# Patient Record
Sex: FEMALE | Race: WHITE | NOT HISPANIC OR LATINO | Employment: OTHER | URBAN - METROPOLITAN AREA
[De-identification: names, ages, dates, MRNs, and addresses within clinical notes are randomized per-mention and may not be internally consistent; named-entity substitution may affect disease eponyms.]

---

## 2017-01-10 ENCOUNTER — INFUSION (OUTPATIENT)
Dept: INFUSION THERAPY | Facility: HOSPITAL | Age: 52
End: 2017-01-10
Attending: INTERNAL MEDICINE
Payer: MEDICARE

## 2017-01-10 ENCOUNTER — OFFICE VISIT (OUTPATIENT)
Dept: HEMATOLOGY/ONCOLOGY | Facility: CLINIC | Age: 52
End: 2017-01-10
Payer: MEDICARE

## 2017-01-10 VITALS
BODY MASS INDEX: 25.3 KG/M2 | DIASTOLIC BLOOD PRESSURE: 60 MMHG | OXYGEN SATURATION: 100 % | WEIGHT: 161.19 LBS | SYSTOLIC BLOOD PRESSURE: 90 MMHG | TEMPERATURE: 98 F | HEIGHT: 67 IN | HEART RATE: 118 BPM | RESPIRATION RATE: 18 BRPM

## 2017-01-10 VITALS
DIASTOLIC BLOOD PRESSURE: 66 MMHG | HEIGHT: 67 IN | BODY MASS INDEX: 25.3 KG/M2 | WEIGHT: 161.19 LBS | HEART RATE: 77 BPM | SYSTOLIC BLOOD PRESSURE: 98 MMHG

## 2017-01-10 DIAGNOSIS — C50.111 MALIGNANT NEOPLASM OF CENTRAL PORTION OF RIGHT FEMALE BREAST: Primary | ICD-10-CM

## 2017-01-10 DIAGNOSIS — I95.1 ORTHOSTATIC HYPOTENSION: ICD-10-CM

## 2017-01-10 DIAGNOSIS — D64.81 ANTINEOPLASTIC CHEMOTHERAPY INDUCED ANEMIA: ICD-10-CM

## 2017-01-10 DIAGNOSIS — Z86.73 HISTORY OF CVA (CEREBROVASCULAR ACCIDENT): ICD-10-CM

## 2017-01-10 DIAGNOSIS — T45.1X5A ANTINEOPLASTIC CHEMOTHERAPY INDUCED ANEMIA: ICD-10-CM

## 2017-01-10 DIAGNOSIS — Z51.11 CHEMOTHERAPY MANAGEMENT, ENCOUNTER FOR: ICD-10-CM

## 2017-01-10 PROCEDURE — 96413 CHEMO IV INFUSION 1 HR: CPT | Mod: PO

## 2017-01-10 PROCEDURE — 96367 TX/PROPH/DG ADDL SEQ IV INF: CPT | Mod: PO

## 2017-01-10 PROCEDURE — 99215 OFFICE O/P EST HI 40 MIN: CPT | Mod: 25,S$PBB,, | Performed by: INTERNAL MEDICINE

## 2017-01-10 PROCEDURE — 63600175 PHARM REV CODE 636 W HCPCS: Mod: PO | Performed by: INTERNAL MEDICINE

## 2017-01-10 PROCEDURE — 96361 HYDRATE IV INFUSION ADD-ON: CPT | Mod: PO

## 2017-01-10 PROCEDURE — 96375 TX/PRO/DX INJ NEW DRUG ADDON: CPT | Mod: PO

## 2017-01-10 PROCEDURE — 25000003 PHARM REV CODE 250: Mod: PO | Performed by: INTERNAL MEDICINE

## 2017-01-10 PROCEDURE — 96415 CHEMO IV INFUSION ADDL HR: CPT | Mod: PO

## 2017-01-10 PROCEDURE — 99999 PR PBB SHADOW E&M-EST. PATIENT-LVL III: CPT | Mod: PBBFAC,,, | Performed by: INTERNAL MEDICINE

## 2017-01-10 PROCEDURE — 96377 APPLICATON ON-BODY INJECTOR: CPT | Mod: PO,59

## 2017-01-10 RX ORDER — HEPARIN 100 UNIT/ML
500 SYRINGE INTRAVENOUS
Status: CANCELLED | OUTPATIENT
Start: 2017-01-10

## 2017-01-10 RX ORDER — PALONOSETRON 0.05 MG/ML
0.25 INJECTION, SOLUTION INTRAVENOUS
Status: COMPLETED | OUTPATIENT
Start: 2017-01-10 | End: 2017-01-10

## 2017-01-10 RX ORDER — FAMOTIDINE 20 MG/50ML
20 INJECTION, SOLUTION INTRAVENOUS
Status: COMPLETED | OUTPATIENT
Start: 2017-01-10 | End: 2017-01-10

## 2017-01-10 RX ORDER — SODIUM CHLORIDE 0.9 % (FLUSH) 0.9 %
10 SYRINGE (ML) INJECTION
Status: CANCELLED | OUTPATIENT
Start: 2017-01-10

## 2017-01-10 RX ORDER — HEPARIN 100 UNIT/ML
500 SYRINGE INTRAVENOUS
Status: DISCONTINUED | OUTPATIENT
Start: 2017-01-10 | End: 2017-01-10 | Stop reason: HOSPADM

## 2017-01-10 RX ORDER — FAMOTIDINE 20 MG/50ML
20 INJECTION, SOLUTION INTRAVENOUS
Status: CANCELLED
Start: 2017-01-10

## 2017-01-10 RX ORDER — PALONOSETRON 0.05 MG/ML
0.25 INJECTION, SOLUTION INTRAVENOUS
Status: CANCELLED
Start: 2017-01-10

## 2017-01-10 RX ADMIN — PALONOSETRON HYDROCHLORIDE 0.25 MG: 0.25 INJECTION INTRAVENOUS at 09:01

## 2017-01-10 RX ADMIN — HEPARIN 500 UNITS: 100 SYRINGE at 03:01

## 2017-01-10 RX ADMIN — SODIUM CHLORIDE 1000 ML: 900 INJECTION, SOLUTION INTRAVENOUS at 09:01

## 2017-01-10 RX ADMIN — DEXAMETHASONE SODIUM PHOSPHATE: 4 INJECTION, SOLUTION INTRA-ARTICULAR; INTRALESIONAL; INTRAMUSCULAR; INTRAVENOUS; SOFT TISSUE at 10:01

## 2017-01-10 RX ADMIN — FAMOTIDINE 20 MG: 20 INJECTION, SOLUTION INTRAVENOUS at 09:01

## 2017-01-10 RX ADMIN — DIPHENHYDRAMINE HYDROCHLORIDE: 50 INJECTION INTRAMUSCULAR; INTRAVENOUS at 10:01

## 2017-01-10 RX ADMIN — PACLITAXEL 324 MG: 6 INJECTION, SOLUTION INTRAVENOUS at 10:01

## 2017-01-10 RX ADMIN — PEGFILGRASTIM 6 MG: KIT SUBCUTANEOUS at 03:01

## 2017-01-10 NOTE — MR AVS SNAPSHOT
Patient Information     Patient Name Sex     Carole Moore Female 1965      Visit Information        Provider Department Dept Phone Center    1/10/2017 8:45 AM [facility] Chemo Infusion [facility] Chemotherapy Infusion [phone] [facility]      Patient Instructions     None      Your Current Medications Are     aspirin 81 MG Chew    atorvastatin (LIPITOR) 10 MG tablet    LORATADINE (CLARITIN ORAL)    ondansetron (ZOFRAN) 8 MG tablet    prochlorperazine (COMPAZINE) 10 MG tablet    tramadol (ULTRAM) 50 mg tablet      Facility-Administered Medications     dexamethasone (DECADRON) 20 mg in sodium chloride 0.9% IVPB    diphenhydrAMINE (BENADRYL) 50 mg in sodium chloride 0.9% 100 mL IVPB    famotidine IVPB 20 mg    heparin, porcine (PF) 100 unit/mL injection flush 500 Units    paclitaxel (TAXOL) 175 mg/m2 = 324 mg in sodium chloride 0.9% 554 mL chemo infusion    palonosetron injection 0.25 mg    pegfilgrastim (NEULASTA (ON BODY INJECTOR)) injection 6 mg    sodium chloride 0.9% bolus 1,000 mL    diphenhydramine IVPB 50 mg (Discontinued)      Appointments for Next Year     2017  8:05 AM NON FASTING LAB (5 min.) [facility]    Arrive at check-in approximately 15 minutes before your scheduled appointment time. Bring all outside medical records and imaging, along with a list of your current medications and insurance card.    (off [employer]) 2nd floor    2017  8:20 AM ESTABLISHED PATIENT (20 min.) [facility] - Hemotology Oncology Alessia Moss MD    Arrive at check-in approximately 15 minutes before your scheduled appointment time. Bring all outside medical records and imaging, along with a list of your current medications and insurance card.    (off [employer]) 3rd Floor    2017  9:00 AM INFUSION 240 MIN (240 min.) [facility] [address]    Arrive at check-in approximately 15 minutes before your scheduled appointment time. Bring all  outside medical records and imaging, along with a list of your current medications and insurance card.    2/24/2017  9:30 AM 2 D ECHO (45 min.) [facility] -Cardiology ECHOCARDIOGRAM, [facility]    INSTRUCTIONS FOR A COMPLETE TWO-DIMENSIONAL ECHOCARDIOGRAM EXAM OR LIMITED 2D/M-MODE ECHOCARDIOGRAM EXAM You have been scheduled for a Two-Dimensional Echocardiogram. This test uses ultrasound waves (harmless sound waves) to give detailed images of heart size, function and structure. NO PREPARATION IS NECESSARY. YOU MAY EAT AND TAKE YOUR MEDICATIONS AS USUAL. Please wear a two-piece outfit. You will be asked to undress from the waist up. Women will put on a patient gown opening to the front. Small pads (electrodes) will be put on your chest to monitor your heart beat. You will be asked to lie on your left side, and will need to remain quiet during the exam. A transducer coated with cool gel will be moved firmly over your chest. This transducer creates sound waves that make images of your heart. A computer changes the sound waves into images that are seen on a screen. You may hear loud noises from the echo machine; this is a normal occurrence. You may be asked to exhale and hold your breath for a few seconds. Air in your lungs can affect the images.  The images of your heart are recorded on video tape so the cardiologist can review them later. Your doctor will be informed of the test results. The test will take approximately 30 minutes to complete. If you are unable to keep your appointment, please call our office to reschedule. Please note that family members or friends are not  allowed to accompany you while you are in the testing area. Check your appointment slip for the location where the test will be done.    (off [facility]) 3rd floor    3/2/2017  1:40 PM ESTABLISHED PATIENT (20 min.) [facility] - Cardiology Fredy Mei MD    Arrive at check-in approximately 15 minutes before your scheduled appointment time. Bring all  "outside medical records and imaging, along with a list of your current medications and insurance card.    (off [facility]) 3rd floor         Default Flowsheet Data (last 24 hours)      Amb Complex Vitals Raffy        01/10/17 1142 01/10/17 1124 01/10/17 0918 01/10/17 0841       Measurements    Weight   73.1 kg (161 lb 2.5 oz) 73.1 kg (161 lb 2.5 oz)     Height   5' 7" (1.702 m) 5' 7" (1.702 m)     BSA (Calculated - sq m)   1.86 sq meters 1.86 sq meters     BMI (Calculated)   25.3 25.3     BP 96/65 (!)  96/59  90/60     Temp    97.8 °F (36.6 °C)     Pulse 77 73  (!)  118     Resp    18     SpO2    100 %     Pain Assessment    Pain Score   Zero Zero             Allergies     No Known Allergies      Medications You Received from 01/09/2017 1517 to 01/10/2017 1517        Date/Time Order Dose Route Action     01/10/2017 1023 dexamethasone (DECADRON) 20 mg in sodium chloride 0.9% IVPB   Intravenous New Bag     01/10/2017 1000 diphenhydrAMINE (BENADRYL) 50 mg in sodium chloride 0.9% 100 mL IVPB   Intravenous New Bag     01/10/2017 0941 famotidine IVPB 20 mg 20 mg Intravenous New Bag     01/10/2017 1059 paclitaxel (TAXOL) 175 mg/m2 = 324 mg in sodium chloride 0.9% 554 mL chemo infusion 324 mg Intravenous New Bag     01/10/2017 0940 palonosetron injection 0.25 mg 0.25 mg Intravenous Given     01/10/2017 1508 pegfilgrastim (NEULASTA (ON BODY INJECTOR)) injection 6 mg 6 mg Subcutaneous Given     01/10/2017 0935 sodium chloride 0.9% bolus 1,000 mL 1,000 mL Intravenous New Bag      Current Discharge Medication List     Cannot display discharge medications since this is not an admission.      "

## 2017-01-10 NOTE — PLAN OF CARE
Problem: Patient Care Overview  Goal: Plan of Care Review  Outcome: Ongoing (interventions implemented as appropriate)  Pt is without complaint at present

## 2017-01-10 NOTE — PROGRESS NOTES
Hematology/Oncology Established Visit    Reason for Visit / CC: Breast cancer    [facility] Diagnosis: Breast cancer    Stage: Clinical Stage IIB ([id number])    Pathology: 10/27/16 Right breast mass, incisional biopsy:  Invasive lobular carcinoma, intermediate grade (3,2,1), see note. Virtually 100% of the cells of the infiltrating carcinoma are strongly both nuclear estrogen receptor and nuclear  progesterone receptor positive. They are HER-2 negative.    Prior Treatment: None    Current Treatment: Neoadjuvant chemotherapy with dose-dense AC-T    History of Present Illness:  Ms. 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 She has had an indention in her right breast with some nipple retraction for the past year, but mammogram and u/s last year were normal. This year, imaging and physical exam revealed denser tissue with more pronounced nipple retraction. Dr. Faria performed incisional LN biopsy of the R breast, which revealed invasive lobular carcinoma. She had a hysterectomy in 2008. She does get occasional hot flashes. Colonoscopy approx age 46 was normal. No melena/hematochezia. No unintentional weight loss. No bone pain.    Today, patient comes in for follow up of breast cancer. She is tearful this morning and states that she has been extremely fatigued after her 4th AC chemotherapy cycle. She has been taking 2 naps a day, which is very unusual for her. No nausea, vomiting, diarrhea, fevers, chills, sweats, cough.  Her constipation and hemorrhoids have returned. Pt endorses she is not drinking much water. No fevers, chills, sweats.    ROS:  General:  No wt loss, fever/chills, fatigue, night sweats  Eyes: No vision problems, pain or inflammation.   Ears/Nose: No difficultly hearing, ear pain, rhinorrhea, or epistaxis  Oropharynx: No ulcers, dysphagia, or odynophagia  Cardiovascular: No chest  pains, sob, PND or dyspnea on exertion  Pulmonary: No cough, sob, hemoptysis  Gastrointestional: No n/v/d, melena, hematochezia, or change in bowel habits  : No dysuria, hematuria, pelvic pain or flank pain  Musculoskeletal: No myalgias,  or arthralgias +R arm weakness and numbness  Neurological: No headaches, focal deficits, or seizure activity  Endocrine: No heat or cold intolerance   Skin: No rashes pruritus, or lesions  Psychiatric: No symptoms of mood disorders  Heme/Lymph: No lymph node enlargment    Past Medical History   Diagnosis Date    Cancer     CVA (cerebral infarction)     Diverticulosis     Hyperlipidemia     Nausea after anesthesia     Paralysis      right side    Stroke      Past Surgical History:  Partial hysterectomy 11/2008    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 No illicits.  Social History     Social History    Marital status:      Spouse name: N/A    Number of children: N/A    Years of education: N/A     Social History Main Topics    Smoking status: Never Smoker    Smokeless tobacco: Never Used    Alcohol use No    Drug use: No    Sexual activity: No     Other Topics Concern    Are You Pregnant Or Think You May Be? No    Breast-Feeding No     Social History Narrative       Family History: family history includes Breast cancer in her paternal aunt; Colon cancer in her father and mother; Melanoma in her maternal uncle. 2 Paternal aunts diagnosed with breast cancer age 60s. Mom had colon cancer age late 60s, father had colon cancer age late 60s. Maternal uncle with melanoma age 45. Maternal uncle with lung cancer age 59.    Physical Exam:  Vitals:    01/10/17 0841   BP: 90/60   Pulse: (!) 118   Resp: 18   Temp: 97.8 °F (36.6 °C)     Body mass index is 25.24 kg/(m^2).  General:  AAOx4, no acute distress  HEENT: EOMI. Normocephalic and atraumatic. No maxillary sinus tenderness. External auditory canals clear and TMs intact without  lesions. Nasal and oral mucosal membranes moist. Normal dentition and gums.   Neck: no LAD, thyromegaly, normal ROM  Breast: Left breast without any masses, skin changes. Right breast with central dense mass approx 1.5cm horiz by 2cm vertical and less nipple retraction. No cervical, supraclav, axillary LAD.  Pulmonary: Bilaterally clear to auscultation, Normal effort with no accessory muscle use, no wheezes/rales/rhonchi  CV: Normal rate, regular rhythm, no murmurs/rubs/gallops, no edema  ABD:  Soft, nontender, nondistended, no mass, and without hepatosplenomegaly   Ext: No clubbing, cyanosis, or edema, normal ROM  Skin: No rashes, lesions, bruising or petechiae  Neurological: CN II to XII grossly intact, normal coordination  +R arm with 0/5 strength, mild numbness. R ankle with foot drop requiring brace.  Psychiatric:  Normal affect and judgement +tearful  Hem/Lymph:  No submandibular, cervical, supraclavicular, axillary LAD.    Labs:    Lab Results   Component Value Date    WBC 7.35 01/10/2017    HGB 10.1 (L) 01/10/2017    HCT 30.3 (L) 01/10/2017    MCV 93 01/10/2017     01/10/2017     Lab Results   Component Value Date     12/27/2016    K 3.4 (L) 12/27/2016     12/27/2016    CO2 27 12/27/2016    BUN 12 12/27/2016    CREATININE 0.7 12/27/2016    CALCIUM 8.9 12/27/2016    ANIONGAP 7 (L) 12/27/2016    ESTGFRAFRICA >60 12/27/2016    EGFRNONAA >60 12/27/2016     Lab Results   Component Value Date    ALT 19 12/27/2016    AST 13 12/27/2016    ALKPHOS 82 12/27/2016    BILITOT 0.4 12/27/2016       No results found for: IRON, TIBC, FERRITIN, SATURATEDIRO  No results found for: LESSRLAL84  No results found for: FOLATE  Lab Results   Component Value Date    TSH 0.680 09/15/2015       Imaging:  Mammogram 10/10/16: The breasts are heterogeneously dense.  This may lower the sensitivity of mammography.  There is global asymmetry seen in the right breast.  Increased density and contraction of fibroglandular  tissue on the right with increased prominence  of nipple retraction.  No defiinite mass or suspicious calcifications. Recommend ultrasound.  Left breast stable without  additional evidence of dominant mass, architectural distortion or suspicious calcification.  Results of ultrasound and/or mammogram should not preclude biopsy of any clinically palpable and/or suspicious nodule or mass. Please correlate with exam.  Digital tomosynthesis was performed and used in the interpretation of the images. Images were evaluated with a Computer Aided Detection (CAD) system.  Impression  Global asymmetry in the right breast requires additional evaluation. Clinical correlation of finding is recommended. An ultrasound exam is recommended.    PET 11/9/16:  Hypermetabolic uptake within patient's known right breast carcinoma with a maximum SUV of 3.5.  No findings to suggest metastatic disease.    Assessment / Plan:  Carole Moore is a 51 y.o. female who comes in with right breast cancer.    1. Right-sided breast cancer: Invasive lobular carcinoma, ER/AZ positive, [id number] neg. BRCA negative. Given large mass on physical examination as well as intraoperative findings, I agree that this patient would benefit from neoadjuvant chemotherapy. Have discussed and consented patient for dose-dense AC-T regimen. Will plan for neoadjuvant chemotherapy, followed by mastectomy and SLND. Physical exam demonstates response.  -- Proceed with cycle 1, day 1 of dose-dense Taxol today. Neulasta OBI today.   -- Return in 2 weeks for cycle 2    2. Cardiotoxic medication monitoring: Given use of anthracycline, will monitor heart function. Echo on 11/10/16 shows EF 55-60%.  -- Cardio-oncology is following.    3. 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 No irinotecan can be given. Monitor Tylenol use.    4. H/o CVA: With residual R-sided weakness. On Aspirin, statin.    5. Constipation: Likely related to Zofran. I  recommend discontinuing Zofran and using Compazine prn. Discussed with patient to increase fluids and use Senna daily prn.    6. Chemotherapy induced anemia: Will continue to monitor closely.    7. Orthostatic hypotension: 2/2 dehydration. Will administer 1 liter NS over 1 hr today.    Alessia Moss M.D.  Hematology Oncology

## 2017-01-12 ENCOUNTER — INFUSION (OUTPATIENT)
Dept: INFUSION THERAPY | Facility: HOSPITAL | Age: 52
End: 2017-01-12
Attending: INTERNAL MEDICINE
Payer: COMMERCIAL

## 2017-01-12 VITALS — DIASTOLIC BLOOD PRESSURE: 64 MMHG | TEMPERATURE: 97 F | HEART RATE: 104 BPM | SYSTOLIC BLOOD PRESSURE: 100 MMHG

## 2017-01-12 DIAGNOSIS — C50.111 MALIGNANT NEOPLASM OF CENTRAL PORTION OF RIGHT FEMALE BREAST: ICD-10-CM

## 2017-01-12 DIAGNOSIS — I95.1 ORTHOSTATIC HYPOTENSION: Primary | ICD-10-CM

## 2017-01-12 PROCEDURE — 96360 HYDRATION IV INFUSION INIT: CPT | Mod: PO

## 2017-01-12 PROCEDURE — 96361 HYDRATE IV INFUSION ADD-ON: CPT | Mod: PO

## 2017-01-12 PROCEDURE — 25000003 PHARM REV CODE 250: Mod: PO | Performed by: INTERNAL MEDICINE

## 2017-01-12 RX ORDER — HEPARIN 100 UNIT/ML
500 SYRINGE INTRAVENOUS
Status: CANCELLED | OUTPATIENT
Start: 2017-01-12

## 2017-01-12 RX ORDER — HEPARIN 100 UNIT/ML
500 SYRINGE INTRAVENOUS
Status: COMPLETED | OUTPATIENT
Start: 2017-01-12 | End: 2017-01-12

## 2017-01-12 RX ORDER — SODIUM CHLORIDE 0.9 % (FLUSH) 0.9 %
10 SYRINGE (ML) INJECTION
Status: CANCELLED | OUTPATIENT
Start: 2017-01-12

## 2017-01-12 RX ADMIN — HEPARIN 500 UNITS: 100 SYRINGE at 11:01

## 2017-01-12 RX ADMIN — SODIUM CHLORIDE: 900 INJECTION, SOLUTION INTRAVENOUS at 09:01

## 2017-01-12 NOTE — MR AVS SNAPSHOT
Patient Information     Patient Name Sex     Carole Moore Female 1965      Visit Information        Provider Department Dept Phone Center    2017 9:00 AM [facility] Chemo Infusion [facility] Chemotherapy Infusion [phone] [facility]      Patient Instructions     None      Your Current Medications Are     aspirin 81 MG Chew    atorvastatin (LIPITOR) 10 MG tablet    LORATADINE (CLARITIN ORAL)    ondansetron (ZOFRAN) 8 MG tablet    prochlorperazine (COMPAZINE) 10 MG tablet    tramadol (ULTRAM) 50 mg tablet      Facility-Administered Medications     sodium chloride 0.9% 1,000 mL infusion      Appointments for Next Year     2017  8:45 AM INFUSION 120 MIN (120 min.) [facility] CHAIR 06 [facility]    Arrive at check-in approximately 15 minutes before your scheduled appointment time. Bring all outside medical records and imaging, along with a list of your current medications and insurance card.    2017  8:05 AM NON FASTING LAB (5 min.) [facility] LABORATORY [facility]    Arrive at check-in approximately 15 minutes before your scheduled appointment time. Bring all outside medical records and imaging, along with a list of your current medications and insurance card.    (off [employer]) 2nd floor    2017  8:20 AM ESTABLISHED PATIENT (20 min.) [facility] Hemotology Oncology Alessia Moss MD    Arrive at check-in approximately 15 minutes before your scheduled appointment time. Bring all outside medical records and imaging, along with a list of your current medications and insurance card.    (off [email or web address]) 3rd Floor    2017  9:00 AM INFUSION 240 MIN (240 min.) [facility] CHAIR 08 [facility]    Arrive at check-in approximately 15 minutes before your scheduled appointment time. Bring all outside medical records and imaging, along with a list of your current medications and insurance card.    2017  9:30 AM 2 D ECHO (45 min.) [facility]  -Cardiology ECHOCARDIOGRAM, [facility]    INSTRUCTIONS FOR A COMPLETE TWO-DIMENSIONAL ECHOCARDIOGRAM EXAM OR LIMITED 2D/M-MODE ECHOCARDIOGRAM EXAM You have been scheduled for a Two-Dimensional Echocardiogram. This test uses ultrasound waves (harmless sound waves) to give detailed images of heart size, function and structure. NO PREPARATION IS NECESSARY. YOU MAY EAT AND TAKE YOUR MEDICATIONS AS USUAL. Please wear a two-piece outfit. You will be asked to undress from the waist up. Women will put on a patient gown opening to the front. Small pads (electrodes) will be put on your chest to monitor your heart beat. You will be asked to lie on your left side, and will need to remain quiet during the exam. A transducer coated with cool gel will be moved firmly over your chest. This transducer creates sound waves that make images of your heart. A computer changes the sound waves into images that are seen on a screen. You may hear loud noises from the echo machine; this is a normal occurrence. You may be asked to exhale and hold your breath for a few seconds. Air in your lungs can affect the images.  The images of your heart are recorded on video tape so the cardiologist can review them later. Your doctor will be informed of the test results. The test will take approximately 30 minutes to complete. If you are unable to keep your appointment, please call our office to reschedule. Please note that family members or friends are not  allowed to accompany you while you are in the testing area. Check your appointment slip for the location where the test will be done.    (off [employer]) 3rd floor    3/2/2017  1:40 PM ESTABLISHED PATIENT (20 min.) [facility] - Cardiology Fredy Mei MD    Arrive at check-in approximately 15 minutes before your scheduled appointment time. Bring all outside medical records and imaging, along with a list of your current medications and insurance card.    (off [employer]) 3rd floor          Default Flowsheet Data (last 24 hours)      Amb Complex Vitals Raffy        01/12/17 0856                Measurements    /64        Temp 96.9 °F (36.1 °C)        Pulse 104        Pain Assessment    Pain Score One        Pain Loc FINGER                Allergies     No Known Allergies      Medications You Received from 01/11/2017 1115 to 01/12/2017 1115        Date/Time Order Dose Route Action     01/12/2017 0904 sodium chloride 0.9% 1,000 mL infusion   Intravenous New Bag      Current Discharge Medication List     Cannot display discharge medications since this is not an admission.

## 2017-01-12 NOTE — PLAN OF CARE
Problem: Patient Care Overview  Goal: Plan of Care Review  Outcome: Ongoing (interventions implemented as appropriate)  Pt states she feels better having received the fluids

## 2017-01-13 ENCOUNTER — INFUSION (OUTPATIENT)
Dept: INFUSION THERAPY | Facility: HOSPITAL | Age: 52
End: 2017-01-13
Attending: INTERNAL MEDICINE
Payer: MEDICARE

## 2017-01-13 VITALS
HEART RATE: 110 BPM | RESPIRATION RATE: 20 BRPM | DIASTOLIC BLOOD PRESSURE: 59 MMHG | TEMPERATURE: 98 F | SYSTOLIC BLOOD PRESSURE: 104 MMHG

## 2017-01-13 DIAGNOSIS — I95.1 ORTHOSTATIC HYPOTENSION: Primary | ICD-10-CM

## 2017-01-13 PROCEDURE — 96361 HYDRATE IV INFUSION ADD-ON: CPT | Mod: PO

## 2017-01-13 PROCEDURE — 25000003 PHARM REV CODE 250: Mod: PO | Performed by: INTERNAL MEDICINE

## 2017-01-13 PROCEDURE — 96360 HYDRATION IV INFUSION INIT: CPT | Mod: PO

## 2017-01-13 RX ADMIN — SODIUM CHLORIDE: 900 INJECTION, SOLUTION INTRAVENOUS at 08:01

## 2017-01-13 NOTE — PLAN OF CARE
Problem: Patient Care Overview  Goal: Plan of Care Review  Outcome: Ongoing (interventions implemented as appropriate)  Patient verbalizes understanding of care plan.

## 2017-01-24 ENCOUNTER — OFFICE VISIT (OUTPATIENT)
Dept: HEMATOLOGY/ONCOLOGY | Facility: CLINIC | Age: 52
End: 2017-01-24
Payer: MEDICARE

## 2017-01-24 ENCOUNTER — INFUSION (OUTPATIENT)
Dept: INFUSION THERAPY | Facility: HOSPITAL | Age: 52
End: 2017-01-24
Attending: INTERNAL MEDICINE
Payer: MEDICARE

## 2017-01-24 VITALS
OXYGEN SATURATION: 98 % | WEIGHT: 161.81 LBS | TEMPERATURE: 98 F | RESPIRATION RATE: 18 BRPM | HEIGHT: 67 IN | BODY MASS INDEX: 25.4 KG/M2 | HEART RATE: 100 BPM | DIASTOLIC BLOOD PRESSURE: 70 MMHG | SYSTOLIC BLOOD PRESSURE: 90 MMHG

## 2017-01-24 VITALS
WEIGHT: 161.81 LBS | HEIGHT: 67 IN | DIASTOLIC BLOOD PRESSURE: 66 MMHG | HEART RATE: 103 BPM | BODY MASS INDEX: 25.4 KG/M2 | SYSTOLIC BLOOD PRESSURE: 110 MMHG

## 2017-01-24 DIAGNOSIS — Z86.73 HISTORY OF CVA (CEREBROVASCULAR ACCIDENT): ICD-10-CM

## 2017-01-24 DIAGNOSIS — Z79.899 ENCOUNTER FOR MONITORING CARDIOTOXIC DRUG THERAPY: ICD-10-CM

## 2017-01-24 DIAGNOSIS — Z51.81 ENCOUNTER FOR MONITORING CARDIOTOXIC DRUG THERAPY: ICD-10-CM

## 2017-01-24 DIAGNOSIS — T45.1X5A ANTINEOPLASTIC CHEMOTHERAPY INDUCED ANEMIA: ICD-10-CM

## 2017-01-24 DIAGNOSIS — C50.111 MALIGNANT NEOPLASM OF CENTRAL PORTION OF RIGHT FEMALE BREAST: Primary | ICD-10-CM

## 2017-01-24 DIAGNOSIS — Z51.11 CHEMOTHERAPY MANAGEMENT, ENCOUNTER FOR: ICD-10-CM

## 2017-01-24 DIAGNOSIS — D64.81 ANTINEOPLASTIC CHEMOTHERAPY INDUCED ANEMIA: ICD-10-CM

## 2017-01-24 PROCEDURE — 25000003 PHARM REV CODE 250: Mod: PO | Performed by: INTERNAL MEDICINE

## 2017-01-24 PROCEDURE — 96415 CHEMO IV INFUSION ADDL HR: CPT | Mod: PO

## 2017-01-24 PROCEDURE — 63600175 PHARM REV CODE 636 W HCPCS: Mod: PO | Performed by: INTERNAL MEDICINE

## 2017-01-24 PROCEDURE — 96377 APPLICATON ON-BODY INJECTOR: CPT | Mod: PO

## 2017-01-24 PROCEDURE — 99213 OFFICE O/P EST LOW 20 MIN: CPT | Mod: PBBFAC,25,PO | Performed by: INTERNAL MEDICINE

## 2017-01-24 PROCEDURE — 99999 PR PBB SHADOW E&M-EST. PATIENT-LVL III: CPT | Mod: PBBFAC,,, | Performed by: INTERNAL MEDICINE

## 2017-01-24 PROCEDURE — 96413 CHEMO IV INFUSION 1 HR: CPT | Mod: PO

## 2017-01-24 PROCEDURE — 96367 TX/PROPH/DG ADDL SEQ IV INF: CPT | Mod: PO

## 2017-01-24 PROCEDURE — 99215 OFFICE O/P EST HI 40 MIN: CPT | Mod: 25,S$PBB,, | Performed by: INTERNAL MEDICINE

## 2017-01-24 PROCEDURE — 96375 TX/PRO/DX INJ NEW DRUG ADDON: CPT | Mod: PO

## 2017-01-24 PROCEDURE — 96368 THER/DIAG CONCURRENT INF: CPT | Mod: PO

## 2017-01-24 RX ORDER — FAMOTIDINE 20 MG/50ML
20 INJECTION, SOLUTION INTRAVENOUS
Status: CANCELLED
Start: 2017-01-24

## 2017-01-24 RX ORDER — PALONOSETRON 0.05 MG/ML
0.25 INJECTION, SOLUTION INTRAVENOUS
Status: COMPLETED | OUTPATIENT
Start: 2017-01-24 | End: 2017-01-24

## 2017-01-24 RX ORDER — PALONOSETRON 0.05 MG/ML
0.25 INJECTION, SOLUTION INTRAVENOUS
Status: CANCELLED
Start: 2017-01-24

## 2017-01-24 RX ORDER — SODIUM CHLORIDE 0.9 % (FLUSH) 0.9 %
10 SYRINGE (ML) INJECTION
Status: DISCONTINUED | OUTPATIENT
Start: 2017-01-24 | End: 2017-01-24 | Stop reason: HOSPADM

## 2017-01-24 RX ORDER — FAMOTIDINE 20 MG/50ML
20 INJECTION, SOLUTION INTRAVENOUS
Status: COMPLETED | OUTPATIENT
Start: 2017-01-24 | End: 2017-01-24

## 2017-01-24 RX ORDER — HEPARIN 100 UNIT/ML
500 SYRINGE INTRAVENOUS
Status: CANCELLED | OUTPATIENT
Start: 2017-01-24

## 2017-01-24 RX ORDER — SODIUM CHLORIDE 0.9 % (FLUSH) 0.9 %
10 SYRINGE (ML) INJECTION
Status: CANCELLED | OUTPATIENT
Start: 2017-01-24

## 2017-01-24 RX ORDER — HEPARIN 100 UNIT/ML
500 SYRINGE INTRAVENOUS
Status: DISCONTINUED | OUTPATIENT
Start: 2017-01-24 | End: 2017-01-24 | Stop reason: HOSPADM

## 2017-01-24 RX ADMIN — FAMOTIDINE 20 MG: 20 INJECTION, SOLUTION INTRAVENOUS at 09:01

## 2017-01-24 RX ADMIN — DIPHENHYDRAMINE HYDROCHLORIDE: 50 INJECTION INTRAMUSCULAR; INTRAVENOUS at 09:01

## 2017-01-24 RX ADMIN — PEGFILGRASTIM 6 MG: KIT SUBCUTANEOUS at 03:01

## 2017-01-24 RX ADMIN — PACLITAXEL 324 MG: 6 INJECTION, SOLUTION INTRAVENOUS at 11:01

## 2017-01-24 RX ADMIN — PALONOSETRON HYDROCHLORIDE 0.25 MG: 0.25 INJECTION INTRAVENOUS at 09:01

## 2017-01-24 RX ADMIN — DEXAMETHASONE SODIUM PHOSPHATE: 4 INJECTION, SOLUTION INTRA-ARTICULAR; INTRALESIONAL; INTRAMUSCULAR; INTRAVENOUS; SOFT TISSUE at 10:01

## 2017-01-24 RX ADMIN — SODIUM CHLORIDE: 0.9 INJECTION, SOLUTION INTRAVENOUS at 09:01

## 2017-01-24 RX ADMIN — SODIUM CHLORIDE, PRESERVATIVE FREE 10 ML: 5 INJECTION INTRAVENOUS at 09:01

## 2017-01-24 RX ADMIN — HEPARIN 500 UNITS: 100 SYRINGE at 03:01

## 2017-01-24 NOTE — MR AVS SNAPSHOT
Patient Information     Patient Name Sex     Carole Moore Female 1965      Visit Information        Provider Department Dept Phone Center    2017 9:00 AM [facility] Chemo Infusion [facility] Chemotherapy [facility]fusion [phone] [facility]      Patient Instructions      [facility]  [address] [facility] [address]  [address] [address]  [phone] phone     [phone] fax  Hours of Operation: Monday- Friday 8:00am- 5:00pm  After hours phone  [phone]  Hematology / Oncology Physicians on call      Dr. Bill Moss    Please call with any concerns regarding your appointment today.HOME CARE AFTER CHEMOTHERAPY   Meals   Many patients feel sick and lose their appetites during treatment. Eat small meals several times a day. Choose bland foods with little taste or smell if you have problems with nausea. Be sure to cook all food thoroughly. This kills bacteria and helps you avoid intestinal infection. Soft foods are easier to swallow and digest.   Activity   Exercise keeps you strong and keeps your heart and lungs active. Talk to your doctor about an appropriate exercise program for you.   Skin Care   To prevent a skin infection, bathe or shower once a day. Use a moisturizing soap and wash with warm water. Avoid very hot or cold water. Chemotherapy can make your skin dry . Apply moisturizing lotion to help relieve dry skin. Some drugs used in high doses can cause slight burns to appear (like sunburn). Ask for a special cream to help relieve the burn and protect your skin.   Prevent Mouth Sores   During chemotherapy, many people get mouth sores. Do the following to help prevent mouth sores or to ease discomfort.   Brush your teeth with a soft-bristle toothbrush after every meal.  Don't use dental floss if your platelet count is below 50,000. Your doctor or nurse will tell you if this is the case.  Use an oral swab or special soft toothbrush if your  gums bleed during regular brushing.  Use mouthwash as directed. If you can't tolerate commercial mouthwash, use salt and baking soda to clean your mouth. Mix 1 teaspoon of salt and 1 teaspoon of baking soda into a glass of water. Swish and spit.  Call your doctor or return to this facility if you develop any of the following:   Sore throat   White patches in the mouth or throat   Fever of 100.4ºF (38ºC) or higher, or as directed by your healthcare provider  © 9510-7765 [employer]en[employer], [address] [place] [id number]. All rights reserved. This information is not intended as a substitute for professional medical care. Always follow your healthcare professional's   YOU HAVE STARTED ON CHEMOTHERAPY. IF YOU EXPERIENCE ANY OF THE FOLLOWING PROBLEMS, CALL THE OFFICE IMMEDIATELY.    *FEVER .0 OR GREATER    *CHILLS, ESPECIALLY SHAKING CHILLS, OR SWEATING    *A SEVERE COUGH OR SORE THROAT, OR SINUS PAIN/     PRESSURE    *REDNESS, SWELLING, OR TENDERNESS AROUND A WOUND,     SORE, PIMPLE, RECTAL AREA, OR IV SITE    *SORES OR ULCERS IN THE MOUTH    *BLISTERS ON THE LIPS OR SKIN    *FREQUENT URGENCY TO URINATE OR A BURNING FEELING   WHEN YOU URINATE    *BLOOD IN THE URINE OR STOOL    *ANY UNEXPLAINED BRUISING OR PROLONGED BLEEDING,     (NOSEBLEEDS OR BLEEDING GUMS)    *LOOSE BOWEL MOVEMENTS THAT DO NOT RESPOND TO     IMODIUM OR MORE THAN THREE TIMES A DAY    *VOMITING UNRESPONSIVE TO ANTINAUSEA MEDICINE    *ANY UNUSUAL PHYSICAL SYMPTOMS THAT BEGAN AFTER     CHEMOTHERAPY    DURING WEEKDAYS, CALL AND ASK TO SPEAK DIRECTLY TO A NURSE.  AT OTHER TIMES, CALL THE OFFICE PHONE NUMBER; THE ANSWERING SERVICE WILL CONTACT THE ON-CALL PHYSICIAN.  SOMEONE IS AVAILABLE 24 HOURS A DAY, SEVEN DAYS A WEEK.       Your Current Medications Are     aspirin 81 MG Chew    atorvastatin (LIPITOR) 10 MG tablet    LORATADINE (CLARITIN ORAL)    ondansetron (ZOFRAN) 8 MG tablet    prochlorperazine (COMPAZINE) 10 MG tablet    tramadol  (ULTRAM) 50 mg tablet      Facility-Administered Medications     dexamethasone (DECADRON) 20 mg in sodium chloride 0.9% IVPB    diphenhydrAMINE (BENADRYL) 50 mg in sodium chloride 0.9% 100 mL IVPB    famotidine IVPB 20 mg    heparin, porcine (PF) 100 unit/mL injection flush 500 Units    paclitaxel (TAXOL) 175 mg/m2 = 324 mg in sodium chloride 0.9% 554 mL chemo infusion    palonosetron injection 0.25 mg    pegfilgrastim (NEULASTA (ON BODY INJECTOR)) injection 6 mg    sodium chloride 0.9% 250 mL flush bag    sodium chloride 0.9% flush 10 mL      Appointments for Next Year     1/26/2017  9:00 AM INFUSION 120 MIN (120 min.) [facility]    Arrive at check-in approximately 15 minutes before your scheduled appointment time. Bring all outside medical records and imaging, along with a list of your current medications and insurance card.    2/7/2017  7:55 AM NON FASTING LAB (5 min.) [facility]    Arrive at check-in approximately 15 minutes before your scheduled appointment time. Bring all outside medical records and imaging, along with a list of your current medications and insurance card.    (off [employer]) 2nd floor    2/7/2017  8:20 AM ESTABLISHED PATIENT (20 min.) [facility] - Hemotology Oncology Alessia Moss MD    Arrive at check-in approximately 15 minutes before your scheduled appointment time. Bring all outside medical records and imaging, along with a list of your current medications and insurance card.    (off [employer]) 3rd Floor    2/7/2017  8:45 AM INFUSION 240 MIN (240 min.) [facility]    Arrive at check-in approximately 15 minutes before your scheduled appointment time. Bring all outside medical records and imaging, along with a list of your current medications and insurance card.    2/24/2017  9:30 AM 2 D ECHO (45 min.) [facility] -[facility]iology [facility]    INSTRUCTIONS FOR A COMPLETE  TWO-DIMENSIONAL ECHOCARDIOGRAM EXAM OR LIMITED 2D/M-MODE ECHOCARDIOGRAM EXAM You have been scheduled for a Two-Dimensional Echocardiogram. This test uses ultrasound waves (harmless sound waves) to give detailed images of heart size, function and structure. NO PREPARATION IS NECESSARY. YOU MAY EAT AND TAKE YOUR MEDICATIONS AS USUAL. Please wear a two-piece outfit. You will be asked to undress from the waist up. Women will put on a patient gown opening to the front. Small pads (electrodes) will be put on your chest to monitor your heart beat. You will be asked to lie on your left side, and will need to remain quiet during the exam. A transducer coated with cool gel will be moved firmly over your chest. This transducer creates sound waves that make images of your heart. A computer changes the sound waves into images that are seen on a screen. You may hear loud noises from the echo machine; this is a normal occurrence. You may be asked to exhale and hold your breath for a few seconds. Air in your lungs can affect the images.  The images of your heart are recorded on video tape so the cardiologist can review them later. Your doctor will be informed of the test results. The test will take approximately 30 minutes to complete. If you are unable to keep your appointment, please call our office to reschedule. Please note that family members or friends are not  allowed to accompany you while you are in the testing area. Check your appointment slip for the location where the test will be done.    (off [employer]) 3rd floor    3/2/2017  1:40 PM ESTABLISHED PATIENT (20 min.) [facility] - Cardiology Fredy Mei MD    Arrive at check-in approximately 15 minutes before your scheduled appointment time. Bring all outside medical records and imaging, along with a list of your current medications and insurance card.    (off [employer]) 3rd floor         Default Flowsheet Data (last 24 hours)      Amb Complex Vitals Raffy      "   01/24/17 0915 01/24/17 0845             Measurements    Weight 73.4 kg (161 lb 13.1 oz) 73.4 kg (161 lb 13.1 oz)       Height 5' 7" (1.702 m) 5' 7" (1.702 m)       BSA (Calculated - sq m) 1.86 sq meters 1.86 sq meters       BMI (Calculated) 25.4 25.4       BP  90/70       Temp  98 °F (36.7 °C)       Pulse  100       Resp  18       SpO2  98 %       Pain Assessment    Pain Score Zero Zero               Allergies     No Known Allergies      Medications You Received from 01/23/2017 0951 to 01/24/2017 0951        Date/Time Order Dose Route Action     01/24/2017 0936 famotidine IVPB 20 mg 20 mg Intravenous New Bag     01/24/2017 0932 palonosetron injection 0.25 mg 0.25 mg Intravenous Given     01/24/2017 0921 sodium chloride 0.9% 250 mL flush bag   Intravenous New Bag     01/24/2017 0920 sodium chloride 0.9% flush 10 mL 10 mL Intravenous Given      Current Discharge Medication List     Cannot display discharge medications since this is not an admission.      "

## 2017-01-24 NOTE — PROGRESS NOTES
Hematology/Oncology Established Visit    Reason for Visit / CC: Breast cancer    [facility] Diagnosis: Breast cancer    Stage: Clinical Stage IIB (tP7P4Tt)    Pathology: 10/27/16 Right breast mass, incisional biopsy:  Invasive lobular carcinoma, intermediate grade (3,2,1), see note. Virtually 100% of the cells of the infiltrating carcinoma are strongly both nuclear estrogen receptor and nuclear  progesterone receptor positive. They are HER-2 negative.    Prior Treatment: None    Current Treatment: Neoadjuvant chemotherapy with dose-dense AC-T    History of Present Illness:  Ms. 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 She has had an indention in her right breast with some nipple retraction for the past year, but mammogram and u/s last year were normal. This year, imaging and physical exam revealed denser tissue with more pronounced nipple retraction. Dr. Faria performed incisional LN biopsy of the R breast, which revealed invasive lobular carcinoma. She had a hysterectomy in 2008. She does get occasional hot flashes. Colonoscopy approx age 46 was normal. No melena/hematochezia. No unintentional weight loss. No bone pain.    Today, patient comes in for follow up of breast cancer. She tolerated her first cycle of dose-dense Taxol well and had mild myalgias 3-4 days after treatment. No nausea, vomiting, diarrhea, fevers, chills, sweats, cough.  Her constipation and hemorrhoids have returned. Pt endorses she is not drinking much water, but she felt much better after IVF 2 weeks ago. She also forgot to take her stool softeners regularly.    ROS:  General:  No wt loss, fever/chills, fatigue, night sweats  Eyes: No vision problems, pain or inflammation.   Ears/Nose: No difficultly hearing, ear pain, rhinorrhea, or epistaxis  Oropharynx: No ulcers, dysphagia, or odynophagia  Cardiovascular: No chest  pains, sob, PND or dyspnea on exertion  Pulmonary: No cough, sob, hemoptysis  Gastrointestional: No n/v/d, melena, hematochezia, or change in bowel habits +constipation and hemorrhoids  : No dysuria, hematuria, pelvic pain or flank pain  Musculoskeletal: No myalgias,  or arthralgias +R arm weakness and numbness  Neurological: No headaches, focal deficits, or seizure activity  Endocrine: No heat or cold intolerance   Skin: No rashes pruritus, or lesions  Psychiatric: No symptoms of mood disorders  Heme/Lymph: No lymph node enlargment    Past Medical History   Diagnosis Date    Cancer     CVA (cerebral infarction)     Diverticulosis     Hyperlipidemia     Nausea after anesthesia     Paralysis      right side    Stroke      Past Surgical History:  Partial hysterectomy 11/2008    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 No illicits.  Social History     Social History    Marital status:      Spouse name: N/A    Number of children: N/A    Years of education: N/A     Social History Main Topics    Smoking status: Never Smoker    Smokeless tobacco: Never Used    Alcohol use No    Drug use: No    Sexual activity: No     Other Topics Concern    Are You Pregnant Or Think You May Be? No    Breast-Feeding No     Social History Narrative       Family History: family history includes Breast cancer in her paternal aunt; Colon cancer in her father and mother; Melanoma in her maternal uncle. 2 Paternal aunts diagnosed with breast cancer age 60s. Mom had colon cancer age late 60s, father had colon cancer age late 60s. Maternal uncle with melanoma age 45. Maternal uncle with lung cancer age 59.    Physical Exam:  Vitals:    01/24/17 0845   BP: 90/70   Pulse: 100   Resp: 18   Temp: 98 °F (36.7 °C)     Body mass index is 25.34 kg/(m^2).  General:  AAOx4, no acute distress  HEENT: EOMI. Normocephalic and atraumatic. No maxillary sinus tenderness. External auditory canals clear  and TMs intact without lesions. Nasal and oral mucosal membranes moist. Normal dentition and gums.   Neck: no LAD, thyromegaly, normal ROM  Breast: Left breast without any masses, skin changes. Right breast with central dense mass approx 1.5cm horiz by 2cm vertical and less nipple retraction. No cervical, supraclav, axillary LAD.  Pulmonary: Bilaterally clear to auscultation, Normal effort with no accessory muscle use, no wheezes/rales/rhonchi  CV: Normal rate, regular rhythm, no murmurs/rubs/gallops, no edema  ABD:  Soft, nontender, nondistended, no mass, and without hepatosplenomegaly   Ext: No clubbing, cyanosis, or edema, normal ROM  Skin: No rashes, lesions, bruising or petechiae  Neurological: CN II to XII grossly intact, normal coordination  +R arm with 0/5 strength, mild numbness. R ankle with foot drop requiring brace.  Psychiatric:  Normal affect and judgement +tearful  Hem/Lymph:  No submandibular, cervical, supraclavicular, axillary LAD.    Labs:    Lab Results   Component Value Date    WBC 9.02 01/24/2017    HGB 11.0 (L) 01/24/2017    HCT 33.3 (L) 01/24/2017    MCV 97 01/24/2017     01/24/2017     Lab Results   Component Value Date     01/10/2017    K 4.0 01/10/2017     01/10/2017    CO2 27 01/10/2017    BUN 11 01/10/2017    CREATININE 0.7 01/10/2017    CALCIUM 9.8 01/10/2017    ANIONGAP 11 01/10/2017    ESTGFRAFRICA >60 01/10/2017    EGFRNONAA >60 01/10/2017     Lab Results   Component Value Date    ALT 15 01/10/2017    AST 15 01/10/2017    ALKPHOS 101 01/10/2017    BILITOT 0.4 01/10/2017       No results found for: IRON, TIBC, FERRITIN, SATURATEDIRO  No results found for: LABRTCID11  No results found for: FOLATE  Lab Results   Component Value Date    TSH 0.680 09/15/2015       Imaging:  Mammogram 10/10/16: The breasts are heterogeneously dense.  This may lower the sensitivity of mammography.  There is global asymmetry seen in the right breast.  Increased density and contraction of  fibroglandular tissue on the right with increased prominence  of nipple retraction.  No defiinite mass or suspicious calcifications. Recommend ultrasound.  Left breast stable without  additional evidence of dominant mass, architectural distortion or suspicious calcification.  Results of ultrasound and/or mammogram should not preclude biopsy of any clinically palpable and/or suspicious nodule or mass. Please correlate with exam.  Digital tomosynthesis was performed and used in the interpretation of the images. Images were evaluated with a Computer Aided Detection (CAD) system.  Impression  Global asymmetry in the right breast requires additional evaluation. Clinical correlation of finding is recommended. An ultrasound exam is recommended.    PET 11/9/16:  Hypermetabolic uptake within patient's known right breast carcinoma with a maximum SUV of 3.5.  No findings to suggest metastatic disease.    Assessment / Plan:  Carole Moore is a 51 y.o. female who comes in with right breast cancer.    1. Right-sided breast cancer: Invasive lobular carcinoma, ER/MD positive, [id number] neg. BRCA negative. Given large mass on physical examination as well as intraoperative findings, I agree that this patient would benefit from neoadjuvant chemotherapy. Have discussed and consented patient for dose-dense AC-T regimen. Will plan for neoadjuvant chemotherapy, followed by mastectomy and SLND. Physical exam demonstates response.  -- Proceed with cycle 2, day 1 of dose-dense Taxol today. Neulasta OBI today.   -- 1 liter IVF on Thursday  -- Return in 2 weeks for cycle 3    2. Cardiotoxic medication monitoring: Given use of anthracycline, will monitor heart function. Echo on 11/10/16 shows EF 55-60%.  -- Cardio-oncology is following.    3. 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 No irinotecan can be given. Monitor Tylenol use.    4. H/o CVA: With residual R-sided weakness. On Aspirin, statin.    5.  Constipation: Likely related to Zofran. I recommend discontinuing Zofran and using Compazine prn. Discussed with patient to increase fluids and use Senna bid.    6. Chemotherapy induced anemia: Will continue to monitor closely.    7. Orthostatic hypotension: 2/2 dehydration. Will administer 1 liter NS on Thursday.    Alessia Moss M.D.  Hematology Oncology

## 2017-01-24 NOTE — PLAN OF CARE
"Problem: Patient Care Overview  Goal: Plan of Care Review  Outcome: Ongoing (interventions implemented as appropriate)  Pt. Stated,"I'm feeling really good today!"      "

## 2017-01-24 NOTE — PATIENT INSTRUCTIONS
[facility]  [address]  [address] [address]  [phone] phone     [phone] fax  Hours of Operation: Monday- Friday 8:00am- 5:00pm  After hours phone  [phone]  Hematology / Oncology Physicians on call      Dr. Bill Moss    Please call with any concerns regarding your appointment today.HOME CARE AFTER CHEMOTHERAPY   Meals   Many patients feel sick and lose their appetites during treatment. Eat small meals several times a day. Choose bland foods with little taste or smell if you have problems with nausea. Be sure to cook all food thoroughly. This kills bacteria and helps you avoid intestinal infection. Soft foods are easier to swallow and digest.   Activity   Exercise keeps you strong and keeps your heart and lungs active. Talk to your doctor about an appropriate exercise program for you.   Skin Care   To prevent a skin infection, bathe or shower once a day. Use a moisturizing soap and wash with warm water. Avoid very hot or cold water. Chemotherapy can make your skin dry . Apply moisturizing lotion to help relieve dry skin. Some drugs used in high doses can cause slight burns to appear (like sunburn). Ask for a special cream to help relieve the burn and protect your skin.   Prevent Mouth Sores   During chemotherapy, many people get mouth sores. Do the following to help prevent mouth sores or to ease discomfort.   Brush your teeth with a soft-bristle toothbrush after every meal.  Don't use dental floss if your platelet count is below 50,000. Your doctor or nurse will tell you if this is the case.  Use an oral swab or special soft toothbrush if your gums bleed during regular brushing.  Use mouthwash as directed. If you can't tolerate commercial mouthwash, use salt and baking soda to clean your mouth. Mix 1 teaspoon of salt and 1 teaspoon of baking soda into a glass of water. Swish and spit.  Call your doctor or return  to this facility if you develop any of the following:   Sore throat   White patches in the mouth or throat   Fever of 100.4ºF (38ºC) or higher, or as directed by your healthcare provider  © 3289-1941 [employer] [facility], [address] [place] [address], [place] [address]. All rights reserved. This information is not intended as a substitute for professional medical care. Always follow your healthcare professional's   YOU HAVE STARTED ON CHEMOTHERAPY. IF YOU EXPERIENCE ANY OF THE FOLLOWING PROBLEMS, CALL THE OFFICE IMMEDIATELY.    *FEVER .0 OR GREATER    *CHILLS, ESPECIALLY SHAKING CHILLS, OR SWEATING    *A SEVERE COUGH OR SORE THROAT, OR SINUS PAIN/     PRESSURE    *REDNESS, SWELLING, OR TENDERNESS AROUND A WOUND,     SORE, PIMPLE, RECTAL AREA, OR IV SITE    *SORES OR ULCERS IN THE MOUTH    *BLISTERS ON THE LIPS OR SKIN    *FREQUENT URGENCY TO URINATE OR A BURNING FEELING   WHEN YOU URINATE    *BLOOD IN THE URINE OR STOOL    *ANY UNEXPLAINED BRUISING OR PROLONGED BLEEDING,     (NOSEBLEEDS OR BLEEDING GUMS)    *LOOSE BOWEL MOVEMENTS THAT DO NOT RESPOND TO     IMODIUM OR MORE THAN THREE TIMES A DAY    *VOMITING UNRESPONSIVE TO ANTINAUSEA MEDICINE    *ANY UNUSUAL PHYSICAL SYMPTOMS THAT BEGAN AFTER     CHEMOTHERAPY    DURING WEEKDAYS, CALL AND ASK TO SPEAK DIRECTLY TO A NURSE.  AT OTHER TIMES, CALL THE OFFICE PHONE NUMBER; THE ANSWERING SERVICE WILL CONTACT THE ON-CALL PHYSICIAN.  SOMEONE IS AVAILABLE 24 HOURS A DAY, SEVEN DAYS A WEEK.

## 2017-01-26 ENCOUNTER — INFUSION (OUTPATIENT)
Dept: INFUSION THERAPY | Facility: HOSPITAL | Age: 52
End: 2017-01-26
Attending: INTERNAL MEDICINE
Payer: COMMERCIAL

## 2017-01-26 VITALS
RESPIRATION RATE: 18 BRPM | DIASTOLIC BLOOD PRESSURE: 71 MMHG | BODY MASS INDEX: 25.27 KG/M2 | HEIGHT: 67 IN | TEMPERATURE: 99 F | SYSTOLIC BLOOD PRESSURE: 105 MMHG | WEIGHT: 161 LBS | HEART RATE: 108 BPM

## 2017-01-26 DIAGNOSIS — I95.1 ORTHOSTATIC HYPOTENSION: Primary | ICD-10-CM

## 2017-01-26 PROCEDURE — 25000003 PHARM REV CODE 250: Mod: PO | Performed by: INTERNAL MEDICINE

## 2017-01-26 PROCEDURE — 96361 HYDRATE IV INFUSION ADD-ON: CPT | Mod: PO

## 2017-01-26 PROCEDURE — 96360 HYDRATION IV INFUSION INIT: CPT | Mod: PO

## 2017-01-26 RX ADMIN — SODIUM CHLORIDE: 900 INJECTION, SOLUTION INTRAVENOUS at 09:01

## 2017-01-26 NOTE — MR AVS SNAPSHOT
Patient Information     Patient Name Sex     Carole Moore Female 1965      Visit Information        Provider Department Dept Phone Center    2017 9:00 AM [facility] Chemo Infusion [facility]otherapy [facility]fusion [phone] [facility]      Patient Instructions    .  [facility]  [address] [facility] [address]  [address]  [phone] phone     [phone] fax  Hours of Operation: Monday- Friday 8:00am- 5:00pm  After hours phone  [phone]  Hematology / Oncology Physicians on call      Dr. Bill Moss    Please call with any concerns regarding your appointment today.         Your Current Medications Are     aspirin 81 MG Chew    atorvastatin (LIPITOR) 10 MG tablet    LORATADINE (CLARITIN ORAL)    ondansetron (ZOFRAN) 8 MG tablet    prochlorperazine (COMPAZINE) 10 MG tablet    tramadol (ULTRAM) 50 mg tablet      Facility-Administered Medications     sodium chloride 0.9% 1,000 mL infusion      Appointments for Next Year     2017  7:55 AM NON FASTING LAB (5 min.) [facility]    Arrive at check-in approximately 15 minutes before your scheduled appointment time. Bring all outside medical records and imaging, along with a list of your current medications and insurance card.    (off [address]) 2nd floor    2017  8:20 AM ESTABLISHED PATIENT (20 min.) [facility] - Hemotology Oncology Alessia Moss MD    Arrive at check-in approximately 15 minutes before your scheduled appointment time. Bring all outside medical records and imaging, along with a list of your current medications and insurance card.    (off [address]) 3rd Floor    2017  8:45 AM INFUSION 240 MIN (240 min.) [facility]    Arrive at check-in approximately 15 minutes before your scheduled appointment time. Bring all outside medical records and imaging, along with a list of your current  medications and insurance card.    2/24/2017  9:30 AM 2 D ECHO (45 min.) [facility] -[facility]iology ECHOCARDIOGRAM, [facility]    INSTRUCTIONS FOR A COMPLETE TWO-DIMENSIONAL ECHOCARDIOGRAM EXAM OR LIMITED 2D/M-MODE ECHOCARDIOGRAM EXAM You have been scheduled for a Two-Dimensional Echocardiogram. This test uses ultrasound waves (harmless sound waves) to give detailed images of heart size, function and structure. NO PREPARATION IS NECESSARY. YOU MAY EAT AND TAKE YOUR MEDICATIONS AS USUAL. Please wear a two-piece outfit. You will be asked to undress from the waist up. Women will put on a patient gown opening to the front. Small pads (electrodes) will be put on your chest to monitor your heart beat. You will be asked to lie on your left side, and will need to remain quiet during the exam. A transducer coated with cool gel will be moved firmly over your chest. This transducer creates sound waves that make images of your heart. A computer changes the sound waves into images that are seen on a screen. You may hear loud noises from the echo machine; this is a normal occurrence. You may be asked to exhale and hold your breath for a few seconds. Air in your lungs can affect the images.  The images of your heart are recorded on video tape so the cardiologist can review them later. Your doctor will be informed of the test results. The test will take approximately 30 minutes to complete. If you are unable to keep your appointment, please call our office to reschedule. Please note that family members or friends are not  allowed to accompany you while you are in the testing area. Check your appointment slip for the location where the test will be done.    (off [facility]) 3rd floor    3/2/2017  1:40 PM ESTABLISHED PATIENT (20 min.) [facility] Fredy Mei MD    Arrive at check-in approximately 15 minutes before your scheduled appointment time. Bring all outside medical records and imaging, along with a list of your current  "medications and insurance card.    (off [facility]) 3rd floor         Default Flowsheet Data (last 24 hours)      Amb Complex Vitals Raffy        01/26/17 0905                Measurements    Weight 73 kg (161 lb)        Height 5' 7" (1.702 m)        BSA (Calculated - sq m) 1.86 sq meters        BMI (Calculated) 25.3        /71        Temp 99 °F (37.2 °C)        Pulse 108        Resp 18        Pain Assessment    Pain Score Zero                Allergies     No Known Allergies      Medications You Received from 01/25/2017 1047 to 01/26/2017 1047        Date/Time Order Dose Route Action     01/26/2017 0913 sodium chloride 0.9% 1,000 mL infusion   Intravenous New Bag      Current Discharge Medication List     Cannot display discharge medications since this is not an admission.      "

## 2017-01-26 NOTE — PLAN OF CARE
Problem: Patient Care Overview  Goal: Plan of Care Review  Outcome: Ongoing (interventions implemented as appropriate)  Pt has no complaints today

## 2017-01-26 NOTE — PATIENT INSTRUCTIONS
.  [facility]  [address]  [address]  [phone] phone     [phone] fax  Hours of Operation: Monday- Friday 8:00am- 5:00pm  After hours phone  [phone]  Hematology / Oncology Physicians on call      Dr. Bill Moss    Please call with any concerns regarding your appointment today.

## 2017-02-01 ENCOUNTER — OFFICE VISIT (OUTPATIENT)
Dept: HEMATOLOGY/ONCOLOGY | Facility: CLINIC | Age: 52
End: 2017-02-01
Payer: MEDICARE

## 2017-02-01 VITALS
DIASTOLIC BLOOD PRESSURE: 64 MMHG | HEIGHT: 67 IN | OXYGEN SATURATION: 100 % | SYSTOLIC BLOOD PRESSURE: 104 MMHG | RESPIRATION RATE: 18 BRPM | TEMPERATURE: 98 F | HEART RATE: 108 BPM | WEIGHT: 162.94 LBS | BODY MASS INDEX: 25.57 KG/M2

## 2017-02-01 DIAGNOSIS — C50.111 MALIGNANT NEOPLASM OF CENTRAL PORTION OF RIGHT FEMALE BREAST: Primary | ICD-10-CM

## 2017-02-01 DIAGNOSIS — L73.9 FOLLICULITIS: ICD-10-CM

## 2017-02-01 PROCEDURE — 99213 OFFICE O/P EST LOW 20 MIN: CPT | Mod: PBBFAC | Performed by: INTERNAL MEDICINE

## 2017-02-01 PROCEDURE — 99213 OFFICE O/P EST LOW 20 MIN: CPT | Mod: S$PBB,,, | Performed by: INTERNAL MEDICINE

## 2017-02-01 PROCEDURE — 99999 PR PBB SHADOW E&M-EST. PATIENT-LVL III: CPT | Mod: PBBFAC,,, | Performed by: INTERNAL MEDICINE

## 2017-02-01 RX ORDER — DOXYCYCLINE HYCLATE 100 MG
100 TABLET ORAL 2 TIMES DAILY
Qty: 28 TABLET | Refills: 0 | Status: SHIPPED | OUTPATIENT
Start: 2017-02-01 | End: 2017-02-21 | Stop reason: ALTCHOICE

## 2017-02-01 NOTE — PROGRESS NOTES
Hematology/Oncology Established Visit    Reason for Visit / CC: Breast cancer    [facility] Diagnosis: Breast cancer    Stage: Clinical Stage IIB ([id number])    Pathology: 10/27/16 Right breast mass, incisional biopsy:  Invasive lobular carcinoma, intermediate grade (3,2,1), see note. Virtually 100% of the cells of the infiltrating carcinoma are strongly both nuclear estrogen receptor and nuclear  progesterone receptor positive. They are HER-2 negative.    Prior Treatment: None    Current Treatment: Neoadjuvant chemotherapy with dose-dense AC-T    History of Present Illness:  Ms. 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 She has had an indention in her right breast with some nipple retraction for the past year, but mammogram and u/s last year were normal. This year, imaging and physical exam revealed denser tissue with more pronounced nipple retraction. Dr. Faria performed incisional LN biopsy of the R breast, which revealed invasive lobular carcinoma. She had a hysterectomy in 2008. She does get occasional hot flashes. Colonoscopy approx age 46 was normal. No melena/hematochezia. No unintentional weight loss. No bone pain.    Today, patient comes in for urgent visit regarding firm tender lumps in her R axilla which she noticed last night in the shower. She denies any fevers, chills. She states she actually does not shave her underarms due to lack of hair there. She states it tingles and is slightly tender, but she does have chronically decreased sensation in that arm.    ROS:  General:  No wt loss, fever/chills, fatigue, night sweats  Eyes: No vision problems, pain or inflammation.   Ears/Nose: No difficultly hearing, ear pain, rhinorrhea, or epistaxis  Oropharynx: No ulcers, dysphagia, or odynophagia  Cardiovascular: No chest pains, sob, PND or dyspnea on exertion  Pulmonary: No cough, sob,  hemoptysis  Gastrointestional: No n/v/d, melena, hematochezia, or change in bowel habits +constipation and hemorrhoids  : No dysuria, hematuria, pelvic pain or flank pain  Musculoskeletal: No myalgias,  or arthralgias +R arm weakness and numbness  Neurological: No headaches, focal deficits, or seizure activity  Endocrine: No heat or cold intolerance   Skin: No rashes pruritus +R axillary nodules  Psychiatric: No symptoms of mood disorders  Heme/Lymph: No lymph node enlargment    Past Medical History   Diagnosis Date    Cancer     CVA (cerebral infarction)     Diverticulosis     Hyperlipidemia     Nausea after anesthesia     Paralysis      right side    Stroke      Past Surgical History:  Partial hysterectomy 11/2008    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 No illicits.  Social History     Social History    Marital status:      Spouse name: N/A    Number of children: N/A    Years of education: N/A     Social History Main Topics    Smoking status: Never Smoker    Smokeless tobacco: Never Used    Alcohol use No    Drug use: No    Sexual activity: No     Other Topics Concern    Are You Pregnant Or Think You May Be? No    Breast-Feeding No     Social History Narrative       Family History: family history includes Breast cancer in her paternal aunt; Colon cancer in her father and mother; Melanoma in her maternal uncle. 2 Paternal aunts diagnosed with breast cancer age 60s. Mom had colon cancer age late 60s, father had colon cancer age late 60s. Maternal uncle with melanoma age 45. Maternal uncle with lung cancer age 59.    Physical Exam:  Vitals:    02/01/17 1313   BP: 104/64   Pulse: 108   Resp: 18   Temp: 97.9 °F (36.6 °C)     Body mass index is 25.52 kg/(m^2).  General:  AAOx4, no acute distress  Breast: Left breast without any masses, skin changes. Right breast with central dense mass approx 1.5cm horiz by 2cm vertical and less nipple retraction. R  axilla with 3 small subcutaneous nodules that are 0.5cm in diameter, one with overlying pustule. No erythema or drainage.  Pulmonary: Bilaterally clear to auscultation, Normal effort with no accessory muscle use, no wheezes/rales/rhonchi    Labs:    Lab Results   Component Value Date    WBC 9.02 01/24/2017    HGB 11.0 (L) 01/24/2017    HCT 33.3 (L) 01/24/2017    MCV 97 01/24/2017     01/24/2017     Lab Results   Component Value Date     01/24/2017    K 4.3 01/24/2017     01/24/2017    CO2 25 01/24/2017    BUN 12 01/24/2017    CREATININE 0.7 01/24/2017    CALCIUM 9.6 01/24/2017    ANIONGAP 11 01/24/2017    ESTGFRAFRICA >60 01/24/2017    EGFRNONAA >60 01/24/2017     Lab Results   Component Value Date    ALT 40 01/24/2017    AST 25 01/24/2017    ALKPHOS 103 01/24/2017    BILITOT 0.6 01/24/2017       No results found for: IRON, TIBC, FERRITIN, SATURATEDIRO  No results found for: [id number]  No results found for: FOLATE  Lab Results   Component Value Date    TSH 0.680 09/15/2015       Imaging:  Mammogram 10/10/16: The breasts are heterogeneously dense.  This may lower the sensitivity of mammography.  There is global asymmetry seen in the right breast.  Increased density and contraction of fibroglandular tissue on the right with increased prominence  of nipple retraction.  No defiinite mass or suspicious calcifications. Recommend ultrasound.  Left breast stable without  additional evidence of dominant mass, architectural distortion or suspicious calcification.  Results of ultrasound and/or mammogram should not preclude biopsy of any clinically palpable and/or suspicious nodule or mass. Please correlate with exam.  Digital tomosynthesis was performed and used in the interpretation of the images. Images were evaluated with a Computer Aided Detection (CAD) system.  Impression  Global asymmetry in the right breast requires additional evaluation. Clinical correlation of finding is recommended. An ultrasound  exam is recommended.    PET 11/9/16:  Hypermetabolic uptake within patient's known right breast carcinoma with a maximum SUV of 3.5.  No findings to suggest metastatic disease.    Assessment / Plan:  Carole Moore is a 51 y.o. female who comes in with right breast cancer.    1. Right-sided breast cancer: Invasive lobular carcinoma, ER/NE positive, [id number] neg. BRCA negative. Given large mass on physical examination as well as intraoperative findings, I agree that this patient would benefit from neoadjuvant chemotherapy. Have discussed and consented patient for dose-dense AC-T regimen. Will plan for neoadjuvant chemotherapy, followed by mastectomy and SLND. Physical exam demonstates response.  -- Return next week for cycle 3 of dose-dense Taxol.     2. Folliculitis/abscess: These nodules are in the subcutaneous region and too small for I&D (0.5cm each). Will treat with Doxycycline 100mg bid for coverage of skin dianelys including MRSA.  -- Doxycycline 100mg bid x 14 days.  -- Will re-examine axilla next week in clinic.    Alessia Moss M.D.  Hematology Oncology

## 2017-02-07 ENCOUNTER — INFUSION (OUTPATIENT)
Dept: INFUSION THERAPY | Facility: HOSPITAL | Age: 52
End: 2017-02-07
Attending: INTERNAL MEDICINE
Payer: MEDICARE

## 2017-02-07 ENCOUNTER — OFFICE VISIT (OUTPATIENT)
Dept: HEMATOLOGY/ONCOLOGY | Facility: CLINIC | Age: 52
End: 2017-02-07
Payer: MEDICARE

## 2017-02-07 VITALS
DIASTOLIC BLOOD PRESSURE: 60 MMHG | HEART RATE: 98 BPM | RESPIRATION RATE: 18 BRPM | WEIGHT: 165.13 LBS | TEMPERATURE: 98 F | SYSTOLIC BLOOD PRESSURE: 98 MMHG | OXYGEN SATURATION: 98 % | BODY MASS INDEX: 25.92 KG/M2 | HEIGHT: 67 IN

## 2017-02-07 VITALS — HEART RATE: 90 BPM | SYSTOLIC BLOOD PRESSURE: 113 MMHG | DIASTOLIC BLOOD PRESSURE: 67 MMHG

## 2017-02-07 DIAGNOSIS — T45.1X5A ANTINEOPLASTIC CHEMOTHERAPY INDUCED ANEMIA: ICD-10-CM

## 2017-02-07 DIAGNOSIS — C50.111 MALIGNANT NEOPLASM OF CENTRAL PORTION OF RIGHT FEMALE BREAST: Primary | ICD-10-CM

## 2017-02-07 DIAGNOSIS — Z51.11 CHEMOTHERAPY MANAGEMENT, ENCOUNTER FOR: ICD-10-CM

## 2017-02-07 DIAGNOSIS — L73.9 FOLLICULITIS: ICD-10-CM

## 2017-02-07 DIAGNOSIS — Z86.73 HISTORY OF CVA (CEREBROVASCULAR ACCIDENT): ICD-10-CM

## 2017-02-07 DIAGNOSIS — D64.81 ANTINEOPLASTIC CHEMOTHERAPY INDUCED ANEMIA: ICD-10-CM

## 2017-02-07 PROCEDURE — 99213 OFFICE O/P EST LOW 20 MIN: CPT | Mod: PBBFAC,25,PO | Performed by: INTERNAL MEDICINE

## 2017-02-07 PROCEDURE — 63600175 PHARM REV CODE 636 W HCPCS: Mod: PO | Performed by: INTERNAL MEDICINE

## 2017-02-07 PROCEDURE — 96413 CHEMO IV INFUSION 1 HR: CPT | Mod: PO

## 2017-02-07 PROCEDURE — 25000003 PHARM REV CODE 250: Mod: PO | Performed by: INTERNAL MEDICINE

## 2017-02-07 PROCEDURE — 99215 OFFICE O/P EST HI 40 MIN: CPT | Mod: 25,S$PBB,, | Performed by: INTERNAL MEDICINE

## 2017-02-07 PROCEDURE — 96367 TX/PROPH/DG ADDL SEQ IV INF: CPT | Mod: PO

## 2017-02-07 PROCEDURE — 96375 TX/PRO/DX INJ NEW DRUG ADDON: CPT | Mod: PO

## 2017-02-07 PROCEDURE — 99999 PR PBB SHADOW E&M-EST. PATIENT-LVL III: CPT | Mod: PBBFAC,,, | Performed by: INTERNAL MEDICINE

## 2017-02-07 PROCEDURE — 96377 APPLICATON ON-BODY INJECTOR: CPT | Mod: PO

## 2017-02-07 PROCEDURE — 96415 CHEMO IV INFUSION ADDL HR: CPT | Mod: PO

## 2017-02-07 RX ORDER — FAMOTIDINE 20 MG/50ML
20 INJECTION, SOLUTION INTRAVENOUS
Status: CANCELLED
Start: 2017-02-07

## 2017-02-07 RX ORDER — PALONOSETRON 0.05 MG/ML
0.25 INJECTION, SOLUTION INTRAVENOUS
Status: CANCELLED
Start: 2017-02-07

## 2017-02-07 RX ORDER — FAMOTIDINE 20 MG/50ML
20 INJECTION, SOLUTION INTRAVENOUS
Status: COMPLETED | OUTPATIENT
Start: 2017-02-07 | End: 2017-02-07

## 2017-02-07 RX ORDER — SODIUM CHLORIDE 0.9 % (FLUSH) 0.9 %
10 SYRINGE (ML) INJECTION
Status: DISCONTINUED | OUTPATIENT
Start: 2017-02-07 | End: 2017-02-07 | Stop reason: HOSPADM

## 2017-02-07 RX ORDER — SODIUM CHLORIDE 0.9 % (FLUSH) 0.9 %
10 SYRINGE (ML) INJECTION
Status: CANCELLED | OUTPATIENT
Start: 2017-02-07

## 2017-02-07 RX ORDER — HEPARIN 100 UNIT/ML
500 SYRINGE INTRAVENOUS
Status: CANCELLED | OUTPATIENT
Start: 2017-02-07

## 2017-02-07 RX ORDER — HEPARIN 100 UNIT/ML
500 SYRINGE INTRAVENOUS
Status: DISCONTINUED | OUTPATIENT
Start: 2017-02-07 | End: 2017-02-07 | Stop reason: HOSPADM

## 2017-02-07 RX ORDER — PALONOSETRON 0.05 MG/ML
0.25 INJECTION, SOLUTION INTRAVENOUS
Status: COMPLETED | OUTPATIENT
Start: 2017-02-07 | End: 2017-02-07

## 2017-02-07 RX ADMIN — DIPHENHYDRAMINE HYDROCHLORIDE: 50 INJECTION INTRAMUSCULAR; INTRAVENOUS at 09:02

## 2017-02-07 RX ADMIN — FAMOTIDINE 20 MG: 20 INJECTION, SOLUTION INTRAVENOUS at 09:02

## 2017-02-07 RX ADMIN — PALONOSETRON HYDROCHLORIDE 0.25 MG: 0.25 INJECTION INTRAVENOUS at 09:02

## 2017-02-07 RX ADMIN — DEXAMETHASONE SODIUM PHOSPHATE: 4 INJECTION, SOLUTION INTRA-ARTICULAR; INTRALESIONAL; INTRAMUSCULAR; INTRAVENOUS; SOFT TISSUE at 10:02

## 2017-02-07 RX ADMIN — HEPARIN 500 UNITS: 100 SYRINGE at 01:02

## 2017-02-07 RX ADMIN — PACLITAXEL 324 MG: 6 INJECTION, SOLUTION, CONCENTRATE INTRAVENOUS at 10:02

## 2017-02-07 RX ADMIN — PEGFILGRASTIM 6 MG: KIT SUBCUTANEOUS at 02:02

## 2017-02-07 NOTE — PROGRESS NOTES
Hematology/Oncology Established Visit    Reason for Visit / CC: Breast cancer    [facility] Diagnosis: Breast cancer    Stage: Clinical Stage IIB ([id number])    Pathology: 10/27/16 Right breast mass, incisional biopsy:  Invasive lobular carcinoma, intermediate grade (3,2,1), see note. Virtually 100% of the cells of the infiltrating carcinoma are strongly both nuclear estrogen receptor and nuclear  progesterone receptor positive. They are HER-2 negative.    Prior Treatment: None    Current Treatment: Neoadjuvant chemotherapy with dose-dense AC-T    History of Present Illness:  Ms. 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 She has had an indention in her right breast with some nipple retraction for the past year, but mammogram and u/s last year were normal. This year, imaging and physical exam revealed denser tissue with more pronounced nipple retraction. Dr. Faria performed incisional LN biopsy of the R breast, which revealed invasive lobular carcinoma. She had a hysterectomy in 2008. She does get occasional hot flashes. Colonoscopy approx age 46 was normal. No melena/hematochezia. No unintentional weight loss. No bone pain.    Today, patient comes in for follow up of breast cancer. No nausea, vomiting, diarrhea, fevers, chills, sweats, cough.  Her constipation and hemorrhoids have returned and she is managing with regular stool softeners. She is drinking more water. She is taking Doxycycline, which was started last week for firm tender lumps in axilla thought to be folliculitis. The bumps do seem to be getting smaller.     ROS:  General:  No wt loss, fever/chills, fatigue, night sweats  Eyes: No vision problems, pain or inflammation.   Ears/Nose: No difficultly hearing, ear pain, rhinorrhea, or epistaxis  Oropharynx: No ulcers, dysphagia, or odynophagia  Cardiovascular: No chest pains, sob, PND or  dyspnea on exertion  Pulmonary: No cough, sob, hemoptysis  Gastrointestional: No n/v/d, melena, hematochezia, or change in bowel habits +constipation and hemorrhoids  : No dysuria, hematuria, pelvic pain or flank pain  Musculoskeletal: No myalgias,  or arthralgias +R arm weakness and numbness  Neurological: No headaches, focal deficits, or seizure activity  Endocrine: No heat or cold intolerance   Skin: No rashes pruritus, or lesions  Psychiatric: No symptoms of mood disorders  Heme/Lymph: No lymph node enlargment    Past Medical History   Diagnosis Date    Cancer     CVA (cerebral infarction)     Diverticulosis     Hyperlipidemia     Nausea after anesthesia     Paralysis      right side    Stroke      Past Surgical History:  Partial hysterectomy 11/2008    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 No illicits.  Social History     Social History    Marital status:      Spouse name: N/A    Number of children: N/A    Years of education: N/A     Social History Main Topics    Smoking status: Never Smoker    Smokeless tobacco: Never Used    Alcohol use No    Drug use: No    Sexual activity: No     Other Topics Concern    Are You Pregnant Or Think You May Be? No    Breast-Feeding No     Social History Narrative       Family History: family history includes Breast cancer in her paternal aunt; Colon cancer in her father and mother; Melanoma in her maternal uncle. 2 Paternal aunts diagnosed with breast cancer age 60s. Mom had colon cancer age late 60s, father had colon cancer age late 60s. Maternal uncle with melanoma age 45. Maternal uncle with lung cancer age 59.    Physical Exam:  Vitals:    02/07/17 0830   BP: 98/60   Pulse: 98   Resp: 18   Temp: 98 °F (36.7 °C)     Body mass index is 25.86 kg/(m^2).  General:  AAOx4, no acute distress  HEENT: EOMI. Normocephalic and atraumatic. No maxillary sinus tenderness. External auditory canals clear and TMs intact  without lesions. Nasal and oral mucosal membranes moist. Normal dentition and gums.   Neck: no LAD, thyromegaly, normal ROM  Breast: Left breast without any masses, skin changes. Left axilla with one small subcutaneous nodule. Right breast with central dense mass approx 1.5cm horiz by 2cm vertical and less nipple retraction. R axilla with 3 small subcutaneous nodules that are 0.5cm in diameter, shrinking, superior one with overlying pustule now. No erythema or drainage.No cervical, supraclav, axillary LAD.  Pulmonary: Bilaterally clear to auscultation, Normal effort with no accessory muscle use, no wheezes/rales/rhonchi  CV: Normal rate, regular rhythm, no murmurs/rubs/gallops, no edema  ABD:  Soft, nontender, nondistended, no mass, and without hepatosplenomegaly   Ext: No clubbing, cyanosis, or edema, normal ROM  Skin: No rashes, lesions, bruising or petechiae  Neurological: CN II to XII grossly intact, normal coordination  +R arm with 0/5 strength, mild numbness. R ankle with foot drop requiring brace.  Psychiatric:  Normal affect and judgement +tearful  Hem/Lymph:  No submandibular, cervical, supraclavicular, axillary LAD.    Labs:    Lab Results   Component Value Date    WBC 8.01 02/07/2017    HGB 10.9 (L) 02/07/2017    HCT 33.6 (L) 02/07/2017    MCV 98 02/07/2017     02/07/2017     Lab Results   Component Value Date     01/24/2017    K 4.3 01/24/2017     01/24/2017    CO2 25 01/24/2017    BUN 12 01/24/2017    CREATININE 0.7 01/24/2017    CALCIUM 9.6 01/24/2017    ANIONGAP 11 01/24/2017    ESTGFRAFRICA >60 01/24/2017    EGFRNONAA >60 01/24/2017     Lab Results   Component Value Date    ALT 40 01/24/2017    AST 25 01/24/2017    ALKPHOS 103 01/24/2017    BILITOT 0.6 01/24/2017       No results found for: IRON, TIBC, FERRITIN, SATURATEDIRO  No results found for: [id number]  No results found for: FOLATE  Lab Results   Component Value Date    TSH 0.680 09/15/2015       Imaging:  Mammogram 10/10/16:  The breasts are heterogeneously dense.  This may lower the sensitivity of mammography.  There is global asymmetry seen in the right breast.  Increased density and contraction of fibroglandular tissue on the right with increased prominence  of nipple retraction.  No defiinite mass or suspicious calcifications. Recommend ultrasound.  Left breast stable without  additional evidence of dominant mass, architectural distortion or suspicious calcification.  Results of ultrasound and/or mammogram should not preclude biopsy of any clinically palpable and/or suspicious nodule or mass. Please correlate with exam.  Digital tomosynthesis was performed and used in the interpretation of the images. Images were evaluated with a Computer Aided Detection (CAD) system.  Impression  Global asymmetry in the right breast requires additional evaluation. Clinical correlation of finding is recommended. An ultrasound exam is recommended.    PET 11/9/16:  Hypermetabolic uptake within patient's known right breast carcinoma with a maximum SUV of 3.5.  No findings to suggest metastatic disease.    Assessment / Plan:  Carole Moore is a 51 y.o. female who comes in with right breast cancer.    1. Right-sided breast cancer: Invasive lobular carcinoma, ER/[place] positive, [id number] neg. BRCA negative. Given large mass on physical examination as well as intraoperative findings, I agree that this patient would benefit from neoadjuvant chemotherapy. Have discussed and consented patient for dose-dense AC-T regimen. Will plan for neoadjuvant chemotherapy, followed by mastectomy and SLND. Physical exam demonstates response.  -- Proceed with cycle 3, day 1 of dose-dense Taxol today. Neulasta OBI today.   -- Return in 2 weeks for cycle 4    2. Cardiotoxic medication monitoring: Given use of anthracycline, will monitor heart function. Echo on 11/10/16 shows EF 55-60%.  -- Cardio-oncology is following.    3. Gilbert's disease: Likely given chronic  unconjugated hyperbilirubinemia. No irinotecan can be given. Monitor Tylenol use.    4. H/o CVA: With residual R-sided weakness. On Aspirin, statin.    5. Constipation: Likely related to Zofran. I recommend discontinuing Zofran and using Compazine prn. Discussed with patient to increase fluids and use Senna bid.    6. Chemotherapy induced anemia: Will continue to monitor closely.    7. Folliculitis: Present in bilateral axillary regions. Pt is completing a course of antibiotics with Doxycycline.    Alessia Moss M.D.  Hematology Oncology

## 2017-02-07 NOTE — PATIENT INSTRUCTIONS
[facility]  [address]  [address]  [phone] phone     [phone] fax  Hours of Operation: Monday- Friday 8:00am- 5:00pm  After hours phone  [phone]  Hematology / Oncology Physicians on call      Dr. Bill Moss    Please call with any concerns regarding your appointment today.

## 2017-02-07 NOTE — PLAN OF CARE
Problem: Patient Care Overview  Goal: Plan of Care Review  Outcome: Ongoing (interventions implemented as appropriate)  Pt stated, this chemo drug is much better than the other. I have been feeling better

## 2017-02-07 NOTE — MR AVS SNAPSHOT
Patient Information     Patient Name Sex     Carole Moore Female 1965      Visit Information        Provider Department Dept Phone Center    2017 8:45 AM [facility] [phone] [facility]      Patient Instructions      [facility]  [address] [facility] [address]  [address] [address]  [phone] phone     [phone] fax  Hours of Operation: Monday- Friday 8:00am- 5:00pm  After hours phone  [phone]  Hematology / Oncology Physicians on call      Dr. Bill Moss    Please call with any concerns regarding your appointment today.       Your Current Medications Are     aspirin 81 MG Chew    atorvastatin (LIPITOR) 10 MG tablet    doxycycline (VIBRA-TABS) 100 MG tablet    LORATADINE (CLARITIN ORAL)    ondansetron (ZOFRAN) 8 MG tablet    prochlorperazine (COMPAZINE) 10 MG tablet    tramadol (ULTRAM) 50 mg tablet      Facility-Administered Medications     dexamethasone (DECADRON) 20 mg in sodium chloride 0.9% IVPB    diphenhydrAMINE (BENADRYL) 50 mg in sodium chloride 0.9% 100 mL IVPB    famotidine IVPB 20 mg    heparin, porcine (PF) 100 unit/mL injection flush 500 Units    paclitaxel (TAXOL) 175 mg/m2 = 324 mg in sodium chloride 0.9% 554 mL chemo infusion    palonosetron injection 0.25 mg    pegfilgrastim (NEULASTA (ON BODY INJECTOR)) injection 6 mg    sodium chloride 0.9% 250 mL flush bag    sodium chloride 0.9% flush 10 mL      Appointments for Next Year     2017  8:00 AM NON FASTING LAB (10 min.) [facility], [facility] CARTER    Arrive at check-in approximately 15 minutes before your scheduled appointment time. Bring all outside medical records and imaging, along with a list of your current medications and insurance card.    2017  8:20 AM ESTABLISHED PATIENT (20 min.) [facility] - Hematology Oncology Alessia Moss MD    Arrive at check-in approximately 15  minutes before your scheduled appointment time. Bring all outside medical records and imaging, along with a list of your current medications and insurance card.    (off [address]) 1st Floor Appointments with Bianca Mendoza - 2nd Floor    2/21/2017  8:45 AM INFUSION 240 MIN (240 min.) [facility] CHAIR 02 ONLH    Arrive at check-in approximately 15 minutes before your scheduled appointment time. Bring all outside medical records and imaging, along with a list of your current medications and insurance card.    (off [address]) 1st floor    2/24/2017  9:30 AM 2 D ECHO (45 min.) [facility]CHOCARD[facility], [facility]    INSTRUCTIONS FOR A COMPLETE TWO-DIMENSIONAL ECHOCARDIOGRAM EXAM OR LIMITED 2D/M-MODE ECHOCARDIOGRAM EXAM You have been scheduled for a Two-Dimensional Echocardiogram. This test uses ultrasound waves (harmless sound waves) to give detailed images of heart size, function and structure. NO PREPARATION IS NECESSARY. YOU MAY EAT AND TAKE YOUR MEDICATIONS AS USUAL. Please wear a two-piece outfit. You will be asked to undress from the waist up. Women will put on a patient gown opening to the front. Small pads (electrodes) will be put on your chest to monitor your heart beat. You will be asked to lie on your left side, and will need to remain quiet during the exam. A transducer coated with cool gel will be moved firmly over your chest. This transducer creates sound waves that make images of your heart. A computer changes the sound waves into images that are seen on a screen. You may hear loud noises from the echo machine; this is a normal occurrence. You may be asked to exhale and hold your breath for a few seconds. Air in your lungs can affect the images.  The images of your heart are recorded on video tape so the cardiologist can review them later. Your doctor will be informed of the test results. The test will take approximately 30 minutes to complete. If you are unable to keep your appointment,  "please call our office to reschedule. Please note that family members or friends are not  allowed to accompany you while you are in the testing area. Check your appointment slip for the location where the test will be done.    (off [address]) 3rd floor    3/2/2017  1:40 PM ESTABLISHED PATIENT (20 min.) [facility] - Cardiology Fredy Mei MD    Arrive at check-in approximately 15 minutes before your scheduled appointment time. Bring all outside medical records and imaging, along with a list of your current medications and insurance card.    (off [address]) 3rd floor         Default Flowsheet Data (last 24 hours)      Amb Complex Vitals Raffy        02/07/17 1043 02/07/17 0902 02/07/17 0830          Measurements    Weight   74.9 kg (165 lb 2 oz)      Height   5' 7" (1.702 m)      BSA (Calculated - sq m)   1.88 sq meters      BMI (Calculated)   25.9      BP (!)  97/57  98/60      Temp   98 °F (36.7 °C)      Pulse 82  98      Resp   18      SpO2   98 %      Pain Assessment    Pain Score  Zero Zero              Allergies     No Known Allergies      Medications You Received from 02/06/2017 1357 to 02/07/2017 1357        Date/Time Order Dose Route Action     02/07/2017 1015 dexamethasone (DECADRON) 20 mg in sodium chloride 0.9% IVPB   Intravenous New Bag     02/07/2017 0917 diphenhydrAMINE (BENADRYL) 50 mg in sodium chloride 0.9% 100 mL IVPB   Intravenous New Bag     02/07/2017 0938 famotidine IVPB 20 mg 20 mg Intravenous New Bag     02/07/2017 1039 paclitaxel (TAXOL) 175 mg/m2 = 324 mg in sodium chloride 0.9% 554 mL chemo infusion 324 mg Intravenous New Bag     02/07/2017 0914 palonosetron injection 0.25 mg 0.25 mg Intravenous Given      Current Discharge Medication List     Cannot display discharge medications since this is not an admission.      "

## 2017-02-21 ENCOUNTER — OFFICE VISIT (OUTPATIENT)
Dept: HEMATOLOGY/ONCOLOGY | Facility: CLINIC | Age: 52
End: 2017-02-21
Payer: MEDICARE

## 2017-02-21 ENCOUNTER — INFUSION (OUTPATIENT)
Dept: INFUSION THERAPY | Facility: HOSPITAL | Age: 52
End: 2017-02-21
Attending: INTERNAL MEDICINE
Payer: MEDICARE

## 2017-02-21 VITALS
TEMPERATURE: 98 F | WEIGHT: 164.69 LBS | RESPIRATION RATE: 18 BRPM | BODY MASS INDEX: 25.85 KG/M2 | SYSTOLIC BLOOD PRESSURE: 100 MMHG | OXYGEN SATURATION: 96 % | DIASTOLIC BLOOD PRESSURE: 70 MMHG | HEIGHT: 67 IN | HEART RATE: 107 BPM

## 2017-02-21 VITALS — SYSTOLIC BLOOD PRESSURE: 111 MMHG | DIASTOLIC BLOOD PRESSURE: 67 MMHG | HEART RATE: 99 BPM

## 2017-02-21 DIAGNOSIS — C50.111 MALIGNANT NEOPLASM OF CENTRAL PORTION OF RIGHT FEMALE BREAST: Primary | ICD-10-CM

## 2017-02-21 DIAGNOSIS — D64.81 ANTINEOPLASTIC CHEMOTHERAPY INDUCED ANEMIA: ICD-10-CM

## 2017-02-21 DIAGNOSIS — Z51.11 CHEMOTHERAPY MANAGEMENT, ENCOUNTER FOR: ICD-10-CM

## 2017-02-21 DIAGNOSIS — T45.1X5A ANTINEOPLASTIC CHEMOTHERAPY INDUCED ANEMIA: ICD-10-CM

## 2017-02-21 DIAGNOSIS — K64.9 HEMORRHOIDS, UNSPECIFIED HEMORRHOID TYPE: ICD-10-CM

## 2017-02-21 PROCEDURE — 96415 CHEMO IV INFUSION ADDL HR: CPT

## 2017-02-21 PROCEDURE — 63600175 PHARM REV CODE 636 W HCPCS: Performed by: INTERNAL MEDICINE

## 2017-02-21 PROCEDURE — 99215 OFFICE O/P EST HI 40 MIN: CPT | Mod: 25,S$PBB,, | Performed by: INTERNAL MEDICINE

## 2017-02-21 PROCEDURE — 96375 TX/PRO/DX INJ NEW DRUG ADDON: CPT

## 2017-02-21 PROCEDURE — 25000003 PHARM REV CODE 250: Performed by: INTERNAL MEDICINE

## 2017-02-21 PROCEDURE — 99999 PR PBB SHADOW E&M-EST. PATIENT-LVL III: CPT | Mod: PBBFAC,,, | Performed by: INTERNAL MEDICINE

## 2017-02-21 PROCEDURE — 96377 APPLICATON ON-BODY INJECTOR: CPT

## 2017-02-21 PROCEDURE — 96367 TX/PROPH/DG ADDL SEQ IV INF: CPT

## 2017-02-21 PROCEDURE — 96413 CHEMO IV INFUSION 1 HR: CPT

## 2017-02-21 RX ORDER — PALONOSETRON 0.05 MG/ML
0.25 INJECTION, SOLUTION INTRAVENOUS
Status: COMPLETED | OUTPATIENT
Start: 2017-02-21 | End: 2017-02-21

## 2017-02-21 RX ORDER — HYDROCORTISONE ACETATE 25 MG/1
25 SUPPOSITORY RECTAL 2 TIMES DAILY
Qty: 20 SUPPOSITORY | Refills: 0 | Status: SHIPPED | OUTPATIENT
Start: 2017-02-21 | End: 2017-03-03

## 2017-02-21 RX ORDER — SODIUM CHLORIDE 0.9 % (FLUSH) 0.9 %
10 SYRINGE (ML) INJECTION
Status: CANCELLED | OUTPATIENT
Start: 2017-02-21

## 2017-02-21 RX ORDER — PALONOSETRON 0.05 MG/ML
0.25 INJECTION, SOLUTION INTRAVENOUS
Status: CANCELLED
Start: 2017-02-21

## 2017-02-21 RX ORDER — FAMOTIDINE 20 MG/50ML
20 INJECTION, SOLUTION INTRAVENOUS
Status: COMPLETED | OUTPATIENT
Start: 2017-02-21 | End: 2017-02-21

## 2017-02-21 RX ORDER — HEPARIN 100 UNIT/ML
500 SYRINGE INTRAVENOUS
Status: CANCELLED | OUTPATIENT
Start: 2017-02-21

## 2017-02-21 RX ORDER — FAMOTIDINE 20 MG/50ML
20 INJECTION, SOLUTION INTRAVENOUS
Status: CANCELLED
Start: 2017-02-21

## 2017-02-21 RX ORDER — SODIUM CHLORIDE 0.9 % (FLUSH) 0.9 %
10 SYRINGE (ML) INJECTION
Status: DISCONTINUED | OUTPATIENT
Start: 2017-02-21 | End: 2017-02-21 | Stop reason: HOSPADM

## 2017-02-21 RX ORDER — HEPARIN 100 UNIT/ML
500 SYRINGE INTRAVENOUS
Status: DISCONTINUED | OUTPATIENT
Start: 2017-02-21 | End: 2017-02-21 | Stop reason: HOSPADM

## 2017-02-21 RX ADMIN — SODIUM CHLORIDE: 0.9 INJECTION, SOLUTION INTRAVENOUS at 09:02

## 2017-02-21 RX ADMIN — HEPARIN 500 UNITS: 100 SYRINGE at 02:02

## 2017-02-21 RX ADMIN — DEXAMETHASONE SODIUM PHOSPHATE: 4 INJECTION, SOLUTION INTRAMUSCULAR; INTRAVENOUS at 09:02

## 2017-02-21 RX ADMIN — DIPHENHYDRAMINE HYDROCHLORIDE: 50 INJECTION, SOLUTION INTRAMUSCULAR; INTRAVENOUS at 10:02

## 2017-02-21 RX ADMIN — PALONOSETRON HYDROCHLORIDE 0.25 MG: 0.25 INJECTION INTRAVENOUS at 09:02

## 2017-02-21 RX ADMIN — PACLITAXEL 330 MG: 6 INJECTION, SOLUTION INTRAVENOUS at 10:02

## 2017-02-21 RX ADMIN — FAMOTIDINE 20 MG: 20 INJECTION, SOLUTION INTRAVENOUS at 09:02

## 2017-02-21 RX ADMIN — PEGFILGRASTIM 6 MG: KIT SUBCUTANEOUS at 02:02

## 2017-02-21 NOTE — MR AVS SNAPSHOT
Patient Information     Patient Name Sex     Carole Moore Female 1965      Visit Information        Provider Department Dept Phone Center    2017 8:45 AM [facility] Chemo Infusion On Chemotherapy Infusion [phone] [facility]      Patient Instructions      [facility]  [address]  [address] [address]  [phone] phone     [phone] fax  Hours of Operation: Monday- Friday 8:00am- 5:00pm  After hours phone  [phone]  Hematology / Oncology Physicians on call      Dr. Bill Moss    Please call with any concerns regarding your appointment today.      WAYS TO HELP PREVENT INFECTION         WASH YOUR HANDS OFTEN DURING THE DAY, ESPECIALLY BEFORE YOU EAT, AFTER USING THE BATHROOM, AND AFTER TOUCHING ANIMALS     STAY AWAY FROM PEOPLE WHO HAVE ILLNESSES YOU CAN CATCH; SUCH AS COLDS, FLU, CHICKEN POX     TRY TO AVOID CROWDS     STAY AWAY FROM CHILDREN WHO RECENTLY HAVE RECEIVED LIVE VIRUS VACCINES     MAINTAIN GOOD MOUTH CARE     DO NOT SQUEEZE OR SCRATCH PIMPLES     CLEAN CUTS & SCRAPES RIGHT AWAY AND DAILY UNTIL HEALED WITH WARM WATER, SOAP & AN ANTISEPTIC     AVOID CONTACT WITH LITTER BOXES, BIRD CAGES, & FISH TANKS     AVOID STANDING WATER, IE., BIRD BATHS, FLOWER POTS/VASES, OR HUMIDIFIERS     WEAR GLOVES WHEN GARDENING OR CLEANING UP AFTER OTHERS, ESPECIALLY BABIES & SMALL CHILDREN     DO NOT EAT RAW FISH, SEAFOOD, MEAT, OR EGGS       Your Current Medications Are     aspirin 81 MG Chew    atorvastatin (LIPITOR) 10 MG tablet    LORATADINE (CLARITIN ORAL)    ondansetron (ZOFRAN) 8 MG tablet    prochlorperazine (COMPAZINE) 10 MG tablet    tramadol (ULTRAM) 50 mg tablet    doxycycline (VIBRA-TABS) 100 MG tablet (Discontinued)    hydrocortisone (ANUSOL-HC) 25 mg suppository      Facility-Administered Medications     dexamethasone (DECADRON) 20 mg in sodium chloride 0.9% IVPB     diphenhydrAMINE (BENADRYL) 50 mg in sodium chloride 0.9% 100 mL IVPB    famotidine IVPB 20 mg    heparin, porcine (PF) 100 unit/mL injection flush 500 Units    paclitaxel (TAXOL) 175 mg/m2 = 330 mg in sodium chloride 0.9% 555 mL chemo infusion    palonosetron injection 0.25 mg    pegfilgrastim (NEULASTA (ON BODY INJECTOR)) injection 6 mg    sodium chloride 0.9% 250 mL flush bag    sodium chloride 0.9% flush 10 mL      Appointments for Next Year     2/24/2017  9:30 AM 2 D ECHO (45 min.) [facility], [facility]    INSTRUCTIONS FOR A COMPLETE TWO-DIMENSIONAL ECHOCARDIOGRAM EXAM OR LIMITED 2D/M-MODE ECHOCARDIOGRAM EXAM You have been scheduled for a Two-Dimensional Echocardiogram. This test uses ultrasound waves (harmless sound waves) to give detailed images of heart size, function and structure. NO PREPARATION IS NECESSARY. YOU MAY EAT AND TAKE YOUR MEDICATIONS AS USUAL. Please wear a two-piece outfit. You will be asked to undress from the waist up. Women will put on a patient gown opening to the front. Small pads (electrodes) will be put on your chest to monitor your heart beat. You will be asked to lie on your left side, and will need to remain quiet during the exam. A transducer coated with cool gel will be moved firmly over your chest. This transducer creates sound waves that make images of your heart. A computer changes the sound waves into images that are seen on a screen. You may hear loud noises from the echo machine; this is a normal occurrence. You may be asked to exhale and hold your breath for a few seconds. Air in your lungs can affect the images.  The images of your heart are recorded on video tape so the cardiologist can review them later. Your doctor will be informed of the test results. The test will take approximately 30 minutes to complete. If you are unable to keep your appointment, please call our office to reschedule. Please note that family members or friends are not  allowed to  "accompany you while you are in the testing area. Check your appointment slip for the location where the test will be done.    (off [address]) 3rd floor    3/2/2017  1:40 PM ESTABLISHED PATIENT (20 min.) [facility] - Cardiology Fredy Mei MD    Arrive at check-in approximately 15 minutes before your scheduled appointment time. Bring all outside medical records and imaging, along with a list of your current medications and insurance card.    (off [address]) 3rd floor    3/8/2017  9:45 AM ESTABLISHED PATIENT (15 min.) [facility] - General Surgery Travon Faria MD    Arrive at check-in approximately 15 minutes before your scheduled appointment time. Bring all outside medical records and imaging, along with a list of your current medications and insurance card.    (off [address]) 3rd floor    3/8/2017 11:00 AM ESTABLISHED PATIENT (20 min.) [facility] - Hematology Oncology Alessia Moss MD    Arrive at check-in approximately 15 minutes before your scheduled appointment time. Bring all outside medical records and imaging, along with a list of your current medications and insurance card.    (off [address]) 1st Floor Appointments with [facility] - 2nd Floor         Default Flowsheet Data (last 24 hours)      Amb Complex Vitals Raffy        02/21/17 1319 02/21/17 1108 02/21/17 0820          Measurements    Weight   74.7 kg (164 lb 10.9 oz)      Height   5' 7" (1.702 m)      BSA (Calculated - sq m)   1.88 sq meters      BMI (Calculated)   25.8      /67 104/67 100/70      Temp   97.9 °F (36.6 °C)      Pulse 99 78 107      Resp   18      SpO2   96 %      Pain Assessment    Pain Score   Zero              Allergies     No Known Allergies      Medications You Received from 02/20/2017 1353 to 02/21/2017 1353        Date/Time Order Dose Route Action     02/21/2017 0956 dexamethasone (DECADRON) 20 mg in sodium chloride 0.9% IVPB   Intravenous New Bag     02/21/2017 1027 diphenhydrAMINE (BENADRYL) 50 mg in " sodium chloride 0.9% 100 mL IVPB   Intravenous New Bag     02/21/2017 0918 famotidine IVPB 20 mg 20 mg Intravenous New Bag     02/21/2017 1056 paclitaxel (TAXOL) 175 mg/m2 = 330 mg in sodium chloride 0.9% 555 mL chemo infusion 330 mg Intravenous New Bag     02/21/2017 0915 palonosetron injection 0.25 mg 0.25 mg Intravenous Given     02/21/2017 0905 sodium chloride 0.9% 250 mL flush bag   Intravenous New Bag      Current Discharge Medication List     Cannot display discharge medications since this is not an admission.

## 2017-02-21 NOTE — PLAN OF CARE
Problem: Patient Care Overview  Goal: Plan of Care Review  Outcome: Ongoing (interventions implemented as appropriate)  States she is excited that this is her last chemo treatment.

## 2017-02-21 NOTE — PATIENT INSTRUCTIONS
[facility]  [address] [facility] [address]  [address] [address]  [phone] phone     [phone] fax  Hours of Operation: Monday- Friday 8:00am- 5:00pm  After hours phone  [phone]  Hematology / Oncology Physicians on call      Dr. Bill Moss    Please call with any concerns regarding your appointment today.      WAYS TO HELP PREVENT INFECTION         WASH YOUR HANDS OFTEN DURING THE DAY, ESPECIALLY BEFORE YOU EAT, AFTER USING THE BATHROOM, AND AFTER TOUCHING ANIMALS     STAY AWAY FROM PEOPLE WHO HAVE ILLNESSES YOU CAN CATCH; SUCH AS COLDS, FLU, CHICKEN POX     TRY TO AVOID CROWDS     STAY AWAY FROM CHILDREN WHO RECENTLY HAVE RECEIVED LIVE VIRUS VACCINES     MAINTAIN GOOD MOUTH CARE     DO NOT SQUEEZE OR SCRATCH PIMPLES     CLEAN CUTS & SCRAPES RIGHT AWAY AND DAILY UNTIL HEALED WITH WARM WATER, SOAP & AN ANTISEPTIC     AVOID CONTACT WITH LITTER BOXES, BIRD CAGES, & FISH TANKS     AVOID STANDING WATER, IE., BIRD BATHS, FLOWER POTS/VASES, OR HUMIDIFIERS     WEAR GLOVES WHEN GARDENING OR CLEANING UP AFTER OTHERS, ESPECIALLY BABIES & SMALL CHILDREN     DO NOT EAT RAW FISH, SEAFOOD, MEAT, OR EGGS

## 2017-02-21 NOTE — PROGRESS NOTES
Hematology/Oncology Established Visit    Reason for Visit / CC: Breast cancer    [facility] Diagnosis: Breast cancer    Stage: Clinical Stage IIB ([id number])    Pathology: 10/27/16 Right breast mass, incisional biopsy:  Invasive lobular carcinoma, intermediate grade (3,2,1), see note. Virtually 100% of the cells of the infiltrating carcinoma are strongly both nuclear estrogen receptor and nuclear  progesterone receptor positive. They are HER-2 negative.    Prior Treatment: None    Current Treatment: Neoadjuvant chemotherapy with dose-dense AC-T    History of Present Illness:  Ms. 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 She has had an indention in her right breast with some nipple retraction for the past year, but mammogram and u/s last year were normal. This year, imaging and physical exam revealed denser tissue with more pronounced nipple retraction. Dr. Faria performed incisional LN biopsy of the R breast, which revealed invasive lobular carcinoma. She had a hysterectomy in 2008. She does get occasional hot flashes. Colonoscopy approx age 46 was normal. No melena/hematochezia. No unintentional weight loss. No bone pain.    Today, patient comes in for follow up of breast cancer. No nausea, vomiting, diarrhea, fevers, chills, sweats, cough.  Her constipation and hemorrhoids are being managed with regular stool softeners and preparation H. She is drinking more water. She completed Doxycycline for axillary folliculitis vs hydradenitis. The bumps are still getting smaller.     ROS:  General:  No wt loss, fever/chills, fatigue, night sweats  Eyes: No vision problems, pain or inflammation.   Ears/Nose: No difficultly hearing, ear pain, rhinorrhea, or epistaxis  Oropharynx: No ulcers, dysphagia, or odynophagia  Cardiovascular: No chest pains, sob, PND or dyspnea on exertion  Pulmonary: No cough, sob,  hemoptysis  Gastrointestional: No n/v/d, melena, hematochezia, or change in bowel habits +constipation and hemorrhoids  : No dysuria, hematuria, pelvic pain or flank pain  Musculoskeletal: No myalgias,  or arthralgias +R arm weakness and numbness  Neurological: No headaches, focal deficits, or seizure activity  Endocrine: No heat or cold intolerance   Skin: No rashes pruritus, or lesions  Psychiatric: No symptoms of mood disorders  Heme/Lymph: No lymph node enlargment    Past Medical History   Diagnosis Date    Cancer     CVA (cerebral infarction)     Diverticulosis     Hyperlipidemia     Nausea after anesthesia     Paralysis      right side    Stroke      Past Surgical History:  Partial hysterectomy 11/2008    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 No illicits.  Social History     Social History    Marital status:      Spouse name: N/A    Number of children: N/A    Years of education: N/A     Social History Main Topics    Smoking status: Never Smoker    Smokeless tobacco: Never Used    Alcohol use No    Drug use: No    Sexual activity: No     Other Topics Concern    Are You Pregnant Or Think You May Be? No    Breast-Feeding No     Social History Narrative       Family History: family history includes Breast cancer in her paternal aunt; Colon cancer in her father and mother; Melanoma in her maternal uncle. 2 Paternal aunts diagnosed with breast cancer age 60s. Mom had colon cancer age late 60s, father had colon cancer age late 60s. Maternal uncle with melanoma age 45. Maternal uncle with lung cancer age 59.    Physical Exam:  Vitals:    02/21/17 0820   BP: 100/70   Pulse: 107   Resp: 18   Temp: 97.9 °F (36.6 °C)     Body mass index is 25.79 kg/(m^2).  General:  AAOx4, no acute distress  HEENT: EOMI. Normocephalic and atraumatic. No maxillary sinus tenderness. External auditory canals clear and TMs intact without lesions. Nasal and oral mucosal  membranes moist. Normal dentition and gums.   Neck: no LAD, thyromegaly, normal ROM  Breast: Left breast without any masses, skin changes. Left superior aspect of axilla with one small subcutaneous nodule. Right breast with central density, unable to measure and less nipple retraction. R superior aspect of axilla with 1 small subcutaneous nodules that is shrinking. No erythema or drainage.No cervical, supraclav, axillary LAD.  Pulmonary: Bilaterally clear to auscultation, Normal effort with no accessory muscle use, no wheezes/rales/rhonchi  CV: Normal rate, regular rhythm, no murmurs/rubs/gallops, no edema  ABD:  Soft, nontender, nondistended, no mass, and without hepatosplenomegaly   Ext: No clubbing, cyanosis, or edema, normal ROM  Skin: No rashes, lesions, bruising or petechiae  Neurological: CN II to XII grossly intact, normal coordination  +R arm with 0/5 strength, mild numbness. R ankle with foot drop requiring brace.  Psychiatric:  Normal affect and judgement +tearful  Hem/Lymph:  No submandibular, cervical, supraclavicular, axillary LAD.    Labs:    Lab Results   Component Value Date    WBC 5.96 02/21/2017    HGB 11.3 (L) 02/21/2017    HCT 34.9 (L) 02/21/2017     (H) 02/21/2017     02/21/2017     Lab Results   Component Value Date     02/07/2017    K 4.2 02/07/2017     02/07/2017    CO2 23 02/07/2017    BUN 13 02/07/2017    CREATININE 0.6 02/07/2017    CALCIUM 9.6 02/07/2017    ANIONGAP 10 02/07/2017    ESTGFRAFRICA >60 02/07/2017    EGFRNONAA >60 02/07/2017     Lab Results   Component Value Date    ALT 31 02/07/2017    AST 21 02/07/2017    ALKPHOS 113 02/07/2017    BILITOT 0.7 02/07/2017       No results found for: IRON, TIBC, FERRITIN, SATURATEDIRO  No results found for: NHXMHNBO20  No results found for: FOLATE  Lab Results   Component Value Date    TSH 0.680 09/15/2015       Imaging:  Mammogram 10/10/16: The breasts are heterogeneously dense.  This may lower the sensitivity of  mammography.  There is global asymmetry seen in the right breast.  Increased density and contraction of fibroglandular tissue on the right with increased prominence  of nipple retraction.  No defiinite mass or suspicious calcifications. Recommend ultrasound.  Left breast stable without  additional evidence of dominant mass, architectural distortion or suspicious calcification.  Results of ultrasound and/or mammogram should not preclude biopsy of any clinically palpable and/or suspicious nodule or mass. Please correlate with exam.  Digital tomosynthesis was performed and used in the interpretation of the images. Images were evaluated with a Computer Aided Detection (CAD) system.  Impression  Global asymmetry in the right breast requires additional evaluation. Clinical correlation of finding is recommended. An ultrasound exam is recommended.    PET 11/9/16:  Hypermetabolic uptake within patient's known right breast carcinoma with a maximum SUV of 3.5.  No findings to suggest metastatic disease.    Assessment / Plan:  Carole Moore is a 51 y.o. female who comes in with right breast cancer.    1. Right-sided breast cancer: Invasive lobular carcinoma, ER/VT positive, [id number] neg. BRCA negative. Given large mass on physical examination as well as intraoperative findings, I agree that this patient would benefit from neoadjuvant chemotherapy. Have discussed and consented patient for dose-dense AC-T regimen. Will plan for neoadjuvant chemotherapy, followed by mastectomy and SLND. Physical exam demonstates response.  -- Proceed with cycle 4, day 1 of dose-dense Taxol today. Neulasta OBI today.   -- Refer back to Dr. Faria to discuss surgery.  -- Return in 2 weeks for follow up.    2. Cardiotoxic medication monitoring: Given use of anthracycline, will monitor heart function. Echo on 11/10/16 shows EF 55-60%.  -- Cardio-oncology is following.    3. Gilbert's disease: Likely given chronic unconjugated  hyperbilirubinemia. No irinotecan can be given. Monitor Tylenol use.    4. H/o CVA: With residual R-sided weakness. On Aspirin, statin.    5. Constipation: Likely related to Zofran. I recommend discontinuing Zofran and using Compazine prn. Discussed with patient to increase fluids and use Senna bid.    6. Chemotherapy induced anemia: Will continue to monitor closely.    7. Folliculitis vs hydradenitis: Present in bilateral axillary regions. Pt completed a course of antibiotics with Doxycycline.    Alessia Moss M.D.  Hematology Oncology

## 2017-02-24 ENCOUNTER — CLINICAL SUPPORT (OUTPATIENT)
Dept: CARDIOLOGY | Facility: CLINIC | Age: 52
End: 2017-02-24
Payer: MEDICARE

## 2017-02-24 DIAGNOSIS — Z91.89 HEART FAILURE, ACC/AHA STAGE A: ICD-10-CM

## 2017-02-24 PROCEDURE — 93307 TTE W/O DOPPLER COMPLETE: CPT | Mod: PBBFAC,PO | Performed by: INTERNAL MEDICINE

## 2017-02-25 LAB
DIASTOLIC DYSFUNCTION: NO
ESTIMATED PA SYSTOLIC PRESSURE: 18.32
RETIRED EF AND QEF - SEE NOTES: 60 (ref 55–65)

## 2017-03-02 ENCOUNTER — OFFICE VISIT (OUTPATIENT)
Dept: CARDIOLOGY | Facility: CLINIC | Age: 52
End: 2017-03-02
Payer: COMMERCIAL

## 2017-03-02 VITALS
HEIGHT: 67 IN | BODY MASS INDEX: 26.21 KG/M2 | DIASTOLIC BLOOD PRESSURE: 64 MMHG | WEIGHT: 167 LBS | SYSTOLIC BLOOD PRESSURE: 100 MMHG | HEART RATE: 88 BPM

## 2017-03-02 DIAGNOSIS — Z91.89 HEART FAILURE, ACC/AHA STAGE A: Primary | ICD-10-CM

## 2017-03-02 PROCEDURE — 99213 OFFICE O/P EST LOW 20 MIN: CPT | Mod: PBBFAC,PO | Performed by: NUCLEAR MEDICINE

## 2017-03-02 PROCEDURE — 99214 OFFICE O/P EST MOD 30 MIN: CPT | Mod: S$PBB,,, | Performed by: NUCLEAR MEDICINE

## 2017-03-02 PROCEDURE — 99999 PR PBB SHADOW E&M-EST. PATIENT-LVL III: CPT | Mod: PBBFAC,,, | Performed by: NUCLEAR MEDICINE

## 2017-03-02 RX ORDER — IBUPROFEN 200 MG
200 TABLET ORAL
COMMUNITY
End: 2017-03-08

## 2017-03-02 NOTE — PROGRESS NOTES
Subjective:   Patient ID:  Carole Moore is a 51 y.o. female who presents for follow-up of Congestive Heart Failure (heart failure) and Results (review wchocardiogram)      HPI  HEART FAILURE STAGE A- CHEMO VS CA OF BREAST- AC-T-   SHE HAS FINISHED HER CYCLE OF CHEMO- BEEN CONSIDERED FOR SURGERY.  RECENT ECHO 2/24/17-  LV EF 60%- NO PE. NORMAL LV FUNCTION AND CONTRACTILITY  NO HX OF PALPITATIONS. NO NEAR SYNCOPE OR SYNCOPE  NO CHEST DISCOMFORT- NO UNUSUAL LIPSCOMB WITH ORDINARY DAILY ACTIVITIES- NO ORTHOPNEA OR PND  NO EDEMA. NO CALVE TENDERNESS. NO ABDOMINAL PAIN  NO FOCAL CNS SYMPTOMS OR SIGNS TO SUGGEST TIA OR STROKE      Review of Systems   Constitution: Negative for chills, fever, weakness, night sweats, weight gain and weight loss.   HENT: Negative for headaches and nosebleeds.    Eyes: Negative for blurred vision, double vision and visual disturbance.   Cardiovascular: Negative for chest pain, dyspnea on exertion, irregular heartbeat, leg swelling, orthopnea, palpitations, paroxysmal nocturnal dyspnea and syncope.   Respiratory: Negative for cough, hemoptysis and wheezing.    Endocrine: Negative for polydipsia and polyuria.   Hematologic/Lymphatic: Does not bruise/bleed easily.   Skin: Negative for rash.   Musculoskeletal: Negative for joint pain, joint swelling, muscle weakness and myalgias.   Gastrointestinal: Negative for abdominal pain, hematemesis, jaundice and melena.   Genitourinary: Negative for dysuria, hematuria and nocturia.   Neurological: Negative for dizziness, focal weakness and sensory change.   Psychiatric/Behavioral: Negative for depression. The patient does not have insomnia and is not nervous/anxious.      Family History   Problem Relation Age of Onset    Breast cancer Paternal Aunt     Colon cancer Father     Colon cancer Mother     Melanoma Maternal Uncle      Past Medical History:   Diagnosis Date    Cancer     CVA (cerebral infarction)     Diverticulosis     Hyperlipidemia      Nausea after anesthesia     Paralysis     right side    Stroke      Current Outpatient Prescriptions on File Prior to Visit   Medication Sig Dispense Refill    aspirin 81 MG Chew Take 81 mg by mouth once daily.      atorvastatin (LIPITOR) 10 MG tablet Take 1 tablet (10 mg total) by mouth once daily. 90 tablet 4    hydrocortisone (ANUSOL-HC) 25 mg suppository Place 1 suppository (25 mg total) rectally 2 (two) times daily. 20 suppository 0    LORATADINE (CLARITIN ORAL) Take by mouth.      ondansetron (ZOFRAN) 8 MG tablet Take 1 tablet (8 mg total) by mouth every 8 (eight) hours as needed for Nausea. 30 tablet 2    prochlorperazine (COMPAZINE) 10 MG tablet Take 1 tablet (10 mg total) by mouth every 6 (six) hours as needed. 30 tablet 1    tramadol (ULTRAM) 50 mg tablet Take 1 tablet (50 mg total) by mouth every 6 (six) hours as needed for Pain. 20 tablet 0     No current facility-administered medications on file prior to visit.      Review of patient's allergies indicates:  No Known Allergies    Objective:     Physical Exam   Constitutional: She is oriented to person, place, and time. She appears well-developed. No distress.   HENT:   Head: Normocephalic.   Eyes: Conjunctivae are normal. Pupils are equal, round, and reactive to light. No scleral icterus.   Neck: Normal range of motion. Neck supple. Normal carotid pulses, no hepatojugular reflux and no JVD present. Carotid bruit is not present. No edema present. No thyroid mass and no thyromegaly present.   Cardiovascular: Normal rate, regular rhythm, S1 normal, S2 normal, normal heart sounds and intact distal pulses.  PMI is not displaced.  Exam reveals no gallop and no friction rub.    No murmur heard.  Pulses:       Carotid pulses are 2+ on the right side, and 2+ on the left side.       Radial pulses are 2+ on the right side, and 2+ on the left side.        Femoral pulses are 2+ on the right side, and 2+ on the left side.       Popliteal pulses are 2+ on  the right side, and 2+ on the left side.        Dorsalis pedis pulses are 2+ on the right side, and 2+ on the left side.        Posterior tibial pulses are 2+ on the right side, and 2+ on the left side.   Pulmonary/Chest: Effort normal and breath sounds normal. She has no wheezes. She has no rales. She exhibits no tenderness.   Abdominal: Soft. Bowel sounds are normal. She exhibits no pulsatile midline mass and no mass. There is no hepatosplenomegaly. There is no tenderness.   Musculoskeletal: Normal range of motion. She exhibits no edema or tenderness.        Cervical back: Normal.        Thoracic back: Normal.        Lumbar back: Normal.   Lymphadenopathy:     She has no cervical adenopathy.     She has no axillary adenopathy.        Right: No supraclavicular adenopathy present.        Left: No supraclavicular adenopathy present.   Neurological: She is alert and oriented to person, place, and time. She has normal strength and normal reflexes. No sensory deficit. Gait normal.   Skin: Skin is warm. No rash noted. No cyanosis. No pallor. Nails show no clubbing.   Psychiatric: She has a normal mood and affect. Her speech is normal and behavior is normal. Cognition and memory are normal.       Assessment:     1. Heart failure, ACC/AHA stage A      STABLE CV STATUS- NO EVIDENCE OF ADHF. NO ARRHYTHMIAS. NO ACTIVE MYOCARDIAL ISCHEMIA  Plan:     Heart failure, ACC/AHA stage A  -     2D echo only; Future      RETURN IN 4 MONTHS.

## 2017-03-02 NOTE — MR AVS SNAPSHOT
[facility] - Cardiology  [address] [facility] [address] [place] [address]  Phone: [phone]  Fax: [phone]                  Carole Moore   3/2/2017 1:40 PM   Office Visit    Description:  Female : 1965   Provider:  Fredy Mei MD   Department:  [facility] - [facility]           Reason for Visit     Congestive Heart Failure     Results           Diagnoses this Visit        Comments    Heart failure, ACC/AHA stage A    -  Primary            To Do List           Future Appointments        Provider Department Dept Phone    3/8/2017 9:45 AM Travon Faria MD [facility] - General Surgery [phone]    3/8/2017 11:00 AM Alessia Moss MD [facility] - Hematology Oncology [phone]      Goals (5 Years of Data)     None      Follow-Up and Disposition     Return in about 4 months (around 2017).      [facility] On Call     [facility] On Call Nurse Care Line -  Assistance  Registered nurses in the [facility] On Call Center provide clinical advisement, health education, appointment booking, and other advisory services.  Call for this free service at [phone].             Medications           Message regarding Medications     Verify the changes and/or additions to your medication regime listed below are the same as discussed with your clinician today.  If any of these changes or additions are incorrect, please notify your healthcare provider.             Verify that the below list of medications is an accurate representation of the medications you are currently taking.  If none reported, the list may be blank. If incorrect, please contact your healthcare provider. Carry this list with you in case of emergency.           Current Medications     aspirin 81 MG Chew Take 81 mg by mouth once daily.    atorvastatin (LIPITOR) 10 MG tablet Take 1 tablet (10 mg total) by mouth once daily.    hydrocortisone (ANUSOL-HC) 25 mg suppository Place 1 suppository (25 mg total) rectally 2 (two) times daily.    ibuprofen  (ADVIL,MOTRIN) 200 MG tablet Take 200 mg by mouth as needed for Pain.    LORATADINE (CLARITIN ORAL) Take by mouth.    ondansetron (ZOFRAN) 8 MG tablet Take 1 tablet (8 mg total) by mouth every 8 (eight) hours as needed for Nausea.    prochlorperazine (COMPAZINE) 10 MG tablet Take 1 tablet (10 mg total) by mouth every 6 (six) hours as needed.    tramadol (ULTRAM) 50 mg tablet Take 1 tablet (50 mg total) by mouth every 6 (six) hours as needed for Pain.           Clinical Reference Information           Your Vitals Were     BP                   100/64 (BP Location: Right arm, Patient Position: Sitting, BP Method: Manual)           Blood Pressure          Most Recent Value    BP  100/64      Allergies as of 3/2/2017     No Known Allergies      Immunizations Administered on Date of Encounter - 3/2/2017     None      Orders Placed During Today's Visit     Future Labs/Procedures Expected by Expires    2D echo only  As directed 3/2/2018      [facility] Sign-Up     Activating your [facility] account is as easy as 1-2-3!     1) Visit [email or web address], select Sign Up Now, enter this activation code and your date of birth, then select Next.  [email or web address]  Expires: 4/16/2017  2:20 PM      2) Create a username and password to use when you visit [facility] in the future and select a security question in case you lose your password and select Next.    3) Enter your e-mail address and click Sign Up!    Additional Information  If you have questions, please e-mail [email or web address] or call [phone] to talk to our [facility] staff. Remember, [facility] is NOT to be used for urgent needs. For medical emergencies, dial 911.         Language Assistance Services     ATTENTION: Language assistance services are available, free of charge. Please call [phone].      ATENCIÓN: Si habla español, tiene a escudero disposición servicios gratuitos de asistencia lingüística. Llame al [phone].     CHÚ Ý: N?u b?n nói Ti?ng Vi?t, có các  d?ch v? h? tr? ngôn ng? mi?n phí [place]ell[place] cho b?n. G?i s? [phone].         [facility] complies with applicable Federal civil rights laws and does not discriminate on the basis of race, color, national origin, age, disability, or sex.

## 2017-03-08 ENCOUNTER — OFFICE VISIT (OUTPATIENT)
Dept: SURGERY | Facility: CLINIC | Age: 52
End: 2017-03-08
Payer: MEDICARE

## 2017-03-08 ENCOUNTER — OFFICE VISIT (OUTPATIENT)
Dept: HEMATOLOGY/ONCOLOGY | Facility: CLINIC | Age: 52
End: 2017-03-08
Payer: MEDICARE

## 2017-03-08 VITALS
WEIGHT: 165.56 LBS | RESPIRATION RATE: 18 BRPM | HEART RATE: 96 BPM | TEMPERATURE: 98 F | DIASTOLIC BLOOD PRESSURE: 80 MMHG | HEIGHT: 67 IN | BODY MASS INDEX: 25.99 KG/M2 | SYSTOLIC BLOOD PRESSURE: 120 MMHG | OXYGEN SATURATION: 100 %

## 2017-03-08 VITALS
BODY MASS INDEX: 25.93 KG/M2 | TEMPERATURE: 98 F | SYSTOLIC BLOOD PRESSURE: 101 MMHG | DIASTOLIC BLOOD PRESSURE: 67 MMHG | HEART RATE: 93 BPM | WEIGHT: 165.56 LBS

## 2017-03-08 DIAGNOSIS — D64.81 ANTINEOPLASTIC CHEMOTHERAPY INDUCED ANEMIA: ICD-10-CM

## 2017-03-08 DIAGNOSIS — C50.111 MALIGNANT NEOPLASM OF CENTRAL PORTION OF RIGHT FEMALE BREAST: Primary | ICD-10-CM

## 2017-03-08 DIAGNOSIS — G62.0 CHEMOTHERAPY-INDUCED PERIPHERAL NEUROPATHY: ICD-10-CM

## 2017-03-08 DIAGNOSIS — T45.1X5A ANTINEOPLASTIC CHEMOTHERAPY INDUCED ANEMIA: ICD-10-CM

## 2017-03-08 DIAGNOSIS — T45.1X5A CHEMOTHERAPY-INDUCED PERIPHERAL NEUROPATHY: ICD-10-CM

## 2017-03-08 PROCEDURE — 99213 OFFICE O/P EST LOW 20 MIN: CPT | Mod: S$PBB,,, | Performed by: SURGERY

## 2017-03-08 PROCEDURE — 99213 OFFICE O/P EST LOW 20 MIN: CPT | Mod: PBBFAC | Performed by: INTERNAL MEDICINE

## 2017-03-08 PROCEDURE — 99999 PR PBB SHADOW E&M-EST. PATIENT-LVL III: CPT | Mod: PBBFAC,,, | Performed by: INTERNAL MEDICINE

## 2017-03-08 PROCEDURE — 99999 PR PBB SHADOW E&M-EST. PATIENT-LVL III: CPT | Mod: PBBFAC,,, | Performed by: SURGERY

## 2017-03-08 PROCEDURE — 99214 OFFICE O/P EST MOD 30 MIN: CPT | Mod: S$PBB,,, | Performed by: INTERNAL MEDICINE

## 2017-03-08 NOTE — MR AVS SNAPSHOT
[facility]  [address] [place] [address]  Phone: [phone]  Fax: [phone]                  Carole Moore   3/8/2017 9:45 AM   Office Visit    Description:  Female : 1965   Provider:  Travon Faria MD   Department:  [facility]           Reason for Visit     Follow-up                To Do List           Future Appointments        Provider Department Dept Phone    3/8/2017 11:00 AM Alessia Moss MD [facility] - Hematology Oncology [phone]      Goals (5 Years of Data)     None      [facility]     [facility] On [facility] Nurse Care Line -  Assistance  Registered nurses in the [facility] On Call Center provide clinical advisement, health education, appointment booking, and other advisory services.  Call for this free service at [phone].             Medications           Message regarding Medications     Verify the changes and/or additions to your medication regime listed below are the same as discussed with your clinician today.  If any of these changes or additions are incorrect, please notify your healthcare provider.        STOP taking these medications     ondansetron (ZOFRAN) 8 MG tablet Take 1 tablet (8 mg total) by mouth every 8 (eight) hours as needed for Nausea.    prochlorperazine (COMPAZINE) 10 MG tablet Take 1 tablet (10 mg total) by mouth every 6 (six) hours as needed.    tramadol (ULTRAM) 50 mg tablet Take 1 tablet (50 mg total) by mouth every 6 (six) hours as needed for Pain.    ibuprofen (ADVIL,MOTRIN) 200 MG tablet Take 200 mg by mouth as needed for Pain.           Verify that the below list of medications is an accurate representation of the medications you are currently taking.  If none reported, the list may be blank. If incorrect, please contact your healthcare provider. Carry this list with you in case of emergency.           Current Medications     aspirin 81 MG Chew Take 81 mg by mouth once daily.    atorvastatin  (LIPITOR) 10 MG tablet Take 1 tablet (10 mg total) by mouth once daily.    LORATADINE (CLARITIN ORAL) Take by mouth.           Clinical Reference Information           Your Vitals Were     BP Pulse Temp Weight Last Period BMI    101/67 93 98.2 °F (36.8 °C) (Oral) 75.1 kg (165 lb 9.1 oz) 10/11/2009 25.93 kg/m2      Blood Pressure          Most Recent Value    BP  101/67      Allergies as of 3/8/2017     No Known Allergies      Immunizations Administered on Date of Encounter - 3/8/2017     None      [facility] Sign-Up     Activating your [facility] account is as easy as 1-2-3!     1) Visit [email or web address], select Sign Up Now, enter this activation code and your date of birth, then select Next.  [email or web address]  Expires: 4/16/2017  2:20 PM      2) Create a username and password to use when you visit [facility] in the future and select a security question in case you lose your password and select Next.    3) Enter your e-mail address and click Sign Up!    Additional Information  If you have questions, please e-mail [email or web address] or call [phone] to talk to our [facility] staff. Remember, [facility] is NOT to be used for urgent needs. For medical emergencies, dial 911.         Language Assistance Services     ATTENTION: Language assistance services are available, free of charge. Please call [phone].      ATENCIÓN: Si habla español, tiene a escudero disposición servicios gratuitos de asistencia lingüística. Llame al [phone].     CHÚ Ý: N?u b?n nói Ti?ng Vi?t, có các d?ch v? h? tr? ngôn ng? mi?n phí dành cho b?n. G?i s? [phone].         [facility] - General Surgery complies with applicable Federal civil rights laws and does not discriminate on the basis of race, color, national origin, age, disability, or sex.

## 2017-03-08 NOTE — PROGRESS NOTES
Hematology/Oncology Established Visit    Reason for Visit / CC: Breast cancer    [facility] Diagnosis: Breast cancer    Stage: Clinical Stage IIB ([id number])    Pathology: 10/27/16 Right breast mass, incisional biopsy:  Invasive lobular carcinoma, intermediate grade (3,2,1), see note. Virtually 100% of the cells of the infiltrating carcinoma are strongly both nuclear estrogen receptor and nuclear  progesterone receptor positive. They are HER-2 negative.    Prior Treatment: None    Current Treatment: Neoadjuvant chemotherapy with dose-dense AC-T    History of Present Illness:  Ms. Moore is a 52 y/o female with h/o CVA in her 30s from OCPs and residual R arm contracture comes in with new diagnosis of Invasive lobular carcinoma, ER/IA positive. She has had an indention in her right breast with some nipple retraction for the past year, but mammogram and u/s last year were normal. This year, imaging and physical exam revealed denser tissue with more pronounced nipple retraction. Dr. Faria performed incisional LN biopsy of the R breast, which revealed invasive lobular carcinoma. She had a hysterectomy in 2008. She does get occasional hot flashes. Colonoscopy approx age 46 was normal. No melena/hematochezia. No unintentional weight loss. No bone pain.    Today, patient comes in for follow up of breast cancer. She completed neoadjuvant chemotherapy 2 weeks ago. She met with Dr. Faria this morning who recommends a right breast mastectomy as well as evaluation by plastic surgeon, Dr. Carrasco. She is feeling itching in her hands, feet, and back. When asked whether this could be numbness/tingling, she is unsure.    ROS:  General:  No wt loss, fever/chills, fatigue, night sweats  Eyes: No vision problems, pain or inflammation.   Ears/Nose: No difficultly hearing, ear pain, rhinorrhea, or epistaxis  Oropharynx: No ulcers, dysphagia, or odynophagia  Cardiovascular: No chest pains, sob, PND or dyspnea on exertion  Pulmonary: No  cough, sob, hemoptysis  Gastrointestional: No n/v/d, melena, hematochezia, or change in bowel habits +constipation and hemorrhoids  : No dysuria, hematuria, pelvic pain or flank pain  Musculoskeletal: No myalgias,  or arthralgias +R arm weakness and numbness  Neurological: No headaches, focal deficits, or seizure activity  Endocrine: No heat or cold intolerance   Skin: No rashes pruritus, or lesions  Psychiatric: No symptoms of mood disorders  Heme/Lymph: No lymph node enlargment    Past Medical History:   Diagnosis Date    Cancer     CVA (cerebral infarction)     Diverticulosis     Hyperlipidemia     Nausea after anesthesia     Paralysis     right side    Stroke      Past Surgical History:  Partial hysterectomy 11/2008    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 No illicits.  Social History     Social History    Marital status:      Spouse name: N/A    Number of children: N/A    Years of education: N/A     Social History Main Topics    Smoking status: Never Smoker    Smokeless tobacco: Never Used    Alcohol use No    Drug use: No    Sexual activity: No     Other Topics Concern    Are You Pregnant Or Think You May Be? No    Breast-Feeding No     Social History Narrative       Family History: family history includes Breast cancer in her paternal aunt; Colon cancer in her father and mother; Melanoma in her maternal uncle. 2 Paternal aunts diagnosed with breast cancer age 60s. Mom had colon cancer age late 60s, father had colon cancer age late 60s. Maternal uncle with melanoma age 45. Maternal uncle with lung cancer age 59.    Physical Exam:  Vitals:    03/08/17 1116   BP: 120/80   Pulse: 96   Resp: 18   Temp: 98.2 °F (36.8 °C)     Body mass index is 25.93 kg/(m^2).  General:  AAOx4, no acute distress  HEENT: EOMI. Normocephalic and atraumatic. No maxillary sinus tenderness. External auditory canals clear and TMs intact without lesions. Nasal and oral  mucosal membranes moist. Normal dentition and gums.   Neck: no LAD, thyromegaly, normal ROM  Breast: Left breast without any masses, skin changes. Left superior aspect of axilla with one small subcutaneous nodule. Right breast with central density, unable to measure and less nipple retraction. R superior aspect of axilla with 1 small subcutaneous nodules that is shrinking. No erythema or drainage.No cervical, supraclav, axillary LAD.  Pulmonary: Bilaterally clear to auscultation, Normal effort with no accessory muscle use, no wheezes/rales/rhonchi  CV: Normal rate, regular rhythm, no murmurs/rubs/gallops, no edema  ABD:  Soft, nontender, nondistended, no mass, and without hepatosplenomegaly   Ext: No clubbing, cyanosis, or edema, normal ROM  Skin: No rashes, lesions, bruising or petechiae  Neurological: CN II to XII grossly intact, normal coordination  +R arm with 0/5 strength, mild numbness. R ankle with foot drop requiring brace.  Psychiatric:  Normal affect and judgement +tearful  Hem/Lymph:  No submandibular, cervical, supraclavicular, axillary LAD.    Labs:    Lab Results   Component Value Date    WBC 5.96 02/21/2017    HGB 11.3 (L) 02/21/2017    HCT 34.9 (L) 02/21/2017     (H) 02/21/2017     02/21/2017     Lab Results   Component Value Date     02/21/2017    K 3.9 02/21/2017     02/21/2017    CO2 25 02/21/2017    BUN 12 02/21/2017    CREATININE 0.7 02/21/2017    CALCIUM 9.5 02/21/2017    ANIONGAP 11 02/21/2017    ESTGFRAFRICA >60 02/21/2017    EGFRNONAA >60 02/21/2017     Lab Results   Component Value Date    ALT 37 02/21/2017    AST 23 02/21/2017    ALKPHOS 109 02/21/2017    BILITOT 0.7 02/21/2017       No results found for: IRON, TIBC, FERRITIN, SATURATEDIRO  No results found for: [id number]  No results found for: FOLATE  Lab Results   Component Value Date    TSH 0.680 09/15/2015       Imaging:  Mammogram 10/10/16: The breasts are heterogeneously dense.  This may lower the  sensitivity of mammography.  There is global asymmetry seen in the right breast.  Increased density and contraction of fibroglandular tissue on the right with increased prominence  of nipple retraction.  No defiinite mass or suspicious calcifications. Recommend ultrasound.  Left breast stable without  additional evidence of dominant mass, architectural distortion or suspicious calcification.  Results of ultrasound and/or mammogram should not preclude biopsy of any clinically palpable and/or suspicious nodule or mass. Please correlate with exam.  Digital tomosynthesis was performed and used in the interpretation of the images. Images were evaluated with a Computer Aided Detection (CAD) system.  Impression  Global asymmetry in the right breast requires additional evaluation. Clinical correlation of finding is recommended. An ultrasound exam is recommended.    PET 11/9/16:  Hypermetabolic uptake within patient's known right breast carcinoma with a maximum SUV of 3.5.  No findings to suggest metastatic disease.    Assessment / Plan:  Carole Moore is a 51 y.o. female who comes in with right breast cancer.    1. Right-sided breast cancer: Invasive lobular carcinoma, ER/MT positive, [id number] neg. BRCA negative. Given large mass on physical examination as well as intraoperative findings, I agree that this patient would benefit from neoadjuvant chemotherapy. Have discussed and consented patient for dose-dense AC-T regimen. Will plan for neoadjuvant chemotherapy, followed by mastectomy and SLND. Physical exam demonstates response. Patient completed neoadjuvant chemotherapy on 2/21/17.  -- Return in 5-6 weeks to review final pathology and decide whether there is any role for radiation  -- Will discuss adjuvant Endocrine therapy at next visit (Check FSH, estradiol at next visit. If FSH is not > 40, should consider Tamoxifen instead of AI).     2. Cardiotoxic medication monitoring: Given use of anthracycline, will  monitor heart function. Echo on 11/10/16 shows EF 55-60%.  -- Cardio-oncology is following.    3. 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 No irinotecan can be given. Monitor Tylenol use.    4. H/o CVA: With residual R-sided weakness. On Aspirin, statin.    5. Constipation: Likely related to Zofran. I recommend discontinuing Zofran and using Compazine prn. Discussed with patient to increase fluids and use Senna bid.    6. Chemotherapy induced anemia: Will continue to monitor closely.    7. Chemotherapy induced peripheral neuropathy: Mild, due to Taxol and will continue to monitor.    Alessia Moss M.D.  Hematology Oncology

## 2017-03-09 NOTE — PROGRESS NOTES
HISTORY OF PRESENT ILLNESS:  The patient returns now in followup.  The patient   is known to me, who was diagnosed in October last year with a fairly diffuse   right breast infiltrating lobular carcinoma, strongly ER/PA positive, HER-2/dorothea   negative.  This caused nipple retraction and was fairly diffuse in the breast.    Clinically and by ultrasound, she really did not have any enlarged lymph nodes.    The patient, a couple of weeks ago, finished her last treatment using ACT   therapy.  The patient is feeling stronger now and is eating well.  She has   noticed that the right breast has improved in the exam significantly with less   nipple retraction, less flattening in the breast and is softer now.  She   tolerated the chemo well.  She did have an episode of what sounds like some   folliculitis in her axilla, but not responding antibiotics and she has had no   problems there since.  The patient presents now for consideration for mastectomy   as the next step in the therapy.    PAST MEDICAL HISTORY:  Unchanged basically from 16 years ago, right   cerebrovascular accident leaving a right arm hemiparesis and limp on the right   side, not sure of the cause.  She has some hyperlipidemia and diverticulosis.    MEDICATIONS:  Only real medicine is some Lipitor, occasional Motrin.    PAST SURGICAL HISTORY:  Only surgery, tonsillectomy and hysterectomy.    SOCIAL HISTORY:  She never smoked and does not really drink alcohol.    FAMILY HISTORY:  Mother and father with colon cancer and aunt with breast   cancer.    REVIEW OF SYSTEMS:  Really unchanged, again some residual from her stroke, but   no other real medical problems other than the breast cancer.    PHYSICAL EXAMINATION:  VITAL SIGNS:  Blood pressure 101/67, pulse is 90.  She is 75 kg with normal   temperature of 98.2.  GENERAL:  She is alert and oriented x3.  HEENT:  Extraocular movements were intact.  NECK:  Soft.  I do not palpate any supraclavicular  adenopathy.  CHEST:  Clear to auscultation throughout.  HEART:  Reveals normal rate and rhythm.  BREASTS:  Show a significant improvement in the breast contour.  Nipple   retraction has much improved and the flattened contour is now more rounded.  The   firm 4 to 5 cm doughy area deep to the nipple and areola was basically   resolved.  The whole breast and this area feel almost back to normal or same as   the other side.  I cannot really detect any distinct mass.  Both left breast is   normal on exam.  Both axillae are normal.  ABDOMEN:  Soft and nontender.  EXTREMITIES:  Again, she has a hemiplegia on the right arm.  She can move the   shoulder some, but is limited extension on the right arm and limited extension   of the elbow, limited extension of the hand and wrist.  Again with ambulation   has a mild limp on the right foot.    IMPRESSION:  The patient is doing well post-neoadjuvant chemotherapy with ACT.    It should be noted that he has not had any cardiac changes on echo, but she has   a significant improvement in the breast exam.  It is really hard to detect any   residual mass at this point.    RECOMMENDATIONS:  The patient needs mastectomy with sentinel node biopsy and   possible axillary dissection.  This mass was very diffuse in the breast   involving the nipple and areola and I would be reluctant to do anything short of   a total right mastectomy and sentinel node biopsy.  We also discussed   post-mastectomy reconstruction.  The patient would like to go ahead and pursue   that avenue.  We will set her up with Dr. Carrasco.  We talk to her about possible   implant versus flaps.  I did tell the patient radiation therapy would depend on   the findings of mastectomy and sentinel node.  If there is minimal residual   tumor in the right breast and the lymph nodes are negative, then the patient may   not need any radiation therapy.  I did  her to the mastectomy and the   sentinel node biopsy.  I talked to  her about injection of the radioactive   colloid and Lymphazurin blue dye.  I talked to her about temporary turning the   urine blue.  I talked to her that 95% of time we can find the sentinel nodes,   but sometimes it is not possible and she will need to proceed directly to a node   dissection.  I also talked to her about the possibility of arm edema and   swelling after the surgery.  I counseled her that she will need drains whether   she has mastectomy alone or mastectomy with reconstruction.  I also counseled   her as to the risks of bleeding, infection, flap necrosis, postoperative seroma   and numbness in the upper arm.  She agrees with the procedure.  She is going to   see Dr. Carrasco with Plastic Surgery and then we will coordinate surgery after   that.        /barrett [id number] yeison(s)        TIMOTHY/IMELDA  dd: 03/08/2017 18:15:29 (CST)  td: 03/09/2017 07:55:53 (CST)  Doc ID   #[id number]  Job ID #[id number]    CC: Alessia Emery M.D.

## 2017-03-27 DIAGNOSIS — C50.919 BREAST CANCER: ICD-10-CM

## 2017-03-27 DIAGNOSIS — C50.111 MALIGNANT NEOPLASM OF CENTRAL PORTION OF RIGHT FEMALE BREAST: Primary | ICD-10-CM

## 2017-03-27 RX ORDER — SODIUM CHLORIDE 9 MG/ML
INJECTION, SOLUTION INTRAVENOUS CONTINUOUS
Status: CANCELLED | OUTPATIENT
Start: 2017-03-27

## 2017-03-30 ENCOUNTER — TELEPHONE (OUTPATIENT)
Dept: SURGERY | Facility: CLINIC | Age: 52
End: 2017-03-30

## 2017-03-30 NOTE — TELEPHONE ENCOUNTER
----- Message from Albert Weeks sent at 3/30/2017 11:04 AM CDT -----  Contact: Pt   Pt request call from nurse to get more information on the surgery she is going to have on 04-11-17, please contact pt at [phone]

## 2017-03-31 ENCOUNTER — CLINICAL SUPPORT (OUTPATIENT)
Dept: CARDIOLOGY | Facility: CLINIC | Age: 52
End: 2017-03-31
Payer: MEDICARE

## 2017-03-31 ENCOUNTER — LAB VISIT (OUTPATIENT)
Dept: LAB | Facility: HOSPITAL | Age: 52
End: 2017-03-31
Attending: SURGERY
Payer: MEDICARE

## 2017-03-31 ENCOUNTER — OFFICE VISIT (OUTPATIENT)
Dept: SURGERY | Facility: CLINIC | Age: 52
End: 2017-03-31
Payer: MEDICARE

## 2017-03-31 VITALS
TEMPERATURE: 98 F | SYSTOLIC BLOOD PRESSURE: 112 MMHG | DIASTOLIC BLOOD PRESSURE: 75 MMHG | BODY MASS INDEX: 26.17 KG/M2 | WEIGHT: 167.13 LBS | HEART RATE: 87 BPM

## 2017-03-31 DIAGNOSIS — C50.111 MALIGNANT NEOPLASM OF CENTRAL PORTION OF RIGHT FEMALE BREAST: ICD-10-CM

## 2017-03-31 DIAGNOSIS — C50.111 MALIGNANT NEOPLASM OF CENTRAL PORTION OF RIGHT FEMALE BREAST: Primary | ICD-10-CM

## 2017-03-31 LAB
ALBUMIN SERPL BCP-MCNC: 3.6 G/DL
ALP SERPL-CCNC: 115 U/L
ALT SERPL W/O P-5'-P-CCNC: 23 U/L
ANION GAP SERPL CALC-SCNC: 8 MMOL/L
AST SERPL-CCNC: 20 U/L
BASOPHILS # BLD AUTO: 0.02 K/UL
BASOPHILS NFR BLD: 0.5 %
BILIRUB SERPL-MCNC: 0.9 MG/DL
BUN SERPL-MCNC: 13 MG/DL
CALCIUM SERPL-MCNC: 9.5 MG/DL
CHLORIDE SERPL-SCNC: 105 MMOL/L
CO2 SERPL-SCNC: 27 MMOL/L
CREAT SERPL-MCNC: 0.7 MG/DL
DIFFERENTIAL METHOD: ABNORMAL
EOSINOPHIL # BLD AUTO: 0.3 K/UL
EOSINOPHIL NFR BLD: 7.7 %
ERYTHROCYTE [DISTWIDTH] IN BLOOD BY AUTOMATED COUNT: 12.8 %
EST. GFR  (AFRICAN AMERICAN): >60 ML/MIN/1.73 M^2
EST. GFR  (NON AFRICAN AMERICAN): >60 ML/MIN/1.73 M^2
GLUCOSE SERPL-MCNC: 86 MG/DL
HCT VFR BLD AUTO: 38.6 %
HGB BLD-MCNC: 12.4 G/DL
LYMPHOCYTES # BLD AUTO: 1.1 K/UL
LYMPHOCYTES NFR BLD: 29.2 %
MCH RBC QN AUTO: 30.6 PG
MCHC RBC AUTO-ENTMCNC: 32.1 %
MCV RBC AUTO: 95 FL
MONOCYTES # BLD AUTO: 0.3 K/UL
MONOCYTES NFR BLD: 8 %
NEUTROPHILS # BLD AUTO: 2.1 K/UL
NEUTROPHILS NFR BLD: 54.6 %
PLATELET # BLD AUTO: 204 K/UL
PMV BLD AUTO: 10.6 FL
POTASSIUM SERPL-SCNC: 4 MMOL/L
PROT SERPL-MCNC: 6.9 G/DL
RBC # BLD AUTO: 4.05 M/UL
SODIUM SERPL-SCNC: 140 MMOL/L
WBC # BLD AUTO: 3.77 K/UL

## 2017-03-31 PROCEDURE — 80053 COMPREHEN METABOLIC PANEL: CPT

## 2017-03-31 PROCEDURE — 36415 COLL VENOUS BLD VENIPUNCTURE: CPT

## 2017-03-31 PROCEDURE — 85025 COMPLETE CBC W/AUTO DIFF WBC: CPT

## 2017-03-31 PROCEDURE — 93005 ELECTROCARDIOGRAM TRACING: CPT | Mod: PBBFAC | Performed by: NUCLEAR MEDICINE

## 2017-03-31 PROCEDURE — 99211 OFF/OP EST MAY X REQ PHY/QHP: CPT | Mod: S$PBB,,, | Performed by: SURGERY

## 2017-03-31 PROCEDURE — 93010 ELECTROCARDIOGRAM REPORT: CPT | Mod: S$PBB,,, | Performed by: NUCLEAR MEDICINE

## 2017-03-31 PROCEDURE — 99999 PR PBB SHADOW E&M-EST. PATIENT-LVL III: CPT | Mod: PBBFAC,,, | Performed by: SURGERY

## 2017-03-31 NOTE — MR AVS SNAPSHOT
[facility]  [address] [address]  Phone: [phone]  Fax: [phone]                  Carole Moore   3/31/2017 10:45 AM   Office Visit    Description:  Female : 1965   Provider:  Travon Faria MD   Department:  [facility]           Reason for Visit     Pre-op Exam           Diagnoses this Visit        Comments    Malignant neoplasm of central portion of right female breast    -  Primary            To Do List           Future Appointments        Provider Department Dept Phone    3/31/2017 11:45 AM LAB, SAME DAY [facility] [phone]    2017 2:00 PM CHAIR 07 [facility] [phone]    2017 7:00 AM [facility] -  [phone]    2017 10:30 AM Travon Faria MD [facility] [phone]    2017 10:00 AM LABORATORY, [address] [facility] [phone]      Your Future Surgeries/Procedures     2017   Surgery with Travon Faria MD   [facility]  ([facility])    [address] [address] [phone]              Goals (5 Years of Data)     None      [facility]     [facility] Nurse Care Line -  Assistance  Unless otherwise directed by your provider, please contact [facility], our nurse care line that is available for  assistance.     Registered nurses in the [facility] provide: appointment scheduling, clinical advisement, health education, and other advisory services.  Call: [phone] (toll free)               Medications           Message regarding Medications     Verify the changes and/or additions to your medication regime listed below are the same as discussed with your clinician today.  If any of these changes or additions are incorrect, please notify your healthcare provider.              Verify that the below list of medications is an accurate representation of the medications you are currently taking.  If none reported, the list may be blank. If incorrect, please contact your healthcare provider. Carry this list with you in case of emergency.           Current Medications     atorvastatin (LIPITOR) 10 MG tablet Take 1 tablet (10 mg total) by mouth once daily.    LORATADINE (CLARITIN ORAL) Take by mouth.    aspirin 81 MG Chew Take 81 mg by mouth once daily.           Clinical Reference Information           Your Vitals Were     BP Pulse Temp Weight Last Period BMI    112/75 87 98 °F (36.7 °C) (Oral) 75.8 kg (167 lb 1.7 oz) 10/11/2009 26.17 kg/m2      Blood Pressure          Most Recent Value    BP  112/75      Allergies as of 3/31/2017     No Known Allergies      Immunizations Administered on Date of Encounter - 3/31/2017     None      Orders Placed During Today's Visit     Future Labs/Procedures Expected by Expires    CBC auto differential  3/31/2017 5/30/2018    Comprehensive metabolic panel  3/31/2017 5/30/2018    EKG 12-lead  As directed 3/31/2018      [facility] Sign-Up     Activating your [facility] account is as easy as 1-2-3!     1) Visit [email or web address], select Sign Up Now, enter this activation code and your date of birth, then select Next.  [email or web address]  Expires: 4/16/2017  3:20 PM      2) Create a username and password to use when you visit [facility] in the future and select a security question in case you lose your password and select Next.    3) Enter your e-mail address and click Sign Up!    Additional Information  If you have questions, please e-mail [email or web address] or call [phone] to talk to our [facility] staff. Remember, [facility] is NOT to be used for urgent needs. For medical emergencies, dial 911.         Language Assistance Services     ATTENTION: Language assistance services are available, free of charge. Please call [phone].      ATENCIÓN: Ana lema  español, tiene a escudero disposición servicios gratuitos de asistencia lingüística. Llsaul al [phone].     CHÚ Ý: N?u b?n nói Ti?ng Vi?t, có các d?ch v? h? tr? ngôn ng? mi?n phí dành cho b?n. G?i s? [phone].         [facility] complies with applicable Federal civil rights laws and does not discriminate on the basis of race, color, national origin, age, disability, or sex.

## 2017-04-03 NOTE — PROGRESS NOTES
CLINIC VISIT    The patient returns now in followup.  Please see my note from a few weeks ago.    Basically about four or five weeks ago, the patient finished her neoadjuvant   chemotherapy.  She presented with diffuse infiltrating lobular cancer of the   right breast.  She had MediPort placement and then chemotherapy with significant   reduction, distortion and mass of the right breast.  She is here now for her   preop eval.  She has seen a plastic surgeon and the plans are now right   mastectomy, a sentinel node biopsy, possible axillary node dissection and then   right breast tissue expander and left breast surgery.  The patient is feeling   better.  Her energy is coming back.  She feels less tired, but she has noticed   that the firmness in the breast on the right side is basically resolved.    PAST MEDICAL HISTORY:  Cerebrovascular accident 16 years ago, leaving her right   arm fairly weak with decreased range of motion.  She also wears a foot brace.    Etiology is not sure other than possible birth control pills.  She has some   hyperlipidemia, has had some diverticulosis.    CURRENT MEDICATIONS:  Lipitor, 81 mg aspirin.  She has held the aspirin now for   anticipation of surgery.    PAST SURGICAL HISTORY:  Hysterectomy, tonsillectomy and she had a MediPort   placed.    SOCIAL HISTORY:  Occasionally smoked in the distant past.  Does not drink   alcohol.    FAMILY HISTORY:  Aunt had breast cancer.  Parents had colon cancer.    REVIEW OF SYSTEMS:  Again, feeling much better.  She does get some sweats from   the post-menopausal.  GASTROINTESTINAL:  Occasional constipation is improved.  SKIN:  She had some mild inflammation in her axilla about a month ago, but has   totally cleared now.  MUSCULOSKELETAL:  Again, right arm weakness, right foot brace and a mild limp   when she walks.  NEUROLOGIC:  No history of seizures.  No psychiatric problems.  HEMATOLOGIC:  She has some mild anemia  post-chemotherapy.  CARDIOVASCULAR:  Denies any chest pains, any palpitations or high blood   pressure.  Followup echo recently showed good ejection fraction.    PHYSICAL EXAMINATION:  VITAL SIGNS:  Blood pressure 112/75, pulse 87, temperature 98 degrees.  She is   75 kg.  GENERAL:  She is alert and oriented x3.  HEENT:  Extraocular movements are intact.  There is no gross visual or auditory   defect.  NECK:  Soft without adenopathy.  I do not palpate any axillary adenopathy.  CHEST:  Clear to auscultation throughout.  HEART:  Reveals normal rate and rhythm.  BREAST:  Left breast was within normal limits.  I cannot palpate any masses.    The nipple areola is normal.  Examination of the right breast reveals   significant improvement.  There is improvement even over the last couple of   weeks.  The nipple is now soft.  It is not really inverted anymore.  I cannot   palpate any mass at beneath the nipple and I cannot really palpate any distinct   mass in the right breast.  Right breast is somewhat smaller than the left   breast.  However, I cannot palpate any masses.  ABDOMEN:  Soft and nontender.  EXTREMITIES:  Reveal almost a flexure like contracture of the right arm with   limited abduction and extension.  She does walk with a limp and she does have a   right ankle brace on.  PSYCHIATRIC:  Normal psychiatric behavior.    IMPRESSION:  Excellent neoadjuvant chemotherapy response to diffuse breast   cancer in the right breast.  Clinically negative lymph nodes.    PLAN:  I discussed my portion of the surgery with the patient.  She will undergo   a right mastectomy and sentinel lymph node biopsy and discussed injection of   the blue dye, the probability to turn the urine blue temporarily.  I also talked   to her about injection of dye in Radiology.  I discussed the fact that if the   lymph nodes are positive in the axilla, would proceed with axillary node   dissection.  If cannot find any sentinel nodes, then I would not  proceed with   axillary node dissection.  After my portion is done, Dr. Carrasco, the plastic   surgeon, would start with his.  Discussed bleeding, infection, arm numbness, arm   swelling, possibilities after the surgery.  In addition, I talked to her about   the drains and possible seroma.  The patient is ready to proceed with the   surgery.        /barrett [id number] yeison(s)          TIMOTHY/IMELDA  dd: 03/31/2017 13:14:57 (CDT)  td: 04/01/2017 00:49:51 (CDT)  Doc ID   #[id number]  Job ID #[id number]    CC: Alessia Moss M.D.

## 2017-04-04 ENCOUNTER — INFUSION (OUTPATIENT)
Dept: INFUSION THERAPY | Facility: HOSPITAL | Age: 52
End: 2017-04-04
Attending: INTERNAL MEDICINE
Payer: MEDICARE

## 2017-04-04 DIAGNOSIS — C50.111 MALIGNANT NEOPLASM OF CENTRAL PORTION OF RIGHT FEMALE BREAST: Primary | ICD-10-CM

## 2017-04-04 PROCEDURE — 25000003 PHARM REV CODE 250: Mod: PO | Performed by: INTERNAL MEDICINE

## 2017-04-04 PROCEDURE — 96523 IRRIG DRUG DELIVERY DEVICE: CPT | Mod: PO

## 2017-04-04 RX ORDER — SODIUM CHLORIDE 0.9 % (FLUSH) 0.9 %
10 SYRINGE (ML) INJECTION
Status: CANCELLED | OUTPATIENT
Start: 2017-04-04

## 2017-04-04 RX ORDER — HEPARIN 100 UNIT/ML
500 SYRINGE INTRAVENOUS
Status: CANCELLED | OUTPATIENT
Start: 2017-04-04

## 2017-04-04 RX ORDER — HEPARIN 100 UNIT/ML
500 SYRINGE INTRAVENOUS
Status: COMPLETED | OUTPATIENT
Start: 2017-04-04 | End: 2017-04-04

## 2017-04-04 RX ORDER — SODIUM CHLORIDE 0.9 % (FLUSH) 0.9 %
10 SYRINGE (ML) INJECTION
Status: COMPLETED | OUTPATIENT
Start: 2017-04-04 | End: 2017-04-04

## 2017-04-04 RX ADMIN — HEPARIN SODIUM (PORCINE) LOCK FLUSH IV SOLN 100 UNIT/ML 500 UNITS: 100 SOLUTION at 01:04

## 2017-04-04 RX ADMIN — SODIUM CHLORIDE, PRESERVATIVE FREE 10 ML: 5 INJECTION INTRAVENOUS at 01:04

## 2017-04-05 NOTE — PRE ADMISSION SCREENING
Pre op instructions reviewed with patient per phone:    To confirm, Your surgeon has instructed you:  Surgery is scheduled 04/11/17 at 0930.      Please report to [facility] floor main lobby by 0530  Pt to go to radiology prior to surgery with final arrival time.      INSTRUCTIONS IMPORTANT!!!  ¨ Do not eat, drink, or smoke after 12 midnight-including water. OK to brush teeth, no gum, candy or mints!    ¨ Take only these medicines with a small swallow of water-morning of surgery.  Lipitor        ____  Do not wear makeup, including mascara.  ____  No powder, lotions or creams to surgical area.  ____  Please remove all jewelry, including piercings and leave at home.  ____  No money or valuables needed. Please leave at home.  ____  Please bring identification and insurance information to hospital.  ____  If going home the same day, arrange for a ride home. You will not be able to   drive if Anesthesia was used.  ____  Children, under 12 years old, must remain in the waiting room with an adult.  They are not allowed in patient areas.  ____  Wear loose fitting clothing. Allow for dressings, bandages.  ____  Stop Aspirin, Ibuprofen, Motrin and Aleve at least 5-7 days before surgery, unless otherwise instructed by your doctor, or the nurse.   You MAY use Tylenol/acetaminophen until day of surgery.  ____  If you take diabetic medication, do not take am of surgery unless instructed by   Doctor.  ____ Stop taking any Fish Oil supplement or any Vitamins that contain Vitamin E at least 5 days prior to surgery.          Bathing Instructions-- The night before surgery and the morning prior to coming to the hospital:   -Do not shave the surgical area.   -Shower and wash your hair and body as usual with anti-bacterial  soap and shampoo.   -Rinse your hair and body completely.   -Use one packet of hibiclens to wash the surgical site (using your hand) gently for 5 minutes.  Do not scrub you skin too  hard.   -Do not use hibiclens on your head, face, or genitals.   -Do not wash with anti-bacterial soap after you use the hibiclens.   -Rinse your body thoroughly.   -Dry with clean, soft towel.  Do not use lotion, cream, deodorant, or powders on   the surgical site.    Use antibacterial soap in place of hibiclens if your surgery is on the head, face or genitals.         Surgical Site Infection    Prevention of surgical site infections:     -Keep incisions clean and dry.   -Do not soak/submerge incisions in water until completely healed.   -Do not apply lotions, powders, creams, or deodorants to site.   -Always make sure hands are cleaned with antibacterial soap/ alcohol-based   prior to touching the surgical site.  (This includes doctors, nurses, staff, and yourself.)    Signs and symptoms:   -Redness and pain around the area where you had surgery   -Drainage of cloudy fluid from your surgical wound   -Fever over 100.4  I have read or had read and explained to me, and understand the above information.

## 2017-04-10 ENCOUNTER — ANESTHESIA EVENT (OUTPATIENT)
Dept: SURGERY | Facility: HOSPITAL | Age: 52
End: 2017-04-10
Payer: MEDICARE

## 2017-04-10 NOTE — H&P (VIEW-ONLY)
CLINIC VISIT    The patient returns now in followup.  Please see my note from a few weeks ago.    Basically about four or five weeks ago, the patient finished her neoadjuvant   chemotherapy.  She presented with diffuse infiltrating lobular cancer of the   right breast.  She had MediPort placement and then chemotherapy with significant   reduction, distortion and mass of the right breast.  She is here now for her   preop eval.  She has seen a plastic surgeon and the plans are now right   mastectomy, a sentinel node biopsy, possible axillary node dissection and then   right breast tissue expander and left breast surgery.  The patient is feeling   better.  Her energy is coming back.  She feels less tired, but she has noticed   that the firmness in the breast on the right side is basically resolved.    PAST MEDICAL HISTORY:  Cerebrovascular accident 16 years ago, leaving her right   arm fairly weak with decreased range of motion.  She also wears a foot brace.    Etiology is not sure other than possible birth control pills.  She has some   hyperlipidemia, has had some diverticulosis.    CURRENT MEDICATIONS:  Lipitor, 81 mg aspirin.  She has held the aspirin now for   anticipation of surgery.    PAST SURGICAL HISTORY:  Hysterectomy, tonsillectomy and she had a MediPort   placed.    SOCIAL HISTORY:  Occasionally smoked in the distant past.  Does not drink   alcohol.    FAMILY HISTORY:  Aunt had breast cancer.  Parents had colon cancer.    REVIEW OF SYSTEMS:  Again, feeling much better.  She does get some sweats from   the post-menopausal.  GASTROINTESTINAL:  Occasional constipation is improved.  SKIN:  She had some mild inflammation in her axilla about a month ago, but has   totally cleared now.  MUSCULOSKELETAL:  Again, right arm weakness, right foot brace and a mild limp   when she walks.  NEUROLOGIC:  No history of seizures.  No psychiatric problems.  HEMATOLOGIC:  She has some mild anemia  post-chemotherapy.  CARDIOVASCULAR:  Denies any chest pains, any palpitations or high blood   pressure.  Followup echo recently showed good ejection fraction.    PHYSICAL EXAMINATION:  VITAL SIGNS:  Blood pressure 112/75, pulse 87, temperature 98 degrees.  She is   75 kg.  GENERAL:  She is alert and oriented x3.  HEENT:  Extraocular movements are intact.  There is no gross visual or auditory   defect.  NECK:  Soft without adenopathy.  I do not palpate any axillary adenopathy.  CHEST:  Clear to auscultation throughout.  HEART:  Reveals normal rate and rhythm.  BREAST:  Left breast was within normal limits.  I cannot palpate any masses.    The nipple areola is normal.  Examination of the right breast reveals   significant improvement.  There is improvement even over the last couple of   weeks.  The nipple is now soft.  It is not really inverted anymore.  I cannot   palpate any mass at beneath the nipple and I cannot really palpate any distinct   mass in the right breast.  Right breast is somewhat smaller than the left   breast.  However, I cannot palpate any masses.  ABDOMEN:  Soft and nontender.  EXTREMITIES:  Reveal almost a flexure like contracture of the right arm with   limited abduction and extension.  She does walk with a limp and she does have a   right ankle brace on.  PSYCHIATRIC:  Normal psychiatric behavior.    IMPRESSION:  Excellent neoadjuvant chemotherapy response to diffuse breast   cancer in the right breast.  Clinically negative lymph nodes.    PLAN:  I discussed my portion of the surgery with the patient.  She will undergo   a right mastectomy and sentinel lymph node biopsy and discussed injection of   the blue dye, the probability to turn the urine blue temporarily.  I also talked   to her about injection of dye in Radiology.  I discussed the fact that if the   lymph nodes are positive in the axilla, would proceed with axillary node   dissection.  If cannot find any sentinel nodes, then I would not  proceed with   axillary node dissection.  After my portion is done, Dr. Carrasco, the plastic   surgeon, would start with his.  Discussed bleeding, infection, arm numbness, arm   swelling, possibilities after the surgery.  In addition, I talked to her about   the drains and possible seroma.  The patient is ready to proceed with the   surgery.        /barrett [id number] yeison(s)          TIMOTHY/IMELDA  dd: 03/31/2017 13:14:57 (CDT)  td: 04/01/2017 00:49:51 (CDT)  Doc ID   #[id number]  Job ID #[id number]    CC: Alessia Moss M.D.

## 2017-04-11 ENCOUNTER — HOSPITAL ENCOUNTER (OUTPATIENT)
Dept: RADIOLOGY | Facility: HOSPITAL | Age: 52
Discharge: HOME | End: 2017-04-11
Attending: SURGERY | Admitting: SURGERY
Payer: COMMERCIAL

## 2017-04-11 ENCOUNTER — ANESTHESIA (OUTPATIENT)
Dept: SURGERY | Facility: HOSPITAL | Age: 52
End: 2017-04-11
Payer: MEDICARE

## 2017-04-11 ENCOUNTER — HOSPITAL ENCOUNTER (OUTPATIENT)
Facility: HOSPITAL | Age: 52
Discharge: HOME | End: 2017-04-12
Attending: SURGERY | Admitting: SURGERY
Payer: MEDICARE

## 2017-04-11 DIAGNOSIS — C50.919 BREAST CANCER: ICD-10-CM

## 2017-04-11 DIAGNOSIS — C50.111 MALIGNANT NEOPLASM OF CENTRAL PORTION OF RIGHT FEMALE BREAST: ICD-10-CM

## 2017-04-11 LAB — ALLODERM: NORMAL

## 2017-04-11 PROCEDURE — 88341 IMHCHEM/IMCYTCHM EA ADD ANTB: CPT | Mod: 26,,, | Performed by: PATHOLOGY

## 2017-04-11 PROCEDURE — 63600175 PHARM REV CODE 636 W HCPCS: Performed by: SURGERY

## 2017-04-11 PROCEDURE — 71000039 HC RECOVERY, EACH ADD'L HOUR: Performed by: SURGERY

## 2017-04-11 PROCEDURE — 88305 TISSUE EXAM BY PATHOLOGIST: CPT | Performed by: PATHOLOGY

## 2017-04-11 PROCEDURE — 63600175 PHARM REV CODE 636 W HCPCS: Performed by: ANESTHESIOLOGY

## 2017-04-11 PROCEDURE — 25000003 PHARM REV CODE 250: Performed by: NURSE ANESTHETIST, CERTIFIED REGISTERED

## 2017-04-11 PROCEDURE — 36000706: Performed by: SURGERY

## 2017-04-11 PROCEDURE — 63600175 PHARM REV CODE 636 W HCPCS: Performed by: NURSE ANESTHETIST, CERTIFIED REGISTERED

## 2017-04-11 PROCEDURE — 38525 BIOPSY/REMOVAL LYMPH NODES: CPT | Mod: 51,RT,, | Performed by: SURGERY

## 2017-04-11 PROCEDURE — C1789 PROSTHESIS, BREAST, IMP: HCPCS | Performed by: SURGERY

## 2017-04-11 PROCEDURE — 19303 MAST SIMPLE COMPLETE: CPT | Mod: RT,,, | Performed by: SURGERY

## 2017-04-11 PROCEDURE — 27201423 OPTIME MED/SURG SUP & DEVICES STERILE SUPPLY: Performed by: SURGERY

## 2017-04-11 PROCEDURE — 25000003 PHARM REV CODE 250: Performed by: SURGERY

## 2017-04-11 PROCEDURE — 37000009 HC ANESTHESIA EA ADD 15 MINS: Performed by: SURGERY

## 2017-04-11 PROCEDURE — 88331 PATH CONSLTJ SURG 1 BLK 1SPC: CPT | Mod: 26,,, | Performed by: PATHOLOGY

## 2017-04-11 PROCEDURE — 37000008 HC ANESTHESIA 1ST 15 MINUTES: Performed by: SURGERY

## 2017-04-11 PROCEDURE — 71000033 HC RECOVERY, INTIAL HOUR: Performed by: SURGERY

## 2017-04-11 PROCEDURE — 88305 TISSUE EXAM BY PATHOLOGIST: CPT | Mod: 26,,, | Performed by: PATHOLOGY

## 2017-04-11 PROCEDURE — 88307 TISSUE EXAM BY PATHOLOGIST: CPT | Mod: 26,,, | Performed by: PATHOLOGY

## 2017-04-11 PROCEDURE — A9520 TC99 TILMANOCEPT DIAG 0.5MCI: HCPCS

## 2017-04-11 PROCEDURE — 36000707: Performed by: SURGERY

## 2017-04-11 PROCEDURE — C1729 CATH, DRAINAGE: HCPCS | Performed by: SURGERY

## 2017-04-11 PROCEDURE — 38900 IO MAP OF SENT LYMPH NODE: CPT | Mod: RT,,, | Performed by: SURGERY

## 2017-04-11 PROCEDURE — 88342 IMHCHEM/IMCYTCHM 1ST ANTB: CPT | Mod: 26,,, | Performed by: PATHOLOGY

## 2017-04-11 DEVICE — IMPLANTABLE DEVICE: Type: IMPLANTABLE DEVICE | Site: BREAST | Status: FUNCTIONAL

## 2017-04-11 DEVICE — GRAFT PERF 9.6X19.3 THCK TISS: Type: IMPLANTABLE DEVICE | Site: BREAST | Status: FUNCTIONAL

## 2017-04-11 RX ORDER — MEPERIDINE HYDROCHLORIDE 50 MG/ML
12.5 INJECTION INTRAMUSCULAR; INTRAVENOUS; SUBCUTANEOUS ONCE AS NEEDED
Status: DISCONTINUED | OUTPATIENT
Start: 2017-04-11 | End: 2017-04-11 | Stop reason: HOSPADM

## 2017-04-11 RX ORDER — SODIUM CHLORIDE, SODIUM LACTATE, POTASSIUM CHLORIDE, CALCIUM CHLORIDE 600; 310; 30; 20 MG/100ML; MG/100ML; MG/100ML; MG/100ML
INJECTION, SOLUTION INTRAVENOUS CONTINUOUS PRN
Status: DISCONTINUED | OUTPATIENT
Start: 2017-04-11 | End: 2017-04-11

## 2017-04-11 RX ORDER — ACETAMINOPHEN 10 MG/ML
INJECTION, SOLUTION INTRAVENOUS
Status: DISCONTINUED | OUTPATIENT
Start: 2017-04-11 | End: 2017-04-11

## 2017-04-11 RX ORDER — MIDAZOLAM HYDROCHLORIDE 1 MG/ML
INJECTION, SOLUTION INTRAMUSCULAR; INTRAVENOUS
Status: DISCONTINUED | OUTPATIENT
Start: 2017-04-11 | End: 2017-04-11

## 2017-04-11 RX ORDER — ONDANSETRON 2 MG/ML
INJECTION INTRAMUSCULAR; INTRAVENOUS
Status: DISCONTINUED | OUTPATIENT
Start: 2017-04-11 | End: 2017-04-11

## 2017-04-11 RX ORDER — CEFAZOLIN SODIUM 2 G/50ML
2 SOLUTION INTRAVENOUS
Status: COMPLETED | OUTPATIENT
Start: 2017-04-11 | End: 2017-04-11

## 2017-04-11 RX ORDER — CEFAZOLIN SODIUM 2 G/50ML
2 SOLUTION INTRAVENOUS
Status: DISCONTINUED | OUTPATIENT
Start: 2017-04-11 | End: 2017-04-12 | Stop reason: HOSPADM

## 2017-04-11 RX ORDER — NEOSTIGMINE METHYLSULFATE 1 MG/ML
INJECTION, SOLUTION INTRAVENOUS
Status: DISCONTINUED | OUTPATIENT
Start: 2017-04-11 | End: 2017-04-11

## 2017-04-11 RX ORDER — FENTANYL CITRATE 50 UG/ML
INJECTION, SOLUTION INTRAMUSCULAR; INTRAVENOUS
Status: DISCONTINUED | OUTPATIENT
Start: 2017-04-11 | End: 2017-04-11

## 2017-04-11 RX ORDER — BUPIVACAINE HYDROCHLORIDE AND EPINEPHRINE 2.5; 5 MG/ML; UG/ML
INJECTION, SOLUTION EPIDURAL; INFILTRATION; INTRACAUDAL; PERINEURAL
Status: DISCONTINUED | OUTPATIENT
Start: 2017-04-11 | End: 2017-04-11 | Stop reason: HOSPADM

## 2017-04-11 RX ORDER — GLYCOPYRROLATE 0.2 MG/ML
INJECTION INTRAMUSCULAR; INTRAVENOUS
Status: DISCONTINUED | OUTPATIENT
Start: 2017-04-11 | End: 2017-04-11

## 2017-04-11 RX ORDER — ROCURONIUM BROMIDE 10 MG/ML
INJECTION, SOLUTION INTRAVENOUS
Status: DISCONTINUED | OUTPATIENT
Start: 2017-04-11 | End: 2017-04-11

## 2017-04-11 RX ORDER — NEOMYCIN AND POLYMYXIN B SULFATES 40; 200000 MG/ML; [USP'U]/ML
SOLUTION IRRIGATION
Status: DISCONTINUED | OUTPATIENT
Start: 2017-04-11 | End: 2017-04-11 | Stop reason: HOSPADM

## 2017-04-11 RX ORDER — PHENYLEPHRINE HYDROCHLORIDE 10 MG/ML
INJECTION INTRAVENOUS
Status: DISCONTINUED | OUTPATIENT
Start: 2017-04-11 | End: 2017-04-11

## 2017-04-11 RX ORDER — DEXAMETHASONE SODIUM PHOSPHATE 4 MG/ML
INJECTION, SOLUTION INTRA-ARTICULAR; INTRALESIONAL; INTRAMUSCULAR; INTRAVENOUS; SOFT TISSUE
Status: DISCONTINUED | OUTPATIENT
Start: 2017-04-11 | End: 2017-04-11

## 2017-04-11 RX ORDER — PROPOFOL 10 MG/ML
VIAL (ML) INTRAVENOUS
Status: DISCONTINUED | OUTPATIENT
Start: 2017-04-11 | End: 2017-04-11

## 2017-04-11 RX ORDER — ONDANSETRON 8 MG/1
8 TABLET, ORALLY DISINTEGRATING ORAL EVERY 8 HOURS PRN
Status: DISCONTINUED | OUTPATIENT
Start: 2017-04-11 | End: 2017-04-12 | Stop reason: HOSPADM

## 2017-04-11 RX ORDER — KETOROLAC TROMETHAMINE 30 MG/ML
15 INJECTION, SOLUTION INTRAMUSCULAR; INTRAVENOUS ONCE
Status: COMPLETED | OUTPATIENT
Start: 2017-04-11 | End: 2017-04-11

## 2017-04-11 RX ORDER — SUCCINYLCHOLINE CHLORIDE 20 MG/ML
INJECTION INTRAMUSCULAR; INTRAVENOUS
Status: DISCONTINUED | OUTPATIENT
Start: 2017-04-11 | End: 2017-04-11

## 2017-04-11 RX ORDER — LIDOCAINE HCL/PF 100 MG/5ML
SYRINGE (ML) INTRAVENOUS
Status: DISCONTINUED | OUTPATIENT
Start: 2017-04-11 | End: 2017-04-11

## 2017-04-11 RX ORDER — SODIUM CHLORIDE 9 MG/ML
INJECTION, SOLUTION INTRAVENOUS CONTINUOUS
Status: DISCONTINUED | OUTPATIENT
Start: 2017-04-11 | End: 2017-04-11

## 2017-04-11 RX ORDER — ATORVASTATIN CALCIUM 10 MG/1
10 TABLET, FILM COATED ORAL DAILY
Status: DISCONTINUED | OUTPATIENT
Start: 2017-04-12 | End: 2017-04-12 | Stop reason: HOSPADM

## 2017-04-11 RX ORDER — HYDROMORPHONE HYDROCHLORIDE 1 MG/ML
1 INJECTION, SOLUTION INTRAMUSCULAR; INTRAVENOUS; SUBCUTANEOUS EVERY 4 HOURS PRN
Status: DISCONTINUED | OUTPATIENT
Start: 2017-04-11 | End: 2017-04-12 | Stop reason: HOSPADM

## 2017-04-11 RX ORDER — SODIUM CHLORIDE, SODIUM LACTATE, POTASSIUM CHLORIDE, CALCIUM CHLORIDE 600; 310; 30; 20 MG/100ML; MG/100ML; MG/100ML; MG/100ML
1000 INJECTION, SOLUTION INTRAVENOUS CONTINUOUS
Status: DISCONTINUED | OUTPATIENT
Start: 2017-04-11 | End: 2017-04-12 | Stop reason: HOSPADM

## 2017-04-11 RX ORDER — CEFAZOLIN SODIUM 1 G/3ML
INJECTION, POWDER, FOR SOLUTION INTRAMUSCULAR; INTRAVENOUS
Status: DISCONTINUED | OUTPATIENT
Start: 2017-04-11 | End: 2017-04-11 | Stop reason: HOSPADM

## 2017-04-11 RX ORDER — BACITRACIN 50000 [IU]/1
INJECTION, POWDER, FOR SOLUTION INTRAMUSCULAR
Status: DISCONTINUED | OUTPATIENT
Start: 2017-04-11 | End: 2017-04-11 | Stop reason: HOSPADM

## 2017-04-11 RX ORDER — LIDOCAINE HYDROCHLORIDE 10 MG/ML
1 INJECTION, SOLUTION EPIDURAL; INFILTRATION; INTRACAUDAL; PERINEURAL ONCE
Status: DISCONTINUED | OUTPATIENT
Start: 2017-04-11 | End: 2017-04-11 | Stop reason: HOSPADM

## 2017-04-11 RX ORDER — OXYCODONE HYDROCHLORIDE 5 MG/1
5 TABLET ORAL
Status: DISCONTINUED | OUTPATIENT
Start: 2017-04-11 | End: 2017-04-11 | Stop reason: HOSPADM

## 2017-04-11 RX ORDER — CYCLOBENZAPRINE HCL 10 MG
10 TABLET ORAL 3 TIMES DAILY
Status: DISCONTINUED | OUTPATIENT
Start: 2017-04-11 | End: 2017-04-12 | Stop reason: HOSPADM

## 2017-04-11 RX ORDER — SODIUM CHLORIDE 9 MG/ML
3 INJECTION, SOLUTION INTRAMUSCULAR; INTRAVENOUS; SUBCUTANEOUS
Status: DISCONTINUED | OUTPATIENT
Start: 2017-04-11 | End: 2017-04-12 | Stop reason: HOSPADM

## 2017-04-11 RX ORDER — MORPHINE SULFATE 10 MG/ML
2 INJECTION INTRAMUSCULAR; INTRAVENOUS; SUBCUTANEOUS EVERY 5 MIN PRN
Status: COMPLETED | OUTPATIENT
Start: 2017-04-11 | End: 2017-04-11

## 2017-04-11 RX ORDER — HYDROCODONE BITARTRATE AND ACETAMINOPHEN 5; 325 MG/1; MG/1
1 TABLET ORAL EVERY 4 HOURS PRN
Status: DISCONTINUED | OUTPATIENT
Start: 2017-04-11 | End: 2017-04-12 | Stop reason: HOSPADM

## 2017-04-11 RX ORDER — ISOSULFAN BLUE 50 MG/5ML
INJECTION, SOLUTION SUBCUTANEOUS
Status: DISCONTINUED | OUTPATIENT
Start: 2017-04-11 | End: 2017-04-11 | Stop reason: HOSPADM

## 2017-04-11 RX ADMIN — FENTANYL CITRATE 50 MCG: 50 INJECTION, SOLUTION INTRAMUSCULAR; INTRAVENOUS at 10:04

## 2017-04-11 RX ADMIN — NEOSTIGMINE METHYLSULFATE 3 MG: 1 INJECTION INTRAVENOUS at 12:04

## 2017-04-11 RX ADMIN — ROCURONIUM BROMIDE 10 MG: 10 INJECTION, SOLUTION INTRAVENOUS at 10:04

## 2017-04-11 RX ADMIN — ONDANSETRON 4 MG: 2 INJECTION, SOLUTION INTRAMUSCULAR; INTRAVENOUS at 12:04

## 2017-04-11 RX ADMIN — MIDAZOLAM HYDROCHLORIDE 2 MG: 1 INJECTION, SOLUTION INTRAMUSCULAR; INTRAVENOUS at 09:04

## 2017-04-11 RX ADMIN — KETOROLAC TROMETHAMINE 15 MG: 30 INJECTION, SOLUTION INTRAMUSCULAR at 02:04

## 2017-04-11 RX ADMIN — MORPHINE SULFATE 2 MG: 10 INJECTION, SOLUTION INTRAMUSCULAR; INTRAVENOUS at 02:04

## 2017-04-11 RX ADMIN — SODIUM CHLORIDE, SODIUM LACTATE, POTASSIUM CHLORIDE, AND CALCIUM CHLORIDE 1000 ML: .6; .31; .03; .02 INJECTION, SOLUTION INTRAVENOUS at 03:04

## 2017-04-11 RX ADMIN — PROMETHAZINE HYDROCHLORIDE 6.25 MG: 25 INJECTION, SOLUTION INTRAMUSCULAR; INTRAVENOUS at 08:04

## 2017-04-11 RX ADMIN — SUCCINYLCHOLINE CHLORIDE 120 MG: 20 INJECTION, SOLUTION INTRAMUSCULAR; INTRAVENOUS at 09:04

## 2017-04-11 RX ADMIN — HYDROCODONE BITARTRATE AND ACETAMINOPHEN 1 TABLET: 5; 325 TABLET ORAL at 07:04

## 2017-04-11 RX ADMIN — CYCLOBENZAPRINE HYDROCHLORIDE 10 MG: 10 TABLET, FILM COATED ORAL at 09:04

## 2017-04-11 RX ADMIN — CEFAZOLIN SODIUM 2 G: 2 SOLUTION INTRAVENOUS at 07:04

## 2017-04-11 RX ADMIN — DEXAMETHASONE SODIUM PHOSPHATE 8 MG: 4 INJECTION, SOLUTION INTRA-ARTICULAR; INTRALESIONAL; INTRAMUSCULAR; INTRAVENOUS; SOFT TISSUE at 09:04

## 2017-04-11 RX ADMIN — PHENYLEPHRINE HYDROCHLORIDE 100 MCG: 10 INJECTION INTRAVENOUS at 10:04

## 2017-04-11 RX ADMIN — SODIUM CHLORIDE, SODIUM LACTATE, POTASSIUM CHLORIDE, AND CALCIUM CHLORIDE: 600; 310; 30; 20 INJECTION, SOLUTION INTRAVENOUS at 09:04

## 2017-04-11 RX ADMIN — MORPHINE SULFATE 2 MG: 10 INJECTION, SOLUTION INTRAMUSCULAR; INTRAVENOUS at 01:04

## 2017-04-11 RX ADMIN — ROCURONIUM BROMIDE 5 MG: 10 INJECTION, SOLUTION INTRAVENOUS at 09:04

## 2017-04-11 RX ADMIN — EPHEDRINE SULFATE 10 MG: 50 INJECTION, SOLUTION INTRAMUSCULAR; INTRAVENOUS; SUBCUTANEOUS at 11:04

## 2017-04-11 RX ADMIN — ONDANSETRON 8 MG: 8 TABLET, ORALLY DISINTEGRATING ORAL at 05:04

## 2017-04-11 RX ADMIN — CEFAZOLIN SODIUM 2 G: 2 SOLUTION INTRAVENOUS at 12:04

## 2017-04-11 RX ADMIN — LIDOCAINE HYDROCHLORIDE 80 MG: 20 INJECTION, SOLUTION INTRAVENOUS at 09:04

## 2017-04-11 RX ADMIN — HYDROCODONE BITARTRATE AND ACETAMINOPHEN 1 TABLET: 5; 325 TABLET ORAL at 03:04

## 2017-04-11 RX ADMIN — FENTANYL CITRATE 50 MCG: 50 INJECTION, SOLUTION INTRAMUSCULAR; INTRAVENOUS at 12:04

## 2017-04-11 RX ADMIN — EPHEDRINE SULFATE 10 MG: 50 INJECTION, SOLUTION INTRAMUSCULAR; INTRAVENOUS; SUBCUTANEOUS at 10:04

## 2017-04-11 RX ADMIN — PROPOFOL 50 MG: 10 INJECTION, EMULSION INTRAVENOUS at 12:04

## 2017-04-11 RX ADMIN — ROCURONIUM BROMIDE 20 MG: 10 INJECTION, SOLUTION INTRAVENOUS at 11:04

## 2017-04-11 RX ADMIN — FENTANYL CITRATE 50 MCG: 50 INJECTION, SOLUTION INTRAMUSCULAR; INTRAVENOUS at 09:04

## 2017-04-11 RX ADMIN — GLYCOPYRROLATE 0.6 MG: 0.2 INJECTION, SOLUTION INTRAMUSCULAR; INTRAVENOUS at 12:04

## 2017-04-11 RX ADMIN — CEFAZOLIN SODIUM 2 G: 2 SOLUTION INTRAVENOUS at 09:04

## 2017-04-11 RX ADMIN — PROPOFOL 130 MG: 10 INJECTION, EMULSION INTRAVENOUS at 09:04

## 2017-04-11 RX ADMIN — SODIUM CHLORIDE, SODIUM LACTATE, POTASSIUM CHLORIDE, AND CALCIUM CHLORIDE: 600; 310; 30; 20 INJECTION, SOLUTION INTRAVENOUS at 11:04

## 2017-04-11 RX ADMIN — ACETAMINOPHEN 1000 MG: 10 INJECTION, SOLUTION INTRAVENOUS at 12:04

## 2017-04-11 RX ADMIN — ROCURONIUM BROMIDE 15 MG: 10 INJECTION, SOLUTION INTRAVENOUS at 10:04

## 2017-04-11 NOTE — TRANSFER OF CARE
"Anesthesia Transfer of Care Note    Patient: Carole Moore    Procedure(s) Performed: Procedure(s) (LRB):  MASTECTOMY, SENTINEL NODE BX, ANCILLARY NODE DISSECTION (Right)  INSERTION-BREAST TISSUE EXPANDER WITH ALLODERM RIGHT (Right)  AUGMENTATION-BREAST WITH GEL IMPLANT LEFT (Left)    Patient location: PACU    Anesthesia Type: general    Transport from OR: Transported from OR on room air with adequate spontaneous ventilation    Post pain: adequate analgesia    Post assessment: no apparent anesthetic complications    Post vital signs: stable    Level of consciousness: sedated    Nausea/Vomiting: no nausea/vomiting    Complications: none          Last vitals:   Visit Vitals    BP (!) 122/59 (BP Location: Left arm, Patient Position: Sitting, BP Method: Automatic)    Pulse 83    Temp 36.7 °C (98.1 °F) (Oral)    Resp 18    Ht 5' 7" (1.702 m)    Wt 75 kg (165 lb 5.5 oz)    LMP 10/11/2009    SpO2 98%    Breastfeeding No    BMI 25.9 kg/m2     "

## 2017-04-11 NOTE — ANESTHESIA POSTPROCEDURE EVALUATION
"Anesthesia Post Evaluation    Patient: Carole Moore    Procedure(s) Performed: Procedure(s) (LRB):  MASTECTOMY, SENTINEL NODE BX, ANCILLARY NODE DISSECTION (Right)  INSERTION-BREAST TISSUE EXPANDER WITH ALLODERM RIGHT (Right)  AUGMENTATION-BREAST WITH GEL IMPLANT LEFT (Left)    Final Anesthesia Type: general  Patient location during evaluation: PACU  Patient participation: Yes- Able to Participate  Level of consciousness: awake and alert  Post-procedure vital signs: reviewed and stable  Pain management: adequate  Airway patency: patent  PONV status at discharge: No PONV  Anesthetic complications: no      Cardiovascular status: blood pressure returned to baseline  Respiratory status: unassisted  Hydration status: euvolemic  Follow-up not needed.        Visit Vitals    BP (!) 101/58    Pulse 82    Temp 36.7 °C (98.1 °F) (Oral)    Resp 17    Ht 5' 7" (1.702 m)    Wt 75 kg (165 lb 5.5 oz)    LMP 10/11/2009    SpO2 (!) 94%    Breastfeeding No    BMI 25.9 kg/m2       Pain/Dimas Score: Pain Assessment Performed: Yes (4/11/2017  2:15 PM)  Presence of Pain: complains of pain/discomfort (4/11/2017  2:15 PM)  Pain Rating Prior to Med Admin: 6 (4/11/2017  2:33 PM)  Dimas Score: 10 (4/11/2017  2:15 PM)      "

## 2017-04-11 NOTE — OP NOTE
DATE OF PROCEDURE:  04/11/2017    PREOPERATIVE DIAGNOSES:  Right breast cancer, status post neoadjuvant   chemotherapy.    POSTOPERATIVE DIAGNOSES:  Right breast cancer, status post neoadjuvant   chemotherapy.    This was done with 2 surgeons.  I will dictate my portion of the operation.  Dr. Carrasco will dictate his portion.    OPERATIVE PROCEDURE:  1.  Right total mastectomy.  2.  Right deep axillary sentinel node biopsy.  3.  Injection of Lymphazurin blue dye for identification of the sentinel node.    SURGEON:  Travon Faria M.D.    ESTIMATED BLOOD LOSS:  100 mL.    FLUIDS RECEIVED:  1900 mL of crystalloid.    ANESTHESIA:  General endotracheal, but also 15 mL of 0.25% Marcaine with   epinephrine used to inject the right axillary wound.    OPERATIVE FINDINGS:  There was no evidence any residual cancer in the right   breast grossly.  All 3 deep axillary sentinel nodes were normal with counts of   10,972, 2513, and 2604 respectively.  She had fairly dense breast on the right   side.    PROCEDURE IN DETAIL:  Before anesthesia was obtained, the patient went to   Nuclear Medicine and she had injection of lymphoscintigraphy for identification   of the sentinel nodes.  The patient was taken to the Operating Room and after   general endotracheal anesthesia was obtained, area of skin just lateral above   the nipple areola was injected with 4 mL of Lymphazurin blue dye injecting the   dermis.  The breast was then massaged for 10 minutes.  She also had a [place]   catheter placed.  Preoperatively, she had SCD hose and IV antibiotics.  Next,   both breasts, axilla, right upper extremity, and chest wall in between the   breast was prepped and draped in sterile fashion.  After this was done, the   gamma counter was placed and high counts were noted just lateral and posteriorly   to the pectoralis major muscle in mid portion of the axilla.  Transverse   incision was made there and carried through the skin using knife, through    subcutaneous tissue using cautery, down to the breast parenchyma.  Using careful   blunt dissection, the area of high counts was noted and using the gamma counter   as a guide, dissection was carried down and approximately a 1 cm lymph node was   identified.  It was dissected out.  This node was clamped at its pedicle and   removed and had high counts and was sent for frozen.  The pedicles were ligated   with 2-0 silk ties.  Gamma counter was placed again.  A little bit anterior and   deep to this node, the second sentinel node was identified using careful blunt   dissection following the counts.  This node was minimally blue.  It was   dissected out and removed in identical fashion.  The first one had a count of   2500.  The patient's baseline count area was about 30.  A gamma counter was   again placed and then a little more posteriorly in deep near the chest wall, a   third sentinel node was identified.  Again, a count was over 2600.  It was   removed in identical fashion as first.  These all came back benign on frozen   section.  The gamma counter was placed again and was normal activity was noted   after removing at least 3 sentinel nodes.  The wound was irrigated.  A couple of   small oozing areas were cauterized.  The area was injected with Marcaine.  The   deep axillary tissue was reapproximated with 3-0 Vicryl, subcutaneous tissue   running 3-0 Vicryl, and skin with 4-0 Vicryl subcuticular.  Attention was then   turned toward the breast.  Dr. Carrasco has already drawn elliptical incision   included the nipple areola and this ellipse was then made.  It did go a little   bit higher above the areola and the marked ellipse that to make sure the whole   marginal areola was obtained due to the fact the skin had been a little thicker   prechemotherapy.  Incision was carried down in elliptical include the areola to   be removed.  It was carried through the skin using knife, through the   subcutaneous tissue down to  the breast parenchyma.  After this was done, then   breast flaps were elevated using the cautery stain and a plane between the subQ   fat and the breast.  These flaps were elevated.  Medially, there were a couple   of medial perforators were identified.  These were ligated with 2-0 silk or   clipped.  This was done superiorly to the clavicle, inferiorly to the rectus   fascia, medial to the sternum and laterally to the lateral edge of the   latissimus dorsi.  After this was done, the breast was then elevated off the   pectoralis muscle.  The pectoralis fascia was removed with the breast and the   cautery was used to elevate the pectoralis fascia and breast off the breast.    This was gone from medial to laterally.  Then, lateral to the pectoralis muscle   using blunt dissection, the breast was able to be elevated away from the   subcutaneous fat and the serratus muscle and latissimus to stay away from the   nerves using cautery.  This was transected.  A couple of small oozers were   either clipped or cauterized.  After this was done, the wound was copiously   irrigated.  A couple of small oozers were cauterized.  Moist lap was left in the   wound and to keep the wound moist for Dr. Carrasco's procedure.      TIMOTHY/  dd: 04/11/2017 12:01:07 (CDT)  td: 04/11/2017 14:26:21 (CDT)  Doc ID   #[id number]  Job ID #[id number]    CC: Augustine Carrasco M.D.

## 2017-04-11 NOTE — PLAN OF CARE
Problem: Patient Care Overview  Goal: Plan of Care Review  Patient remains free from falls, fall precaution in place. Stand by assist. IV infusing.   VS stable. Dressing dry,clean, intact. DG drainage intact, bloody drain. C/O of pain to breast. PRN pain med given.  No other C/O at this time. Call bell and belongings within reach, reminded to call for assistance.

## 2017-04-11 NOTE — IP AVS SNAPSHOT
[address] [place] [address]           Patient Discharge Instructions   Our goal is to set you up for success. This packet includes information on your condition, medications, and your home care.  It will help you care for yourself to prevent having to return to the hospital.     Please ask your nurse if you have any questions.      There are many details to remember when preparing to leave the hospital. Here is what you will need to do:    1. Take your medicine. If you are prescribed medications, review your Medication List on the following pages. You may have new medications to  at the pharmacy and others that you'll need to stop taking. Review the instructions for how and when to take your medications. Talk with your doctor or nurses if you are unsure of what to do.     2. Go to your follow-up appointments. Specific follow-up information is listed in the following pages. Your may be contacted by a nurse or clinical provider about future appointments. Be sure we have all of the phone numbers to reach you. Please contact your provider's office if you are unable to make an appointment.     3. Watch for warning signs. Your doctor or nurse will give you detailed warning signs to watch for and when to call for assistance. These instructions may also include educational information about your condition. If you experience any of warning signs to your health, call your doctor.               ** Verify the list of medication(s) below is accurate and up to date. Carry this with you in case of emergency. If your medications have changed, please notify your healthcare provider.             Medication List      CONTINUE taking these medications        Additional Info                      aspirin 81 MG Chew   Refills:  0   Dose:  81 mg    Instructions:  Take 81 mg by mouth once daily.     Begin Date    AM    Noon    PM    Bedtime       atorvastatin 10 MG tablet   Commonly  known as:  LIPITOR   Quantity:  90 tablet   Refills:  4   Dose:  10 mg   Indications:  Hypertriglyceridemia    Instructions:  Take 1 tablet (10 mg total) by mouth once daily.     Begin Date    AM    Noon    PM    Bedtime       CLARITIN ORAL   Refills:  0    Instructions:  Take by mouth daily as needed.     Begin Date    AM    Noon    PM    Bedtime                  Please bring to all follow up appointments:    1. A copy of your discharge instructions.  2. All medicines you are currently taking in their original bottles.  3. Identification and insurance card.    Please arrive 15 minutes ahead of scheduled appointment time.    Please call 24 hours in advance if you must reschedule your appointment and/or time.        Your Scheduled Appointments     Apr 19, 2017 10:30 AM CDT   Post OP with Travon Faria MD   [facility] - General Surgery ([facility])    [address]  [facility] [id number] [phone]            May 02, 2017 10:00 AM CDT   Non-Fasting Lab with LABORATORY, [address] ([facility]    [address] [place] [id number] [phone]            May 02, 2017 10:20 AM CDT   Established Patient Visit with MD Zackery Daleal - Hematology Oncology ([facility])    [address] [place] [id number] [phone]            May 02, 2017 11:00 AM CDT   Infusion 15 Min with CHAIR 04 ONLH [facility] ([facility])    [address] [place] [id number] [phone]              Follow-up Information     Follow up with Travon Faria MD.    Specialty:  General Surgery    Why:  HAS APPT. WITH ME     Contact information:    [address]  [facility] [id number] [phone]          Follow up with Augustine Carrasco MD.    Specialty:  Plastic Surgery    Why:  HAS APPT. ALREADY    Contact information:    [address] [facility] [id number] [phone]       "    Discharge Instructions     Future Orders    Activity as tolerated     Call MD for:  difficulty breathing or increased cough     Call MD for:  persistent nausea and vomiting or diarrhea     Call MD for:  redness, tenderness, or signs of infection (pain, swelling, redness, odor or green/yellow discharge around incision site)     Call MD for:  temperature >100.4     Diet general     Questions:    Total calories:      Fat restriction, if any:      Protein restriction, if any:      Na restriction, if any:      Fluid restriction:      Additional restrictions:      Lifting restrictions     Sponge bath only until clinic visit     Weight bearing restrictions (specify)       Discharge References/Attachments     MASTECTOMY: HEALING AT HOME (ENGLISH)    MASTECTOMY: CARE, FOLLOW-UP  (ENGLISH)    MASTECTOMY: AFTER SURGERY   (ENGLISH)    MASTECTOMY WITH RECONSTRUCTION (ENGLISH)        Primary Diagnosis     Your primary diagnosis was:  Breast Cancer      Admission Information     Date & Time Provider Department CSN    4/11/2017  5:52 AM Travon Faria MD [facility] -  [id number]      Care Providers     Provider Role Specialty Primary office phone    Travon Faria MD Attending Provider General Surgery [phone]    Travon aFria MD Surgeon  General Surgery [phone]    Augustine Carrasco MD Surgeon  Plastic Surgery [phone]      Your Vitals Were     BP Pulse Temp Resp Height Weight    107/62 (BP Location: Left arm, Patient Position: Lying, BP Method: Automatic) 72 98 °F (36.7 °C) (Oral) 18 5' 7" (1.702 m) 75 kg (165 lb 5.5 oz)    Last Period SpO2 BMI          10/11/2009 98% 25.9 kg/m2        Recent Lab Values     No lab values to display.      Pending Labs     Order Current Status    Specimen to Pathology - Surgery Collected (04/11/17 1029)    Specimen to Pathology - Surgery Collected (04/11/17 1030)    Specimen to Pathology - Surgery In process    Alloderm Preliminary result      Allergies as of " 4/12/2017     No Known Allergies      [facility]     [facility] Nurse Care Line - 24/7 Assistance  Unless otherwise directed by your provider, please contact [facility], our nurse care line that is available for 24/7 assistance.     Registered nurses in the [facility] provide clinical advisement, health education, appointment booking, and other advisory services.  Call for this free service at [phone].        Advance Directives     An advance directive is a document which, in the event you are no longer able to make decisions for yourself, tells your healthcare team what kind of treatment you do or do not want to receive, or who you would like to make those decisions for you.  If you do not currently have an advance directive, [facility] encourages you to create one.  For more information call:  [phone]), [phone] ([phone]),  or log on to [email or web address].        Language Assistance Services     ATTENTION: Language assistance services are available, free of charge. Please call [phone].      ATENCIÓN: Si habla español, tiene a escudero disposición servicios gratuitos de asistencia lingüística. Llame al [phone].     CHÚ Ý: N?u b?n nói Ti?ng Vi?t, có các d?ch v? h? tr? ngôn ng? mi?n phí dành cho b?n. G?i s? [phone].        [facility] Sign-Up     Activating your [facility] account is as easy as 1-2-3!     1) Visit [email or web address], select Sign Up Now, enter this activation code and your date of birth, then select Next.  [email or web address]  Expires: 4/16/2017  3:20 PM      2) Create a username and password to use when you visit [facility] in the future and select a security question in case you lose your password and select Next.    3) Enter your e-mail address and click Sign Up!    Additional Information  If you have questions, please e-mail [email or web address] or call [phone] to talk to our [facility] staff. Remember, [facility] is NOT to  be used for urgent needs. For medical emergencies, dial 911.          [facility] complies with applicable Federal civil rights laws and does not discriminate on the basis of race, color, national origin, age, disability, or sex.

## 2017-04-11 NOTE — PROGRESS NOTES
Pressure somewhat low and drops slightly with each morphine admin.  Dr. Gonzales, anesthesia, aware, OK to continue giving morphine

## 2017-04-11 NOTE — ANESTHESIA PREPROCEDURE EVALUATION
04/11/2017  Carole Moore is a 51 y.o., female.    [facility] Pre-op Assessment    I have reviewed the Patient Summary Reports.    I have reviewed the Nursing Notes.   I have reviewed the Medications.     Review of Systems  Anesthesia Hx:  Hx of Anesthetic complications  History of prior surgery of interest to airway management or planning: Denies Family Hx of Anesthesia complications.   Denies Personal Hx of Anesthesia complications.   Cardiovascular:   CONCLUSIONS     1 - Concentric hypertrophy.     2 - Normal left ventricular systolic function (EF 60-65%).     3 - Normal left ventricular diastolic function.     4 - Normal right ventricular systolic function .     5 - The estimated PA systolic pressure is greater than 18 mmHg.             This document has been electronically    SIGNED BY: Fish Sandoval MD On: 02/25/2017    Neurological:   CVA, residual symptoms Stroke at age 35. 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       Physical Exam  General:  Well nourished    Airway/Jaw/Neck:  Airway Findings: Mallampati: II     Dental:  DENTAL FINDINGS: Normal   Chest/Lungs:  Chest/Lungs Findings: Normal Respiratory Rate, Clear to auscultation     Heart/Vascular:  Heart Findings: Rate: Normal  Rhythm: Regular Rhythm  Sounds: Normal             Anesthesia Plan  Type of Anesthesia, risks & benefits discussed:  Anesthesia Type:  general  Patient's Preference:   Intra-op Monitoring Plan:   Intra-op Monitoring Plan Comments:   Post Op Pain Control Plan:   Post Op Pain Control Plan Comments:   Induction:   IV  Beta Blocker:  Patient is not currently on a Beta-Blocker (No further documentation required).       Informed Consent:    ASA Score: 2     Day of Surgery Review of History & Physical: I have interviewed and examined the patient. I have reviewed the patient's H&P dated:  There are no significant changes.

## 2017-04-11 NOTE — PROGRESS NOTES
Pt arrived to floor via stretcher BS report received from MARGAUX Beltran.  VS taken and assessment completed ,see flow sheets for detailed assessment. Dressing clean, dry, intact.   No acute distress noted.Pt oriented to room service, call bell within reach, hourly rounding and fall prevention measures in placed.

## 2017-04-11 NOTE — BRIEF OP NOTE
[facility] -   Brief Operative Note    SUMMARY     Surgery Date: 4/11/2017     Surgeon(s) and Role:  Panel 1:     * Travon Faria MD - Primary    Panel 2:     * Augustine Carrasco MD - Primary    Assisting Surgeon: None    Pre-op Diagnosis:  Malignant neoplasm of central portion of right female breast [C50.111]    Post-op Diagnosis:  Post-Op Diagnosis Codes:     * Malignant neoplasm of central portion of right female breast [C50.111]    Procedure: right total mastectomy, sentinel node biopsy, injection of blue dye to identify sentinel node.                      See Dr. Carrasco note for rest of procedures    Anesthesia: General,15ml of .25%marcaine with epi in axilla on right    Description of Procedure:      Description of the findings of the procedure: dense breast with no evidence of residual disease. All three sentinel nodes are normal. Counts were 10k,2500, and 2600 .     Estimated Blood Loss: 100 mL         Specimens:   Specimen (12h ago through future)    Start     Ordered    04/11/17 1124  Specimen to Pathology - Surgery  Once     Comments:  1. SN # 1 right axilla (frozen)  2. SN #2 right axilla (frozen)  3. SN # 3 right axilla (frozen)  4. Nipple from  Right breast (perm)  5. Right breast (perm) removed at 1115    DX RIGHT BREAST CX    04/11/17 1128    04/11/17 1030  Specimen to Pathology - Surgery  Once     Comments:  1. SN # 1 RIGHT AXILLA  2. SN #2 RIGHT  AXILLA  3. SN #3 RIGHT AXILLA     DX RIGHT BREAST CANCER    04/11/17 1030    04/11/17 1029  Specimen to Pathology - Surgery  Once     Comments:  1. Skin from right breast nipple     DX: right breast cancer    04/11/17 1029

## 2017-04-11 NOTE — PLAN OF CARE
Ambulated to preop with limp. Right leg brace due to weakness from past cva. Right arm paralyzed. preop completed. Pt to radiology at 710 and returned at 740am. Dr collins came to bedside. Right axilla already marked by radiology. Pt awaiting surgery.

## 2017-04-12 VITALS
HEIGHT: 67 IN | DIASTOLIC BLOOD PRESSURE: 81 MMHG | BODY MASS INDEX: 25.96 KG/M2 | TEMPERATURE: 98 F | SYSTOLIC BLOOD PRESSURE: 104 MMHG | HEART RATE: 86 BPM | OXYGEN SATURATION: 97 % | RESPIRATION RATE: 19 BRPM | WEIGHT: 165.38 LBS

## 2017-04-12 PROCEDURE — 63600175 PHARM REV CODE 636 W HCPCS: Performed by: SURGERY

## 2017-04-12 PROCEDURE — 25000003 PHARM REV CODE 250: Performed by: SURGERY

## 2017-04-12 PROCEDURE — 94799 UNLISTED PULMONARY SVC/PX: CPT

## 2017-04-12 PROCEDURE — 99900035 HC TECH TIME PER 15 MIN (STAT)

## 2017-04-12 RX ORDER — KETOROLAC TROMETHAMINE 30 MG/ML
30 INJECTION, SOLUTION INTRAMUSCULAR; INTRAVENOUS ONCE
Status: COMPLETED | OUTPATIENT
Start: 2017-04-12 | End: 2017-04-12

## 2017-04-12 RX ADMIN — CYCLOBENZAPRINE HYDROCHLORIDE 10 MG: 10 TABLET, FILM COATED ORAL at 06:04

## 2017-04-12 RX ADMIN — KETOROLAC TROMETHAMINE 30 MG: 30 INJECTION, SOLUTION INTRAMUSCULAR at 08:04

## 2017-04-12 RX ADMIN — CEFAZOLIN SODIUM 2 G: 2 SOLUTION INTRAVENOUS at 03:04

## 2017-04-12 RX ADMIN — HYDROCODONE BITARTRATE AND ACETAMINOPHEN 1 TABLET: 5; 325 TABLET ORAL at 02:04

## 2017-04-12 RX ADMIN — HYDROCODONE BITARTRATE AND ACETAMINOPHEN 1 TABLET: 5; 325 TABLET ORAL at 06:04

## 2017-04-12 NOTE — OP NOTE
DATE OF PROCEDURE:  04/11/2017.    PREOPERATIVE DIAGNOSES:  Right breast cancer, absence right breast,   contralateral asymmetry following breast reconstruction.    POSTOPERATIVE DIAGNOSIS:  Right breast cancer, absence right breast,   contralateral asymmetry following breast reconstruction.    PROCEDURES:  1.  Right-sided breast reconstruction with tissue expander and AlloDerm.  2.  Left-sided breast augmentation for symmetry.    SURGEON:  Augustine Carrasco M.D.    ASSISTANT:  None.    ANESTHESIA:  General.    ESTIMATED BLOOD LOSS:  100 mL.    IMPLANTS:  The patient received on the left side a Natrelle style 15   silicone-filled round, smooth gel implant, 194 mL  On the right side, the   patient received Natrelle style 133MX-14 tissue expander, 600 mL, filled to 60   mL intraoperatively.    INDICATIONS FOR PROCEDURE:  Ms. Moore is a pleasant 51-year-old female with a   history of right-sided breast cancer.  She is status post chemotherapy and at   this point while undergoing mastectomy, she is interested in immediate   reconstruction.  She is also interested in a contralateral augmentation for   symmetry.  Risks, benefits, alternatives and possible complications were   discussed with the patient at length and she agreed to proceed with surgery.    DESCRIPTION OF OPERATIVE REPORT:  Informed consent was obtained from the patient   at the preoperative appointment.  On the morning of surgery, the patient was   seen, examined and marked.  Operative plan was reconfirmed with the patient   along with the markings.  Antibiotics and SCDs were begun in holding.  The   patient was then brought to the Operating Room and placed supine on the   operating table.  General endotracheal anesthesia was administered without   difficulty.  The patient's chest was prepped and draped in standard sterile   fashion.  First, Dr Faria proceeded with his portion of the procedure, which   was a right-sided simple mastectomy along with sentinel  lymph node biopsy.    Sullivan lymph node biopsy returned negative.  Incision was through a   periareolar elliptical skin incision encompassing the nipple-areolar complex.    See his OP report for details.  Once he was finished, I began my portion of the   procedure.  First, the left-sided augmentation for symmetry was performed.  In   the inframammary crease, approximately a 1.5 cm incision was designed, just off   the midline and laterally.  Local anesthetic was administered along this.  Next,   a #10-blade scalpel was used to make an incision through the skin and dissected   down the subcutaneous tissue.  Bovie electrocauterization was used to create a   retropectoral pocket of sufficient size to contain the implant.  Inferiorly, the   muscle was released along the inframammary fold in a dual plane type 2   maneuver.  The pocket was dissected of the appropriate size for the implant.    Meticulous hemostasis was confirmed.  Pocket was washed copiously with   antibiotic irrigation and there was no sign of bleeding.  New sterile gloves   were changed.  The implant was opened up, washed with antibiotic irrigation and   local anesthetic was administered into the pocket using sterile technique.  The   implant was then placed in the proper orientation behind the muscle.  Skin was   then closed in multiple layers using interrupted 3-0 Vicryl to reapproximate   Sal, then a 3-point tacking stitch to chest wall.  A 3-0 Vicryl was used to   reapproximate deep dermis and a 3-0 PDS was used to close the skin.  The nipple   covered over on the left breast during the entire procedure.  Next, the   right-sided breast reconstruction was performed.  The pocket was inspected and   meticulous hemostasis was achieved with electrocautery.  Next, a retropectoral   pocket of sufficient size containing tissue expanders created using   electrocautery.  Inferior attachments of the inframammary fold were lysed of the   pectoralis muscle  to allow for better placement of tissue expander as the   muscle fibers inserted high on the patient's chest.  Once retropectoral pocket   was created of sufficient size, meticulous hemostasis was confirmed.  The pocket   was washed copiously with antibiotic irrigation.  A piece of immediate   perforated contoured AlloDerm was then used as an inferior sling and prepared   accordingly using sterile technique was sewn as an inferior sling in proper   orientation and was sutured in place using interrupted 2-0 Vicryl in a   horizontal mattress fashion.  It was sutured to the lateral border of sternum to   the inframammary fold and laterally onto the serratus anterior.  A 15-Estonian   drain was placed in the retropectoral pocket and sutured in place with 2-0 silk.    Next, the pocket was inspected one more time for hemostasis.  New sterile   gloves were changed.  Tissue expanders opened up, prepared accordingly, washed   with antibiotic irrigation using sterile technique was placed in the   retropectoral pocket with proper orientation.  The superior border of the   AlloDerm was sutured to the inferior border of the pectoralis using a running   2-0 Vicryl.  Next, a 15-Estonian drain was placed into the subcutaneous space and   sutured in place with 2-0 silk.  Skin edges debrided back to healthy bleeding   tissue.  Tissue expander was gently inflated to 60 mL to have some volume.    Pocket was washed multiple times with antibiotic irrigation and finally, the   skin edges were closed using interrupted 3-0 Vicryl and deep dermis with 3-0   PDS.  Skin was a little bit tight, but there was really very minimal skin to   work with.  Biopatch were placed around the drains.  Drains were put on bulb   suction.  Sterile dressing was placed on top.  The patient tolerated the   procedure well.          /barrett [id number] blank(s)        AYLIN/  dd: 04/12/2017 06:39:54 (CDT)  td: 04/12/2017 10:37:20 (CDT)  Doc ID   #[id number]  Job ID #[id number]    CC:

## 2017-04-12 NOTE — OP NOTE
"[facility]  Surgery Department  Operative Note    SUMMARY     Date of Procedure: 4/11/2017     Procedure: Procedure(s) (LRB):  MASTECTOMY, SENTINEL NODE BX, ANCILLARY NODE DISSECTION (Right)  INSERTION-BREAST TISSUE EXPANDER WITH ALLODERM RIGHT (Right)  AUGMENTATION-BREAST WITH GEL IMPLANT LEFT (Left)     Surgeon(s) and Role:  Panel 1:     * Travon Faria MD - Primary    Panel 2:     * Augustine Carrasco MD - Primary    Assisting Surgeon: None    Pre-Operative Diagnosis: Malignant neoplasm of central portion of right female breast [C50.111]    Post-Operative Diagnosis: Post-Op Diagnosis Codes:     * Malignant neoplasm of central portion of right female breast [C50.111]    Anesthesia: General    Technical Procedures Used: Right breast reconstruction with tissue expander and alloderm, left breast augmentation for symmetry    Description of the Findings of the Procedure: Left breast aug with round smooth silicone gel 194 cc style 15; right recon with alloderm and tissue expander 600cc filled to 60 cc intraop    Significant Surgical Tasks Conducted by the Assistant(s), if Applicable: None    Complications: No    Estimated Blood Loss (EBL): 125 mL           Implants:   Implant Name Type Inv. Item Serial No.  Lot No. LRB No. Used   GRAFT PERF 9.6X19.3 THCK TISS - [id number]  GRAFT PERF 9.6X19.3 THCK TISS  [employer] [id number] Right 1   IMPLANT BREAST MID RANGE 194CC - [id number]  IMPLANT BREAST MID RANGE 194CC  ADAIR MORRISON [id number] Left 1   EXPANDER TISSUE GABY 600ML - [id number]   EXPANDER TISSUE GABY 600ML   [employer] [id number] Right 1       Specimens:   Specimen     None                  Condition: Good    Disposition: PACU    Attestation: "I was present and scrubbed for the entire procedure."  "

## 2017-04-12 NOTE — PROGRESS NOTES
IV removed. Patient received verbal and written discharge instructions on medications, follow up appts, activity,and diet. No acute distress noted. Patient verbalized understanding of discharge instructions.  Patient wheeled to vehicle and was transported home by . Patient has f/u appt with Dr. Carrasco and Dr. Faria next week. Patient's  taught and return demonstrated emptying of DG drains.

## 2017-04-12 NOTE — PROGRESS NOTES
[facility]  General Surgery  Progress Note    Subjective:     Interval History: surgery yesterday.  + emesis last night after taking po pain meds on empty stomach.  Ambulating. Voiding.  Doing well otherwise.    Post-Op Info:  Procedure(s) (LRB):  MASTECTOMY, SENTINEL NODE BX, ANCILLARY NODE DISSECTION (Right)  INSERTION-BREAST TISSUE EXPANDER WITH ALLODERM RIGHT (Right)  AUGMENTATION-BREAST WITH GEL IMPLANT LEFT (Left)   1 Day Post-Op      Medications:  Continuous Infusions:   lactated ringers 1,000 mL (04/12/17 0100)     Scheduled Meds:   atorvastatin  10 mg Oral Daily    ceFAZolin (ANCEF) IVPB  2 g Intravenous Q8H    cyclobenzaprine  10 mg Oral TID     PRN Meds:hydrocodone-acetaminophen 5-325mg, HYDROmorphone, ondansetron, promethazine (PHENERGAN) IVPB, sodium chloride 0.9%     Objective:     Vital Signs (Most Recent):  Temp: 98.3 °F (36.8 °C) (04/12/17 0351)  Pulse: 71 (04/12/17 0351)  Resp: 18 (04/12/17 0351)  BP: 105/61 (04/12/17 0351)  SpO2: 98 % (04/12/17 0351) Vital Signs (24h Range):  Temp:  [97.3 °F (36.3 °C)-98.3 °F (36.8 °C)] 98.3 °F (36.8 °C)  Pulse:  [63-84] 71  Resp:  [17-25] 18  SpO2:  [94 %-99 %] 98 %  BP: ()/(50-70) 105/61       Intake/Output Summary (Last 24 hours) at 04/12/17 0657  Last data filed at 04/12/17 0200   Gross per 24 hour   Intake          3553.33 ml   Output             3195 ml   Net           358.33 ml       Physical Exam  Incisions - healing well with no sign of infection or fluid collection.  Flaps well perfused.    Significant Labs:      Significant Diagnostics:      Assessment/Plan:     Active Diagnoses:    Diagnosis Date Noted POA    Breast cancer [C50.919] 04/11/2017 Yes      Problems Resolved During this Admission:    Diagnosis Date Noted Date Resolved POA       A/P: s/p right breast recon and left breast aug    1. Doing great.  Encourage PO intake.  If able to tolerate po, send home today.  2. Detailed wound care, restrictions, drain management  discussed with patient and .  3. Patient has prescriptions for pain meds, abx, muscle relaxers already given in my office.  4. OK for D/C from plastics standpoint.  Patient to call me with any questions and has follow up set up already in my office for next week.        Augustine Carrasco MD  General Surgery  [facility]

## 2017-04-12 NOTE — PROGRESS NOTES
This RN spoke with Dr. Faria and notified him that patient c/o pain to breasts from surgery and next available Norco 5 mg PO PRN Q4 is at 1030. Toradol 30 mg IVP one time dose ordered for prior to discharge.

## 2017-04-12 NOTE — PLAN OF CARE
Problem: Patient Care Overview  Goal: Plan of Care Review  Outcome: Ongoing (interventions implemented as appropriate)  Free of falls/injury during shift  Pain managed effectively w/ PRN meds  BRA in place to bilateral breast w/ DG x 2 in place  R arm contracted w/ R foot drop noted  Probable d/c this am  Safety interventions in place

## 2017-04-13 NOTE — DISCHARGE SUMMARY
[facility]  General Surgery  Discharge Summary      Patient Name: Carole Moore  MRN: [id number]  Admission Date: 4/11/2017  Hospital Length of Stay: 0 days  Discharge Date and Time: 4/12/2017  9:26 AM  Attending Physician: No att. providers found   Discharging Provider: Travon Faria MD  Primary Care Provider: Angel Emery MD     HPI: see H and P. Patient has had neoadjuvant chemo and now for mastectomy, snb, and tissue expander.     procedure was right mastectomy, snb, right tissue expander withalloderm, implant on left side    Hospital Course: patient tolerated procedure well. By next day wounds were clean and dry with no hematoma, kathryn drain volume was appropriate, vss, eating well, and voiding well. She was instructed in wound care and drain care. And went home doing well    Consults:     Significant Diagnostic Studies:      Pending Diagnostic Studies:     None        Final Active Diagnoses:    Diagnosis Date Noted POA    PRINCIPAL PROBLEM:  Malignant neoplasm of central portion of right female breast [C50.111] 11/07/2016 Yes    Breast cancer [C50.919] 04/11/2017 Yes    History of CVA (cerebrovascular accident) [Z86.73] 12/02/2013 Not Applicable    Hyperlipidemia [E78.5] 07/11/2013 Yes      Problems Resolved During this Admission:    Diagnosis Date Noted Date Resolved POA      Discharged Condition: good    Disposition: Home or Self Care    Follow Up:  Follow-up Information     Follow up with Travon Faria MD.    Specialty:  General Surgery    Why:  HAS APPT. WITH ME     Contact information:    [address]  [place] [address] [phone]          Follow up with Augustine Carrasco MD.    Specialty:  Plastic Surgery    Why:  HAS APPT. ALREADY    Contact information:    [address]  [place] [address] [phone]          Patient Instructions:     Diet general     Activity as tolerated     Sponge bath only until clinic visit     Lifting restrictions      Weight bearing restrictions (specify)     Call MD for:  temperature >100.4     Call MD for:  persistent nausea and vomiting or diarrhea     Call MD for:  redness, tenderness, or signs of infection (pain, swelling, redness, odor or green/yellow discharge around incision site)     Call MD for:  difficulty breathing or increased cough     Change dressing (specify)   Order Comments: MAY APPLY FRESH GAUZE TO AREA AS NEEDED, MEASURE DRAIN OUTPUT HAS BEEN INSTRUCTED       Medications:  Reconciled Home Medications:   Discharge Medication List as of 4/12/2017  8:41 AM      CONTINUE these medications which have NOT CHANGED    Details   aspirin 81 MG Chew Take 81 mg by mouth once daily., Until Discontinued, Historical Med      atorvastatin (LIPITOR) 10 MG tablet Take 1 tablet (10 mg total) by mouth once daily., Starting 12/9/2015, Until Discontinued, Normal      LORATADINE (CLARITIN ORAL) Take by mouth daily as needed. , Until Discontinued, Historical Med             Travon Faria MD  General Surgery  [facility]

## 2017-04-19 ENCOUNTER — OFFICE VISIT (OUTPATIENT)
Dept: SURGERY | Facility: CLINIC | Age: 52
End: 2017-04-19
Payer: COMMERCIAL

## 2017-04-19 VITALS
HEART RATE: 101 BPM | TEMPERATURE: 98 F | BODY MASS INDEX: 26.17 KG/M2 | DIASTOLIC BLOOD PRESSURE: 71 MMHG | WEIGHT: 167.13 LBS | SYSTOLIC BLOOD PRESSURE: 105 MMHG

## 2017-04-19 DIAGNOSIS — Z98.890 POST-OPERATIVE STATE: Primary | ICD-10-CM

## 2017-04-19 PROCEDURE — 99024 POSTOP FOLLOW-UP VISIT: CPT | Mod: S$GLB,,, | Performed by: SURGERY

## 2017-04-19 PROCEDURE — 99999 PR PBB SHADOW E&M-EST. PATIENT-LVL III: CPT | Mod: PBBFAC,,, | Performed by: SURGERY

## 2017-04-19 NOTE — MR AVS SNAPSHOT
[facility] Surgery  [address] [place] [address]  Phone: [phone]  Fax: [phone]                  Carole Moore   2017 10:30 AM   Office Visit    Description:  Female : 1965   Provider:  Travon Faria MD   Department:  [facility]           Reason for Visit     Post-op Evaluation                To Do List           Future Appointments        Provider Department Dept Phone    2017 10:00 AM LABORATORY, [address] [facility] [phone]    2017 10:20 AM Alessia Moss MD [facility] - Hematology Oncology [phone]    2017 11:00 AM CHAIR 04 [facility] [phone]    2017 9:00 AM Travon Faria MD [facility] [phone]      Goals (5 Years of Data)     None      [facility]     [facility] Nurse Care Line -  Assistance  Unless otherwise directed by your provider, please contact [facility], our nurse care line that is available for  assistance.     Registered nurses in the [facility] provide: appointment scheduling, clinical advisement, health education, and other advisory services.  Call: [phone] (toll free)               Medications           Message regarding Medications     Verify the changes and/or additions to your medication regime listed below are the same as discussed with your clinician today.  If any of these changes or additions are incorrect, please notify your healthcare provider.             Verify that the below list of medications is an accurate representation of the medications you are currently taking.  If none reported, the list may be blank. If incorrect, please contact your healthcare provider. Carry this list with you in case of emergency.           Current Medications     aspirin 81 MG Chew Take 81 mg by mouth once daily.    atorvastatin (LIPITOR) 10 MG tablet Take 1 tablet (10 mg total) by mouth once daily.     LORATADINE (CLARITIN ORAL) Take by mouth daily as needed.            Clinical Reference Information           Your Vitals Were     BP Pulse Temp Weight Last Period BMI    105/71 101 97.8 °F (36.6 °C) (Oral) 75.8 kg (167 lb 1.7 oz) 10/11/2009 26.17 kg/m2      Blood Pressure          Most Recent Value    BP  105/71      Allergies as of 4/19/2017     No Known Allergies      Immunizations Administered on Date of Encounter - 4/19/2017     None      [facility] Sign-Up     Activating your [facility] account is as easy as 1-2-3!     1) Visit [email or web address], select Sign Up Now, enter this activation code and your date of birth, then select Next.  [email or web address]  Expires: 6/3/2017 11:45 AM      2) Create a username and password to use when you visit [facility] in the future and select a security question in case you lose your password and select Next.    3) Enter your e-mail address and click Sign Up!    Additional Information  If you have questions, please e-mail [email or web address] or call [phone] to talk to our [facility] staff. Remember, [facility] is NOT to be used for urgent needs. For medical emergencies, dial 911.         Language Assistance Services     ATTENTION: Language assistance services are available, free of charge. Please call [phone].      ATENCIÓN: Si habla español, tiene a escudero disposición servicios gratuitos de asistencia lingüística. Llame al [phone].     CHÚ Ý: N?u b?n nói Ti?ng Vi?t, có các d?ch v? h? tr? ngôn ng? mi?n phí dành cho b?n. G?i s? [phone].         [facility] - General Surgery complies with applicable Federal civil rights laws and does not discriminate on the basis of race, color, national origin, age, disability, or sex.

## 2017-04-20 NOTE — PROGRESS NOTES
Subjective:       Patient ID: Carole Moore is a 51 y.o. female.    Chief Complaint: Post-op Evaluation    HPI Comments: PATIENT IS NOW 6 DAYS POST RIGHT MASTECTOMY AND SENTINEL NODE BX FOR BREAST CANCER. SHE ALSO HAD LEFT BREAST IMPLANT PLACED. SHE FEELS WELL WITH MINIMAL PAIN. ONE DRAIN IS NEARLY DRY. DENIES WOUND COMPLAINTS ARM SWELLING OR FEVER.     Review of Systems    Objective:      Physical Exam   Constitutional: She is oriented to person, place, and time. She appears well-developed and well-nourished.   HENT:   Head: Normocephalic and atraumatic.   Neck: Neck supple.   Cardiovascular: Normal rate and regular rhythm.    Pulmonary/Chest: Effort normal.   Neurological: She is alert and oriented to person, place, and time.   Skin:   LEFT BREAST IS CLEAN AND DRY AND WOUND AT SMALL SGY SITE IS CLEAN. LEFT MASTECTOMY SITE SHOWS WOUNDS ARE CLEAN AND DRY, NO SEROMA ONE DRAIN DRAINING WELL. NO ARM SWELLING   Psychiatric: She has a normal mood and affect. Her behavior is normal.   Vitals reviewed.    PRELIMINARY PATH SHOWS 7 CM AREA OF RESIDUAL TUMOR, SKIN MARGINS OK. LYMPH NODES ARE NORMAL BUT IHC IS PENDING  Assessment:     STABLE POST RIGHT MASTECTOMY, SNB, PLACEMENT OF TISSUE EXPANDER AND LEFT IMPLANT     Plan:       SEE ME ONE MONTH, AWAIT FINAL PATH AND ONCOLOGY AND RTX INPUT. WILL SEE dR. TERRELL FOR DRAIN F/U

## 2017-05-02 ENCOUNTER — TELEPHONE (OUTPATIENT)
Dept: INTERNAL MEDICINE | Facility: CLINIC | Age: 52
End: 2017-05-02

## 2017-05-02 ENCOUNTER — LAB VISIT (OUTPATIENT)
Dept: LAB | Facility: HOSPITAL | Age: 52
End: 2017-05-02
Attending: INTERNAL MEDICINE
Payer: MEDICARE

## 2017-05-02 ENCOUNTER — INFUSION (OUTPATIENT)
Dept: INFUSION THERAPY | Facility: HOSPITAL | Age: 52
End: 2017-05-02
Attending: INTERNAL MEDICINE
Payer: MEDICARE

## 2017-05-02 ENCOUNTER — OFFICE VISIT (OUTPATIENT)
Dept: HEMATOLOGY/ONCOLOGY | Facility: CLINIC | Age: 52
End: 2017-05-02
Payer: MEDICARE

## 2017-05-02 VITALS
BODY MASS INDEX: 26.19 KG/M2 | HEART RATE: 85 BPM | WEIGHT: 166.88 LBS | OXYGEN SATURATION: 97 % | HEIGHT: 67 IN | TEMPERATURE: 98 F | RESPIRATION RATE: 18 BRPM

## 2017-05-02 VITALS — BODY MASS INDEX: 26.19 KG/M2 | HEIGHT: 67 IN | WEIGHT: 166.88 LBS

## 2017-05-02 DIAGNOSIS — E78.5 HYPERLIPIDEMIA, UNSPECIFIED HYPERLIPIDEMIA TYPE: ICD-10-CM

## 2017-05-02 DIAGNOSIS — C50.111 MALIGNANT NEOPLASM OF CENTRAL PORTION OF RIGHT FEMALE BREAST: ICD-10-CM

## 2017-05-02 DIAGNOSIS — C50.111 MALIGNANT NEOPLASM OF CENTRAL PORTION OF RIGHT FEMALE BREAST: Primary | ICD-10-CM

## 2017-05-02 LAB
ESTRADIOL SERPL-MCNC: <10 PG/ML
FSH SERPL-ACNC: 80.6 MIU/ML

## 2017-05-02 PROCEDURE — 36415 COLL VENOUS BLD VENIPUNCTURE: CPT

## 2017-05-02 PROCEDURE — 83001 ASSAY OF GONADOTROPIN (FSH): CPT

## 2017-05-02 PROCEDURE — 25000003 PHARM REV CODE 250: Performed by: INTERNAL MEDICINE

## 2017-05-02 PROCEDURE — 1160F RVW MEDS BY RX/DR IN RCRD: CPT | Mod: ,,, | Performed by: INTERNAL MEDICINE

## 2017-05-02 PROCEDURE — 99213 OFFICE O/P EST LOW 20 MIN: CPT | Mod: PBBFAC,25 | Performed by: INTERNAL MEDICINE

## 2017-05-02 PROCEDURE — 99999 PR PBB SHADOW E&M-EST. PATIENT-LVL III: CPT | Mod: PBBFAC,,, | Performed by: INTERNAL MEDICINE

## 2017-05-02 PROCEDURE — 96523 IRRIG DRUG DELIVERY DEVICE: CPT

## 2017-05-02 PROCEDURE — 82670 ASSAY OF TOTAL ESTRADIOL: CPT

## 2017-05-02 PROCEDURE — 99214 OFFICE O/P EST MOD 30 MIN: CPT | Mod: S$PBB,,, | Performed by: INTERNAL MEDICINE

## 2017-05-02 RX ORDER — HEPARIN 100 UNIT/ML
500 SYRINGE INTRAVENOUS
Status: CANCELLED | OUTPATIENT
Start: 2017-05-02

## 2017-05-02 RX ORDER — SODIUM CHLORIDE 0.9 % (FLUSH) 0.9 %
10 SYRINGE (ML) INJECTION
Status: CANCELLED | OUTPATIENT
Start: 2017-05-02

## 2017-05-02 RX ORDER — ATORVASTATIN CALCIUM 10 MG/1
10 TABLET, FILM COATED ORAL DAILY
Qty: 90 TABLET | Refills: 4 | Status: SHIPPED | OUTPATIENT
Start: 2017-05-02 | End: 2018-07-27 | Stop reason: SDUPTHER

## 2017-05-02 RX ORDER — HEPARIN 100 UNIT/ML
500 SYRINGE INTRAVENOUS
Status: COMPLETED | OUTPATIENT
Start: 2017-05-02 | End: 2017-05-02

## 2017-05-02 RX ORDER — SODIUM CHLORIDE 0.9 % (FLUSH) 0.9 %
10 SYRINGE (ML) INJECTION
Status: COMPLETED | OUTPATIENT
Start: 2017-05-02 | End: 2017-05-02

## 2017-05-02 RX ADMIN — SODIUM CHLORIDE, PRESERVATIVE FREE 10 ML: 5 INJECTION INTRAVENOUS at 11:05

## 2017-05-02 RX ADMIN — HEPARIN 500 UNITS: 100 SYRINGE at 11:05

## 2017-05-02 NOTE — TELEPHONE ENCOUNTER
----- Message from Emma Patterson LPN sent at 5/2/2017 10:21 AM CDT -----  This patients right leg brace recently broke. She is requesting a new script be faxed to [facility]: [facility]. I gathered the numbers for you.    Thanks, Emma    [phone] fax  [phone] phone

## 2017-05-02 NOTE — TELEPHONE ENCOUNTER
Contacted [facility].  They are faxing over the proper paperwork for Dr. Emery to sign in for to get patient a new brace.

## 2017-05-02 NOTE — TELEPHONE ENCOUNTER
Call [facility] and ask them to let us know what brace we need to order for her.  We have done this before.

## 2017-05-02 NOTE — TELEPHONE ENCOUNTER
Is this something you would do? This message came from the hematology nurse.  She was seeing them today.  You have not seen patient since 1/2016 due to breast cancer.
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
## 2017-05-02 NOTE — PROGRESS NOTES
Hematology/Oncology Established Visit    Reason for Visit / CC: Breast cancer    [facility] Diagnosis: Breast cancer    Stage: Pathological stage after neoadjuvant chemotherapy - Stage IIIA [[id number]]    Pathology: 10/27/16 Right breast mass, incisional biopsy:  Invasive lobular carcinoma, intermediate grade (3,2,1), see note. Virtually 100% of the cells of the infiltrating carcinoma are strongly both nuclear estrogen receptor and nuclear  progesterone receptor positive. They are HER-2 negative.    1. [place] node #1:  One lymph node positive for metastatic carcinoma; see note.  2. [place] node #2:  One lymph node positive for metastatic carcinoma; see note.  3. [place] node #3:  One lymph node positive for metastatic carcinoma; see note.  NOTE: The metastatic disease to the lymph nodes is apparent only on immunohistochemical staining, being largely  comprised of single-lying cells of a morphology consistent with those of the primary lobular carcinoma. The sizes of  the metastatic foci are therefore technically difficult to measure , especially in light of the effects of xiomara-adjuvant  therapy; however, at least one of the foci is felt to be larger than 0.2 cm. Immunostaining for pancytokeratin, WSK,  and CAM 5.2 is performed on each sentinel lymph node, marking as above, with all control material staining  appropriately.  4. Nipple from right breast:  Skin tissue positive for isolated tumor cells; see note.  NOTE: Direct extension of tumor to nipple and/or skin is not identified.  5. Right breast:  Mastectomy with invasive lobular carcinoma, approximately 6 cm in greatest dimension, status-post neoadjuvant    Prior Treatment:   1. Neoadjuvant chemotherapy with dose-dense AC-T, completed 2/21/17  2. Right breast mastectomy and sentinel LN dissection 4/11/17    Current Treatment: Pending    History of Present Illness:  Ms. Moore is a 52 y/o female with h/o CVA in her 30s from OCPs and residual R arm contracture  comes in with new diagnosis of Invasive lobular carcinoma, ER/MA positive. She has had an indention in her right breast with some nipple retraction for the past year, but mammogram and u/s last year were normal. This year, imaging and physical exam revealed denser tissue with more pronounced nipple retraction. Dr. Faria performed incisional LN biopsy of the R breast, which revealed invasive lobular carcinoma. She had a hysterectomy in 2008. She does get occasional hot flashes. Colonoscopy approx age 46 was normal. No melena/hematochezia. No unintentional weight loss. No bone pain.     Today, patient comes in for follow up of breast cancer. She completed neoadjuvant chemotherapy in 2/2017 and underwent R breast mastectomy and sentinel LN dissection on 4/11/17. She also had tissue expander placed by Dr. Carrasco and plans to see him weekly at this time. She comes in today to review pathology. She states she is healing well and denies any post-op pain.    ROS:  Answers for HPI/ROS submitted by the patient on 5/2/2017   appetite change : No  unexpected weight change: No  visual disturbance: No  cough: No  shortness of breath: No  chest pain: No  abdominal pain: No  diarrhea: No  frequency: Yes  back pain: No  rash: No  headaches: No  adenopathy: No  nervous/ anxious: No  +R arm numbness weakness    Past Medical History:   Diagnosis Date    Cancer     R Breast cancer    CVA (cerebral infarction)     Diverticulosis     Hyperlipidemia     Nausea after anesthesia     Paralysis     right side    PONV (postoperative nausea and vomiting)     Stroke     R side weakness     Past Surgical History:  Partial hysterectomy 11/2008    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 No illicits.  Social History     Social History    Marital status:      Spouse name: N/A    Number of children: N/A    Years of education: N/A     Social History Main Topics    Smoking status: Never Smoker     Smokeless tobacco: Never Used    Alcohol use No    Drug use: No    Sexual activity: No     Other Topics Concern    Are You Pregnant Or Think You May Be? No    Breast-Feeding No     Social History Narrative       Family History: family history includes Breast cancer in her paternal aunt; Colon cancer in her father and mother; Melanoma in her maternal uncle. 2 Paternal aunts diagnosed with breast cancer age 60s. Mom had colon cancer age late 60s, father had colon cancer age late 60s. Maternal uncle with melanoma age 45. Maternal uncle with lung cancer age 59.    Physical Exam:  Vitals:    05/02/17 1018   Pulse: 85   Resp: 18   Temp: 98 °F (36.7 °C)     Body mass index is 26.14 kg/(m^2).  General:  AAOx4, no acute distress  HEENT: EOMI. Normocephalic and atraumatic. No maxillary sinus tenderness. External auditory canals clear and TMs intact without lesions. Nasal and oral mucosal membranes moist. Normal dentition and gums.   Neck: no LAD, thyromegaly, normal ROM  Breast: Left breast without any masses, skin changes. Left superior aspect of axilla with one small subcutaneous nodule. Right breast with central density, unable to measure and less nipple retraction. R superior aspect of axilla with 1 small subcutaneous nodules that is shrinking. No erythema or drainage.No cervical, supraclav, axillary LAD.  Pulmonary: Bilaterally clear to auscultation, Normal effort with no accessory muscle use, no wheezes/rales/rhonchi  CV: Normal rate, regular rhythm, no murmurs/rubs/gallops, no edema  ABD:  Soft, nontender, nondistended, no mass, and without hepatosplenomegaly   Ext: No clubbing, cyanosis, or edema, normal ROM  Skin: No rashes, lesions, bruising or petechiae  Neurological: CN II to XII grossly intact, normal coordination  +R arm with 0/5 strength, mild numbness. R ankle with foot drop requiring brace.  Psychiatric:  Normal affect and judgement +tearful  Hem/Lymph:  No submandibular, cervical, supraclavicular,  axillary LAD.    Labs:    Lab Results   Component Value Date    WBC 3.77 (L) 03/31/2017    HGB 12.4 03/31/2017    HCT 38.6 03/31/2017    MCV 95 03/31/2017     03/31/2017     Lab Results   Component Value Date     03/31/2017    K 4.0 03/31/2017     03/31/2017    CO2 27 03/31/2017    BUN 13 03/31/2017    CREATININE 0.7 03/31/2017    CALCIUM 9.5 03/31/2017    ANIONGAP 8 03/31/2017    ESTGFRAFRICA >60.0 03/31/2017    EGFRNONAA >60.0 03/31/2017     Lab Results   Component Value Date    ALT 23 03/31/2017    AST 20 03/31/2017    ALKPHOS 115 03/31/2017    BILITOT 0.9 03/31/2017       No results found for: IRON, TIBC, FERRITIN, SATURATEDIRO  No results found for: PPCJQAHS15  No results found for: FOLATE  Lab Results   Component Value Date    TSH 0.680 09/15/2015       Imaging:  Mammogram 10/10/16: The breasts are heterogeneously dense.  This may lower the sensitivity of mammography.  There is global asymmetry seen in the right breast.  Increased density and contraction of fibroglandular tissue on the right with increased prominence  of nipple retraction.  No defiinite mass or suspicious calcifications. Recommend ultrasound.  Left breast stable without  additional evidence of dominant mass, architectural distortion or suspicious calcification.  Results of ultrasound and/or mammogram should not preclude biopsy of any clinically palpable and/or suspicious nodule or mass. Please correlate with exam.  Digital tomosynthesis was performed and used in the interpretation of the images. Images were evaluated with a Computer Aided Detection (CAD) system.  Impression  Global asymmetry in the right breast requires additional evaluation. Clinical correlation of finding is recommended. An ultrasound exam is recommended.    PET 11/9/16:  Hypermetabolic uptake within patient's known right breast carcinoma with a maximum SUV of 3.5.  No findings to suggest metastatic disease.    Assessment / Plan:  Carole Moore is  a 51 y.o. female who comes in with right breast cancer.    1. Right-sided breast cancer: Stage IIIA invasive lobular carcinoma, ER/FL positive, [id number] neg. BRCA negative. Given large mass on physical examination, we chose to treat with neoadjuvant chemotherapy, using dose-dense AC-T regimen, which patient completed in 2/2017 with decrease in size and firmness of R breast mass. She then underwent R breast mastectomy and SLND 4/2017, with tissue expander placement. Given final pathology after neoadjuvant chemotherapy showed tumor 6cm, sentinel LNs positive, and nipple skin positive, I do recommend adjuvant radiation to R chest and axilla.  -- Refer to [facility] for adjuvant radiation to R chest and axilla. Discussed with Dr. Coker.  -- Will present her case at tumor board as well.  -- f/u FSH, estradiol: If FSH is not > 40, should consider Tamoxifen instead of AI, which will be started AFTER radiation complete.   -- Return in 2 weeks for follow up.    2. Cardiotoxic medication monitoring: Given use of anthracycline, will monitor heart function. Echo on 11/10/16 shows EF 55-60%.  -- Cardio-oncology is following.    3. 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 No irinotecan can be given. Monitor Tylenol use.    4. H/o CVA: With residual R-sided weakness. On Aspirin, statin.    5. Constipation: Likely related to Zofran. I recommend discontinuing Zofran and using Compazine prn. Discussed with patient to increase fluids and use Senna bid.    6. Chemotherapy induced anemia: Will continue to monitor closely.    7. Chemotherapy induced peripheral neuropathy: Mild, due to Taxol and will continue to monitor.    Alessia Moss M.D.  Hematology Oncology    Spent over 40 minutes discussing final pathology as well as discussing plan for adjuvant radiation followed by endocrine therapy.
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
## 2017-05-10 ENCOUNTER — TELEPHONE (OUTPATIENT)
Dept: INTERNAL MEDICINE | Facility: CLINIC | Age: 52
End: 2017-05-10

## 2017-05-10 NOTE — TELEPHONE ENCOUNTER
----- Message from Jules Carrasco sent at 5/10/2017  2:59 PM CDT -----  Contact: nya guadalupe/ Elaine [facility]  She's calling in regards to receipt of the certificate of medical necessity, please advise, please fax this to: [phone], # [phone]

## 2017-05-10 NOTE — TELEPHONE ENCOUNTER
Informed them that it is in his box and I will fax back as soon as he gets back in clinic. Verbalized understanding.

## 2017-05-16 ENCOUNTER — DOCUMENTATION ONLY (OUTPATIENT)
Dept: INFUSION THERAPY | Facility: HOSPITAL | Age: 52
End: 2017-05-16

## 2017-05-16 NOTE — PROGRESS NOTES
Patient was presented by Dr. Moss and discussed at the Multi-Disciplinary Tumor Conference at [facility] on 5/11/17. The recommendation was to consider axillary disection followed by radiation and Arimidex therapy.

## 2017-05-17 ENCOUNTER — OFFICE VISIT (OUTPATIENT)
Dept: HEMATOLOGY/ONCOLOGY | Facility: CLINIC | Age: 52
End: 2017-05-17
Payer: MEDICARE

## 2017-05-17 VITALS
HEIGHT: 67 IN | HEART RATE: 80 BPM | DIASTOLIC BLOOD PRESSURE: 80 MMHG | BODY MASS INDEX: 26.26 KG/M2 | OXYGEN SATURATION: 97 % | TEMPERATURE: 98 F | RESPIRATION RATE: 18 BRPM | SYSTOLIC BLOOD PRESSURE: 120 MMHG | WEIGHT: 167.31 LBS

## 2017-05-17 DIAGNOSIS — Z79.811 AROMATASE INHIBITOR USE: ICD-10-CM

## 2017-05-17 DIAGNOSIS — R21 RASH OF HANDS: ICD-10-CM

## 2017-05-17 DIAGNOSIS — C50.111 MALIGNANT NEOPLASM OF CENTRAL PORTION OF RIGHT FEMALE BREAST: Primary | ICD-10-CM

## 2017-05-17 PROCEDURE — 1160F RVW MEDS BY RX/DR IN RCRD: CPT | Mod: ,,, | Performed by: INTERNAL MEDICINE

## 2017-05-17 PROCEDURE — 99213 OFFICE O/P EST LOW 20 MIN: CPT | Mod: PBBFAC | Performed by: INTERNAL MEDICINE

## 2017-05-17 PROCEDURE — 99999 PR PBB SHADOW E&M-EST. PATIENT-LVL III: CPT | Mod: PBBFAC,,, | Performed by: INTERNAL MEDICINE

## 2017-05-17 PROCEDURE — 99214 OFFICE O/P EST MOD 30 MIN: CPT | Mod: S$PBB,,, | Performed by: INTERNAL MEDICINE

## 2017-05-17 RX ORDER — UREA 200 MG/G
1 CREAM TOPICAL DAILY
Qty: 85 G | Refills: 0 | Status: SHIPPED | OUTPATIENT
Start: 2017-05-17 | End: 2018-08-09

## 2017-05-17 NOTE — PROGRESS NOTES
Hematology/Oncology Established Visit    Reason for Visit / CC: Breast cancer    [facility] Diagnosis: Breast cancer    Stage: Pathological stage after neoadjuvant chemotherapy - Stage IIIA [[id number]]    Pathology: 10/27/16 Right breast mass, incisional biopsy:  Invasive lobular carcinoma, intermediate grade (3,2,1), see note. Virtually 100% of the cells of the infiltrating carcinoma are strongly both nuclear estrogen receptor and nuclear  progesterone receptor positive. They are HER-2 negative.    1. [place] node #1:  One lymph node positive for metastatic carcinoma; see note.  2. [place] node #2:  One lymph node positive for metastatic carcinoma; see note.  3. [place] node #3:  One lymph node positive for metastatic carcinoma; see note.  NOTE: The metastatic disease to the lymph nodes is apparent only on immunohistochemical staining, being largely  comprised of single-lying cells of a morphology consistent with those of the primary lobular carcinoma. The sizes of  the metastatic foci are therefore technically difficult to measure , especially in light of the effects of xiomara-adjuvant  therapy; however, at least one of the foci is felt to be larger than 0.2 cm. Immunostaining for pancytokeratin, WSK,  and CAM 5.2 is performed on each sentinel lymph node, marking as above, with all control material staining  appropriately.  4. Nipple from right breast:  Skin tissue positive for isolated tumor cells; see note.  NOTE: Direct extension of tumor to nipple and/or skin is not identified.  5. Right breast:  Mastectomy with invasive lobular carcinoma, approximately 6 cm in greatest dimension, status-post neoadjuvant    Prior Treatment:   1. Neoadjuvant chemotherapy with dose-dense AC-T, completed 2/21/17  2. Right breast mastectomy and sentinel LN dissection 4/11/17    Current Treatment: Pending    History of Present Illness:  Ms. Moore is a 50 y/o female with h/o CVA in her 30s from OCPs and residual R arm contracture  comes in with new diagnosis of Invasive lobular carcinoma, ER/RI positive. She has had an indention in her right breast with some nipple retraction for the past year, but mammogram and u/s last year were normal. This year, imaging and physical exam revealed denser tissue with more pronounced nipple retraction. Dr. Faria performed incisional LN biopsy of the R breast, which revealed invasive lobular carcinoma. She had a hysterectomy in 2008. She does get occasional hot flashes. Colonoscopy approx age 46 was normal. No melena/hematochezia. No unintentional weight loss. No bone pain.     Today, patient comes in for follow up of breast cancer. She completed neoadjuvant chemotherapy in 2/2017 and underwent R breast mastectomy and sentinel LN dissection on 4/11/17. She also had tissue expander placed by Dr. Carrasco and plans to see him weekly at this time. She comes in today to for follow up. She states she is healing well and denies any post-op pain. She has also met with Dr. Coker in [facility] who recommends adjuvant radiation to the right chest and right axilla after her tissue expansion is complete.     Answers for HPI/ROS submitted by the patient on 5/17/2017   appetite change : Yes  unexpected weight change: No  visual disturbance: Yes  cough: No  shortness of breath: No  chest pain: No  abdominal pain: No  diarrhea: No  frequency: No  back pain: No  rash: Yes  headaches: No  adenopathy: No  nervous/ anxious: No      Past Medical History:   Diagnosis Date    Cancer     R Breast cancer    CVA (cerebral infarction)     Diverticulosis     Hyperlipidemia     Nausea after anesthesia     Paralysis     right side    PONV (postoperative nausea and vomiting)     Stroke     R side weakness     Past Surgical History:  Partial hysterectomy 11/2008    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 No illicits.  Social History     Social History    Marital status:      Spouse name:  N/A    Number of children: N/A    Years of education: N/A     Social History Main Topics    Smoking status: Never Smoker    Smokeless tobacco: Never Used    Alcohol use No    Drug use: No    Sexual activity: No     Other Topics Concern    Are You Pregnant Or Think You May Be? No    Breast-Feeding No     Social History Narrative       Family History: family history includes Breast cancer in her paternal aunt; Colon cancer in her father and mother; Melanoma in her maternal uncle. 2 Paternal aunts diagnosed with breast cancer age 60s. Mom had colon cancer age late 60s, father had colon cancer age late 60s. Maternal uncle with melanoma age 45. Maternal uncle with lung cancer age 59.    Physical Exam:  Vitals:    05/17/17 1125   BP: 120/80   Pulse: 80   Resp: 18   Temp: 97.9 °F (36.6 °C)     Body mass index is 26.21 kg/(m^2).  General:  AAOx4, no acute distress  HEENT: EOMI. Normocephalic and atraumatic. No maxillary sinus tenderness. External auditory canals clear and TMs intact without lesions. Nasal and oral mucosal membranes moist. Normal dentition and gums.   Neck: no LAD, thyromegaly, normal ROM  Breast: Left breast without any masses, skin changes. Left superior aspect of axilla with one small subcutaneous nodule. Right breast with central density, unable to measure and less nipple retraction. R superior aspect of axilla with 1 small subcutaneous nodules that is shrinking. No erythema or drainage.No cervical, supraclav, axillary LAD.  Pulmonary: Bilaterally clear to auscultation, Normal effort with no accessory muscle use, no wheezes/rales/rhonchi  CV: Normal rate, regular rhythm, no murmurs/rubs/gallops, no edema  ABD:  Soft, nontender, nondistended, no mass, and without hepatosplenomegaly   Ext: No clubbing, cyanosis, or edema, normal ROM  Skin: No rashes, lesions, bruising or petechiae  Neurological: CN II to XII grossly intact, normal coordination  +R arm with 0/5 strength, mild numbness. R ankle with  foot drop requiring brace.  Psychiatric:  Normal affect and judgement  Hem/Lymph:  No submandibular, cervical, supraclavicular, axillary LAD.    Labs:    Lab Results   Component Value Date    WBC 3.77 (L) 03/31/2017    HGB 12.4 03/31/2017    HCT 38.6 03/31/2017    MCV 95 03/31/2017     03/31/2017     Lab Results   Component Value Date     03/31/2017    K 4.0 03/31/2017     03/31/2017    CO2 27 03/31/2017    BUN 13 03/31/2017    CREATININE 0.7 03/31/2017    CALCIUM 9.5 03/31/2017    ANIONGAP 8 03/31/2017    ESTGFRAFRICA >60.0 03/31/2017    EGFRNONAA >60.0 03/31/2017     Lab Results   Component Value Date    ALT 23 03/31/2017    AST 20 03/31/2017    ALKPHOS 115 03/31/2017    BILITOT 0.9 03/31/2017       No results found for: IRON, TIBC, FERRITIN, SATURATEDIRO  No results found for: [id number]  No results found for: FOLATE  Lab Results   Component Value Date    TSH 0.680 09/15/2015       Imaging:  Mammogram 10/10/16: The breasts are heterogeneously dense.  This may lower the sensitivity of mammography.  There is global asymmetry seen in the right breast.  Increased density and contraction of fibroglandular tissue on the right with increased prominence  of nipple retraction.  No defiinite mass or suspicious calcifications. Recommend ultrasound.  Left breast stable without  additional evidence of dominant mass, architectural distortion or suspicious calcification.  Results of ultrasound and/or mammogram should not preclude biopsy of any clinically palpable and/or suspicious nodule or mass. Please correlate with exam.  Digital tomosynthesis was performed and used in the interpretation of the images. Images were evaluated with a Computer Aided Detection (CAD) system.  Impression  Global asymmetry in the right breast requires additional evaluation. Clinical correlation of finding is recommended. An ultrasound exam is recommended.    PET 11/9/16:  Hypermetabolic uptake within patient's known right breast  carcinoma with a maximum SUV of 3.5.  No findings to suggest metastatic disease.    Assessment / Plan:  Carole Moore is a 51 y.o. female who comes in with right breast cancer.    1. Right-sided breast cancer: Stage IIIA invasive lobular carcinoma, ER/NE positive, [id number] neg. BRCA negative. Given large mass on physical examination, we chose to treat with neoadjuvant chemotherapy, using dose-dense AC-T regimen, which patient completed in 2/2017 with decrease in size and firmness of R breast mass. She then underwent R breast mastectomy and SLND 4/2017, with tissue expander placement. Given final pathology after neoadjuvant chemotherapy showed tumor 6cm, sentinel LNs positive, and nipple skin positive, I do recommend adjuvant radiation to R chest and axilla. I presented her case at tumor board and while axillary LN dissection was discussed, adjuvant radiation to the left chest and axilla were recommended.  -- Refered to [facility] for adjuvant radiation to R chest and axilla. Discussed with Dr. Coker. He recommends starting adjuvant radiation R chest and R axilla after breast tissue expansion is finished in 2-3 weeks. Dr. Coker will communicate with Dr. Carrasco.  -- Can start aromatase inhibitor AFTER radiation complete (FSH is > 40 and confirms menopause).  -- DEXA to evaluate bone density  -- Will also plan to treat with Zometa 4mg IV every 6 months for prevention of AI-induced bone loss.  -- Return in 7 weeks for follow up and to prescribe AI.    2. Cardiotoxic medication monitoring: Given use of anthracycline, will monitor heart function. Echo on 11/10/16 shows EF 55-60%.  -- Cardio-oncology is following.    3. 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 No irinotecan can be given. Monitor Tylenol use.    4. H/o CVA: With residual R-sided weakness. On Aspirin, statin.    5. Constipation: Likely related to Zofran. I recommend discontinuing Zofran and using Compazine prn.  Discussed with patient to increase fluids and use Senna bid.    6. Chemotherapy induced anemia: Will continue to monitor closely.    7. Chemotherapy induced peripheral neuropathy: Mild, due to Taxol and will continue to monitor.    Alessia Moss M.D.  Hematology Oncology

## 2017-05-31 ENCOUNTER — OFFICE VISIT (OUTPATIENT)
Dept: SURGERY | Facility: CLINIC | Age: 52
End: 2017-05-31
Payer: COMMERCIAL

## 2017-05-31 VITALS
BODY MASS INDEX: 26.66 KG/M2 | WEIGHT: 170.19 LBS | TEMPERATURE: 99 F | SYSTOLIC BLOOD PRESSURE: 114 MMHG | HEART RATE: 93 BPM | DIASTOLIC BLOOD PRESSURE: 71 MMHG

## 2017-05-31 DIAGNOSIS — Z98.890 POST-OPERATIVE STATE: Primary | ICD-10-CM

## 2017-05-31 PROCEDURE — 99999 PR PBB SHADOW E&M-EST. PATIENT-LVL II: CPT | Mod: PBBFAC,,, | Performed by: SURGERY

## 2017-05-31 PROCEDURE — 99024 POSTOP FOLLOW-UP VISIT: CPT | Mod: S$GLB,,, | Performed by: SURGERY

## 2017-06-02 NOTE — PROGRESS NOTES
CLINIC VISIT    The patient returns now in followup.  She is now about seven weeks status post   right total mastectomy, sentinel node biopsy for infiltrating lobular carcinoma   of the right breast.  At that time, she also underwent placement of a tissue   expander using saline in the right side and had a silicone implant on the left   side.  The patient feels well.  The tissue expander is going to get its final   injection in the next couple of days.  She denies any wound drainage or any   significant pain or any significant arm swelling.  The patient's case was   presented at Tumor Board a couple of weeks ago because of the sentinel nodes.    Initial frozen sentinel nodes were benign and on H and E, they were negative.    However, on IHC, there was one sentinel node showed a diameter of about 2 mm of   tumor and there were some nodes two of them ihc only.  After discussion, it was decided that   the patient needed chest wall radiation on the right side to the size of   residual tumor and so radiation was then included in her axillary nodes also.    On exam today, her blood pressure 114/71, pulse is 83, temperature 98.9.  She is   alert and oriented x3.  Neck is soft.  Her left and right breast wounds are all   clean and dry, healing and well.  There is no significant tenderness.  The   right axillary area is clean and dry.  There is no significant arm swelling.    IMPRESSION:  Stable status post now seven weeks from right total mastectomy,   sentinel node biopsy and placement of a tissue expander and then implant in the   left breast.    PLAN:  Resume all regular activities.  I had a discussion with the patient and   her , at this point, I recommend just proceeding with radiation therapy.    The other possibility is to go back and remove the rest of the nodes in the   right axilla with minimal disease in the nodes and the fact that she needs   radiation anyway in the chest wall, I would recommend to  radiate in the axilla   also.  I told her I think morbidity would increase significantly.  Lymph node   dissection and it is not going to increase her survival.  I told her I would be   more concerned about chest wall recurrence or distal recurrence in the axilla.    The patient is fine with that.  She is going to start radiation therapy in the   next couple of weeks and I will see her back in a couple of months for [employer]   removal per Dr. Moss's request.      TIMOTHY/IMELDA  dd: 05/31/2017 18:49:59 (CDT)  td: 06/01/2017 17:55:51 (CDT)  Doc ID   #[id number]  Job ID #[id number]    CC:

## 2017-06-08 DIAGNOSIS — M85.80 OTHER SPECIFIED DISORDERS OF BONE DENSITY AND STRUCTURE: ICD-10-CM

## 2017-06-08 DIAGNOSIS — E28.39 ESTROGEN DEFICIENCY: Primary | ICD-10-CM

## 2017-06-12 ENCOUNTER — APPOINTMENT (OUTPATIENT)
Dept: RADIOLOGY | Facility: CLINIC | Age: 52
End: 2017-06-12
Attending: INTERNAL MEDICINE
Payer: MEDICARE

## 2017-06-12 DIAGNOSIS — M85.80 OTHER SPECIFIED DISORDERS OF BONE DENSITY AND STRUCTURE: ICD-10-CM

## 2017-06-12 DIAGNOSIS — E28.39 ESTROGEN DEFICIENCY: ICD-10-CM

## 2017-06-12 PROCEDURE — 77080 DXA BONE DENSITY AXIAL: CPT | Mod: TC

## 2017-06-12 PROCEDURE — 77080 DXA BONE DENSITY AXIAL: CPT | Mod: 26,,, | Performed by: INTERNAL MEDICINE

## 2017-06-20 ENCOUNTER — INFUSION (OUTPATIENT)
Dept: INFUSION THERAPY | Facility: HOSPITAL | Age: 52
End: 2017-06-20
Attending: INTERNAL MEDICINE
Payer: MEDICARE

## 2017-06-20 VITALS — BODY MASS INDEX: 26.71 KG/M2 | WEIGHT: 170.19 LBS | HEIGHT: 67 IN

## 2017-06-20 DIAGNOSIS — C50.111 MALIGNANT NEOPLASM OF CENTRAL PORTION OF RIGHT FEMALE BREAST: Primary | ICD-10-CM

## 2017-06-20 DIAGNOSIS — L30.9 ECZEMA, UNSPECIFIED TYPE: Primary | ICD-10-CM

## 2017-06-20 PROCEDURE — 25000003 PHARM REV CODE 250: Mod: PO | Performed by: INTERNAL MEDICINE

## 2017-06-20 PROCEDURE — 96523 IRRIG DRUG DELIVERY DEVICE: CPT | Mod: PO

## 2017-06-20 RX ORDER — HEPARIN 100 UNIT/ML
500 SYRINGE INTRAVENOUS
Status: COMPLETED | OUTPATIENT
Start: 2017-06-20 | End: 2017-06-20

## 2017-06-20 RX ORDER — HYDROCORTISONE 25 MG/G
CREAM TOPICAL 2 TIMES DAILY
Qty: 1 TUBE | Refills: 1 | Status: SHIPPED | OUTPATIENT
Start: 2017-06-20 | End: 2022-10-05

## 2017-06-20 RX ORDER — SODIUM CHLORIDE 0.9 % (FLUSH) 0.9 %
10 SYRINGE (ML) INJECTION
Status: COMPLETED | OUTPATIENT
Start: 2017-06-20 | End: 2017-06-20

## 2017-06-20 RX ORDER — SODIUM CHLORIDE 0.9 % (FLUSH) 0.9 %
10 SYRINGE (ML) INJECTION
Status: CANCELLED | OUTPATIENT
Start: 2017-06-20

## 2017-06-20 RX ORDER — HEPARIN 100 UNIT/ML
500 SYRINGE INTRAVENOUS
Status: CANCELLED | OUTPATIENT
Start: 2017-06-20

## 2017-06-20 RX ADMIN — SODIUM CHLORIDE, PRESERVATIVE FREE 10 ML: 5 INJECTION INTRAVENOUS at 11:06

## 2017-06-20 RX ADMIN — HEPARIN 500 UNITS: 100 SYRINGE at 11:06

## 2017-06-20 NOTE — PATIENT INSTRUCTIONS
[facility]  [address]  [phone] phone     [phone] fax  Hours of Operation: Monday- Friday 8:00am- 5:00pm  After hours phone  [phone]  Hematology / Oncology Physicians on call      Dr. Bill Moss                      Please call with any concerns regarding your appointment today.

## 2017-06-20 NOTE — NURSING
"Pt here for Mediport Flush. Right chestwall mediport accessed with a 20g 1" trevino via sterile technique.  Excellent blood return noted.  Flushed with 10ml NS and 5 ml heparin solution.  Needle D/C, site without redness, swelling, or drainage noted.  Dressing applied.  Patient tolerated well.  Patient to return to clinic on 8/1/17.    "

## 2017-06-26 DIAGNOSIS — M85.89 OSTEOPENIA OF MULTIPLE SITES: Primary | ICD-10-CM

## 2017-06-26 DIAGNOSIS — Z79.811 USE OF AROMATASE INHIBITORS: ICD-10-CM

## 2017-07-05 ENCOUNTER — INFUSION (OUTPATIENT)
Dept: INFUSION THERAPY | Facility: HOSPITAL | Age: 52
End: 2017-07-05
Attending: INTERNAL MEDICINE
Payer: MEDICARE

## 2017-07-05 ENCOUNTER — LAB VISIT (OUTPATIENT)
Dept: LAB | Facility: HOSPITAL | Age: 52
End: 2017-07-05
Attending: INTERNAL MEDICINE
Payer: MEDICARE

## 2017-07-05 ENCOUNTER — OFFICE VISIT (OUTPATIENT)
Dept: HEMATOLOGY/ONCOLOGY | Facility: CLINIC | Age: 52
End: 2017-07-05
Payer: COMMERCIAL

## 2017-07-05 VITALS
BODY MASS INDEX: 25.15 KG/M2 | DIASTOLIC BLOOD PRESSURE: 60 MMHG | OXYGEN SATURATION: 98 % | SYSTOLIC BLOOD PRESSURE: 90 MMHG | TEMPERATURE: 98 F | WEIGHT: 160.25 LBS | HEIGHT: 67 IN | HEART RATE: 83 BPM | RESPIRATION RATE: 18 BRPM

## 2017-07-05 VITALS — BODY MASS INDEX: 25.1 KG/M2 | WEIGHT: 160.25 LBS

## 2017-07-05 DIAGNOSIS — Z79.811 USE OF AROMATASE INHIBITORS: ICD-10-CM

## 2017-07-05 DIAGNOSIS — M85.89 OSTEOPENIA OF MULTIPLE SITES: ICD-10-CM

## 2017-07-05 DIAGNOSIS — Z79.811 AROMATASE INHIBITOR USE: ICD-10-CM

## 2017-07-05 DIAGNOSIS — Z79.811 USE OF AROMATASE INHIBITORS: Primary | ICD-10-CM

## 2017-07-05 DIAGNOSIS — C50.111 MALIGNANT NEOPLASM OF CENTRAL PORTION OF RIGHT FEMALE BREAST: ICD-10-CM

## 2017-07-05 DIAGNOSIS — Z86.73 HISTORY OF CVA (CEREBROVASCULAR ACCIDENT): ICD-10-CM

## 2017-07-05 DIAGNOSIS — C50.111 MALIGNANT NEOPLASM OF CENTRAL PORTION OF RIGHT FEMALE BREAST: Primary | ICD-10-CM

## 2017-07-05 LAB
ANION GAP SERPL CALC-SCNC: 6 MMOL/L
BUN SERPL-MCNC: 10 MG/DL
CALCIUM SERPL-MCNC: 9.6 MG/DL
CHLORIDE SERPL-SCNC: 103 MMOL/L
CO2 SERPL-SCNC: 31 MMOL/L
CREAT SERPL-MCNC: 0.7 MG/DL
EST. GFR  (AFRICAN AMERICAN): >60 ML/MIN/1.73 M^2
EST. GFR  (NON AFRICAN AMERICAN): >60 ML/MIN/1.73 M^2
GLUCOSE SERPL-MCNC: 98 MG/DL
POTASSIUM SERPL-SCNC: 4.4 MMOL/L
SODIUM SERPL-SCNC: 140 MMOL/L

## 2017-07-05 PROCEDURE — 25000003 PHARM REV CODE 250: Mod: PO | Performed by: INTERNAL MEDICINE

## 2017-07-05 PROCEDURE — 63600175 PHARM REV CODE 636 W HCPCS: Mod: PO | Performed by: INTERNAL MEDICINE

## 2017-07-05 PROCEDURE — 99214 OFFICE O/P EST MOD 30 MIN: CPT | Mod: 25,S$GLB,, | Performed by: INTERNAL MEDICINE

## 2017-07-05 PROCEDURE — 36415 COLL VENOUS BLD VENIPUNCTURE: CPT | Mod: PO

## 2017-07-05 PROCEDURE — 80048 BASIC METABOLIC PNL TOTAL CA: CPT | Mod: PO

## 2017-07-05 PROCEDURE — 99999 PR PBB SHADOW E&M-EST. PATIENT-LVL III: CPT | Mod: PBBFAC,,, | Performed by: INTERNAL MEDICINE

## 2017-07-05 PROCEDURE — 96365 THER/PROPH/DIAG IV INF INIT: CPT | Mod: PO

## 2017-07-05 RX ORDER — HEPARIN 100 UNIT/ML
500 SYRINGE INTRAVENOUS
Status: DISCONTINUED | OUTPATIENT
Start: 2017-07-05 | End: 2017-07-05 | Stop reason: HOSPADM

## 2017-07-05 RX ORDER — ANASTROZOLE 1 MG/1
1 TABLET ORAL DAILY
Qty: 30 TABLET | Refills: 2 | Status: SHIPPED | OUTPATIENT
Start: 2017-07-05 | End: 2017-10-11 | Stop reason: SDUPTHER

## 2017-07-05 RX ORDER — SODIUM CHLORIDE 0.9 % (FLUSH) 0.9 %
10 SYRINGE (ML) INJECTION
Status: CANCELLED | OUTPATIENT
Start: 2017-07-05

## 2017-07-05 RX ORDER — HEPARIN 100 UNIT/ML
500 SYRINGE INTRAVENOUS
Status: CANCELLED | OUTPATIENT
Start: 2017-07-05

## 2017-07-05 RX ORDER — ZOLEDRONIC ACID 4 MG/100ML
4 SOLUTION INTRAVENOUS ONCE
Status: COMPLETED | OUTPATIENT
Start: 2017-07-05 | End: 2017-07-05

## 2017-07-05 RX ADMIN — ZOLEDRONIC ACID 4 MG: 0.04 INJECTION, SOLUTION INTRAVENOUS at 12:07

## 2017-07-05 RX ADMIN — HEPARIN 500 UNITS: 100 SYRINGE at 01:07

## 2017-07-05 NOTE — PROGRESS NOTES
Hematology/Oncology Established Visit    Reason for Visit / CC: Breast cancer    [facility] Diagnosis: Breast cancer    Stage: Pathological stage after neoadjuvant chemotherapy - Stage IIIA [[id number]]    Pathology: 10/27/16 Right breast mass, incisional biopsy:  Invasive lobular carcinoma, intermediate grade (3,2,1), see note. Virtually 100% of the cells of the infiltrating carcinoma are strongly both nuclear estrogen receptor and nuclear  progesterone receptor positive. They are HER-2 negative.    1. [place] node #1:  One lymph node positive for metastatic carcinoma; see note.  2. [place] node #2:  One lymph node positive for metastatic carcinoma; see note.  3. [place] node #3:  One lymph node positive for metastatic carcinoma; see note.  NOTE: The metastatic disease to the lymph nodes is apparent only on immunohistochemical staining, being largely  comprised of single-lying cells of a morphology consistent with those of the primary lobular carcinoma. The sizes of  the metastatic foci are therefore technically difficult to measure , especially in light of the effects of xiomara-adjuvant  therapy; however, at least one of the foci is felt to be larger than 0.2 cm. Immunostaining for pancytokeratin, WSK,  and CAM 5.2 is performed on each sentinel lymph node, marking as above, with all control material staining  appropriately.  4. Nipple from right breast:  Skin tissue positive for isolated tumor cells; see note.  NOTE: Direct extension of tumor to nipple and/or skin is not identified.  5. Right breast:  Mastectomy with invasive lobular carcinoma, approximately 6 cm in greatest dimension, status-post neoadjuvant    Prior Treatment:   1. Neoadjuvant chemotherapy with dose-dense AC-T, completed 2/21/17  2. Right breast mastectomy and sentinel LN dissection 4/11/17    Current Treatment: Pending    History of Present Illness:  Ms. Moore is a 52 y/o female with h/o CVA in her 30s from OCPs and residual R arm contracture  comes in with new diagnosis of Invasive lobular carcinoma, ER/RI positive. She has had an indention in her right breast with some nipple retraction for the past year, but mammogram and u/s last year were normal. This year, imaging and physical exam revealed denser tissue with more pronounced nipple retraction. Dr. Faria performed incisional LN biopsy of the R breast, which revealed invasive lobular carcinoma. She had a hysterectomy in 2008. She does get occasional hot flashes. Colonoscopy approx age 46 was normal. No melena/hematochezia. No unintentional weight loss. No bone pain.     Today, patient comes in for follow up of breast cancer. She completed neoadjuvant chemotherapy in 2/2017 and underwent R breast mastectomy and sentinel LN dissection on 4/11/17. She also had tissue expander placed by Dr. Carrasco and sees him again in Oct 2017. She has also met with Dr. Coker in [facility] who recommended and started adjuvant radiation to the right chest and right axilla after her tissue expansion is complete. She is tolerating radiation well and states she has approx 13 treatments remaining. 7/24/17 will be her last treatment.     Answers for HPI/ROS submitted by the patient on 7/5/2017   appetite change : No  unexpected weight change: No  visual disturbance: No  cough: No  shortness of breath: No  chest pain: No  abdominal pain: No  diarrhea: No  frequency: No  back pain: No  rash: No  headaches: No  adenopathy: No  nervous/ anxious: No      Past Medical History:   Diagnosis Date    Cancer     R Breast cancer    CVA (cerebral infarction)     Diverticulosis     Hyperlipidemia     Nausea after anesthesia     Paralysis     right side    PONV (postoperative nausea and vomiting)     Stroke     R side weakness     Past Surgical History:  Partial hysterectomy 11/2008    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 No illicits.  Social History     Social History    Marital status:       Spouse name: N/A    Number of children: N/A    Years of education: N/A     Social History Main Topics    Smoking status: Never Smoker    Smokeless tobacco: Never Used    Alcohol use No    Drug use: No    Sexual activity: No     Other Topics Concern    Are You Pregnant Or Think You May Be? No    Breast-Feeding No     Social History Narrative    None       Family History: family history includes Breast cancer in her paternal aunt; Colon cancer in her father and mother; Melanoma in her maternal uncle. 2 Paternal aunts diagnosed with breast cancer age 60s. Mom had colon cancer age late 60s, father had colon cancer age late 60s. Maternal uncle with melanoma age 45. Maternal uncle with lung cancer age 59.    Physical Exam:  Vitals:    07/05/17 1132   BP: 90/60   Pulse: 83   Resp: 18   Temp: 97.5 °F (36.4 °C)     Body mass index is 25.1 kg/m².  General:  AAOx4, no acute distress  HEENT: EOMI. Normocephalic and atraumatic. No maxillary sinus tenderness. External auditory canals clear and TMs intact without lesions. Nasal and oral mucosal membranes moist. Normal dentition and gums.   Neck: no LAD, thyromegaly, normal ROM  Breast: Left breast without any masses, skin changes. Left superior aspect of axilla with one small subcutaneous nodule. Right breast with central density, unable to measure and less nipple retraction. R superior aspect of axilla with 1 small subcutaneous nodules that is shrinking. No erythema or drainage.No cervical, supraclav, axillary LAD.  Pulmonary: Bilaterally clear to auscultation, Normal effort with no accessory muscle use, no wheezes/rales/rhonchi  CV: Normal rate, regular rhythm, no murmurs/rubs/gallops, no edema  ABD:  Soft, nontender, nondistended, no mass, and without hepatosplenomegaly   Ext: No clubbing, cyanosis, or edema, normal ROM  Skin: No rashes, lesions, bruising or petechiae  Neurological: CN II to XII grossly intact, normal coordination  +R arm with 0/5  strength, mild numbness. R ankle with foot drop requiring brace.  Psychiatric:  Normal affect and judgement  Hem/Lymph:  No submandibular, cervical, supraclavicular, axillary LAD.    Labs:    Lab Results   Component Value Date    WBC 3.77 (L) 03/31/2017    HGB 12.4 03/31/2017    HCT 38.6 03/31/2017    MCV 95 03/31/2017     03/31/2017     Lab Results   Component Value Date     03/31/2017    K 4.0 03/31/2017     03/31/2017    CO2 27 03/31/2017    BUN 13 03/31/2017    CREATININE 0.7 03/31/2017    CALCIUM 9.5 03/31/2017    ANIONGAP 8 03/31/2017    ESTGFRAFRICA >60.0 03/31/2017    EGFRNONAA >60.0 03/31/2017     Lab Results   Component Value Date    ALT 23 03/31/2017    AST 20 03/31/2017    ALKPHOS 115 03/31/2017    BILITOT 0.9 03/31/2017       No results found for: IRON, TIBC, FERRITIN, SATURATEDIRO  No results found for: [id number]  No results found for: FOLATE  Lab Results   Component Value Date    TSH 0.680 09/15/2015       Imaging:  Mammogram 10/10/16: The breasts are heterogeneously dense.  This may lower the sensitivity of mammography.  There is global asymmetry seen in the right breast.  Increased density and contraction of fibroglandular tissue on the right with increased prominence  of nipple retraction.  No defiinite mass or suspicious calcifications. Recommend ultrasound.  Left breast stable without  additional evidence of dominant mass, architectural distortion or suspicious calcification.  Results of ultrasound and/or mammogram should not preclude biopsy of any clinically palpable and/or suspicious nodule or mass. Please correlate with exam.  Digital tomosynthesis was performed and used in the interpretation of the images. Images were evaluated with a Computer Aided Detection (CAD) system.  Impression  Global asymmetry in the right breast requires additional evaluation. Clinical correlation of finding is recommended. An ultrasound exam is recommended.    PET 11/9/16:  Hypermetabolic uptake  within patient's known right breast carcinoma with a maximum SUV of 3.5.  No findings to suggest metastatic disease.    Assessment / Plan:  Carole Moore is a 51 y.o. female who comes in with right breast cancer.    1. Right-sided breast cancer: Stage IIIA invasive lobular carcinoma, ER/NM positive, Mad1Ijk neg. BRCA negative. Given large mass on physical examination, we chose to treat with neoadjuvant chemotherapy, using dose-dense AC-T regimen, which patient completed in 2/2017 with decrease in size and firmness of R breast mass. She then underwent R breast mastectomy and SLND 4/2017, with tissue expander placement. Given final pathology after neoadjuvant chemotherapy showed tumor 6cm, sentinel LNs positive, and nipple skin positive, I do recommend adjuvant radiation to R chest and axilla. I presented her case at tumor board and while axillary LN dissection was discussed, adjuvant radiation to the left chest and axilla were recommended.  -- Port removal by Dr. Faria  -- Pt will continue with adjuvant radiation, which is due to be completed 7/24/17.   -- Start anastrazole 1mg PO daily. Pt knows to start this after 7/24/17 when she completes radiation.  -- Proceed with 1st Zometa infusion today.  -- Return in 3 months for f/u, breast exam.    2. Osteopenia: Seen on DEXA in 6/2017.   -- Zometa 4mg IV every 6 months for prevention of AI-induced bone loss and treatment of osteopenia.  -- Take Calcium + VitD  -- Check Vit D level at next visit    3. Cardiotoxic medication monitoring: Given use of anthracycline, will monitor heart function. Echo on 11/10/16 shows EF 55-60%.  -- Cardio-oncology is following.    4. 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 No irinotecan can be given. Monitor Tylenol use.    5. H/o CVA: With residual R-sided weakness. On Aspirin, statin.    6. H/o Constipation: Now resolved and was likely related to Zofran.     7. H/o Chemotherapy induced peripheral  neuropathy: Was mild and has completely resolved per patient.    Alessia Moss M.D.  Hematology Oncology

## 2017-07-06 ENCOUNTER — OFFICE VISIT (OUTPATIENT)
Dept: CARDIOLOGY | Facility: CLINIC | Age: 52
End: 2017-07-06
Payer: COMMERCIAL

## 2017-07-06 VITALS
HEART RATE: 88 BPM | HEIGHT: 67 IN | SYSTOLIC BLOOD PRESSURE: 90 MMHG | WEIGHT: 167 LBS | BODY MASS INDEX: 26.21 KG/M2 | DIASTOLIC BLOOD PRESSURE: 60 MMHG

## 2017-07-06 DIAGNOSIS — Z91.89 HEART FAILURE, ACC/AHA STAGE A: Primary | ICD-10-CM

## 2017-07-06 PROCEDURE — 99999 PR PBB SHADOW E&M-EST. PATIENT-LVL III: CPT | Mod: PBBFAC,,, | Performed by: NUCLEAR MEDICINE

## 2017-07-06 PROCEDURE — 99214 OFFICE O/P EST MOD 30 MIN: CPT | Mod: S$GLB,,, | Performed by: NUCLEAR MEDICINE

## 2017-07-06 NOTE — PROGRESS NOTES
Subjective:   Patient ID:  Carole Moore is a 51 y.o. female who presents for follow-up of heart failure stage A (3 month followup)      HPI HEART FAILURE STAGE A- CHEMO FOR CA OF BREAST - GRAY-  ON RADIATION RX  DOING WELL. NO HX OF PALPITATIONS. NO UNUSUAL LIPSCOMB WITH ORDINARY DAILY ACTIVITIES.  NO ORTHOPNEA OR PND  NO PLEURITIC   CP . NO ANGINA  NO EDEMA.  NO NEW FOCAL CNS SYMPTOMS OR SIGNS    Review of Systems   Constitution: Negative for chills, fever, weakness, night sweats, weight gain and weight loss.   HENT: Negative for headaches and nosebleeds.    Eyes: Negative for blurred vision, double vision and visual disturbance.   Cardiovascular: Negative for chest pain, dyspnea on exertion, irregular heartbeat, leg swelling, orthopnea, palpitations, paroxysmal nocturnal dyspnea and syncope.   Respiratory: Negative for cough, hemoptysis and wheezing.    Endocrine: Negative for polydipsia and polyuria.   Hematologic/Lymphatic: Does not bruise/bleed easily.   Skin: Negative for rash.   Musculoskeletal: Negative for joint pain, joint swelling, muscle weakness and myalgias.   Gastrointestinal: Negative for abdominal pain, hematemesis, jaundice and melena.   Genitourinary: Negative for dysuria, hematuria and nocturia.   Neurological: Positive for focal weakness. Negative for dizziness and sensory change.   Psychiatric/Behavioral: Negative for depression. The patient does not have insomnia and is not nervous/anxious.      Family History   Problem Relation Age of Onset    Breast cancer Paternal Aunt     Colon cancer Father     Colon cancer Mother     Melanoma Maternal Uncle      Past Medical History:   Diagnosis Date    Cancer     R Breast cancer    CVA (cerebral infarction)     Diverticulosis     Hyperlipidemia     Nausea after anesthesia     Paralysis     right side    PONV (postoperative nausea and vomiting)     Stroke     R side weakness     Current Outpatient Prescriptions on File Prior to Visit    Medication Sig Dispense Refill    atorvastatin (LIPITOR) 10 MG tablet Take 1 tablet (10 mg total) by mouth once daily. 90 tablet 4    hydrocortisone 2.5 % cream Apply topically 2 (two) times daily. 1 Tube 1    anastrozole (ARIMIDEX) 1 mg Tab Take 1 tablet (1 mg total) by mouth once daily. 30 tablet 2    aspirin 81 MG Chew Take 81 mg by mouth once daily.      LORATADINE (CLARITIN ORAL) Take by mouth daily as needed.       urea 20 % Crea Apply 1 application topically once daily at 6am. 85 g 0     Current Facility-Administered Medications on File Prior to Visit   Medication Dose Route Frequency Provider Last Rate Last Dose    [COMPLETED] zoledronic acid-mannitol-water 4 mg/100 mL IVPB (premix) 4 mg  4 mg Intravenous Once Alessia Moss MD   Stopped at 07/05/17 1309    [DISCONTINUED] heparin, porcine (PF) 100 unit/mL injection flush 500 Units  500 Units Intravenous PRN Alessia Moss MD   500 Units at 07/05/17 1309    [DISCONTINUED] zoledronic acid (ZOMETA) 4 mg in sodium chloride 0.9% 100 mL IVPB  4 mg Intravenous 1 time in Clinic/HOD Alessia Moss MD         Review of patient's allergies indicates:  No Known Allergies    Objective:     Physical Exam   Constitutional: She is oriented to person, place, and time. She appears well-developed. No distress.   HENT:   Head: Normocephalic.   Eyes: Conjunctivae are normal. Pupils are equal, round, and reactive to light. No scleral icterus.   Neck: Normal range of motion. Neck supple. Normal carotid pulses, no hepatojugular reflux and no JVD present. Carotid bruit is not present. No edema present. No thyroid mass and no thyromegaly present.   Cardiovascular: Normal rate, regular rhythm, S1 normal, S2 normal, normal heart sounds and intact distal pulses.  PMI is not displaced.  Exam reveals no gallop and no friction rub.    No murmur heard.  Pulses:       Carotid pulses are 2+ on the right side, and 2+ on the left side.       Radial pulses are 2+ on the right  side, and 2+ on the left side.        Femoral pulses are 2+ on the right side, and 2+ on the left side.       Popliteal pulses are 2+ on the right side, and 2+ on the left side.        Dorsalis pedis pulses are 2+ on the right side, and 2+ on the left side.        Posterior tibial pulses are 2+ on the right side, and 2+ on the left side.   Pulmonary/Chest: Effort normal and breath sounds normal. She has no wheezes. She has no rales. She exhibits no tenderness.   Abdominal: Soft. Bowel sounds are normal. She exhibits no pulsatile midline mass and no mass. There is no hepatosplenomegaly. There is no tenderness.   Musculoskeletal: Normal range of motion. She exhibits no edema or tenderness.        Cervical back: Normal.        Thoracic back: Normal.        Lumbar back: Normal.   Lymphadenopathy:     She has no cervical adenopathy.     She has no axillary adenopathy.        Right: No supraclavicular adenopathy present.        Left: No supraclavicular adenopathy present.   Neurological: She is alert and oriented to person, place, and time. She has normal strength and normal reflexes. No sensory deficit. She exhibits abnormal muscle tone. Gait normal.   OLD RIGHT HEMIPARESIS   Skin: Skin is warm. No rash noted. No cyanosis. No pallor. Nails show no clubbing.   Psychiatric: She has a normal mood and affect. Her speech is normal and behavior is normal. Cognition and memory are normal.       Assessment:     1. Heart failure, ACC/AHA stage A      STABLE CV STATUS- NO CLINICAL EVIDENCE OF ADHF. NO ARRHYTHMIAS. NO ACTIVE MYOCARDIAL ISCHEMIA  BP STABLE  CNS STATUS STABLE  Plan:     Heart failure, ACC/AHA stage A  -     2D echo only; Future; Expected date: 01/06/2018      RETURN IN 6 MONTHS WITH LIMITED ECHO

## 2017-07-12 ENCOUNTER — PROCEDURE VISIT (OUTPATIENT)
Dept: SURGERY | Facility: CLINIC | Age: 52
End: 2017-07-12
Payer: COMMERCIAL

## 2017-07-12 DIAGNOSIS — C50.911 MALIGNANT NEOPLASM OF RIGHT BREAST IN FEMALE, ESTROGEN RECEPTOR POSITIVE, UNSPECIFIED SITE OF BREAST: Primary | ICD-10-CM

## 2017-07-12 DIAGNOSIS — Z17.0 MALIGNANT NEOPLASM OF RIGHT BREAST IN FEMALE, ESTROGEN RECEPTOR POSITIVE, UNSPECIFIED SITE OF BREAST: Primary | ICD-10-CM

## 2017-07-12 PROCEDURE — 36590 REMOVAL TUNNELED CV CATH: CPT | Mod: S$GLB,,, | Performed by: SURGERY

## 2017-07-12 NOTE — PROCEDURES
"Exc, Foreign Body  Date/Time: 7/12/2017 5:12 PM  Performed by: AB BIGGS  Authorized by: AB BIGGS     Consent Done?:  Yes (Written)  Time out: Immediately prior to procedure a "time out" was called to verify the correct patient, procedure, equipment, support staff and site/side marked as required.      STAFF:  Assistants?: No    I was present for the entire procedure.  INDICATIONS:: Removal of Mediport.  LOCATION:  Body area:  Neck / anterior thorax    PREP:  Patient was prepped and draped in the normal sterile fashion.    ANESTHESIA:  Anesthesia:  Local infiltration  Local anesthetic:  Lidocaine 1% with epinephrine    PROCEDURE DETAILS:  Excision type:  Foreign body removal  Excision size (cm):  2.5  Scalpel size:  15  Incision type:  Single straight  Specimens?: No      Hemostasis was obtained.  Estimated blood loss (cc):  3  Wound closure:  Intermediate layered  Wound repair size (cm):  2.5  Sutures:  3-0 Vicryl and other (comments) (4-0 Vicryl subcuticular for the skin)  Sterile dressings:  Gauze, Mastisol and Steri-strips  Post-op diagnosis: Same as pre-op diagnosis.  Needle, instrument, and sponge counts were correct.  Patient tolerated the procedure well with no immediate complications.  Post-operative instructions were provided for the patient.     Mediport was a left anterior thorax just inferior to the clavicle  "

## 2017-07-13 ENCOUNTER — OFFICE VISIT (OUTPATIENT)
Dept: INTERNAL MEDICINE | Facility: CLINIC | Age: 52
End: 2017-07-13
Payer: COMMERCIAL

## 2017-07-13 VITALS
TEMPERATURE: 98 F | BODY MASS INDEX: 26.19 KG/M2 | DIASTOLIC BLOOD PRESSURE: 68 MMHG | WEIGHT: 166.88 LBS | SYSTOLIC BLOOD PRESSURE: 105 MMHG | HEIGHT: 67 IN | HEART RATE: 88 BPM

## 2017-07-13 DIAGNOSIS — E78.2 MIXED HYPERLIPIDEMIA: ICD-10-CM

## 2017-07-13 DIAGNOSIS — Z79.811 USE OF AROMATASE INHIBITORS: ICD-10-CM

## 2017-07-13 DIAGNOSIS — Z86.73 HISTORY OF CVA (CEREBROVASCULAR ACCIDENT): ICD-10-CM

## 2017-07-13 DIAGNOSIS — C50.111 MALIGNANT NEOPLASM OF CENTRAL PORTION OF RIGHT BREAST IN FEMALE, ESTROGEN RECEPTOR POSITIVE: ICD-10-CM

## 2017-07-13 DIAGNOSIS — Z17.0 MALIGNANT NEOPLASM OF CENTRAL PORTION OF RIGHT BREAST IN FEMALE, ESTROGEN RECEPTOR POSITIVE: ICD-10-CM

## 2017-07-13 DIAGNOSIS — Z12.11 COLON CANCER SCREENING: ICD-10-CM

## 2017-07-13 DIAGNOSIS — Z00.00 ROUTINE GENERAL MEDICAL EXAMINATION AT HEALTH CARE FACILITY: Primary | ICD-10-CM

## 2017-07-13 DIAGNOSIS — I69.351 HEMIPARESIS AFFECTING RIGHT SIDE AS LATE EFFECT OF CEREBROVASCULAR ACCIDENT: ICD-10-CM

## 2017-07-13 PROBLEM — I95.1 ORTHOSTATIC HYPOTENSION: Status: RESOLVED | Noted: 2017-01-12 | Resolved: 2017-07-13

## 2017-07-13 PROCEDURE — 99999 PR PBB SHADOW E&M-EST. PATIENT-LVL III: CPT | Mod: PBBFAC,,, | Performed by: INTERNAL MEDICINE

## 2017-07-13 PROCEDURE — 99396 PREV VISIT EST AGE 40-64: CPT | Mod: S$GLB,,, | Performed by: INTERNAL MEDICINE

## 2017-07-13 NOTE — PROGRESS NOTES
Subjective:       Patient ID: Carole Moore is a 51 y.o. female.    Chief Complaint: Annual Exam    HPI  patient is a 51-year-old female coming in today for updated physical exam review chronic health issues.  She is a patient has a history of breast cancer diagnosed within the last year and the right breast.  Hyperlipidemia, history of CVA with resultant hemiparesis.  She indicates overall she feels like she's doing well.  She underwent mastectomy and tissue expander placement.  She has completed chemotherapy and is getting ready to complete her radiation therapy.  She has been recommended a REM index therapy for 10 years.  She is looking forward to being done with the active phase of treatment.  She had her access port removed yesterday.  She is feeling pretty well overall.    She's had a history of CVA with resultant hemiparesis on the right side.  She is stable from that measure.  She is not had any further events no evidence of any progression of weakness or hemiparesis generally she feels like she has been doing well.  She continues take aspirin daily and continues on her statin therapy.    She is overdue for colon cancer screening.  She was due last year but in the course of the diagnosis and treatment for the breast cancer that got sidelined.  She is looking to get that done later in the fall.    Review of Systems   Constitutional: Negative for chills and fever.   HENT: Negative for hearing loss.    Eyes: Negative for photophobia and visual disturbance.   Respiratory: Negative for cough, shortness of breath and wheezing.    Cardiovascular: Negative for chest pain and palpitations.   Gastrointestinal: Negative for blood in stool, constipation, nausea and vomiting.   Genitourinary: Negative for dysuria and hematuria.   Musculoskeletal: Negative for neck pain and neck stiffness.   Skin: Negative for rash.   Neurological: Negative for syncope, weakness, light-headedness and headaches.   Hematological:  "Negative for adenopathy.   Psychiatric/Behavioral: Negative for dysphoric mood. The patient is not nervous/anxious.        Objective:   /68   Pulse 88   Temp 97.9 °F (36.6 °C) (Tympanic)   Ht 5' 7" (1.702 m)   Wt 75.7 kg (166 lb 14.2 oz)   LMP 10/11/2009   BMI 26.14 kg/m²      Physical Exam   Constitutional: She is oriented to person, place, and time. She appears well-developed and well-nourished.   HENT:   Head: Normocephalic and atraumatic.   Eyes: EOM are normal. Pupils are equal, round, and reactive to light.   Neck: Normal range of motion. Neck supple. No thyromegaly present.   Cardiovascular: Normal rate, regular rhythm and intact distal pulses.  Exam reveals no gallop and no friction rub.    No murmur heard.  Pulmonary/Chest: Breath sounds normal. She has no wheezes. She has no rales. She exhibits no tenderness.   Abdominal: Soft. Bowel sounds are normal. She exhibits no distension and no mass. There is no tenderness. There is no rebound and no guarding.   Musculoskeletal: She exhibits no edema.   Lymphadenopathy:     She has no cervical adenopathy.   Neurological: She is alert and oriented to person, place, and time. She has normal reflexes. She displays normal reflexes. No cranial nerve deficit.   Right-sided hemiparesis.   Skin: Skin is warm and dry. No rash noted.   Healing incision over the left upper chest.   Psychiatric: She has a normal mood and affect. Her behavior is normal. Judgment normal.   Vitals reviewed.      Lab Visit on 07/05/2017   Component Date Value    Sodium 07/05/2017 140     Potassium 07/05/2017 4.4     Chloride 07/05/2017 103     CO2 07/05/2017 31*    Glucose 07/05/2017 98     BUN, Bld 07/05/2017 10     Creatinine 07/05/2017 0.7     Calcium 07/05/2017 9.6     Anion Gap 07/05/2017 6*    eGFR if  07/05/2017 >60     eGFR if non African Amer* 07/05/2017 >60        Assessment:       1. Routine general medical examination at health care facility    2. " Mixed hyperlipidemia    3. Malignant neoplasm of central portion of right breast in female, estrogen receptor positive    4. History of CVA (cerebrovascular accident)    5. Hemiparesis affecting right side as late effect of cerebrovascular accident    6. Use of aromatase inhibitors    7. Colon cancer screening        Plan:   Hyperlipidemia  Update labs, continue lipitor    History of CVA (cerebrovascular accident)  Needs updated disabiltiy forms.    Carole was seen today for annual exam.    Diagnoses and all orders for this visit:    Routine general medical examination at health care facility  Comments:  Focus on good health habits, low fat diet regular exercise, seatbelt use, sunscreen use.    Mixed hyperlipidemia  -     Lipid panel; Future    Malignant neoplasm of central portion of right breast in female, estrogen receptor positive  Comments:  Completed chemo and surgery.  Wrapping up xrt at this time.  Arimidex planned for 10 years.  REconstruction in six months    History of CVA (cerebrovascular accident)  Comments:  Continue lipitor and asa    Hemiparesis affecting right side as late effect of cerebrovascular accident    Use of aromatase inhibitors    Colon cancer screening  -     Case request GI: COLONOSCOPY        Return in about 6 months (around 1/13/2018).

## 2017-08-02 ENCOUNTER — LAB VISIT (OUTPATIENT)
Dept: LAB | Facility: HOSPITAL | Age: 52
End: 2017-08-02
Attending: INTERNAL MEDICINE
Payer: MEDICARE

## 2017-08-02 DIAGNOSIS — E78.2 MIXED HYPERLIPIDEMIA: ICD-10-CM

## 2017-08-02 LAB
CHOLEST/HDLC SERPL: 2.5 {RATIO}
HDL/CHOLESTEROL RATIO: 39.5 %
HDLC SERPL-MCNC: 147 MG/DL
HDLC SERPL-MCNC: 58 MG/DL
LDLC SERPL CALC-MCNC: 73.4 MG/DL
NONHDLC SERPL-MCNC: 89 MG/DL
TRIGL SERPL-MCNC: 78 MG/DL

## 2017-08-02 PROCEDURE — 80061 LIPID PANEL: CPT

## 2017-08-02 PROCEDURE — 36415 COLL VENOUS BLD VENIPUNCTURE: CPT | Mod: PO

## 2017-08-08 ENCOUNTER — OFFICE VISIT (OUTPATIENT)
Dept: DERMATOLOGY | Facility: CLINIC | Age: 52
End: 2017-08-08
Payer: MEDICARE

## 2017-08-08 DIAGNOSIS — D48.9 NEOPLASM OF UNCERTAIN BEHAVIOR: Primary | ICD-10-CM

## 2017-08-08 PROCEDURE — 99213 OFFICE O/P EST LOW 20 MIN: CPT | Mod: PBBFAC,PO

## 2017-08-08 PROCEDURE — 99999 PR PBB SHADOW E&M-EST. PATIENT-LVL III: CPT | Mod: PBBFAC,,,

## 2017-08-08 PROCEDURE — 11100 PR BIOPSY OF SKIN LESION: CPT | Mod: PBBFAC,PO

## 2017-08-08 PROCEDURE — 88305 TISSUE EXAM BY PATHOLOGIST: CPT | Performed by: PATHOLOGY

## 2017-08-08 PROCEDURE — 11100 PR BIOPSY OF SKIN LESION: CPT | Mod: S$PBB,,,

## 2017-08-08 PROCEDURE — 88305 TISSUE EXAM BY PATHOLOGIST: CPT | Mod: 26,,, | Performed by: PATHOLOGY

## 2017-08-08 PROCEDURE — 99499 UNLISTED E&M SERVICE: CPT | Mod: S$PBB,,,

## 2017-08-08 NOTE — PROGRESS NOTES
Subjective:       Patient ID:  Carole Moore is a 52 y.o. female who presents for   Chief Complaint   Patient presents with    Spot     c/o spot on left nose x 6 months      53 yo with complaint of lesion to left nose x 6 months. Pt reports similar lesion in this spot years ago that was biopsied by Dr. Lane 2/2015 and was consistent with a fibrous papule. Lesion is asymptomatic. She denies other new or changing lesions.  No personal or family history of skin cancer         Review of Systems   Constitutional: Negative for fever and chills.   Hematologic/Lymphatic: Does not bruise/bleed easily.        Objective:    Physical Exam   Constitutional: She appears well-developed and well-nourished. No distress.   Neurological: She is alert and oriented to person, place, and time. She is not disoriented.   Psychiatric: She has a normal mood and affect.   Skin:   Areas Examined (abnormalities noted in diagram):   Head / Face Inspection Performed              Diagram Legend     Erythematous scaling macule/papule c/w actinic keratosis       Vascular papule c/w angioma      Pigmented verrucoid papule/plaque c/w seborrheic keratosis      Yellow umbilicated papule c/w sebaceous hyperplasia      Irregularly shaped tan macule c/w lentigo     1-2 mm smooth white papules consistent with Milia      Movable subcutaneous cyst with punctum c/w epidermal inclusion cyst      Subcutaneous movable cyst c/w pilar cyst      Firm pink to brown papule c/w dermatofibroma      Pedunculated fleshy papule(s) c/w skin tag(s)      Evenly pigmented macule c/w junctional nevus     Mildly variegated pigmented, slightly irregular-bordered macule c/w mildly atypical nevus      Flesh colored to evenly pigmented papule c/w intradermal nevus       Pink pearly papule/plaque c/w basal cell carcinoma      Erythematous hyperkeratotic cursted plaque c/w SCC      Surgical scar with no sign of skin cancer recurrence      Open and closed comedones       Inflammatory papules and pustules      Verrucoid papule consistent consistent with wart     Erythematous eczematous patches and plaques     Dystrophic onycholytic nail with subungual debris c/w onychomycosis     Umbilicated papule    Erythematous-base heme-crusted tan verrucoid plaque consistent with inflamed seborrheic keratosis     Erythematous Silvery Scaling Plaque c/w Psoriasis     See annotation      Assessment / Plan:      Pathology Orders:      Normal Orders This Visit    Tissue Specimen To Pathology, Dermatology     Questions:    Directional Terms:  Other(comment)    Clinical information:  r/o fibrous papule vs other    Specific Site:  left nasal sidewall        Neoplasm of uncertain behavior  -     Tissue Specimen To Pathology, Dermatology  Shave biopsy procedure note:  Shave biopsy performed after verbal consent including risk of infection, scar, recurrence, need for additional treatment of site. Area prepped with alcohol, anesthetized with approximately 1.0cc of 1% lidocaine with epinephrine. Lesional tissue shaved with razor blade. Hemostasis achieved with application of aluminum chloride followed by hyfrecation. No complications. Dressing applied. Wound care explained.           Return for skin check with Dr. Lane.

## 2017-08-08 NOTE — PATIENT INSTRUCTIONS
Shave Biopsy Wound Care    Your doctor has performed a shave biopsy today.  A band aid and vaseline ointment has been placed over the site.  This should remain in place for 24 hours.  It is recommended that you keep the area dry for the first 24 hours.  After 24 hours, you may remove the band aid and wash the area with warm soap and water and apply Vaseline jelly.  Many patients prefer to use Neosporin or Bacitracin ointment.  This is acceptable; however, know that you can develop an allergy to this medication even if you have used it safely for years.  It is important to keep the area moist.  Letting it dry out and get air slows healing time, and will worsen the scar.  Band aid is optional after first 24 hours.      If you notice increasing redness, tenderness, pain, or yellow drainage at the biopsy site, please notify your doctor.  These are signs of an infection.    If your biopsy site is bleeding, apply firm pressure for 15 minutes straight.  Repeat for another 15 minutes, if it is still bleeding.   If the surgical site continues to bleed, then please contact your doctor.      [facility] [place] [address]/ [phone] FAX/ [email or web address]

## 2017-09-28 ENCOUNTER — OFFICE VISIT (OUTPATIENT)
Dept: DERMATOLOGY | Facility: CLINIC | Age: 52
End: 2017-09-28
Payer: MEDICARE

## 2017-09-28 DIAGNOSIS — D48.5 NEOPLASM OF UNCERTAIN BEHAVIOR OF SKIN: ICD-10-CM

## 2017-09-28 DIAGNOSIS — D22.39 FIBROUS PAPULE OF NOSE: ICD-10-CM

## 2017-09-28 DIAGNOSIS — L82.1 SEBORRHEIC KERATOSIS: Primary | ICD-10-CM

## 2017-09-28 PROCEDURE — 88305 TISSUE EXAM BY PATHOLOGIST: CPT | Performed by: PATHOLOGY

## 2017-09-28 PROCEDURE — 99213 OFFICE O/P EST LOW 20 MIN: CPT | Mod: PBBFAC,PO | Performed by: DERMATOLOGY

## 2017-09-28 PROCEDURE — 11100 PR BIOPSY OF SKIN LESION: CPT | Mod: PBBFAC,PO | Performed by: DERMATOLOGY

## 2017-09-28 PROCEDURE — 11101 PR BIOPSY, EACH ADDED LESION: CPT | Mod: PBBFAC,PO | Performed by: DERMATOLOGY

## 2017-09-28 PROCEDURE — 99213 OFFICE O/P EST LOW 20 MIN: CPT | Mod: 25,S$PBB,, | Performed by: DERMATOLOGY

## 2017-09-28 PROCEDURE — 11100 PR BIOPSY OF SKIN LESION: CPT | Mod: S$PBB,,, | Performed by: DERMATOLOGY

## 2017-09-28 PROCEDURE — 99999 PR PBB SHADOW E&M-EST. PATIENT-LVL III: CPT | Mod: PBBFAC,,, | Performed by: DERMATOLOGY

## 2017-09-28 PROCEDURE — 11101 PR BIOPSY, EACH ADDED LESION: CPT | Mod: S$PBB,,, | Performed by: DERMATOLOGY

## 2017-09-28 PROCEDURE — 88305 TISSUE EXAM BY PATHOLOGIST: CPT | Mod: 26,,, | Performed by: PATHOLOGY

## 2017-09-28 NOTE — PATIENT INSTRUCTIONS
Shave Biopsy Wound Care    Your doctor has performed a shave biopsy today.  A band aid and vaseline ointment has been placed over the site.  This should remain in place for 24 hours.  It is recommended that you keep the area dry for the first 24 hours.  After 24 hours, you may remove the band aid and wash the area with warm soap and water and apply Vaseline jelly.  Many patients prefer to use Neosporin or Bacitracin ointment.  This is acceptable; however, know that you can develop an allergy to this medication even if you have used it safely for years.  It is important to keep the area moist.  Letting it dry out and get air slows healing time, and will worsen the scar.  Band aid is optional after first 24 hours.      If you notice increasing redness, tenderness, pain, or yellow drainage at the biopsy site, please notify your doctor.  These are signs of an infection.    If your biopsy site is bleeding, apply firm pressure for 15 minutes straight.  Repeat for another 15 minutes, if it is still bleeding.   If the surgical site continues to bleed, then please contact your doctor.      [facility] - [facility]   DERMATOLOGY  [address]   [place] [address]   Dept: [phone]   Dept Fax: [phone]

## 2017-09-28 NOTE — PROGRESS NOTES
Subjective:       Patient ID:  Carole Moore is a 52 y.o. female who presents for   Chief Complaint   Patient presents with    Spot     c/o spot of left side of nose x 1yr,,asymptomatic,,no tx previously frozen off but returned     Hx of fibrous papule of the left nasal sidewall, recently diagnosed right breast CA (s/p right mastectomy c/ chemotherapy and radiation), SK's and angioma, last seen on 8/8/17 by GIULIANO Dickerson.  She c/o changes of the moles of the right chest, + irritation.  Denies growth or changes in color.     The patient denies personal or family history of skin cancer.          Review of Systems   Constitutional: Negative for fever and chills.   Gastrointestinal: Negative for nausea and vomiting.   Skin: Negative for daily sunscreen use, activity-related sunscreen use and recent sunburn.   Hematologic/Lymphatic: Does not bruise/bleed easily.        Objective:    Physical Exam   Constitutional: She appears well-developed and well-nourished. No distress.   Neurological: She is alert and oriented to person, place, and time. She is not disoriented.   Psychiatric: She has a normal mood and affect.   Skin:   Areas Examined (abnormalities noted in diagram):   Head / Face Inspection Performed  Neck Inspection Performed  Chest / Axilla Inspection Performed  Abdomen Inspection Performed  Back Inspection Performed  RUE Inspected  LUE Inspection Performed  Nails and Digits Inspection Performed                   Diagram Legend     Erythematous scaling macule/papule c/w actinic keratosis       Vascular papule c/w angioma      Pigmented verrucoid papule/plaque c/w seborrheic keratosis      Yellow umbilicated papule c/w sebaceous hyperplasia      Irregularly shaped tan macule c/w lentigo     1-2 mm smooth white papules consistent with Milia      Movable subcutaneous cyst with punctum c/w epidermal inclusion cyst      Subcutaneous movable cyst c/w pilar cyst      Firm pink to brown papule c/w dermatofibroma       Pedunculated fleshy papule(s) c/w skin tag(s)      Evenly pigmented macule c/w junctional nevus     Mildly variegated pigmented, slightly irregular-bordered macule c/w mildly atypical nevus      Flesh colored to evenly pigmented papule c/w intradermal nevus       Pink pearly papule/plaque c/w basal cell carcinoma      Erythematous hyperkeratotic cursted plaque c/w SCC      Surgical scar with no sign of skin cancer recurrence      Open and closed comedones      Inflammatory papules and pustules      Verrucoid papule consistent consistent with wart     Erythematous eczematous patches and plaques     Dystrophic onycholytic nail with subungual debris c/w onychomycosis     Umbilicated papule    Erythematous-base heme-crusted tan verrucoid plaque consistent with inflamed seborrheic keratosis     Erythematous Silvery Scaling Plaque c/w Psoriasis     See annotation                Assessment / Plan:      Pathology Orders:      Normal Orders This Visit    Tissue Specimen To Pathology, Dermatology     Questions:    Directional Terms:  Other(comment)    Clinical information:  nevus r/o atypia    Specific Site:  right jawline    Tissue Specimen To Pathology, Dermatology     Questions:    Directional Terms:  Other(comment)    Clinical information:  nevus r/o atypia    Specific Site:  right breast    Tissue Specimen To Pathology, Dermatology     Questions:    Directional Terms:  Other(comment)    Clinical information:  nevus r/o atypia    Specific Site:  right chest medial    Tissue Specimen To Pathology, Dermatology     Questions:    Directional Terms:  Other(comment)    Clinical information:  nevus r/o atypia    Specific Site:  right chest lateral        Seborrheic keratosis  Reassurance given. Discussed diagnosis and that lesions are benign.  AAD handout given.     Fibrous papule of nose  S/p biopsy by GIULIANO Dickerson at last visit on 8/8/17.  Discussed risk for recurrence a second time. The patient acknowledged understanding.      Neoplasm of uncertain behavior of skin  -     Tissue Specimen To Pathology, Dermatology  -     Tissue Specimen To Pathology, Dermatology  -     Tissue Specimen To Pathology, Dermatology  -     Tissue Specimen To Pathology, Dermatology  -     Shave biopsy(-ies) done of 4 site(s).   Patient informed to call for results within 2 weeks if have not received notification [facility] telephone call [facility]           Return for call for results.     PROCEDURE NOTE - SHAVE BIOPSY   Location: see above    After risk, benefits, and alternatives were discussed with the patient, the patient agrees to the procedure by verbal informed consent.  The area(s) were cleansed with alcohol. 2 cc of lidocaine 1% with epinephrine was injected for local anesthesia into each lesion(s).  A sharp dermablade was used to remove part or all of the lesion(s).  The specimen(s) will be sent for tissue pathology.  Hemostasis was obtained with aluminum chloride and/or hyfrecation.  The area(s) were dressed with vaseline ointment and bandaged.  The patient tolerated the procedure well without adverse events.  Wound care instructions were given to the patient on the AVS.  The patient will be notified of pathology results once available. Results will also be available in Epic.

## 2017-10-05 ENCOUNTER — LAB VISIT (OUTPATIENT)
Dept: LAB | Facility: HOSPITAL | Age: 52
End: 2017-10-05
Attending: INTERNAL MEDICINE
Payer: MEDICARE

## 2017-10-05 DIAGNOSIS — M85.89 OSTEOPENIA OF MULTIPLE SITES: ICD-10-CM

## 2017-10-05 DIAGNOSIS — C50.111 MALIGNANT NEOPLASM OF CENTRAL PORTION OF RIGHT FEMALE BREAST: ICD-10-CM

## 2017-10-05 LAB — 25(OH)D3+25(OH)D2 SERPL-MCNC: 62 NG/ML

## 2017-10-05 PROCEDURE — 82306 VITAMIN D 25 HYDROXY: CPT

## 2017-10-05 PROCEDURE — 36415 COLL VENOUS BLD VENIPUNCTURE: CPT | Mod: PO

## 2017-10-11 ENCOUNTER — CLINICAL SUPPORT (OUTPATIENT)
Dept: CARDIOLOGY | Facility: CLINIC | Age: 52
End: 2017-10-11
Payer: MEDICARE

## 2017-10-11 ENCOUNTER — OFFICE VISIT (OUTPATIENT)
Dept: HEMATOLOGY/ONCOLOGY | Facility: CLINIC | Age: 52
End: 2017-10-11
Payer: MEDICARE

## 2017-10-11 VITALS
HEART RATE: 89 BPM | DIASTOLIC BLOOD PRESSURE: 74 MMHG | SYSTOLIC BLOOD PRESSURE: 99 MMHG | WEIGHT: 173.75 LBS | OXYGEN SATURATION: 95 % | RESPIRATION RATE: 18 BRPM | HEIGHT: 67 IN | BODY MASS INDEX: 27.27 KG/M2 | TEMPERATURE: 98 F

## 2017-10-11 DIAGNOSIS — Z79.811 USE OF AROMATASE INHIBITORS: ICD-10-CM

## 2017-10-11 DIAGNOSIS — Z79.811 AROMATASE INHIBITOR USE: ICD-10-CM

## 2017-10-11 DIAGNOSIS — C50.111 MALIGNANT NEOPLASM OF CENTRAL PORTION OF RIGHT BREAST IN FEMALE, ESTROGEN RECEPTOR POSITIVE: ICD-10-CM

## 2017-10-11 DIAGNOSIS — M25.532 LEFT WRIST PAIN: ICD-10-CM

## 2017-10-11 DIAGNOSIS — C50.111 MALIGNANT NEOPLASM OF CENTRAL PORTION OF RIGHT BREAST IN FEMALE, ESTROGEN RECEPTOR POSITIVE: Primary | ICD-10-CM

## 2017-10-11 DIAGNOSIS — M25.532 LEFT WRIST PAIN: Primary | ICD-10-CM

## 2017-10-11 DIAGNOSIS — Z17.0 MALIGNANT NEOPLASM OF CENTRAL PORTION OF RIGHT BREAST IN FEMALE, ESTROGEN RECEPTOR POSITIVE: Primary | ICD-10-CM

## 2017-10-11 DIAGNOSIS — M85.89 OSTEOPENIA OF MULTIPLE SITES: ICD-10-CM

## 2017-10-11 DIAGNOSIS — Z17.0 MALIGNANT NEOPLASM OF CENTRAL PORTION OF RIGHT BREAST IN FEMALE, ESTROGEN RECEPTOR POSITIVE: ICD-10-CM

## 2017-10-11 PROCEDURE — 93005 ELECTROCARDIOGRAM TRACING: CPT | Mod: PBBFAC,PO | Performed by: INTERNAL MEDICINE

## 2017-10-11 PROCEDURE — 93010 ELECTROCARDIOGRAM REPORT: CPT | Mod: S$PBB,,, | Performed by: INTERNAL MEDICINE

## 2017-10-11 PROCEDURE — 99999 PR PBB SHADOW E&M-EST. PATIENT-LVL III: CPT | Mod: PBBFAC,,, | Performed by: INTERNAL MEDICINE

## 2017-10-11 PROCEDURE — 99215 OFFICE O/P EST HI 40 MIN: CPT | Mod: S$PBB,,, | Performed by: INTERNAL MEDICINE

## 2017-10-11 PROCEDURE — 99213 OFFICE O/P EST LOW 20 MIN: CPT | Mod: PBBFAC,PO,25 | Performed by: INTERNAL MEDICINE

## 2017-10-11 PROCEDURE — G0008 ADMIN INFLUENZA VIRUS VAC: HCPCS | Mod: PBBFAC,PO

## 2017-10-11 RX ORDER — ANASTROZOLE 1 MG/1
1 TABLET ORAL DAILY
Qty: 30 TABLET | Refills: 2 | Status: SHIPPED | OUTPATIENT
Start: 2017-10-11 | End: 2017-12-28 | Stop reason: SDUPTHER

## 2017-10-11 NOTE — TELEPHONE ENCOUNTER
----- Message from Mabel Hai sent at 10/11/2017  4:14 PM CDT -----  Contact: pt   1. What is the name of the medication you are requesting? Anastrozole   2. What is the dose?   3. How do you take the medication? Orally, topically, etc? Orally   4. How often do you take this medication? Daily   5. Do you need a 30 day or 90 day supply?30  6. How many refills are you requesting? 1  7. What is your preferred pharmacy and location of the pharmacy?   [address] - [address] [facility]  [address] [address] [address]  Phone: [phone] Fax: [phone]  8. Who can we contact with further questions? the patient

## 2017-10-11 NOTE — PROGRESS NOTES
Reason for visit: Follow-up for right breast carcinoma  Date of Diagnosis: October 2016    HPI:   The patient is a 52-year-old  female who presents to the hematology oncology clinic today for follow-up for right breast carcinoma.  The patient was previously followed in the outpatient  Hem/Onc clinic by Dr. Alessia Moss and I'm evaluating the patient today for the first time.  I have reviewed all of the patient's relevant clinical history available in the medical record and have utilized this in my evaluation and management recommendations today.  Today the patient reports that overall she is tolerating her adjuvant endocrine therapy with anastrozole well.  She does have side effects related to hot flashes.  She denies any chest pain or shortness of breath.  She reports worsening left wrist pain for the past few months.  She does not have significant movement in her right upper extremity because of hemiparesis due to history of stroke.  She thinks that the pain in her left wrist might be related to compensatory overuse.  She denies any fevers, chills or night sweats.  She denies any loss of appetite or unintentional weight loss.  She denies any melena, hematochezia, hematemesis, hemoptysis or hematuria.  She denies any bowel or urinary complaints.  She denies any nausea, vomiting or abdominal pain.    PAST MEDICAL HISTORY:   1.  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  Dyslipidemia  3.  Osteopenia    SURGICAL HISTORY:   1.  Tonsillectomy  2.  Hysterectomy  3.  Right breast mastectomy with with right deep axilla sentinel lymph node biopsy followed by breast reconstruction with tissue expander done on April 11, 2017  4.  Mediport placement and removal    FAMILY HISTORY: She reports that 2 paternal aunts had breast cancer in their late 50s.  A maternal uncle had lung cancer the age of 54.  He used to smoke cigarettes.  A maternal uncle had melanoma at the  age of 49.  Her father had colon cancer at the age of 70.  Her mother had colon cancer in her 70s.  She denies any other immediate family members with cancer or bleeding/clotting disorders.    SOCIAL HISTORY: The ports a 5-pack-year smoking history and quit at the age of 23.  She drinks alcohol socially.  She has never used any recreational drugs.  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  She is  and has 2 children.  She lives in [place], [place].    ALLERGIES: [NKDA]    MEDICATIONS: [Medcard has been reviewed and/or reconciled.]    REVIEW OF SYSTEMS:   GENERAL: [No fevers, chills or sweats. No fatigue, weight loss or loss of appetite.]  HEENT: [No blurred vision, tinnitus, nasal discharge, sorethroat or dysphagia.]  HEART: [No chest pain, palpitations or shortness of breath.]    LUNGS: [No cough, hemoptysis or breathing problems.]  ABDOMEN: [No abdominal pain, nausea, vomiting, diarrhea, constipation or melena.]  GENITOURINARY: [No dysuria, bleeding or malodorous discharge.]  NEURO: [No headache, dizziness or vertigo.]  HEMATOLOGY: [No easy bruising, spontaneous bleeding or blood clots in the past].  MUSCULOSKELETAL: [No arthralgias, myalgias or bone pains.]  SKIN: [No rashes or skin lesions.]  PSYCHIATRY: [No depression or anxiety.]    PHYSICAL EXAMINATION:   VS: Reviewed on medication card.  APPEARANCE: The patient is a well-developed, well-nourished and well-groomed  female who appears in no acute distress.  She was accompanied to this clinic visit by her .  HEENT: No scleral icterus. Both external auditory canals clear. No oral ulcers, lesions. Throat clear  HEAD: No sinus tenderness.  NECK: Supple. No palpable lymphadenopathy. Thyroid non-tender, no palpable masses  CHEST: Breath sounds clear bilaterally. No rales. No rhonchi. Unlabored respirations.  BREAST: Right breast reconstruction is noted.  There are no palpable masses noted in the left breast.   No evidence of left nipple discharge.  CARDIOVASCULAR: Normal S1, S2. Normal rate. Regular rhythm.  ABDOMEN: Bowel sounds normal. No tenderness. No abdominal distention. No hepatomegaly. No splenomegaly.  LYMPHATIC: No palpable supraclavicular, axillary nodes  EXTREMITIES: No clubbing, cyanosis, edema.  Mild tenderness to palpation noted in the left wrist.  SKIN: No lesions. No petechiae. No ecchymoses. No induration or nodules  NEUROLOGIC: Right upper extremity hemiparesis is noted.  Alert & Oriented x 3. Mood appropriate to affect    LABS:   Reviewed    IMAGING:  Reviewed    IMPRESSION:  1.  Right breast lobular carcinoma [stage IIIa] [ER positive/MN positive/HER-2 negative] [BRCA negative]  2.  Osteopenia noted on DEXA scan done in June 2017  3.  Left wrist pain    PLAN:  1. Right-sided breast cancer: Stage IIIA invasive lobular carcinoma, ER/MN positive, [id number] neg. BRCA negative. Given large mass on physical examination, we chose to treat with neoadjuvant chemotherapy, using dose-dense AC-T regimen, which patient completed in 2/2017 with decrease in size and firmness of R breast mass. She then underwent R breast mastectomy and SLND 4/2017, with tissue expander placement. Given final pathology after neoadjuvant chemotherapy showed tumor 6cm, sentinel LNs positive, and nipple skin positive, we did recommend adjuvant radiation to R chest and axilla. Her case was discussed at tumor board and while axillary LN dissection was discussed, adjuvant radiation to the left chest and axilla were recommende which she has since completed.  -- Continue anastrazole 1mg PO daily for adjuvant endocrine therapy.  2. Zometa 4mg IV every 6 months for prevention of AI-induced bone loss and treatment of osteopenia.  -- Take Calcium + VitD.  Recent vitamin D level checked was normal.  She will be due for her next dose of Zometa in January 2018.  3.  She will proceed with right breast reconstruction with Dr. Augustine Carrasco as scheduled  in December 2017.  I will schedule the patient for EKG and lab work as requested for preoperative workup/clearance.  4.  Orthopedics consult for further evaluation of left wrist pain.  5.  The patient will be given the influenza vaccination today.    Follow-up in January 2018 with labs for IV Zometa.  She knows to call sooner for any new problems or questions.    I spent 40 minutes face-to-face with the patient discussing and reviewing all of the above in detail with greater than 50% of this time spent in counseling.    Leroy Huitron MD

## 2017-10-12 ENCOUNTER — TELEPHONE (OUTPATIENT)
Dept: HEMATOLOGY/ONCOLOGY | Facility: CLINIC | Age: 52
End: 2017-10-12

## 2017-10-12 NOTE — TELEPHONE ENCOUNTER
----- Message from Leroy Huitron MD sent at 10/11/2017  6:32 PM CDT -----  Please let the patient know that her EKG has been reviewed by cardiology and compared to prior EKGs and all findings look stable.  No cardiology evaluation is needed at this time.  Thank you.

## 2017-10-13 ENCOUNTER — OFFICE VISIT (OUTPATIENT)
Dept: ORTHOPEDICS | Facility: CLINIC | Age: 52
End: 2017-10-13
Payer: MEDICARE

## 2017-10-13 ENCOUNTER — HOSPITAL ENCOUNTER (OUTPATIENT)
Dept: RADIOLOGY | Facility: HOSPITAL | Age: 52
Discharge: HOME | End: 2017-10-13
Attending: ORTHOPAEDIC SURGERY
Payer: MEDICARE

## 2017-10-13 VITALS
BODY MASS INDEX: 26.66 KG/M2 | WEIGHT: 170.19 LBS | DIASTOLIC BLOOD PRESSURE: 66 MMHG | SYSTOLIC BLOOD PRESSURE: 116 MMHG | HEART RATE: 76 BPM

## 2017-10-13 DIAGNOSIS — M25.532 LEFT WRIST PAIN: ICD-10-CM

## 2017-10-13 DIAGNOSIS — M25.532 LEFT WRIST PAIN: Primary | ICD-10-CM

## 2017-10-13 DIAGNOSIS — M65.4 DE QUERVAIN'S TENOSYNOVITIS: Primary | ICD-10-CM

## 2017-10-13 DIAGNOSIS — R53.1 RIGHT SIDED WEAKNESS: ICD-10-CM

## 2017-10-13 DIAGNOSIS — M65.4 DE QUERVAIN'S TENOSYNOVITIS, LEFT: ICD-10-CM

## 2017-10-13 PROCEDURE — 73110 X-RAY EXAM OF WRIST: CPT | Mod: TC,PO,LT

## 2017-10-13 PROCEDURE — 99212 OFFICE O/P EST SF 10 MIN: CPT | Mod: PBBFAC,25,PO | Performed by: ORTHOPAEDIC SURGERY

## 2017-10-13 PROCEDURE — 73110 X-RAY EXAM OF WRIST: CPT | Mod: 26,LT,, | Performed by: RADIOLOGY

## 2017-10-13 PROCEDURE — 20550 NJX 1 TENDON SHEATH/LIGAMENT: CPT | Mod: PBBFAC,PO | Performed by: ORTHOPAEDIC SURGERY

## 2017-10-13 PROCEDURE — 20550 NJX 1 TENDON SHEATH/LIGAMENT: CPT | Mod: S$PBB,LT,, | Performed by: ORTHOPAEDIC SURGERY

## 2017-10-13 PROCEDURE — 99204 OFFICE O/P NEW MOD 45 MIN: CPT | Mod: 25,S$PBB,, | Performed by: ORTHOPAEDIC SURGERY

## 2017-10-13 PROCEDURE — 99999 PR PBB SHADOW E&M-EST. PATIENT-LVL II: CPT | Mod: PBBFAC,,, | Performed by: ORTHOPAEDIC SURGERY

## 2017-10-13 RX ORDER — DICLOFENAC SODIUM 10 MG/G
2 GEL TOPICAL 4 TIMES DAILY
Qty: 1 TUBE | Refills: 0 | Status: SHIPPED | OUTPATIENT
Start: 2017-10-13 | End: 2018-01-15 | Stop reason: SDUPTHER

## 2017-10-13 RX ORDER — TRIAMCINOLONE ACETONIDE 40 MG/ML
40 INJECTION, SUSPENSION INTRA-ARTICULAR; INTRAMUSCULAR
Status: COMPLETED | OUTPATIENT
Start: 2017-10-13 | End: 2017-10-13

## 2017-10-13 RX ADMIN — TRIAMCINOLONE ACETONIDE 40 MG: 40 INJECTION, SUSPENSION INTRA-ARTICULAR; INTRAMUSCULAR at 09:10

## 2017-10-13 NOTE — LETTER
October 13, 2017      Leroy Huitron MD  [address] [place] [address]           [facility] - Orthopedics  [address] [place] [address]  Phone: [phone]  Fax: [phone]          Patient: Carole Moore   MR Number: [id number]   YOB: 1965   Date of Visit: 10/13/2017       Dear Dr. Leroy Huitron:    Thank you for referring Carole Moore to me for evaluation. Attached you will find relevant portions of my assessment and plan of care.    If you have questions, please do not hesitate to call me. I look forward to following Carole Moore along with you.    Sincerely,    Kong Cabrera MD    Enclosure  CC:  No Recipients    If you would like to receive this communication electronically, please contact [email or web address] or [phone] to request more information on [facility] Link access.    For providers and/or their staff who would like to refer a patient to [facility], please contact us through our one-stop-shop provider referral line, [facility] , at [phone].    If you feel you have received this communication in error or would no longer like to receive these types of communications, please e-mail [email or web address]

## 2017-10-13 NOTE — PROGRESS NOTES
Subjective:     Patient ID: Carole Moore is a 52 y.o. female.    Chief Complaint: Pain of the Left Wrist    Carole Moore is a 52 y.o. female here for left wrist pain for a couple months, intermittent brace use. No other treatments. No numbness/tingling. Hx of stroke with right sided residual weakness.      Wrist Pain    The pain is present in the left wrist. This is a new problem. The current episode started more than 1 month ago. The problem occurs intermittently. The problem has been gradually worsening. The quality of the pain is described as sharp and throbbing. The pain is at a severity of 3/10. Pertinent negatives include no fever or numbness. The symptoms are aggravated by activity. She has tried brace/corset for the symptoms. The treatment provided moderate relief.       Past Medical History:   Diagnosis Date    Cancer     R Breast cancer    CVA (cerebral infarction)     Diverticulosis     Hyperlipidemia     Nausea after anesthesia     Paralysis     right side    PONV (postoperative nausea and vomiting)     Stroke     R side weakness     Past Surgical History:   Procedure Laterality Date    HYSTERECTOMY      maintains both ovaries, menorrhagia    MEDIPORT INSERTION, SINGLE      TONSILLECTOMY       Family History   Problem Relation Age of Onset    Breast cancer Paternal Aunt     Colon cancer Father     Colon cancer Mother     Melanoma Maternal Uncle      Social History     Social History    Marital status:      Spouse name: N/A    Number of children: N/A    Years of education: N/A     Occupational History    Not on file.     Social History Main Topics    Smoking status: Never Smoker    Smokeless tobacco: Never Used    Alcohol use No    Drug use: No    Sexual activity: No     Other Topics Concern    Are You Pregnant Or Think You May Be? No    Breast-Feeding No     Social History Narrative    No narrative on file     Medication List with Changes/Refills    Current Medications    ANASTROZOLE (ARIMIDEX) 1 MG TAB    Take 1 tablet (1 mg total) by mouth once daily.    ASPIRIN 81 MG CHEW    Take 81 mg by mouth once daily.    ATORVASTATIN (LIPITOR) 10 MG TABLET    Take 1 tablet (10 mg total) by mouth once daily.    HYDROCORTISONE 2.5 % CREAM    Apply topically 2 (two) times daily.    LORATADINE (CLARITIN ORAL)    Take by mouth daily as needed.     UREA 20 % CREA    Apply 1 application topically once daily at 6am.     Review of patient's allergies indicates:  No Known Allergies  Review of Systems   Constitution: Negative for fever and night sweats.   HENT: Negative for hearing loss.    Eyes: Negative for blurred vision and visual disturbance.   Cardiovascular: Negative for chest pain and leg swelling.   Respiratory: Negative for shortness of breath.    Endocrine: Negative for polyuria.   Hematologic/Lymphatic: Negative for bleeding problem.   Skin: Negative for rash.   Musculoskeletal: Positive for joint pain and joint swelling. Negative for back pain, muscle cramps and muscle weakness.   Gastrointestinal: Negative for melena.   Genitourinary: Negative for hematuria.   Neurological: Negative for loss of balance, numbness and paresthesias.   Psychiatric/Behavioral: Negative for altered mental status.       Objective:   Body mass index is 26.66 kg/m².  Vitals:    10/13/17 0814   BP: 116/66   Pulse: 76       General: Carole is well-developed, well-nourished, appears stated age, in no acute distress, alert and oriented to time, place and person.       General    Vitals reviewed.  Constitutional: She is oriented to person, place, and time. She appears well-developed and well-nourished. No distress.   HENT:   Right Ear: External ear normal.   Left Ear: External ear normal.   Nose: Nose normal.   Eyes: Pupils are equal, round, and reactive to light. Right eye exhibits no discharge. Left eye exhibits no discharge.   Neck: Normal range of motion.   Pulmonary/Chest: Effort normal and  breath sounds normal. No respiratory distress.   Abdominal: Soft.   Neurological: She is alert and oriented to person, place, and time. She has normal reflexes. No cranial nerve deficit. She exhibits normal muscle tone. Coordination normal.   Psychiatric: She has a normal mood and affect. Her behavior is normal. Judgment and thought content normal.             Right Hand/Wrist Exam     Inspection   Scars: Wrist - absent   Effusion: Wrist - absent   Bruising: Wrist - absent   Deformity: Wrist - deformity     Range of Motion     Wrist   Extension: normal   Flexion: normal   Abduction: normal  Adduction: normal    Other     Neuorologic Exam    Median Distribution: normal  Ulnar Distribution: normal  Radial Distribution: normal      Left Hand/Wrist Exam     Inspection   Scars: Wrist - absent   Effusion: Wrist - absent   Bruising: Wrist - absent   Deformity: Wrist - absent     Tenderness   The patient is tender to palpation of the dorsal area.     Range of Motion     Wrist   Extension:  50 normal   Flexion:  70 normal   Abduction: 20 normal  Adduction: 35 normal    Tests   Phalens Sign: negative  Tinels Sign (Medial Nerve): negative  Finkelstein: positive  Carpal Tunnel Compression Test: negative  Cubital Tunnel Compression Test: negative  LT Stability: negative  Costello's Test: negative      Other     Sensory Exam  Median Distribution: normal  Ulnar Distribution: normal  Radial Distribution: normal      Right Elbow Exam     Inspection   Scars: absent  Effusion: absent  Bruising: absent  Deformity: absent  Atrophy: present    Range of Motion   Extension: abnormal   Flexion: abnormal   Pronation: abnormal   Supination: abnormal     Tests Varus: negative  Valgus: negative  Tinel's Sign (cubital tunnel): negative    Other   Sensation: normal    Comments:  RUE weakness secondary to stroke      Left Elbow Exam     Inspection   Scars: absent  Effusion: absent  Bruising: absent  Deformity: absent  Atrophy: absent    Range of  Motion   Extension:  0 normal   Flexion:  130 normal   Pronation: normal   Supination:  80 normal     Tests Varus: negative  Tinel's Sign (cubital tunnel): negative  Tennis Elbow: negative  Golfer's Elbow: negative  Radial Capitellar Grind: negative    Other   Sensation: normal        Muscle Strength   Left Upper Extremity  Wrist Extension: 5/5/5   Wrist Flexion: 5/5/5   :  5/5/5   Pinch Mechanism: 5/5  Elbow Pronation:  5/5   Elbow Supination:  5/5   Elbow Extension: 5/5  Elbow Flexion: 5/5  Intrinsics: 5/5  EPL (Extensor Pollicis Longus): 5/5    Vascular Exam     Right Pulses      Radial:                    2+      Left Pulses      Radial:                    2+      Capillary Refill  Right Hand: normal capillary refill  Left Hand: normal capillary refill  Left Dorian's Test: no rapid refill no slow refill    Edema  Left Forearm: absent      Xray wrist  taken and reviewed today. There is no evidence of fracture or dislocation about the wrist   per my review. No arthritis noted.      Assessment:     Encounter Diagnoses   Name Primary?    Left wrist pain Yes    De Quervain's tenosynovitis, left     Right sided weakness         Plan:     1. Disucssed diagnosis with patient. Recommend brace wear. She would like to proceed with injection today.    Injection Procedure    After time out was performed, including verification of patient ID, procedure, site and side, availability of information and equipment, review of safety issues, and agreement with consent, the procedure site was marked and the patient was prepped aseptically. A diagnostic and therapeutic injection of 2cc Kenalog/lidocaine and was given under sterile technique using a 25g x 1.5 needle into the left DeQuervain's (1st Dorsal Compartment) in seated position.     The patient had no adverse reactions to the medication. Pain decreased. The patient was instructed to apply ice to the joint for 20 minutes and avoid strenuous activities for 24-36 hours  following the injection. Patient was warned of possible blood sugar and/or blood pressure changes during that time. Following that time, patient can resume regular activities.    Patient was reminded to call the clinic immediately for any adverse side effects as explained in clinic today.

## 2017-10-17 ENCOUNTER — TELEPHONE (OUTPATIENT)
Dept: RADIOLOGY | Facility: HOSPITAL | Age: 52
End: 2017-10-17

## 2017-10-18 ENCOUNTER — HOSPITAL ENCOUNTER (OUTPATIENT)
Dept: RADIOLOGY | Facility: HOSPITAL | Age: 52
Discharge: HOME | End: 2017-10-18
Attending: INTERNAL MEDICINE
Payer: MEDICARE

## 2017-10-18 ENCOUNTER — TELEPHONE (OUTPATIENT)
Dept: HEMATOLOGY/ONCOLOGY | Facility: CLINIC | Age: 52
End: 2017-10-18

## 2017-10-18 DIAGNOSIS — Z17.0 MALIGNANT NEOPLASM OF CENTRAL PORTION OF RIGHT BREAST IN FEMALE, ESTROGEN RECEPTOR POSITIVE: ICD-10-CM

## 2017-10-18 DIAGNOSIS — Z79.811 USE OF AROMATASE INHIBITORS: ICD-10-CM

## 2017-10-18 DIAGNOSIS — C50.111 MALIGNANT NEOPLASM OF CENTRAL PORTION OF RIGHT BREAST IN FEMALE, ESTROGEN RECEPTOR POSITIVE: ICD-10-CM

## 2017-10-18 DIAGNOSIS — M85.89 OSTEOPENIA OF MULTIPLE SITES: ICD-10-CM

## 2017-10-18 PROCEDURE — 77065 DX MAMMO INCL CAD UNI: CPT | Mod: 26,LT,, | Performed by: RADIOLOGY

## 2017-10-18 PROCEDURE — 77061 BREAST TOMOSYNTHESIS UNI: CPT | Mod: TC,LT

## 2017-10-18 PROCEDURE — 77061 BREAST TOMOSYNTHESIS UNI: CPT | Mod: 26,LT,, | Performed by: RADIOLOGY

## 2017-10-18 NOTE — TELEPHONE ENCOUNTER
----- Message from Leroy Huitron MD sent at 10/18/2017  9:18 AM CDT -----  Please let the patient know that her mammogram result looks okay.  Repeat in one year.  Thank you.

## 2017-11-20 ENCOUNTER — LAB VISIT (OUTPATIENT)
Dept: LAB | Facility: HOSPITAL | Age: 52
End: 2017-11-20
Attending: INTERNAL MEDICINE
Payer: MEDICARE

## 2017-11-20 DIAGNOSIS — M85.89 OSTEOPENIA OF MULTIPLE SITES: ICD-10-CM

## 2017-11-20 DIAGNOSIS — Z79.811 USE OF AROMATASE INHIBITORS: ICD-10-CM

## 2017-11-20 DIAGNOSIS — C50.111 MALIGNANT NEOPLASM OF CENTRAL PORTION OF RIGHT BREAST IN FEMALE, ESTROGEN RECEPTOR POSITIVE: ICD-10-CM

## 2017-11-20 DIAGNOSIS — Z17.0 MALIGNANT NEOPLASM OF CENTRAL PORTION OF RIGHT BREAST IN FEMALE, ESTROGEN RECEPTOR POSITIVE: ICD-10-CM

## 2017-11-20 LAB
ALBUMIN SERPL BCP-MCNC: 3.5 G/DL
ALP SERPL-CCNC: 91 U/L
ALT SERPL W/O P-5'-P-CCNC: 23 U/L
ANION GAP SERPL CALC-SCNC: 10 MMOL/L
AST SERPL-CCNC: 18 U/L
BASOPHILS # BLD AUTO: 0.01 K/UL
BASOPHILS NFR BLD: 0.2 %
BILIRUB SERPL-MCNC: 1.2 MG/DL
BUN SERPL-MCNC: 11 MG/DL
CALCIUM SERPL-MCNC: 9.5 MG/DL
CHLORIDE SERPL-SCNC: 103 MMOL/L
CO2 SERPL-SCNC: 26 MMOL/L
CREAT SERPL-MCNC: 0.7 MG/DL
DIFFERENTIAL METHOD: ABNORMAL
EOSINOPHIL # BLD AUTO: 0.1 K/UL
EOSINOPHIL NFR BLD: 2.7 %
ERYTHROCYTE [DISTWIDTH] IN BLOOD BY AUTOMATED COUNT: 12.7 %
EST. GFR  (AFRICAN AMERICAN): >60 ML/MIN/1.73 M^2
EST. GFR  (NON AFRICAN AMERICAN): >60 ML/MIN/1.73 M^2
GLUCOSE SERPL-MCNC: 81 MG/DL
HCT VFR BLD AUTO: 40.9 %
HGB BLD-MCNC: 13.7 G/DL
LYMPHOCYTES # BLD AUTO: 1.1 K/UL
LYMPHOCYTES NFR BLD: 24.5 %
MCH RBC QN AUTO: 31.1 PG
MCHC RBC AUTO-ENTMCNC: 33.5 G/DL
MCV RBC AUTO: 93 FL
MONOCYTES # BLD AUTO: 0.5 K/UL
MONOCYTES NFR BLD: 10.9 %
NEUTROPHILS # BLD AUTO: 2.7 K/UL
NEUTROPHILS NFR BLD: 61.7 %
PLATELET # BLD AUTO: 231 K/UL
PMV BLD AUTO: 9.7 FL
POTASSIUM SERPL-SCNC: 3.8 MMOL/L
PROT SERPL-MCNC: 7.1 G/DL
RBC # BLD AUTO: 4.41 M/UL
SODIUM SERPL-SCNC: 139 MMOL/L
WBC # BLD AUTO: 4.41 K/UL

## 2017-11-20 PROCEDURE — 80053 COMPREHEN METABOLIC PANEL: CPT

## 2017-11-20 PROCEDURE — 85025 COMPLETE CBC W/AUTO DIFF WBC: CPT

## 2017-11-20 PROCEDURE — 36415 COLL VENOUS BLD VENIPUNCTURE: CPT | Mod: PO

## 2017-11-21 ENCOUNTER — TELEPHONE (OUTPATIENT)
Dept: HEMATOLOGY/ONCOLOGY | Facility: CLINIC | Age: 52
End: 2017-11-21

## 2017-11-21 NOTE — TELEPHONE ENCOUNTER
----- Message from Leroy Huitron MD sent at 11/21/2017  9:25 AM CST -----  All lab results look ok. thanks

## 2017-12-13 ENCOUNTER — HOSPITAL ENCOUNTER (OUTPATIENT)
Dept: RADIOLOGY | Facility: HOSPITAL | Age: 52
Discharge: HOME | End: 2017-12-13
Attending: PHYSICIAN ASSISTANT
Payer: MEDICARE

## 2017-12-13 ENCOUNTER — TELEPHONE (OUTPATIENT)
Dept: INTERNAL MEDICINE | Facility: CLINIC | Age: 52
End: 2017-12-13

## 2017-12-13 ENCOUNTER — OFFICE VISIT (OUTPATIENT)
Dept: INTERNAL MEDICINE | Facility: CLINIC | Age: 52
End: 2017-12-13
Payer: MEDICARE

## 2017-12-13 VITALS
DIASTOLIC BLOOD PRESSURE: 70 MMHG | HEIGHT: 67 IN | SYSTOLIC BLOOD PRESSURE: 100 MMHG | WEIGHT: 172.19 LBS | TEMPERATURE: 99 F | BODY MASS INDEX: 27.02 KG/M2

## 2017-12-13 DIAGNOSIS — Z01.818 PRE-OP EVALUATION: ICD-10-CM

## 2017-12-13 DIAGNOSIS — J06.9 UPPER RESPIRATORY TRACT INFECTION, UNSPECIFIED TYPE: ICD-10-CM

## 2017-12-13 DIAGNOSIS — J06.9 UPPER RESPIRATORY TRACT INFECTION, UNSPECIFIED TYPE: Primary | ICD-10-CM

## 2017-12-13 PROCEDURE — 99213 OFFICE O/P EST LOW 20 MIN: CPT | Mod: PBBFAC,25,PO | Performed by: PHYSICIAN ASSISTANT

## 2017-12-13 PROCEDURE — 71020 XR CHEST PA AND LATERAL: CPT | Mod: TC,PO

## 2017-12-13 PROCEDURE — 99999 PR PBB SHADOW E&M-EST. PATIENT-LVL III: CPT | Mod: PBBFAC,,, | Performed by: PHYSICIAN ASSISTANT

## 2017-12-13 PROCEDURE — 99213 OFFICE O/P EST LOW 20 MIN: CPT | Mod: S$PBB,,, | Performed by: PHYSICIAN ASSISTANT

## 2017-12-13 PROCEDURE — 71020 XR CHEST PA AND LATERAL: CPT | Mod: 26,,, | Performed by: RADIOLOGY

## 2017-12-13 RX ORDER — DEXTROMETHORPHAN HYDROBROMIDE, GUAIFENESIN, AND PHENYLEPHRINE HYDROCHLORIDE 17.5; 385; 1 MG/1; MG/1; MG/1
1 TABLET ORAL
Qty: 14 TABLET | Refills: 0 | Status: SHIPPED | OUTPATIENT
Start: 2017-12-13 | End: 2018-08-09

## 2017-12-13 NOTE — TELEPHONE ENCOUNTER
----- Message from TRI Mendes sent at 12/13/2017  2:50 PM CST -----  xrays shows no sign of lung infection

## 2017-12-28 DIAGNOSIS — Z79.811 AROMATASE INHIBITOR USE: ICD-10-CM

## 2017-12-28 RX ORDER — ANASTROZOLE 1 MG/1
TABLET ORAL
Qty: 30 TABLET | Refills: 2 | Status: SHIPPED | OUTPATIENT
Start: 2017-12-28 | End: 2018-03-26 | Stop reason: SDUPTHER

## 2018-01-12 ENCOUNTER — OFFICE VISIT (OUTPATIENT)
Dept: HEMATOLOGY/ONCOLOGY | Facility: CLINIC | Age: 53
End: 2018-01-12
Payer: MEDICARE

## 2018-01-12 ENCOUNTER — INFUSION (OUTPATIENT)
Dept: INFUSION THERAPY | Facility: HOSPITAL | Age: 53
End: 2018-01-12
Attending: INTERNAL MEDICINE
Payer: MEDICARE

## 2018-01-12 VITALS
TEMPERATURE: 98 F | OXYGEN SATURATION: 97 % | HEIGHT: 67 IN | WEIGHT: 175.5 LBS | HEART RATE: 74 BPM | WEIGHT: 175.5 LBS | BODY MASS INDEX: 27.55 KG/M2 | SYSTOLIC BLOOD PRESSURE: 120 MMHG | BODY MASS INDEX: 27.55 KG/M2 | RESPIRATION RATE: 18 BRPM | DIASTOLIC BLOOD PRESSURE: 70 MMHG | HEIGHT: 67 IN

## 2018-01-12 DIAGNOSIS — C50.111 MALIGNANT NEOPLASM OF CENTRAL PORTION OF RIGHT BREAST IN FEMALE, ESTROGEN RECEPTOR POSITIVE: ICD-10-CM

## 2018-01-12 DIAGNOSIS — Z17.0 MALIGNANT NEOPLASM OF CENTRAL PORTION OF RIGHT BREAST IN FEMALE, ESTROGEN RECEPTOR POSITIVE: Primary | ICD-10-CM

## 2018-01-12 DIAGNOSIS — Z79.811 USE OF AROMATASE INHIBITORS: Primary | ICD-10-CM

## 2018-01-12 DIAGNOSIS — Z79.811 USE OF AROMATASE INHIBITORS: ICD-10-CM

## 2018-01-12 DIAGNOSIS — M85.89 OSTEOPENIA OF MULTIPLE SITES: ICD-10-CM

## 2018-01-12 DIAGNOSIS — C50.111 MALIGNANT NEOPLASM OF CENTRAL PORTION OF RIGHT BREAST IN FEMALE, ESTROGEN RECEPTOR POSITIVE: Primary | ICD-10-CM

## 2018-01-12 DIAGNOSIS — Z17.0 MALIGNANT NEOPLASM OF CENTRAL PORTION OF RIGHT BREAST IN FEMALE, ESTROGEN RECEPTOR POSITIVE: ICD-10-CM

## 2018-01-12 PROCEDURE — 96365 THER/PROPH/DIAG IV INF INIT: CPT | Mod: PO

## 2018-01-12 PROCEDURE — 25000003 PHARM REV CODE 250: Mod: PO | Performed by: INTERNAL MEDICINE

## 2018-01-12 PROCEDURE — 99213 OFFICE O/P EST LOW 20 MIN: CPT | Mod: PBBFAC,PO,25 | Performed by: INTERNAL MEDICINE

## 2018-01-12 PROCEDURE — 99214 OFFICE O/P EST MOD 30 MIN: CPT | Mod: 25,S$PBB,, | Performed by: INTERNAL MEDICINE

## 2018-01-12 PROCEDURE — 99999 PR PBB SHADOW E&M-EST. PATIENT-LVL III: CPT | Mod: PBBFAC,,, | Performed by: INTERNAL MEDICINE

## 2018-01-12 PROCEDURE — A4216 STERILE WATER/SALINE, 10 ML: HCPCS | Mod: PO | Performed by: INTERNAL MEDICINE

## 2018-01-12 PROCEDURE — 63600175 PHARM REV CODE 636 W HCPCS: Mod: PO | Performed by: INTERNAL MEDICINE

## 2018-01-12 RX ORDER — SODIUM CHLORIDE 0.9 % (FLUSH) 0.9 %
10 SYRINGE (ML) INJECTION
Status: DISCONTINUED | OUTPATIENT
Start: 2018-01-12 | End: 2018-01-12 | Stop reason: HOSPADM

## 2018-01-12 RX ORDER — HEPARIN 100 UNIT/ML
500 SYRINGE INTRAVENOUS
Status: DISCONTINUED | OUTPATIENT
Start: 2018-01-12 | End: 2018-01-12 | Stop reason: HOSPADM

## 2018-01-12 RX ORDER — SODIUM CHLORIDE 0.9 % (FLUSH) 0.9 %
10 SYRINGE (ML) INJECTION
Status: CANCELLED | OUTPATIENT
Start: 2018-01-12

## 2018-01-12 RX ORDER — HEPARIN 100 UNIT/ML
500 SYRINGE INTRAVENOUS
Status: CANCELLED | OUTPATIENT
Start: 2018-01-12

## 2018-01-12 RX ADMIN — ZOLEDRONIC ACID 4 MG: 4 INJECTION, SOLUTION, CONCENTRATE INTRAVENOUS at 10:01

## 2018-01-12 RX ADMIN — SODIUM CHLORIDE, PRESERVATIVE FREE 10 ML: 5 INJECTION INTRAVENOUS at 10:01

## 2018-01-12 NOTE — PATIENT INSTRUCTIONS
[facility]  [address]  [phone] phone     [phone] fax  Hours of Operation: Monday- Friday 8:00am- 5:00pm  After hours phone  [phone]  Hematology / Oncology Physicians on call      Dr. Bill Palacios                        Please call with any concerns regarding your appointment today.

## 2018-01-12 NOTE — PROGRESS NOTES
Reason for visit: Follow-up for right breast carcinoma  Date of Diagnosis: October 2016    HPI:   The patient is a 52-year-old  female who presents to the hematology oncology clinic today for follow-up for right breast carcinoma.  The patient was previously followed in the outpatient  Hem/Onc clinic by Dr. Alessia oMss.  I have reviewed all of the patient's relevant clinical history available in the medical record and have utilized this in my evaluation and management recommendations today.  Today the patient reports that overall she is tolerating her adjuvant endocrine therapy with anastrozole well.  She does have side effects related to hot flashes.  She denies any chest pain or shortness of breath. She does not have significant movement in her right upper extremity because of hemiparesis due to history of stroke.  She does have occasional pain in her left wrist which she thinks might be related to compensatory overuse.  She denies any fevers, chills or night sweats.  She denies any loss of appetite or unintentional weight loss.  She denies any melena, hematochezia, hematemesis, hemoptysis or hematuria.  She denies any bowel or urinary complaints.  She denies any nausea, vomiting or abdominal pain.    PAST MEDICAL HISTORY:   1.  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  Dyslipidemia  3.  Osteopenia    SURGICAL HISTORY:   1.  Tonsillectomy  2.  Hysterectomy  3.  Right breast mastectomy with with right deep axilla sentinel lymph node biopsy followed by breast reconstruction with tissue expander done on April 11, 2017 and reconstruction completed in Dec 2017.  4.  Mediport placement and removal    FAMILY HISTORY: She reports that 2 paternal aunts had breast cancer in their late 50s.  A maternal uncle had lung cancer the age of 54.  He used to smoke cigarettes.  A maternal uncle had melanoma at the age of 49.  Her father had colon cancer at the age of 70.   Her mother had colon cancer in her 70s.  She denies any other immediate family members with cancer or bleeding/clotting disorders.    SOCIAL HISTORY: The ports a 5-pack-year smoking history and quit at the age of 23.  She drinks alcohol socially.  She has never used any recreational drugs.  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  She is  and has 2 children.  She lives in [place], [place].    ALLERGIES: [NKDA]    MEDICATIONS: [Medcard has been reviewed and/or reconciled.]    REVIEW OF SYSTEMS:   GENERAL: [No fevers, chills or sweats. No fatigue, weight loss or loss of appetite.]  HEENT: [No blurred vision, tinnitus, nasal discharge, sorethroat or dysphagia.]  HEART: [No chest pain, palpitations or shortness of breath.]    LUNGS: [No cough, hemoptysis or breathing problems.]  ABDOMEN: [No abdominal pain, nausea, vomiting, diarrhea, constipation or melena.]  GENITOURINARY: [No dysuria, bleeding or malodorous discharge.]  NEURO: [No headache, dizziness or vertigo.]  HEMATOLOGY: [No easy bruising, spontaneous bleeding or blood clots in the past].  MUSCULOSKELETAL: [No arthralgias, myalgias or bone pains.]  SKIN: [No rashes or skin lesions.]  PSYCHIATRY: [No depression or anxiety.]    PHYSICAL EXAMINATION:   VS: Reviewed on medication card.  APPEARANCE: The patient is a well-developed, well-nourished and well-groomed  female who appears in no acute distress.    HEENT: No scleral icterus. Both external auditory canals clear. No oral ulcers, lesions. Throat clear  HEAD: No sinus tenderness.  NECK: Supple. No palpable lymphadenopathy. Thyroid non-tender, no palpable masses  CHEST: Breath sounds clear bilaterally. No rales. No rhonchi. Unlabored respirations.  BREAST: Deferred today.  CARDIOVASCULAR: Normal S1, S2. Normal rate. Regular rhythm.  ABDOMEN: Bowel sounds normal. No tenderness. No abdominal distention. No hepatomegaly. No splenomegaly.  LYMPHATIC: No palpable  supraclavicular, axillary nodes  EXTREMITIES: No clubbing, cyanosis, edema.    SKIN: No lesions. No petechiae. No ecchymoses. No induration or nodules  NEUROLOGIC: Right upper extremity hemiparesis is noted.  Alert & Oriented x 3. Mood appropriate to affect    LABS:   Reviewed    IMAGING:  Reviewed    IMPRESSION:  1.  Right breast lobular carcinoma [stage IIIa] [ER positive/VA positive/HER-2 negative] [BRCA negative]  2.  Osteopenia noted on DEXA scan done in June 2017    PLAN:  1. Right-sided breast cancer: Stage IIIA invasive lobular carcinoma, ER/VA positive, [id number] neg. BRCA negative. Given large mass on physical examination, we chose to treat with neoadjuvant chemotherapy, using dose-dense AC-T regimen, which patient completed in 2/2017 with decrease in size and firmness of R breast mass. She then underwent R breast mastectomy and SLND 4/2017, with tissue expander placement. Given final pathology after neoadjuvant chemotherapy showed tumor 6cm, sentinel LNs positive, and nipple skin positive, we did recommend adjuvant radiation to R chest and axilla. Her case was discussed at tumor board and while axillary LN dissection was discussed, adjuvant radiation to the left chest and axilla were recommende which she has since completed.  -- Continue anastrazole 1mg PO daily for adjuvant endocrine therapy.  2. Zometa 4mg IV every 6 months for prevention of AI-induced bone loss and treatment of osteopenia.  -- Take Calcium + VitD.  Recent vitamin D level checked was normal.  She will be due for her next dose of Zometa in January 2018.    Follow-up in 6 months with labs for IV Zometa.  She knows to call sooner for any new problems or questions.    Leroy Huitron MD

## 2018-01-15 ENCOUNTER — OFFICE VISIT (OUTPATIENT)
Dept: ORTHOPEDICS | Facility: CLINIC | Age: 53
End: 2018-01-15
Payer: MEDICARE

## 2018-01-15 ENCOUNTER — CLINICAL SUPPORT (OUTPATIENT)
Dept: CARDIOLOGY | Facility: CLINIC | Age: 53
End: 2018-01-15
Attending: NUCLEAR MEDICINE
Payer: MEDICARE

## 2018-01-15 VITALS
SYSTOLIC BLOOD PRESSURE: 99 MMHG | BODY MASS INDEX: 27.55 KG/M2 | DIASTOLIC BLOOD PRESSURE: 67 MMHG | WEIGHT: 175.5 LBS | HEIGHT: 67 IN | HEART RATE: 89 BPM

## 2018-01-15 DIAGNOSIS — M65.4 DE QUERVAIN'S TENOSYNOVITIS, LEFT: Primary | ICD-10-CM

## 2018-01-15 DIAGNOSIS — M25.532 LEFT WRIST PAIN: ICD-10-CM

## 2018-01-15 DIAGNOSIS — M65.4 DE QUERVAIN'S TENOSYNOVITIS: ICD-10-CM

## 2018-01-15 DIAGNOSIS — M25.532 LEFT WRIST PAIN: Primary | ICD-10-CM

## 2018-01-15 DIAGNOSIS — Z91.89 HEART FAILURE, ACC/AHA STAGE A: ICD-10-CM

## 2018-01-15 LAB
DIASTOLIC DYSFUNCTION: NO
RETIRED EF AND QEF - SEE NOTES: 55 (ref 55–65)

## 2018-01-15 PROCEDURE — 93307 TTE W/O DOPPLER COMPLETE: CPT | Mod: PBBFAC,PO | Performed by: NUCLEAR MEDICINE

## 2018-01-15 PROCEDURE — 99213 OFFICE O/P EST LOW 20 MIN: CPT | Mod: PBBFAC,PO,25 | Performed by: ORTHOPAEDIC SURGERY

## 2018-01-15 PROCEDURE — 99999 PR PBB SHADOW E&M-EST. PATIENT-LVL III: CPT | Mod: PBBFAC,,, | Performed by: ORTHOPAEDIC SURGERY

## 2018-01-15 PROCEDURE — 99214 OFFICE O/P EST MOD 30 MIN: CPT | Mod: S$PBB,,, | Performed by: ORTHOPAEDIC SURGERY

## 2018-01-15 RX ORDER — DICLOFENAC SODIUM 10 MG/G
2 GEL TOPICAL 4 TIMES DAILY
Qty: 1 TUBE | Refills: 0 | Status: SHIPPED | OUTPATIENT
Start: 2018-01-15 | End: 2019-10-16

## 2018-01-15 NOTE — PROGRESS NOTES
Subjective:     Patient ID: Carole Moore is a 52 y.o. female.    Chief Complaint: No chief complaint on file.    Here for follow up. States that her pain has improved after injection, hx below:    Carole Moore is a 52 y.o. female here for left wrist pain for a couple months, intermittent brace use. No other treatments. No numbness/tingling. Hx of stroke with right sided residual weakness. Has taken OTC nsaids without relief before.      Wrist Pain    The pain is present in the left wrist. This is a new problem. The current episode started more than 1 month ago. The problem occurs intermittently. The problem has been gradually worsening. The quality of the pain is described as sharp and throbbing. The pain is at a severity of 3/10. Pertinent negatives include no fever or numbness. The symptoms are aggravated by activity. She has tried brace/corset for the symptoms. The treatment provided mild relief.       Past Medical History:   Diagnosis Date    Breast cancer     Cancer     R Breast cancer    CVA (cerebral infarction)     Diverticulosis     Hyperlipidemia     Nausea after anesthesia     Paralysis     right side    PONV (postoperative nausea and vomiting)     Stroke     R side weakness     Past Surgical History:   Procedure Laterality Date    BREAST SURGERY      HYSTERECTOMY      maintains both ovaries, menorrhagia    MASTECTOMY      MEDIPORT INSERTION, SINGLE      TONSILLECTOMY       Family History   Problem Relation Age of Onset    Breast cancer Paternal Aunt     Colon cancer Father     Colon cancer Mother     Melanoma Maternal Uncle      Social History     Social History    Marital status:      Spouse name: N/A    Number of children: N/A    Years of education: N/A     Occupational History    Not on file.     Social History Main Topics    Smoking status: Never Smoker    Smokeless tobacco: Never Used    Alcohol use No    Drug use: No    Sexual activity: No      Other Topics Concern    Are You Pregnant Or Think You May Be? No    Breast-Feeding No     Social History Narrative    No narrative on file     Medication List with Changes/Refills   Current Medications    ANASTROZOLE (ARIMIDEX) 1 MG TAB    TAKE ONE TABLET BY MOUTH ONCE DAILY    ASPIRIN 81 MG CHEW    Take 81 mg by mouth once daily.    ATORVASTATIN (LIPITOR) 10 MG TABLET    Take 1 tablet (10 mg total) by mouth once daily.    DICLOFENAC SODIUM 1 % GEL    Apply 2 g topically 4 (four) times daily.    HYDROCORTISONE 2.5 % CREAM    Apply topically 2 (two) times daily.    LORATADINE (CLARITIN ORAL)    Take by mouth daily as needed.     PHENYLEPHRINE-DM-GUAIFENESIN 10-17.5-385 MG TAB    Take 1 tablet by mouth every 4 to 6 hours as needed.    UREA 20 % CREA    Apply 1 application topically once daily at 6am.     Review of patient's allergies indicates:  No Known Allergies  Review of Systems   Constitution: Negative for fever and night sweats.   HENT: Negative for hearing loss.    Eyes: Negative for blurred vision and visual disturbance.   Cardiovascular: Negative for chest pain and leg swelling.   Respiratory: Negative for shortness of breath.    Endocrine: Negative for polyuria.   Hematologic/Lymphatic: Negative for bleeding problem.   Skin: Negative for rash.   Musculoskeletal: Positive for joint pain and joint swelling. Negative for back pain, muscle cramps and muscle weakness.   Gastrointestinal: Negative for melena.   Genitourinary: Negative for hematuria.   Neurological: Negative for loss of balance, numbness and paresthesias.   Psychiatric/Behavioral: Negative for altered mental status.       Objective:   Body mass index is 27.49 kg/m².  Vitals:    01/15/18 0859   BP: 99/67   Pulse: 89       General: Carole is well-developed, well-nourished, appears stated age, in no acute distress, alert and oriented to time, place and person.       General    Vitals reviewed.  Constitutional: She is oriented to person, place,  and time. She appears well-developed and well-nourished. No distress.   HENT:   Right Ear: External ear normal.   Left Ear: External ear normal.   Nose: Nose normal.   Eyes: Pupils are equal, round, and reactive to light. Right eye exhibits no discharge. Left eye exhibits no discharge.   Neck: Normal range of motion.   Pulmonary/Chest: Effort normal and breath sounds normal. No respiratory distress.   Abdominal: Soft.   Neurological: She is alert and oriented to person, place, and time. She has normal reflexes. No cranial nerve deficit. She exhibits normal muscle tone. Coordination normal.   Psychiatric: She has a normal mood and affect. Her behavior is normal. Judgment and thought content normal.             Right Hand/Wrist Exam     Inspection   Scars: Wrist - absent   Effusion: Wrist - absent   Bruising: Wrist - absent   Deformity: Wrist - deformity     Range of Motion     Wrist   Extension: normal   Flexion: normal   Abduction: normal  Adduction: normal    Other     Neuorologic Exam    Median Distribution: normal  Ulnar Distribution: normal  Radial Distribution: normal      Left Hand/Wrist Exam     Inspection   Scars: Wrist - absent   Effusion: Wrist - absent   Bruising: Wrist - absent   Deformity: Wrist - absent     Tenderness   The patient is tender to palpation of the dorsal area.     Range of Motion     Wrist   Extension:  50 normal   Flexion:  70 normal   Abduction: 20 normal  Adduction: 35 normal    Tests   Phalens Sign: negative  Tinels Sign (Medial Nerve): negative  Finkelstein: positive  Carpal Tunnel Compression Test: negative  Cubital Tunnel Compression Test: negative  LT Stability: negative  Costello's Test: negative      Other     Sensory Exam  Median Distribution: normal  Ulnar Distribution: normal  Radial Distribution: normal      Right Elbow Exam     Inspection   Scars: absent  Effusion: absent  Bruising: absent  Deformity: absent  Atrophy: present    Range of Motion   Extension: abnormal    Flexion: abnormal   Pronation: abnormal   Supination: abnormal     Tests Varus: negative  Valgus: negative  Tinel's Sign (cubital tunnel): negative    Other   Sensation: normal    Comments:  RUE weakness secondary to stroke      Left Elbow Exam     Inspection   Scars: absent  Effusion: absent  Bruising: absent  Deformity: absent  Atrophy: absent    Range of Motion   Extension:  0 normal   Flexion:  130 normal   Pronation: normal   Supination:  80 normal     Tests Varus: negative  Tinel's Sign (cubital tunnel): negative  Tennis Elbow: negative  Golfer's Elbow: negative  Radial Capitellar Grind: negative    Other   Sensation: normal        Muscle Strength   Left Upper Extremity  Wrist Extension: 5/5/5   Wrist Flexion: 5/5/5   :  5/5/5   Pinch Mechanism: 5/5  Elbow Pronation:  5/5   Elbow Supination:  5/5   Elbow Extension: 5/5  Elbow Flexion: 5/5  Intrinsics: 5/5  EPL (Extensor Pollicis Longus): 5/5    Vascular Exam     Right Pulses      Radial:                    2+      Left Pulses      Radial:                    2+      Capillary Refill  Right Hand: normal capillary refill  Left Hand: normal capillary refill  Left Dorian's Test: no rapid refill no slow refill    Edema  Left Forearm: absent          Assessment:     Encounter Diagnosis   Name Primary?    De Quervain's tenosynovitis, left Yes        Plan:     1. Will re-send voltaren gel as patient didn't  last time    2. Brace PRN flareups    3. Discussed option of surgery if patient has recurrence, she declined as she has right sided weakness and would be tough for her to have a surgery on side that she relies on for ADL's. Will manage with injections a few times a year.    4. RTC PRN when symptoms worsening

## 2018-02-01 ENCOUNTER — OFFICE VISIT (OUTPATIENT)
Dept: CARDIOLOGY | Facility: CLINIC | Age: 53
End: 2018-02-01
Payer: MEDICARE

## 2018-02-01 VITALS
HEIGHT: 67 IN | DIASTOLIC BLOOD PRESSURE: 60 MMHG | WEIGHT: 169 LBS | HEART RATE: 64 BPM | BODY MASS INDEX: 26.53 KG/M2 | SYSTOLIC BLOOD PRESSURE: 122 MMHG

## 2018-02-01 DIAGNOSIS — Z91.89 HEART FAILURE, ACC/AHA STAGE A: Primary | ICD-10-CM

## 2018-02-01 DIAGNOSIS — E78.00 PURE HYPERCHOLESTEROLEMIA: ICD-10-CM

## 2018-02-01 PROCEDURE — 99213 OFFICE O/P EST LOW 20 MIN: CPT | Mod: PBBFAC,PO | Performed by: NUCLEAR MEDICINE

## 2018-02-01 PROCEDURE — 99214 OFFICE O/P EST MOD 30 MIN: CPT | Mod: S$PBB,,, | Performed by: NUCLEAR MEDICINE

## 2018-02-01 PROCEDURE — 99999 PR PBB SHADOW E&M-EST. PATIENT-LVL III: CPT | Mod: PBBFAC,,, | Performed by: NUCLEAR MEDICINE

## 2018-02-01 NOTE — PROGRESS NOTES
Subjective:   Patient ID:  Carole Moore is a 52 y.o. female who presents for follow-up of HEART FAILURE STAGE A      HPI1- HEART FAILURE STAGE A- POST CHEMO- GRAY- FOR CA OF BREAST   2- DYSLIPIDEMIA  DOING WELL.NO UNUSUAL LIPSCOMB. NO ORTHOPNEA OR PND  NO PALPITATIONS  NO CHEST DISCOMFORT  NO EDEMA. NO CALVE TENDERNESS  NO FOCAL CNS SYMPTOMS OR SIGNS TO SUGGEST TIA OR STROKE    Review of Systems   Constitution: Negative for chills, fever, weakness, night sweats, weight gain and weight loss.   HENT: Negative for nosebleeds.    Eyes: Negative for blurred vision, double vision and visual disturbance.   Cardiovascular: Negative for chest pain, dyspnea on exertion, irregular heartbeat, leg swelling, orthopnea, palpitations, paroxysmal nocturnal dyspnea and syncope.   Respiratory: Negative for cough, hemoptysis and wheezing.    Endocrine: Negative for polydipsia and polyuria.   Hematologic/Lymphatic: Does not bruise/bleed easily.   Skin: Negative for rash.   Musculoskeletal: Negative for joint pain, joint swelling, muscle weakness and myalgias.   Gastrointestinal: Negative for abdominal pain, hematemesis, jaundice and melena.   Genitourinary: Negative for dysuria, hematuria and nocturia.   Neurological: Negative for dizziness, focal weakness, headaches and sensory change.   Psychiatric/Behavioral: Negative for depression. The patient does not have insomnia and is not nervous/anxious.      Family History   Problem Relation Age of Onset    Breast cancer Paternal Aunt     Colon cancer Father     Colon cancer Mother     Melanoma Maternal Uncle      Past Medical History:   Diagnosis Date    Breast cancer     Cancer     R Breast cancer    CVA (cerebral infarction)     Diverticulosis     Hyperlipidemia     Nausea after anesthesia     Paralysis     right side    PONV (postoperative nausea and vomiting)     Stroke     R side weakness     Current Outpatient Prescriptions on File Prior to Visit   Medication Sig  Dispense Refill    anastrozole (ARIMIDEX) 1 mg Tab TAKE ONE TABLET BY MOUTH ONCE DAILY 30 tablet 2    aspirin 81 MG Chew Take 81 mg by mouth once daily.      atorvastatin (LIPITOR) 10 MG tablet Take 1 tablet (10 mg total) by mouth once daily. 90 tablet 4    diclofenac sodium 1 % Gel Apply 2 g topically 4 (four) times daily. 1 Tube 0    hydrocortisone 2.5 % cream Apply topically 2 (two) times daily. 1 Tube 1    LORATADINE (CLARITIN ORAL) Take by mouth daily as needed.       phenylephrine-DM-guaifenesin 10-17.5-385 mg Tab Take 1 tablet by mouth every 4 to 6 hours as needed. 14 tablet 0    urea 20 % Crea Apply 1 application topically once daily at 6am. 85 g 0     No current facility-administered medications on file prior to visit.      Review of patient's allergies indicates:  No Known Allergies    Objective:     Physical Exam   Constitutional: She is oriented to person, place, and time. She appears well-developed. No distress.   HENT:   Head: Normocephalic.   Eyes: Conjunctivae are normal. Pupils are equal, round, and reactive to light. No scleral icterus.   Neck: Normal range of motion. Neck supple. Normal carotid pulses, no hepatojugular reflux and no JVD present. Carotid bruit is not present. No edema present. No thyroid mass and no thyromegaly present.   Cardiovascular: Normal rate, regular rhythm, S1 normal, S2 normal, normal heart sounds and intact distal pulses.  PMI is not displaced.  Exam reveals no gallop and no friction rub.    No murmur heard.  Pulses:       Carotid pulses are 2+ on the right side, and 2+ on the left side.       Radial pulses are 2+ on the right side, and 2+ on the left side.        Femoral pulses are 2+ on the right side, and 2+ on the left side.       Popliteal pulses are 2+ on the right side, and 2+ on the left side.        Dorsalis pedis pulses are 2+ on the right side, and 2+ on the left side.        Posterior tibial pulses are 2+ on the right side, and 2+ on the left side.    Pulmonary/Chest: Effort normal and breath sounds normal. She has no wheezes. She has no rales. She exhibits no tenderness.   Abdominal: Soft. Bowel sounds are normal. She exhibits no pulsatile midline mass and no mass. There is no hepatosplenomegaly. There is no tenderness.   Musculoskeletal: Normal range of motion. She exhibits no edema or tenderness.        Cervical back: Normal.        Thoracic back: Normal.        Lumbar back: Normal.   Lymphadenopathy:     She has no cervical adenopathy.     She has no axillary adenopathy.        Right: No supraclavicular adenopathy present.        Left: No supraclavicular adenopathy present.   Neurological: She is alert and oriented to person, place, and time. She has normal strength and normal reflexes. No sensory deficit. Gait normal.   CHRONIC RIGHT HEMIPARESIS- OLD CVA   Skin: Skin is warm. No rash noted. No cyanosis. No pallor. Nails show no clubbing.   Psychiatric: She has a normal mood and affect. Her speech is normal and behavior is normal. Cognition and memory are normal.       Assessment:     1. Heart failure, ACC/AHA stage A    2. Pure hypercholesterolemia      STABLE CV STATUS- NO EVIDENCE OF ADHF. NO ARRHYTHMIAS  RECENT ECHO WAS REVIEWED AND DISCUSSED- LV EF 55-60%. NO STRUCTURAL VALVE ABNS. NO PE.  Plan:     Heart failure, ACC/AHA stage A    Pure hypercholesterolemia      1- RETURN IN ONE YEAR WITH ECHO

## 2018-03-12 ENCOUNTER — OFFICE VISIT (OUTPATIENT)
Dept: URGENT CARE | Facility: CLINIC | Age: 53
End: 2018-03-12
Payer: MEDICARE

## 2018-03-12 VITALS
HEART RATE: 89 BPM | OXYGEN SATURATION: 97 % | WEIGHT: 174.19 LBS | HEIGHT: 67 IN | SYSTOLIC BLOOD PRESSURE: 110 MMHG | RESPIRATION RATE: 18 BRPM | TEMPERATURE: 98 F | BODY MASS INDEX: 27.34 KG/M2 | DIASTOLIC BLOOD PRESSURE: 82 MMHG

## 2018-03-12 DIAGNOSIS — J02.9 PHARYNGITIS, UNSPECIFIED ETIOLOGY: ICD-10-CM

## 2018-03-12 DIAGNOSIS — J32.9 SINUSITIS, UNSPECIFIED CHRONICITY, UNSPECIFIED LOCATION: Primary | ICD-10-CM

## 2018-03-12 LAB
CTP QC/QA: YES
S PYO RRNA THROAT QL PROBE: NEGATIVE

## 2018-03-12 PROCEDURE — 99999 PR PBB SHADOW E&M-EST. PATIENT-LVL V: CPT | Mod: PBBFAC,,, | Performed by: NURSE PRACTITIONER

## 2018-03-12 PROCEDURE — 99213 OFFICE O/P EST LOW 20 MIN: CPT | Mod: S$PBB,,, | Performed by: NURSE PRACTITIONER

## 2018-03-12 PROCEDURE — 99215 OFFICE O/P EST HI 40 MIN: CPT | Mod: PBBFAC,PO | Performed by: NURSE PRACTITIONER

## 2018-03-12 PROCEDURE — 87081 CULTURE SCREEN ONLY: CPT

## 2018-03-12 RX ORDER — PREDNISONE 20 MG/1
20 TABLET ORAL DAILY
Qty: 5 TABLET | Refills: 0 | Status: SHIPPED | OUTPATIENT
Start: 2018-03-12 | End: 2018-03-17

## 2018-03-12 RX ORDER — AMOXICILLIN 875 MG/1
875 TABLET, FILM COATED ORAL 2 TIMES DAILY
Qty: 20 TABLET | Refills: 0 | Status: SHIPPED | OUTPATIENT
Start: 2018-03-12 | End: 2018-03-22

## 2018-03-13 NOTE — PROGRESS NOTES
"Subjective:      Patient ID: Carole Moore is a 52 y.o. female.    Chief Complaint: Sore Throat (x2 weeks) and Nasal Congestion    Sore Throat    This is a new problem. The current episode started 1 to 4 weeks ago (2 weeks). The problem has been gradually worsening. Maximum temperature: night sweats; possible subjective fever. Associated symptoms include congestion and swollen glands. Pertinent negatives include no coughing, ear pain, headaches or hoarse voice. Treatments tried: Claritin. The treatment provided no relief.     Review of Systems   Constitutional: Negative.    HENT: Positive for congestion, postnasal drip, sinus pressure, sore throat and voice change (intermittent hoarseness). Negative for ear pain and hoarse voice.    Respiratory: Negative.  Negative for cough.    Cardiovascular: Negative.    Gastrointestinal: Negative.    Musculoskeletal: Negative.    Neurological: Negative.  Negative for headaches.   Hematological: Negative.        Objective:   /82 (BP Location: Left arm, Patient Position: Sitting, BP Method: Medium (Manual))   Pulse 89   Temp 97.7 °F (36.5 °C) (Tympanic)   Resp 18   Ht 5' 7" (1.702 m)   Wt 79 kg (174 lb 2.6 oz)   LMP 10/11/2009   SpO2 97%   BMI 27.28 kg/m²   Physical Exam   Constitutional: She is oriented to person, place, and time. She appears well-developed and well-nourished. No distress.   HENT:   Head: Normocephalic and atraumatic.   Right Ear: Tympanic membrane normal.   Left Ear: Tympanic membrane normal.   Nose: Mucosal edema and sinus tenderness present.   Mouth/Throat: Uvula is midline and mucous membranes are normal. Posterior oropharyngeal erythema present. No oropharyngeal exudate or posterior oropharyngeal edema.   Neck: Normal range of motion. Neck supple.   Cardiovascular: Normal rate, regular rhythm and normal heart sounds.    Pulmonary/Chest: Effort normal. No respiratory distress.   Neurological: She is alert and oriented to person, place, " and time.   Skin: Skin is warm and dry. She is not diaphoretic.   Nursing note and vitals reviewed.    Assessment:      1. Sinusitis, unspecified chronicity, unspecified location    2. Pharyngitis, unspecified etiology       Plan:   Sinusitis, unspecified chronicity, unspecified location  -     amoxicillin (AMOXIL) 875 MG tablet; Take 1 tablet (875 mg total) by mouth 2 (two) times daily.  Dispense: 20 tablet; Refill: 0  -     predniSONE (DELTASONE) 20 MG tablet; Take 1 tablet (20 mg total) by mouth once daily.  Dispense: 5 tablet; Refill: 0    Pharyngitis, unspecified etiology  -     POCT Rapid Strep A  -     Strep A culture, throat    Instructions, follow up, and supportive care as per AVS.  Follow up with PCP if not improved or for any new or worsening symptoms.

## 2018-03-13 NOTE — PATIENT INSTRUCTIONS
· Rest and increase fluids.   · May apply warm compresses as needed.   · Saline nasal spray or saline irrigation (Neti pot) to loosen nasal congestion.  · Flonase or Nasacort to reduce inflammation in the sinus cavities.  · Take antibiotics exactly as prescribed. Make sure to complete the entire course of antibiotics even if you start feeling better. This will prevent recurrence of your infection and bacterial resistance.   · Do not drive, drink alcohol, or take any other sedating medications or substances while taking cough syrup.   · Follow up with your primary care provider or with ENT if not improved within a few days or sooner for any new or worsening symptoms.   · Go to the ER for any fever that does not improve with Tylenol/Ibuprofen, neck stiffness, rash, severe headache, vision changes, shortness of breath, chest pain, severe facial pain or swelling, or for any other new and concerning symptoms.     Sinusitis (Antibiotic Treatment)    The sinuses are air-filled spaces within the bones of the face. They connect to the inside of the nose. Sinusitis is an inflammation of the tissue lining the sinus cavity. Sinus inflammation can occur during a cold. It can also be due to allergies to pollens and other particles in the air. Sinusitis can cause symptoms of sinus congestion and fullness. A sinus infection causes fever, headache and facial pain. There is often green or yellow drainage from the nose or into the back of the throat (post-nasal drip). You have been given antibiotics to treat this condition.  Home care:  · Take the full course of antibiotics as instructed. Do not stop taking them, even if you feel better.  · Drink plenty of water, hot tea, and other liquids. This may help thin mucus. It also may promote sinus drainage.  · Heat may help soothe painful areas of the face. Use a towel soaked in hot water. Or,  the shower and direct the hot spray onto your face. Using a vaporizer along with a  menthol rub at night may also help.   · An expectorant containing guaifenesin may help thin the mucus and promote drainage from the sinuses.  · Over-the-counter decongestants may be used unless a similar medicine was prescribed. Nasal sprays work the fastest. Use one that contains phenylephrine or oxymetazoline. First blow the nose gently. Then use the spray. Do not use these medicines more often than directed on the label or symptoms may get worse. You may also use tablets containing pseudoephedrine. Avoid products that combine ingredients, because side effects may be increased. Read labels. You can also ask the pharmacist for help. (NOTE: Persons with high blood pressure should not use decongestants. They can raise blood pressure.)  · Over-the-counter antihistamines may help if allergies contributed to your sinusitis.    · Do not use nasal rinses or irrigation during an acute sinus infection, unless told to by your health care provider. Rinsing may spread the infection to other sinuses.  · Use acetaminophen or ibuprofen to control pain, unless another pain medicine was prescribed. (If you have chronic liver or kidney disease or ever had a stomach ulcer, talk with your doctor before using these medicines. Aspirin should never be used in anyone under 18 years of age who is ill with a fever. It may cause severe liver damage.)  · Don't smoke. This can worsen symptoms.  Follow-up care  Follow up with your healthcare provider or our staff if you are not improving within the next week.  When to seek medical advice  Call your healthcare provider if any of these occur:  · Facial pain or headache becoming more severe  · Stiff neck  · Unusual drowsiness or confusion  · Swelling of the forehead or eyelids  · Vision problems, including blurred or double vision  · Fever of 100.4ºF (38ºC) or higher, or as directed by your healthcare provider  · Seizure  · Breathing problems  · Symptoms not resolving within 10 days  Date Last  Reviewed: 4/13/2015  © 9179-7181 [employer] [place]. [address] [place] [address], [place] [address]. All rights reserved. This information is not intended as a substitute for professional medical care. Always follow your healthcare professional's instructions.

## 2018-03-16 LAB — BACTERIA THROAT CULT: NORMAL

## 2018-03-26 DIAGNOSIS — Z79.811 AROMATASE INHIBITOR USE: ICD-10-CM

## 2018-03-26 RX ORDER — ANASTROZOLE 1 MG/1
1 TABLET ORAL DAILY
Qty: 90 TABLET | Refills: 1 | Status: SHIPPED | OUTPATIENT
Start: 2018-03-26 | End: 2018-07-24 | Stop reason: SDUPTHER

## 2018-07-24 DIAGNOSIS — Z79.811 AROMATASE INHIBITOR USE: ICD-10-CM

## 2018-07-24 RX ORDER — ANASTROZOLE 1 MG/1
1 TABLET ORAL DAILY
Qty: 90 TABLET | Refills: 1 | Status: SHIPPED | OUTPATIENT
Start: 2018-07-24 | End: 2018-10-08 | Stop reason: SDUPTHER

## 2018-07-25 ENCOUNTER — PATIENT OUTREACH (OUTPATIENT)
Dept: ADMINISTRATIVE | Facility: HOSPITAL | Age: 53
End: 2018-07-25

## 2018-07-27 ENCOUNTER — LAB VISIT (OUTPATIENT)
Dept: LAB | Facility: HOSPITAL | Age: 53
End: 2018-07-27
Attending: INTERNAL MEDICINE
Payer: MEDICARE

## 2018-07-27 ENCOUNTER — OFFICE VISIT (OUTPATIENT)
Dept: HEMATOLOGY/ONCOLOGY | Facility: CLINIC | Age: 53
End: 2018-07-27
Payer: MEDICARE

## 2018-07-27 ENCOUNTER — INFUSION (OUTPATIENT)
Dept: INFUSION THERAPY | Facility: HOSPITAL | Age: 53
End: 2018-07-27
Attending: INTERNAL MEDICINE
Payer: MEDICARE

## 2018-07-27 ENCOUNTER — TELEPHONE (OUTPATIENT)
Dept: INFUSION THERAPY | Facility: HOSPITAL | Age: 53
End: 2018-07-27

## 2018-07-27 ENCOUNTER — PATIENT MESSAGE (OUTPATIENT)
Dept: ADMINISTRATIVE | Facility: HOSPITAL | Age: 53
End: 2018-07-27

## 2018-07-27 VITALS
WEIGHT: 168.19 LBS | RESPIRATION RATE: 18 BRPM | HEART RATE: 65 BPM | HEIGHT: 67 IN | SYSTOLIC BLOOD PRESSURE: 102 MMHG | BODY MASS INDEX: 26.4 KG/M2 | DIASTOLIC BLOOD PRESSURE: 64 MMHG | TEMPERATURE: 98 F

## 2018-07-27 DIAGNOSIS — E78.00 PURE HYPERCHOLESTEROLEMIA: Primary | ICD-10-CM

## 2018-07-27 DIAGNOSIS — Z79.811 USE OF AROMATASE INHIBITORS: ICD-10-CM

## 2018-07-27 DIAGNOSIS — Z17.0 MALIGNANT NEOPLASM OF CENTRAL PORTION OF RIGHT BREAST IN FEMALE, ESTROGEN RECEPTOR POSITIVE: Primary | ICD-10-CM

## 2018-07-27 DIAGNOSIS — Z17.0 MALIGNANT NEOPLASM OF CENTRAL PORTION OF RIGHT BREAST IN FEMALE, ESTROGEN RECEPTOR POSITIVE: ICD-10-CM

## 2018-07-27 DIAGNOSIS — C50.111 MALIGNANT NEOPLASM OF CENTRAL PORTION OF RIGHT BREAST IN FEMALE, ESTROGEN RECEPTOR POSITIVE: ICD-10-CM

## 2018-07-27 DIAGNOSIS — M85.89 OSTEOPENIA OF MULTIPLE SITES: ICD-10-CM

## 2018-07-27 DIAGNOSIS — E78.5 HYPERLIPIDEMIA, UNSPECIFIED HYPERLIPIDEMIA TYPE: ICD-10-CM

## 2018-07-27 DIAGNOSIS — Z12.31 ENCOUNTER FOR SCREENING MAMMOGRAM FOR MALIGNANT NEOPLASM OF BREAST: ICD-10-CM

## 2018-07-27 DIAGNOSIS — Z79.811 USE OF AROMATASE INHIBITORS: Primary | ICD-10-CM

## 2018-07-27 DIAGNOSIS — C50.111 MALIGNANT NEOPLASM OF CENTRAL PORTION OF RIGHT BREAST IN FEMALE, ESTROGEN RECEPTOR POSITIVE: Primary | ICD-10-CM

## 2018-07-27 DIAGNOSIS — M89.9 DISORDER OF BONE: ICD-10-CM

## 2018-07-27 LAB
ALBUMIN SERPL BCP-MCNC: 3.8 G/DL
ALP SERPL-CCNC: 69 U/L
ALT SERPL W/O P-5'-P-CCNC: 22 U/L
ANION GAP SERPL CALC-SCNC: 8 MMOL/L
AST SERPL-CCNC: 21 U/L
BASOPHILS # BLD AUTO: 0.01 K/UL
BASOPHILS NFR BLD: 0.3 %
BILIRUB SERPL-MCNC: 1.2 MG/DL
BUN SERPL-MCNC: 14 MG/DL
CALCIUM SERPL-MCNC: 9.9 MG/DL
CHLORIDE SERPL-SCNC: 106 MMOL/L
CO2 SERPL-SCNC: 27 MMOL/L
CREAT SERPL-MCNC: 0.7 MG/DL
DIFFERENTIAL METHOD: ABNORMAL
EOSINOPHIL # BLD AUTO: 0.1 K/UL
EOSINOPHIL NFR BLD: 3.7 %
ERYTHROCYTE [DISTWIDTH] IN BLOOD BY AUTOMATED COUNT: 12.4 %
EST. GFR  (AFRICAN AMERICAN): >60 ML/MIN/1.73 M^2
EST. GFR  (NON AFRICAN AMERICAN): >60 ML/MIN/1.73 M^2
GLUCOSE SERPL-MCNC: 74 MG/DL
HCT VFR BLD AUTO: 39.2 %
HGB BLD-MCNC: 13.2 G/DL
LYMPHOCYTES # BLD AUTO: 1.3 K/UL
LYMPHOCYTES NFR BLD: 33.6 %
MCH RBC QN AUTO: 30.7 PG
MCHC RBC AUTO-ENTMCNC: 33.7 G/DL
MCV RBC AUTO: 91 FL
MONOCYTES # BLD AUTO: 0.4 K/UL
MONOCYTES NFR BLD: 10.5 %
NEUTROPHILS # BLD AUTO: 2 K/UL
NEUTROPHILS NFR BLD: 51.9 %
PLATELET # BLD AUTO: 198 K/UL
PMV BLD AUTO: 9.6 FL
POTASSIUM SERPL-SCNC: 4.8 MMOL/L
PROT SERPL-MCNC: 7 G/DL
RBC # BLD AUTO: 4.3 M/UL
SODIUM SERPL-SCNC: 141 MMOL/L
WBC # BLD AUTO: 3.81 K/UL

## 2018-07-27 PROCEDURE — 99215 OFFICE O/P EST HI 40 MIN: CPT | Mod: 25,S$PBB,, | Performed by: INTERNAL MEDICINE

## 2018-07-27 PROCEDURE — 96365 THER/PROPH/DIAG IV INF INIT: CPT | Mod: PO

## 2018-07-27 PROCEDURE — 99213 OFFICE O/P EST LOW 20 MIN: CPT | Mod: PBBFAC,25,PO | Performed by: INTERNAL MEDICINE

## 2018-07-27 PROCEDURE — 36415 COLL VENOUS BLD VENIPUNCTURE: CPT | Mod: PO

## 2018-07-27 PROCEDURE — 99999 PR PBB SHADOW E&M-EST. PATIENT-LVL III: CPT | Mod: PBBFAC,,, | Performed by: INTERNAL MEDICINE

## 2018-07-27 PROCEDURE — 85025 COMPLETE CBC W/AUTO DIFF WBC: CPT | Mod: PO

## 2018-07-27 PROCEDURE — 63600175 PHARM REV CODE 636 W HCPCS: Mod: PO | Performed by: INTERNAL MEDICINE

## 2018-07-27 PROCEDURE — 80053 COMPREHEN METABOLIC PANEL: CPT | Mod: PO

## 2018-07-27 RX ORDER — SODIUM CHLORIDE 0.9 % (FLUSH) 0.9 %
10 SYRINGE (ML) INJECTION
Status: CANCELLED | OUTPATIENT
Start: 2018-07-27

## 2018-07-27 RX ORDER — ATORVASTATIN CALCIUM 10 MG/1
10 TABLET, FILM COATED ORAL DAILY
Qty: 90 TABLET | Refills: 4 | Status: SHIPPED | OUTPATIENT
Start: 2018-07-27 | End: 2018-08-07 | Stop reason: SDUPTHER

## 2018-07-27 RX ORDER — HEPARIN 100 UNIT/ML
500 SYRINGE INTRAVENOUS
Status: CANCELLED | OUTPATIENT
Start: 2018-07-27

## 2018-07-27 RX ORDER — ZOLEDRONIC ACID 0.04 MG/ML
4 INJECTION, SOLUTION INTRAVENOUS
Status: COMPLETED | OUTPATIENT
Start: 2018-07-27 | End: 2018-07-27

## 2018-07-27 RX ADMIN — ZOLEDRONIC ACID 4 MG: 0.04 INJECTION, SOLUTION INTRAVENOUS at 09:07

## 2018-07-27 NOTE — PLAN OF CARE
Problem: Patient Care Overview  Goal: Plan of Care Review  Outcome: Ongoing (interventions implemented as appropriate)  I'm doing great!

## 2018-07-27 NOTE — PATIENT INSTRUCTIONS
[facility]  [address]  [phone] phone     [phone] fax  Hours of Operation: Monday- Friday 8:00am- 5:00pm  After hours phone  [phone]  Hematology / Oncology Physicians on call      Dr. Bill Palacios                        Please call with any concerns regarding your appointment today.

## 2018-07-27 NOTE — PROGRESS NOTES
Reason for visit: Follow-up for right breast carcinoma  Date of Diagnosis: October 2016    HPI:   The patient is a 53-year-old  female who presents to the hematology oncology clinic today for follow-up for right breast carcinoma.  The patient was previously followed in the outpatient  Hem/Onc clinic by Dr. Alessia Moss.  I have reviewed all of the patient's relevant clinical history available in the medical record and have utilized this in my evaluation and management recommendations today.  Today the patient reports that overall she is tolerating her adjuvant endocrine therapy with anastrozole well.  She does have side effects related to hot flashes.  She denies any chest pain or shortness of breath. She does not have significant movement in her right upper extremity because of hemiparesis due to history of stroke.  She does have occasional pain in her left wrist which she thinks might be related to compensatory overuse.  She denies any fevers, chills or night sweats.  She denies any loss of appetite or unintentional weight loss.  She denies any melena, hematochezia, hematemesis, hemoptysis or hematuria.  She denies any bowel or urinary complaints.  She denies any nausea, vomiting or abdominal pain.    PAST MEDICAL HISTORY:   1.  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  Dyslipidemia  3.  Osteopenia    SURGICAL HISTORY:   1.  Tonsillectomy  2.  Hysterectomy  3.  Right breast mastectomy with with right deep axilla sentinel lymph node biopsy followed by breast reconstruction with tissue expander done on April 11, 2017 and reconstruction completed in Dec 2017.  4.  Mediport placement and removal    FAMILY HISTORY: She reports that 2 paternal aunts had breast cancer in their late 50s.  A maternal uncle had lung cancer the age of 54.  He used to smoke cigarettes.  A maternal uncle had melanoma at the age of 49.  Her father had colon cancer at the age of 70.   Her mother had colon cancer in her 70s.  She denies any other immediate family members with cancer or bleeding/clotting disorders.    SOCIAL HISTORY: The ports a 5-pack-year smoking history and quit at the age of 23.  She drinks alcohol socially.  She has never used any recreational drugs.  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  She is  and has 2 children.  She lives in [place], [place].    ALLERGIES: [NKDA]    MEDICATIONS: [Medcard has been reviewed and/or reconciled.]    REVIEW OF SYSTEMS:   GENERAL: [No fevers, chills or sweats. No fatigue, weight loss or loss of appetite.]  HEENT: [No blurred vision, tinnitus, nasal discharge, sorethroat or dysphagia.]  HEART: [No chest pain, palpitations or shortness of breath.]    LUNGS: [No cough, hemoptysis or breathing problems.]  ABDOMEN: [No abdominal pain, nausea, vomiting, diarrhea, constipation or melena.]  GENITOURINARY: [No dysuria, bleeding or malodorous discharge.]  NEURO: [No headache, dizziness or vertigo.]  HEMATOLOGY: [No easy bruising, spontaneous bleeding or blood clots in the past].  MUSCULOSKELETAL: [No arthralgias, myalgias or bone pains.]  SKIN: [No rashes or skin lesions.]  PSYCHIATRY: [No depression or anxiety.]    PHYSICAL EXAMINATION:   VS: Reviewed on medication card.  APPEARANCE: The patient is a well-developed, well-nourished and well-groomed  female who appears in no acute distress.    HEENT: No scleral icterus. Both external auditory canals clear. No oral ulcers, lesions. Throat clear  HEAD: No sinus tenderness.  NECK: Supple. No palpable lymphadenopathy. Thyroid non-tender, no palpable masses  CHEST: Breath sounds clear bilaterally. No rales. No rhonchi. Unlabored respirations.  BREAST: No palpable mass in left breast. No nipple discharge. Right reconstruction noted.  CARDIOVASCULAR: Normal S1, S2. Normal rate. Regular rhythm.  ABDOMEN: Bowel sounds normal. No tenderness. No abdominal  distention. No hepatomegaly. No splenomegaly.  LYMPHATIC: No palpable supraclavicular, axillary nodes  EXTREMITIES: No clubbing, cyanosis, edema.    SKIN: No lesions. No petechiae. No ecchymoses. No induration or nodules  NEUROLOGIC: Right upper extremity hemiparesis is noted.  Alert & Oriented x 3. Mood appropriate to affect    LABS:   Reviewed    IMAGING:  Reviewed    IMPRESSION:  1.  Right breast lobular carcinoma [stage IIIa] [ER positive/AK positive/HER-2 negative] [BRCA negative]  2.  Osteopenia noted on DEXA scan done in June 2017    PLAN:  1. Right-sided breast cancer: Stage IIIA invasive lobular carcinoma, ER/AK positive, [id number] neg. BRCA negative. Given large mass on physical examination, we chose to treat with neoadjuvant chemotherapy, using dose-dense AC-T regimen, which patient completed in 2/2017 with decrease in size and firmness of R breast mass. She then underwent R breast mastectomy and SLND 4/2017, with tissue expander placement. Given final pathology after neoadjuvant chemotherapy showed tumor 6cm, sentinel LNs positive, and nipple skin positive, we did recommend adjuvant radiation to R chest and axilla. Her case was discussed at tumor board and while axillary LN dissection was discussed, adjuvant radiation to the left chest and axilla were recommende which she has since completed.  -- Continue anastrazole 1mg PO daily for adjuvant endocrine therapy.  2. Zometa 4mg IV every 6 months for prevention of AI-induced bone loss and treatment of osteopenia today.  -- Take Calcium + VitD.  Recent vitamin D level checked was normal.  She will be due for her next dose of Zometa in January 2019.    Follow-up in 6 months with labs for IV Zometa.  She knows to call sooner for any new problems or questions.    Leroy Huitron MD

## 2018-07-27 NOTE — TELEPHONE ENCOUNTER
----- Message from Leroy Huitron MD sent at 7/27/2018 10:16 AM CDT -----  Screening is good. thx  ----- Message -----  From: Emma Patterson LPN  Sent: 7/27/2018  10:10 AM  To: MD Dr TAYLOR Junior, did you want a screening mammo or diagnostic?  If diagnostic, please reorder.    Thanks, Emma

## 2018-08-01 ENCOUNTER — LAB VISIT (OUTPATIENT)
Dept: LAB | Facility: HOSPITAL | Age: 53
End: 2018-08-01
Attending: INTERNAL MEDICINE
Payer: MEDICARE

## 2018-08-01 DIAGNOSIS — E78.00 PURE HYPERCHOLESTEROLEMIA: ICD-10-CM

## 2018-08-01 LAB
ALBUMIN SERPL BCP-MCNC: 3.6 G/DL
ALP SERPL-CCNC: 74 U/L
ALT SERPL W/O P-5'-P-CCNC: 23 U/L
ANION GAP SERPL CALC-SCNC: 6 MMOL/L
AST SERPL-CCNC: 22 U/L
BASOPHILS # BLD AUTO: 0.02 K/UL
BASOPHILS NFR BLD: 0.6 %
BILIRUB SERPL-MCNC: 1.2 MG/DL
BUN SERPL-MCNC: 15 MG/DL
CALCIUM SERPL-MCNC: 9.4 MG/DL
CHLORIDE SERPL-SCNC: 107 MMOL/L
CHOLEST SERPL-MCNC: 130 MG/DL
CHOLEST/HDLC SERPL: 2.6 {RATIO}
CO2 SERPL-SCNC: 27 MMOL/L
CREAT SERPL-MCNC: 0.6 MG/DL
DIFFERENTIAL METHOD: ABNORMAL
EOSINOPHIL # BLD AUTO: 0.1 K/UL
EOSINOPHIL NFR BLD: 3.7 %
ERYTHROCYTE [DISTWIDTH] IN BLOOD BY AUTOMATED COUNT: 12.5 %
EST. GFR  (AFRICAN AMERICAN): >60 ML/MIN/1.73 M^2
EST. GFR  (NON AFRICAN AMERICAN): >60 ML/MIN/1.73 M^2
GLUCOSE SERPL-MCNC: 81 MG/DL
HCT VFR BLD AUTO: 41.9 %
HDLC SERPL-MCNC: 50 MG/DL
HDLC SERPL: 38.5 %
HGB BLD-MCNC: 13.5 G/DL
IMM GRANULOCYTES # BLD AUTO: 0.01 K/UL
IMM GRANULOCYTES NFR BLD AUTO: 0.3 %
LDLC SERPL CALC-MCNC: 67.8 MG/DL
LYMPHOCYTES # BLD AUTO: 1.1 K/UL
LYMPHOCYTES NFR BLD: 30.7 %
MCH RBC QN AUTO: 31 PG
MCHC RBC AUTO-ENTMCNC: 32.2 G/DL
MCV RBC AUTO: 96 FL
MONOCYTES # BLD AUTO: 0.3 K/UL
MONOCYTES NFR BLD: 7.6 %
NEUTROPHILS # BLD AUTO: 2 K/UL
NEUTROPHILS NFR BLD: 57.1 %
NONHDLC SERPL-MCNC: 80 MG/DL
NRBC BLD-RTO: 0 /100 WBC
PLATELET # BLD AUTO: 232 K/UL
PMV BLD AUTO: 10.2 FL
POTASSIUM SERPL-SCNC: 4.6 MMOL/L
PROT SERPL-MCNC: 7.1 G/DL
RBC # BLD AUTO: 4.36 M/UL
SODIUM SERPL-SCNC: 140 MMOL/L
TRIGL SERPL-MCNC: 61 MG/DL
WBC # BLD AUTO: 3.55 K/UL

## 2018-08-01 PROCEDURE — 80061 LIPID PANEL: CPT

## 2018-08-01 PROCEDURE — 85025 COMPLETE CBC W/AUTO DIFF WBC: CPT

## 2018-08-01 PROCEDURE — 36415 COLL VENOUS BLD VENIPUNCTURE: CPT | Mod: PO

## 2018-08-01 PROCEDURE — 80053 COMPREHEN METABOLIC PANEL: CPT

## 2018-08-02 NOTE — PROGRESS NOTES
Here are the results of your recent labs.  We will review them in detail at your follow up visit.    Sincerely,    Angel Emery M.D.        If you would like to review your experience with Dr. Emery or [facility], please follow the link below:    [email or web address]

## 2018-08-07 DIAGNOSIS — E78.5 HYPERLIPIDEMIA, UNSPECIFIED HYPERLIPIDEMIA TYPE: ICD-10-CM

## 2018-08-07 RX ORDER — ATORVASTATIN CALCIUM 10 MG/1
10 TABLET, FILM COATED ORAL DAILY
Qty: 90 TABLET | Refills: 4 | Status: SHIPPED | OUTPATIENT
Start: 2018-08-07 | End: 2019-12-17 | Stop reason: SDUPTHER

## 2018-08-09 ENCOUNTER — OFFICE VISIT (OUTPATIENT)
Dept: INTERNAL MEDICINE | Facility: CLINIC | Age: 53
End: 2018-08-09
Payer: MEDICARE

## 2018-08-09 VITALS
BODY MASS INDEX: 26.4 KG/M2 | HEART RATE: 88 BPM | TEMPERATURE: 98 F | HEIGHT: 67 IN | WEIGHT: 168.19 LBS | SYSTOLIC BLOOD PRESSURE: 114 MMHG | DIASTOLIC BLOOD PRESSURE: 70 MMHG

## 2018-08-09 DIAGNOSIS — Z12.11 COLON CANCER SCREENING: ICD-10-CM

## 2018-08-09 DIAGNOSIS — E78.00 PURE HYPERCHOLESTEROLEMIA: ICD-10-CM

## 2018-08-09 DIAGNOSIS — C50.111 MALIGNANT NEOPLASM OF CENTRAL PORTION OF RIGHT BREAST IN FEMALE, ESTROGEN RECEPTOR POSITIVE: ICD-10-CM

## 2018-08-09 DIAGNOSIS — Z86.73 HISTORY OF CVA (CEREBROVASCULAR ACCIDENT): ICD-10-CM

## 2018-08-09 DIAGNOSIS — Z17.0 MALIGNANT NEOPLASM OF CENTRAL PORTION OF RIGHT BREAST IN FEMALE, ESTROGEN RECEPTOR POSITIVE: ICD-10-CM

## 2018-08-09 DIAGNOSIS — R53.1 RIGHT SIDED WEAKNESS: ICD-10-CM

## 2018-08-09 DIAGNOSIS — I69.351 HEMIPARESIS AFFECTING RIGHT SIDE AS LATE EFFECT OF CEREBROVASCULAR ACCIDENT: Primary | ICD-10-CM

## 2018-08-09 PROBLEM — M65.4 DE QUERVAIN'S TENOSYNOVITIS, LEFT: Status: RESOLVED | Noted: 2017-10-13 | Resolved: 2018-08-09

## 2018-08-09 PROCEDURE — 99999 PR PBB SHADOW E&M-EST. PATIENT-LVL III: CPT | Mod: PBBFAC,,, | Performed by: INTERNAL MEDICINE

## 2018-08-09 PROCEDURE — 99214 OFFICE O/P EST MOD 30 MIN: CPT | Mod: S$PBB,,, | Performed by: INTERNAL MEDICINE

## 2018-08-09 PROCEDURE — 99213 OFFICE O/P EST LOW 20 MIN: CPT | Mod: PBBFAC,PO | Performed by: INTERNAL MEDICINE

## 2018-08-09 NOTE — PROGRESS NOTES
Subjective:       Patient ID: Carole Moore is a 53 y.o. female.    Chief Complaint: Annual Exam    HPI  Patient is a 53-year-old female presenting today for follow-up of chronic health issues.  She has a history of chronic hypercholesterolemia breast cancer and hemiparesis due to a CVA that she had quite a few years ago.  She indicates generally speaking she is doing well.  In regards to the breast cancer she is on estrogen blockade as therapy at this time.  She has completed all other interventions at this point and states that the medication is tolerable and she seems to be doing okay with it.    She is on secondary prevention with Lipitor for her cholesterol and stroke issues.  She has been on the medication for some time experiences no adverse effects.  Her cholesterol numbers have been good.    She has chronic hemiparesis on the right side secondary to her prior stroke.  This issue is stable at this time.  She has not seen any improvement with time but she is not seeing any deterioration as well.    She is due for colon cancer screening.  She is resistant about this.  We discussed that based upon her family history it is worthwhile to have this done it is quite important to have it done and she expresses understanding.  She would like to wait till October or November to schedule it but she will proceed with she states.    Review of Systems   Constitutional: Negative for chills and fever.   HENT: Negative for hearing loss.    Eyes: Negative for photophobia and visual disturbance.   Respiratory: Negative for cough, shortness of breath and wheezing.    Cardiovascular: Negative for chest pain and palpitations.   Gastrointestinal: Negative for blood in stool, constipation, nausea and vomiting.   Genitourinary: Negative for dysuria and hematuria.   Musculoskeletal: Negative for neck pain and neck stiffness.   Skin: Negative for rash.   Neurological: Negative for syncope, weakness, light-headedness and  "headaches.        See HPI   Hematological: Negative for adenopathy.   Psychiatric/Behavioral: Negative for dysphoric mood. The patient is not nervous/anxious.        Objective:   /70   Pulse 88   Temp 97.9 °F (36.6 °C) (Tympanic)   Ht 5' 7" (1.702 m)   Wt 76.3 kg (168 lb 3.4 oz)   LMP 10/11/2009   BMI 26.35 kg/m²      Physical Exam   Constitutional: She is oriented to person, place, and time. She appears well-developed and well-nourished.   HENT:   Head: Normocephalic and atraumatic.   Eyes: EOM are normal. Pupils are equal, round, and reactive to light.   Neck: Normal range of motion. Neck supple. No thyromegaly present.   Cardiovascular: Normal rate, regular rhythm and intact distal pulses.  Exam reveals no gallop and no friction rub.    No murmur heard.  Pulmonary/Chest: Breath sounds normal. She has no wheezes. She has no rales. She exhibits no tenderness.   Abdominal: Soft. Bowel sounds are normal. She exhibits no distension and no mass. There is no tenderness. There is no rebound and no guarding.   Musculoskeletal: She exhibits no edema.   Lymphadenopathy:     She has no cervical adenopathy.   Neurological: She is alert and oriented to person, place, and time. She has normal reflexes. She displays normal reflexes. No cranial nerve deficit.   Hemiparesis on the right side.  Hyperreflexia on the right.  Contractures of the right arm.   Skin: Skin is warm and dry. No rash noted.   Psychiatric: She has a normal mood and affect. Her behavior is normal. Judgment normal.   Vitals reviewed.      Lab Visit on 08/01/2018   Component Date Value    Cholesterol 08/01/2018 130     Triglycerides 08/01/2018 61     HDL 08/01/2018 50     LDL Cholesterol 08/01/2018 67.8     HDL/Chol Ratio 08/01/2018 38.5     Total Cholesterol/HDL Ra* 08/01/2018 2.6     Non-HDL Cholesterol 08/01/2018 80     Sodium 08/01/2018 140     Potassium 08/01/2018 4.6     Chloride 08/01/2018 107     CO2 08/01/2018 27     Glucose " 08/01/2018 81     BUN, Bld 08/01/2018 15     Creatinine 08/01/2018 0.6     Calcium 08/01/2018 9.4     Total Protein 08/01/2018 7.1     Albumin 08/01/2018 3.6     Total Bilirubin 08/01/2018 1.2*    Alkaline Phosphatase 08/01/2018 74     AST 08/01/2018 22     ALT 08/01/2018 23     Anion Gap 08/01/2018 6*    eGFR if African American 08/01/2018 >60.0     eGFR if non  Amer* 08/01/2018 >60.0     WBC 08/01/2018 3.55*    RBC 08/01/2018 4.36     Hemoglobin 08/01/2018 13.5     Hematocrit 08/01/2018 41.9     MCV 08/01/2018 96     MCH 08/01/2018 31.0     MCHC 08/01/2018 32.2     RDW 08/01/2018 12.5     Platelets 08/01/2018 232     MPV 08/01/2018 10.2     Immature Granulocytes 08/01/2018 0.3     Gran # (ANC) 08/01/2018 2.0     Immature Grans (Abs) 08/01/2018 0.01     Lymph # 08/01/2018 1.1     Mono # 08/01/2018 0.3     Eos # 08/01/2018 0.1     Baso # 08/01/2018 0.02     nRBC 08/01/2018 0     Gran% 08/01/2018 57.1     Lymph% 08/01/2018 30.7     Mono% 08/01/2018 7.6     Eosinophil% 08/01/2018 3.7     Basophil% 08/01/2018 0.6     Differential Method 08/01/2018 Automated    Lab Visit on 07/27/2018   Component Date Value    WBC 07/27/2018 3.81*    RBC 07/27/2018 4.30     Hemoglobin 07/27/2018 13.2     Hematocrit 07/27/2018 39.2     MCV 07/27/2018 91     MCH 07/27/2018 30.7     MCHC 07/27/2018 33.7     RDW 07/27/2018 12.4     Platelets 07/27/2018 198     MPV 07/27/2018 9.6     Gran # (ANC) 07/27/2018 2.0     Lymph # 07/27/2018 1.3     Mono # 07/27/2018 0.4     Eos # 07/27/2018 0.1     Baso # 07/27/2018 0.01     Gran% 07/27/2018 51.9     Lymph% 07/27/2018 33.6     Mono% 07/27/2018 10.5     Eosinophil% 07/27/2018 3.7     Basophil% 07/27/2018 0.3     Differential Method 07/27/2018 Automated     Sodium 07/27/2018 141     Potassium 07/27/2018 4.8     Chloride 07/27/2018 106     CO2 07/27/2018 27     Glucose 07/27/2018 74     BUN, Bld 07/27/2018 14     Creatinine  07/27/2018 0.7     Calcium 07/27/2018 9.9     Total Protein 07/27/2018 7.0     Albumin 07/27/2018 3.8     Total Bilirubin 07/27/2018 1.2*    Alkaline Phosphatase 07/27/2018 69     AST 07/27/2018 21     ALT 07/27/2018 22     Anion Gap 07/27/2018 8     eGFR if  07/27/2018 >60     eGFR if non African Amer* 07/27/2018 >60        Assessment:       1. Hemiparesis affecting right side as late effect of cerebrovascular accident    2. Pure hypercholesterolemia    3. Right sided weakness    4. History of CVA (cerebrovascular accident)    5. Malignant neoplasm of central portion of right breast in female, estrogen receptor positive    6. Colon cancer screening        Plan:   No problem-specific Assessment & Plan notes found for this encounter.    Carole was seen today for annual exam.    Diagnoses and all orders for this visit:    Hemiparesis affecting right side as late effect of cerebrovascular accident  Comments:  Continued late effect of stroke, no changes over time.  Stable.    Pure hypercholesterolemia  Comments:  COntinue atorvastatin,   stable    Right sided weakness  Comments:  persistent.  Stable. no degredation over time.    History of CVA (cerebrovascular accident)  Comments:  continue secondary prevention.    Malignant neoplasm of central portion of right breast in female, estrogen receptor positive  Comments:  COntinue on arimidex.  Following with oncology.    Colon cancer screening  -     Case request GI: COLONOSCOPY        Follow-up in about 1 year (around 8/9/2019).

## 2018-08-13 ENCOUNTER — DOCUMENTATION ONLY (OUTPATIENT)
Dept: ENDOSCOPY | Facility: HOSPITAL | Age: 53
End: 2018-08-13

## 2018-08-13 ENCOUNTER — TELEPHONE (OUTPATIENT)
Dept: INTERNAL MEDICINE | Facility: CLINIC | Age: 53
End: 2018-08-13

## 2018-08-13 RX ORDER — SODIUM, POTASSIUM,MAG SULFATES 17.5-3.13G
SOLUTION, RECONSTITUTED, ORAL ORAL
Qty: 354 ML | Refills: 0 | Status: ON HOLD | OUTPATIENT
Start: 2018-08-13 | End: 2018-10-30 | Stop reason: CLARIF

## 2018-08-13 NOTE — PROGRESS NOTES
08/13/18 Per [employer], Person pressed touch tone key to speak with an endoscopy . Colonoscopy scheduled for 10/30/18. Instructions given, mailed and sent via my chart. Suprep ordered.

## 2018-08-13 NOTE — TELEPHONE ENCOUNTER
----- Message from Yadi Gamino sent at 8/13/2018 10:37 AM CDT -----  Pt dropped off a form on last Thursday. She is wanting to check on the status of that. If you could call pt at [phone] to let her know if it has been done or once it's been done. Thanks.

## 2018-10-08 ENCOUNTER — TELEPHONE (OUTPATIENT)
Dept: HEMATOLOGY/ONCOLOGY | Facility: CLINIC | Age: 53
End: 2018-10-08

## 2018-10-08 DIAGNOSIS — Z79.811 AROMATASE INHIBITOR USE: ICD-10-CM

## 2018-10-08 RX ORDER — ANASTROZOLE 1 MG/1
1 TABLET ORAL DAILY
Qty: 90 TABLET | Refills: 1 | Status: SHIPPED | OUTPATIENT
Start: 2018-10-08 | End: 2019-10-07 | Stop reason: SDUPTHER

## 2018-10-08 NOTE — TELEPHONE ENCOUNTER
This pt has not seen you since 11/2016. She sent an appt request to see you for her 6mo fu. She has been seeing . Should I book her with you and if so can you make sure all the orders you will want are in please. Or would it be ok to use the lab orders  already put in?

## 2018-10-08 NOTE — TELEPHONE ENCOUNTER
Spoke with pt and informed her that our schedules for January 2019 are not open yet. That she last saw  7/2018 and her 6mo fu would be in January. But that as soon as our schedules open for January we will get her scheduled to see Bianca for her labs and poss Zometa. Pt verbalized understanding.

## 2018-10-16 ENCOUNTER — IMMUNIZATION (OUTPATIENT)
Dept: INTERNAL MEDICINE | Facility: CLINIC | Age: 53
End: 2018-10-16
Payer: MEDICARE

## 2018-10-16 PROCEDURE — G0008 ADMIN INFLUENZA VIRUS VAC: HCPCS | Mod: PBBFAC,PO

## 2018-10-18 ENCOUNTER — HOSPITAL ENCOUNTER (OUTPATIENT)
Dept: RADIOLOGY | Facility: HOSPITAL | Age: 53
Discharge: HOME | End: 2018-10-18
Attending: INTERNAL MEDICINE
Payer: MEDICARE

## 2018-10-18 VITALS — BODY MASS INDEX: 26.37 KG/M2 | HEIGHT: 67 IN | WEIGHT: 168 LBS

## 2018-10-18 DIAGNOSIS — Z17.0 MALIGNANT NEOPLASM OF CENTRAL PORTION OF RIGHT BREAST IN FEMALE, ESTROGEN RECEPTOR POSITIVE: ICD-10-CM

## 2018-10-18 DIAGNOSIS — Z12.31 ENCOUNTER FOR SCREENING MAMMOGRAM FOR MALIGNANT NEOPLASM OF BREAST: ICD-10-CM

## 2018-10-18 DIAGNOSIS — C50.111 MALIGNANT NEOPLASM OF CENTRAL PORTION OF RIGHT BREAST IN FEMALE, ESTROGEN RECEPTOR POSITIVE: ICD-10-CM

## 2018-10-18 PROCEDURE — 77061 BREAST TOMOSYNTHESIS UNI: CPT | Mod: TC,PO,LT

## 2018-10-18 PROCEDURE — 77065 DX MAMMO INCL CAD UNI: CPT | Mod: 26,LT,, | Performed by: RADIOLOGY

## 2018-10-18 PROCEDURE — 77065 DX MAMMO INCL CAD UNI: CPT | Mod: TC,PO,LT

## 2018-10-18 PROCEDURE — 77061 BREAST TOMOSYNTHESIS UNI: CPT | Mod: 26,LT,, | Performed by: RADIOLOGY

## 2018-10-28 PROBLEM — Z12.11 COLON CANCER SCREENING: Status: ACTIVE | Noted: 2018-10-28

## 2018-10-30 ENCOUNTER — ANESTHESIA (OUTPATIENT)
Dept: ENDOSCOPY | Facility: HOSPITAL | Age: 53
End: 2018-10-30
Payer: MEDICARE

## 2018-10-30 ENCOUNTER — HOSPITAL ENCOUNTER (OUTPATIENT)
Facility: HOSPITAL | Age: 53
Discharge: HOME | End: 2018-10-30
Attending: FAMILY MEDICINE | Admitting: FAMILY MEDICINE
Payer: MEDICARE

## 2018-10-30 ENCOUNTER — ANESTHESIA EVENT (OUTPATIENT)
Dept: ENDOSCOPY | Facility: HOSPITAL | Age: 53
End: 2018-10-30
Payer: MEDICARE

## 2018-10-30 DIAGNOSIS — K57.30 DIVERTICULOSIS OF COLON: ICD-10-CM

## 2018-10-30 DIAGNOSIS — K63.5 POLYP OF COLON, UNSPECIFIED PART OF COLON, UNSPECIFIED TYPE: ICD-10-CM

## 2018-10-30 DIAGNOSIS — Z12.11 COLON CANCER SCREENING: Primary | ICD-10-CM

## 2018-10-30 DIAGNOSIS — Z80.0 FAMILY HISTORY OF COLON CANCER: ICD-10-CM

## 2018-10-30 DIAGNOSIS — Z12.11 SPECIAL SCREENING FOR MALIGNANT NEOPLASMS, COLON: ICD-10-CM

## 2018-10-30 PROCEDURE — 63600175 PHARM REV CODE 636 W HCPCS: Performed by: NURSE ANESTHETIST, CERTIFIED REGISTERED

## 2018-10-30 PROCEDURE — 37000009 HC ANESTHESIA EA ADD 15 MINS: Performed by: FAMILY MEDICINE

## 2018-10-30 PROCEDURE — 88305 TISSUE EXAM BY PATHOLOGIST: CPT | Mod: 26,,, | Performed by: PATHOLOGY

## 2018-10-30 PROCEDURE — 27201012 HC FORCEPS, HOT/COLD, DISP: Performed by: FAMILY MEDICINE

## 2018-10-30 PROCEDURE — 45380 COLONOSCOPY AND BIOPSY: CPT | Mod: PT,,, | Performed by: FAMILY MEDICINE

## 2018-10-30 PROCEDURE — 45380 COLONOSCOPY AND BIOPSY: CPT | Performed by: FAMILY MEDICINE

## 2018-10-30 PROCEDURE — 88305 TISSUE EXAM BY PATHOLOGIST: CPT | Performed by: PATHOLOGY

## 2018-10-30 PROCEDURE — 25000003 PHARM REV CODE 250: Performed by: FAMILY MEDICINE

## 2018-10-30 PROCEDURE — 37000008 HC ANESTHESIA 1ST 15 MINUTES: Performed by: FAMILY MEDICINE

## 2018-10-30 RX ORDER — SODIUM CHLORIDE, SODIUM LACTATE, POTASSIUM CHLORIDE, CALCIUM CHLORIDE 600; 310; 30; 20 MG/100ML; MG/100ML; MG/100ML; MG/100ML
INJECTION, SOLUTION INTRAVENOUS CONTINUOUS
Status: DISCONTINUED | OUTPATIENT
Start: 2018-10-30 | End: 2018-10-30 | Stop reason: HOSPADM

## 2018-10-30 RX ORDER — PROPOFOL 10 MG/ML
INJECTION, EMULSION INTRAVENOUS
Status: DISCONTINUED | OUTPATIENT
Start: 2018-10-30 | End: 2018-10-30

## 2018-10-30 RX ORDER — LIDOCAINE HCL/PF 100 MG/5ML
SYRINGE (ML) INTRAVENOUS
Status: DISCONTINUED | OUTPATIENT
Start: 2018-10-30 | End: 2018-10-30

## 2018-10-30 RX ORDER — SODIUM CHLORIDE 0.9 % (FLUSH) 0.9 %
3 SYRINGE (ML) INJECTION
Status: DISCONTINUED | OUTPATIENT
Start: 2018-10-30 | End: 2018-10-30 | Stop reason: HOSPADM

## 2018-10-30 RX ORDER — ONDANSETRON 2 MG/ML
INJECTION INTRAMUSCULAR; INTRAVENOUS
Status: DISCONTINUED | OUTPATIENT
Start: 2018-10-30 | End: 2018-10-30

## 2018-10-30 RX ADMIN — PROPOFOL 30 MG: 10 INJECTION, EMULSION INTRAVENOUS at 07:10

## 2018-10-30 RX ADMIN — SODIUM CHLORIDE, SODIUM LACTATE, POTASSIUM CHLORIDE, AND CALCIUM CHLORIDE: 600; 310; 30; 20 INJECTION, SOLUTION INTRAVENOUS at 06:10

## 2018-10-30 RX ADMIN — LIDOCAINE HYDROCHLORIDE 100 MG: 20 INJECTION, SOLUTION INTRAVENOUS at 06:10

## 2018-10-30 RX ADMIN — ONDANSETRON 4 MG: 2 INJECTION, SOLUTION INTRAMUSCULAR; INTRAVENOUS at 07:10

## 2018-10-30 RX ADMIN — PROPOFOL 100 MG: 10 INJECTION, EMULSION INTRAVENOUS at 06:10

## 2018-10-30 NOTE — DISCHARGE INSTRUCTIONS
Diverticulosis    Diverticulosis means that small pouches have formed in the wall of your large intestine (colon). Most often, this problem causes no symptoms and is common as people age. But the pouches in the colon are at risk of becoming infected. When this happens, the condition is called diverticulitis. Although most people with diverticulosis never develop diverticulitis, it is still not uncommon. Rectal bleeding can also occur and in less common situations, a type of colon inflammation called colitis.  While most people do not have symptoms, some people with diverticulosis may have:  · Abdominal cramps and pain  · Bloating  · Constipation  · Change in bowel habits  Causes  The exact cause of diverticulosis (and diverticulitis) has not been proved, but a few things are associated with the condition:  · Low-fiber diet  · Constipation  · Lack of exercise  Your healthcare provider will talk with you about how to manage your condition. Diet changes may be all that are needed to help control diverticulosis and prevent progression to diverticulitis. If you develop diverticulitis, you will likely need other treatments.  Home care  You may be told to take fiber supplements daily. Fiber adds bulk to the stool so that it passes through the colon more easily. Stool softeners may be recommended. You may also be given medications for pain relief. Be sure to take all medications as directed.  In the past, people were told to avoid corn, nuts, and seeds. This is no longer necessary.  Follow these guidelines when caring for yourself at home:  · Eat unprocessed foods that are high in fiber. Whole grains, fruits, and vegetables are good choices.  · Drink 6 to 8 glasses of water every day unless your healthcare provider has you limit how much fluid you should have.  · Watch for changes in your bowel movements. Tell your provider if you notice any changes.  · Begin an exercise program. Ask your provider how to get started.  Generally, walking is the best.  · Get plenty of rest and sleep.  Follow-up care  Follow up with your healthcare provider, or as advised. Regular visits may be needed to check on your health. Sometimes special procedures such as colonoscopy, are needed after an episode of diverticulitis or blooding. Be sure to keep all your appointments.  If a stool sample was taken, or cultures were done, you should be told if they are positive, or if your treatment needs to be changed. You can call as directed for the results.  If X-rays were done, a radiologist will look at them. You will be told if there is a change in your treatment.  If antibiotics were prescribed, be sure to finish them all.  When to seek medical advice  Call your healthcare provider right away if any of these occur:  · Fever of 100.4°F (38°C) or higher, or as directed by your healthcare provider  · Severe cramps in the lower left side of the abdomen or pain that is getting worse  · Tenderness in the lower left side of the abdomen or worsening pain throughout the abdomen  · Diarrhea or constipation that doesn't get better within 24 hours  · Nausea and vomiting  · Bleeding from the rectum  Call 911  Call emergency services if any of the following occur:  · Trouble breathing  · Confusion  · Very drowsy or trouble awakening  · Fainting or loss of consciousness  · Rapid heart rate  · Chest pain  Date Last Reviewed: 12/30/2015 © 2000-2017 [employer]. [address] [place] [address]. All rights reserved. This information is not intended as a substitute for professional medical care. Always follow your healthcare professional's instructions.        Understanding Colon and Rectal Polyps    The colon (also called the large intestine) is a muscular tube that forms the last part of the digestive tract. It absorbs water and stores food waste. The colon is about 4 to 6 feet long. The rectum is the last 6 inches of the colon. The colon and rectum  have a smooth lining composed of millions of cells. Changes in these cells can lead to growths in the colon that can become cancerous and should be removed. Multiple tests are available to screen for colon cancer, but the colonoscopy is the most recommended test. During colonoscopy, these polyps can be removed. How often you need this test depends on many things including your condition, your family history, symptoms, and what the findings were at the previous colonoscopy.   When the colon lining changes  Changes that happen in the cells that line the colon or rectum can lead to growths called polyps. Over a period of years, polyps can turn cancerous. Removing polyps early may prevent cancer from ever forming.  Polyps  Polyps are fleshy clumps of tissue that form on the lining of the colon or rectum. Small polyps are usually benign (not cancerous). However, over time, cells in a polyp can change and become cancerous. Certain types of polyps known as adenomatous polyps are premalignant. The risk for invasive cancer increases with the size of the polyp and certain cell and gene features. This means that they can become cancerous if they're not removed. Hyperplastic polyps are benign. They can grow quite large and not turn cancerous.   Cancer  Almost all colorectal cancers start when polyp cells begin growing abnormally. As a cancerous tumor grows, it may involve more and more of the colon or rectum. In time, cancer can also grow beyond the colon or rectum and spread to nearby organs or to glands called lymph nodes. The cells can also travel to other parts of the body. This is known as metastasis. The earlier a cancerous tumor is removed, the better the chance of preventing its spread.    Date Last Reviewed: 8/1/2016  © 1149-5897 [employer]. [address] [place] [address], [place] [address]. All rights reserved. This information is not intended as a substitute for professional medical care. Always follow your  healthcare professional's instructions.

## 2018-10-30 NOTE — TRANSFER OF CARE
"Anesthesia Transfer of Care Note    Patient: Carole Moore    Procedure(s) Performed: Procedure(s) (LRB):  COLONOSCOPY (N/A)    Patient location: PACU    Anesthesia Type: MAC    Transport from OR: Transported from OR on room air with adequate spontaneous ventilation    Post pain: adequate analgesia    Post assessment: no apparent anesthetic complications    Post vital signs: stable    Level of consciousness: awake    Nausea/Vomiting: no nausea/vomiting    Complications: none    Transfer of care protocol was followed      Last vitals:   Visit Vitals  /64 (BP Location: Left arm, Patient Position: Lying)   Pulse 76   Temp 36.8 °C (98.2 °F) (Oral)   Resp 18   Ht 5' 7" (1.702 m)   Wt 73.2 kg (161 lb 5 oz)   LMP 10/11/2009   SpO2 96%   Breastfeeding? No   BMI 25.27 kg/m²     "

## 2018-10-30 NOTE — ANESTHESIA PREPROCEDURE EVALUATION
10/30/2018  Carole Moore is a 53 y.o., female.    Pre-op Assessment    I have reviewed the Patient Summary Reports.     I have reviewed the Nursing Notes.   I have reviewed the Medications.     Review of Systems  Anesthesia Hx:  Hx of Anesthetic complications    Social:  Former Smoker, No Alcohol Use    Hematology/Oncology:  Hematology Normal       -- Cancer in past history:  right surgery  Oncology Comments: 2016 right breast cancer.     EENT/Dental:   chronic allergic rhinitis   Cardiovascular:   ECG has been reviewed. CONCLUSIONS     1 - No wall motion abnormalities.     2 - Normal left ventricular systolic function (EF 55-60%).     3 - Normal left ventricular diastolic function.     4 - Normal right ventricular systolic function .   Normal sinus rhythm  Left axis deviation  Septal infarct (cited on or before 11-OCT-2017)  Abnormal ECG  When compared with ECG of 31-MAR-2017 11:48,  No significant change was found  Confirmed by MARY ELLEN KWOK MD ([id number]) on 10/11/2017 6:06:27 PM   Renal/:  Renal/ Normal     Hepatic/GI:   Bowel Prep. 0300 last drink of fluid.  Sunday last solid meal.   Neurological:   CVA, residual symptoms    Dermatological:  Skin Normal    Psych:  Psychiatric Normal           Physical Exam  General:  Well nourished    Airway/Jaw/Neck:  Airway Findings: Mallampati: I                Anesthesia Plan  Type of Anesthesia, risks & benefits discussed:  Anesthesia Type:  MAC  Patient's Preference:   Intra-op Monitoring Plan:   Intra-op Monitoring Plan Comments:   Post Op Pain Control Plan:   Post Op Pain Control Plan Comments:   Induction:   IV  Beta Blocker:  Patient is not currently on a Beta-Blocker (No further documentation required).       Informed Consent: Patient understands risks and agrees with Anesthesia plan.  Questions answered. Anesthesia consent signed with patient.  ASA  Score: 2     Day of Surgery Review of History & Physical: I have interviewed and examined the patient. I have reviewed the patient's H&P dated: 10/30/18. There are no significant changes.  H&P update referred to the surgeon.

## 2018-10-30 NOTE — [employer] PATIENT INSTRUCTIONS
Discharge Summary/Instructions after an Endoscopic Procedure  Patient Name: Carole Moore  Patient MRN: [id number]  Patient YOB: 1965 Tuesday, October 30, 2018 Cosme Monson MD  RESTRICTIONS:  During your procedure today, you received medications for sedation.  These   medications may affect your judgment, balance and coordination.  Therefore,   for 24 hours, you have the following restrictions:   - DO NOT drive a car, operate machinery, make legal/financial decisions,   sign important papers or drink alcohol.    ACTIVITY:  Today: no heavy lifting, straining or running due to procedural   sedation/anesthesia.  The following day: return to full activity including work.  DIET:  Eat and drink normally unless instructed otherwise.     TREATMENT FOR COMMON SIDE EFFECTS:  - Mild abdominal pain, nausea, belching, bloating or excessive gas:  rest,   eat lightly and use a heating pad.  - Sore Throat: treat with throat lozenges and/or gargle with warm salt   water.  - Because air was used during the procedure, expelling large amounts of air   from your rectum or belching is normal.  - If a bowel prep was taken, you may not have a bowel movement for 1-3 days.    This is normal.  SYMPTOMS TO WATCH FOR AND REPORT TO YOUR PHYSICIAN:  1. Abdominal pain or bloating, other than gas cramps.  2. Chest pain.  3. Back pain.  4. Signs of infection such as: chills or fever occurring within 24 hours   after the procedure.  5. Rectal bleeding, which would show as bright red, maroon, or black stools.   (A tablespoon of blood from the rectum is not serious, especially if   hemorrhoids are present.)  6. Vomiting.  7. Weakness or dizziness.  GO DIRECTLY TO THE NEAREST EMERGENCY ROOM IF YOU HAVE ANY OF THE FOLLOWING:      Difficulty breathing              Chills and/or fever over 101 F   Persistent vomiting and/or vomiting blood   Severe abdominal pain   Severe chest pain   Black, tarry stools   Bleeding- more than one  tablespoon   Any other symptom or condition that you feel may need urgent attention  Your doctor recommends these additional instructions:  If any biopsies were taken, your doctors clinic will contact you in 1 to 2   weeks with any results.  - Patient has a contact number available for emergencies.  The signs and   symptoms of potential delayed complications were discussed with the   patient.  Return to normal activities tomorrow.  Written discharge   instructions were provided to the patient.   - Resume previous diet.   - Continue present medications.   - Await pathology results.   - Repeat colonoscopy in 5 years for surveillance.   - Telephone my office for pathology results in 1 week.   - Discharge patient to home (via wheelchair).  For questions, problems or results please call your physician Cosme Monson MD at Work:  [phone]  If you have any questions about the above instructions, call the GI   department at [phone] or call the endoscopy unit at [phone]   from 7am until 3 pm.  [facility] [place], EMERGENCY ROOM PHONE NUMBER:   [phone]  IF A COMPLICATION OR EMERGENCY SITUATION ARISES AND YOU ARE UNABLE TO REACH   YOUR PHYSICIAN - GO DIRECTLY TO THE EMERGENCY ROOM.  I have read or have had read to me these discharge instructions for my   procedure and have received a written copy.  I understand these   instructions and will follow-up with my physician if I have any questions.     __________________________________       _____________________________________  Nurse Signature                                          Patient/Designated   Responsible Party Signature  Cosme Monson MD  10/30/2018 7:17:24 AM  This report has been verified and signed electronically.  [employer]

## 2018-10-30 NOTE — ANESTHESIA POSTPROCEDURE EVALUATION
"Anesthesia Post Evaluation    Patient: Carole Moore    Procedure(s) Performed: Procedure(s) (LRB):  COLONOSCOPY (N/A)    Final Anesthesia Type: MAC  Patient location during evaluation: PACU  Patient participation: Yes- Able to Participate  Level of consciousness: awake and alert and oriented  Post-procedure vital signs: reviewed and stable  Pain management: adequate  Airway patency: patent  PONV status at discharge: No PONV  Anesthetic complications: no      Cardiovascular status: blood pressure returned to baseline  Respiratory status: room air, unassisted and spontaneous ventilation  Hydration status: euvolemic  Follow-up not needed.        Visit Vitals  /64 (BP Location: Left arm, Patient Position: Lying)   Pulse 76   Temp 36.8 °C (98.2 °F) (Oral)   Resp 18   Ht 5' 7" (1.702 m)   Wt 73.2 kg (161 lb 5 oz)   LMP 10/11/2009   SpO2 96%   Breastfeeding? No   BMI 25.27 kg/m²       Pain/Dimas Score: Pain Assessment Performed: Yes (10/30/2018  6:46 AM)  Presence of Pain: denies (10/30/2018  6:46 AM)        "

## 2018-10-30 NOTE — H&P
Short Stay Endoscopy History and Physical    PCP - Angel Emery MD    Procedure - Colonoscopy  ASA - 2  Mallampati - per anesthesia  History of Anesthesia problems - no  Family history Anesthesia problems -  no     HPI:  This is a 53 y.o. female here for evaluation of :   Active Hospital Problems    Diagnosis  POA    *Colon cancer screening [Z12.11]  Not Applicable    Family history of colon cancer [Z80.0]  Not Applicable     Her mother and father both had colon cancer.        Resolved Hospital Problems   No resolved problems to display.         Health Maintenance       Date Due Completion Date    Pneumococcal PCV13 (High Risk) (1 - PCV13 Required) 07/21/1967 ---    Pneumococcal PPSV23 (High Risk) (1) 07/21/1983 ---    Colonoscopy 06/15/2016 6/15/2011    Lipid Panel 08/01/2019 8/1/2018    Mammogram 10/18/2019 10/18/2018    High Dose Statin 10/30/2019 10/30/2018    TETANUS VACCINE 05/30/2021 5/30/2011          Screening - yes-she has had one negative colonoscopy around 7 years ago.  Both of her parents had colon cancer.  History of polyps - no  Diarrhea - no  Anemia - no  Blood in stools - no  Abdominal pain - no  Other - no    ROS:  CONSTITUTIONAL: Denies weight change,  fatigue, fevers, chills, night sweats.  CARDIOVASCULAR: Denies chest pain, shortness of breath, orthopnea and edema.  RESPIRATORY: Denies cough, hemoptysis, dyspnea, and wheezing.  GI: See HPI.    Medical History:   Past Medical History:   Diagnosis Date    Breast cancer     Cancer     R Breast cancer    CVA (cerebral infarction)     Diverticulosis     Hyperlipidemia     Nausea after anesthesia     Paralysis     right side    PONV (postoperative nausea and vomiting)     Stroke     R side weakness       Surgical History:   Past Surgical History:   Procedure Laterality Date    AUGMENTATION-BREAST Left 4/11/2017    [facility]    BIOPSY-INCISIONAL Right 10/27/2016    [facility] at  [facility] OR    BREAST SURGERY      HYSTERECTOMY      maintains both ovaries, menorrhagia    INSERTION-BREAST TISSUE EXPANDER WITH ALLODERM RIGHT Right 4/11/2017    [facility] at [facility] OR    INSERTION-PORT Left 11/7/2016    [facility] by [facility] at [facility] OR    MASTECTOMY      MASTECTOMY, SENTINEL NODE BX, ANCILLARY NODE DISSECTION Right 4/11/2017    [facility] at [facility] OR    MEDIPORT INSERTION, SINGLE      TONSILLECTOMY         Family History:   Family History   Problem Relation Age of Onset    Breast cancer Paternal Aunt     Colon cancer Father     Colon cancer Mother     Melanoma Maternal Uncle        Social History:   Social History     Tobacco Use    Smoking status: Never Smoker    Smokeless tobacco: Never Used   Substance Use Topics    Alcohol use: No    Drug use: No       Allergies:   Review of patient's allergies indicates:  No Known Allergies    Medications:   No current facility-administered medications on file prior to encounter.      Current Outpatient Medications on File Prior to Encounter   Medication Sig Dispense Refill    aspirin 81 MG Chew Take 81 mg by mouth once daily.      atorvastatin (LIPITOR) 10 MG tablet Take 1 tablet (10 mg total) by mouth once daily. 90 tablet 4    LORATADINE (CLARITIN ORAL) Take by mouth daily as needed.       diclofenac sodium 1 % Gel Apply 2 g topically 4 (four) times daily. 1 Tube 0    hydrocortisone 2.5 % cream Apply topically 2 (two) times daily. 1 Tube 1       Physical Exam:  Vital Signs: see nurses notes.  General Appearance: Well appearing in no acute distress  ENT: OP clear  Chest: CTA B  CV: RRR, no m/r/g  Abd: s/nt/nd/nabs  Ext: no edema    Labs:Reviewed    IMP:  Active Hospital Problems    Diagnosis  POA    *Colon cancer screening [Z12.11]  Not Applicable    Family history of colon cancer [Z80.0]  Not Applicable     Her mother and father both had colon cancer.        Resolved Hospital  Problems   No resolved problems to display.         Plan:   I have explained the risks and benefits of colonoscopy to the patient including but not limited to bleeding, perforation, infection, and death. The patient wishes to proceed.

## 2018-10-30 NOTE — DISCHARGE SUMMARY
Endoscopy Discharge Summary      Admit Date: 10/30/2018    Discharge Date and Time:  10/30/2018 7:18 AM    Attending Physician: Cosme Monson MD     Discharge Physician: Cosme Monson MD     Principal Admitting Diagnoses: Colon cancer screening         Discharge Diagnosis: The primary encounter diagnosis was Colon cancer screening. Diagnoses of Special screening for malignant neoplasms, colon, Family history of colon cancer, Polyp of colon, unspecified part of colon, unspecified type, and Diverticulosis of colon were also pertinent to this visit.     Discharged Condition: Good    Indication for Admission: Colon cancer screening     Hospital Course: Patient was admitted for an inpatient procedure and tolerated the procedure well with no complications.    Significant Diagnostic Studies: Colonoscopy with cold biopsy polypectomy    Pathology (if any):  Specimen (12h ago, onward)    Start     Ordered    10/30/18 0713  Specimen to Pathology - Surgery  Once     Comments:  1. Transverse Colon Polyp     Start Status   10/30/18 0713 Collected (10/30/18 0716)       10/30/18 0715          Estimated Blood Loss: 1 ml.    Discussed with: patient and family.    Disposition: Home.    Follow Up/Patient Instructions:   Current Discharge Medication List      CONTINUE these medications which have NOT CHANGED    Details   anastrozole (ARIMIDEX) 1 mg Tab Take 1 tablet (1 mg total) by mouth once daily.  Qty: 90 tablet, Refills: 1    Comments: Please consider 90 day supplies to promote better adherence  Associated Diagnoses: Aromatase inhibitor use      aspirin 81 MG Chew Take 81 mg by mouth once daily.      atorvastatin (LIPITOR) 10 MG tablet Take 1 tablet (10 mg total) by mouth once daily.  Qty: 90 tablet, Refills: 4    Associated Diagnoses: Hyperlipidemia, unspecified hyperlipidemia type      LORATADINE (CLARITIN ORAL) Take by mouth daily as needed.       diclofenac sodium 1 % Gel Apply 2 g topically 4 (four) times daily.  Qty: 1  Tube, Refills: 0    Associated Diagnoses: De Quervain's tenosynovitis; Left wrist pain      hydrocortisone 2.5 % cream Apply topically 2 (two) times daily.  Qty: 1 Tube, Refills: 1    Associated Diagnoses: Eczema, unspecified type             Discharge Procedure Orders   Diet general     Call MD for:  temperature >100.4     Call MD for:  persistent nausea and vomiting     Call MD for:  severe uncontrolled pain     Call MD for:  difficulty breathing, headache or visual disturbances     Call MD for:  redness, tenderness, or signs of infection (pain, swelling, redness, odor or green/yellow discharge around incision site)     Call MD for:  hives     Call MD for:  persistent dizziness or light-headedness     No dressing needed       Follow-up Information     Cosme Monson MD. Call in 1 week.    Specialty:  Family Medicine  Why:  To receive pathology results.  Contact information:  [address] [facility] [address] [phone]

## 2018-10-30 NOTE — ANESTHESIA RELEASE NOTE
"Anesthesia Release from PACU Note    Patient: Carole Moore    Procedure(s) Performed: Procedure(s) (LRB):  COLONOSCOPY (N/A)    Anesthesia type: MAC    Post pain: Adequate analgesia    Post assessment: no apparent anesthetic complications, tolerated procedure well and no evidence of recall    Last Vitals:   Visit Vitals  /64 (BP Location: Left arm, Patient Position: Lying)   Pulse 76   Temp 36.8 °C (98.2 °F) (Oral)   Resp 18   Ht 5' 7" (1.702 m)   Wt 73.2 kg (161 lb 5 oz)   LMP 10/11/2009   SpO2 96%   Breastfeeding? No   BMI 25.27 kg/m²       Post vital signs: stable    Level of consciousness: awake, alert  and oriented    Nausea/Vomiting: no nausea/no vomiting    Complications: none    Airway Patency: patent    Respiratory: unassisted, spontaneous ventilation, room air    Cardiovascular: stable    Hydration: euvolemic  "

## 2018-10-31 VITALS
DIASTOLIC BLOOD PRESSURE: 80 MMHG | RESPIRATION RATE: 18 BRPM | HEIGHT: 67 IN | SYSTOLIC BLOOD PRESSURE: 102 MMHG | WEIGHT: 161.31 LBS | BODY MASS INDEX: 25.32 KG/M2 | OXYGEN SATURATION: 98 % | TEMPERATURE: 98 F | HEART RATE: 70 BPM

## 2018-11-02 RX ORDER — SODIUM CHLORIDE 0.9 % (FLUSH) 0.9 %
10 SYRINGE (ML) INJECTION
Status: CANCELLED | OUTPATIENT
Start: 2019-01-23

## 2018-11-02 RX ORDER — HEPARIN 100 UNIT/ML
500 SYRINGE INTRAVENOUS
Status: CANCELLED | OUTPATIENT
Start: 2019-01-23

## 2018-11-05 NOTE — PROGRESS NOTES
Dear Angel Emery MD,    I recently cared for Carole Moore and performed an endoscopy.  Tissue was sent for pathology evaluation and I will have a letter written to ask the patient to repeat the colonoscopy in 5 years.  The pathology showed that there was adenomatous tissue present.  Thank you for allowing me to participate in the care of your patient.  Please call me for any questions that you might have.      Dr. Cosme Monson  [phone] cell  [phone] office      NURSING STAFF:Please  inform the patient that I reviewed the recent pathology obtained at the time of colonoscopy.    The results showed that there was adenomatous tissue present which is benign and based on that, I recommend that the patient have a repeat colonoscopy performed in 5 years.     If the patient has MyChart, this message has been sent to them.  Confirm that they read the note.  If not, copy the information and print a letter to send to the patient at this time.  Confirm that a notation to the PCP was done.      Dear Carole Moore,    This is to inform you that I have reviewed your recent colonoscopy pathology.  The results showed that you had adenomatous tissue present which is benign and based on that, I recommend that you have a repeat colonoscopy performed in 5 years.      Dr. Cosme Monson  [phone]

## 2018-11-22 ENCOUNTER — OFFICE VISIT (OUTPATIENT)
Dept: URGENT CARE | Facility: CLINIC | Age: 53
End: 2018-11-22
Payer: MEDICARE

## 2018-11-22 VITALS
HEIGHT: 67 IN | WEIGHT: 168.19 LBS | RESPIRATION RATE: 16 BRPM | TEMPERATURE: 97 F | BODY MASS INDEX: 26.4 KG/M2 | OXYGEN SATURATION: 97 %

## 2018-11-22 DIAGNOSIS — R31.9 URINARY TRACT INFECTION WITH HEMATURIA, SITE UNSPECIFIED: Primary | ICD-10-CM

## 2018-11-22 DIAGNOSIS — N39.0 URINARY TRACT INFECTION WITH HEMATURIA, SITE UNSPECIFIED: Primary | ICD-10-CM

## 2018-11-22 DIAGNOSIS — R30.0 DYSURIA: ICD-10-CM

## 2018-11-22 PROCEDURE — 87086 URINE CULTURE/COLONY COUNT: CPT

## 2018-11-22 PROCEDURE — 99214 OFFICE O/P EST MOD 30 MIN: CPT | Mod: PBBFAC,PO

## 2018-11-22 PROCEDURE — 99214 OFFICE O/P EST MOD 30 MIN: CPT | Mod: S$PBB,,, | Performed by: FAMILY MEDICINE

## 2018-11-22 PROCEDURE — 87077 CULTURE AEROBIC IDENTIFY: CPT

## 2018-11-22 PROCEDURE — 99999 PR PBB SHADOW E&M-EST. PATIENT-LVL IV: CPT | Mod: PBBFAC,,,

## 2018-11-22 PROCEDURE — 87186 SC STD MICRODIL/AGAR DIL: CPT

## 2018-11-22 PROCEDURE — 87088 URINE BACTERIA CULTURE: CPT

## 2018-11-22 RX ORDER — NITROFURANTOIN 25; 75 MG/1; MG/1
100 CAPSULE ORAL 2 TIMES DAILY
Qty: 14 CAPSULE | Refills: 0 | Status: SHIPPED | OUTPATIENT
Start: 2018-11-22 | End: 2018-11-29

## 2018-11-22 RX ORDER — PHENAZOPYRIDINE HYDROCHLORIDE 200 MG/1
200 TABLET, FILM COATED ORAL
Qty: 6 TABLET | Refills: 0 | Status: SHIPPED | OUTPATIENT
Start: 2018-11-22 | End: 2018-11-24

## 2018-11-22 NOTE — PROGRESS NOTES
Subjective:       Patient ID: Carole Moore is a 53 y.o. female.    Chief Complaint: Urinary Tract Infection    Pt is a 53 year old female to clinic today with complaints of dysuria, frequency and       Dysuria    This is a recurrent problem. The current episode started in the past 7 days. The problem occurs every urination. The problem has been gradually worsening. The quality of the pain is described as burning. The pain is at a severity of 3/10. The pain is mild. There has been no fever. Associated symptoms include frequency, hesitancy, urgency and withholding. Pertinent negatives include no behavior changes, chills, discharge, flank pain, hematuria, nausea, possible pregnancy, sweats, vomiting, weight loss, bubble bath use, constipation or rash. She has tried nothing for the symptoms.     Review of Systems   Constitutional: Negative for chills, diaphoresis, fatigue, fever and weight loss.   Gastrointestinal: Negative for abdominal pain, constipation, diarrhea, nausea and vomiting.   Genitourinary: Positive for dysuria, frequency, hesitancy and urgency. Negative for difficulty urinating, flank pain and hematuria.   Musculoskeletal: Negative for back pain and myalgias.   Skin: Negative for rash.   Neurological: Negative for dizziness, weakness, light-headedness and headaches.       Objective:      Physical Exam   Constitutional: She is oriented to person, place, and time. She appears well-developed and well-nourished. No distress.   HENT:   Head: Normocephalic.   Right Ear: External ear normal.   Left Ear: External ear normal.   Nose: Nose normal.   Eyes: Pupils are equal, round, and reactive to light.   Abdominal: Soft. Normal appearance. There is no tenderness. There is no CVA tenderness.   Neurological: She is alert and oriented to person, place, and time.   Skin: Skin is warm and dry. No rash noted. She is not diaphoretic.   Psychiatric: She has a normal mood and affect. Her speech is normal and  behavior is normal. Thought content normal.   Nursing note and vitals reviewed.      Assessment:       1. Urinary tract infection with hematuria, site unspecified    2. Dysuria        Plan:   Urinary tract infection with hematuria, site unspecified  -     phenazopyridine (PYRIDIUM) 200 MG tablet; Take 1 tablet (200 mg total) by mouth 3 (three) times daily with meals. for 2 days  Dispense: 6 tablet; Refill: 0  -     nitrofurantoin, macrocrystal-monohydrate, (MACROBID) 100 MG capsule; Take 1 capsule (100 mg total) by mouth 2 (two) times daily. for 7 days  Dispense: 14 capsule; Refill: 0    Dysuria  -     POCT urinalysis, dipstick or tablet reag  -     Urine culture      · Increase fluids (avoid caffeine or sugary beverages as these will irritate the bladder).   · Take your antibiotics exactly as prescribed and make sure to complete entire course even if you begin to feel better. This will prevent recurrence of infection and antibiotic resistance.   · We have prescribed an antibiotic that covers most bacteria that cause urinary tract infections, however, if your urine culture shows that you have any bacterial resistance to the antibiotic you were prescribed or an unusual bacterial strain, we will notify you and make any necessary changes. Urine cultures usually result in about three days.   · Please follow up with your primary care provider if your symptoms do not improve within 2-3 days or sooner for any new or worsening symptoms.  · For any severe pain, bright red blood in urine, difficulty urinating, fever that does not improve with Tylenol or Ibuprofen, inability to urinate, incontinence of urine or bowels, or for any other urgent concerns, please go to the ER for immediate evaluation.

## 2018-11-22 NOTE — PATIENT INSTRUCTIONS
Anatomy of the Female Urinary Tract  Your urinary tract helps get rid of urine (your bodys liquid waste). The kidneys collect chemicals and water your body doesnt need. This is turned into urine. Urine travels out of the kidneys through the ureters to the bladder. The bladder holds urine until youre ready to release it. The urethra carries urine from the bladder out of the body. The main sphincter muscle circles the mid-urethra.      Front view of female urinary tract.     Date Last Reviewed: 1/1/2017 © 2000-2017 [employer]. [address] [place] [id number]. All rights reserved. This information is not intended as a substitute for professional medical care. Always follow your healthcare professional's instructions.

## 2018-11-26 LAB — BACTERIA UR CULT: NORMAL

## 2018-12-04 ENCOUNTER — OFFICE VISIT (OUTPATIENT)
Dept: URGENT CARE | Facility: CLINIC | Age: 53
End: 2018-12-04
Payer: MEDICARE

## 2018-12-04 VITALS
BODY MASS INDEX: 26.57 KG/M2 | HEART RATE: 79 BPM | SYSTOLIC BLOOD PRESSURE: 100 MMHG | TEMPERATURE: 98 F | OXYGEN SATURATION: 98 % | HEIGHT: 67 IN | WEIGHT: 169.31 LBS | DIASTOLIC BLOOD PRESSURE: 70 MMHG

## 2018-12-04 DIAGNOSIS — R30.0 DYSURIA: ICD-10-CM

## 2018-12-04 DIAGNOSIS — N39.0 URINARY TRACT INFECTION WITH HEMATURIA, SITE UNSPECIFIED: Primary | ICD-10-CM

## 2018-12-04 DIAGNOSIS — R31.9 URINARY TRACT INFECTION WITH HEMATURIA, SITE UNSPECIFIED: Primary | ICD-10-CM

## 2018-12-04 LAB
BILIRUB SERPL-MCNC: NEGATIVE MG/DL
BLOOD URINE, POC: 250
COLOR, POC UA: YELLOW
GLUCOSE UR QL STRIP: NORMAL
KETONES UR QL STRIP: NEGATIVE
LEUKOCYTE ESTERASE URINE, POC: NORMAL
NITRITE, POC UA: NEGATIVE
PH, POC UA: 5
PROTEIN, POC: NORMAL
SPECIFIC GRAVITY, POC UA: 1.01
UROBILINOGEN, POC UA: NORMAL

## 2018-12-04 PROCEDURE — 87088 URINE BACTERIA CULTURE: CPT

## 2018-12-04 PROCEDURE — 99999 PR PBB SHADOW E&M-EST. PATIENT-LVL III: CPT | Mod: PBBFAC,,, | Performed by: NURSE PRACTITIONER

## 2018-12-04 PROCEDURE — 99213 OFFICE O/P EST LOW 20 MIN: CPT | Mod: PBBFAC,PO | Performed by: NURSE PRACTITIONER

## 2018-12-04 PROCEDURE — 87186 SC STD MICRODIL/AGAR DIL: CPT

## 2018-12-04 PROCEDURE — 99214 OFFICE O/P EST MOD 30 MIN: CPT | Mod: S$PBB,,, | Performed by: NURSE PRACTITIONER

## 2018-12-04 PROCEDURE — 81002 URINALYSIS NONAUTO W/O SCOPE: CPT | Mod: PBBFAC,PO | Performed by: NURSE PRACTITIONER

## 2018-12-04 PROCEDURE — 87184 SC STD DISK METHOD PER PLATE: CPT

## 2018-12-04 PROCEDURE — 87086 URINE CULTURE/COLONY COUNT: CPT

## 2018-12-04 PROCEDURE — 87077 CULTURE AEROBIC IDENTIFY: CPT

## 2018-12-04 RX ORDER — SULFAMETHOXAZOLE AND TRIMETHOPRIM 800; 160 MG/1; MG/1
1 TABLET ORAL 2 TIMES DAILY
Qty: 14 TABLET | Refills: 0 | Status: SHIPPED | OUTPATIENT
Start: 2018-12-04 | End: 2018-12-04 | Stop reason: CLARIF

## 2018-12-04 RX ORDER — PHENAZOPYRIDINE HYDROCHLORIDE 100 MG/1
100 TABLET, FILM COATED ORAL 3 TIMES DAILY PRN
Qty: 15 TABLET | Refills: 0 | Status: SHIPPED | OUTPATIENT
Start: 2018-12-04 | End: 2018-12-09

## 2018-12-04 RX ORDER — SULFAMETHOXAZOLE AND TRIMETHOPRIM 800; 160 MG/1; MG/1
1 TABLET ORAL 2 TIMES DAILY
Qty: 20 TABLET | Refills: 0 | Status: SHIPPED | OUTPATIENT
Start: 2018-12-04 | End: 2018-12-14

## 2018-12-04 NOTE — PATIENT INSTRUCTIONS

## 2018-12-04 NOTE — PROGRESS NOTES
Subjective:       Patient ID: Carole Moore is a 53 y.o. female.    Chief Complaint: Urinary Frequency    Dysuria    This is a new problem. The current episode started in the past 7 days. The problem occurs every urination. The problem has been unchanged. The quality of the pain is described as burning. The pain is moderate. There has been no fever. Associated symptoms include frequency, hematuria and urgency. Pertinent negatives include no chills, discharge, flank pain, hesitancy, weight loss, constipation or withholding. She has tried nothing for the symptoms. Her past medical history is significant for recurrent UTIs.     Review of Systems   Constitutional: Negative for chills, fatigue, fever and weight loss.   Respiratory: Negative for shortness of breath, wheezing and stridor.    Cardiovascular: Negative for chest pain, palpitations and leg swelling.   Gastrointestinal: Negative for constipation.   Genitourinary: Positive for dysuria, frequency, hematuria and urgency. Negative for difficulty urinating, flank pain and hesitancy.   Skin: Negative for color change.   Allergic/Immunologic: Negative for environmental allergies.   Neurological: Negative for dizziness and light-headedness.   Psychiatric/Behavioral: Negative for agitation.       Objective:      Physical Exam   Constitutional: She is oriented to person, place, and time. She appears well-developed and well-nourished.   Cardiovascular: Normal rate and normal heart sounds.   Pulmonary/Chest: Effort normal and breath sounds normal.   Abdominal: Soft. Normal appearance and bowel sounds are normal. There is tenderness in the suprapubic area. There is no rigidity, no rebound, no guarding and no CVA tenderness.   Neurological: She is alert and oriented to person, place, and time.   Nursing note and vitals reviewed.      Assessment:       1. Urinary tract infection with hematuria, site unspecified    2. Dysuria        Plan:         Carole was seen today  for urinary frequency.    Diagnoses and all orders for this visit:    Urinary tract infection with hematuria, site unspecified  -     sulfamethoxazole-trimethoprim 800-160mg (BACTRIM DS) 800-160 mg Tab; Take 1 tablet by mouth 2 (two) times daily. for 10 days    Dysuria  -     POCT URINE DIPSTICK WITHOUT MICROSCOPE  -     Urine culture; Future  -     phenazopyridine (PYRIDIUM) 100 MG tablet; Take 1 tablet (100 mg total) by mouth 3 (three) times daily as needed for Pain.    Other orders  -     Discontinue: sulfamethoxazole-trimethoprim 800-160mg (BACTRIM DS) 800-160 mg Tab; Take 1 tablet by mouth 2 (two) times daily. for 7 days    · Increase fluids (avoid caffeine or sugary beverages as these will irritate the bladder).   · Take your antibiotics exactly as prescribed and make sure to complete entire course even if you begin to feel better. This will prevent recurrence of infection and antibiotic resistance.   · We have prescribed an antibiotic that covers most bacteria that cause urinary tract infections, however, if your urine culture shows that you have any bacterial resistance to the antibiotic you were prescribed or an unusual bacterial strain, we will notify you and make any necessary changes. Urine cultures usually result in about three days.   · Please follow up with your primary care provider if your symptoms do not improve within 2-3 days or sooner for any new or worsening symptoms.  · For any severe pain, bright red blood in urine, difficulty urinating, fever that does not improve with Tylenol or Ibuprofen, inability to urinate, incontinence of urine or bowels, or for any other urgent concerns, please go to the ER for immediate evaluation.

## 2018-12-07 ENCOUNTER — TELEPHONE (OUTPATIENT)
Dept: URGENT CARE | Facility: CLINIC | Age: 53
End: 2018-12-07

## 2018-12-08 LAB — BACTERIA UR CULT: NORMAL

## 2019-01-14 ENCOUNTER — OFFICE VISIT (OUTPATIENT)
Dept: URGENT CARE | Facility: CLINIC | Age: 54
End: 2019-01-14
Payer: MEDICARE

## 2019-01-14 VITALS
HEART RATE: 87 BPM | HEIGHT: 67 IN | TEMPERATURE: 99 F | OXYGEN SATURATION: 98 % | WEIGHT: 167.56 LBS | BODY MASS INDEX: 26.3 KG/M2 | RESPIRATION RATE: 16 BRPM | DIASTOLIC BLOOD PRESSURE: 70 MMHG | SYSTOLIC BLOOD PRESSURE: 126 MMHG

## 2019-01-14 DIAGNOSIS — J32.9 SINUSITIS, UNSPECIFIED CHRONICITY, UNSPECIFIED LOCATION: Primary | ICD-10-CM

## 2019-01-14 PROCEDURE — 99999 PR PBB SHADOW E&M-EST. PATIENT-LVL IV: CPT | Mod: PBBFAC,,, | Performed by: NURSE PRACTITIONER

## 2019-01-14 PROCEDURE — 99214 OFFICE O/P EST MOD 30 MIN: CPT | Mod: S$PBB,,, | Performed by: NURSE PRACTITIONER

## 2019-01-14 PROCEDURE — 99214 PR OFFICE/OUTPT VISIT, EST, LEVL IV, 30-39 MIN: ICD-10-PCS | Mod: S$PBB,,, | Performed by: NURSE PRACTITIONER

## 2019-01-14 PROCEDURE — 99214 OFFICE O/P EST MOD 30 MIN: CPT | Mod: PBBFAC,PO | Performed by: NURSE PRACTITIONER

## 2019-01-14 PROCEDURE — 99999 PR PBB SHADOW E&M-EST. PATIENT-LVL IV: ICD-10-PCS | Mod: PBBFAC,,, | Performed by: NURSE PRACTITIONER

## 2019-01-14 RX ORDER — BENZONATATE 200 MG/1
200 CAPSULE ORAL 3 TIMES DAILY PRN
Qty: 21 CAPSULE | Refills: 0 | Status: SHIPPED | OUTPATIENT
Start: 2019-01-14 | End: 2019-01-21

## 2019-01-14 RX ORDER — AMOXICILLIN AND CLAVULANATE POTASSIUM 875; 125 MG/1; MG/1
1 TABLET, FILM COATED ORAL 2 TIMES DAILY
Qty: 14 TABLET | Refills: 0 | Status: SHIPPED | OUTPATIENT
Start: 2019-01-14 | End: 2019-01-21

## 2019-01-14 RX ORDER — PREDNISONE 20 MG/1
20 TABLET ORAL DAILY
Qty: 5 TABLET | Refills: 0 | Status: SHIPPED | OUTPATIENT
Start: 2019-01-14 | End: 2019-01-19

## 2019-01-14 RX ORDER — FLUTICASONE PROPIONATE 50 MCG
2 SPRAY, SUSPENSION (ML) NASAL DAILY
Qty: 16 G | Refills: 0 | Status: SHIPPED | OUTPATIENT
Start: 2019-01-14 | End: 2019-01-28

## 2019-01-14 NOTE — PROGRESS NOTES
"Subjective:       Patient ID: Carole Moore is a 53 y.o. female.    Chief Complaint: Cough    HPI  The complaints are cough, congestion, sinus pressure, ear pressure, and fever. Symptoms started 6 days ago and are gradually worsening. States intermittent fevers with T max 101.8 two days ago. Taking deconex with minimal improvement. No GI issues.   /70   Pulse 87   Temp 98.8 °F (37.1 °C) (Oral)   Resp 16   Ht 5' 7" (1.702 m)   Wt 76 kg (167 lb 8.8 oz)   LMP 10/11/2009   SpO2 98%   BMI 26.24 kg/m²     Review of Systems   Constitutional: Positive for fever (T max 101.8 two days ago). Negative for chills and diaphoresis.   HENT: Positive for congestion, ear pain, postnasal drip and sinus pressure. Negative for sore throat.    Respiratory: Positive for cough. Negative for chest tightness and shortness of breath.    Cardiovascular: Negative for chest pain and palpitations.   Gastrointestinal: Negative for abdominal distention, abdominal pain, diarrhea, nausea and vomiting.   Genitourinary: Negative for difficulty urinating.   Musculoskeletal: Negative for myalgias.   Skin: Negative for rash and wound.   Allergic/Immunologic: Negative for immunocompromised state.   Neurological: Negative for headaches.   Hematological: Does not bruise/bleed easily.   Psychiatric/Behavioral: Negative for behavioral problems and confusion.       Objective:      Physical Exam   Constitutional: She is oriented to person, place, and time. She appears well-developed and well-nourished. No distress.   HENT:   Head: Normocephalic and atraumatic.   Right Ear: Tympanic membrane and ear canal normal.   Left Ear: Tympanic membrane and ear canal normal.   Nose: Nose normal. No mucosal edema. Right sinus exhibits no maxillary sinus tenderness and no frontal sinus tenderness. Left sinus exhibits no maxillary sinus tenderness and no frontal sinus tenderness.   Mouth/Throat: Uvula is midline. No oropharyngeal exudate, posterior " oropharyngeal edema or posterior oropharyngeal erythema.   Eyes: Conjunctivae and EOM are normal. Pupils are equal, round, and reactive to light.   Neck: Neck supple.   Cardiovascular: Normal rate, regular rhythm and intact distal pulses.   No murmur heard.  Pulmonary/Chest: Breath sounds normal. No respiratory distress. She has no wheezes.   Abdominal: Soft. Bowel sounds are normal. There is no tenderness.   Musculoskeletal: Normal range of motion. She exhibits no edema or deformity.   Lymphadenopathy:     She has no cervical adenopathy.   Neurological: She is alert and oriented to person, place, and time.   Skin: Skin is warm and dry. No rash noted. She is not diaphoretic.   Psychiatric: She has a normal mood and affect. Her behavior is normal.   Vitals reviewed.      Assessment:       1. Sinusitis, unspecified chronicity, unspecified location        Plan:       Carole was seen today for cough.    Diagnoses and all orders for this visit:    Sinusitis, unspecified chronicity, unspecified location  -     fluticasone (FLONASE) 50 mcg/actuation nasal spray; 2 sprays (100 mcg total) by Each Nare route once daily. for 14 days  -     amoxicillin-clavulanate 875-125mg (AUGMENTIN) 875-125 mg per tablet; Take 1 tablet by mouth 2 (two) times daily. for 7 days  -     predniSONE (DELTASONE) 20 MG tablet; Take 1 tablet (20 mg total) by mouth once daily. for 5 days  -     benzonatate (TESSALON) 200 MG capsule; Take 1 capsule (200 mg total) by mouth 3 (three) times daily as needed for Cough.    Finish the entire course of antibiotics even if symptoms improve  Patient counseled on symptomatic treatment.   - Rest  - Hydration-- 64 ounces fluid per day  - Cool mist humidifier/vaporizer  - Nasal saline/steroids  - Antihistamines  - Mucinex  - Gargles and lozenges for sore throat  - OTC pain/fever relievers    Follow up with PCP or ER immediately for worsening, new symptoms or no improvement. Go to ER if you develop fever of 103 or  higher, chest pain, shortness of breath, upper back pain, stiff neck or severe headache.      Diagnosis, treatment, AVS reviewed with patient. Patient understands and agrees with plan    Patient Instructions     Sinusitis (Antibiotic Treatment)    The sinuses are air-filled spaces within the bones of the face. They connect to the inside of the nose. Sinusitis is an inflammation of the tissue lining the sinus cavity. Sinus inflammation can occur during a cold. It can also be due to allergies to pollens and other particles in the air. Sinusitis can cause symptoms of sinus congestion and fullness. A sinus infection causes fever, headache and facial pain. There is often green or yellow drainage from the nose or into the back of the throat (post-nasal drip). You have been given antibiotics to treat this condition.  Home care:  · Take the full course of antibiotics as instructed. Do not stop taking them, even if you feel better.  · Drink plenty of water, hot tea, and other liquids. This may help thin mucus. It also may promote sinus drainage.  · Heat may help soothe painful areas of the face. Use a towel soaked in hot water. Or,  the shower and direct the hot spray onto your face. Using a vaporizer along with a menthol rub at night may also help.   · An expectorant containing guaifenesin may help thin the mucus and promote drainage from the sinuses.  · Over-the-counter decongestants may be used unless a similar medicine was prescribed. Nasal sprays work the fastest. Use one that contains phenylephrine or oxymetazoline. First blow the nose gently. Then use the spray. Do not use these medicines more often than directed on the label or symptoms may get worse. You may also use tablets containing pseudoephedrine. Avoid products that combine ingredients, because side effects may be increased. Read labels. You can also ask the pharmacist for help. (NOTE: Persons with high blood pressure should not use decongestants. They  can raise blood pressure.)  · Over-the-counter antihistamines may help if allergies contributed to your sinusitis.    · Do not use nasal rinses or irrigation during an acute sinus infection, unless told to by your health care provider. Rinsing may spread the infection to other sinuses.  · Use acetaminophen or ibuprofen to control pain, unless another pain medicine was prescribed. (If you have chronic liver or kidney disease or ever had a stomach ulcer, talk with your doctor before using these medicines. Aspirin should never be used in anyone under 18 years of age who is ill with a fever. It may cause severe liver damage.)  · Don't smoke. This can worsen symptoms.  Follow-up care  Follow up with your healthcare provider or our staff if you are not improving within the next week.  When to seek medical advice  Call your healthcare provider if any of these occur:  · Facial pain or headache becoming more severe  · Stiff neck  · Unusual drowsiness or confusion  · Swelling of the forehead or eyelids  · Vision problems, including blurred or double vision  · Fever of 100.4ºF (38ºC) or higher, or as directed by your healthcare provider  · Seizure  · Breathing problems  · Symptoms not resolving within 10 days  Date Last Reviewed: 4/13/2015  © 5405-1614 [employer]. [address] [place] [address], [place] [address]. All rights reserved. This information is not intended as a substitute for professional medical care. Always follow your healthcare professional's instructions.        Sinusitis (Antibiotic Treatment)    The sinuses are air-filled spaces within the bones of the face. They connect to the inside of the nose. Sinusitis is an inflammation of the tissue lining the sinus cavity. Sinus inflammation can occur during a cold. It can also be due to allergies to pollens and other particles in the air. Sinusitis can cause symptoms of sinus congestion and fullness. A sinus infection causes fever, headache and facial pain.  There is often green or yellow drainage from the nose or into the back of the throat (post-nasal drip). You have been given antibiotics to treat this condition.  Home care:  · Take the full course of antibiotics as instructed. Do not stop taking them, even if you feel better.  · Drink plenty of water, hot tea, and other liquids. This may help thin mucus. It also may promote sinus drainage.  · Heat may help soothe painful areas of the face. Use a towel soaked in hot water. Or,  the shower and direct the hot spray onto your face. Using a vaporizer along with a menthol rub at night may also help.   · An expectorant containing guaifenesin may help thin the mucus and promote drainage from the sinuses.  · Over-the-counter decongestants may be used unless a similar medicine was prescribed. Nasal sprays work the fastest. Use one that contains phenylephrine or oxymetazoline. First blow the nose gently. Then use the spray. Do not use these medicines more often than directed on the label or symptoms may get worse. You may also use tablets containing pseudoephedrine. Avoid products that combine ingredients, because side effects may be increased. Read labels. You can also ask the pharmacist for help. (NOTE: Persons with high blood pressure should not use decongestants. They can raise blood pressure.)  · Over-the-counter antihistamines may help if allergies contributed to your sinusitis.    · Do not use nasal rinses or irrigation during an acute sinus infection, unless told to by your health care provider. Rinsing may spread the infection to other sinuses.  · Use acetaminophen or ibuprofen to control pain, unless another pain medicine was prescribed. (If you have chronic liver or kidney disease or ever had a stomach ulcer, talk with your doctor before using these medicines. Aspirin should never be used in anyone under 18 years of age who is ill with a fever. It may cause severe liver damage.)  · Don't smoke. This can  worsen symptoms.  Follow-up care  Follow up with your healthcare provider or our staff if you are not improving within the next week.  When to seek medical advice  Call your healthcare provider if any of these occur:  · Facial pain or headache becoming more severe  · Stiff neck  · Unusual drowsiness or confusion  · Swelling of the forehead or eyelids  · Vision problems, including blurred or double vision  · Fever of 100.4ºF (38ºC) or higher, or as directed by your healthcare provider  · Seizure  · Breathing problems  · Symptoms not resolving within 10 days  Date Last Reviewed: 4/13/2015  © 6309-0075 [employer]. [address] [place] [address], [place] [address]. All rights reserved. This information is not intended as a substitute for professional medical care. Always follow your healthcare professional's instructions.

## 2019-01-14 NOTE — PATIENT INSTRUCTIONS
Sinusitis (Antibiotic Treatment)    The sinuses are air-filled spaces within the bones of the face. They connect to the inside of the nose. Sinusitis is an inflammation of the tissue lining the sinus cavity. Sinus inflammation can occur during a cold. It can also be due to allergies to pollens and other particles in the air. Sinusitis can cause symptoms of sinus congestion and fullness. A sinus infection causes fever, headache and facial pain. There is often green or yellow drainage from the nose or into the back of the throat (post-nasal drip). You have been given antibiotics to treat this condition.  Home care:  · Take the full course of antibiotics as instructed. Do not stop taking them, even if you feel better.  · Drink plenty of water, hot tea, and other liquids. This may help thin mucus. It also may promote sinus drainage.  · Heat may help soothe painful areas of the face. Use a towel soaked in hot water. Or,  the shower and direct the hot spray onto your face. Using a vaporizer along with a menthol rub at night may also help.   · An expectorant containing guaifenesin may help thin the mucus and promote drainage from the sinuses.  · Over-the-counter decongestants may be used unless a similar medicine was prescribed. Nasal sprays work the fastest. Use one that contains phenylephrine or oxymetazoline. First blow the nose gently. Then use the spray. Do not use these medicines more often than directed on the label or symptoms may get worse. You may also use tablets containing pseudoephedrine. Avoid products that combine ingredients, because side effects may be increased. Read labels. You can also ask the pharmacist for help. (NOTE: Persons with high blood pressure should not use decongestants. They can raise blood pressure.)  · Over-the-counter antihistamines may help if allergies contributed to your sinusitis.    · Do not use nasal rinses or irrigation during an acute sinus infection, unless told to by  your health care provider. Rinsing may spread the infection to other sinuses.  · Use acetaminophen or ibuprofen to control pain, unless another pain medicine was prescribed. (If you have chronic liver or kidney disease or ever had a stomach ulcer, talk with your doctor before using these medicines. Aspirin should never be used in anyone under 18 years of age who is ill with a fever. It may cause severe liver damage.)  · Don't smoke. This can worsen symptoms.  Follow-up care  Follow up with your healthcare provider or our staff if you are not improving within the next week.  When to seek medical advice  Call your healthcare provider if any of these occur:  · Facial pain or headache becoming more severe  · Stiff neck  · Unusual drowsiness or confusion  · Swelling of the forehead or eyelids  · Vision problems, including blurred or double vision  · Fever of 100.4ºF (38ºC) or higher, or as directed by your healthcare provider  · Seizure  · Breathing problems  · Symptoms not resolving within 10 days  Date Last Reviewed: 4/13/2015  © 6019-7767 [employer]. [address] [place] [address]. All rights reserved. This information is not intended as a substitute for professional medical care. Always follow your healthcare professional's instructions.        Sinusitis (Antibiotic Treatment)    The sinuses are air-filled spaces within the bones of the face. They connect to the inside of the nose. Sinusitis is an inflammation of the tissue lining the sinus cavity. Sinus inflammation can occur during a cold. It can also be due to allergies to pollens and other particles in the air. Sinusitis can cause symptoms of sinus congestion and fullness. A sinus infection causes fever, headache and facial pain. There is often green or yellow drainage from the nose or into the back of the throat (post-nasal drip). You have been given antibiotics to treat this condition.  Home care:  · Take the full course of antibiotics  as instructed. Do not stop taking them, even if you feel better.  · Drink plenty of water, hot tea, and other liquids. This may help thin mucus. It also may promote sinus drainage.  · Heat may help soothe painful areas of the face. Use a towel soaked in hot water. Or,  the shower and direct the hot spray onto your face. Using a vaporizer along with a menthol rub at night may also help.   · An expectorant containing guaifenesin may help thin the mucus and promote drainage from the sinuses.  · Over-the-counter decongestants may be used unless a similar medicine was prescribed. Nasal sprays work the fastest. Use one that contains phenylephrine or oxymetazoline. First blow the nose gently. Then use the spray. Do not use these medicines more often than directed on the label or symptoms may get worse. You may also use tablets containing pseudoephedrine. Avoid products that combine ingredients, because side effects may be increased. Read labels. You can also ask the pharmacist for help. (NOTE: Persons with high blood pressure should not use decongestants. They can raise blood pressure.)  · Over-the-counter antihistamines may help if allergies contributed to your sinusitis.    · Do not use nasal rinses or irrigation during an acute sinus infection, unless told to by your health care provider. Rinsing may spread the infection to other sinuses.  · Use acetaminophen or ibuprofen to control pain, unless another pain medicine was prescribed. (If you have chronic liver or kidney disease or ever had a stomach ulcer, talk with your doctor before using these medicines. Aspirin should never be used in anyone under 18 years of age who is ill with a fever. It may cause severe liver damage.)  · Don't smoke. This can worsen symptoms.  Follow-up care  Follow up with your healthcare provider or our staff if you are not improving within the next week.  When to seek medical advice  Call your healthcare provider if any of these  occur:  · Facial pain or headache becoming more severe  · Stiff neck  · Unusual drowsiness or confusion  · Swelling of the forehead or eyelids  · Vision problems, including blurred or double vision  · Fever of 100.4ºF (38ºC) or higher, or as directed by your healthcare provider  · Seizure  · Breathing problems  · Symptoms not resolving within 10 days  Date Last Reviewed: 4/13/2015  © 4122-1174 [employer]. [address] [place] [address], [place] [address]. All rights reserved. This information is not intended as a substitute for professional medical care. Always follow your healthcare professional's instructions.

## 2019-01-18 ENCOUNTER — LAB VISIT (OUTPATIENT)
Dept: LAB | Facility: HOSPITAL | Age: 54
End: 2019-01-18
Attending: INTERNAL MEDICINE
Payer: MEDICARE

## 2019-01-18 DIAGNOSIS — Z12.31 ENCOUNTER FOR SCREENING MAMMOGRAM FOR MALIGNANT NEOPLASM OF BREAST: ICD-10-CM

## 2019-01-18 DIAGNOSIS — M89.9 DISORDER OF BONE: ICD-10-CM

## 2019-01-18 DIAGNOSIS — Z17.0 MALIGNANT NEOPLASM OF CENTRAL PORTION OF RIGHT BREAST IN FEMALE, ESTROGEN RECEPTOR POSITIVE: ICD-10-CM

## 2019-01-18 DIAGNOSIS — C50.111 MALIGNANT NEOPLASM OF CENTRAL PORTION OF RIGHT BREAST IN FEMALE, ESTROGEN RECEPTOR POSITIVE: ICD-10-CM

## 2019-01-18 DIAGNOSIS — Z79.811 USE OF AROMATASE INHIBITORS: ICD-10-CM

## 2019-01-18 DIAGNOSIS — M85.89 OSTEOPENIA OF MULTIPLE SITES: ICD-10-CM

## 2019-01-18 LAB
25(OH)D3+25(OH)D2 SERPL-MCNC: 74 NG/ML
ALBUMIN SERPL BCP-MCNC: 3.6 G/DL
ALP SERPL-CCNC: 74 U/L
ALT SERPL W/O P-5'-P-CCNC: 32 U/L
ANION GAP SERPL CALC-SCNC: 11 MMOL/L
AST SERPL-CCNC: 29 U/L
BASOPHILS # BLD AUTO: 0.02 K/UL
BASOPHILS NFR BLD: 0.5 %
BILIRUB SERPL-MCNC: 0.9 MG/DL
BUN SERPL-MCNC: 21 MG/DL
CALCIUM SERPL-MCNC: 9.9 MG/DL
CHLORIDE SERPL-SCNC: 103 MMOL/L
CO2 SERPL-SCNC: 26 MMOL/L
CREAT SERPL-MCNC: 0.7 MG/DL
DIFFERENTIAL METHOD: ABNORMAL
EOSINOPHIL # BLD AUTO: 0.1 K/UL
EOSINOPHIL NFR BLD: 3.4 %
ERYTHROCYTE [DISTWIDTH] IN BLOOD BY AUTOMATED COUNT: 12.1 %
EST. GFR  (AFRICAN AMERICAN): >60 ML/MIN/1.73 M^2
EST. GFR  (NON AFRICAN AMERICAN): >60 ML/MIN/1.73 M^2
GLUCOSE SERPL-MCNC: 66 MG/DL
HCT VFR BLD AUTO: 39.9 %
HGB BLD-MCNC: 13 G/DL
IMM GRANULOCYTES # BLD AUTO: 0.01 K/UL
IMM GRANULOCYTES NFR BLD AUTO: 0.3 %
LYMPHOCYTES # BLD AUTO: 1.9 K/UL
LYMPHOCYTES NFR BLD: 50.7 %
MCH RBC QN AUTO: 30.2 PG
MCHC RBC AUTO-ENTMCNC: 32.6 G/DL
MCV RBC AUTO: 93 FL
MONOCYTES # BLD AUTO: 0.3 K/UL
MONOCYTES NFR BLD: 8.4 %
NEUTROPHILS # BLD AUTO: 1.4 K/UL
NEUTROPHILS NFR BLD: 36.7 %
NRBC BLD-RTO: 0 /100 WBC
PLATELET # BLD AUTO: 250 K/UL
PMV BLD AUTO: 10.1 FL
POTASSIUM SERPL-SCNC: 4.2 MMOL/L
PROT SERPL-MCNC: 7.4 G/DL
RBC # BLD AUTO: 4.3 M/UL
SODIUM SERPL-SCNC: 140 MMOL/L
WBC # BLD AUTO: 3.79 K/UL

## 2019-01-18 PROCEDURE — 80053 COMPREHEN METABOLIC PANEL: CPT

## 2019-01-18 PROCEDURE — 36415 COLL VENOUS BLD VENIPUNCTURE: CPT | Mod: PO

## 2019-01-18 PROCEDURE — 82306 VITAMIN D 25 HYDROXY: CPT

## 2019-01-18 PROCEDURE — 85025 COMPLETE CBC W/AUTO DIFF WBC: CPT

## 2019-01-22 RX ORDER — SODIUM CHLORIDE 0.9 % (FLUSH) 0.9 %
10 SYRINGE (ML) INJECTION
Status: CANCELLED | OUTPATIENT
Start: 2021-06-30

## 2019-01-22 RX ORDER — HEPARIN 100 UNIT/ML
500 SYRINGE INTRAVENOUS
Status: CANCELLED | OUTPATIENT
Start: 2021-06-30

## 2019-01-22 RX ORDER — SODIUM CHLORIDE 0.9 % (FLUSH) 0.9 %
10 SYRINGE (ML) INJECTION
Status: CANCELLED | OUTPATIENT
Start: 2021-01-13

## 2019-01-22 RX ORDER — SODIUM CHLORIDE 0.9 % (FLUSH) 0.9 %
10 SYRINGE (ML) INJECTION
Status: CANCELLED | OUTPATIENT
Start: 2020-10-21

## 2019-01-22 RX ORDER — HEPARIN 100 UNIT/ML
500 SYRINGE INTRAVENOUS
Status: CANCELLED | OUTPATIENT
Start: 2020-01-29

## 2019-01-22 RX ORDER — HEPARIN 100 UNIT/ML
500 SYRINGE INTRAVENOUS
Status: CANCELLED | OUTPATIENT
Start: 2019-02-20

## 2019-01-22 RX ORDER — SODIUM CHLORIDE 0.9 % (FLUSH) 0.9 %
10 SYRINGE (ML) INJECTION
Status: CANCELLED | OUTPATIENT
Start: 2021-04-07

## 2019-01-22 RX ORDER — SODIUM CHLORIDE 0.9 % (FLUSH) 0.9 %
10 SYRINGE (ML) INJECTION
Status: CANCELLED | OUTPATIENT
Start: 2020-07-29

## 2019-01-22 RX ORDER — HEPARIN 100 UNIT/ML
500 SYRINGE INTRAVENOUS
Status: CANCELLED | OUTPATIENT
Start: 2021-04-07

## 2019-01-22 RX ORDER — HEPARIN 100 UNIT/ML
500 SYRINGE INTRAVENOUS
Status: CANCELLED | OUTPATIENT
Start: 2020-07-29

## 2019-01-22 RX ORDER — SODIUM CHLORIDE 0.9 % (FLUSH) 0.9 %
10 SYRINGE (ML) INJECTION
Status: CANCELLED | OUTPATIENT
Start: 2019-07-25

## 2019-01-22 RX ORDER — HEPARIN 100 UNIT/ML
500 SYRINGE INTRAVENOUS
Status: CANCELLED | OUTPATIENT
Start: 2020-10-21

## 2019-01-22 RX ORDER — HEPARIN 100 UNIT/ML
500 SYRINGE INTRAVENOUS
Status: CANCELLED | OUTPATIENT
Start: 2021-01-13

## 2019-01-22 RX ORDER — HEPARIN 100 UNIT/ML
500 SYRINGE INTRAVENOUS
Status: CANCELLED | OUTPATIENT
Start: 2019-07-25

## 2019-01-22 RX ORDER — SODIUM CHLORIDE 0.9 % (FLUSH) 0.9 %
10 SYRINGE (ML) INJECTION
Status: CANCELLED | OUTPATIENT
Start: 2019-02-20

## 2019-01-22 RX ORDER — SODIUM CHLORIDE 0.9 % (FLUSH) 0.9 %
10 SYRINGE (ML) INJECTION
Status: CANCELLED | OUTPATIENT
Start: 2020-01-29

## 2019-01-22 NOTE — PROGRESS NOTES
Subjective:       Patient ID: Carole Moore is a 53 y.o. female.    Chief Complaint: Breast Cancer (f/u) and Osteopenia (Zometa)    HPI 53-year-old  female who presents to the hematology oncology clinic today for follow-up for right breast carcinoma.  The patient was previously followed in the outpatient  Hem/Onc clinic by Dr. Alessia Moss and Dr. Huitron.    2/2017-Right-sided breast cancer: Stage IIIA invasive lobular carcinoma, ER/[place] positive, [id number] neg. BRCA negative. Given large mass on physical examination, treated with neoadjuvant chemotherapy, using dose-dense AC-T regimen, which patient completed in 2/2017 with decrease in size and firmness of R breast mass. She then underwent R breast mastectomy and SLND 4/2017, with tissue expander placement. Final pathology after neoadjuvant chemotherapy showed tumor 6cm, sentinel LNs positive, and nipple skin positive, we did recommend adjuvant radiation to R chest and axilla. Her case was discussed at tumor board and while axillary LN dissection was discussed, adjuvant radiation to the left chest and axilla were recommended which she has since completed. Anastrazole 1mg PO daily for adjuvant endocrine therapy was initiated end of July 2017.    PAST MEDICAL HISTORY:   1.  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  Dyslipidemia  3.  Osteopenia     SURGICAL HISTORY:   1.  Tonsillectomy  2.  Hysterectomy  3.  Right breast mastectomy with with right deep axilla sentinel lymph node biopsy followed by breast reconstruction with tissue expander done on April 11, 2017 and reconstruction completed in Dec 2017.  4.  Mediport placement and removal     FAMILY HISTORY: She reports that 2 paternal aunts had breast cancer in their late 50s.  A maternal uncle had lung cancer the age of 54.  He used to smoke cigarettes.  A maternal uncle had melanoma at the age of 49.  Her father had colon cancer at the age of 70.   Her mother had colon cancer in her 70s.  She denies any other immediate family members with cancer or bleeding/clotting disorders.     SOCIAL HISTORY: The ports a 5-pack-year smoking history and quit at the age of 23.  She drinks alcohol socially.  She has never used any recreational drugs.  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  She is  and has 2 children.  She lives in [place], [place].     ALLERGIES: [NKDA]     MEDICATIONS: [Medcard has been reviewed and/or reconciled.]    Review of Systems   Constitutional: Negative.         Hot flashes at night- drenching   HENT: Negative.    Eyes: Negative.    Respiratory: Negative.    Cardiovascular: Negative.    Gastrointestinal: Negative.         No reflux   Endocrine: Negative.    Genitourinary: Negative.    Musculoskeletal: Negative.    Skin: Negative.    Allergic/Immunologic: Negative.    Neurological: Negative.    Hematological: Negative.  Negative for adenopathy.   Psychiatric/Behavioral: Negative.        Objective:      Physical Exam   Constitutional: She is oriented to person, place, and time. She appears well-developed and well-nourished.   HENT:   Head: Normocephalic and atraumatic.   Right Ear: External ear normal.   Left Ear: External ear normal.   Mouth/Throat: No oropharyngeal exudate.   Eyes: Conjunctivae and EOM are normal. Pupils are equal, round, and reactive to light. Right eye exhibits no discharge. Left eye exhibits no discharge. No scleral icterus.   Neck: Normal range of motion. Neck supple. No thyromegaly present.   Cardiovascular: Normal rate, regular rhythm and normal heart sounds.   Pulmonary/Chest: Effort normal and breath sounds normal. Right breast exhibits no inverted nipple, no mass, no nipple discharge, no skin change and no tenderness. Left breast exhibits no inverted nipple, no mass, no nipple discharge, no skin change and no tenderness.   Abdominal: Soft. Bowel sounds are normal.    Musculoskeletal: Normal range of motion. She exhibits no edema.        Right shoulder: She exhibits no crepitus and normal strength.   Lymphadenopathy:        Head (right side): No submental, no submandibular, no tonsillar, no preauricular, no posterior auricular and no occipital adenopathy present.        Head (left side): No submental, no submandibular, no tonsillar, no preauricular, no posterior auricular and no occipital adenopathy present.     She has no cervical adenopathy.        Right cervical: No superficial cervical and no posterior cervical adenopathy present.       Left cervical: No superficial cervical and no posterior cervical adenopathy present.     She has no axillary adenopathy.        Right: No supraclavicular adenopathy present.        Left: No supraclavicular adenopathy present.   Neurological: She is alert and oriented to person, place, and time. She has normal reflexes.   Right upper extremity hemiparesis noted   Skin: Skin is warm and dry. No rash noted. No erythema. No pallor.   Psychiatric: She has a normal mood and affect. Her behavior is normal. Judgment and thought content normal.       Assessment:       1. Malignant neoplasm of central portion of right breast in female, estrogen receptor positive    2. Osteopenia of multiple sites    3. Use of aromatase inhibitors    4. Hemiparesis affecting right side as late effect of cerebrovascular accident    5. History of CVA (cerebrovascular accident)    6. Hot flashes related to aromatase inhibitor therapy        Plan:       1.       Right-sided breast cancer: Stage IIIA invasive lobular carcinoma, ER/[place] positive, [id number] neg. BRCA negative.  2.       Osteopenia- Zometa 4 mg IV every 6 months for prevention of AI induced bone loss and treatment of osteopenia to be given today- rtc in July with bmp and Zometa. Bone density 6/12/17 - osteopenia- 3 year f/u recommended- 6/2020.   3.        Continue Anastrazole 1 mg po daily for adjuvant endocrine  therapy. Take calcium and vit D.   4.        Hot flashes- at night - more so- interrupting sleep intermittently. Ditropan 2.5 mg po bid X 1 week and then increase to 5 mg bid thereafter.   5.        Pt is exercising for 30-60 minutes most days of the week on her bike- not taking her calcium and vit d as regularly but will start.

## 2019-01-23 ENCOUNTER — INFUSION (OUTPATIENT)
Dept: INFUSION THERAPY | Facility: HOSPITAL | Age: 54
End: 2019-01-23
Attending: NURSE PRACTITIONER
Payer: MEDICARE

## 2019-01-23 ENCOUNTER — OFFICE VISIT (OUTPATIENT)
Dept: HEMATOLOGY/ONCOLOGY | Facility: CLINIC | Age: 54
End: 2019-01-23
Payer: MEDICARE

## 2019-01-23 VITALS
RESPIRATION RATE: 16 BRPM | SYSTOLIC BLOOD PRESSURE: 90 MMHG | DIASTOLIC BLOOD PRESSURE: 60 MMHG | HEART RATE: 88 BPM | BODY MASS INDEX: 26.37 KG/M2 | TEMPERATURE: 98 F | OXYGEN SATURATION: 98 % | HEIGHT: 67 IN | WEIGHT: 168 LBS

## 2019-01-23 DIAGNOSIS — Z79.811 USE OF AROMATASE INHIBITORS: ICD-10-CM

## 2019-01-23 DIAGNOSIS — Z79.811 USE OF AROMATASE INHIBITORS: Primary | ICD-10-CM

## 2019-01-23 DIAGNOSIS — R23.2 HOT FLASHES RELATED TO AROMATASE INHIBITOR THERAPY: ICD-10-CM

## 2019-01-23 DIAGNOSIS — M85.89 OSTEOPENIA OF MULTIPLE SITES: ICD-10-CM

## 2019-01-23 DIAGNOSIS — T45.1X5A HOT FLASHES RELATED TO AROMATASE INHIBITOR THERAPY: ICD-10-CM

## 2019-01-23 DIAGNOSIS — Z17.0 MALIGNANT NEOPLASM OF CENTRAL PORTION OF RIGHT BREAST IN FEMALE, ESTROGEN RECEPTOR POSITIVE: ICD-10-CM

## 2019-01-23 DIAGNOSIS — C50.111 MALIGNANT NEOPLASM OF CENTRAL PORTION OF RIGHT BREAST IN FEMALE, ESTROGEN RECEPTOR POSITIVE: ICD-10-CM

## 2019-01-23 DIAGNOSIS — Z17.0 MALIGNANT NEOPLASM OF CENTRAL PORTION OF RIGHT BREAST IN FEMALE, ESTROGEN RECEPTOR POSITIVE: Primary | ICD-10-CM

## 2019-01-23 DIAGNOSIS — C50.111 MALIGNANT NEOPLASM OF CENTRAL PORTION OF RIGHT BREAST IN FEMALE, ESTROGEN RECEPTOR POSITIVE: Primary | ICD-10-CM

## 2019-01-23 DIAGNOSIS — I69.351 HEMIPARESIS AFFECTING RIGHT SIDE AS LATE EFFECT OF CEREBROVASCULAR ACCIDENT: ICD-10-CM

## 2019-01-23 DIAGNOSIS — Z86.73 HISTORY OF CVA (CEREBROVASCULAR ACCIDENT): ICD-10-CM

## 2019-01-23 PROCEDURE — 63600175 PHARM REV CODE 636 W HCPCS: Performed by: NURSE PRACTITIONER

## 2019-01-23 PROCEDURE — 99214 OFFICE O/P EST MOD 30 MIN: CPT | Mod: PBBFAC,PN,25 | Performed by: NURSE PRACTITIONER

## 2019-01-23 PROCEDURE — 99214 OFFICE O/P EST MOD 30 MIN: CPT | Mod: S$PBB,,, | Performed by: NURSE PRACTITIONER

## 2019-01-23 PROCEDURE — 96365 THER/PROPH/DIAG IV INF INIT: CPT

## 2019-01-23 PROCEDURE — 99214 PR OFFICE/OUTPT VISIT, EST, LEVL IV, 30-39 MIN: ICD-10-PCS | Mod: S$PBB,,, | Performed by: NURSE PRACTITIONER

## 2019-01-23 PROCEDURE — 99999 PR PBB SHADOW E&M-EST. PATIENT-LVL IV: CPT | Mod: PBBFAC,,, | Performed by: NURSE PRACTITIONER

## 2019-01-23 PROCEDURE — 99999 PR PBB SHADOW E&M-EST. PATIENT-LVL IV: ICD-10-PCS | Mod: PBBFAC,,, | Performed by: NURSE PRACTITIONER

## 2019-01-23 RX ORDER — OXYBUTYNIN CHLORIDE 5 MG/1
TABLET ORAL
Qty: 90 TABLET | Refills: 11 | Status: SHIPPED | OUTPATIENT
Start: 2019-01-23 | End: 2019-07-24 | Stop reason: ALTCHOICE

## 2019-01-23 RX ORDER — ZOLEDRONIC ACID 0.04 MG/ML
4 INJECTION, SOLUTION INTRAVENOUS
Status: COMPLETED | OUTPATIENT
Start: 2019-01-23 | End: 2019-01-23

## 2019-01-23 RX ADMIN — ZOLEDRONIC ACID 4 MG: 0.04 INJECTION, SOLUTION INTRAVENOUS at 01:01

## 2019-01-23 NOTE — DISCHARGE INSTRUCTIONS
[facility]  [address]  [phone] phone     [phone] fax  Hours of Operation: Monday- Friday 8:00am- 5:00pm  After hours phone  [phone]  Hematology / Oncology Physicians on call      Dr. Bill Palacios                        Please call with any concerns regarding your appointment today.

## 2019-01-23 NOTE — PLAN OF CARE
Problem: Adult Inpatient Plan of Care  Goal: Plan of Care Review  Outcome: Ongoing (interventions implemented as appropriate)  I feel great! No complaints.

## 2019-01-23 NOTE — PLAN OF CARE
Problem: Fall Injury Risk  Goal: Absence of Fall and Fall-Related Injury  Outcome: Ongoing (interventions implemented as appropriate)  Pt has osteopenia

## 2019-01-31 ENCOUNTER — OFFICE VISIT (OUTPATIENT)
Dept: DERMATOLOGY | Facility: CLINIC | Age: 54
End: 2019-01-31
Payer: MEDICARE

## 2019-01-31 DIAGNOSIS — D18.00 ANGIOMA: ICD-10-CM

## 2019-01-31 DIAGNOSIS — L82.1 SEBORRHEIC KERATOSIS: Primary | ICD-10-CM

## 2019-01-31 PROCEDURE — 99999 PR PBB SHADOW E&M-EST. PATIENT-LVL II: CPT | Mod: PBBFAC,,, | Performed by: DERMATOLOGY

## 2019-01-31 PROCEDURE — 99213 OFFICE O/P EST LOW 20 MIN: CPT | Mod: S$PBB,,, | Performed by: DERMATOLOGY

## 2019-01-31 PROCEDURE — 99213 PR OFFICE/OUTPT VISIT, EST, LEVL III, 20-29 MIN: ICD-10-PCS | Mod: S$PBB,,, | Performed by: DERMATOLOGY

## 2019-01-31 PROCEDURE — 99212 OFFICE O/P EST SF 10 MIN: CPT | Mod: PBBFAC,PN | Performed by: DERMATOLOGY

## 2019-01-31 PROCEDURE — 99999 PR PBB SHADOW E&M-EST. PATIENT-LVL II: ICD-10-PCS | Mod: PBBFAC,,, | Performed by: DERMATOLOGY

## 2019-01-31 NOTE — PATIENT INSTRUCTIONS

## 2019-01-31 NOTE — PROGRESS NOTES
Subjective:       Patient ID:  Carole Moore is a 53 y.o. female who presents for   Chief Complaint   Patient presents with    Mole     family history of melanoma    Skin Tags     left eye     Hx of multiple nevi,  fibrous papule of the left nasal sidewall, right breast CA (s/p right mastectomy c/ chemotherapy and radiation, followed by GIULIANO Mendoza), SK's and angioma, last seen on 9/28/17.  She c/o mole of the right abdomen for several years.  Denies changes. No tx tried.    The patient denies personal or family history of skin cancer.          Review of Systems   Constitutional: Negative for fever and chills.   Gastrointestinal: Negative for nausea and vomiting.   Skin: Positive for activity-related sunscreen use. Negative for daily sunscreen use and recent sunburn.   Hematologic/Lymphatic: Does not bruise/bleed easily.        Objective:    Physical Exam   Constitutional: She appears well-developed and well-nourished. No distress.   Neurological: She is alert and oriented to person, place, and time. She is not disoriented.   Psychiatric: She has a normal mood and affect.   Skin:   Areas Examined (abnormalities noted in diagram):   Scalp / Hair Palpated and Inspected  Head / Face Inspection Performed  Neck Inspection Performed  Chest / Axilla Inspection Performed  Abdomen Inspection Performed  Back Inspection Performed  RUE Inspected  LUE Inspection Performed  RLE Inspected  LLE Inspection Performed  Nails and Digits Inspection Performed                   Diagram Legend     Erythematous scaling macule/papule c/w actinic keratosis       Vascular papule c/w angioma      Pigmented verrucoid papule/plaque c/w seborrheic keratosis      Yellow umbilicated papule c/w sebaceous hyperplasia      Irregularly shaped tan macule c/w lentigo     1-2 mm smooth white papules consistent with Milia      Movable subcutaneous cyst with punctum c/w epidermal inclusion cyst      Subcutaneous movable cyst c/w pilar cyst       Firm pink to brown papule c/w dermatofibroma      Pedunculated fleshy papule(s) c/w skin tag(s)      Evenly pigmented macule c/w junctional nevus     Mildly variegated pigmented, slightly irregular-bordered macule c/w mildly atypical nevus      Flesh colored to evenly pigmented papule c/w intradermal nevus       Pink pearly papule/plaque c/w basal cell carcinoma      Erythematous hyperkeratotic cursted plaque c/w SCC      Surgical scar with no sign of skin cancer recurrence      Open and closed comedones      Inflammatory papules and pustules      Verrucoid papule consistent consistent with wart     Erythematous eczematous patches and plaques     Dystrophic onycholytic nail with subungual debris c/w onychomycosis     Umbilicated papule    Erythematous-base heme-crusted tan verrucoid plaque consistent with inflamed seborrheic keratosis     Erythematous Silvery Scaling Plaque c/w Psoriasis     See annotation      Assessment / Plan:        Multiple nevi  Reassurance given.  Discussed ABCDEF of melanoma and changes for patient to look for.  AAD Handout given. Discussed importance of daily use of sunscreen which is broad-spectrum and has a minimum SPF of 30.    Seborrheic keratosis  Reassurance given.  Lesions are benign.    Angioma  Reassurance given.  Lesions are benign.             Follow-up if symptoms worsen or fail to improve.

## 2019-02-04 ENCOUNTER — OFFICE VISIT (OUTPATIENT)
Dept: URGENT CARE | Facility: CLINIC | Age: 54
End: 2019-02-04
Payer: MEDICARE

## 2019-02-04 VITALS
BODY MASS INDEX: 26.38 KG/M2 | SYSTOLIC BLOOD PRESSURE: 112 MMHG | WEIGHT: 168.44 LBS | HEART RATE: 100 BPM | DIASTOLIC BLOOD PRESSURE: 76 MMHG | TEMPERATURE: 98 F | RESPIRATION RATE: 19 BRPM | OXYGEN SATURATION: 98 %

## 2019-02-04 DIAGNOSIS — N39.0 URINARY TRACT INFECTION WITH HEMATURIA, SITE UNSPECIFIED: Primary | ICD-10-CM

## 2019-02-04 DIAGNOSIS — R31.9 URINARY TRACT INFECTION WITH HEMATURIA, SITE UNSPECIFIED: Primary | ICD-10-CM

## 2019-02-04 DIAGNOSIS — R30.0 DYSURIA: ICD-10-CM

## 2019-02-04 DIAGNOSIS — R35.0 FREQUENCY OF URINATION: ICD-10-CM

## 2019-02-04 LAB
BILIRUB SERPL-MCNC: ABNORMAL MG/DL
BLOOD URINE, POC: ABNORMAL
COLOR, POC UA: ABNORMAL
GLUCOSE UR QL STRIP: NORMAL
KETONES UR QL STRIP: ABNORMAL
LEUKOCYTE ESTERASE URINE, POC: ABNORMAL
NITRITE, POC UA: ABNORMAL
PH, POC UA: 5
PROTEIN, POC: ABNORMAL
SPECIFIC GRAVITY, POC UA: 1.01
UROBILINOGEN, POC UA: NORMAL

## 2019-02-04 PROCEDURE — 99214 PR OFFICE/OUTPT VISIT, EST, LEVL IV, 30-39 MIN: ICD-10-PCS | Mod: S$PBB,,, | Performed by: NURSE PRACTITIONER

## 2019-02-04 PROCEDURE — 99214 OFFICE O/P EST MOD 30 MIN: CPT | Mod: PBBFAC,PO | Performed by: NURSE PRACTITIONER

## 2019-02-04 PROCEDURE — 87086 URINE CULTURE/COLONY COUNT: CPT

## 2019-02-04 PROCEDURE — 99999 PR PBB SHADOW E&M-EST. PATIENT-LVL IV: CPT | Mod: PBBFAC,,, | Performed by: NURSE PRACTITIONER

## 2019-02-04 PROCEDURE — 99214 OFFICE O/P EST MOD 30 MIN: CPT | Mod: S$PBB,,, | Performed by: NURSE PRACTITIONER

## 2019-02-04 PROCEDURE — 99999 PR PBB SHADOW E&M-EST. PATIENT-LVL IV: ICD-10-PCS | Mod: PBBFAC,,, | Performed by: NURSE PRACTITIONER

## 2019-02-04 PROCEDURE — 81001 URINALYSIS AUTO W/SCOPE: CPT | Mod: PBBFAC,PO | Performed by: NURSE PRACTITIONER

## 2019-02-04 RX ORDER — SULFAMETHOXAZOLE AND TRIMETHOPRIM 800; 160 MG/1; MG/1
1 TABLET ORAL 2 TIMES DAILY
Qty: 20 TABLET | Refills: 0 | Status: SHIPPED | OUTPATIENT
Start: 2019-02-04 | End: 2019-02-09

## 2019-02-04 RX ORDER — SULFAMETHOXAZOLE AND TRIMETHOPRIM 800; 160 MG/1; MG/1
1 TABLET ORAL 2 TIMES DAILY
Qty: 10 TABLET | Refills: 0 | Status: SHIPPED | OUTPATIENT
Start: 2019-02-04 | End: 2019-02-04 | Stop reason: SDUPTHER

## 2019-02-04 RX ORDER — PHENAZOPYRIDINE HYDROCHLORIDE 100 MG/1
100 TABLET, FILM COATED ORAL 3 TIMES DAILY PRN
Qty: 6 TABLET | Refills: 0 | Status: SHIPPED | OUTPATIENT
Start: 2019-02-04 | End: 2019-02-06

## 2019-02-04 NOTE — PROGRESS NOTES
Subjective:       Patient ID: Carole Moore is a 53 y.o. female.    Chief Complaint: Urinary Frequency and Urinary Urgency    Pt is a 53 year old female to clinic today with complaints of dysuria, frequency and urgency that began Saturday.       Urinary Frequency    This is a recurrent problem. The current episode started in the past 7 days. The problem has been gradually worsening. The quality of the pain is described as burning. The pain is at a severity of 4/10. The pain is mild. There has been no fever. Associated symptoms include frequency, hesitancy, urgency, bubble bath use and withholding. Pertinent negatives include no behavior changes, chills, discharge, flank pain, hematuria, nausea, possible pregnancy, sweats, vomiting, weight loss, constipation or rash. She has tried increased fluids for the symptoms. The treatment provided no relief. Her past medical history is significant for recurrent UTIs.     Review of Systems   Constitutional: Negative for chills, diaphoresis, fatigue, fever and weight loss.   Gastrointestinal: Negative for constipation, nausea and vomiting.   Genitourinary: Positive for dysuria, frequency, hesitancy and urgency. Negative for difficulty urinating, flank pain and hematuria.   Musculoskeletal: Negative for back pain and myalgias.   Skin: Negative for rash.   Neurological: Negative for dizziness, weakness, light-headedness and headaches.       Objective:      Physical Exam    Assessment:       1. Urinary tract infection with hematuria, site unspecified    2. Dysuria    3. Frequency of urination        Plan:   Urinary tract infection with hematuria, site unspecified  -     Discontinue: sulfamethoxazole-trimethoprim 800-160mg (BACTRIM DS) 800-160 mg Tab; Take 1 tablet by mouth 2 (two) times daily. for 5 days  Dispense: 10 tablet; Refill: 0  -     phenazopyridine (PYRIDIUM) 100 MG tablet; Take 1 tablet (100 mg total) by mouth 3 (three) times daily as needed for Pain.  Dispense:  6 tablet; Refill: 0  -     sulfamethoxazole-trimethoprim 800-160mg (BACTRIM DS) 800-160 mg Tab; Take 1 tablet by mouth 2 (two) times daily. for 5 days  Dispense: 20 tablet; Refill: 0    Dysuria  -     POCT urinalysis, dipstick or tablet reag  -     Urine culture    Frequency of urination  -     POCT urinalysis, dipstick or tablet reag  -     Urine culture      Pt opts to hold off on urology referral at this time.     · Increase fluids (avoid caffeine or sugary beverages as these will irritate the bladder).   · Take your antibiotics exactly as prescribed and make sure to complete entire course even if you begin to feel better. This will prevent recurrence of infection and antibiotic resistance.   · We have prescribed an antibiotic that covers most bacteria that cause urinary tract infections, however, if your urine culture shows that you have any bacterial resistance to the antibiotic you were prescribed or an unusual bacterial strain, we will notify you and make any necessary changes. Urine cultures usually result in about three days.   · Please follow up with your primary care provider if your symptoms do not improve within 2-3 days or sooner for any new or worsening symptoms.  · For any severe pain, bright red blood in urine, difficulty urinating, fever that does not improve with Tylenol or Ibuprofen, inability to urinate, incontinence of urine or bowels, or for any other urgent concerns, please go to the ER for immediate evaluation.

## 2019-02-04 NOTE — PATIENT INSTRUCTIONS
Understanding Urinary Tract Infections (UTIs)  Most UTIs are caused by bacteria, although they may also be caused by viruses or fungi. Bacteria from the bowel are the most common source of infection. The infection may start because of any of the following:  · Sexual activity. During sex, bacteria can travel from the penis, vagina, or rectum into the urethra.   · Bacteria on the skin outside the rectum may travel into the urethra. This is more common in women since the rectum and urethra are closer to each other than in men. Wiping from front to back after using the toilet and keeping the area clean can help prevent germs from getting to the urethra.  · Blockage of urine flow through the urinary tract. If urine sits too long, germs may start to grow out of control.      Parts of the urinary tract  The infection can occur in any part of the urinary tract.  · The kidneys collect and store urine.  · The ureters carry urine from the kidneys to the bladder.  · The bladder holds urine until you are ready to let it out.  · The urethra carries urine from the bladder out of the body. It is shorter in women, so bacteria can move through it more easily. The urethra is longer in men, so a UTI is less likely to reach the bladder or kidneys in men.  Date Last Reviewed: 1/1/2017  © 2145-8158 [employer]. [address] [place] [address], [place] [address]. All rights reserved. This information is not intended as a substitute for professional medical care. Always follow your healthcare professional's instructions.        Urinary Tract Infections in Women    Urinary tract infections (UTIs) are most often caused by bacteria (germs). These bacteria enter the urinary tract. The bacteria may come from outside the body. Or they may travel from the skin outside the rectum or vagina into the urethra. Female anatomy makes it easy for bacteria from the bowel to enter a womans urinary tract, which is the most common source of UTI. This  means women develop UTIs more often than men. Pain in or around the urinary tract is a common UTI symptom. But the only way to know for sure if you have a UTI for the healthcare provider to test your urine. The two tests that may be done are the urinalysis and urine culture.  Types of UTIs  · Cystitis: A bladder infection (cystitis) is the most common UTI in women. You may have urgent or frequent urination. You may also have pain, burning when you urinate, and bloody urine.  · Urethritis: This is an inflamed urethra, which is the tube that carries urine from the bladder to outside the body. You may have lower stomach or back pain. You may also have urgent or frequent urination.  · Pyelonephritis: This is a kidney infection. If not treated, it can be serious and damage your kidneys. In severe cases, you may be hospitalized. You may have a fever and lower back pain.  Medicines to treat a UTI  Most UTIs are treated with antibiotics. These kill the bacteria. The length of time you need to take them depends on the type of infection. It may be as short as 3 days. If you have repeated UTIs, a low-dose antibiotic may be needed for several months. Take antibiotics exactly as directed. Dont stop taking them until all of the medicine is gone. If you stop taking the antibiotic too soon, the infection may not go away, and you may develop a resistance to the antibiotic. This can make it much harder to treat.  Lifestyle changes to treat and prevent UTIs  The lifestyle changes below will help get rid of your UTI. They may also help prevent future UTIs.  · Drink plenty of fluids. This includes water, juice, or other caffeine-free drinks. Fluids help flush bacteria out of your body.  · Empty your bladder. Always empty your bladder when you feel the urge to urinate. And always urinate before going to sleep. Urine that stays in your bladder can lead to infection. Try to urinate before and after sex as well.  · Practice good personal  hygiene. Wipe yourself from front to back after using the toilet. This helps keep bacteria from getting into the urethra.  · Use condoms during sex. These help prevent UTIs caused by sexually transmitted bacteria. Also, avoid using spermicides during sex. These can increase the risk of UTIs. Choose other forms of birth control instead. For women who tend to get UTIs after sex, a low-dose of a preventive antibiotic may be used. Be sure to discuss this option with your healthcare provider.  · Follow up with your healthcare provider as directed. He or she may test to make sure the infection has cleared. If needed, more treatment may be started.  Date Last Reviewed: 1/1/2017  © 7084-3658 [employer]. [address] [place] [address], [place] [address]. All rights reserved. This information is not intended as a substitute for professional medical care. Always follow your healthcare professional's instructions.

## 2019-02-05 LAB
BACTERIA UR CULT: NORMAL
BACTERIA UR CULT: NORMAL

## 2019-02-06 DIAGNOSIS — E78.2 MIXED HYPERLIPIDEMIA: ICD-10-CM

## 2019-02-06 DIAGNOSIS — Z86.73 HISTORY OF CVA (CEREBROVASCULAR ACCIDENT): Primary | ICD-10-CM

## 2019-02-07 ENCOUNTER — OFFICE VISIT (OUTPATIENT)
Dept: CARDIOLOGY | Facility: CLINIC | Age: 54
End: 2019-02-07
Payer: MEDICARE

## 2019-02-07 ENCOUNTER — CLINICAL SUPPORT (OUTPATIENT)
Dept: CARDIOLOGY | Facility: CLINIC | Age: 54
End: 2019-02-07
Payer: MEDICARE

## 2019-02-07 VITALS
WEIGHT: 165 LBS | BODY MASS INDEX: 25.9 KG/M2 | SYSTOLIC BLOOD PRESSURE: 110 MMHG | DIASTOLIC BLOOD PRESSURE: 60 MMHG | HEIGHT: 67 IN | HEART RATE: 56 BPM

## 2019-02-07 DIAGNOSIS — Z86.73 HISTORY OF CVA (CEREBROVASCULAR ACCIDENT): ICD-10-CM

## 2019-02-07 DIAGNOSIS — E78.2 MIXED HYPERLIPIDEMIA: ICD-10-CM

## 2019-02-07 DIAGNOSIS — Z91.89 HEART FAILURE, ACC/AHA STAGE A: Primary | ICD-10-CM

## 2019-02-07 DIAGNOSIS — E78.00 PURE HYPERCHOLESTEROLEMIA: ICD-10-CM

## 2019-02-07 PROCEDURE — 99213 OFFICE O/P EST LOW 20 MIN: CPT | Mod: PBBFAC,PN | Performed by: NUCLEAR MEDICINE

## 2019-02-07 PROCEDURE — 93005 ELECTROCARDIOGRAM TRACING: CPT | Mod: PBBFAC,PO | Performed by: INTERNAL MEDICINE

## 2019-02-07 PROCEDURE — 99999 PR PBB SHADOW E&M-EST. PATIENT-LVL III: CPT | Mod: PBBFAC,,, | Performed by: NUCLEAR MEDICINE

## 2019-02-07 PROCEDURE — 93010 ELECTROCARDIOGRAM REPORT: CPT | Mod: S$PBB,,, | Performed by: INTERNAL MEDICINE

## 2019-02-07 PROCEDURE — 93010 EKG 12-LEAD: ICD-10-PCS | Mod: S$PBB,,, | Performed by: INTERNAL MEDICINE

## 2019-02-07 PROCEDURE — 99214 PR OFFICE/OUTPT VISIT, EST, LEVL IV, 30-39 MIN: ICD-10-PCS | Mod: S$PBB,,, | Performed by: NUCLEAR MEDICINE

## 2019-02-07 PROCEDURE — 99214 OFFICE O/P EST MOD 30 MIN: CPT | Mod: S$PBB,,, | Performed by: NUCLEAR MEDICINE

## 2019-02-07 PROCEDURE — 99999 PR PBB SHADOW E&M-EST. PATIENT-LVL III: ICD-10-PCS | Mod: PBBFAC,,, | Performed by: NUCLEAR MEDICINE

## 2019-02-07 NOTE — PROGRESS NOTES
Subjective:   Patient ID:  aCrole Moore is a 53 y.o. female who presents for follow-up of 1- HEART FAILURE STAGE A; Hyperlipidemia; and OLD STROKE      HPI NO RECENT HOSPITALIZATIONS OR ED VISITS FOR ADHF OR ARRHYTHMIAS  NO EDEMA. NO CALVE TENDERNESS  NO ABDOMINAL DISCOMFORT  NO PALPITATIONS.   NO NEAR SYNCOPE OR SYNCOPE  NO ORTHOPNEA OR PND.  NO UNUSUAL LIPSCOMB.  NO CHEST DISCOMFORT  NO NEW FOCAL CNS SYMPTOMS OR SIGNS TO SUGGEST TIA OR STROKE    Review of Systems   Constitution: Negative for chills, fever, weakness, night sweats, weight gain and weight loss.   HENT: Negative for nosebleeds.    Eyes: Negative for blurred vision, double vision and visual disturbance.   Cardiovascular: Negative for chest pain, dyspnea on exertion, irregular heartbeat, leg swelling, orthopnea, palpitations, paroxysmal nocturnal dyspnea and syncope.   Respiratory: Negative for cough, hemoptysis and wheezing.    Endocrine: Negative for polydipsia and polyuria.   Hematologic/Lymphatic: Does not bruise/bleed easily.   Skin: Negative for rash.   Musculoskeletal: Negative for joint pain, joint swelling, muscle weakness and myalgias.   Gastrointestinal: Negative for abdominal pain, hematemesis, jaundice and melena.   Genitourinary: Negative for dysuria, hematuria and nocturia.   Neurological: Negative for dizziness, focal weakness, headaches and sensory change.   Psychiatric/Behavioral: Negative for depression. The patient does not have insomnia and is not nervous/anxious.      Family History   Problem Relation Age of Onset    Breast cancer Paternal Aunt     Colon cancer Father     Colon cancer Mother     Melanoma Maternal Uncle      Past Medical History:   Diagnosis Date    Breast cancer     Cancer     R Breast cancer    CVA (cerebral infarction)     Diverticulosis     Family history of colon cancer 10/30/2018    Her mother and father both had colon cancer.    Hyperlipidemia     Nausea after anesthesia     Paralysis      right side    PONV (postoperative nausea and vomiting)     Stroke     R side weakness     Current Outpatient Medications on File Prior to Visit   Medication Sig Dispense Refill    anastrozole (ARIMIDEX) 1 mg Tab Take 1 tablet (1 mg total) by mouth once daily. 90 tablet 1    aspirin 81 MG Chew Take 81 mg by mouth once daily.      atorvastatin (LIPITOR) 10 MG tablet Take 1 tablet (10 mg total) by mouth once daily. 90 tablet 4    LORATADINE (CLARITIN ORAL) Take by mouth daily as needed.       oxybutynin (DITROPAN) 5 MG Tab Take half tab twice a day for one week then go to 5 mg (whole tab) twice a day thereafter 90 tablet 11    sulfamethoxazole-trimethoprim 800-160mg (BACTRIM DS) 800-160 mg Tab Take 1 tablet by mouth 2 (two) times daily. for 5 days 20 tablet 0    diclofenac sodium 1 % Gel Apply 2 g topically 4 (four) times daily. 1 Tube 0    hydrocortisone 2.5 % cream Apply topically 2 (two) times daily. 1 Tube 1    [] phenazopyridine (PYRIDIUM) 100 MG tablet Take 1 tablet (100 mg total) by mouth 3 (three) times daily as needed for Pain. 6 tablet 0     No current facility-administered medications on file prior to visit.      Review of patient's allergies indicates:  No Known Allergies    Objective:     Physical Exam   Constitutional: She is oriented to person, place, and time. She appears well-developed. No distress.   HENT:   Head: Normocephalic.   Eyes: Conjunctivae are normal. Pupils are equal, round, and reactive to light. No scleral icterus.   Neck: Normal range of motion. Neck supple. Normal carotid pulses, no hepatojugular reflux and no JVD present. Carotid bruit is not present. No edema present. No thyroid mass and no thyromegaly present.   Cardiovascular: Normal rate, regular rhythm, S1 normal, S2 normal, normal heart sounds and intact distal pulses. PMI is not displaced. Exam reveals no gallop and no friction rub.   No murmur heard.  Pulses:       Carotid pulses are 2+ on the right side, and  2+ on the left side.       Radial pulses are 2+ on the right side, and 2+ on the left side.        Femoral pulses are 2+ on the right side, and 2+ on the left side.       Popliteal pulses are 2+ on the right side, and 2+ on the left side.        Dorsalis pedis pulses are 2+ on the right side, and 2+ on the left side.        Posterior tibial pulses are 2+ on the right side, and 2+ on the left side.   Pulmonary/Chest: Effort normal and breath sounds normal. She has no wheezes. She has no rales. She exhibits no tenderness.   Abdominal: Soft. Bowel sounds are normal. She exhibits no pulsatile midline mass and no mass. There is no hepatosplenomegaly. There is no tenderness.   Musculoskeletal: Normal range of motion. She exhibits no edema or tenderness.        Cervical back: Normal.        Thoracic back: Normal.        Lumbar back: Normal.   Lymphadenopathy:     She has no cervical adenopathy.     She has no axillary adenopathy.        Right: No supraclavicular adenopathy present.        Left: No supraclavicular adenopathy present.   Neurological: She is alert and oriented to person, place, and time. She has normal strength and normal reflexes. No sensory deficit. Gait normal.   MILD RESIDUAL RIGHT HEMIPARESIS   Skin: Skin is warm. No rash noted. No cyanosis. No pallor. Nails show no clubbing.   Psychiatric: She has a normal mood and affect. Her speech is normal and behavior is normal. Cognition and memory are normal.       Assessment:     1. Heart failure, ACC/AHA stage A    2. History of CVA (cerebrovascular accident)    3. Pure hypercholesterolemia      STABLE CV STATUS  STABLE CNS STATUS  BP CONTROLLED  Plan:     Heart failure, ACC/AHA stage A  -     2D echo with color flow doppler; Future    History of CVA (cerebrovascular accident)    Pure hypercholesterolemia      1- CONTINUE PRESENT CARD MANAGEMENT    2- RETURN IN ONE YEAR WITH ECHO

## 2019-07-23 RX ORDER — SODIUM CHLORIDE 0.9 % (FLUSH) 0.9 %
10 SYRINGE (ML) INJECTION
Status: CANCELLED | OUTPATIENT
Start: 2019-07-23

## 2019-07-23 RX ORDER — HEPARIN 100 UNIT/ML
500 SYRINGE INTRAVENOUS
Status: CANCELLED | OUTPATIENT
Start: 2019-07-23

## 2019-07-23 NOTE — PROGRESS NOTES
Subjective:       Patient ID: Carole Moore is a 54 y.o. female.    Chief Complaint: Breast Cancer and Osteopenia    HPI 53-year-old  female who presents to the hematology oncology clinic today for follow-up for right breast carcinoma.  The patient was previously followed in the outpatient  Hem/Onc clinic by Dr. Alessia Moss and Dr. Huitron.    2/2017-Right-sided breast cancer: Stage IIIA invasive lobular carcinoma, ER/DC positive, Mcx4Aol neg. BRCA negative. Given large mass on physical examination, treated with neoadjuvant chemotherapy, using dose-dense AC-T regimen, which patient completed in 2/2017 with decrease in size and firmness of R breast mass. She then underwent R breast mastectomy and SLND 4/2017, with tissue expander placement. Final pathology after neoadjuvant chemotherapy showed tumor 6cm, sentinel LNs positive, and nipple skin positive, we did recommend adjuvant radiation to R chest and axilla. Her case was discussed at tumor board and while axillary LN dissection was discussed, adjuvant radiation to the left chest and axilla were recommended which she has since completed.     Anastrazole 1mg PO daily for adjuvant endocrine therapy was initiated end of July 2017.    Zometa initiated for osteopenia secondary to AI use.     Pt states had dental abscess and cap placement over broken tooth in June. No healing issues and thinks she told her dentist about the medications she is taking. Asked pt to make sure dentist is aware of her taking Zometa.    PAST MEDICAL HISTORY:   1.  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  Dyslipidemia  3.  Osteopenia     SURGICAL HISTORY:   1.  Tonsillectomy  2.  Hysterectomy  3.  Right breast mastectomy with with right deep axilla sentinel lymph node biopsy followed by breast reconstruction with tissue expander done on April 11, 2017 and reconstruction completed in Dec 2017.  4.  Mediport placement and  removal     FAMILY HISTORY: She reports that 2 paternal aunts had breast cancer in their late 50s.  A maternal uncle had lung cancer the age of 54.  He used to smoke cigarettes.  A maternal uncle had melanoma at the age of 49.  Her father had colon cancer at the age of 70.  Her mother had colon cancer in her 70s.  She denies any other immediate family members with cancer or bleeding/clotting disorders.     SOCIAL HISTORY: The ports a 5-pack-year smoking history and quit at the age of 23.  She drinks alcohol socially.  She has never used any recreational drugs.  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  She is  and has 2 children.  She lives in [place], [place].     ALLERGIES: [NKDA]     MEDICATIONS: [Medcard has been reviewed and/or reconciled.]    Review of Systems   Constitutional: Negative.         Hot flashes at night- drenching   HENT: Negative.    Eyes: Negative.    Respiratory: Negative.    Cardiovascular: Negative.    Gastrointestinal: Negative.         No reflux   Endocrine: Negative.    Genitourinary: Negative.    Musculoskeletal: Negative.    Skin: Negative.    Allergic/Immunologic: Negative.    Neurological: Negative.    Hematological: Negative.  Negative for adenopathy.   Psychiatric/Behavioral: Negative.        Objective:      Physical Exam   Constitutional: She is oriented to person, place, and time. She appears well-developed and well-nourished.   HENT:   Head: Normocephalic and atraumatic.   Right Ear: External ear normal.   Left Ear: External ear normal.   Mouth/Throat: No oropharyngeal exudate.   Eyes: Pupils are equal, round, and reactive to light. Conjunctivae and EOM are normal. Right eye exhibits no discharge. Left eye exhibits no discharge. No scleral icterus.   Neck: Normal range of motion. Neck supple. No thyromegaly present.   Cardiovascular: Normal rate, regular rhythm and normal heart sounds.   Pulmonary/Chest: Effort normal and breath sounds  normal. Right breast exhibits no inverted nipple, no mass, no nipple discharge, no skin change and no tenderness. Left breast exhibits no inverted nipple, no mass, no nipple discharge, no skin change and no tenderness.   Abdominal: Soft. Bowel sounds are normal.   Musculoskeletal: Normal range of motion. She exhibits no edema.        Right shoulder: She exhibits no crepitus and normal strength.   Lymphadenopathy:        Head (right side): No submental, no submandibular, no tonsillar, no preauricular, no posterior auricular and no occipital adenopathy present.        Head (left side): No submental, no submandibular, no tonsillar, no preauricular, no posterior auricular and no occipital adenopathy present.     She has no cervical adenopathy.        Right cervical: No superficial cervical and no posterior cervical adenopathy present.       Left cervical: No superficial cervical and no posterior cervical adenopathy present.     She has no axillary adenopathy.        Right: No supraclavicular adenopathy present.        Left: No supraclavicular adenopathy present.   Neurological: She is alert and oriented to person, place, and time. She has normal reflexes.   Right upper extremity hemiparesis noted   Skin: Skin is warm and dry. No rash noted. No erythema. No pallor.   Psychiatric: She has a normal mood and affect. Her behavior is normal. Judgment and thought content normal.       Mammogram left 10/18/18- wnl  Bone density 6/12/17- osteopenia-3 year f/u recommended -6/2020  Taking Vit D daily- intermittently taking calcium    Exercise- rides her bike at least 30 minutes daily.     Assessment:       1. Malignant neoplasm of central portion of right breast in female, estrogen receptor positive    2. Use of aromatase inhibitors    3. Osteopenia of multiple sites    4. Osteopenia due to cancer therapy    5. Other osteoporosis without current pathological fracture     6. Encounter for monitoring adjuvant hormonal therapy         Plan:       1.       Right-sided breast cancer: Stage IIIA invasive lobular carcinoma, ER/IL positive, Isp4Cuo neg. BRCA negative.  2.       Osteopenia- Zometa 4 mg IV every 6 months for prevention of AI induced bone loss and treatment of osteopenia to be given today- rtc in July with bmp and Zometa. Bone density 6/12/17 - osteopenia- 3 year f/u recommended- 6/2020.   3.        Continue Anastrazole 1 mg po daily for adjuvant endocrine therapy. Take calcium and vit D. 7/2022 will make 5 years  4.        Hot flashes- improved.   5.        Pt is exercising for 30-60 minutes most days of the week on her bike- not taking her calcium regularly. Taking vit d regularly.

## 2019-07-24 ENCOUNTER — OFFICE VISIT (OUTPATIENT)
Dept: HEMATOLOGY/ONCOLOGY | Facility: CLINIC | Age: 54
End: 2019-07-24
Payer: MEDICARE

## 2019-07-24 ENCOUNTER — INFUSION (OUTPATIENT)
Dept: INFUSION THERAPY | Facility: HOSPITAL | Age: 54
End: 2019-07-24
Attending: NURSE PRACTITIONER
Payer: MEDICARE

## 2019-07-24 VITALS
HEART RATE: 75 BPM | SYSTOLIC BLOOD PRESSURE: 106 MMHG | DIASTOLIC BLOOD PRESSURE: 72 MMHG | TEMPERATURE: 98 F | BODY MASS INDEX: 25.97 KG/M2 | WEIGHT: 165.81 LBS

## 2019-07-24 DIAGNOSIS — Z17.0 MALIGNANT NEOPLASM OF CENTRAL PORTION OF RIGHT BREAST IN FEMALE, ESTROGEN RECEPTOR POSITIVE: ICD-10-CM

## 2019-07-24 DIAGNOSIS — C50.111 MALIGNANT NEOPLASM OF CENTRAL PORTION OF RIGHT BREAST IN FEMALE, ESTROGEN RECEPTOR POSITIVE: Primary | ICD-10-CM

## 2019-07-24 DIAGNOSIS — C50.111 MALIGNANT NEOPLASM OF CENTRAL PORTION OF RIGHT BREAST IN FEMALE, ESTROGEN RECEPTOR POSITIVE: ICD-10-CM

## 2019-07-24 DIAGNOSIS — M81.8 OTHER OSTEOPOROSIS WITHOUT CURRENT PATHOLOGICAL FRACTURE: ICD-10-CM

## 2019-07-24 DIAGNOSIS — M85.80 OSTEOPENIA DUE TO CANCER THERAPY: ICD-10-CM

## 2019-07-24 DIAGNOSIS — Z85.3 PERSONAL HISTORY OF BREAST CANCER: Primary | ICD-10-CM

## 2019-07-24 DIAGNOSIS — Z51.81 ENCOUNTER FOR MONITORING ADJUVANT HORMONAL THERAPY: ICD-10-CM

## 2019-07-24 DIAGNOSIS — Z17.0 MALIGNANT NEOPLASM OF CENTRAL PORTION OF RIGHT BREAST IN FEMALE, ESTROGEN RECEPTOR POSITIVE: Primary | ICD-10-CM

## 2019-07-24 DIAGNOSIS — M85.89 OSTEOPENIA OF MULTIPLE SITES: ICD-10-CM

## 2019-07-24 DIAGNOSIS — Z79.899 ENCOUNTER FOR MONITORING ADJUVANT HORMONAL THERAPY: ICD-10-CM

## 2019-07-24 DIAGNOSIS — Z79.811 USE OF AROMATASE INHIBITORS: ICD-10-CM

## 2019-07-24 DIAGNOSIS — Z79.811 USE OF AROMATASE INHIBITORS: Primary | ICD-10-CM

## 2019-07-24 PROCEDURE — 99999 PR PBB SHADOW E&M-EST. PATIENT-LVL III: CPT | Mod: PBBFAC,,, | Performed by: NURSE PRACTITIONER

## 2019-07-24 PROCEDURE — 99213 OFFICE O/P EST LOW 20 MIN: CPT | Mod: PBBFAC,25 | Performed by: NURSE PRACTITIONER

## 2019-07-24 PROCEDURE — 99214 PR OFFICE/OUTPT VISIT, EST, LEVL IV, 30-39 MIN: ICD-10-PCS | Mod: 25,S$PBB,, | Performed by: NURSE PRACTITIONER

## 2019-07-24 PROCEDURE — 96365 THER/PROPH/DIAG IV INF INIT: CPT

## 2019-07-24 PROCEDURE — 99214 OFFICE O/P EST MOD 30 MIN: CPT | Mod: 25,S$PBB,, | Performed by: NURSE PRACTITIONER

## 2019-07-24 PROCEDURE — 63600175 PHARM REV CODE 636 W HCPCS: Performed by: NURSE PRACTITIONER

## 2019-07-24 PROCEDURE — 99999 PR PBB SHADOW E&M-EST. PATIENT-LVL III: ICD-10-PCS | Mod: PBBFAC,,, | Performed by: NURSE PRACTITIONER

## 2019-07-24 RX ORDER — ZOLEDRONIC ACID 0.04 MG/ML
4 INJECTION, SOLUTION INTRAVENOUS
Status: COMPLETED | OUTPATIENT
Start: 2019-07-24 | End: 2019-07-24

## 2019-07-24 RX ADMIN — ZOLEDRONIC ACID 4 MG: 0.04 INJECTION, SOLUTION INTRAVENOUS at 11:07

## 2019-07-24 NOTE — PLAN OF CARE
Problem: Fall Injury Risk  Goal: Absence of Fall and Fall-Related Injury    Intervention: Promote Injury-Free Environment  Reminded pt to call for assistance upon ambulation

## 2019-07-24 NOTE — DISCHARGE INSTRUCTIONS
.  [facility]  [address] [facility]  [address]  [phone] phone     [phone] fax  Hours of Operation: Monday- Friday 8:00am- 5:00pm  After hours phone  [phone]  Hematology / Oncology Physicians on call      Dr. Bill Avila    Please call with any concerns regarding your appointment today.  .FALL PREVENTION   Falls often occur due to slipping, tripping or losing your balance. Here are ways to reduce your risk of falling again.   Was there anything that caused your fall that can be fixed, removed or replaced?   Make your home safe by keeping walkways clear of objects you may trip over.   Use non-slip pads under rugs.   Do not walk in poorly lit areas.   Do not stand on chairs or wobbly ladders.   Use caution when reaching overhead or looking upward. This position can cause a loss of balance.   Be sure your shoes fit properly, have non-slip bottoms and are in good condition.   Be cautious when going up and down stairs, curbs, and when walking on uneven sidewalks.   If your balance is poor, consider using a cane or walker.   If your fall was related to alcohol use, stop or limit alcohol intake.   If your fall was related to use of sleeping medicines, talk to your doctor about this. You may need to reduce your dosage at bedtime if you awaken during the night to go to the bathroom.   To reduce the need for nighttime bathroom trips:   Avoid drinking fluids for several hours before going to bed   Empty your bladder before going to bed   Men can keep a urinal at the bedside   © 2438-7669 [employer], [address] [place] [address], [place] [address]. All rights reserved. This information is not intended as a substitute for professional medical care. Always follow your healthcare professional's instructions.

## 2019-07-24 NOTE — PLAN OF CARE
"Problem: Adult Inpatient Plan of Care  Goal: Plan of Care Review  Outcome: Ongoing (interventions implemented as appropriate)  Pt states, "I always feel tired"      "

## 2019-08-01 ENCOUNTER — PATIENT MESSAGE (OUTPATIENT)
Dept: HEMATOLOGY/ONCOLOGY | Facility: CLINIC | Age: 54
End: 2019-08-01

## 2019-08-29 ENCOUNTER — PATIENT MESSAGE (OUTPATIENT)
Dept: INTERNAL MEDICINE | Facility: CLINIC | Age: 54
End: 2019-08-29

## 2019-09-05 ENCOUNTER — TELEPHONE (OUTPATIENT)
Dept: INTERNAL MEDICINE | Facility: CLINIC | Age: 54
End: 2019-09-05

## 2019-09-05 DIAGNOSIS — Z00.00 ANNUAL PHYSICAL EXAM: Primary | ICD-10-CM

## 2019-09-05 DIAGNOSIS — E78.00 PURE HYPERCHOLESTEROLEMIA: ICD-10-CM

## 2019-09-05 NOTE — TELEPHONE ENCOUNTER
----- Message from Tiffany Michelle sent at 9/5/2019  2:28 PM CDT -----  Contact: [phone]  Would like cot consult with the nurse,  Patient would like to get an Order for Blood work before her  Appt,  Patient Appt is on Aug 12, 2019 Please call back at  [phone], Thanks sj

## 2019-09-06 ENCOUNTER — LAB VISIT (OUTPATIENT)
Dept: LAB | Facility: HOSPITAL | Age: 54
End: 2019-09-06
Attending: INTERNAL MEDICINE
Payer: MEDICARE

## 2019-09-06 DIAGNOSIS — E78.00 PURE HYPERCHOLESTEROLEMIA: ICD-10-CM

## 2019-09-06 DIAGNOSIS — Z00.00 ANNUAL PHYSICAL EXAM: ICD-10-CM

## 2019-09-06 LAB
ALBUMIN SERPL BCP-MCNC: 3.6 G/DL (ref 3.5–5.2)
ALP SERPL-CCNC: 56 U/L (ref 55–135)
ALT SERPL W/O P-5'-P-CCNC: 21 U/L (ref 10–44)
ANION GAP SERPL CALC-SCNC: 8 MMOL/L (ref 8–16)
AST SERPL-CCNC: 19 U/L (ref 10–40)
BILIRUB SERPL-MCNC: 0.9 MG/DL (ref 0.1–1)
BUN SERPL-MCNC: 15 MG/DL (ref 6–20)
CALCIUM SERPL-MCNC: 9.6 MG/DL (ref 8.7–10.5)
CHLORIDE SERPL-SCNC: 107 MMOL/L (ref 95–110)
CHOLEST SERPL-MCNC: 169 MG/DL (ref 120–199)
CHOLEST/HDLC SERPL: 2.6 {RATIO} (ref 2–5)
CO2 SERPL-SCNC: 28 MMOL/L (ref 23–29)
CREAT SERPL-MCNC: 0.7 MG/DL (ref 0.5–1.4)
EST. GFR  (AFRICAN AMERICAN): >60 ML/MIN/1.73 M^2
EST. GFR  (NON AFRICAN AMERICAN): >60 ML/MIN/1.73 M^2
GLUCOSE SERPL-MCNC: 82 MG/DL (ref 70–110)
HDLC SERPL-MCNC: 66 MG/DL (ref 40–75)
HDLC SERPL: 39.1 % (ref 20–50)
LDLC SERPL CALC-MCNC: 90 MG/DL (ref 63–159)
NONHDLC SERPL-MCNC: 103 MG/DL
POTASSIUM SERPL-SCNC: 4.5 MMOL/L (ref 3.5–5.1)
PROT SERPL-MCNC: 6.7 G/DL (ref 6–8.4)
SODIUM SERPL-SCNC: 143 MMOL/L (ref 136–145)
TRIGL SERPL-MCNC: 65 MG/DL (ref 30–150)

## 2019-09-06 PROCEDURE — 36415 COLL VENOUS BLD VENIPUNCTURE: CPT | Mod: PO

## 2019-09-06 PROCEDURE — 80053 COMPREHEN METABOLIC PANEL: CPT

## 2019-09-06 PROCEDURE — 80061 LIPID PANEL: CPT

## 2019-09-08 NOTE — PROGRESS NOTES
Here are the results of your recent labs.  We will review them in detail at your follow up visit.    Sincerely,    Angel Emery M.D.        If you would like to review your experience with Dr. Emery or [facility], please follow the link below:    [email or web address]

## 2019-09-12 ENCOUNTER — OFFICE VISIT (OUTPATIENT)
Dept: URGENT CARE | Facility: CLINIC | Age: 54
End: 2019-09-12
Payer: MEDICARE

## 2019-09-12 VITALS
SYSTOLIC BLOOD PRESSURE: 120 MMHG | BODY MASS INDEX: 26.62 KG/M2 | HEART RATE: 85 BPM | TEMPERATURE: 98 F | DIASTOLIC BLOOD PRESSURE: 80 MMHG | WEIGHT: 170 LBS | OXYGEN SATURATION: 99 %

## 2019-09-12 DIAGNOSIS — N30.90 CYSTITIS: Primary | ICD-10-CM

## 2019-09-12 LAB
BILIRUB SERPL-MCNC: NORMAL MG/DL
BLOOD URINE, POC: NORMAL
COLOR, POC UA: NORMAL
GLUCOSE UR QL STRIP: NORMAL
KETONES UR QL STRIP: NORMAL
LEUKOCYTE ESTERASE URINE, POC: NORMAL
NITRITE, POC UA: NORMAL
PH, POC UA: 7
PROTEIN, POC: NORMAL
SPECIFIC GRAVITY, POC UA: 1
UROBILINOGEN, POC UA: NORMAL

## 2019-09-12 PROCEDURE — 99999 PR PBB SHADOW E&M-EST. PATIENT-LVL IV: ICD-10-PCS | Mod: PBBFAC,,, | Performed by: FAMILY MEDICINE

## 2019-09-12 PROCEDURE — 87077 CULTURE AEROBIC IDENTIFY: CPT

## 2019-09-12 PROCEDURE — 87186 SC STD MICRODIL/AGAR DIL: CPT

## 2019-09-12 PROCEDURE — 99214 OFFICE O/P EST MOD 30 MIN: CPT | Mod: S$PBB,,, | Performed by: FAMILY MEDICINE

## 2019-09-12 PROCEDURE — 81002 URINALYSIS NONAUTO W/O SCOPE: CPT | Mod: PBBFAC,PO | Performed by: FAMILY MEDICINE

## 2019-09-12 PROCEDURE — 99999 PR PBB SHADOW E&M-EST. PATIENT-LVL IV: CPT | Mod: PBBFAC,,, | Performed by: FAMILY MEDICINE

## 2019-09-12 PROCEDURE — 87086 URINE CULTURE/COLONY COUNT: CPT

## 2019-09-12 PROCEDURE — 87088 URINE BACTERIA CULTURE: CPT

## 2019-09-12 PROCEDURE — 99214 PR OFFICE/OUTPT VISIT, EST, LEVL IV, 30-39 MIN: ICD-10-PCS | Mod: S$PBB,,, | Performed by: FAMILY MEDICINE

## 2019-09-12 PROCEDURE — 99214 OFFICE O/P EST MOD 30 MIN: CPT | Mod: PBBFAC,PO | Performed by: FAMILY MEDICINE

## 2019-09-12 RX ORDER — SULFAMETHOXAZOLE AND TRIMETHOPRIM 800; 160 MG/1; MG/1
1 TABLET ORAL 2 TIMES DAILY
Qty: 6 TABLET | Refills: 0 | Status: SHIPPED | OUTPATIENT
Start: 2019-09-12 | End: 2019-09-15

## 2019-09-12 NOTE — PATIENT INSTRUCTIONS

## 2019-09-12 NOTE — PROGRESS NOTES
Subjective:       Patient ID: Carole Moore is a 54 y.o. female.    Chief Complaint: Urinary Frequency    /80 (BP Location: Left arm, Patient Position: Sitting, BP Method: Large (Manual))   Pulse 85   Temp 98.2 °F (36.8 °C) (Oral)   Wt 77.1 kg (169 lb 15.6 oz)   LMP 10/11/2009   SpO2 99%   BMI 26.62 kg/m²     HPI  Urinary frequency since last night. Had UTI November, December last year and February this year. Desires urology referral.   Hx of hysterectomy     Review of Systems   Constitutional: Negative for chills and fever.   Gastrointestinal: Negative for abdominal distention and abdominal pain.   Allergic/Immunologic: Positive for immunocompromised state.       Objective:      Physical Exam   Constitutional: She is oriented to person, place, and time. She appears well-developed and well-nourished. No distress.   HENT:   Head: Normocephalic and atraumatic.   Abdominal: Soft. She exhibits no distension. There is no tenderness.   Neurological: She is alert and oriented to person, place, and time. No cranial nerve deficit.   Skin: She is not diaphoretic.   Nursing note and vitals reviewed.      Assessment:       1. Cystitis        Plan:     Carole was seen today for urinary frequency.    Diagnoses and all orders for this visit:    Cystitis  -     POCT urine dipstick without microscope  -     sulfamethoxazole-trimethoprim 800-160mg (BACTRIM DS) 800-160 mg Tab; Take 1 tablet by mouth 2 (two) times daily. for 3 days  -     Urine culture  -     Ambulatory referral to Urology

## 2019-09-15 LAB — BACTERIA UR CULT: ABNORMAL

## 2019-10-07 ENCOUNTER — IMMUNIZATION (OUTPATIENT)
Dept: INTERNAL MEDICINE | Facility: CLINIC | Age: 54
End: 2019-10-07
Payer: MEDICARE

## 2019-10-07 DIAGNOSIS — Z79.811 AROMATASE INHIBITOR USE: ICD-10-CM

## 2019-10-07 PROCEDURE — 90686 IIV4 VACC NO PRSV 0.5 ML IM: CPT | Mod: PBBFAC,PO

## 2019-10-07 RX ORDER — ANASTROZOLE 1 MG/1
1 TABLET ORAL DAILY
Qty: 90 TABLET | Refills: 1 | Status: SHIPPED | OUTPATIENT
Start: 2019-10-07 | End: 2020-03-31 | Stop reason: SDUPTHER

## 2019-10-16 ENCOUNTER — OFFICE VISIT (OUTPATIENT)
Dept: INTERNAL MEDICINE | Facility: CLINIC | Age: 54
End: 2019-10-16
Payer: MEDICARE

## 2019-10-16 VITALS
WEIGHT: 166.69 LBS | SYSTOLIC BLOOD PRESSURE: 110 MMHG | HEART RATE: 72 BPM | DIASTOLIC BLOOD PRESSURE: 70 MMHG | TEMPERATURE: 97 F | BODY MASS INDEX: 26.16 KG/M2 | HEIGHT: 67 IN

## 2019-10-16 DIAGNOSIS — I69.351 HEMIPARESIS AFFECTING RIGHT SIDE AS LATE EFFECT OF CEREBROVASCULAR ACCIDENT: Primary | ICD-10-CM

## 2019-10-16 DIAGNOSIS — Z80.0 FAMILY HISTORY OF COLON CANCER: ICD-10-CM

## 2019-10-16 DIAGNOSIS — Z17.0 MALIGNANT NEOPLASM OF CENTRAL PORTION OF RIGHT BREAST IN FEMALE, ESTROGEN RECEPTOR POSITIVE: ICD-10-CM

## 2019-10-16 DIAGNOSIS — Z86.73 HISTORY OF CVA (CEREBROVASCULAR ACCIDENT): ICD-10-CM

## 2019-10-16 DIAGNOSIS — E78.2 MIXED HYPERLIPIDEMIA: ICD-10-CM

## 2019-10-16 DIAGNOSIS — C50.111 MALIGNANT NEOPLASM OF CENTRAL PORTION OF RIGHT BREAST IN FEMALE, ESTROGEN RECEPTOR POSITIVE: ICD-10-CM

## 2019-10-16 PROBLEM — K63.5 COLON POLYP: Status: RESOLVED | Noted: 2018-10-30 | Resolved: 2019-10-16

## 2019-10-16 PROBLEM — K57.30 DIVERTICULOSIS OF COLON: Status: RESOLVED | Noted: 2018-10-30 | Resolved: 2019-10-16

## 2019-10-16 PROBLEM — Z12.11 COLON CANCER SCREENING: Status: RESOLVED | Noted: 2018-10-28 | Resolved: 2019-10-16

## 2019-10-16 PROBLEM — Z12.11 SPECIAL SCREENING FOR MALIGNANT NEOPLASMS, COLON: Status: RESOLVED | Noted: 2018-10-30 | Resolved: 2019-10-16

## 2019-10-16 PROCEDURE — 99999 PR PBB SHADOW E&M-EST. PATIENT-LVL III: ICD-10-PCS | Mod: PBBFAC,,, | Performed by: INTERNAL MEDICINE

## 2019-10-16 PROCEDURE — 99213 OFFICE O/P EST LOW 20 MIN: CPT | Mod: PBBFAC,PO,25 | Performed by: INTERNAL MEDICINE

## 2019-10-16 PROCEDURE — G0009 ADMIN PNEUMOCOCCAL VACCINE: HCPCS | Mod: PBBFAC,PO

## 2019-10-16 PROCEDURE — 99999 PR PBB SHADOW E&M-EST. PATIENT-LVL III: CPT | Mod: PBBFAC,,, | Performed by: INTERNAL MEDICINE

## 2019-10-16 PROCEDURE — 99214 OFFICE O/P EST MOD 30 MIN: CPT | Mod: 25,S$PBB,, | Performed by: INTERNAL MEDICINE

## 2019-10-16 PROCEDURE — 99214 PR OFFICE/OUTPT VISIT, EST, LEVL IV, 30-39 MIN: ICD-10-PCS | Mod: 25,S$PBB,, | Performed by: INTERNAL MEDICINE

## 2019-10-17 NOTE — PROGRESS NOTES
Subjective:       Patient ID: Carole Moore is a 54 y.o. female.    Chief Complaint: Breast Cancer (f/u) and Osteopenia    Collaborating provider is Dr. Harpal Khan    HPI 54-year-old  female who presents to the hematology oncology clinic today for follow-up for right breast carcinoma.  The patient was previously followed in the outpatient  Hem/Onc clinic by Dr. Alessia Moss and Dr. Huitron.    2/2017-Right-sided breast cancer: Stage IIIA invasive lobular carcinoma, ER/UT positive, Pvv3Xja neg. BRCA negative. Given large mass on physical examination, treated with neoadjuvant chemotherapy, using dose-dense AC-T regimen, which patient completed in 2/2017 with decrease in size and firmness of R breast mass. She then underwent R breast mastectomy and SLND 4/2017, with tissue expander placement. Final pathology after neoadjuvant chemotherapy showed tumor 6cm, sentinel LNs positive, and nipple skin positive, we did recommend adjuvant radiation to R chest and axilla. Her case was discussed at tumor board and while axillary LN dissection was discussed, adjuvant radiation to the left chest and axilla were recommended which she has since completed.    Anastrazole 1mg PO daily for adjuvant endocrine therapy was initiated end of July 2017.     Zometa initiated for osteopenia secondary to AI use. Next treatment due Jan 2020    Pt states had dental abscess and cap placement over broken tooth in June. No healing issues and thinks she told her dentist about the medications she is taking. Asked pt to make sure dentist is aware of her taking Zometa.    PAST MEDICAL HISTORY:   1.  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  Dyslipidemia  3.  Osteopenia  4.  Right breast cancer     SURGICAL HISTORY:   1.  Tonsillectomy  2.  Hysterectomy  3.  Right breast mastectomy with with right deep axilla sentinel lymph node biopsy followed by breast reconstruction with  tissue expander done on April 11, 2017 and reconstruction completed in Dec 2017.  4.  Mediport placement and removal     FAMILY HISTORY: She reports that 2 paternal aunts had breast cancer in their late 50s.  A maternal uncle had lung cancer the age of 54.  He used to smoke cigarettes.  A maternal uncle had melanoma at the age of 49.  Her father had colon cancer at the age of 70.  Her mother had colon cancer in her 70s.  She denies any other immediate family members with cancer or bleeding/clotting disorders.     SOCIAL HISTORY: The reports a 5-pack-year smoking history and quit at the age of 23.  She drinks alcohol socially.  She has never used any recreational drugs.  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  She is  and has 2 children.  She lives in [place], [place].     ALLERGIES: [NKDA]     MEDICATIONS: [Medcard has been reviewed and/or reconciled.]    Review of Systems   Constitutional: Negative.  Negative for appetite change, fatigue, fever and unexpected weight change.        Hot flashes at night- drenching   HENT: Negative.    Eyes: Negative.    Respiratory: Negative.    Cardiovascular: Negative.    Gastrointestinal: Negative.  Negative for abdominal pain and blood in stool.        No reflux   Endocrine: Negative.    Genitourinary: Negative.  Negative for hematuria.        Pt relates recurrent uti's- scheduling an appt with urology outside the clinic due to availability not until Jan 2020.    Musculoskeletal: Negative.    Skin: Negative.    Allergic/Immunologic: Negative.    Neurological: Negative.    Hematological: Negative.  Negative for adenopathy.   Psychiatric/Behavioral: Negative.        Objective:      Physical Exam   Constitutional: She is oriented to person, place, and time. She appears well-developed and well-nourished.   HENT:   Head: Normocephalic and atraumatic.   Right Ear: External ear normal.   Left Ear: External ear normal.   Mouth/Throat: No  oropharyngeal exudate.   Eyes: Pupils are equal, round, and reactive to light. Conjunctivae and EOM are normal. Right eye exhibits no discharge. Left eye exhibits no discharge. No scleral icterus.   Neck: Normal range of motion. Neck supple. No thyromegaly present.   Cardiovascular: Normal rate, regular rhythm and normal heart sounds.   Pulmonary/Chest: Effort normal and breath sounds normal. Right breast exhibits no inverted nipple, no mass, no nipple discharge, no skin change and no tenderness. Left breast exhibits no inverted nipple, no mass, no nipple discharge, no skin change and no tenderness.   Right breast reconstruction with implant.  No visible skin changes. No palpable abnormality appreciated over reconstructed breast.  No palpable mass left breast.       Abdominal: Soft. Bowel sounds are normal.   Musculoskeletal: Normal range of motion. She exhibits no edema.        Right shoulder: She exhibits no crepitus and normal strength.   Lymphadenopathy:        Head (right side): No submental, no submandibular, no tonsillar, no preauricular, no posterior auricular and no occipital adenopathy present.        Head (left side): No submental, no submandibular, no tonsillar, no preauricular, no posterior auricular and no occipital adenopathy present.     She has no cervical adenopathy.        Right cervical: No superficial cervical and no posterior cervical adenopathy present.       Left cervical: No superficial cervical and no posterior cervical adenopathy present.     She has no axillary adenopathy.        Right: No supraclavicular adenopathy present.        Left: No supraclavicular adenopathy present.   Neurological: She is alert and oriented to person, place, and time. She has normal reflexes.   Right upper extremity hemiparesis noted   Skin: Skin is warm and dry. No rash noted. No erythema. No pallor.   Psychiatric: She has a normal mood and affect. Her behavior is normal. Judgment and thought content normal.        Mammogram left today- result pending  Bone density 6/12/17- osteopenia-3 year f/u recommended -6/2020  Taking Vit D daily- intermittently taking calcium  Pt reports negative genetic testing in 2017    Exercise- rides her bike at least 30 minutes 4-5 days a week.     Assessment:       1. Malignant neoplasm of central portion of right breast in female, estrogen receptor positive    2. Osteopenia of multiple sites    3. Use of aromatase inhibitors    4. Encounter for follow-up surveillance of breast cancer    5. Encounter for monitoring adjuvant hormonal therapy    6. Counseling on health promotion and disease prevention        Plan:       1.       Right-sided breast cancer: Stage IIIA invasive lobular carcinoma, ER/AK positive, [id number] neg. BRCA negative. Mammo today- results pending  2.       Osteopenia- Zometa 4 mg IV every 6 months for prevention of AI induced bone loss and treatment of osteopenia to be given today- rtc in Jan 2020 with bmp and possible Zometa. Bone density 6/12/17 - osteopenia- 3 year f/u recommended- 6/2020.   3.        Continue Anastrazole 1 mg po daily for adjuvant endocrine therapy. Take calcium and vit D. 7/2022 will make 5 years  4.        Hot flashes- improved.   5.        Pt is exercising for 30-60 minutes most days of the week on her bike- taking her calcium regularly. Taking vit d regularly. Congratulated pt on maintaining her exercise and wt.   6.        Gyn f/u since has ovaries.

## 2019-10-21 ENCOUNTER — OFFICE VISIT (OUTPATIENT)
Dept: SURGERY | Facility: CLINIC | Age: 54
End: 2019-10-21
Payer: MEDICARE

## 2019-10-21 ENCOUNTER — TELEPHONE (OUTPATIENT)
Dept: SURGERY | Facility: CLINIC | Age: 54
End: 2019-10-21

## 2019-10-21 ENCOUNTER — HOSPITAL ENCOUNTER (OUTPATIENT)
Dept: RADIOLOGY | Facility: HOSPITAL | Age: 54
Discharge: HOME | End: 2019-10-21
Attending: NURSE PRACTITIONER
Payer: MEDICARE

## 2019-10-21 VITALS
HEART RATE: 70 BPM | BODY MASS INDEX: 26.07 KG/M2 | WEIGHT: 166.44 LBS | SYSTOLIC BLOOD PRESSURE: 102 MMHG | DIASTOLIC BLOOD PRESSURE: 61 MMHG

## 2019-10-21 DIAGNOSIS — Z17.0 MALIGNANT NEOPLASM OF CENTRAL PORTION OF RIGHT BREAST IN FEMALE, ESTROGEN RECEPTOR POSITIVE: Primary | ICD-10-CM

## 2019-10-21 DIAGNOSIS — Z85.3 PERSONAL HISTORY OF BREAST CANCER: ICD-10-CM

## 2019-10-21 DIAGNOSIS — Z71.89 COUNSELING ON HEALTH PROMOTION AND DISEASE PREVENTION: ICD-10-CM

## 2019-10-21 DIAGNOSIS — Z85.3 ENCOUNTER FOR FOLLOW-UP SURVEILLANCE OF BREAST CANCER: ICD-10-CM

## 2019-10-21 DIAGNOSIS — Z51.81 ENCOUNTER FOR MONITORING ADJUVANT HORMONAL THERAPY: ICD-10-CM

## 2019-10-21 DIAGNOSIS — Z08 ENCOUNTER FOR FOLLOW-UP SURVEILLANCE OF BREAST CANCER: ICD-10-CM

## 2019-10-21 DIAGNOSIS — C50.111 MALIGNANT NEOPLASM OF CENTRAL PORTION OF RIGHT BREAST IN FEMALE, ESTROGEN RECEPTOR POSITIVE: Primary | ICD-10-CM

## 2019-10-21 DIAGNOSIS — M85.89 OSTEOPENIA OF MULTIPLE SITES: ICD-10-CM

## 2019-10-21 DIAGNOSIS — Z79.899 ENCOUNTER FOR MONITORING ADJUVANT HORMONAL THERAPY: ICD-10-CM

## 2019-10-21 DIAGNOSIS — Z79.811 USE OF AROMATASE INHIBITORS: ICD-10-CM

## 2019-10-21 PROCEDURE — 77065 MAMMO DIGITAL DIAGNOSTIC LEFT WITH TOMOSYNTHESIS_CAD: ICD-10-PCS | Mod: 26,LT,, | Performed by: RADIOLOGY

## 2019-10-21 PROCEDURE — 77065 DX MAMMO INCL CAD UNI: CPT | Mod: 26,LT,, | Performed by: RADIOLOGY

## 2019-10-21 PROCEDURE — 99999 PR PBB SHADOW E&M-EST. PATIENT-LVL III: ICD-10-PCS | Mod: PBBFAC,,, | Performed by: NURSE PRACTITIONER

## 2019-10-21 PROCEDURE — 77061 MAMMO DIGITAL DIAGNOSTIC LEFT WITH TOMOSYNTHESIS_CAD: ICD-10-PCS | Mod: 26,LT,, | Performed by: RADIOLOGY

## 2019-10-21 PROCEDURE — 77065 DX MAMMO INCL CAD UNI: CPT | Mod: TC,LT

## 2019-10-21 PROCEDURE — 99214 OFFICE O/P EST MOD 30 MIN: CPT | Mod: S$PBB,,, | Performed by: NURSE PRACTITIONER

## 2019-10-21 PROCEDURE — 99999 PR PBB SHADOW E&M-EST. PATIENT-LVL III: CPT | Mod: PBBFAC,,, | Performed by: NURSE PRACTITIONER

## 2019-10-21 PROCEDURE — 99213 OFFICE O/P EST LOW 20 MIN: CPT | Mod: PBBFAC | Performed by: NURSE PRACTITIONER

## 2019-10-21 PROCEDURE — 77061 BREAST TOMOSYNTHESIS UNI: CPT | Mod: 26,LT,, | Performed by: RADIOLOGY

## 2019-10-21 PROCEDURE — 99214 PR OFFICE/OUTPT VISIT, EST, LEVL IV, 30-39 MIN: ICD-10-PCS | Mod: S$PBB,,, | Performed by: NURSE PRACTITIONER

## 2019-11-21 ENCOUNTER — OFFICE VISIT (OUTPATIENT)
Dept: OBSTETRICS AND GYNECOLOGY | Facility: CLINIC | Age: 54
End: 2019-11-21
Payer: MEDICARE

## 2019-11-21 VITALS
HEIGHT: 67 IN | WEIGHT: 162.94 LBS | DIASTOLIC BLOOD PRESSURE: 72 MMHG | BODY MASS INDEX: 25.57 KG/M2 | SYSTOLIC BLOOD PRESSURE: 110 MMHG

## 2019-11-21 DIAGNOSIS — Z01.419 ENCOUNTER FOR GYNECOLOGICAL EXAMINATION WITHOUT ABNORMAL FINDING: Primary | ICD-10-CM

## 2019-11-21 PROCEDURE — 99999 PR PBB SHADOW E&M-EST. PATIENT-LVL III: CPT | Mod: PBBFAC,,, | Performed by: OBSTETRICS & GYNECOLOGY

## 2019-11-21 PROCEDURE — 99213 OFFICE O/P EST LOW 20 MIN: CPT | Mod: PBBFAC | Performed by: OBSTETRICS & GYNECOLOGY

## 2019-11-21 PROCEDURE — G0101 PR CA SCREEN;PELVIC/BREAST EXAM: ICD-10-PCS | Mod: S$PBB,,, | Performed by: OBSTETRICS & GYNECOLOGY

## 2019-11-21 PROCEDURE — G0101 CA SCREEN;PELVIC/BREAST EXAM: HCPCS | Mod: S$PBB,,, | Performed by: OBSTETRICS & GYNECOLOGY

## 2019-11-21 PROCEDURE — 99999 PR PBB SHADOW E&M-EST. PATIENT-LVL III: ICD-10-PCS | Mod: PBBFAC,,, | Performed by: OBSTETRICS & GYNECOLOGY

## 2019-11-25 NOTE — PROGRESS NOTES
CC: Well woman exam    Carole Moore is a 54 y.o. female  presents for a well woman exam.  No issues, problems, or complaints.      Past Medical History:   Diagnosis Date    Breast cancer 2016    right breast    Cancer     R Breast cancer    CVA (cerebral infarction)     Diverticulosis     Family history of colon cancer 10/30/2018    Her mother and father both had colon cancer.    Hyperlipidemia     Nausea after anesthesia     Paralysis     right side    PONV (postoperative nausea and vomiting)     Stroke     R side weakness     Past Surgical History:   Procedure Laterality Date    AUGMENTATION OF BREAST Left 2017    BREAST SURGERY      COLONOSCOPY N/A 10/30/2018    Procedure: COLONOSCOPY;  Surgeon: Cosme Monson MD;  Location: [facility];  Service: Endoscopy;  Laterality: N/A;    HYSTERECTOMY      maintains both ovaries, menorrhagia    MASTECTOMY Right 2017    MEDIPORT INSERTION, SINGLE      TONSILLECTOMY       Family History   Problem Relation Age of Onset    Breast cancer Paternal Aunt     Colon cancer Father     Colon cancer Mother     Melanoma Maternal Uncle      Social History     Tobacco Use    Smoking status: Never Smoker    Smokeless tobacco: Never Used   Substance Use Topics    Alcohol use: No     Frequency: Never     Drinks per session: Patient refused     Binge frequency: Never    Drug use: No     OB History        2    Para   2    Term   1       1    AB   0    Living   2       SAB   0    TAB   0    Ectopic   0    Multiple   0    Live Births                     Current Outpatient Medications:     anastrozole (ARIMIDEX) 1 mg Tab, Take 1 tablet (1 mg total) by mouth once daily., Disp: 90 tablet, Rfl: 1    aspirin 81 MG Chew, Take 81 mg by mouth once daily., Disp: , Rfl:     atorvastatin (LIPITOR) 10 MG tablet, Take 1 tablet (10 mg total) by mouth once daily., Disp: 90 tablet, Rfl: 4    LORATADINE (CLARITIN ORAL), Take by mouth daily as  "needed. , Disp: , Rfl:     hydrocortisone 2.5 % cream, Apply topically 2 (two) times daily., Disp: 1 Tube, Rfl: 1    GYNECOLOGY HISTORY:  No abnormal pap/std    DATA REVIEWED:  Last pap: normal Date: 2011  Last mmg: normal Date: 2019 (surgically absent right breast)  Colonoscopy 2018 normal, repeat 2023    /72 (BP Location: Right arm)   Ht 5' 7" (1.702 m)   Wt 73.9 kg (162 lb 14.7 oz)   LMP 10/11/2009   BMI 25.52 kg/m²     ROS:  GENERAL: Denies weight gain or weight loss. Feeling well overall.   SKIN: Denies rash or lesions.   HEAD: Denies head injury or headache.   NODES: Denies enlarged lymph nodes.   CHEST: Denies chest pain or shortness of breath.   CARDIOVASCULAR: Denies palpitations or left sided chest pain.   ABDOMEN: No abdominal pain, constipation, diarrhea, nausea, vomiting or rectal bleeding.   URINARY: No frequency, dysuria, hematuria, or burning on urination.  REPRODUCTIVE: See HPI.   BREASTS: The patient denies pain, lumps, or nipple discharge.   HEMATOLOGIC: No easy bruisability or excessive bleeding.   MUSCULOSKELETAL: Denies joint pain or swelling.   NEUROLOGIC: Denies syncope or weakness.   PSYCHIATRIC: Denies depression, anxiety or mood swings.    PE:   APPEARANCE: Well nourished, well developed, in no acute distress.  AFFECT: WNL, alert and oriented x 3.  SKIN: No acne or hirsutism.  NECK: Neck symmetric without masses or thyromegaly.  NODES: No inguinal, cervical, axillary or femoral lymph node enlargement.  CHEST: Good respiratory effort.   ABDOMEN: Soft. No tenderness or masses. No hepatosplenomegaly. No hernias.  BREASTS: surgically absent right breast; left breast w/ no palpable masses, nipple discharge  PELVIC: Normal external female genitalia without lesions. Normal hair distribution. Adequate perineal body, normal urethral meatus. Vagina atrophic without lesions or discharge. No significant cystocele or rectocele. Bimanual exam shows uterus and cervix to be surgically absent. " Adnexa without masses or tenderness.  EXTREMITIES: No edema.    Encounter for gynecological examination without abnormal finding    Patient was counseled today on A.C.S. Pap guidelines (none needed) and recommendations for yearly pelvic exams, breast follow up & mammograms per heme/onc; to see her PCP for other health maintenance.

## 2019-12-17 DIAGNOSIS — E78.5 HYPERLIPIDEMIA, UNSPECIFIED HYPERLIPIDEMIA TYPE: ICD-10-CM

## 2019-12-17 RX ORDER — ATORVASTATIN CALCIUM 10 MG/1
10 TABLET, FILM COATED ORAL DAILY
Qty: 90 TABLET | Refills: 4 | Status: SHIPPED | OUTPATIENT
Start: 2019-12-17 | End: 2021-03-11 | Stop reason: SDUPTHER

## 2020-01-09 ENCOUNTER — OFFICE VISIT (OUTPATIENT)
Dept: DERMATOLOGY | Facility: CLINIC | Age: 55
End: 2020-01-09
Payer: COMMERCIAL

## 2020-01-09 DIAGNOSIS — L73.8 SEBACEOUS HYPERPLASIA: ICD-10-CM

## 2020-01-09 DIAGNOSIS — L81.4 LENTIGINES: Primary | ICD-10-CM

## 2020-01-09 DIAGNOSIS — L91.8 ACROCHORDON: ICD-10-CM

## 2020-01-09 PROCEDURE — 99213 OFFICE O/P EST LOW 20 MIN: CPT | Mod: PBBFAC | Performed by: DERMATOLOGY

## 2020-01-09 PROCEDURE — 99999 PR PBB SHADOW E&M-EST. PATIENT-LVL III: CPT | Mod: PBBFAC,,, | Performed by: DERMATOLOGY

## 2020-01-09 PROCEDURE — 99999 PR PBB SHADOW E&M-EST. PATIENT-LVL III: ICD-10-PCS | Mod: PBBFAC,,, | Performed by: DERMATOLOGY

## 2020-01-09 PROCEDURE — 99213 PR OFFICE/OUTPT VISIT, EST, LEVL III, 20-29 MIN: ICD-10-PCS | Mod: S$GLB,,, | Performed by: DERMATOLOGY

## 2020-01-09 PROCEDURE — 99213 OFFICE O/P EST LOW 20 MIN: CPT | Mod: S$GLB,,, | Performed by: DERMATOLOGY

## 2020-01-09 NOTE — PATIENT INSTRUCTIONS
Preventing Skin Cancer  Relaxing in the sun may feel good. But it isnt good for your skin. In fact, being exposed to the suns harmful rays is a major cause of skin cancer. This is a serious disease that can be life-threatening. People of all ages and backgrounds are at risk. But in most cases, skin cancer can be prevented.    Your Role in Prevention  You can act today to help prevent skin cancer. Start by avoiding the suns UV (ultraviolet) rays. And dont use tanning beds, which are no safer than the sun. Taking these steps can help keep you from getting skin cancer. It can also help prevent wrinkles and other sun-induced aging effects. Make sure your children also follow these safeguards. Now is the time to start taking preventive steps against skin cancer.  When You Are Outdoors  Protect your skin when you go outdoors during the day. Take precautions whenever you go out to eat, run errands by car or on foot, or do any outdoor activity. There isnt just one easy way to protect your skin. Its best to follow all of these steps:  · Wear tightly woven clothing that covers your skin. Put on a wide-brimmed hat to protect your face, ears, and scalp.  · Watch the clock. Try to avoid the sun between 10 a.m. and 4 p.m., when it is strongest.  · Head for the shade or create your own. Use an umbrella when sitting or strolling.  · Know that the suns rays can reflect off sand, water, and snow. This can harm your skin. Take extra care when you are near reflective surfaces.  · Keep in mind that even when the weather is hazy or cloudy, your skin can be exposed to strong UV rays.  · Shield your skin with sunscreen. Also, apply sunscreen to your childrens skin.  Tips for Using Sunscreen  To help prevent skin cancer, choose the right sunscreen and use it correctly. Try the following tips:  · Choose a sunscreen that has a sun protection factor (SPF) of at least 15. For the best protection, an SPF of at least 30 is preferred.  Also, choose a sunscreen labeled broad spectrum. This will shield you from both UVA and UVB (ultraviolet A and B) rays.  · If one brand irritates your skin, try another, particularly ones without fragrance.  · Use a water-resistant sunscreen if swimming or sweating.  · Reapply sunscreen every 2 hours. If youre active, do this more often.  · Cover any sun-exposed skin, from your face to your feet. Dont forget your ears and your lips.  · Know that while sunscreen helps protect you, it isnt enough. You should also wear protective clothing. And try to stay out of the sun as much as you can, especially from 10 a.m. to 4 p.m.  © 2849-4907 [employer], [employer]. [address] [place] [address], [place] [address]. All rights reserved. This information is not intended as a substitute for professional medical care. Always follow your healthcare professional's instructions.

## 2020-01-09 NOTE — PROGRESS NOTES
Subjective:       Patient ID:  Carole Moore is a 54 y.o. female who presents for   Chief Complaint   Patient presents with    Skin Check     spot located on buttocks      Hx of multiple nevi,  fibrous papule of the left nasal sidewall, right breast CA (s/p right mastectomy c/ chemotherapy and radiation, followed by GIULIANO Mendoza), SK's and angioma, last seen on 1/31/19. She c/o lesion on the left buttock, present for several months. + irritation. Denies bleeding, tender, growing, or concerning lesions.      The patient denies personal or family history of skin cancer.      Review of Systems   Constitutional: Negative for fever and chills.   Gastrointestinal: Negative for nausea and vomiting.   Skin: Positive for activity-related sunscreen use. Negative for daily sunscreen use and recent sunburn.   Hematologic/Lymphatic: Does not bruise/bleed easily.        Objective:    Physical Exam   Constitutional: She appears well-developed and well-nourished. No distress.   Neurological: She is alert and oriented to person, place, and time. She is not disoriented.   Psychiatric: She has a normal mood and affect.   Skin:   Areas Examined (abnormalities noted in diagram):   Scalp / Hair Palpated and Inspected  Head / Face Inspection Performed  Neck Inspection Performed  Chest / Axilla Inspection Performed  Abdomen Inspection Performed  Genitals / Buttocks / Groin Inspection Performed  Back Inspection Performed  RUE Inspected  LUE Inspection Performed  RLE Inspected  LLE Inspection Performed  Nails and Digits Inspection Performed                   Diagram Legend     Erythematous scaling macule/papule c/w actinic keratosis       Vascular papule c/w angioma      Pigmented verrucoid papule/plaque c/w seborrheic keratosis      Yellow umbilicated papule c/w sebaceous hyperplasia      Irregularly shaped tan macule c/w lentigo     1-2 mm smooth white papules consistent with Milia      Movable subcutaneous cyst with punctum c/w  epidermal inclusion cyst      Subcutaneous movable cyst c/w pilar cyst      Firm pink to brown papule c/w dermatofibroma      Pedunculated fleshy papule(s) c/w skin tag(s)      Evenly pigmented macule c/w junctional nevus     Mildly variegated pigmented, slightly irregular-bordered macule c/w mildly atypical nevus      Flesh colored to evenly pigmented papule c/w intradermal nevus       Pink pearly papule/plaque c/w basal cell carcinoma      Erythematous hyperkeratotic cursted plaque c/w SCC      Surgical scar with no sign of skin cancer recurrence      Open and closed comedones      Inflammatory papules and pustules      Verrucoid papule consistent consistent with wart     Erythematous eczematous patches and plaques     Dystrophic onycholytic nail with subungual debris c/w onychomycosis     Umbilicated papule    Erythematous-base heme-crusted tan verrucoid plaque consistent with inflamed seborrheic keratosis     Erythematous Silvery Scaling Plaque c/w Psoriasis     See annotation      Assessment / Plan:        Lentigines  Reassurance given.  Discussed ABCDEF of melanoma and changes for patient to look for.  AAD Handout given. Discussed importance of daily use of sunscreen which is broad-spectrum and has a minimum SPF of 30.    Sebaceous hyperplasia  Reassurance given.  Lesions are benign.    Acrochordon  Reassurance given.  Lesions are benign.             Follow up if symptoms worsen or fail to improve.

## 2020-01-21 ENCOUNTER — HOSPITAL ENCOUNTER (OUTPATIENT)
Dept: RADIOLOGY | Facility: HOSPITAL | Age: 55
Discharge: HOME | End: 2020-01-21
Attending: FAMILY MEDICINE
Payer: COMMERCIAL

## 2020-01-21 ENCOUNTER — OFFICE VISIT (OUTPATIENT)
Dept: INTERNAL MEDICINE | Facility: CLINIC | Age: 55
End: 2020-01-21
Payer: COMMERCIAL

## 2020-01-21 VITALS
BODY MASS INDEX: 24.92 KG/M2 | WEIGHT: 158.75 LBS | HEART RATE: 82 BPM | SYSTOLIC BLOOD PRESSURE: 124 MMHG | TEMPERATURE: 98 F | DIASTOLIC BLOOD PRESSURE: 82 MMHG | HEIGHT: 67 IN

## 2020-01-21 DIAGNOSIS — J40 BRONCHITIS: Primary | ICD-10-CM

## 2020-01-21 DIAGNOSIS — J40 BRONCHITIS: ICD-10-CM

## 2020-01-21 PROCEDURE — 71046 XR CHEST PA AND LATERAL: ICD-10-PCS | Mod: 26,,, | Performed by: RADIOLOGY

## 2020-01-21 PROCEDURE — 99999 PR PBB SHADOW E&M-EST. PATIENT-LVL III: ICD-10-PCS | Mod: PBBFAC,,, | Performed by: FAMILY MEDICINE

## 2020-01-21 PROCEDURE — 96372 PR INJECTION,THERAP/PROPH/DIAG2ST, IM OR SUBCUT: ICD-10-PCS | Mod: S$GLB,,, | Performed by: FAMILY MEDICINE

## 2020-01-21 PROCEDURE — 3008F BODY MASS INDEX DOCD: CPT | Mod: CPTII,S$GLB,, | Performed by: FAMILY MEDICINE

## 2020-01-21 PROCEDURE — 99999 PR PBB SHADOW E&M-EST. PATIENT-LVL III: CPT | Mod: PBBFAC,,, | Performed by: FAMILY MEDICINE

## 2020-01-21 PROCEDURE — 71046 X-RAY EXAM CHEST 2 VIEWS: CPT | Mod: TC,FY,PO

## 2020-01-21 PROCEDURE — 99214 OFFICE O/P EST MOD 30 MIN: CPT | Mod: 25,S$GLB,, | Performed by: FAMILY MEDICINE

## 2020-01-21 PROCEDURE — 96372 THER/PROPH/DIAG INJ SC/IM: CPT | Mod: S$GLB,,, | Performed by: FAMILY MEDICINE

## 2020-01-21 PROCEDURE — 99214 PR OFFICE/OUTPT VISIT, EST, LEVL IV, 30-39 MIN: ICD-10-PCS | Mod: 25,S$GLB,, | Performed by: FAMILY MEDICINE

## 2020-01-21 PROCEDURE — 3008F PR BODY MASS INDEX (BMI) DOCUMENTED: ICD-10-PCS | Mod: CPTII,S$GLB,, | Performed by: FAMILY MEDICINE

## 2020-01-21 PROCEDURE — 71046 X-RAY EXAM CHEST 2 VIEWS: CPT | Mod: 26,,, | Performed by: RADIOLOGY

## 2020-01-21 RX ORDER — BETAMETHASONE SODIUM PHOSPHATE AND BETAMETHASONE ACETATE 3; 3 MG/ML; MG/ML
6 INJECTION, SUSPENSION INTRA-ARTICULAR; INTRALESIONAL; INTRAMUSCULAR; SOFT TISSUE
Status: COMPLETED | OUTPATIENT
Start: 2020-01-21 | End: 2020-01-21

## 2020-01-21 RX ORDER — DOXYCYCLINE 100 MG/1
100 CAPSULE ORAL EVERY 12 HOURS
Qty: 14 CAPSULE | Refills: 0 | Status: SHIPPED | OUTPATIENT
Start: 2020-01-21 | End: 2020-02-13

## 2020-01-21 RX ORDER — ZOSTER VACCINE RECOMBINANT, ADJUVANTED 50 MCG/0.5
KIT INTRAMUSCULAR
COMMUNITY
Start: 2019-10-16 | End: 2021-09-15

## 2020-01-21 RX ORDER — ALBUTEROL SULFATE 90 UG/1
1-2 AEROSOL, METERED RESPIRATORY (INHALATION) EVERY 6 HOURS PRN
Qty: 18 G | Refills: 0 | Status: SHIPPED | OUTPATIENT
Start: 2020-01-21 | End: 2021-02-23

## 2020-01-21 RX ADMIN — BETAMETHASONE SODIUM PHOSPHATE AND BETAMETHASONE ACETATE 6 MG: 3; 3 INJECTION, SUSPENSION INTRA-ARTICULAR; INTRALESIONAL; INTRAMUSCULAR; SOFT TISSUE at 02:01

## 2020-01-21 NOTE — PROGRESS NOTES
Subjective:       Patient ID: Carole Moore is a 54 y.o. female.    Chief Complaint: Cough (pt feels the sickness is a relapse of something she had back in december)    URI    This is a recurrent problem. The current episode started 1 to 4 weeks ago. The problem has been gradually worsening. There has been no fever. Associated symptoms include congestion and coughing. Pertinent negatives include no abdominal pain, rash, rhinorrhea, sinus pain, vomiting or wheezing. Treatments tried: claritin, tyl cold. The treatment provided no relief.     Review of Systems   HENT: Positive for congestion. Negative for rhinorrhea and sinus pain.    Respiratory: Positive for cough. Negative for wheezing.    Gastrointestinal: Negative for abdominal pain and vomiting.   Skin: Negative for rash.       Objective:      Physical Exam   Constitutional: She appears well-developed and well-nourished. She appears distressed.   HENT:   Head: Normocephalic and atraumatic.   Nose: Nose normal. Right sinus exhibits no maxillary sinus tenderness and no frontal sinus tenderness. Left sinus exhibits no maxillary sinus tenderness and no frontal sinus tenderness.   Mouth/Throat: Oropharynx is clear and moist. No oropharyngeal exudate.   Pulmonary/Chest: Effort normal and breath sounds normal. No respiratory distress. She has no wheezes.   Cough on exam   Skin: Skin is warm and dry. No rash noted. She is not diaphoretic. No erythema.   Nursing note and vitals reviewed.      Assessment:       1. Bronchitis        Plan:     Problem List Items Addressed This Visit        Pulmonary    Bronchitis - Primary    Relevant Medications    albuterol (PROVENTIL/VENTOLIN HFA) 90 mcg/actuation inhaler    betamethasone acetate-betamethasone sodium phosphate injection 6 mg (Start on 1/21/2020  2:45 PM)    doxycycline (VIBRAMYCIN) 100 MG Cap    Other Relevant Orders    X-Ray Chest PA And Lateral

## 2020-01-21 NOTE — PROGRESS NOTES
Results have been reviewed . All labs are within normal range.   If you have any questions please feel free to contact me.  Impression      1.  No acute cardiopulmonary process.

## 2020-01-27 NOTE — PROGRESS NOTES
Subjective:       Patient ID: Carole Moore is a 54 y.o. female.    Chief Complaint: No chief complaint on file.    Collaborating provider is Dr. Harpal Khan    HPI 54-year-old  female who presents to the hematology oncology clinic today for follow-up for right breast carcinoma.  The patient was previously followed in the outpatient  Hem/Onc clinic by Dr. Alessia Moss and Dr. Huitron.    2/2017-Right-sided breast cancer: Stage IIIA invasive lobular carcinoma, ER/PA positive, Ghm3Fes neg. BRCA negative. Given large mass on physical examination, treated with neoadjuvant chemotherapy, using dose-dense AC-T regimen, which patient completed in 2/2017 with decrease in size and firmness of R breast mass. She then underwent R breast mastectomy and SLND 4/2017, with tissue expander placement. Final pathology after neoadjuvant chemotherapy showed tumor 6cm, sentinel LNs positive, and nipple skin positive, we did recommend adjuvant radiation to R chest and axilla. Her case was discussed at tumor board and while axillary LN dissection was discussed, adjuvant radiation to the left chest and axilla were recommended which she has since completed.    Anastrazole 1mg PO daily for adjuvant endocrine therapy was initiated end of July 2017. Pt tolerating without difficulty- hot flashes are getting better- did try ditropan for a short while and did not like the way it made her feel.    Zometa initiated for osteopenia secondary to AI use. Next treatment due Jan 2020    No dental issues or infections. No healing issues and thinks she told her dentist about the medications she is taking. Asked pt to make sure dentist is aware of her taking Zometa.    PAST MEDICAL HISTORY:   1.  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  Dyslipidemia  3.  Osteopenia  4.  Right breast cancer     SURGICAL HISTORY:   1.  Tonsillectomy  2.  Hysterectomy  3.  Right breast mastectomy with  with right deep axilla sentinel lymph node biopsy followed by breast reconstruction with tissue expander done on April 11, 2017 and reconstruction completed in Dec 2017.  4.  Mediport placement and removal     FAMILY HISTORY: She reports that 2 paternal aunts had breast cancer in their late 50s.  A maternal uncle had lung cancer the age of 54.  He used to smoke cigarettes.  A maternal uncle had melanoma at the age of 49.  Her father had colon cancer at the age of 70.  Her mother had colon cancer in her 70s.  She denies any other immediate family members with cancer or bleeding/clotting disorders.     SOCIAL HISTORY: The reports a 5-pack-year smoking history and quit at the age of 23.  She drinks alcohol socially.  She has never used any recreational drugs.  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  She is  and has 2 children.  She lives in [place], [place].     ALLERGIES: [NKDA]     MEDICATIONS: [Medcard has been reviewed and/or reconciled.]    Review of Systems   Constitutional: Negative.  Negative for appetite change, fatigue, fever and unexpected weight change.        Hot flashes at night- awakening intermittently at 2-3 am- goes to bed at 7:30 pm- has to get up to void at that time and has trouble falling back to sleep- has appt with urology for UTI issues this week so will mention the issue with voiding at night.    HENT: Negative.    Eyes: Negative.    Respiratory: Negative.    Cardiovascular: Negative.    Gastrointestinal: Negative.  Negative for abdominal pain and blood in stool.        No reflux   Endocrine: Negative.    Genitourinary: Negative.  Negative for hematuria.        Pt relates recurrent uti's- scheduling an appt with urology outside the clinic due to availability not until Jan 2020.    Musculoskeletal: Negative.    Skin: Negative.    Allergic/Immunologic: Negative.    Neurological: Negative.    Hematological: Negative.  Negative for adenopathy.    Psychiatric/Behavioral: Negative.        Objective:      Physical Exam   Constitutional: She is oriented to person, place, and time. She appears well-developed and well-nourished.   HENT:   Head: Normocephalic and atraumatic.   Right Ear: External ear normal.   Left Ear: External ear normal.   Mouth/Throat: No oropharyngeal exudate.   Eyes: Pupils are equal, round, and reactive to light. Conjunctivae and EOM are normal. Right eye exhibits no discharge. Left eye exhibits no discharge. No scleral icterus.   Neck: Normal range of motion. Neck supple. No thyromegaly present.   Cardiovascular: Normal rate, regular rhythm and normal heart sounds.   Pulmonary/Chest: Effort normal and breath sounds normal. Right breast exhibits no inverted nipple, no mass, no nipple discharge, no skin change and no tenderness. Left breast exhibits no inverted nipple, no mass, no nipple discharge, no skin change and no tenderness.   Right breast reconstruction with implant.  No visible skin changes. No palpable abnormality appreciated over reconstructed breast.  No palpable mass left breast.       Abdominal: Soft. Bowel sounds are normal.   Musculoskeletal: Normal range of motion. She exhibits no edema.        Right shoulder: She exhibits no crepitus and normal strength.   Lymphadenopathy:        Head (right side): No submental, no submandibular, no tonsillar, no preauricular, no posterior auricular and no occipital adenopathy present.        Head (left side): No submental, no submandibular, no tonsillar, no preauricular, no posterior auricular and no occipital adenopathy present.     She has no cervical adenopathy.        Right cervical: No superficial cervical and no posterior cervical adenopathy present.       Left cervical: No superficial cervical and no posterior cervical adenopathy present.     She has no axillary adenopathy.        Right: No supraclavicular adenopathy present.        Left: No supraclavicular adenopathy present.    Neurological: She is alert and oriented to person, place, and time. She has normal reflexes.   Right upper extremity hemiparesis noted   Skin: Skin is warm and dry. No rash noted. No erythema. No pallor.   Psychiatric: She has a normal mood and affect. Her behavior is normal. Judgment and thought content normal.       Mammogram 10/21/19- left - wnl  Bone density 6/12/17- osteopenia-3 year f/u recommended -6/2020  Taking Vit D daily- intermittently taking calcium  Pt reports negative genetic testing in 2017    Exercise- rides her bike at least 60 minutes 4-5 days a week.     Assessment:       1. Malignant neoplasm of central portion of right breast in female, estrogen receptor positive    2. Osteopenia of multiple sites    3. Use of aromatase inhibitors    4. Right sided weakness    5. Hemiparesis affecting right side as late effect of cerebrovascular accident    6. Encounter for monitoring adjuvant hormonal therapy    7. Encounter for follow-up surveillance of breast cancer        Plan:       1.       Right-sided breast cancer: Stage IIIA invasive lobular carcinoma, ER/HI positive, [id number] neg. BRCA negative. Mammo today- results pending  2.       Osteopenia- Zometa 4 mg IV every 6 months for prevention of AI induced bone loss and treatment of osteopenia to be given today- rtc in Jan 2020 with bmp and possible Zometa. Bone density 6/12/17 - osteopenia- 3 year f/u recommended- 6/2020.   3.        Continue Anastrazole 1 mg po daily for adjuvant endocrine therapy. Take calcium and vit D. 7/2022 will make 5 years  4.        Hot flashes- improved.   5.        Pt is exercising for 30-60 minutes most days of the week on her bike- taking her calcium regularly. Taking vit d regularly. Congratulated pt on maintaining her exercise and wt.   6.        Gyn f/u was in Nov- 2019

## 2020-01-28 ENCOUNTER — OFFICE VISIT (OUTPATIENT)
Dept: INTERNAL MEDICINE | Facility: CLINIC | Age: 55
End: 2020-01-28
Payer: MEDICARE

## 2020-01-28 ENCOUNTER — INFUSION (OUTPATIENT)
Dept: INFUSION THERAPY | Facility: HOSPITAL | Age: 55
End: 2020-01-28
Attending: NURSE PRACTITIONER
Payer: MEDICARE

## 2020-01-28 ENCOUNTER — OFFICE VISIT (OUTPATIENT)
Dept: HEMATOLOGY/ONCOLOGY | Facility: CLINIC | Age: 55
End: 2020-01-28
Attending: NURSE PRACTITIONER
Payer: MEDICARE

## 2020-01-28 VITALS
HEIGHT: 67 IN | OXYGEN SATURATION: 97 % | RESPIRATION RATE: 18 BRPM | WEIGHT: 157.44 LBS | HEART RATE: 79 BPM | BODY MASS INDEX: 24.71 KG/M2 | SYSTOLIC BLOOD PRESSURE: 107 MMHG | DIASTOLIC BLOOD PRESSURE: 84 MMHG | TEMPERATURE: 97 F

## 2020-01-28 VITALS
TEMPERATURE: 98 F | WEIGHT: 157.19 LBS | HEART RATE: 80 BPM | DIASTOLIC BLOOD PRESSURE: 62 MMHG | HEIGHT: 67 IN | SYSTOLIC BLOOD PRESSURE: 104 MMHG | BODY MASS INDEX: 24.67 KG/M2

## 2020-01-28 DIAGNOSIS — Z51.81 ENCOUNTER FOR MONITORING ADJUVANT HORMONAL THERAPY: ICD-10-CM

## 2020-01-28 DIAGNOSIS — M81.8 OTHER OSTEOPOROSIS WITHOUT CURRENT PATHOLOGICAL FRACTURE: ICD-10-CM

## 2020-01-28 DIAGNOSIS — Z17.0 MALIGNANT NEOPLASM OF CENTRAL PORTION OF RIGHT BREAST IN FEMALE, ESTROGEN RECEPTOR POSITIVE: Primary | ICD-10-CM

## 2020-01-28 DIAGNOSIS — C50.111 MALIGNANT NEOPLASM OF CENTRAL PORTION OF RIGHT BREAST IN FEMALE, ESTROGEN RECEPTOR POSITIVE: ICD-10-CM

## 2020-01-28 DIAGNOSIS — R53.1 RIGHT SIDED WEAKNESS: ICD-10-CM

## 2020-01-28 DIAGNOSIS — C50.111 MALIGNANT NEOPLASM OF CENTRAL PORTION OF RIGHT BREAST IN FEMALE, ESTROGEN RECEPTOR POSITIVE: Primary | ICD-10-CM

## 2020-01-28 DIAGNOSIS — Z79.811 USE OF AROMATASE INHIBITORS: Primary | ICD-10-CM

## 2020-01-28 DIAGNOSIS — Z08 ENCOUNTER FOR FOLLOW-UP SURVEILLANCE OF BREAST CANCER: ICD-10-CM

## 2020-01-28 DIAGNOSIS — Z17.0 MALIGNANT NEOPLASM OF CENTRAL PORTION OF RIGHT BREAST IN FEMALE, ESTROGEN RECEPTOR POSITIVE: ICD-10-CM

## 2020-01-28 DIAGNOSIS — E78.2 MIXED HYPERLIPIDEMIA: ICD-10-CM

## 2020-01-28 DIAGNOSIS — Z79.899 ENCOUNTER FOR MONITORING ADJUVANT HORMONAL THERAPY: ICD-10-CM

## 2020-01-28 DIAGNOSIS — J40 BRONCHITIS: Primary | ICD-10-CM

## 2020-01-28 DIAGNOSIS — M85.89 OSTEOPENIA OF MULTIPLE SITES: ICD-10-CM

## 2020-01-28 DIAGNOSIS — M85.80 OTHER SPECIFIED DISORDERS OF BONE DENSITY AND STRUCTURE, UNSPECIFIED SITE: ICD-10-CM

## 2020-01-28 DIAGNOSIS — I69.351 HEMIPARESIS AFFECTING RIGHT SIDE AS LATE EFFECT OF CEREBROVASCULAR ACCIDENT: ICD-10-CM

## 2020-01-28 DIAGNOSIS — Z85.3 ENCOUNTER FOR FOLLOW-UP SURVEILLANCE OF BREAST CANCER: ICD-10-CM

## 2020-01-28 DIAGNOSIS — Z86.73 HISTORY OF CVA (CEREBROVASCULAR ACCIDENT): ICD-10-CM

## 2020-01-28 DIAGNOSIS — Z79.811 USE OF AROMATASE INHIBITORS: ICD-10-CM

## 2020-01-28 PROCEDURE — 63600175 PHARM REV CODE 636 W HCPCS: Performed by: NURSE PRACTITIONER

## 2020-01-28 PROCEDURE — 99999 PR PBB SHADOW E&M-EST. PATIENT-LVL III: CPT | Mod: PBBFAC,,, | Performed by: FAMILY MEDICINE

## 2020-01-28 PROCEDURE — 99214 OFFICE O/P EST MOD 30 MIN: CPT | Mod: 25,S$PBB,, | Performed by: NURSE PRACTITIONER

## 2020-01-28 PROCEDURE — 99213 OFFICE O/P EST LOW 20 MIN: CPT | Mod: PBBFAC,PO,25 | Performed by: FAMILY MEDICINE

## 2020-01-28 PROCEDURE — 99214 PR OFFICE/OUTPT VISIT, EST, LEVL IV, 30-39 MIN: ICD-10-PCS | Mod: 25,S$PBB,, | Performed by: NURSE PRACTITIONER

## 2020-01-28 PROCEDURE — 99214 OFFICE O/P EST MOD 30 MIN: CPT | Mod: S$PBB,,, | Performed by: FAMILY MEDICINE

## 2020-01-28 PROCEDURE — 99999 PR PBB SHADOW E&M-EST. PATIENT-LVL III: ICD-10-PCS | Mod: PBBFAC,,, | Performed by: FAMILY MEDICINE

## 2020-01-28 PROCEDURE — 99214 PR OFFICE/OUTPT VISIT, EST, LEVL IV, 30-39 MIN: ICD-10-PCS | Mod: S$PBB,,, | Performed by: FAMILY MEDICINE

## 2020-01-28 PROCEDURE — 99999 PR PBB SHADOW E&M-EST. PATIENT-LVL IV: CPT | Mod: PBBFAC,,, | Performed by: NURSE PRACTITIONER

## 2020-01-28 PROCEDURE — 99999 PR PBB SHADOW E&M-EST. PATIENT-LVL IV: ICD-10-PCS | Mod: PBBFAC,,, | Performed by: NURSE PRACTITIONER

## 2020-01-28 PROCEDURE — 99214 OFFICE O/P EST MOD 30 MIN: CPT | Mod: PBBFAC,25,27 | Performed by: NURSE PRACTITIONER

## 2020-01-28 PROCEDURE — 96365 THER/PROPH/DIAG IV INF INIT: CPT

## 2020-01-28 RX ORDER — PROMETHAZINE HYDROCHLORIDE AND DEXTROMETHORPHAN HYDROBROMIDE 6.25; 15 MG/5ML; MG/5ML
5 SYRUP ORAL NIGHTLY
Qty: 118 ML | Refills: 0 | Status: SHIPPED | OUTPATIENT
Start: 2020-01-28 | End: 2020-09-10

## 2020-01-28 RX ORDER — ZOLEDRONIC ACID 0.04 MG/ML
4 INJECTION, SOLUTION INTRAVENOUS
Status: COMPLETED | OUTPATIENT
Start: 2020-01-28 | End: 2020-01-28

## 2020-01-28 RX ORDER — BENZONATATE 100 MG/1
100 CAPSULE ORAL 3 TIMES DAILY PRN
Qty: 30 CAPSULE | Refills: 0 | Status: SHIPPED | OUTPATIENT
Start: 2020-01-28 | End: 2020-02-07

## 2020-01-28 RX ADMIN — ZOLEDRONIC ACID 4 MG: 0.04 INJECTION, SOLUTION INTRAVENOUS at 02:01

## 2020-01-28 NOTE — PROGRESS NOTES
Subjective:       Patient ID: Carole Moore is a 54 y.o. female.    Chief Complaint: Cough (follow up from last week - dx of bronchitis - still has a cough but doesn't feel as bad.)    Cough   This is a recurrent problem. The current episode started 1 to 4 weeks ago. The problem has been gradually improving. The problem occurs every few hours. The cough is productive of sputum. Associated symptoms include wheezing. Pertinent negatives include no chest pain, chills, ear congestion, ear pain, fever, headaches, heartburn, hemoptysis, myalgias, nasal congestion, postnasal drip, rash, rhinorrhea, sore throat, shortness of breath, sweats or weight loss. Nothing aggravates the symptoms. She has tried OTC cough suppressant, a beta-agonist inhaler and rest for the symptoms. The treatment provided significant relief. Her past medical history is significant for bronchitis. There is no history of asthma, bronchiectasis, COPD, emphysema, environmental allergies or pneumonia.     Review of Systems   Constitutional: Negative for chills, fever and weight loss.   HENT: Negative for ear pain, postnasal drip, rhinorrhea and sore throat.    Respiratory: Positive for cough and wheezing. Negative for hemoptysis and shortness of breath.    Cardiovascular: Negative for chest pain.   Gastrointestinal: Negative for heartburn.   Musculoskeletal: Negative for myalgias.   Skin: Negative for rash.   Allergic/Immunologic: Negative for environmental allergies.   Neurological: Negative for headaches.       Objective:      Physical Exam   Constitutional: She appears well-developed and well-nourished. No distress.   HENT:   Head: Normocephalic and atraumatic.   Nose: Nose normal.   Mouth/Throat: Oropharynx is clear and moist.   Pulmonary/Chest: Effort normal and breath sounds normal. No respiratory distress. She has no wheezes.   Cough on exam   Skin: Skin is warm and dry. No rash noted. She is not diaphoretic. No erythema.   Nursing note  and vitals reviewed.      Assessment:       1. Bronchitis    2. Mixed hyperlipidemia    3. History of CVA (cerebrovascular accident)        Plan:     Problem List Items Addressed This Visit        Neuro    History of CVA (cerebrovascular accident)    Current Assessment & Plan     Cont asa / statin            Pulmonary    Bronchitis - Primary    Relevant Medications    promethazine-dextromethorphan (PROMETHAZINE-DM) 6.25-15 mg/5 mL Syrp    benzonatate (TESSALON) 100 MG capsule       Cardiac/Vascular    Hyperlipidemia    Current Assessment & Plan     Cont statin

## 2020-01-30 ENCOUNTER — OFFICE VISIT (OUTPATIENT)
Dept: UROLOGY | Facility: CLINIC | Age: 55
End: 2020-01-30
Payer: MEDICARE

## 2020-01-30 VITALS
SYSTOLIC BLOOD PRESSURE: 124 MMHG | HEIGHT: 67 IN | WEIGHT: 157.44 LBS | BODY MASS INDEX: 24.71 KG/M2 | DIASTOLIC BLOOD PRESSURE: 76 MMHG

## 2020-01-30 DIAGNOSIS — N30.01 ACUTE CYSTITIS WITH HEMATURIA: Primary | ICD-10-CM

## 2020-01-30 LAB
BACTERIA #/AREA URNS AUTO: ABNORMAL /HPF
BILIRUB SERPL-MCNC: NORMAL MG/DL
BLOOD URINE, POC: NORMAL
COLOR, POC UA: YELLOW
GLUCOSE UR QL STRIP: NORMAL
KETONES UR QL STRIP: NORMAL
LEUKOCYTE ESTERASE URINE, POC: NORMAL
MICROSCOPIC COMMENT: ABNORMAL
NITRITE, POC UA: NORMAL
PH, POC UA: 5
POC RESIDUAL URINE VOLUME: 0 ML (ref 0–100)
PROTEIN, POC: NORMAL
RBC #/AREA URNS AUTO: 6 /HPF (ref 0–4)
SPECIFIC GRAVITY, POC UA: 1.02
UROBILINOGEN, POC UA: NORMAL
WBC #/AREA URNS AUTO: 0 /HPF (ref 0–5)

## 2020-01-30 PROCEDURE — 99213 OFFICE O/P EST LOW 20 MIN: CPT | Mod: PBBFAC,25 | Performed by: UROLOGY

## 2020-01-30 PROCEDURE — 99204 PR OFFICE/OUTPT VISIT, NEW, LEVL IV, 45-59 MIN: ICD-10-PCS | Mod: S$PBB,,, | Performed by: UROLOGY

## 2020-01-30 PROCEDURE — 99204 OFFICE O/P NEW MOD 45 MIN: CPT | Mod: S$PBB,,, | Performed by: UROLOGY

## 2020-01-30 PROCEDURE — 87086 URINE CULTURE/COLONY COUNT: CPT

## 2020-01-30 PROCEDURE — 81001 URINALYSIS AUTO W/SCOPE: CPT

## 2020-01-30 PROCEDURE — 99999 PR PBB SHADOW E&M-EST. PATIENT-LVL III: CPT | Mod: PBBFAC,,, | Performed by: UROLOGY

## 2020-01-30 PROCEDURE — 51798 US URINE CAPACITY MEASURE: CPT | Mod: PBBFAC | Performed by: UROLOGY

## 2020-01-30 PROCEDURE — 81002 URINALYSIS NONAUTO W/O SCOPE: CPT | Mod: PBBFAC,59 | Performed by: UROLOGY

## 2020-01-30 PROCEDURE — 99999 PR PBB SHADOW E&M-EST. PATIENT-LVL III: ICD-10-PCS | Mod: PBBFAC,,, | Performed by: UROLOGY

## 2020-01-30 NOTE — PROGRESS NOTES
Chief Complaint: UTI?    HPI:   1/30/20: 53 yo woman with breast cancer history about a year ago had a few UTI.  Sole symptom was frequency.   Bought some new underwear and after changing back to old type things got better and no problems since, about 5 mo.  No abd/pelvic pain and no exac/rel factors.  No hematuria.  No urolithiasis.  No urinary bother.  No  history.  Normal sexual function.  Normal OB exam 2 mo ago.  Microhematuria on dipstick UA.  Had a stroke 20 yrs ago, on disability.    Allergies:  Patient has no known allergies.    Medications: has a current medication list which includes the following prescription(s): albuterol, anastrozole, aspirin, atorvastatin, benzonatate, doxycycline, loratadine, promethazine-dextromethorphan, shingrix (pf), and hydrocortisone.    Review of Systems:  General: No fever, chills, fatigability, or weight loss.  Skin: No rashes, itching, or changes in color or texture of skin.  Chest: Denies LIPSCOMB, cyanosis, wheezing, cough, and sputum production.  Abdomen: Appetite fine. No weight loss. Denies diarrhea, abdominal pain, hematemesis, or blood in stool.  Musculoskeletal: No joint stiffness or swelling. Denies back pain.  : As above.  All other review of systems negative.    PMH:   has a past medical history of Breast cancer (2016), Cancer, CVA (cerebral infarction), Diverticulosis, Family history of colon cancer (10/30/2018), Hyperlipidemia, Nausea after anesthesia, Paralysis, PONV (postoperative nausea and vomiting), and Stroke.    PSH:   has a past surgical history that includes Tonsillectomy; Mediport insertion, single; Breast surgery; Colonoscopy (N/A, 10/30/2018); Mastectomy (Right, 2017); Augmentation of breast (Left, 2017); and Hysterectomy (2007).    FamHx: family history includes Breast cancer in her paternal aunt; Colon cancer in her father and mother; Melanoma in her maternal uncle; Skin cancer in her maternal uncle.    SocHx:  reports that she has never smoked.  She has never used smokeless tobacco. She reports that she does not drink alcohol or use drugs.     Physical Exam:  Vitals:   Vitals:    01/30/20 1521   BP: 124/76     General: A&Ox3. No apparent distress. No deformities.  Neck: No masses. Normal thyroid.  Lungs: normal inspiration. No use of accessory muscles.  Heart: normal pulse. No arrhythmias.  Abdomen: Soft. NT. ND. No masses. No hernias. No hepatosplenomegaly.  Lymphatic: Neck and groin nodes negative.  Skin: The skin is warm and dry. No jaundice.  Ext: No c/c/e.  : External genitalia normal.     Labs/Studies:   Urinalysis performed in clinic, summary: UA normal exc tr blood    Impression/Plan:   1. Cath UA/UCx to ensure no sig hematuria, RTC prn.

## 2020-01-30 NOTE — LETTER
January 30, 2020      Kvng Herrera MD  [address]  [facility] [address]           [facility]  [address] [place], [place] [address] [place] [address]  Phone: [phone]  Fax: [phone]          Patient: Carole Moore   MR Number: [id number]   YOB: 1965   Date of Visit: 1/30/2020       Dear Dr. Kvng Herrera:    Thank you for referring Carole Moore to me for evaluation. Attached you will find relevant portions of my assessment and plan of care.    If you have questions, please do not hesitate to call me. I look forward to following Carole Moore along with you.    Sincerely,    Rubens Cruz IV, MD    Enclosure  CC:  No Recipients    If you would like to receive this communication electronically, please contact [email or web address] or [phone] to request more information on [employer] access.    For providers and/or their staff who would like to refer a patient to [facility], please contact us through our one-stop-shop provider referral line, [facility] , at [phone].    If you feel you have received this communication in error or would no longer like to receive these types of communications, please e-mail [email or web address]

## 2020-02-01 LAB — BACTERIA UR CULT: NO GROWTH

## 2020-02-04 ENCOUNTER — CLINICAL SUPPORT (OUTPATIENT)
Dept: CARDIOLOGY | Facility: CLINIC | Age: 55
End: 2020-02-04
Attending: NUCLEAR MEDICINE
Payer: MEDICARE

## 2020-02-04 DIAGNOSIS — I51.7 CARDIOMEGALY: ICD-10-CM

## 2020-02-04 DIAGNOSIS — Z91.89 HEART FAILURE, ACC/AHA STAGE A: ICD-10-CM

## 2020-02-04 LAB
DIASTOLIC DYSFUNCTION: NO
ESTIMATED PA SYSTOLIC PRESSURE: 35.49
MITRAL VALVE REGURGITATION: NORMAL
RETIRED EF AND QEF - SEE NOTES: 60 (ref 55–65)

## 2020-02-04 PROCEDURE — 93306 TTE W/DOPPLER COMPLETE: CPT | Mod: PBBFAC | Performed by: INTERNAL MEDICINE

## 2020-02-04 PROCEDURE — 93306 2D ECHO WITH COLOR FLOW DOPPLER: ICD-10-PCS | Mod: 26,S$PBB,, | Performed by: INTERNAL MEDICINE

## 2020-02-13 ENCOUNTER — OFFICE VISIT (OUTPATIENT)
Dept: CARDIOLOGY | Facility: CLINIC | Age: 55
End: 2020-02-13
Payer: COMMERCIAL

## 2020-02-13 VITALS
WEIGHT: 159.19 LBS | BODY MASS INDEX: 24.99 KG/M2 | HEART RATE: 87 BPM | HEIGHT: 67 IN | DIASTOLIC BLOOD PRESSURE: 62 MMHG | OXYGEN SATURATION: 98 % | SYSTOLIC BLOOD PRESSURE: 92 MMHG

## 2020-02-13 DIAGNOSIS — E78.2 MIXED HYPERLIPIDEMIA: ICD-10-CM

## 2020-02-13 DIAGNOSIS — Z91.89 HEART FAILURE, ACC/AHA STAGE A: Primary | ICD-10-CM

## 2020-02-13 PROCEDURE — 99999 PR PBB SHADOW E&M-EST. PATIENT-LVL III: ICD-10-PCS | Mod: PBBFAC,,, | Performed by: NUCLEAR MEDICINE

## 2020-02-13 PROCEDURE — 99214 PR OFFICE/OUTPT VISIT, EST, LEVL IV, 30-39 MIN: ICD-10-PCS | Mod: S$GLB,,, | Performed by: NUCLEAR MEDICINE

## 2020-02-13 PROCEDURE — 99214 OFFICE O/P EST MOD 30 MIN: CPT | Mod: S$GLB,,, | Performed by: NUCLEAR MEDICINE

## 2020-02-13 PROCEDURE — 99999 PR PBB SHADOW E&M-EST. PATIENT-LVL III: CPT | Mod: PBBFAC,,, | Performed by: NUCLEAR MEDICINE

## 2020-02-13 PROCEDURE — 99213 OFFICE O/P EST LOW 20 MIN: CPT | Mod: PBBFAC | Performed by: NUCLEAR MEDICINE

## 2020-02-13 NOTE — PROGRESS NOTES
Subjective:   Patient ID:  Carole Moore is a 54 y.o. female who presents for follow-up of HEART FAILURE STAGE A and Hyperlipidemia      HPI DOING WELL. NO UNUSUAL LIPSCOMB. NO ORTHOPNEA OR PND  NO CHEST DISCOMFORT- PLEURITIC OR ANGINAL  NO PALPITATIONS  NO ABDOMINAL DISCOMFORT  NO EDEMA .NO CALVE TENDERNESS  NO NEW FOCAL CNS SYMPTOMS OR SIGNS TO SUGGEST TIA OR STROKE- RESIDUAL RIGHT HEMIPARESIS UNCHANGED  NO ABNORMAL BLEEDING ON ASA  STATIN WELL TOLERATED    Review of Systems   Constitution: Negative for chills, fever, night sweats, weight gain and weight loss.   HENT: Negative for nosebleeds.    Eyes: Negative for blurred vision, double vision and visual disturbance.   Cardiovascular: Negative for chest pain, dyspnea on exertion, irregular heartbeat, leg swelling, orthopnea, palpitations, paroxysmal nocturnal dyspnea and syncope.   Respiratory: Negative for cough, hemoptysis and wheezing.    Endocrine: Negative for polydipsia and polyuria.   Hematologic/Lymphatic: Does not bruise/bleed easily.   Skin: Negative for rash.   Musculoskeletal: Negative for joint pain, joint swelling, muscle weakness and myalgias.   Gastrointestinal: Negative for abdominal pain, hematemesis, jaundice and melena.   Genitourinary: Negative for dysuria, hematuria and nocturia.   Neurological: Negative for dizziness, focal weakness, headaches, sensory change and weakness.   Psychiatric/Behavioral: Negative for depression. The patient does not have insomnia and is not nervous/anxious.      Family History   Problem Relation Age of Onset    Breast cancer Paternal Aunt     Colon cancer Father     Colon cancer Mother     Melanoma Maternal Uncle     Skin cancer Maternal Uncle      Past Medical History:   Diagnosis Date    Breast cancer 2016    right breast    Cancer     R Breast cancer    CVA (cerebral infarction)     Diverticulosis     Family history of colon cancer 10/30/2018    Her mother and father both had colon cancer.     Hyperlipidemia     Nausea after anesthesia     Paralysis     right side    PONV (postoperative nausea and vomiting)     Stroke     R side weakness     Social History     Socioeconomic History    Marital status:      Spouse name: Not on file    Number of children: Not on file    Years of education: Not on file    Highest education level: Not on file   Occupational History    Not on file   Social Needs    Financial resource strain: Not very hard    Food insecurity:     Worry: Never true     Inability: Never true    Transportation needs:     Medical: No     Non-medical: No   Tobacco Use    Smoking status: Never Smoker    Smokeless tobacco: Never Used   Substance and Sexual Activity    Alcohol use: No     Frequency: Never     Drinks per session: Patient refused     Binge frequency: Never    Drug use: No    Sexual activity: Never     Birth control/protection: Surgical   Lifestyle    Physical activity:     Days per week: 5 days     Minutes per session: 30 min    Stress: Not at all   Relationships    Social connections:     Talks on phone: More than three times a week     Gets together: Once a week     Attends Bahai service: Not on file     Active member of club or organization: No     Attends meetings of clubs or organizations: Never     Relationship status:    Other Topics Concern    Are you pregnant or think you may be? No    Breast-feeding No   Social History Narrative    Not on file     Current Outpatient Medications on File Prior to Visit   Medication Sig Dispense Refill    albuterol (PROVENTIL/VENTOLIN HFA) 90 mcg/actuation inhaler Inhale 1-2 puffs into the lungs every 6 (six) hours as needed for Wheezing or Shortness of Breath. Rescue 18 g 0    anastrozole (ARIMIDEX) 1 mg Tab Take 1 tablet (1 mg total) by mouth once daily. 90 tablet 1    aspirin 81 MG Chew Take 81 mg by mouth once daily.      atorvastatin (LIPITOR) 10 MG tablet Take 1 tablet (10 mg total) by mouth once  daily. 90 tablet 4    hydrocortisone 2.5 % cream Apply topically 2 (two) times daily. 1 Tube 1    LORATADINE (CLARITIN ORAL) Take by mouth daily as needed.       promethazine-dextromethorphan (PROMETHAZINE-DM) 6.25-15 mg/5 mL Syrp Take 5 mLs by mouth every evening. 118 mL 0    SHINGRIX, PF, 50 mcg/0.5 mL injection       [DISCONTINUED] doxycycline (VIBRAMYCIN) 100 MG Cap Take 1 capsule (100 mg total) by mouth every 12 (twelve) hours. 14 capsule 0     No current facility-administered medications on file prior to visit.      Review of patient's allergies indicates:  No Known Allergies    Objective:     Physical Exam   Constitutional: She is oriented to person, place, and time. She appears well-developed. No distress.   HENT:   Head: Normocephalic.   Eyes: Pupils are equal, round, and reactive to light. Conjunctivae are normal. No scleral icterus.   Neck: Normal range of motion. Neck supple. Normal carotid pulses, no hepatojugular reflux and no JVD present. Carotid bruit is not present. No edema present. No thyroid mass and no thyromegaly present.   Cardiovascular: Normal rate, regular rhythm, S1 normal, S2 normal, normal heart sounds and intact distal pulses. PMI is not displaced. Exam reveals no gallop and no friction rub.   No murmur heard.  Pulses:       Carotid pulses are 2+ on the right side, and 2+ on the left side.       Radial pulses are 2+ on the right side, and 2+ on the left side.        Femoral pulses are 2+ on the right side, and 2+ on the left side.       Popliteal pulses are 2+ on the right side, and 2+ on the left side.        Dorsalis pedis pulses are 2+ on the right side, and 2+ on the left side.        Posterior tibial pulses are 2+ on the right side, and 2+ on the left side.   Pulmonary/Chest: Effort normal and breath sounds normal. She has no wheezes. She has no rales. She exhibits no tenderness.   Abdominal: Soft. Bowel sounds are normal. She exhibits no pulsatile midline mass and no mass.  There is no hepatosplenomegaly. There is no tenderness.   Musculoskeletal: Normal range of motion. She exhibits no edema or tenderness.        Cervical back: Normal.        Thoracic back: Normal.        Lumbar back: Normal.   Lymphadenopathy:     She has no cervical adenopathy.     She has no axillary adenopathy.        Right: No supraclavicular adenopathy present.        Left: No supraclavicular adenopathy present.   Neurological: She is alert and oriented to person, place, and time. She has normal strength and normal reflexes. No sensory deficit. Gait normal.   Skin: Skin is warm. No rash noted. No cyanosis. No pallor. Nails show no clubbing.   Psychiatric: She has a normal mood and affect. Her speech is normal and behavior is normal. Cognition and memory are normal.       Assessment:     1. Heart failure, ACC/AHA stage A    2. Mixed hyperlipidemia        Plan:     Heart failure, ACC/AHA stage A  NO CLINICAL EVIDENCE OF ADHF  RECENT ECHO WAS REVIEWED AND DISCUSSED. LV EF 60%  NO ARRHYTHMIAS    Mixed hyperlipidemia  STATIN WELL TOLERATED    CONTINUE PRESENT CARD MANAGEMENT  RETURN IN ONE YEAR

## 2020-03-31 DIAGNOSIS — Z79.811 AROMATASE INHIBITOR USE: ICD-10-CM

## 2020-03-31 RX ORDER — ANASTROZOLE 1 MG/1
1 TABLET ORAL DAILY
Qty: 90 TABLET | Refills: 3 | Status: SHIPPED | OUTPATIENT
Start: 2020-03-31 | End: 2021-03-22

## 2020-03-31 RX ORDER — ANASTROZOLE 1 MG/1
1 TABLET ORAL DAILY
Qty: 90 TABLET | Refills: 1 | Status: CANCELLED | OUTPATIENT
Start: 2020-03-31

## 2020-04-01 ENCOUNTER — PATIENT MESSAGE (OUTPATIENT)
Dept: INTERNAL MEDICINE | Facility: CLINIC | Age: 55
End: 2020-04-01

## 2020-07-22 NOTE — PROGRESS NOTES
Subjective:       Patient ID: Carole Moore is a 55 y.o. female.    Chief Complaint: Breast Cancer (f/u)    Collaborating provider is Dr. Harpal Khan    HPI 55-year-old  female who presents to the hematology oncology clinic today for follow-up for right breast carcinoma.  The patient was previously followed in the outpatient  Hem/Onc clinic by Dr. Alessia Moss and Dr. Huitron.    2/2017-Right-sided breast cancer: Stage IIIA invasive lobular carcinoma, ER/MI positive, [id number] neg. BRCA negative. Given large mass on physical examination, treated with neoadjuvant chemotherapy, using dose-dense AC-T regimen, which patient completed in 2/2017 with decrease in size and firmness of R breast mass. She then underwent R breast mastectomy and SLND 4/2017, with tissue expander placement. Final pathology after neoadjuvant chemotherapy showed tumor 6cm, sentinel LNs positive, and nipple skin positive, we did recommend adjuvant radiation to R chest and axilla. Her case was discussed at tumor board and while axillary LN dissection was discussed, adjuvant radiation to the left chest and axilla were recommended which she has since completed.    Anastrazole 1mg PO daily for adjuvant endocrine therapy was initiated end of July 2017. Pt tolerating without difficulty- hot flashes are getting better- did try ditropan for a short while and did not like the way it made her feel.    Zometa initiated for osteopenia secondary to AI use. Next treatment due today and again Jan 2021    No dental issues or infections. No healing issues and thinks she told her dentist about the medications she is taking. Asked pt to make sure dentist is aware of her taking Zometa.    PAST MEDICAL HISTORY:   1.  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  Dyslipidemia  3.  Osteopenia  4.  Right breast cancer     SURGICAL HISTORY:   1.  Tonsillectomy  2.  Hysterectomy  3.  Right breast  mastectomy with with right deep axilla sentinel lymph node biopsy followed by breast reconstruction with tissue expander done on April 11, 2017 and reconstruction completed in Dec 2017.  4.  Mediport placement and removal     FAMILY HISTORY: She reports that 2 paternal aunts had breast cancer in their late 50s.  A maternal uncle had lung cancer the age of 54.  He used to smoke cigarettes.  A maternal uncle had melanoma at the age of 49.  Her father had colon cancer at the age of 70.  Her mother had colon cancer in her 70s.  She denies any other immediate family members with cancer or bleeding/clotting disorders.     SOCIAL HISTORY: The reports a 5-pack-year smoking history and quit at the age of 23.  She drinks alcohol socially.  She has never used any recreational drugs.  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  She is  and has 2 children.  She lives in [place], [place].     ALLERGIES: [NKDA]     MEDICATIONS: [Medcard has been reviewed and/or reconciled.]    Review of Systems   Constitutional: Negative.  Negative for appetite change, fatigue, fever and unexpected weight change.        Hot flashes at night- awakening intermittently at 2-3 am- goes to bed at 7:30 pm- has to get up to void at that time and has trouble falling back to sleep- has appt with urology for UTI issues this week so will mention the issue with voiding at night.    HENT: Negative.    Eyes: Negative.    Respiratory: Negative.    Cardiovascular: Negative.    Gastrointestinal: Negative.  Negative for abdominal pain and blood in stool.        No reflux   Endocrine: Negative.    Genitourinary: Negative.  Negative for hematuria.        Pt relates recurrent uti's- scheduling an appt with urology outside the clinic due to availability not until Jan 2020.    Musculoskeletal: Negative.    Skin: Negative.    Allergic/Immunologic: Negative.    Neurological: Negative.    Hematological: Negative.  Negative for  adenopathy.   Psychiatric/Behavioral: Negative.        Objective:      Physical Exam  Constitutional:       Appearance: She is well-developed.   HENT:      Head: Normocephalic and atraumatic.      Right Ear: External ear normal.      Left Ear: External ear normal.      Mouth/Throat:      Pharynx: No oropharyngeal exudate.   Eyes:      General: No scleral icterus.        Right eye: No discharge.         Left eye: No discharge.      Conjunctiva/sclera: Conjunctivae normal.      Pupils: Pupils are equal, round, and reactive to light.   Neck:      Musculoskeletal: Normal range of motion and neck supple.      Thyroid: No thyromegaly.   Cardiovascular:      Rate and Rhythm: Normal rate and regular rhythm.      Heart sounds: Normal heart sounds.   Pulmonary:      Effort: Pulmonary effort is normal.      Breath sounds: Normal breath sounds.   Chest:      Breasts:         Right: No inverted nipple, mass, nipple discharge, skin change or tenderness.         Left: No inverted nipple, mass, nipple discharge, skin change or tenderness.       Abdominal:      General: Bowel sounds are normal.      Palpations: Abdomen is soft.   Musculoskeletal: Normal range of motion.      Right shoulder: She exhibits no crepitus and normal strength.   Lymphadenopathy:      Head:      Right side of head: No submental, submandibular, tonsillar, preauricular, posterior auricular or occipital adenopathy.      Left side of head: No submental, submandibular, tonsillar, preauricular, posterior auricular or occipital adenopathy.      Cervical: No cervical adenopathy.      Right cervical: No superficial or posterior cervical adenopathy.     Left cervical: No superficial or posterior cervical adenopathy.      Upper Body:      Right upper body: No supraclavicular adenopathy.      Left upper body: No supraclavicular adenopathy.   Skin:     General: Skin is warm and dry.      Coloration: Skin is not pale.      Findings: No erythema or rash.   Neurological:       Mental Status: She is alert and oriented to person, place, and time.      Deep Tendon Reflexes: Reflexes are normal and symmetric.      Comments: Right upper extremity hemiparesis noted   Psychiatric:         Behavior: Behavior normal.         Thought Content: Thought content normal.         Judgment: Judgment normal.         Results for FREDA PAUL (MRN [id number]) as of 7/28/2020 14:06   Ref. Range 7/27/2020 10:27   WBC Latest Ref Range: 3.90 - 12.70 K/uL 4.38   RBC Latest Ref Range: 4.00 - 5.40 M/uL 4.38   Hemoglobin Latest Ref Range: 12.0 - 16.0 g/dL 13.2   Hematocrit Latest Ref Range: 37.0 - 48.5 % 41.2   MCV Latest Ref Range: 82 - 98 fL 94   MCH Latest Ref Range: 27.0 - 31.0 pg 30.1   MCHC Latest Ref Range: 32.0 - 36.0 g/dL 32.0   RDW Latest Ref Range: 11.5 - 14.5 % 12.0   Platelets Latest Ref Range: 150 - 350 K/uL 249   MPV Latest Ref Range: 9.2 - 12.9 fL 10.0   Gran% Latest Ref Range: 38.0 - 73.0 % 44.8   Gran # (ANC) Latest Ref Range: 1.8 - 7.7 K/uL 2.0   Lymph% Latest Ref Range: 18.0 - 48.0 % 40.9   Lymph # Latest Ref Range: 1.0 - 4.8 K/uL 1.8   Mono% Latest Ref Range: 4.0 - 15.0 % 10.0   Mono # Latest Ref Range: 0.3 - 1.0 K/uL 0.4   Eosinophil% Latest Ref Range: 0.0 - 8.0 % 3.4   Eos # Latest Ref Range: 0.0 - 0.5 K/uL 0.2   Basophil% Latest Ref Range: 0.0 - 1.9 % 0.7   Baso # Latest Ref Range: 0.00 - 0.20 K/uL 0.03   nRBC Latest Ref Range: 0 /100 WBC 0   Differential Method Unknown Automated   Immature Grans (Abs) Latest Ref Range: 0.00 - 0.04 K/uL 0.01   Immature Granulocytes Latest Ref Range: 0.0 - 0.5 % 0.2   Sodium Latest Ref Range: 136 - 145 mmol/L 140   Potassium Latest Ref Range: 3.5 - 5.1 mmol/L 3.9   Chloride Latest Ref Range: 95 - 110 mmol/L 103   CO2 Latest Ref Range: 23 - 29 mmol/L 28   Anion Gap Latest Ref Range: 8 - 16 mmol/L 9   BUN, Bld Latest Ref Range: 6 - 20 mg/dL 14   Creatinine Latest Ref Range: 0.5 - 1.4 mg/dL 0.7   eGFR if non African American Latest Ref Range: >60  mL/min/1.73 m^2 >60   eGFR if  Latest Ref Range: >60 mL/min/1.73 m^2 >60   Glucose Latest Ref Range: 70 - 110 mg/dL 86   Calcium Latest Ref Range: 8.7 - 10.5 mg/dL 9.5   TSH Latest Ref Range: 0.400 - 4.000 uIU/mL 0.778       Mammogram 10/21/19- left - wnl  Bone density today- results pending- zometa every 6 months initiated 6/2017 due to osteopenia  Taking Vit D daily- intermittently taking calcium  Pt reports negative genetic testing in 2017    Exercise- rides her bike at least 60 minutes 4-5 days a week.     Assessment:       1. Malignant neoplasm of central portion of right breast in female, estrogen receptor positive    2. Osteopenia of multiple sites    3. Use of aromatase inhibitors    4. History of CVA (cerebrovascular accident)    5. Hemiparesis affecting right side as late effect of cerebrovascular accident        Plan:       1.       Right-sided breast cancer: Stage IIIA invasive lobular carcinoma, ER/NC positive, [id number] neg. BRCA negative. Mammo today- results pending  2.       Osteopenia- Zometa 4 mg IV every 6 months for prevention of AI induced bone loss and treatment of osteopenia to be given today- rtc in Jan 2021 with cbc and bmp and possible Zometa. Bone density today - osteopenia- 2 year f/u recommended- . TSH and vit d level pending from today  3.        Continue Anastrazole 1 mg po daily for adjuvant endocrine therapy. Take calcium and vit D. 7/2022 will make 5 years - would recommend pt stay on for 5 more- 7/2027  4.        Hot flashes- improved.   5.        Pt is exercising for 30-60 minutes most days of the week on her bike- taking her calcium regularly. Taking vit d regularly. Congratulated pt on maintaining her exercise and wt.   6.        Gyn f/u was in Nov- 2019     Bone density results just in- normal- will stop zometa for one year and repeat dexa July 2021 and if osteopenic at that time restart it.

## 2020-07-27 ENCOUNTER — LAB VISIT (OUTPATIENT)
Dept: LAB | Facility: HOSPITAL | Age: 55
End: 2020-07-27
Attending: NURSE PRACTITIONER
Payer: COMMERCIAL

## 2020-07-27 DIAGNOSIS — Z79.811 USE OF AROMATASE INHIBITORS: ICD-10-CM

## 2020-07-27 DIAGNOSIS — Z17.0 MALIGNANT NEOPLASM OF CENTRAL PORTION OF RIGHT BREAST IN FEMALE, ESTROGEN RECEPTOR POSITIVE: ICD-10-CM

## 2020-07-27 DIAGNOSIS — M81.8 OTHER OSTEOPOROSIS WITHOUT CURRENT PATHOLOGICAL FRACTURE: ICD-10-CM

## 2020-07-27 DIAGNOSIS — C50.111 MALIGNANT NEOPLASM OF CENTRAL PORTION OF RIGHT BREAST IN FEMALE, ESTROGEN RECEPTOR POSITIVE: ICD-10-CM

## 2020-07-27 DIAGNOSIS — M85.80 OSTEOPENIA DUE TO CANCER THERAPY: ICD-10-CM

## 2020-07-27 DIAGNOSIS — M85.89 OSTEOPENIA OF MULTIPLE SITES: ICD-10-CM

## 2020-07-27 LAB
ANION GAP SERPL CALC-SCNC: 9 MMOL/L (ref 8–16)
BASOPHILS # BLD AUTO: 0.03 K/UL (ref 0–0.2)
BASOPHILS NFR BLD: 0.7 % (ref 0–1.9)
BUN SERPL-MCNC: 14 MG/DL (ref 6–20)
CALCIUM SERPL-MCNC: 9.5 MG/DL (ref 8.7–10.5)
CHLORIDE SERPL-SCNC: 103 MMOL/L (ref 95–110)
CO2 SERPL-SCNC: 28 MMOL/L (ref 23–29)
CREAT SERPL-MCNC: 0.7 MG/DL (ref 0.5–1.4)
DIFFERENTIAL METHOD: NORMAL
EOSINOPHIL # BLD AUTO: 0.2 K/UL (ref 0–0.5)
EOSINOPHIL NFR BLD: 3.4 % (ref 0–8)
ERYTHROCYTE [DISTWIDTH] IN BLOOD BY AUTOMATED COUNT: 12 % (ref 11.5–14.5)
EST. GFR  (AFRICAN AMERICAN): >60 ML/MIN/1.73 M^2
EST. GFR  (NON AFRICAN AMERICAN): >60 ML/MIN/1.73 M^2
GLUCOSE SERPL-MCNC: 86 MG/DL (ref 70–110)
HCT VFR BLD AUTO: 41.2 % (ref 37–48.5)
HGB BLD-MCNC: 13.2 G/DL (ref 12–16)
IMM GRANULOCYTES # BLD AUTO: 0.01 K/UL (ref 0–0.04)
IMM GRANULOCYTES NFR BLD AUTO: 0.2 % (ref 0–0.5)
LYMPHOCYTES # BLD AUTO: 1.8 K/UL (ref 1–4.8)
LYMPHOCYTES NFR BLD: 40.9 % (ref 18–48)
MCH RBC QN AUTO: 30.1 PG (ref 27–31)
MCHC RBC AUTO-ENTMCNC: 32 G/DL (ref 32–36)
MCV RBC AUTO: 94 FL (ref 82–98)
MONOCYTES # BLD AUTO: 0.4 K/UL (ref 0.3–1)
MONOCYTES NFR BLD: 10 % (ref 4–15)
NEUTROPHILS # BLD AUTO: 2 K/UL (ref 1.8–7.7)
NEUTROPHILS NFR BLD: 44.8 % (ref 38–73)
NRBC BLD-RTO: 0 /100 WBC
PLATELET # BLD AUTO: 249 K/UL (ref 150–350)
PMV BLD AUTO: 10 FL (ref 9.2–12.9)
POTASSIUM SERPL-SCNC: 3.9 MMOL/L (ref 3.5–5.1)
RBC # BLD AUTO: 4.38 M/UL (ref 4–5.4)
SODIUM SERPL-SCNC: 140 MMOL/L (ref 136–145)
TSH SERPL DL<=0.005 MIU/L-ACNC: 0.78 UIU/ML (ref 0.4–4)
WBC # BLD AUTO: 4.38 K/UL (ref 3.9–12.7)

## 2020-07-27 PROCEDURE — 80048 BASIC METABOLIC PNL TOTAL CA: CPT

## 2020-07-27 PROCEDURE — 85025 COMPLETE CBC W/AUTO DIFF WBC: CPT

## 2020-07-27 PROCEDURE — 84443 ASSAY THYROID STIM HORMONE: CPT

## 2020-07-27 PROCEDURE — 36415 COLL VENOUS BLD VENIPUNCTURE: CPT | Mod: PO

## 2020-07-28 ENCOUNTER — OFFICE VISIT (OUTPATIENT)
Dept: HEMATOLOGY/ONCOLOGY | Facility: CLINIC | Age: 55
End: 2020-07-28
Payer: COMMERCIAL

## 2020-07-28 ENCOUNTER — APPOINTMENT (OUTPATIENT)
Dept: RADIOLOGY | Facility: HOSPITAL | Age: 55
End: 2020-07-28
Attending: NURSE PRACTITIONER
Payer: COMMERCIAL

## 2020-07-28 ENCOUNTER — INFUSION (OUTPATIENT)
Dept: INFUSION THERAPY | Facility: HOSPITAL | Age: 55
End: 2020-07-28
Attending: NURSE PRACTITIONER
Payer: COMMERCIAL

## 2020-07-28 VITALS
HEART RATE: 83 BPM | DIASTOLIC BLOOD PRESSURE: 63 MMHG | OXYGEN SATURATION: 97 % | SYSTOLIC BLOOD PRESSURE: 102 MMHG | RESPIRATION RATE: 18 BRPM | TEMPERATURE: 98 F

## 2020-07-28 VITALS
SYSTOLIC BLOOD PRESSURE: 110 MMHG | BODY MASS INDEX: 25.48 KG/M2 | WEIGHT: 162.69 LBS | DIASTOLIC BLOOD PRESSURE: 72 MMHG

## 2020-07-28 DIAGNOSIS — Z79.811 USE OF AROMATASE INHIBITORS: Primary | ICD-10-CM

## 2020-07-28 DIAGNOSIS — Z17.0 MALIGNANT NEOPLASM OF CENTRAL PORTION OF RIGHT BREAST IN FEMALE, ESTROGEN RECEPTOR POSITIVE: ICD-10-CM

## 2020-07-28 DIAGNOSIS — I69.351 HEMIPARESIS AFFECTING RIGHT SIDE AS LATE EFFECT OF CEREBROVASCULAR ACCIDENT: ICD-10-CM

## 2020-07-28 DIAGNOSIS — M85.89 OSTEOPENIA OF MULTIPLE SITES: ICD-10-CM

## 2020-07-28 DIAGNOSIS — Z17.0 MALIGNANT NEOPLASM OF CENTRAL PORTION OF RIGHT BREAST IN FEMALE, ESTROGEN RECEPTOR POSITIVE: Primary | ICD-10-CM

## 2020-07-28 DIAGNOSIS — Z86.73 HISTORY OF CVA (CEREBROVASCULAR ACCIDENT): ICD-10-CM

## 2020-07-28 DIAGNOSIS — C50.111 MALIGNANT NEOPLASM OF CENTRAL PORTION OF RIGHT BREAST IN FEMALE, ESTROGEN RECEPTOR POSITIVE: Primary | ICD-10-CM

## 2020-07-28 DIAGNOSIS — Z79.811 USE OF AROMATASE INHIBITORS: ICD-10-CM

## 2020-07-28 DIAGNOSIS — C50.111 MALIGNANT NEOPLASM OF CENTRAL PORTION OF RIGHT BREAST IN FEMALE, ESTROGEN RECEPTOR POSITIVE: ICD-10-CM

## 2020-07-28 PROCEDURE — 77080 DXA BONE DENSITY AXIAL: CPT | Mod: TC

## 2020-07-28 PROCEDURE — 77080 DEXA BONE DENSITY SPINE HIP: ICD-10-PCS | Mod: 26,,, | Performed by: RADIOLOGY

## 2020-07-28 PROCEDURE — 99999 PR PBB SHADOW E&M-EST. PATIENT-LVL III: ICD-10-PCS | Mod: PBBFAC,,, | Performed by: NURSE PRACTITIONER

## 2020-07-28 PROCEDURE — 99215 PR OFFICE/OUTPT VISIT, EST, LEVL V, 40-54 MIN: ICD-10-PCS | Mod: S$GLB,,, | Performed by: NURSE PRACTITIONER

## 2020-07-28 PROCEDURE — 99999 PR PBB SHADOW E&M-EST. PATIENT-LVL III: CPT | Mod: PBBFAC,,, | Performed by: NURSE PRACTITIONER

## 2020-07-28 PROCEDURE — 99215 OFFICE O/P EST HI 40 MIN: CPT | Mod: S$GLB,,, | Performed by: NURSE PRACTITIONER

## 2020-07-28 PROCEDURE — 3008F BODY MASS INDEX DOCD: CPT | Mod: CPTII,S$GLB,, | Performed by: NURSE PRACTITIONER

## 2020-07-28 PROCEDURE — 96365 THER/PROPH/DIAG IV INF INIT: CPT

## 2020-07-28 PROCEDURE — 63600175 PHARM REV CODE 636 W HCPCS: Performed by: NURSE PRACTITIONER

## 2020-07-28 PROCEDURE — 77080 DXA BONE DENSITY AXIAL: CPT | Mod: 26,,, | Performed by: RADIOLOGY

## 2020-07-28 PROCEDURE — 3008F PR BODY MASS INDEX (BMI) DOCUMENTED: ICD-10-PCS | Mod: CPTII,S$GLB,, | Performed by: NURSE PRACTITIONER

## 2020-07-28 RX ORDER — ZOLEDRONIC ACID 0.04 MG/ML
4 INJECTION, SOLUTION INTRAVENOUS
Status: COMPLETED | OUTPATIENT
Start: 2020-07-28 | End: 2020-07-28

## 2020-07-28 RX ADMIN — ZOLEDRONIC ACID 4 MG: 0.04 INJECTION, SOLUTION INTRAVENOUS at 02:07

## 2020-07-28 NOTE — PLAN OF CARE
Problem: Adult Inpatient Plan of Care  Goal: Plan of Care Review  Outcome: Ongoing, Progressing  Flowsheets (Taken 7/28/2020 1500)  Plan of Care Reviewed With: patient  Goal: Patient-Specific Goal (Individualization)  Outcome: Ongoing, Progressing   Pt reported feeling a little tired today. Fall precaution reviewed with the pt and verbalized she understand .

## 2020-08-11 DIAGNOSIS — C50.111 MALIGNANT NEOPLASM OF CENTRAL PORTION OF RIGHT BREAST IN FEMALE, ESTROGEN RECEPTOR POSITIVE: Primary | ICD-10-CM

## 2020-08-11 DIAGNOSIS — Z17.0 MALIGNANT NEOPLASM OF CENTRAL PORTION OF RIGHT BREAST IN FEMALE, ESTROGEN RECEPTOR POSITIVE: Primary | ICD-10-CM

## 2020-09-10 ENCOUNTER — OFFICE VISIT (OUTPATIENT)
Dept: INTERNAL MEDICINE | Facility: CLINIC | Age: 55
End: 2020-09-10
Payer: COMMERCIAL

## 2020-09-10 VITALS
RESPIRATION RATE: 18 BRPM | WEIGHT: 166.44 LBS | SYSTOLIC BLOOD PRESSURE: 112 MMHG | TEMPERATURE: 98 F | HEART RATE: 70 BPM | BODY MASS INDEX: 26.12 KG/M2 | DIASTOLIC BLOOD PRESSURE: 70 MMHG | HEIGHT: 67 IN

## 2020-09-10 DIAGNOSIS — Z11.4 ENCOUNTER FOR SCREENING FOR HIV: ICD-10-CM

## 2020-09-10 DIAGNOSIS — Z00.00 ROUTINE GENERAL MEDICAL EXAMINATION AT HEALTH CARE FACILITY: ICD-10-CM

## 2020-09-10 DIAGNOSIS — E78.2 MIXED HYPERLIPIDEMIA: ICD-10-CM

## 2020-09-10 DIAGNOSIS — Z86.73 HISTORY OF CVA (CEREBROVASCULAR ACCIDENT): ICD-10-CM

## 2020-09-10 DIAGNOSIS — I69.351 HEMIPARESIS AFFECTING RIGHT SIDE AS LATE EFFECT OF CEREBROVASCULAR ACCIDENT: ICD-10-CM

## 2020-09-10 PROBLEM — M25.532 LEFT WRIST PAIN: Status: RESOLVED | Noted: 2017-10-13 | Resolved: 2020-09-10

## 2020-09-10 PROCEDURE — 99999 PR PBB SHADOW E&M-EST. PATIENT-LVL IV: CPT | Mod: PBBFAC,,, | Performed by: INTERNAL MEDICINE

## 2020-09-10 PROCEDURE — 99999 PR PBB SHADOW E&M-EST. PATIENT-LVL IV: ICD-10-PCS | Mod: PBBFAC,,, | Performed by: INTERNAL MEDICINE

## 2020-09-10 PROCEDURE — 99214 OFFICE O/P EST MOD 30 MIN: CPT | Mod: PBBFAC,PO | Performed by: INTERNAL MEDICINE

## 2020-09-10 PROCEDURE — 99396 PREV VISIT EST AGE 40-64: CPT | Mod: S$PBB,,, | Performed by: INTERNAL MEDICINE

## 2020-09-10 PROCEDURE — 99396 PR PREVENTIVE VISIT,EST,40-64: ICD-10-PCS | Mod: S$PBB,,, | Performed by: INTERNAL MEDICINE

## 2020-09-10 NOTE — PROGRESS NOTES
"Subjective:       Patient ID: Carole Moore is a 55 y.o. female.    Chief Complaint: Pre-op Exam    HPI Patient presents for updated physical exam and review of chronic health issues.  Notes no major changes.  Continues to have relatively dense right hemiparesis.  No new symptoms.  reviewed health maintenance issues.    Review of Systems   Constitutional: Negative for chills and fever.   HENT: Negative for hearing loss.    Eyes: Negative for photophobia and visual disturbance.   Respiratory: Negative for cough, shortness of breath and wheezing.    Cardiovascular: Negative for chest pain and palpitations.   Gastrointestinal: Negative for blood in stool, constipation, nausea and vomiting.   Genitourinary: Negative for dysuria and hematuria.   Musculoskeletal: Negative for neck pain and neck stiffness.   Skin: Negative for rash.   Neurological: Positive for weakness. Negative for syncope, light-headedness and headaches.   Hematological: Negative for adenopathy.   Psychiatric/Behavioral: Negative for dysphoric mood. The patient is not nervous/anxious.        Objective:   /70 (BP Location: Left arm, Patient Position: Sitting)   Pulse 70   Temp 98.4 °F (36.9 °C) (Temporal)   Resp 18   Ht 5' 6.54" (1.69 m)   Wt 75.5 kg (166 lb 7.2 oz)   LMP 10/11/2009   BMI 26.44 kg/m²      Physical Exam  Vitals signs reviewed.   Constitutional:       Appearance: Normal appearance. She is well-developed. She is not ill-appearing.   HENT:      Head: Normocephalic and atraumatic.      Right Ear: Tympanic membrane, ear canal and external ear normal.      Left Ear: Tympanic membrane, ear canal and external ear normal.   Eyes:      Pupils: Pupils are equal, round, and reactive to light.   Neck:      Musculoskeletal: Normal range of motion and neck supple.      Thyroid: No thyromegaly.   Cardiovascular:      Rate and Rhythm: Normal rate and regular rhythm.      Heart sounds: No murmur. No friction rub. No gallop.  "   Pulmonary:      Effort: Pulmonary effort is normal.      Breath sounds: Normal breath sounds. No wheezing or rales.   Chest:      Chest wall: No tenderness.   Abdominal:      General: Abdomen is flat. Bowel sounds are normal. There is no distension.      Palpations: Abdomen is soft. There is no mass.      Tenderness: There is no abdominal tenderness. There is no guarding or rebound.   Lymphadenopathy:      Cervical: No cervical adenopathy.   Skin:     General: Skin is warm and dry.      Findings: No rash.   Neurological:      General: No focal deficit present.      Mental Status: She is alert and oriented to person, place, and time.      Cranial Nerves: No cranial nerve deficit.      Deep Tendon Reflexes: Reflexes are normal and symmetric. Reflexes normal.      Comments: Right sided hemiparesis and hyperreflexia.  Mild contracture in upper extremity   Psychiatric:         Behavior: Behavior normal.         Judgment: Judgment normal.         No visits with results within 2 Week(s) from this visit.   Latest known visit with results is:   Lab Visit on 07/27/2020   Component Date Value    TSH 07/27/2020 0.778     WBC 07/27/2020 4.38     RBC 07/27/2020 4.38     Hemoglobin 07/27/2020 13.2     Hematocrit 07/27/2020 41.2     Mean Corpuscular Volume 07/27/2020 94     Mean Corpuscular Hemoglo* 07/27/2020 30.1     Mean Corpuscular Hemoglo* 07/27/2020 32.0     RDW 07/27/2020 12.0     Platelets 07/27/2020 249     MPV 07/27/2020 10.0     Immature Granulocytes 07/27/2020 0.2     Gran # (ANC) 07/27/2020 2.0     Immature Grans (Abs) 07/27/2020 0.01     Lymph # 07/27/2020 1.8     Mono # 07/27/2020 0.4     Eos # 07/27/2020 0.2     Baso # 07/27/2020 0.03     nRBC 07/27/2020 0     Gran% 07/27/2020 44.8     Lymph% 07/27/2020 40.9     Mono% 07/27/2020 10.0     Eosinophil% 07/27/2020 3.4     Basophil% 07/27/2020 0.7     Differential Method 07/27/2020 Automated     Sodium 07/27/2020 140     Potassium 07/27/2020  3.9     Chloride 07/27/2020 103     CO2 07/27/2020 28     Glucose 07/27/2020 86     BUN, Bld 07/27/2020 14     Creatinine 07/27/2020 0.7     Calcium 07/27/2020 9.5     Anion Gap 07/27/2020 9     eGFR if African American 07/27/2020 >60     eGFR if non African Amer* 07/27/2020 >60        Assessment:       1. Routine general medical examination at health care facility    2. Hemiparesis affecting right side as late effect of cerebrovascular accident    3. Mixed hyperlipidemia    4. History of CVA (cerebrovascular accident)    5. Encounter for screening for HIV        Plan:   No problem-specific Assessment & Plan notes found for this encounter.    Routine general medical examination at health care facility    Hemiparesis affecting right side as late effect of cerebrovascular accident  Comments:  Stable, no intervention necessary, paperwork filled out    Mixed hyperlipidemia  Comments:  Contiue atorvastatin  Orders:  -     CBC auto differential; Future; Expected date: 09/21/2020  -     Comprehensive metabolic panel; Future; Expected date: 09/21/2020  -     Lipid Panel; Future; Expected date: 09/21/2020    History of CVA (cerebrovascular accident)    Encounter for screening for HIV  -     HIV 1/2 Ag/Ab (4th Gen); Future; Expected date: 09/21/2020          Follow up in about 1 year (around 9/10/2021).

## 2020-09-10 NOTE — PATIENT INSTRUCTIONS

## 2020-09-21 ENCOUNTER — IMMUNIZATION (OUTPATIENT)
Dept: INTERNAL MEDICINE | Facility: CLINIC | Age: 55
End: 2020-09-21
Payer: COMMERCIAL

## 2020-09-21 ENCOUNTER — LAB VISIT (OUTPATIENT)
Dept: LAB | Facility: HOSPITAL | Age: 55
End: 2020-09-21
Attending: INTERNAL MEDICINE
Payer: COMMERCIAL

## 2020-09-21 DIAGNOSIS — Z11.4 ENCOUNTER FOR SCREENING FOR HIV: ICD-10-CM

## 2020-09-21 DIAGNOSIS — E78.2 MIXED HYPERLIPIDEMIA: ICD-10-CM

## 2020-09-21 LAB
BASOPHILS # BLD AUTO: 0.04 K/UL (ref 0–0.2)
BASOPHILS NFR BLD: 1 % (ref 0–1.9)
DIFFERENTIAL METHOD: ABNORMAL
EOSINOPHIL # BLD AUTO: 0.1 K/UL (ref 0–0.5)
EOSINOPHIL NFR BLD: 3.1 % (ref 0–8)
ERYTHROCYTE [DISTWIDTH] IN BLOOD BY AUTOMATED COUNT: 12.3 % (ref 11.5–14.5)
HCT VFR BLD AUTO: 42.4 % (ref 37–48.5)
HGB BLD-MCNC: 13.2 G/DL (ref 12–16)
IMM GRANULOCYTES # BLD AUTO: 0.01 K/UL (ref 0–0.04)
IMM GRANULOCYTES NFR BLD AUTO: 0.2 % (ref 0–0.5)
LYMPHOCYTES # BLD AUTO: 1.4 K/UL (ref 1–4.8)
LYMPHOCYTES NFR BLD: 34.4 % (ref 18–48)
MCH RBC QN AUTO: 30.2 PG (ref 27–31)
MCHC RBC AUTO-ENTMCNC: 31.1 G/DL (ref 32–36)
MCV RBC AUTO: 97 FL (ref 82–98)
MONOCYTES # BLD AUTO: 0.4 K/UL (ref 0.3–1)
MONOCYTES NFR BLD: 9.7 % (ref 4–15)
NEUTROPHILS # BLD AUTO: 2.1 K/UL (ref 1.8–7.7)
NEUTROPHILS NFR BLD: 51.6 % (ref 38–73)
NRBC BLD-RTO: 0 /100 WBC
PLATELET # BLD AUTO: 249 K/UL (ref 150–350)
PMV BLD AUTO: 10.3 FL (ref 9.2–12.9)
RBC # BLD AUTO: 4.37 M/UL (ref 4–5.4)
WBC # BLD AUTO: 4.13 K/UL (ref 3.9–12.7)

## 2020-09-21 PROCEDURE — 90471 IMMUNIZATION ADMIN: CPT | Mod: S$GLB,,, | Performed by: INTERNAL MEDICINE

## 2020-09-21 PROCEDURE — 85025 COMPLETE CBC W/AUTO DIFF WBC: CPT

## 2020-09-21 PROCEDURE — 90471 FLU VACCINE (QUAD) GREATER THAN OR EQUAL TO 3YO PRESERVATIVE FREE IM: ICD-10-PCS | Mod: S$GLB,,, | Performed by: INTERNAL MEDICINE

## 2020-09-21 PROCEDURE — 86703 HIV-1/HIV-2 1 RESULT ANTBDY: CPT

## 2020-09-21 PROCEDURE — 90686 IIV4 VACC NO PRSV 0.5 ML IM: CPT | Mod: S$GLB,,, | Performed by: INTERNAL MEDICINE

## 2020-09-21 PROCEDURE — 36415 COLL VENOUS BLD VENIPUNCTURE: CPT | Mod: PO

## 2020-09-21 PROCEDURE — 80061 LIPID PANEL: CPT

## 2020-09-21 PROCEDURE — 80053 COMPREHEN METABOLIC PANEL: CPT

## 2020-09-21 PROCEDURE — 90686 FLU VACCINE (QUAD) GREATER THAN OR EQUAL TO 3YO PRESERVATIVE FREE IM: ICD-10-PCS | Mod: S$GLB,,, | Performed by: INTERNAL MEDICINE

## 2020-09-22 LAB
ALBUMIN SERPL BCP-MCNC: 3.7 G/DL (ref 3.5–5.2)
ALP SERPL-CCNC: 51 U/L (ref 55–135)
ALT SERPL W/O P-5'-P-CCNC: 23 U/L (ref 10–44)
ANION GAP SERPL CALC-SCNC: 9 MMOL/L (ref 8–16)
AST SERPL-CCNC: 18 U/L (ref 10–40)
BILIRUB SERPL-MCNC: 0.8 MG/DL (ref 0.1–1)
BUN SERPL-MCNC: 22 MG/DL (ref 6–20)
CALCIUM SERPL-MCNC: 9.1 MG/DL (ref 8.7–10.5)
CHLORIDE SERPL-SCNC: 108 MMOL/L (ref 95–110)
CHOLEST SERPL-MCNC: 166 MG/DL (ref 120–199)
CHOLEST/HDLC SERPL: 2.6 {RATIO} (ref 2–5)
CO2 SERPL-SCNC: 27 MMOL/L (ref 23–29)
CREAT SERPL-MCNC: 0.7 MG/DL (ref 0.5–1.4)
EST. GFR  (AFRICAN AMERICAN): >60 ML/MIN/1.73 M^2
EST. GFR  (NON AFRICAN AMERICAN): >60 ML/MIN/1.73 M^2
GLUCOSE SERPL-MCNC: 90 MG/DL (ref 70–110)
HDLC SERPL-MCNC: 65 MG/DL (ref 40–75)
HDLC SERPL: 39.2 % (ref 20–50)
HIV 1+2 AB+HIV1 P24 AG SERPL QL IA: NEGATIVE
LDLC SERPL CALC-MCNC: 81.4 MG/DL (ref 63–159)
NONHDLC SERPL-MCNC: 101 MG/DL
POTASSIUM SERPL-SCNC: 4 MMOL/L (ref 3.5–5.1)
PROT SERPL-MCNC: 6.8 G/DL (ref 6–8.4)
SODIUM SERPL-SCNC: 144 MMOL/L (ref 136–145)
TRIGL SERPL-MCNC: 98 MG/DL (ref 30–150)

## 2020-10-26 NOTE — PROGRESS NOTES
Subjective:       Patient ID: Carole Moore is a 55 y.o. female.    Chief Complaint: Follow-up    Collaborating provider is Dr. Harpal Khan    HPI 55-year-old  female who presents to the hematology oncology clinic today for follow-up for right breast carcinoma.  The patient was previously followed in the outpatient  Hem/Onc clinic by Dr. Alessia Moss and Dr. Huitron.    2/2017-Right-sided breast cancer: Stage IIIA invasive lobular carcinoma, ER/[place] positive, [id number] neg. BRCA negative. Given large mass on physical examination, treated with neoadjuvant chemotherapy, using dose-dense AC-T regimen, which patient completed in 2/2017 with decrease in size and firmness of R breast mass. She then underwent R breast mastectomy and SLND 4/2017, with tissue expander placement. Final pathology after neoadjuvant chemotherapy showed tumor 6cm, sentinel LNs positive, and nipple skin positive, we did recommend adjuvant radiation to R chest and axilla. Her case was discussed at tumor board and while axillary LN dissection was discussed, adjuvant radiation to the left chest and axilla were recommended which she has since completed.    Anastrazole 1mg PO daily for adjuvant endocrine therapy was initiated end of July 2017. Pt tolerating without difficulty- hot flashes are getting better- did try ditropan for a short while and did not like the way it made her feel.    Zometa initiated for osteopenia secondary to AI use. Bone density in July 2020 was normal so discontinued- will repeat bone density in July 2021    No dental issues or infections. No healing issues and thinks she told her dentist about the medications she is taking.     PAST MEDICAL HISTORY:   1.  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  Dyslipidemia  3.  Osteopenia  4.  Right breast cancer     SURGICAL HISTORY:   1.  Tonsillectomy  2.  Hysterectomy  3.  Right breast mastectomy with with right  deep axilla sentinel lymph node biopsy followed by breast reconstruction with tissue expander done on April 11, 2017 and reconstruction completed in Dec 2017.  4.  Mediport placement and removal     FAMILY HISTORY: She reports that 2 paternal aunts had breast cancer in their late 50s.  A maternal uncle had lung cancer the age of 54.  He used to smoke cigarettes.  A maternal uncle had melanoma at the age of 49.  Her father had colon cancer at the age of 70.  Her mother had colon cancer in her 70s.  She denies any other immediate family members with cancer or bleeding/clotting disorders.     SOCIAL HISTORY: The reports a 5-pack-year smoking history and quit at the age of 23.  She drinks alcohol socially.  She has never used any recreational drugs.  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  She is  and has 2 children.  She lives in [place], [place].     ALLERGIES: [NKDA]     MEDICATIONS: [Medcard has been reviewed and/or reconciled.]    Review of Systems   Constitutional: Negative.  Negative for appetite change, fatigue, fever and unexpected weight change.        Hot flashes at night- awakening intermittently at 2-3 am- goes to bed at 7:30 pm- has to get up to void at that time and has trouble falling back to sleep- has appt with urology for UTI issues this week so will mention the issue with voiding at night.    HENT: Negative.    Eyes: Negative.    Respiratory: Negative.    Cardiovascular: Negative.    Gastrointestinal: Negative.  Negative for abdominal pain and blood in stool.        No reflux   Endocrine: Negative.    Genitourinary: Negative.  Negative for hematuria.            Musculoskeletal: Negative.    Skin: Negative.    Allergic/Immunologic: Negative.    Neurological: Negative.    Hematological: Negative.  Negative for adenopathy.   Psychiatric/Behavioral: Negative.        Objective:      Physical Exam  Constitutional:       Appearance: She is well-developed.   HENT:       Head: Normocephalic and atraumatic.      Right Ear: External ear normal.      Left Ear: External ear normal.      Mouth/Throat:      Pharynx: No oropharyngeal exudate.   Eyes:      General: No scleral icterus.        Right eye: No discharge.         Left eye: No discharge.      Conjunctiva/sclera: Conjunctivae normal.      Pupils: Pupils are equal, round, and reactive to light.   Neck:      Musculoskeletal: Normal range of motion and neck supple.      Thyroid: No thyromegaly.   Cardiovascular:      Rate and Rhythm: Normal rate and regular rhythm.      Heart sounds: Normal heart sounds.   Pulmonary:      Effort: Pulmonary effort is normal.      Breath sounds: Normal breath sounds.   Chest:      Breasts:         Right: No inverted nipple, mass, nipple discharge, skin change or tenderness.         Left: No inverted nipple, mass, nipple discharge, skin change or tenderness.       Abdominal:      General: Bowel sounds are normal.      Palpations: Abdomen is soft.   Musculoskeletal: Normal range of motion.      Right shoulder: She exhibits no crepitus and normal strength.   Lymphadenopathy:      Head:      Right side of head: No submental, submandibular, tonsillar, preauricular, posterior auricular or occipital adenopathy.      Left side of head: No submental, submandibular, tonsillar, preauricular, posterior auricular or occipital adenopathy.      Cervical: No cervical adenopathy.      Right cervical: No superficial or posterior cervical adenopathy.     Left cervical: No superficial or posterior cervical adenopathy.      Upper Body:      Right upper body: No supraclavicular adenopathy.      Left upper body: No supraclavicular adenopathy.   Skin:     General: Skin is warm and dry.      Coloration: Skin is not pale.      Findings: No erythema or rash.   Neurological:      Mental Status: She is alert and oriented to person, place, and time.      Deep Tendon Reflexes: Reflexes are normal and symmetric.      Comments: Right  upper extremity hemiparesis noted   Psychiatric:         Behavior: Behavior normal.         Thought Content: Thought content normal.         Judgment: Judgment normal.             Mammogram today- wnl  7/28/2020- Bone density - normal- can stop Zometa at this time  Taking Vit D daily- intermittently taking calcium  Pt reports negative genetic testing in 2017    Exercise- rides her bike at least 60 minutes 4-5 days a week.     Assessment:       1. Malignant neoplasm of central portion of right breast in female, estrogen receptor positive    2. Use of aromatase inhibitors    3. Right sided weakness    4. Encounter for follow-up surveillance of breast cancer        Plan:       1.       Right-sided breast cancer: Stage IIIA invasive lobular carcinoma, ER/[place] positive, [id number] neg. BRCA negative. Mammo today- normal  2.       Osteopenia- Zometa 4 mg IV discontinued due to normal bone density- RTC in April for exam cbc and bmp pending from today. Repeat dexa in one year to re-evaluate status since will be on AI for 10 years todal.  3.        Continue Anastrazole 1 mg po daily for adjuvant endocrine therapy. Take calcium and vit D. 7/2022 will make 5 years - would recommend pt stay on for 5 more- 7/2027  4.        Hot flashes- improved.   5.        Pt is exercising for 30-60 minutes most days of the week on her bike- taking her calcium regularly. Taking vit d regularly. Congratulated pt on maintaining her exercise and wt.   6.        Gyn f/u was in Nov- 2019

## 2020-10-27 ENCOUNTER — HOSPITAL ENCOUNTER (OUTPATIENT)
Dept: RADIOLOGY | Facility: HOSPITAL | Age: 55
Discharge: HOME | End: 2020-10-27
Attending: NURSE PRACTITIONER
Payer: COMMERCIAL

## 2020-10-27 ENCOUNTER — OFFICE VISIT (OUTPATIENT)
Dept: HEMATOLOGY/ONCOLOGY | Facility: CLINIC | Age: 55
End: 2020-10-27
Payer: COMMERCIAL

## 2020-10-27 VITALS
WEIGHT: 165.13 LBS | SYSTOLIC BLOOD PRESSURE: 109 MMHG | WEIGHT: 166.44 LBS | HEART RATE: 80 BPM | HEIGHT: 67 IN | HEIGHT: 67 IN | RESPIRATION RATE: 14 BRPM | OXYGEN SATURATION: 98 % | BODY MASS INDEX: 25.92 KG/M2 | DIASTOLIC BLOOD PRESSURE: 71 MMHG | TEMPERATURE: 98 F | BODY MASS INDEX: 26.12 KG/M2

## 2020-10-27 DIAGNOSIS — R53.1 RIGHT SIDED WEAKNESS: ICD-10-CM

## 2020-10-27 DIAGNOSIS — Z79.811 USE OF AROMATASE INHIBITORS: ICD-10-CM

## 2020-10-27 DIAGNOSIS — C50.111 MALIGNANT NEOPLASM OF CENTRAL PORTION OF RIGHT BREAST IN FEMALE, ESTROGEN RECEPTOR POSITIVE: ICD-10-CM

## 2020-10-27 DIAGNOSIS — C50.111 MALIGNANT NEOPLASM OF CENTRAL PORTION OF RIGHT BREAST IN FEMALE, ESTROGEN RECEPTOR POSITIVE: Primary | ICD-10-CM

## 2020-10-27 DIAGNOSIS — Z85.3 ENCOUNTER FOR FOLLOW-UP SURVEILLANCE OF BREAST CANCER: ICD-10-CM

## 2020-10-27 DIAGNOSIS — Z08 ENCOUNTER FOR FOLLOW-UP SURVEILLANCE OF BREAST CANCER: ICD-10-CM

## 2020-10-27 DIAGNOSIS — Z17.0 MALIGNANT NEOPLASM OF CENTRAL PORTION OF RIGHT BREAST IN FEMALE, ESTROGEN RECEPTOR POSITIVE: Primary | ICD-10-CM

## 2020-10-27 DIAGNOSIS — Z17.0 MALIGNANT NEOPLASM OF CENTRAL PORTION OF RIGHT BREAST IN FEMALE, ESTROGEN RECEPTOR POSITIVE: ICD-10-CM

## 2020-10-27 PROCEDURE — 77061 BREAST TOMOSYNTHESIS UNI: CPT | Mod: 26,LT,, | Performed by: RADIOLOGY

## 2020-10-27 PROCEDURE — 99214 OFFICE O/P EST MOD 30 MIN: CPT | Mod: S$GLB,,, | Performed by: NURSE PRACTITIONER

## 2020-10-27 PROCEDURE — 77061 MAMMO DIGITAL DIAGNOSTIC LEFT WITH TOMOSYNTHESIS_CAD: ICD-10-PCS | Mod: 26,LT,, | Performed by: RADIOLOGY

## 2020-10-27 PROCEDURE — 77065 MAMMO DIGITAL DIAGNOSTIC LEFT WITH TOMOSYNTHESIS_CAD: ICD-10-PCS | Mod: 26,LT,, | Performed by: RADIOLOGY

## 2020-10-27 PROCEDURE — 3008F BODY MASS INDEX DOCD: CPT | Mod: CPTII,S$GLB,, | Performed by: NURSE PRACTITIONER

## 2020-10-27 PROCEDURE — 99999 PR PBB SHADOW E&M-EST. PATIENT-LVL III: ICD-10-PCS | Mod: PBBFAC,,, | Performed by: NURSE PRACTITIONER

## 2020-10-27 PROCEDURE — 77065 DX MAMMO INCL CAD UNI: CPT | Mod: TC,LT

## 2020-10-27 PROCEDURE — 99999 PR PBB SHADOW E&M-EST. PATIENT-LVL III: CPT | Mod: PBBFAC,,, | Performed by: NURSE PRACTITIONER

## 2020-10-27 PROCEDURE — 77061 BREAST TOMOSYNTHESIS UNI: CPT | Mod: TC

## 2020-10-27 PROCEDURE — 3008F PR BODY MASS INDEX (BMI) DOCUMENTED: ICD-10-PCS | Mod: CPTII,S$GLB,, | Performed by: NURSE PRACTITIONER

## 2020-10-27 PROCEDURE — 77065 DX MAMMO INCL CAD UNI: CPT | Mod: 26,LT,, | Performed by: RADIOLOGY

## 2020-10-27 PROCEDURE — 99214 PR OFFICE/OUTPT VISIT, EST, LEVL IV, 30-39 MIN: ICD-10-PCS | Mod: S$GLB,,, | Performed by: NURSE PRACTITIONER

## 2020-11-12 ENCOUNTER — PES CALL (OUTPATIENT)
Dept: ADMINISTRATIVE | Facility: CLINIC | Age: 55
End: 2020-11-12

## 2020-12-07 ENCOUNTER — PATIENT OUTREACH (OUTPATIENT)
Dept: ADMINISTRATIVE | Facility: OTHER | Age: 55
End: 2020-12-07

## 2020-12-08 ENCOUNTER — OFFICE VISIT (OUTPATIENT)
Dept: DERMATOLOGY | Facility: CLINIC | Age: 55
End: 2020-12-08
Payer: COMMERCIAL

## 2020-12-08 DIAGNOSIS — D18.00 HEMANGIOMA, UNSPECIFIED SITE: ICD-10-CM

## 2020-12-08 DIAGNOSIS — L98.9 ECZEMATOUS SKIN LESIONS: ICD-10-CM

## 2020-12-08 DIAGNOSIS — L82.1 SEBORRHEIC KERATOSIS: Primary | ICD-10-CM

## 2020-12-08 PROCEDURE — 99214 PR OFFICE/OUTPT VISIT, EST, LEVL IV, 30-39 MIN: ICD-10-PCS | Mod: S$GLB,,, | Performed by: DERMATOLOGY

## 2020-12-08 PROCEDURE — 99999 PR PBB SHADOW E&M-EST. PATIENT-LVL III: ICD-10-PCS | Mod: PBBFAC,,, | Performed by: DERMATOLOGY

## 2020-12-08 PROCEDURE — 99999 PR PBB SHADOW E&M-EST. PATIENT-LVL III: CPT | Mod: PBBFAC,,, | Performed by: DERMATOLOGY

## 2020-12-08 PROCEDURE — 99214 OFFICE O/P EST MOD 30 MIN: CPT | Mod: S$GLB,,, | Performed by: DERMATOLOGY

## 2020-12-08 RX ORDER — FLUOCINONIDE 0.5 MG/G
CREAM TOPICAL 2 TIMES DAILY PRN
Qty: 60 G | Refills: 1 | Status: SHIPPED | OUTPATIENT
Start: 2020-12-08 | End: 2020-12-10

## 2020-12-08 NOTE — PROGRESS NOTES
Subjective:       Patient ID:  Carole Moore is a 55 y.o. female who presents for   Chief Complaint   Patient presents with    Rash     x 3 mo, both Knees, dry, itchy, tx hydrocortisone with no relief     Hx of multiple nevi, fibrous papule of the left nasal sidewall, right breast CA (s/p right mastectomy c/ chemotherapy and radiation, followed by GIULIANO Mendoza), SK's and angioma, last seen on 1/9/20.  Here today for annual skin check with new issue:    History of Present Illness: The patient presents with chief complaint of rash.  Location: left knee  Duration: several months  Signs/Symptoms: pruritus    Prior treatments: HTC 2.5% cream     The patient denies personal or family history of skin cancer.      Review of Systems   Constitutional: Negative for fever and chills.   Gastrointestinal: Negative for nausea and vomiting.   Skin: Positive for itching, rash and dry skin. Negative for daily sunscreen use, activity-related sunscreen use and recent sunburn.   Hematologic/Lymphatic: Does not bruise/bleed easily.        Objective:    Physical Exam   Constitutional: She appears well-developed and well-nourished. No distress.   Neurological: She is alert and oriented to person, place, and time. She is not disoriented.   Psychiatric: She has a normal mood and affect.   Skin:   Areas Examined (abnormalities noted in diagram):   Scalp / Hair Palpated and Inspected  Head / Face Inspection Performed  Neck Inspection Performed  Chest / Axilla Inspection Performed  Abdomen Inspection Performed  Genitals / Buttocks / Groin Inspection Performed  Back Inspection Performed  RUE Inspected  LUE Inspection Performed  RLE Inspected  LLE Inspection Performed  Nails and Digits Inspection Performed              Diagram Legend     Erythematous scaling macule/papule c/w actinic keratosis       Vascular papule c/w angioma      Pigmented verrucoid papule/plaque c/w seborrheic keratosis      Yellow umbilicated papule c/w sebaceous  hyperplasia      Irregularly shaped tan macule c/w lentigo     1-2 mm smooth white papules consistent with Milia      Movable subcutaneous cyst with punctum c/w epidermal inclusion cyst      Subcutaneous movable cyst c/w pilar cyst      Firm pink to brown papule c/w dermatofibroma      Pedunculated fleshy papule(s) c/w skin tag(s)      Evenly pigmented macule c/w junctional nevus     Mildly variegated pigmented, slightly irregular-bordered macule c/w mildly atypical nevus      Flesh colored to evenly pigmented papule c/w intradermal nevus       Pink pearly papule/plaque c/w basal cell carcinoma      Erythematous hyperkeratotic cursted plaque c/w SCC      Surgical scar with no sign of skin cancer recurrence      Open and closed comedones      Inflammatory papules and pustules      Verrucoid papule consistent consistent with wart     Erythematous eczematous patches and plaques     Dystrophic onycholytic nail with subungual debris c/w onychomycosis     Umbilicated papule    Erythematous-base heme-crusted tan verrucoid plaque consistent with inflamed seborrheic keratosis     Erythematous Silvery Scaling Plaque c/w Psoriasis     See annotation      Assessment / Plan:        Seborrheic keratosis  Hemangioma, unspecified site  Reassurance given.  Lesions are benign.    Eczematous skin lesions  -     fluocinonide 0.05% (LIDEX) 0.05 % cream; Apply topically 2 (two) times daily as needed.  Dispense: 60 g; Refill: 1  -     Of knees and buttocks, possible contact allergy to detergent vs. Other.  Discussed sensitive skin care. The patient acknowledged understanding. Consider stronger steroid if fails to improve.            Follow up in about 1 year (around 12/8/2021).

## 2020-12-09 ENCOUNTER — PATIENT MESSAGE (OUTPATIENT)
Dept: DERMATOLOGY | Facility: CLINIC | Age: 55
End: 2020-12-09

## 2020-12-10 ENCOUNTER — PATIENT MESSAGE (OUTPATIENT)
Dept: DERMATOLOGY | Facility: CLINIC | Age: 55
End: 2020-12-10

## 2020-12-10 DIAGNOSIS — L98.9 ECZEMATOUS SKIN LESIONS: ICD-10-CM

## 2020-12-10 DIAGNOSIS — L98.9 ECZEMATOUS SKIN LESIONS: Primary | ICD-10-CM

## 2020-12-10 RX ORDER — MOMETASONE FUROATE 1 MG/G
CREAM TOPICAL
Qty: 45 G | Refills: 1 | Status: SHIPPED | OUTPATIENT
Start: 2020-12-10 | End: 2022-10-05

## 2020-12-10 RX ORDER — FLUOCINONIDE 0.5 MG/G
CREAM TOPICAL 2 TIMES DAILY PRN
Qty: 60 G | Refills: 1 | Status: SHIPPED | OUTPATIENT
Start: 2020-12-10 | End: 2022-10-05

## 2021-01-08 ENCOUNTER — OFFICE VISIT (OUTPATIENT)
Dept: OBSTETRICS AND GYNECOLOGY | Facility: CLINIC | Age: 56
End: 2021-01-08
Payer: COMMERCIAL

## 2021-01-08 VITALS
BODY MASS INDEX: 26.24 KG/M2 | SYSTOLIC BLOOD PRESSURE: 112 MMHG | WEIGHT: 167.56 LBS | DIASTOLIC BLOOD PRESSURE: 68 MMHG

## 2021-01-08 DIAGNOSIS — N95.1 VAGINAL DRYNESS, MENOPAUSAL: ICD-10-CM

## 2021-01-08 DIAGNOSIS — C50.911 MALIGNANT NEOPLASM OF RIGHT FEMALE BREAST, UNSPECIFIED ESTROGEN RECEPTOR STATUS, UNSPECIFIED SITE OF BREAST: ICD-10-CM

## 2021-01-08 DIAGNOSIS — Z01.419 ENCOUNTER FOR GYNECOLOGICAL EXAMINATION WITHOUT ABNORMAL FINDING: Primary | ICD-10-CM

## 2021-01-08 PROCEDURE — 99396 PR PREVENTIVE VISIT,EST,40-64: ICD-10-PCS | Mod: S$GLB,,, | Performed by: OBSTETRICS & GYNECOLOGY

## 2021-01-08 PROCEDURE — 99999 PR PBB SHADOW E&M-EST. PATIENT-LVL II: CPT | Mod: PBBFAC,,, | Performed by: OBSTETRICS & GYNECOLOGY

## 2021-01-08 PROCEDURE — 99396 PREV VISIT EST AGE 40-64: CPT | Mod: S$GLB,,, | Performed by: OBSTETRICS & GYNECOLOGY

## 2021-01-08 PROCEDURE — 99212 OFFICE O/P EST SF 10 MIN: CPT | Mod: PBBFAC | Performed by: OBSTETRICS & GYNECOLOGY

## 2021-01-08 PROCEDURE — 99999 PR PBB SHADOW E&M-EST. PATIENT-LVL II: ICD-10-PCS | Mod: PBBFAC,,, | Performed by: OBSTETRICS & GYNECOLOGY

## 2021-01-08 RX ORDER — ESTRADIOL 0.1 MG/G
1 CREAM VAGINAL DAILY
Qty: 42.5 G | Refills: 3 | Status: SHIPPED | OUTPATIENT
Start: 2021-01-08 | End: 2021-09-15

## 2021-01-29 ENCOUNTER — PATIENT MESSAGE (OUTPATIENT)
Dept: CARDIOLOGY | Facility: CLINIC | Age: 56
End: 2021-01-29

## 2021-01-29 ENCOUNTER — PATIENT MESSAGE (OUTPATIENT)
Dept: INTERNAL MEDICINE | Facility: CLINIC | Age: 56
End: 2021-01-29

## 2021-02-03 ENCOUNTER — OFFICE VISIT (OUTPATIENT)
Dept: INTERNAL MEDICINE | Facility: CLINIC | Age: 56
End: 2021-02-03
Payer: COMMERCIAL

## 2021-02-03 VITALS
HEIGHT: 67 IN | SYSTOLIC BLOOD PRESSURE: 110 MMHG | WEIGHT: 162.06 LBS | BODY MASS INDEX: 25.44 KG/M2 | HEART RATE: 76 BPM | DIASTOLIC BLOOD PRESSURE: 72 MMHG | TEMPERATURE: 98 F

## 2021-02-03 DIAGNOSIS — J01.10 ACUTE NON-RECURRENT FRONTAL SINUSITIS: ICD-10-CM

## 2021-02-03 DIAGNOSIS — Z91.89 HEART FAILURE, ACC/AHA STAGE A: Primary | ICD-10-CM

## 2021-02-03 DIAGNOSIS — R39.9 UTI SYMPTOMS: Primary | ICD-10-CM

## 2021-02-03 LAB
BILIRUB SERPL-MCNC: NORMAL MG/DL
BLOOD URINE, POC: 10
CLARITY, POC UA: CLEAR
COLOR, POC UA: YELLOW
GLUCOSE UR QL STRIP: NORMAL
KETONES UR QL STRIP: NORMAL
LEUKOCYTE ESTERASE URINE, POC: NORMAL
NITRITE, POC UA: NORMAL
PH, POC UA: 5
PROTEIN, POC: NORMAL
SPECIFIC GRAVITY, POC UA: 1.01
UROBILINOGEN, POC UA: NORMAL

## 2021-02-03 PROCEDURE — 81002 URINALYSIS NONAUTO W/O SCOPE: CPT | Mod: S$GLB,,, | Performed by: PHYSICIAN ASSISTANT

## 2021-02-03 PROCEDURE — 81002 POCT URINE DIPSTICK WITHOUT MICROSCOPE: ICD-10-PCS | Mod: S$GLB,,, | Performed by: PHYSICIAN ASSISTANT

## 2021-02-03 PROCEDURE — 81001 URINALYSIS AUTO W/SCOPE: CPT

## 2021-02-03 PROCEDURE — 3008F BODY MASS INDEX DOCD: CPT | Mod: CPTII,S$GLB,, | Performed by: PHYSICIAN ASSISTANT

## 2021-02-03 PROCEDURE — 99999 PR PBB SHADOW E&M-EST. PATIENT-LVL IV: CPT | Mod: PBBFAC,,, | Performed by: PHYSICIAN ASSISTANT

## 2021-02-03 PROCEDURE — 1126F PR PAIN SEVERITY QUANTIFIED, NO PAIN PRESENT: ICD-10-PCS | Mod: S$GLB,,, | Performed by: PHYSICIAN ASSISTANT

## 2021-02-03 PROCEDURE — 99213 PR OFFICE/OUTPT VISIT, EST, LEVL III, 20-29 MIN: ICD-10-PCS | Mod: 25,S$GLB,, | Performed by: PHYSICIAN ASSISTANT

## 2021-02-03 PROCEDURE — 99999 PR PBB SHADOW E&M-EST. PATIENT-LVL IV: ICD-10-PCS | Mod: PBBFAC,,, | Performed by: PHYSICIAN ASSISTANT

## 2021-02-03 PROCEDURE — 1126F AMNT PAIN NOTED NONE PRSNT: CPT | Mod: S$GLB,,, | Performed by: PHYSICIAN ASSISTANT

## 2021-02-03 PROCEDURE — 3008F PR BODY MASS INDEX (BMI) DOCUMENTED: ICD-10-PCS | Mod: CPTII,S$GLB,, | Performed by: PHYSICIAN ASSISTANT

## 2021-02-03 PROCEDURE — 99213 OFFICE O/P EST LOW 20 MIN: CPT | Mod: 25,S$GLB,, | Performed by: PHYSICIAN ASSISTANT

## 2021-02-03 RX ORDER — SULFAMETHOXAZOLE AND TRIMETHOPRIM 400; 80 MG/1; MG/1
1 TABLET ORAL 2 TIMES DAILY
Qty: 14 TABLET | Refills: 0 | Status: SHIPPED | OUTPATIENT
Start: 2021-02-03 | End: 2021-02-10

## 2021-02-04 LAB
BACTERIA #/AREA URNS AUTO: NORMAL /HPF
BILIRUB UR QL STRIP: NEGATIVE
CLARITY UR REFRACT.AUTO: CLEAR
COLOR UR AUTO: YELLOW
GLUCOSE UR QL STRIP: NEGATIVE
HGB UR QL STRIP: NEGATIVE
KETONES UR QL STRIP: NEGATIVE
LEUKOCYTE ESTERASE UR QL STRIP: ABNORMAL
MICROSCOPIC COMMENT: NORMAL
NITRITE UR QL STRIP: NEGATIVE
PH UR STRIP: 5 [PH] (ref 5–8)
PROT UR QL STRIP: NEGATIVE
RBC #/AREA URNS AUTO: 1 /HPF (ref 0–4)
SP GR UR STRIP: 1.01 (ref 1–1.03)
SQUAMOUS #/AREA URNS AUTO: 2 /HPF
URN SPEC COLLECT METH UR: ABNORMAL
WBC #/AREA URNS AUTO: 1 /HPF (ref 0–5)

## 2021-02-23 ENCOUNTER — HOSPITAL ENCOUNTER (OUTPATIENT)
Dept: CARDIOLOGY | Facility: HOSPITAL | Age: 56
Discharge: HOME | End: 2021-02-23
Attending: NUCLEAR MEDICINE
Payer: COMMERCIAL

## 2021-02-23 ENCOUNTER — OFFICE VISIT (OUTPATIENT)
Dept: TRANSPLANT | Facility: CLINIC | Age: 56
End: 2021-02-23
Payer: COMMERCIAL

## 2021-02-23 VITALS
BODY MASS INDEX: 26.24 KG/M2 | HEART RATE: 83 BPM | DIASTOLIC BLOOD PRESSURE: 70 MMHG | SYSTOLIC BLOOD PRESSURE: 120 MMHG | OXYGEN SATURATION: 99 % | WEIGHT: 167.56 LBS

## 2021-02-23 DIAGNOSIS — Z91.89 HEART FAILURE, ACC/AHA STAGE A: ICD-10-CM

## 2021-02-23 DIAGNOSIS — E78.2 MIXED HYPERLIPIDEMIA: ICD-10-CM

## 2021-02-23 DIAGNOSIS — Z86.73 HISTORY OF CVA (CEREBROVASCULAR ACCIDENT): ICD-10-CM

## 2021-02-23 DIAGNOSIS — Z91.89 HEART FAILURE, ACC/AHA STAGE A: Primary | ICD-10-CM

## 2021-02-23 PROCEDURE — 93010 EKG 12-LEAD: ICD-10-PCS | Mod: ,,, | Performed by: INTERNAL MEDICINE

## 2021-02-23 PROCEDURE — 99214 OFFICE O/P EST MOD 30 MIN: CPT | Mod: S$GLB,,, | Performed by: NUCLEAR MEDICINE

## 2021-02-23 PROCEDURE — 99999 PR PBB SHADOW E&M-EST. PATIENT-LVL III: ICD-10-PCS | Mod: PBBFAC,,, | Performed by: NUCLEAR MEDICINE

## 2021-02-23 PROCEDURE — 93010 ELECTROCARDIOGRAM REPORT: CPT | Mod: ,,, | Performed by: INTERNAL MEDICINE

## 2021-02-23 PROCEDURE — 99214 PR OFFICE/OUTPT VISIT, EST, LEVL IV, 30-39 MIN: ICD-10-PCS | Mod: S$GLB,,, | Performed by: NUCLEAR MEDICINE

## 2021-02-23 PROCEDURE — 99999 PR PBB SHADOW E&M-EST. PATIENT-LVL III: CPT | Mod: PBBFAC,,, | Performed by: NUCLEAR MEDICINE

## 2021-02-23 PROCEDURE — 93005 ELECTROCARDIOGRAM TRACING: CPT

## 2021-02-23 PROCEDURE — 3008F BODY MASS INDEX DOCD: CPT | Mod: CPTII,S$GLB,, | Performed by: NUCLEAR MEDICINE

## 2021-02-23 PROCEDURE — 3008F PR BODY MASS INDEX (BMI) DOCUMENTED: ICD-10-PCS | Mod: CPTII,S$GLB,, | Performed by: NUCLEAR MEDICINE

## 2021-04-28 ENCOUNTER — TELEPHONE (OUTPATIENT)
Dept: PHYSICAL MEDICINE AND REHAB | Facility: CLINIC | Age: 56
End: 2021-04-28

## 2021-04-28 ENCOUNTER — LAB VISIT (OUTPATIENT)
Dept: LAB | Facility: HOSPITAL | Age: 56
End: 2021-04-28
Attending: INTERNAL MEDICINE
Payer: COMMERCIAL

## 2021-04-28 ENCOUNTER — OFFICE VISIT (OUTPATIENT)
Dept: HEMATOLOGY/ONCOLOGY | Facility: CLINIC | Age: 56
End: 2021-04-28
Payer: COMMERCIAL

## 2021-04-28 VITALS
RESPIRATION RATE: 14 BRPM | OXYGEN SATURATION: 95 % | HEIGHT: 67 IN | HEART RATE: 74 BPM | BODY MASS INDEX: 25.15 KG/M2 | DIASTOLIC BLOOD PRESSURE: 64 MMHG | WEIGHT: 160.25 LBS | TEMPERATURE: 98 F | SYSTOLIC BLOOD PRESSURE: 95 MMHG

## 2021-04-28 DIAGNOSIS — I69.351 HEMIPARESIS AFFECTING RIGHT SIDE AS LATE EFFECT OF CEREBROVASCULAR ACCIDENT: ICD-10-CM

## 2021-04-28 DIAGNOSIS — Z79.899 ENCOUNTER FOR MONITORING ADJUVANT HORMONAL THERAPY: ICD-10-CM

## 2021-04-28 DIAGNOSIS — Z71.89 ENCOUNTER FOR MEDICATION COUNSELING: ICD-10-CM

## 2021-04-28 DIAGNOSIS — Z71.89 COUNSELING AND COORDINATION OF CARE: ICD-10-CM

## 2021-04-28 DIAGNOSIS — Z71.89 COUNSELING ON HEALTH PROMOTION AND DISEASE PREVENTION: ICD-10-CM

## 2021-04-28 DIAGNOSIS — Z79.811 USE OF AROMATASE INHIBITORS: Primary | ICD-10-CM

## 2021-04-28 DIAGNOSIS — Z17.0 MALIGNANT NEOPLASM OF CENTRAL PORTION OF RIGHT BREAST IN FEMALE, ESTROGEN RECEPTOR POSITIVE: ICD-10-CM

## 2021-04-28 DIAGNOSIS — Z80.3 FAMILY HISTORY OF BREAST CANCER: ICD-10-CM

## 2021-04-28 DIAGNOSIS — Z12.31 ENCOUNTER FOR SCREENING MAMMOGRAM FOR BREAST CANCER: ICD-10-CM

## 2021-04-28 DIAGNOSIS — Z51.81 ENCOUNTER FOR MONITORING ADJUVANT HORMONAL THERAPY: ICD-10-CM

## 2021-04-28 DIAGNOSIS — M62.830 MUSCLE SPASM OF BACK: ICD-10-CM

## 2021-04-28 DIAGNOSIS — R92.30 DENSE BREAST TISSUE ON MAMMOGRAM: ICD-10-CM

## 2021-04-28 DIAGNOSIS — T45.1X5A HOT FLASHES RELATED TO AROMATASE INHIBITOR THERAPY: ICD-10-CM

## 2021-04-28 DIAGNOSIS — R23.2 HOT FLASHES RELATED TO AROMATASE INHIBITOR THERAPY: ICD-10-CM

## 2021-04-28 DIAGNOSIS — Z86.73 HISTORY OF CVA (CEREBROVASCULAR ACCIDENT): ICD-10-CM

## 2021-04-28 DIAGNOSIS — Z12.31 ENCOUNTER FOR SCREENING MAMMOGRAM FOR MALIGNANT NEOPLASM OF BREAST: ICD-10-CM

## 2021-04-28 DIAGNOSIS — C50.111 MALIGNANT NEOPLASM OF CENTRAL PORTION OF RIGHT BREAST IN FEMALE, ESTROGEN RECEPTOR POSITIVE: ICD-10-CM

## 2021-04-28 DIAGNOSIS — Z08 ENCOUNTER FOR FOLLOW-UP SURVEILLANCE OF BREAST CANCER: ICD-10-CM

## 2021-04-28 DIAGNOSIS — Z85.3 ENCOUNTER FOR FOLLOW-UP SURVEILLANCE OF BREAST CANCER: ICD-10-CM

## 2021-04-28 DIAGNOSIS — Z79.811 USE OF AROMATASE INHIBITORS: ICD-10-CM

## 2021-04-28 DIAGNOSIS — Z71.9 HEALTH EDUCATION/COUNSELING: ICD-10-CM

## 2021-04-28 LAB
ALBUMIN SERPL BCP-MCNC: 3.8 G/DL (ref 3.5–5.2)
ALP SERPL-CCNC: 60 U/L (ref 55–135)
ALT SERPL W/O P-5'-P-CCNC: 25 U/L (ref 10–44)
ANION GAP SERPL CALC-SCNC: 9 MMOL/L (ref 8–16)
AST SERPL-CCNC: 21 U/L (ref 10–40)
BASOPHILS # BLD AUTO: 0.03 K/UL (ref 0–0.2)
BASOPHILS NFR BLD: 0.7 % (ref 0–1.9)
BILIRUB SERPL-MCNC: 1 MG/DL (ref 0.1–1)
BUN SERPL-MCNC: 21 MG/DL (ref 6–20)
CALCIUM SERPL-MCNC: 9.4 MG/DL (ref 8.7–10.5)
CHLORIDE SERPL-SCNC: 103 MMOL/L (ref 95–110)
CO2 SERPL-SCNC: 27 MMOL/L (ref 23–29)
CREAT SERPL-MCNC: 0.8 MG/DL (ref 0.5–1.4)
DIFFERENTIAL METHOD: NORMAL
EOSINOPHIL # BLD AUTO: 0.1 K/UL (ref 0–0.5)
EOSINOPHIL NFR BLD: 2.7 % (ref 0–8)
ERYTHROCYTE [DISTWIDTH] IN BLOOD BY AUTOMATED COUNT: 12 % (ref 11.5–14.5)
EST. GFR  (AFRICAN AMERICAN): >60 ML/MIN/1.73 M^2
EST. GFR  (NON AFRICAN AMERICAN): >60 ML/MIN/1.73 M^2
GLUCOSE SERPL-MCNC: 97 MG/DL (ref 70–110)
HCT VFR BLD AUTO: 38.4 % (ref 37–48.5)
HGB BLD-MCNC: 13 G/DL (ref 12–16)
IMM GRANULOCYTES # BLD AUTO: 0.01 K/UL (ref 0–0.04)
IMM GRANULOCYTES NFR BLD AUTO: 0.2 % (ref 0–0.5)
LYMPHOCYTES # BLD AUTO: 1.9 K/UL (ref 1–4.8)
LYMPHOCYTES NFR BLD: 41.9 % (ref 18–48)
MCH RBC QN AUTO: 30.8 PG (ref 27–31)
MCHC RBC AUTO-ENTMCNC: 33.9 G/DL (ref 32–36)
MCV RBC AUTO: 91 FL (ref 82–98)
MONOCYTES # BLD AUTO: 0.4 K/UL (ref 0.3–1)
MONOCYTES NFR BLD: 8.5 % (ref 4–15)
NEUTROPHILS # BLD AUTO: 2.1 K/UL (ref 1.8–7.7)
NEUTROPHILS NFR BLD: 46 % (ref 38–73)
NRBC BLD-RTO: 0 /100 WBC
PLATELET # BLD AUTO: 230 K/UL (ref 150–450)
PMV BLD AUTO: 9.7 FL (ref 9.2–12.9)
POTASSIUM SERPL-SCNC: 4.4 MMOL/L (ref 3.5–5.1)
PROT SERPL-MCNC: 6.8 G/DL (ref 6–8.4)
RBC # BLD AUTO: 4.22 M/UL (ref 4–5.4)
SODIUM SERPL-SCNC: 139 MMOL/L (ref 136–145)
WBC # BLD AUTO: 4.46 K/UL (ref 3.9–12.7)

## 2021-04-28 PROCEDURE — 85025 COMPLETE CBC W/AUTO DIFF WBC: CPT | Performed by: NURSE PRACTITIONER

## 2021-04-28 PROCEDURE — 99214 OFFICE O/P EST MOD 30 MIN: CPT | Mod: S$GLB,,, | Performed by: NURSE PRACTITIONER

## 2021-04-28 PROCEDURE — 99999 PR PBB SHADOW E&M-EST. PATIENT-LVL V: CPT | Mod: PBBFAC,,, | Performed by: NURSE PRACTITIONER

## 2021-04-28 PROCEDURE — 3008F PR BODY MASS INDEX (BMI) DOCUMENTED: ICD-10-PCS | Mod: CPTII,S$GLB,, | Performed by: NURSE PRACTITIONER

## 2021-04-28 PROCEDURE — 99214 PR OFFICE/OUTPT VISIT, EST, LEVL IV, 30-39 MIN: ICD-10-PCS | Mod: S$GLB,,, | Performed by: NURSE PRACTITIONER

## 2021-04-28 PROCEDURE — 99999 PR PBB SHADOW E&M-EST. PATIENT-LVL V: ICD-10-PCS | Mod: PBBFAC,,, | Performed by: NURSE PRACTITIONER

## 2021-04-28 PROCEDURE — 1126F AMNT PAIN NOTED NONE PRSNT: CPT | Mod: S$GLB,,, | Performed by: NURSE PRACTITIONER

## 2021-04-28 PROCEDURE — 80053 COMPREHEN METABOLIC PANEL: CPT | Performed by: NURSE PRACTITIONER

## 2021-04-28 PROCEDURE — 3008F BODY MASS INDEX DOCD: CPT | Mod: CPTII,S$GLB,, | Performed by: NURSE PRACTITIONER

## 2021-04-28 PROCEDURE — 1126F PR PAIN SEVERITY QUANTIFIED, NO PAIN PRESENT: ICD-10-PCS | Mod: S$GLB,,, | Performed by: NURSE PRACTITIONER

## 2021-04-28 PROCEDURE — 36415 COLL VENOUS BLD VENIPUNCTURE: CPT | Performed by: NURSE PRACTITIONER

## 2021-04-28 RX ORDER — TIZANIDINE 2 MG/1
2 TABLET ORAL NIGHTLY PRN
Qty: 90 TABLET | Refills: 1 | Status: SHIPPED | OUTPATIENT
Start: 2021-04-28 | End: 2021-05-10

## 2021-04-28 RX ORDER — VENLAFAXINE HYDROCHLORIDE 37.5 MG/1
37.5 CAPSULE, EXTENDED RELEASE ORAL DAILY
Qty: 30 CAPSULE | Refills: 11 | Status: SHIPPED | OUTPATIENT
Start: 2021-04-28 | End: 2021-09-15

## 2021-05-08 ENCOUNTER — PATIENT OUTREACH (OUTPATIENT)
Dept: ADMINISTRATIVE | Facility: OTHER | Age: 56
End: 2021-05-08

## 2021-05-10 ENCOUNTER — OFFICE VISIT (OUTPATIENT)
Dept: PHYSICAL MEDICINE AND REHAB | Facility: CLINIC | Age: 56
End: 2021-05-10
Payer: COMMERCIAL

## 2021-05-10 VITALS
WEIGHT: 160 LBS | HEIGHT: 67 IN | BODY MASS INDEX: 25.11 KG/M2 | SYSTOLIC BLOOD PRESSURE: 112 MMHG | DIASTOLIC BLOOD PRESSURE: 66 MMHG | HEART RATE: 68 BPM

## 2021-05-10 DIAGNOSIS — I69.351 HEMIPARESIS AFFECTING RIGHT SIDE AS LATE EFFECT OF CEREBROVASCULAR ACCIDENT: ICD-10-CM

## 2021-05-10 DIAGNOSIS — M62.830 MUSCLE SPASM OF BACK: ICD-10-CM

## 2021-05-10 PROCEDURE — 99999 PR PBB SHADOW E&M-EST. PATIENT-LVL IV: CPT | Mod: PBBFAC,,, | Performed by: PHYSICAL MEDICINE & REHABILITATION

## 2021-05-10 PROCEDURE — 1125F PR PAIN SEVERITY QUANTIFIED, PAIN PRESENT: ICD-10-PCS | Mod: S$GLB,,, | Performed by: PHYSICAL MEDICINE & REHABILITATION

## 2021-05-10 PROCEDURE — 99203 PR OFFICE/OUTPT VISIT, NEW, LEVL III, 30-44 MIN: ICD-10-PCS | Mod: S$GLB,,, | Performed by: PHYSICAL MEDICINE & REHABILITATION

## 2021-05-10 PROCEDURE — 1125F AMNT PAIN NOTED PAIN PRSNT: CPT | Mod: S$GLB,,, | Performed by: PHYSICAL MEDICINE & REHABILITATION

## 2021-05-10 PROCEDURE — 99203 OFFICE O/P NEW LOW 30 MIN: CPT | Mod: S$GLB,,, | Performed by: PHYSICAL MEDICINE & REHABILITATION

## 2021-05-10 PROCEDURE — 3008F PR BODY MASS INDEX (BMI) DOCUMENTED: ICD-10-PCS | Mod: CPTII,S$GLB,, | Performed by: PHYSICAL MEDICINE & REHABILITATION

## 2021-05-10 PROCEDURE — 99999 PR PBB SHADOW E&M-EST. PATIENT-LVL IV: ICD-10-PCS | Mod: PBBFAC,,, | Performed by: PHYSICAL MEDICINE & REHABILITATION

## 2021-05-10 PROCEDURE — 3008F BODY MASS INDEX DOCD: CPT | Mod: CPTII,S$GLB,, | Performed by: PHYSICAL MEDICINE & REHABILITATION

## 2021-05-10 RX ORDER — BACLOFEN 10 MG/1
10 TABLET ORAL NIGHTLY
Qty: 60 TABLET | Refills: 1 | Status: SHIPPED | OUTPATIENT
Start: 2021-05-10 | End: 2022-09-19

## 2021-06-11 ENCOUNTER — PES CALL (OUTPATIENT)
Dept: ADMINISTRATIVE | Facility: CLINIC | Age: 56
End: 2021-06-11

## 2021-06-23 ENCOUNTER — DOCUMENTATION ONLY (OUTPATIENT)
Dept: SURGERY | Facility: CLINIC | Age: 56
End: 2021-06-23

## 2021-06-23 RX ORDER — OXYBUTYNIN CHLORIDE 5 MG/1
5 TABLET ORAL 2 TIMES DAILY
Qty: 60 TABLET | Refills: 11 | Status: SHIPPED | OUTPATIENT
Start: 2021-06-23 | End: 2021-07-20 | Stop reason: SDUPTHER

## 2021-07-20 DIAGNOSIS — Z79.811 USE OF AROMATASE INHIBITORS: Primary | ICD-10-CM

## 2021-07-20 DIAGNOSIS — T45.1X5A HOT FLASHES RELATED TO AROMATASE INHIBITOR THERAPY: ICD-10-CM

## 2021-07-20 DIAGNOSIS — R23.2 HOT FLASHES RELATED TO AROMATASE INHIBITOR THERAPY: ICD-10-CM

## 2021-07-20 RX ORDER — OXYBUTYNIN CHLORIDE 5 MG/1
5 TABLET ORAL 2 TIMES DAILY
Qty: 60 TABLET | Refills: 11 | Status: SHIPPED | OUTPATIENT
Start: 2021-07-20 | End: 2021-10-21

## 2021-07-26 ENCOUNTER — OFFICE VISIT (OUTPATIENT)
Dept: INTERNAL MEDICINE | Facility: CLINIC | Age: 56
End: 2021-07-26
Payer: COMMERCIAL

## 2021-07-26 ENCOUNTER — TELEPHONE (OUTPATIENT)
Dept: HEMATOLOGY/ONCOLOGY | Facility: CLINIC | Age: 56
End: 2021-07-26

## 2021-07-26 ENCOUNTER — PATIENT MESSAGE (OUTPATIENT)
Dept: INTERNAL MEDICINE | Facility: CLINIC | Age: 56
End: 2021-07-26

## 2021-07-26 VITALS
TEMPERATURE: 98 F | HEIGHT: 67 IN | OXYGEN SATURATION: 97 % | BODY MASS INDEX: 25.44 KG/M2 | DIASTOLIC BLOOD PRESSURE: 70 MMHG | RESPIRATION RATE: 16 BRPM | WEIGHT: 162.06 LBS | SYSTOLIC BLOOD PRESSURE: 108 MMHG | HEART RATE: 88 BPM

## 2021-07-26 DIAGNOSIS — M85.89 OSTEOPENIA OF MULTIPLE SITES: ICD-10-CM

## 2021-07-26 DIAGNOSIS — Z00.00 ENCOUNTER FOR PREVENTIVE HEALTH EXAMINATION: Primary | ICD-10-CM

## 2021-07-26 DIAGNOSIS — C50.111 MALIGNANT NEOPLASM OF CENTRAL PORTION OF RIGHT BREAST IN FEMALE, ESTROGEN RECEPTOR POSITIVE: ICD-10-CM

## 2021-07-26 DIAGNOSIS — E78.2 MIXED HYPERLIPIDEMIA: ICD-10-CM

## 2021-07-26 DIAGNOSIS — Z79.811 USE OF AROMATASE INHIBITORS: ICD-10-CM

## 2021-07-26 DIAGNOSIS — Z17.0 MALIGNANT NEOPLASM OF CENTRAL PORTION OF RIGHT BREAST IN FEMALE, ESTROGEN RECEPTOR POSITIVE: ICD-10-CM

## 2021-07-26 DIAGNOSIS — Z86.73 HISTORY OF CVA (CEREBROVASCULAR ACCIDENT): ICD-10-CM

## 2021-07-26 DIAGNOSIS — I69.351 HEMIPARESIS AFFECTING RIGHT SIDE AS LATE EFFECT OF CEREBROVASCULAR ACCIDENT: ICD-10-CM

## 2021-07-26 DIAGNOSIS — Z80.0 FAMILY HISTORY OF COLON CANCER: ICD-10-CM

## 2021-07-26 DIAGNOSIS — Z91.89 HEART FAILURE, ACC/AHA STAGE A: ICD-10-CM

## 2021-07-26 PROBLEM — J40 BRONCHITIS: Status: RESOLVED | Noted: 2020-01-21 | Resolved: 2021-07-26

## 2021-07-26 PROCEDURE — 99999 PR PBB SHADOW E&M-EST. PATIENT-LVL V: CPT | Mod: PBBFAC,,, | Performed by: NURSE PRACTITIONER

## 2021-07-26 PROCEDURE — 99999 PR PBB SHADOW E&M-EST. PATIENT-LVL V: ICD-10-PCS | Mod: PBBFAC,,, | Performed by: NURSE PRACTITIONER

## 2021-07-26 PROCEDURE — G0439 PR MEDICARE ANNUAL WELLNESS SUBSEQUENT VISIT: ICD-10-PCS | Mod: S$GLB,,, | Performed by: NURSE PRACTITIONER

## 2021-07-26 PROCEDURE — 99215 OFFICE O/P EST HI 40 MIN: CPT | Mod: PBBFAC | Performed by: NURSE PRACTITIONER

## 2021-07-26 PROCEDURE — G0439 PPPS, SUBSEQ VISIT: HCPCS | Mod: S$GLB,,, | Performed by: NURSE PRACTITIONER

## 2021-09-12 ENCOUNTER — PATIENT MESSAGE (OUTPATIENT)
Dept: INTERNAL MEDICINE | Facility: CLINIC | Age: 56
End: 2021-09-12

## 2021-09-12 DIAGNOSIS — Z00.00 ENCOUNTER FOR PREVENTIVE HEALTH EXAMINATION: Primary | ICD-10-CM

## 2021-09-13 ENCOUNTER — PATIENT MESSAGE (OUTPATIENT)
Dept: INTERNAL MEDICINE | Facility: CLINIC | Age: 56
End: 2021-09-13

## 2021-09-13 DIAGNOSIS — E78.2 MIXED HYPERLIPIDEMIA: ICD-10-CM

## 2021-09-13 DIAGNOSIS — I69.351 HEMIPARESIS AFFECTING RIGHT SIDE AS LATE EFFECT OF CEREBROVASCULAR ACCIDENT: Primary | ICD-10-CM

## 2021-09-15 ENCOUNTER — OFFICE VISIT (OUTPATIENT)
Dept: INTERNAL MEDICINE | Facility: CLINIC | Age: 56
End: 2021-09-15
Payer: MEDICARE

## 2021-09-15 ENCOUNTER — LAB VISIT (OUTPATIENT)
Dept: LAB | Facility: HOSPITAL | Age: 56
End: 2021-09-15
Attending: INTERNAL MEDICINE
Payer: MEDICARE

## 2021-09-15 VITALS
DIASTOLIC BLOOD PRESSURE: 80 MMHG | HEART RATE: 68 BPM | TEMPERATURE: 97 F | SYSTOLIC BLOOD PRESSURE: 122 MMHG | HEIGHT: 67 IN | WEIGHT: 163.38 LBS | BODY MASS INDEX: 25.64 KG/M2

## 2021-09-15 DIAGNOSIS — I69.351 HEMIPARESIS AFFECTING RIGHT SIDE AS LATE EFFECT OF CEREBROVASCULAR ACCIDENT: ICD-10-CM

## 2021-09-15 DIAGNOSIS — E78.2 MIXED HYPERLIPIDEMIA: ICD-10-CM

## 2021-09-15 DIAGNOSIS — C50.111 MALIGNANT NEOPLASM OF CENTRAL PORTION OF RIGHT BREAST IN FEMALE, ESTROGEN RECEPTOR POSITIVE: ICD-10-CM

## 2021-09-15 DIAGNOSIS — Z17.0 MALIGNANT NEOPLASM OF CENTRAL PORTION OF RIGHT BREAST IN FEMALE, ESTROGEN RECEPTOR POSITIVE: ICD-10-CM

## 2021-09-15 DIAGNOSIS — Z00.00 ROUTINE GENERAL MEDICAL EXAMINATION AT HEALTH CARE FACILITY: Primary | ICD-10-CM

## 2021-09-15 LAB
ALBUMIN SERPL BCP-MCNC: 3.6 G/DL (ref 3.5–5.2)
ALP SERPL-CCNC: 61 U/L (ref 55–135)
ALT SERPL W/O P-5'-P-CCNC: 25 U/L (ref 10–44)
ANION GAP SERPL CALC-SCNC: 12 MMOL/L (ref 8–16)
AST SERPL-CCNC: 20 U/L (ref 10–40)
BASOPHILS # BLD AUTO: 0.02 K/UL (ref 0–0.2)
BASOPHILS NFR BLD: 0.4 % (ref 0–1.9)
BILIRUB SERPL-MCNC: 1.6 MG/DL (ref 0.1–1)
BUN SERPL-MCNC: 17 MG/DL (ref 6–20)
CALCIUM SERPL-MCNC: 9.6 MG/DL (ref 8.7–10.5)
CHLORIDE SERPL-SCNC: 102 MMOL/L (ref 95–110)
CHOLEST SERPL-MCNC: 165 MG/DL (ref 120–199)
CHOLEST/HDLC SERPL: 2.8 {RATIO} (ref 2–5)
CO2 SERPL-SCNC: 27 MMOL/L (ref 23–29)
CREAT SERPL-MCNC: 0.7 MG/DL (ref 0.5–1.4)
DIFFERENTIAL METHOD: NORMAL
EOSINOPHIL # BLD AUTO: 0.1 K/UL (ref 0–0.5)
EOSINOPHIL NFR BLD: 2.7 % (ref 0–8)
ERYTHROCYTE [DISTWIDTH] IN BLOOD BY AUTOMATED COUNT: 12.2 % (ref 11.5–14.5)
EST. GFR  (AFRICAN AMERICAN): >60 ML/MIN/1.73 M^2
EST. GFR  (NON AFRICAN AMERICAN): >60 ML/MIN/1.73 M^2
GLUCOSE SERPL-MCNC: 83 MG/DL (ref 70–110)
HCT VFR BLD AUTO: 42.4 % (ref 37–48.5)
HDLC SERPL-MCNC: 59 MG/DL (ref 40–75)
HDLC SERPL: 35.8 % (ref 20–50)
HGB BLD-MCNC: 13.9 G/DL (ref 12–16)
IMM GRANULOCYTES # BLD AUTO: 0.01 K/UL (ref 0–0.04)
IMM GRANULOCYTES NFR BLD AUTO: 0.2 % (ref 0–0.5)
LDLC SERPL CALC-MCNC: 91.2 MG/DL (ref 63–159)
LYMPHOCYTES # BLD AUTO: 1.5 K/UL (ref 1–4.8)
LYMPHOCYTES NFR BLD: 30.6 % (ref 18–48)
MCH RBC QN AUTO: 30.2 PG (ref 27–31)
MCHC RBC AUTO-ENTMCNC: 32.8 G/DL (ref 32–36)
MCV RBC AUTO: 92 FL (ref 82–98)
MONOCYTES # BLD AUTO: 0.5 K/UL (ref 0.3–1)
MONOCYTES NFR BLD: 10.4 % (ref 4–15)
NEUTROPHILS # BLD AUTO: 2.7 K/UL (ref 1.8–7.7)
NEUTROPHILS NFR BLD: 55.7 % (ref 38–73)
NONHDLC SERPL-MCNC: 106 MG/DL
NRBC BLD-RTO: 0 /100 WBC
PLATELET # BLD AUTO: 259 K/UL (ref 150–450)
PMV BLD AUTO: 10.2 FL (ref 9.2–12.9)
POTASSIUM SERPL-SCNC: 4.2 MMOL/L (ref 3.5–5.1)
PROT SERPL-MCNC: 6.9 G/DL (ref 6–8.4)
RBC # BLD AUTO: 4.6 M/UL (ref 4–5.4)
SODIUM SERPL-SCNC: 141 MMOL/L (ref 136–145)
TRIGL SERPL-MCNC: 74 MG/DL (ref 30–150)
TSH SERPL DL<=0.005 MIU/L-ACNC: 0.88 UIU/ML (ref 0.4–4)
WBC # BLD AUTO: 4.83 K/UL (ref 3.9–12.7)

## 2021-09-15 PROCEDURE — 85025 COMPLETE CBC W/AUTO DIFF WBC: CPT | Mod: HCNC | Performed by: INTERNAL MEDICINE

## 2021-09-15 PROCEDURE — 1160F RVW MEDS BY RX/DR IN RCRD: CPT | Mod: HCNC,CPTII,S$GLB, | Performed by: INTERNAL MEDICINE

## 2021-09-15 PROCEDURE — 99999 PR PBB SHADOW E&M-EST. PATIENT-LVL IV: CPT | Mod: PBBFAC,HCNC,, | Performed by: INTERNAL MEDICINE

## 2021-09-15 PROCEDURE — 36415 COLL VENOUS BLD VENIPUNCTURE: CPT | Mod: HCNC,PO | Performed by: INTERNAL MEDICINE

## 2021-09-15 PROCEDURE — 80053 COMPREHEN METABOLIC PANEL: CPT | Mod: HCNC | Performed by: INTERNAL MEDICINE

## 2021-09-15 PROCEDURE — 99999 PR PBB SHADOW E&M-EST. PATIENT-LVL IV: ICD-10-PCS | Mod: PBBFAC,HCNC,, | Performed by: INTERNAL MEDICINE

## 2021-09-15 PROCEDURE — 3008F BODY MASS INDEX DOCD: CPT | Mod: HCNC,CPTII,S$GLB, | Performed by: INTERNAL MEDICINE

## 2021-09-15 PROCEDURE — 3079F DIAST BP 80-89 MM HG: CPT | Mod: HCNC,CPTII,S$GLB, | Performed by: INTERNAL MEDICINE

## 2021-09-15 PROCEDURE — 1159F MED LIST DOCD IN RCRD: CPT | Mod: HCNC,CPTII,S$GLB, | Performed by: INTERNAL MEDICINE

## 2021-09-15 PROCEDURE — 3074F PR MOST RECENT SYSTOLIC BLOOD PRESSURE < 130 MM HG: ICD-10-PCS | Mod: HCNC,CPTII,S$GLB, | Performed by: INTERNAL MEDICINE

## 2021-09-15 PROCEDURE — 99396 PREV VISIT EST AGE 40-64: CPT | Mod: HCNC,S$GLB,, | Performed by: INTERNAL MEDICINE

## 2021-09-15 PROCEDURE — 3008F PR BODY MASS INDEX (BMI) DOCUMENTED: ICD-10-PCS | Mod: HCNC,CPTII,S$GLB, | Performed by: INTERNAL MEDICINE

## 2021-09-15 PROCEDURE — 1159F PR MEDICATION LIST DOCUMENTED IN MEDICAL RECORD: ICD-10-PCS | Mod: HCNC,CPTII,S$GLB, | Performed by: INTERNAL MEDICINE

## 2021-09-15 PROCEDURE — 80061 LIPID PANEL: CPT | Mod: HCNC | Performed by: INTERNAL MEDICINE

## 2021-09-15 PROCEDURE — 99499 UNLISTED E&M SERVICE: CPT | Mod: HCNC,S$GLB,, | Performed by: INTERNAL MEDICINE

## 2021-09-15 PROCEDURE — 99396 PR PREVENTIVE VISIT,EST,40-64: ICD-10-PCS | Mod: HCNC,S$GLB,, | Performed by: INTERNAL MEDICINE

## 2021-09-15 PROCEDURE — 3079F PR MOST RECENT DIASTOLIC BLOOD PRESSURE 80-89 MM HG: ICD-10-PCS | Mod: HCNC,CPTII,S$GLB, | Performed by: INTERNAL MEDICINE

## 2021-09-15 PROCEDURE — 84443 ASSAY THYROID STIM HORMONE: CPT | Mod: HCNC | Performed by: INTERNAL MEDICINE

## 2021-09-15 PROCEDURE — 3074F SYST BP LT 130 MM HG: CPT | Mod: HCNC,CPTII,S$GLB, | Performed by: INTERNAL MEDICINE

## 2021-09-15 PROCEDURE — 99499 RISK ADDL DX/OHS AUDIT: ICD-10-PCS | Mod: HCNC,S$GLB,, | Performed by: INTERNAL MEDICINE

## 2021-09-15 PROCEDURE — 1160F PR REVIEW ALL MEDS BY PRESCRIBER/CLIN PHARMACIST DOCUMENTED: ICD-10-PCS | Mod: HCNC,CPTII,S$GLB, | Performed by: INTERNAL MEDICINE

## 2021-09-26 ENCOUNTER — OFFICE VISIT (OUTPATIENT)
Dept: URGENT CARE | Facility: CLINIC | Age: 56
End: 2021-09-26
Payer: MEDICARE

## 2021-09-26 VITALS
SYSTOLIC BLOOD PRESSURE: 134 MMHG | WEIGHT: 156 LBS | HEART RATE: 106 BPM | TEMPERATURE: 99 F | DIASTOLIC BLOOD PRESSURE: 63 MMHG | RESPIRATION RATE: 16 BRPM | OXYGEN SATURATION: 96 % | HEIGHT: 67 IN | BODY MASS INDEX: 24.48 KG/M2

## 2021-09-26 DIAGNOSIS — N30.01 ACUTE CYSTITIS WITH HEMATURIA: Primary | ICD-10-CM

## 2021-09-26 LAB
BILIRUB UR QL STRIP: NEGATIVE
GLUCOSE UR QL STRIP: NEGATIVE
KETONES UR QL STRIP: NEGATIVE
LEUKOCYTE ESTERASE UR QL STRIP: POSITIVE
PH, POC UA: 5.5
POC BLOOD, URINE: POSITIVE
POC NITRATES, URINE: NEGATIVE
PROT UR QL STRIP: NEGATIVE
SP GR UR STRIP: 1 (ref 1–1.03)
UROBILINOGEN UR STRIP-ACNC: ABNORMAL (ref 0.1–1.1)

## 2021-09-26 PROCEDURE — 3078F DIAST BP <80 MM HG: CPT | Mod: CPTII,S$GLB,, | Performed by: EMERGENCY MEDICINE

## 2021-09-26 PROCEDURE — 1159F MED LIST DOCD IN RCRD: CPT | Mod: CPTII,S$GLB,, | Performed by: EMERGENCY MEDICINE

## 2021-09-26 PROCEDURE — 99212 OFFICE O/P EST SF 10 MIN: CPT | Mod: 25,S$GLB,, | Performed by: EMERGENCY MEDICINE

## 2021-09-26 PROCEDURE — 1159F PR MEDICATION LIST DOCUMENTED IN MEDICAL RECORD: ICD-10-PCS | Mod: CPTII,S$GLB,, | Performed by: EMERGENCY MEDICINE

## 2021-09-26 PROCEDURE — 81003 URINALYSIS AUTO W/O SCOPE: CPT | Mod: QW,S$GLB,, | Performed by: EMERGENCY MEDICINE

## 2021-09-26 PROCEDURE — 3008F PR BODY MASS INDEX (BMI) DOCUMENTED: ICD-10-PCS | Mod: CPTII,S$GLB,, | Performed by: EMERGENCY MEDICINE

## 2021-09-26 PROCEDURE — 99212 PR OFFICE/OUTPT VISIT, EST, LEVL II, 10-19 MIN: ICD-10-PCS | Mod: 25,S$GLB,, | Performed by: EMERGENCY MEDICINE

## 2021-09-26 PROCEDURE — 3008F BODY MASS INDEX DOCD: CPT | Mod: CPTII,S$GLB,, | Performed by: EMERGENCY MEDICINE

## 2021-09-26 PROCEDURE — 1160F RVW MEDS BY RX/DR IN RCRD: CPT | Mod: CPTII,S$GLB,, | Performed by: EMERGENCY MEDICINE

## 2021-09-26 PROCEDURE — 3078F PR MOST RECENT DIASTOLIC BLOOD PRESSURE < 80 MM HG: ICD-10-PCS | Mod: CPTII,S$GLB,, | Performed by: EMERGENCY MEDICINE

## 2021-09-26 PROCEDURE — 1160F PR REVIEW ALL MEDS BY PRESCRIBER/CLIN PHARMACIST DOCUMENTED: ICD-10-PCS | Mod: CPTII,S$GLB,, | Performed by: EMERGENCY MEDICINE

## 2021-09-26 PROCEDURE — 3075F PR MOST RECENT SYSTOLIC BLOOD PRESS GE 130-139MM HG: ICD-10-PCS | Mod: CPTII,S$GLB,, | Performed by: EMERGENCY MEDICINE

## 2021-09-26 PROCEDURE — 81003 POCT URINALYSIS, DIPSTICK, AUTOMATED, W/O SCOPE: ICD-10-PCS | Mod: QW,S$GLB,, | Performed by: EMERGENCY MEDICINE

## 2021-09-26 PROCEDURE — 3075F SYST BP GE 130 - 139MM HG: CPT | Mod: CPTII,S$GLB,, | Performed by: EMERGENCY MEDICINE

## 2021-09-26 RX ORDER — PHENAZOPYRIDINE HYDROCHLORIDE 100 MG/1
100 TABLET, FILM COATED ORAL 3 TIMES DAILY PRN
Qty: 6 TABLET | Refills: 0 | Status: SHIPPED | OUTPATIENT
Start: 2021-09-26 | End: 2021-09-28

## 2021-09-26 RX ORDER — NITROFURANTOIN 25; 75 MG/1; MG/1
100 CAPSULE ORAL 2 TIMES DAILY
Qty: 10 CAPSULE | Refills: 0 | Status: SHIPPED | OUTPATIENT
Start: 2021-09-26 | End: 2021-10-01

## 2021-09-29 ENCOUNTER — TELEPHONE (OUTPATIENT)
Dept: URGENT CARE | Facility: CLINIC | Age: 56
End: 2021-09-29

## 2021-10-06 ENCOUNTER — OFFICE VISIT (OUTPATIENT)
Dept: URGENT CARE | Facility: CLINIC | Age: 56
End: 2021-10-06
Payer: MEDICARE

## 2021-10-06 VITALS
TEMPERATURE: 98 F | WEIGHT: 156 LBS | HEART RATE: 78 BPM | HEIGHT: 67 IN | SYSTOLIC BLOOD PRESSURE: 109 MMHG | OXYGEN SATURATION: 98 % | RESPIRATION RATE: 18 BRPM | DIASTOLIC BLOOD PRESSURE: 68 MMHG | BODY MASS INDEX: 24.48 KG/M2

## 2021-10-06 DIAGNOSIS — R30.0 DYSURIA: Primary | ICD-10-CM

## 2021-10-06 DIAGNOSIS — N30.01 ACUTE CYSTITIS WITH HEMATURIA: ICD-10-CM

## 2021-10-06 LAB
BILIRUB UR QL STRIP: NEGATIVE
GLUCOSE UR QL STRIP: NEGATIVE
KETONES UR QL STRIP: NEGATIVE
LEUKOCYTE ESTERASE UR QL STRIP: NEGATIVE
PH, POC UA: 5
POC BLOOD, URINE: POSITIVE
POC NITRATES, URINE: NEGATIVE
PROT UR QL STRIP: NEGATIVE
SP GR UR STRIP: 1 (ref 1–1.03)
UROBILINOGEN UR STRIP-ACNC: ABNORMAL (ref 0.1–1.1)

## 2021-10-06 PROCEDURE — 3078F PR MOST RECENT DIASTOLIC BLOOD PRESSURE < 80 MM HG: ICD-10-PCS | Mod: CPTII,S$GLB,, | Performed by: NURSE PRACTITIONER

## 2021-10-06 PROCEDURE — 3008F BODY MASS INDEX DOCD: CPT | Mod: CPTII,S$GLB,, | Performed by: NURSE PRACTITIONER

## 2021-10-06 PROCEDURE — 99214 PR OFFICE/OUTPT VISIT, EST, LEVL IV, 30-39 MIN: ICD-10-PCS | Mod: 25,S$GLB,, | Performed by: NURSE PRACTITIONER

## 2021-10-06 PROCEDURE — 3074F SYST BP LT 130 MM HG: CPT | Mod: CPTII,S$GLB,, | Performed by: NURSE PRACTITIONER

## 2021-10-06 PROCEDURE — 1159F PR MEDICATION LIST DOCUMENTED IN MEDICAL RECORD: ICD-10-PCS | Mod: CPTII,S$GLB,, | Performed by: NURSE PRACTITIONER

## 2021-10-06 PROCEDURE — 87086 URINE CULTURE/COLONY COUNT: CPT | Mod: HCNC | Performed by: NURSE PRACTITIONER

## 2021-10-06 PROCEDURE — 81003 URINALYSIS AUTO W/O SCOPE: CPT | Mod: QW,S$GLB,, | Performed by: NURSE PRACTITIONER

## 2021-10-06 PROCEDURE — 3078F DIAST BP <80 MM HG: CPT | Mod: CPTII,S$GLB,, | Performed by: NURSE PRACTITIONER

## 2021-10-06 PROCEDURE — 81003 POCT URINALYSIS, DIPSTICK, AUTOMATED, W/O SCOPE: ICD-10-PCS | Mod: QW,S$GLB,, | Performed by: NURSE PRACTITIONER

## 2021-10-06 PROCEDURE — 3074F PR MOST RECENT SYSTOLIC BLOOD PRESSURE < 130 MM HG: ICD-10-PCS | Mod: CPTII,S$GLB,, | Performed by: NURSE PRACTITIONER

## 2021-10-06 PROCEDURE — 3008F PR BODY MASS INDEX (BMI) DOCUMENTED: ICD-10-PCS | Mod: CPTII,S$GLB,, | Performed by: NURSE PRACTITIONER

## 2021-10-06 PROCEDURE — 99214 OFFICE O/P EST MOD 30 MIN: CPT | Mod: 25,S$GLB,, | Performed by: NURSE PRACTITIONER

## 2021-10-06 PROCEDURE — 1159F MED LIST DOCD IN RCRD: CPT | Mod: CPTII,S$GLB,, | Performed by: NURSE PRACTITIONER

## 2021-10-06 RX ORDER — CIPROFLOXACIN 500 MG/1
500 TABLET ORAL 2 TIMES DAILY
Qty: 14 TABLET | Refills: 0 | Status: SHIPPED | OUTPATIENT
Start: 2021-10-06 | End: 2021-10-13

## 2021-10-07 ENCOUNTER — TELEPHONE (OUTPATIENT)
Dept: UROLOGY | Facility: CLINIC | Age: 56
End: 2021-10-07

## 2021-10-07 LAB — BACTERIA UR CULT: NORMAL

## 2021-10-08 ENCOUNTER — TELEPHONE (OUTPATIENT)
Dept: URGENT CARE | Facility: CLINIC | Age: 56
End: 2021-10-08

## 2021-10-08 DIAGNOSIS — Z79.811 USE OF AROMATASE INHIBITORS: Primary | ICD-10-CM

## 2021-10-09 ENCOUNTER — TELEPHONE (OUTPATIENT)
Dept: URGENT CARE | Facility: CLINIC | Age: 56
End: 2021-10-09

## 2021-10-21 ENCOUNTER — OFFICE VISIT (OUTPATIENT)
Dept: UROLOGY | Facility: CLINIC | Age: 56
End: 2021-10-21
Payer: MEDICARE

## 2021-10-21 VITALS
BODY MASS INDEX: 25.76 KG/M2 | WEIGHT: 164.44 LBS | DIASTOLIC BLOOD PRESSURE: 70 MMHG | SYSTOLIC BLOOD PRESSURE: 110 MMHG

## 2021-10-21 DIAGNOSIS — R31.29 MICROHEMATURIA: Primary | ICD-10-CM

## 2021-10-21 DIAGNOSIS — Z79.811 USE OF AROMATASE INHIBITORS: ICD-10-CM

## 2021-10-21 DIAGNOSIS — R30.0 DYSURIA: ICD-10-CM

## 2021-10-21 LAB
BILIRUB SERPL-MCNC: NORMAL MG/DL
BLOOD URINE, POC: 50
CLARITY, POC UA: CLEAR
COLOR, POC UA: YELLOW
GLUCOSE UR QL STRIP: NORMAL
KETONES UR QL STRIP: NORMAL
LEUKOCYTE ESTERASE URINE, POC: NORMAL
NITRITE, POC UA: NORMAL
PH, POC UA: 5
PROTEIN, POC: NORMAL
SPECIFIC GRAVITY, POC UA: 1.02
UROBILINOGEN, POC UA: NORMAL

## 2021-10-21 PROCEDURE — 3008F PR BODY MASS INDEX (BMI) DOCUMENTED: ICD-10-PCS | Mod: HCNC,CPTII,S$GLB, | Performed by: UROLOGY

## 2021-10-21 PROCEDURE — 1160F PR REVIEW ALL MEDS BY PRESCRIBER/CLIN PHARMACIST DOCUMENTED: ICD-10-PCS | Mod: HCNC,CPTII,S$GLB, | Performed by: UROLOGY

## 2021-10-21 PROCEDURE — 99214 PR OFFICE/OUTPT VISIT, EST, LEVL IV, 30-39 MIN: ICD-10-PCS | Mod: HCNC,S$GLB,, | Performed by: UROLOGY

## 2021-10-21 PROCEDURE — 81001 URINALYSIS AUTO W/SCOPE: CPT | Mod: HCNC | Performed by: UROLOGY

## 2021-10-21 PROCEDURE — 87086 URINE CULTURE/COLONY COUNT: CPT | Mod: HCNC | Performed by: UROLOGY

## 2021-10-21 PROCEDURE — 3008F BODY MASS INDEX DOCD: CPT | Mod: HCNC,CPTII,S$GLB, | Performed by: UROLOGY

## 2021-10-21 PROCEDURE — 99214 OFFICE O/P EST MOD 30 MIN: CPT | Mod: HCNC,S$GLB,, | Performed by: UROLOGY

## 2021-10-21 PROCEDURE — 99999 PR PBB SHADOW E&M-EST. PATIENT-LVL III: CPT | Mod: PBBFAC,HCNC,, | Performed by: UROLOGY

## 2021-10-21 PROCEDURE — 3074F SYST BP LT 130 MM HG: CPT | Mod: HCNC,CPTII,S$GLB, | Performed by: UROLOGY

## 2021-10-21 PROCEDURE — 81002 URINALYSIS NONAUTO W/O SCOPE: CPT | Mod: HCNC,S$GLB,, | Performed by: UROLOGY

## 2021-10-21 PROCEDURE — 1159F PR MEDICATION LIST DOCUMENTED IN MEDICAL RECORD: ICD-10-PCS | Mod: HCNC,CPTII,S$GLB, | Performed by: UROLOGY

## 2021-10-21 PROCEDURE — 3078F PR MOST RECENT DIASTOLIC BLOOD PRESSURE < 80 MM HG: ICD-10-PCS | Mod: HCNC,CPTII,S$GLB, | Performed by: UROLOGY

## 2021-10-21 PROCEDURE — 3078F DIAST BP <80 MM HG: CPT | Mod: HCNC,CPTII,S$GLB, | Performed by: UROLOGY

## 2021-10-21 PROCEDURE — 81002 POCT URINE DIPSTICK WITHOUT MICROSCOPE: ICD-10-PCS | Mod: HCNC,S$GLB,, | Performed by: UROLOGY

## 2021-10-21 PROCEDURE — 99999 PR PBB SHADOW E&M-EST. PATIENT-LVL III: ICD-10-PCS | Mod: PBBFAC,HCNC,, | Performed by: UROLOGY

## 2021-10-21 PROCEDURE — 3074F PR MOST RECENT SYSTOLIC BLOOD PRESSURE < 130 MM HG: ICD-10-PCS | Mod: HCNC,CPTII,S$GLB, | Performed by: UROLOGY

## 2021-10-21 PROCEDURE — 1160F RVW MEDS BY RX/DR IN RCRD: CPT | Mod: HCNC,CPTII,S$GLB, | Performed by: UROLOGY

## 2021-10-21 PROCEDURE — 1159F MED LIST DOCD IN RCRD: CPT | Mod: HCNC,CPTII,S$GLB, | Performed by: UROLOGY

## 2021-10-22 LAB
MICROSCOPIC COMMENT: ABNORMAL
RBC #/AREA URNS AUTO: 7 /HPF (ref 0–4)
SQUAMOUS #/AREA URNS AUTO: 0 /HPF
WBC #/AREA URNS AUTO: 0 /HPF (ref 0–5)

## 2021-10-23 LAB
BACTERIA UR CULT: NORMAL
BACTERIA UR CULT: NORMAL

## 2021-10-29 ENCOUNTER — HOSPITAL ENCOUNTER (OUTPATIENT)
Dept: RADIOLOGY | Facility: HOSPITAL | Age: 56
Discharge: HOME | End: 2021-10-29
Attending: NURSE PRACTITIONER
Payer: MEDICARE

## 2021-10-29 VITALS — WEIGHT: 163.13 LBS | BODY MASS INDEX: 25.6 KG/M2 | HEIGHT: 67 IN

## 2021-10-29 DIAGNOSIS — R92.30 DENSE BREAST TISSUE ON MAMMOGRAM: ICD-10-CM

## 2021-10-29 DIAGNOSIS — Z12.31 ENCOUNTER FOR SCREENING MAMMOGRAM FOR BREAST CANCER: ICD-10-CM

## 2021-10-29 DIAGNOSIS — Z85.3 ENCOUNTER FOR FOLLOW-UP SURVEILLANCE OF BREAST CANCER: ICD-10-CM

## 2021-10-29 DIAGNOSIS — Z80.3 FAMILY HISTORY OF BREAST CANCER: ICD-10-CM

## 2021-10-29 DIAGNOSIS — Z08 ENCOUNTER FOR FOLLOW-UP SURVEILLANCE OF BREAST CANCER: ICD-10-CM

## 2021-10-29 PROCEDURE — 77061 MAMMO DIGITAL DIAGNOSTIC LEFT WITH TOMO: ICD-10-PCS | Mod: 26,HCNC,LT, | Performed by: RADIOLOGY

## 2021-10-29 PROCEDURE — 77061 BREAST TOMOSYNTHESIS UNI: CPT | Mod: TC,HCNC,LT

## 2021-10-29 PROCEDURE — 77065 MAMMO DIGITAL DIAGNOSTIC LEFT WITH TOMO: ICD-10-PCS | Mod: 26,HCNC,LT, | Performed by: RADIOLOGY

## 2021-10-29 PROCEDURE — 77065 DX MAMMO INCL CAD UNI: CPT | Mod: 26,HCNC,LT, | Performed by: RADIOLOGY

## 2021-10-29 PROCEDURE — 77061 BREAST TOMOSYNTHESIS UNI: CPT | Mod: 26,HCNC,LT, | Performed by: RADIOLOGY

## 2021-11-01 ENCOUNTER — OFFICE VISIT (OUTPATIENT)
Dept: SURGERY | Facility: CLINIC | Age: 56
End: 2021-11-01
Payer: MEDICARE

## 2021-11-01 VITALS
DIASTOLIC BLOOD PRESSURE: 72 MMHG | HEIGHT: 67 IN | WEIGHT: 167.75 LBS | TEMPERATURE: 98 F | HEART RATE: 77 BPM | SYSTOLIC BLOOD PRESSURE: 120 MMHG | RESPIRATION RATE: 14 BRPM | OXYGEN SATURATION: 98 % | BODY MASS INDEX: 26.33 KG/M2

## 2021-11-01 DIAGNOSIS — Z79.899 ENCOUNTER FOR MONITORING ADJUVANT HORMONAL THERAPY: ICD-10-CM

## 2021-11-01 DIAGNOSIS — Z79.811 USE OF AROMATASE INHIBITORS: ICD-10-CM

## 2021-11-01 DIAGNOSIS — M85.89 OSTEOPENIA OF MULTIPLE SITES: ICD-10-CM

## 2021-11-01 DIAGNOSIS — Z71.9 HEALTH EDUCATION/COUNSELING: ICD-10-CM

## 2021-11-01 DIAGNOSIS — Z08 ENCOUNTER FOR FOLLOW-UP SURVEILLANCE OF BREAST CANCER: ICD-10-CM

## 2021-11-01 DIAGNOSIS — Z17.0 MALIGNANT NEOPLASM OF CENTRAL PORTION OF RIGHT BREAST IN FEMALE, ESTROGEN RECEPTOR POSITIVE: Primary | ICD-10-CM

## 2021-11-01 DIAGNOSIS — Z85.3 ENCOUNTER FOR FOLLOW-UP SURVEILLANCE OF BREAST CANCER: ICD-10-CM

## 2021-11-01 DIAGNOSIS — Z71.89 COUNSELING ON HEALTH PROMOTION AND DISEASE PREVENTION: ICD-10-CM

## 2021-11-01 DIAGNOSIS — Z51.81 ENCOUNTER FOR MONITORING ADJUVANT HORMONAL THERAPY: ICD-10-CM

## 2021-11-01 DIAGNOSIS — Z71.89 COUNSELING AND COORDINATION OF CARE: ICD-10-CM

## 2021-11-01 DIAGNOSIS — C50.111 MALIGNANT NEOPLASM OF CENTRAL PORTION OF RIGHT BREAST IN FEMALE, ESTROGEN RECEPTOR POSITIVE: Primary | ICD-10-CM

## 2021-11-01 PROCEDURE — 3078F DIAST BP <80 MM HG: CPT | Mod: HCNC,CPTII,S$GLB, | Performed by: NURSE PRACTITIONER

## 2021-11-01 PROCEDURE — 1159F MED LIST DOCD IN RCRD: CPT | Mod: HCNC,CPTII,S$GLB, | Performed by: NURSE PRACTITIONER

## 2021-11-01 PROCEDURE — 1159F PR MEDICATION LIST DOCUMENTED IN MEDICAL RECORD: ICD-10-PCS | Mod: HCNC,CPTII,S$GLB, | Performed by: NURSE PRACTITIONER

## 2021-11-01 PROCEDURE — 99499 UNLISTED E&M SERVICE: CPT | Mod: S$GLB,,, | Performed by: NURSE PRACTITIONER

## 2021-11-01 PROCEDURE — 99999 PR PBB SHADOW E&M-EST. PATIENT-LVL IV: CPT | Mod: PBBFAC,HCNC,, | Performed by: NURSE PRACTITIONER

## 2021-11-01 PROCEDURE — 3074F SYST BP LT 130 MM HG: CPT | Mod: HCNC,CPTII,S$GLB, | Performed by: NURSE PRACTITIONER

## 2021-11-01 PROCEDURE — 99499 RISK ADDL DX/OHS AUDIT: ICD-10-PCS | Mod: S$GLB,,, | Performed by: NURSE PRACTITIONER

## 2021-11-01 PROCEDURE — 1160F PR REVIEW ALL MEDS BY PRESCRIBER/CLIN PHARMACIST DOCUMENTED: ICD-10-PCS | Mod: HCNC,CPTII,S$GLB, | Performed by: NURSE PRACTITIONER

## 2021-11-01 PROCEDURE — 99999 PR PBB SHADOW E&M-EST. PATIENT-LVL IV: ICD-10-PCS | Mod: PBBFAC,HCNC,, | Performed by: NURSE PRACTITIONER

## 2021-11-01 PROCEDURE — 1160F RVW MEDS BY RX/DR IN RCRD: CPT | Mod: HCNC,CPTII,S$GLB, | Performed by: NURSE PRACTITIONER

## 2021-11-01 PROCEDURE — 99214 OFFICE O/P EST MOD 30 MIN: CPT | Mod: HCNC,S$GLB,, | Performed by: NURSE PRACTITIONER

## 2021-11-01 PROCEDURE — 3008F PR BODY MASS INDEX (BMI) DOCUMENTED: ICD-10-PCS | Mod: HCNC,CPTII,S$GLB, | Performed by: NURSE PRACTITIONER

## 2021-11-01 PROCEDURE — 3008F BODY MASS INDEX DOCD: CPT | Mod: HCNC,CPTII,S$GLB, | Performed by: NURSE PRACTITIONER

## 2021-11-01 PROCEDURE — 3074F PR MOST RECENT SYSTOLIC BLOOD PRESSURE < 130 MM HG: ICD-10-PCS | Mod: HCNC,CPTII,S$GLB, | Performed by: NURSE PRACTITIONER

## 2021-11-01 PROCEDURE — 99214 PR OFFICE/OUTPT VISIT, EST, LEVL IV, 30-39 MIN: ICD-10-PCS | Mod: HCNC,S$GLB,, | Performed by: NURSE PRACTITIONER

## 2021-11-01 PROCEDURE — 3078F PR MOST RECENT DIASTOLIC BLOOD PRESSURE < 80 MM HG: ICD-10-PCS | Mod: HCNC,CPTII,S$GLB, | Performed by: NURSE PRACTITIONER

## 2021-11-01 RX ORDER — TETANUS TOXOID, REDUCED DIPHTHERIA TOXOID AND ACELLULAR PERTUSSIS VACCINE, ADSORBED 5; 2.5; 8; 8; 2.5 [IU]/.5ML; [IU]/.5ML; UG/.5ML; UG/.5ML; UG/.5ML
SUSPENSION INTRAMUSCULAR
COMMUNITY
Start: 2021-08-02 | End: 2022-03-07

## 2021-11-03 ENCOUNTER — TELEPHONE (OUTPATIENT)
Dept: RADIOLOGY | Facility: HOSPITAL | Age: 56
End: 2021-11-03
Payer: MEDICARE

## 2021-11-03 ENCOUNTER — LAB VISIT (OUTPATIENT)
Dept: LAB | Facility: HOSPITAL | Age: 56
End: 2021-11-03
Attending: UROLOGY
Payer: MEDICARE

## 2021-11-03 DIAGNOSIS — R31.29 MICROHEMATURIA: ICD-10-CM

## 2021-11-03 LAB
CREAT SERPL-MCNC: 0.7 MG/DL (ref 0.5–1.4)
EST. GFR  (AFRICAN AMERICAN): >60 ML/MIN/1.73 M^2
EST. GFR  (NON AFRICAN AMERICAN): >60 ML/MIN/1.73 M^2

## 2021-11-03 PROCEDURE — 84520 ASSAY OF UREA NITROGEN: CPT | Mod: HCNC | Performed by: UROLOGY

## 2021-11-03 PROCEDURE — 82565 ASSAY OF CREATININE: CPT | Mod: HCNC | Performed by: UROLOGY

## 2021-11-03 PROCEDURE — 36415 COLL VENOUS BLD VENIPUNCTURE: CPT | Mod: HCNC,PO | Performed by: UROLOGY

## 2021-11-04 ENCOUNTER — HOSPITAL ENCOUNTER (OUTPATIENT)
Dept: RADIOLOGY | Facility: HOSPITAL | Age: 56
Discharge: HOME | End: 2021-11-04
Attending: UROLOGY
Payer: MEDICARE

## 2021-11-04 DIAGNOSIS — R31.29 MICROHEMATURIA: ICD-10-CM

## 2021-11-04 PROCEDURE — 74178 CT ABD&PLV WO CNTR FLWD CNTR: CPT | Mod: 26,HCNC,, | Performed by: RADIOLOGY

## 2021-11-04 PROCEDURE — 74178 CT UROGRAM ABD PELVIS W WO: ICD-10-PCS | Mod: 26,HCNC,, | Performed by: RADIOLOGY

## 2021-11-04 PROCEDURE — 25500020 PHARM REV CODE 255: Mod: HCNC | Performed by: UROLOGY

## 2021-11-04 PROCEDURE — 74178 CT ABD&PLV WO CNTR FLWD CNTR: CPT | Mod: TC,HCNC

## 2021-11-04 RX ADMIN — IOHEXOL 75 ML: 350 INJECTION, SOLUTION INTRAVENOUS at 10:11

## 2021-11-05 ENCOUNTER — PROCEDURE VISIT (OUTPATIENT)
Dept: UROLOGY | Facility: CLINIC | Age: 56
End: 2021-11-05
Payer: MEDICARE

## 2021-11-05 VITALS
SYSTOLIC BLOOD PRESSURE: 116 MMHG | BODY MASS INDEX: 26.19 KG/M2 | DIASTOLIC BLOOD PRESSURE: 82 MMHG | HEIGHT: 67 IN | WEIGHT: 166.88 LBS

## 2021-11-05 DIAGNOSIS — N32.9 LESION OF BLADDER: ICD-10-CM

## 2021-11-05 DIAGNOSIS — Z01.818 PREOP TESTING: ICD-10-CM

## 2021-11-05 DIAGNOSIS — R31.29 MICROHEMATURIA: Primary | ICD-10-CM

## 2021-11-05 PROCEDURE — 52000 CYSTOURETHROSCOPY: CPT | Mod: S$GLB,,, | Performed by: UROLOGY

## 2021-11-05 PROCEDURE — 52000 PR CYSTOURETHROSCOPY: ICD-10-PCS | Mod: S$GLB,,, | Performed by: UROLOGY

## 2021-11-05 RX ORDER — CIPROFLOXACIN 500 MG/1
500 TABLET ORAL
Status: COMPLETED | OUTPATIENT
Start: 2021-11-05 | End: 2021-11-05

## 2021-11-05 RX ORDER — CETIRIZINE HYDROCHLORIDE 10 MG/1
10 TABLET ORAL DAILY PRN
COMMUNITY

## 2021-11-05 RX ORDER — LIDOCAINE HYDROCHLORIDE 20 MG/ML
JELLY TOPICAL
Status: COMPLETED | OUTPATIENT
Start: 2021-11-05 | End: 2021-11-05

## 2021-11-05 RX ADMIN — LIDOCAINE HYDROCHLORIDE 10 ML: 20 JELLY TOPICAL at 02:11

## 2021-11-05 RX ADMIN — CIPROFLOXACIN 500 MG: 500 TABLET ORAL at 02:11

## 2021-11-19 ENCOUNTER — TELEPHONE (OUTPATIENT)
Dept: PREADMISSION TESTING | Facility: HOSPITAL | Age: 56
End: 2021-11-19
Payer: MEDICARE

## 2021-11-22 ENCOUNTER — HOSPITAL ENCOUNTER (OUTPATIENT)
Dept: CARDIOLOGY | Facility: HOSPITAL | Age: 56
Discharge: HOME | End: 2021-11-22
Attending: UROLOGY
Payer: MEDICARE

## 2021-11-22 ENCOUNTER — HOSPITAL ENCOUNTER (OUTPATIENT)
Dept: RADIOLOGY | Facility: HOSPITAL | Age: 56
Discharge: HOME | End: 2021-11-22
Attending: UROLOGY
Payer: MEDICARE

## 2021-11-22 DIAGNOSIS — Z01.818 PREOP TESTING: ICD-10-CM

## 2021-11-22 PROCEDURE — 71046 X-RAY EXAM CHEST 2 VIEWS: CPT | Mod: TC,HCNC,FY,PO

## 2021-11-22 PROCEDURE — 93010 EKG 12-LEAD: ICD-10-PCS | Mod: HCNC,,, | Performed by: INTERNAL MEDICINE

## 2021-11-22 PROCEDURE — 93010 ELECTROCARDIOGRAM REPORT: CPT | Mod: HCNC,,, | Performed by: INTERNAL MEDICINE

## 2021-11-22 PROCEDURE — 71046 X-RAY EXAM CHEST 2 VIEWS: CPT | Mod: 26,HCNC,, | Performed by: RADIOLOGY

## 2021-11-22 PROCEDURE — 71046 XR CHEST PA AND LATERAL PRE-OP: ICD-10-PCS | Mod: 26,HCNC,, | Performed by: RADIOLOGY

## 2021-11-22 PROCEDURE — 93005 ELECTROCARDIOGRAM TRACING: CPT | Mod: HCNC,PO

## 2021-11-26 ENCOUNTER — ANESTHESIA EVENT (OUTPATIENT)
Dept: SURGERY | Facility: HOSPITAL | Age: 56
End: 2021-11-26
Payer: MEDICARE

## 2021-11-29 ENCOUNTER — HOSPITAL ENCOUNTER (OUTPATIENT)
Facility: HOSPITAL | Age: 56
Discharge: HOME | End: 2021-11-29
Attending: UROLOGY | Admitting: UROLOGY
Payer: MEDICARE

## 2021-11-29 ENCOUNTER — ANESTHESIA (OUTPATIENT)
Dept: SURGERY | Facility: HOSPITAL | Age: 56
End: 2021-11-29
Payer: MEDICARE

## 2021-11-29 DIAGNOSIS — R31.29 MICROHEMATURIA: ICD-10-CM

## 2021-11-29 DIAGNOSIS — N32.9 LESION OF BLADDER: ICD-10-CM

## 2021-11-29 PROCEDURE — D9220A PRA ANESTHESIA: Mod: HCNC,ANES,, | Performed by: ANESTHESIOLOGY

## 2021-11-29 PROCEDURE — D9220A PRA ANESTHESIA: ICD-10-PCS | Mod: HCNC,ANES,, | Performed by: ANESTHESIOLOGY

## 2021-11-29 PROCEDURE — 88305 TISSUE EXAM BY PATHOLOGIST: CPT | Mod: HCNC | Performed by: PATHOLOGY

## 2021-11-29 PROCEDURE — 36000706: Mod: HCNC | Performed by: UROLOGY

## 2021-11-29 PROCEDURE — 63600175 PHARM REV CODE 636 W HCPCS: Mod: HCNC | Performed by: UROLOGY

## 2021-11-29 PROCEDURE — 25000003 PHARM REV CODE 250: Mod: HCNC | Performed by: NURSE ANESTHETIST, CERTIFIED REGISTERED

## 2021-11-29 PROCEDURE — 37000009 HC ANESTHESIA EA ADD 15 MINS: Mod: HCNC | Performed by: UROLOGY

## 2021-11-29 PROCEDURE — 52204 PR CYSTOURETHROSCOPY,BIOPSY: ICD-10-PCS | Mod: HCNC,,, | Performed by: UROLOGY

## 2021-11-29 PROCEDURE — 88305 TISSUE EXAM BY PATHOLOGIST: CPT | Mod: 26,HCNC,, | Performed by: PATHOLOGY

## 2021-11-29 PROCEDURE — 37000008 HC ANESTHESIA 1ST 15 MINUTES: Mod: HCNC | Performed by: UROLOGY

## 2021-11-29 PROCEDURE — 88305 TISSUE EXAM BY PATHOLOGIST: ICD-10-PCS | Mod: 26,HCNC,, | Performed by: PATHOLOGY

## 2021-11-29 PROCEDURE — D9220A PRA ANESTHESIA: Mod: HCNC,CRNA,, | Performed by: NURSE ANESTHETIST, CERTIFIED REGISTERED

## 2021-11-29 PROCEDURE — 25000003 PHARM REV CODE 250: Mod: HCNC | Performed by: ANESTHESIOLOGY

## 2021-11-29 PROCEDURE — 52204 CYSTOSCOPY W/BIOPSY(S): CPT | Mod: HCNC,,, | Performed by: UROLOGY

## 2021-11-29 PROCEDURE — 36000707: Mod: HCNC | Performed by: UROLOGY

## 2021-11-29 PROCEDURE — D9220A PRA ANESTHESIA: ICD-10-PCS | Mod: HCNC,CRNA,, | Performed by: NURSE ANESTHETIST, CERTIFIED REGISTERED

## 2021-11-29 PROCEDURE — 63600175 PHARM REV CODE 636 W HCPCS: Mod: HCNC | Performed by: NURSE ANESTHETIST, CERTIFIED REGISTERED

## 2021-11-29 PROCEDURE — 71000033 HC RECOVERY, INTIAL HOUR: Mod: HCNC | Performed by: UROLOGY

## 2021-11-29 PROCEDURE — 71000015 HC POSTOP RECOV 1ST HR: Mod: HCNC | Performed by: UROLOGY

## 2021-11-29 PROCEDURE — 27200651 HC AIRWAY, LMA: Mod: HCNC | Performed by: ANESTHESIOLOGY

## 2021-11-29 PROCEDURE — 63600175 PHARM REV CODE 636 W HCPCS: Mod: HCNC | Performed by: ANESTHESIOLOGY

## 2021-11-29 RX ORDER — HYDROCODONE BITARTRATE AND ACETAMINOPHEN 5; 325 MG/1; MG/1
1 TABLET ORAL
Status: DISCONTINUED | OUTPATIENT
Start: 2021-11-29 | End: 2021-11-29 | Stop reason: HOSPADM

## 2021-11-29 RX ORDER — SODIUM CHLORIDE, SODIUM LACTATE, POTASSIUM CHLORIDE, CALCIUM CHLORIDE 600; 310; 30; 20 MG/100ML; MG/100ML; MG/100ML; MG/100ML
INJECTION, SOLUTION INTRAVENOUS CONTINUOUS
Status: DISCONTINUED | OUTPATIENT
Start: 2021-11-29 | End: 2022-03-07

## 2021-11-29 RX ORDER — PHENAZOPYRIDINE HYDROCHLORIDE 200 MG/1
200 TABLET, FILM COATED ORAL 3 TIMES DAILY PRN
Qty: 6 TABLET | Refills: 0 | Status: SHIPPED | OUTPATIENT
Start: 2021-11-29 | End: 2022-03-07

## 2021-11-29 RX ORDER — LIDOCAINE HYDROCHLORIDE 20 MG/ML
INJECTION, SOLUTION EPIDURAL; INFILTRATION; INTRACAUDAL; PERINEURAL
Status: DISCONTINUED | OUTPATIENT
Start: 2021-11-29 | End: 2021-11-29

## 2021-11-29 RX ORDER — LIDOCAINE HYDROCHLORIDE 20 MG/ML
JELLY TOPICAL
Status: DISCONTINUED
Start: 2021-11-29 | End: 2021-11-29 | Stop reason: WASHOUT

## 2021-11-29 RX ORDER — MIDAZOLAM HYDROCHLORIDE 1 MG/ML
INJECTION, SOLUTION INTRAMUSCULAR; INTRAVENOUS
Status: DISCONTINUED | OUTPATIENT
Start: 2021-11-29 | End: 2021-11-29

## 2021-11-29 RX ORDER — FENTANYL CITRATE 50 UG/ML
25 INJECTION, SOLUTION INTRAMUSCULAR; INTRAVENOUS EVERY 5 MIN PRN
Status: DISCONTINUED | OUTPATIENT
Start: 2021-11-29 | End: 2021-11-29 | Stop reason: HOSPADM

## 2021-11-29 RX ORDER — DEXAMETHASONE SODIUM PHOSPHATE 4 MG/ML
INJECTION, SOLUTION INTRA-ARTICULAR; INTRALESIONAL; INTRAMUSCULAR; INTRAVENOUS; SOFT TISSUE
Status: DISCONTINUED | OUTPATIENT
Start: 2021-11-29 | End: 2021-11-29

## 2021-11-29 RX ORDER — ONDANSETRON 2 MG/ML
INJECTION INTRAMUSCULAR; INTRAVENOUS
Status: DISCONTINUED | OUTPATIENT
Start: 2021-11-29 | End: 2021-11-29

## 2021-11-29 RX ORDER — FENTANYL CITRATE 50 UG/ML
INJECTION, SOLUTION INTRAMUSCULAR; INTRAVENOUS
Status: DISCONTINUED | OUTPATIENT
Start: 2021-11-29 | End: 2021-11-29

## 2021-11-29 RX ORDER — CEFAZOLIN SODIUM 2 G/50ML
2 SOLUTION INTRAVENOUS
Status: COMPLETED | OUTPATIENT
Start: 2021-11-29 | End: 2021-11-29

## 2021-11-29 RX ORDER — SCOLOPAMINE TRANSDERMAL SYSTEM 1 MG/1
1 PATCH, EXTENDED RELEASE TRANSDERMAL
Status: DISCONTINUED | OUTPATIENT
Start: 2021-11-29 | End: 2021-11-29 | Stop reason: HOSPADM

## 2021-11-29 RX ORDER — PHENYLEPHRINE HYDROCHLORIDE 10 MG/ML
INJECTION INTRAVENOUS
Status: DISCONTINUED | OUTPATIENT
Start: 2021-11-29 | End: 2021-11-29

## 2021-11-29 RX ORDER — ONDANSETRON 2 MG/ML
4 INJECTION INTRAMUSCULAR; INTRAVENOUS ONCE AS NEEDED
Status: DISCONTINUED | OUTPATIENT
Start: 2021-11-29 | End: 2021-11-29 | Stop reason: HOSPADM

## 2021-11-29 RX ORDER — MEPERIDINE HYDROCHLORIDE 25 MG/ML
12.5 INJECTION INTRAMUSCULAR; INTRAVENOUS; SUBCUTANEOUS ONCE
Status: DISCONTINUED | OUTPATIENT
Start: 2021-11-29 | End: 2021-11-29 | Stop reason: HOSPADM

## 2021-11-29 RX ORDER — DIPHENHYDRAMINE HYDROCHLORIDE 50 MG/ML
25 INJECTION INTRAMUSCULAR; INTRAVENOUS EVERY 6 HOURS PRN
Status: DISCONTINUED | OUTPATIENT
Start: 2021-11-29 | End: 2021-11-29 | Stop reason: HOSPADM

## 2021-11-29 RX ORDER — ALBUTEROL SULFATE 0.83 MG/ML
2.5 SOLUTION RESPIRATORY (INHALATION) EVERY 4 HOURS PRN
Status: DISCONTINUED | OUTPATIENT
Start: 2021-11-29 | End: 2021-11-29 | Stop reason: HOSPADM

## 2021-11-29 RX ORDER — PROPOFOL 10 MG/ML
VIAL (ML) INTRAVENOUS
Status: DISCONTINUED | OUTPATIENT
Start: 2021-11-29 | End: 2021-11-29

## 2021-11-29 RX ADMIN — MIDAZOLAM 2 MG: 1 INJECTION INTRAMUSCULAR; INTRAVENOUS at 12:11

## 2021-11-29 RX ADMIN — SODIUM CHLORIDE, SODIUM LACTATE, POTASSIUM CHLORIDE, AND CALCIUM CHLORIDE: 600; 310; 30; 20 INJECTION, SOLUTION INTRAVENOUS at 12:11

## 2021-11-29 RX ADMIN — FENTANYL CITRATE 25 MCG: 50 INJECTION, SOLUTION INTRAMUSCULAR; INTRAVENOUS at 12:11

## 2021-11-29 RX ADMIN — PROPOFOL 150 MG: 10 INJECTION, EMULSION INTRAVENOUS at 12:11

## 2021-11-29 RX ADMIN — LIDOCAINE HYDROCHLORIDE 75 MG: 20 INJECTION, SOLUTION EPIDURAL; INFILTRATION; INTRACAUDAL; PERINEURAL at 12:11

## 2021-11-29 RX ADMIN — ONDANSETRON 4 MG: 2 INJECTION, SOLUTION INTRAMUSCULAR; INTRAVENOUS at 01:11

## 2021-11-29 RX ADMIN — DEXAMETHASONE SODIUM PHOSPHATE 8 MG: 4 INJECTION, SOLUTION INTRA-ARTICULAR; INTRALESIONAL; INTRAMUSCULAR; INTRAVENOUS; SOFT TISSUE at 12:11

## 2021-11-29 RX ADMIN — SCOPALAMINE 1 PATCH: 1 PATCH, EXTENDED RELEASE TRANSDERMAL at 12:11

## 2021-11-29 RX ADMIN — FENTANYL CITRATE 50 MCG: 50 INJECTION, SOLUTION INTRAMUSCULAR; INTRAVENOUS at 01:11

## 2021-11-29 RX ADMIN — CEFAZOLIN SODIUM 2 G: 2 SOLUTION INTRAVENOUS at 12:11

## 2021-11-29 RX ADMIN — GLYCOPYRROLATE 0.2 MG: 0.2 INJECTION, SOLUTION INTRAMUSCULAR; INTRAVITREAL at 12:11

## 2021-11-29 RX ADMIN — SODIUM CHLORIDE, SODIUM LACTATE, POTASSIUM CHLORIDE, AND CALCIUM CHLORIDE: 600; 310; 30; 20 INJECTION, SOLUTION INTRAVENOUS at 01:11

## 2021-11-29 RX ADMIN — PHENYLEPHRINE HYDROCHLORIDE 100 MCG: 10 INJECTION INTRAVENOUS at 01:11

## 2021-12-01 LAB
FINAL PATHOLOGIC DIAGNOSIS: NORMAL
GROSS: NORMAL
Lab: NORMAL

## 2021-12-17 VITALS
WEIGHT: 164.56 LBS | RESPIRATION RATE: 18 BRPM | SYSTOLIC BLOOD PRESSURE: 119 MMHG | DIASTOLIC BLOOD PRESSURE: 60 MMHG | HEART RATE: 68 BPM | BODY MASS INDEX: 25.83 KG/M2 | TEMPERATURE: 98 F | HEIGHT: 67 IN | OXYGEN SATURATION: 99 %

## 2021-12-21 ENCOUNTER — OFFICE VISIT (OUTPATIENT)
Dept: UROLOGY | Facility: CLINIC | Age: 56
End: 2021-12-21
Payer: MEDICARE

## 2021-12-21 VITALS
WEIGHT: 170.63 LBS | BODY MASS INDEX: 26.78 KG/M2 | DIASTOLIC BLOOD PRESSURE: 82 MMHG | HEIGHT: 67 IN | SYSTOLIC BLOOD PRESSURE: 118 MMHG

## 2021-12-21 DIAGNOSIS — N39.0 RECURRENT UTI: ICD-10-CM

## 2021-12-21 DIAGNOSIS — N30.80 CYSTITIS CYSTICA: ICD-10-CM

## 2021-12-21 DIAGNOSIS — R31.29 MICROHEMATURIA: Primary | ICD-10-CM

## 2021-12-21 DIAGNOSIS — C50.111 MALIGNANT NEOPLASM OF CENTRAL PORTION OF RIGHT BREAST IN FEMALE, ESTROGEN RECEPTOR POSITIVE: ICD-10-CM

## 2021-12-21 DIAGNOSIS — Z17.0 MALIGNANT NEOPLASM OF CENTRAL PORTION OF RIGHT BREAST IN FEMALE, ESTROGEN RECEPTOR POSITIVE: ICD-10-CM

## 2021-12-21 PROCEDURE — 99999 PR PBB SHADOW E&M-EST. PATIENT-LVL III: ICD-10-PCS | Mod: PBBFAC,HCNC,, | Performed by: UROLOGY

## 2021-12-21 PROCEDURE — 99999 PR PBB SHADOW E&M-EST. PATIENT-LVL III: CPT | Mod: PBBFAC,HCNC,, | Performed by: UROLOGY

## 2021-12-21 PROCEDURE — 99213 OFFICE O/P EST LOW 20 MIN: CPT | Mod: HCNC,S$GLB,, | Performed by: UROLOGY

## 2021-12-21 PROCEDURE — 99213 PR OFFICE/OUTPT VISIT, EST, LEVL III, 20-29 MIN: ICD-10-PCS | Mod: HCNC,S$GLB,, | Performed by: UROLOGY

## 2021-12-21 RX ORDER — NITROFURANTOIN 25; 75 MG/1; MG/1
100 CAPSULE ORAL DAILY
Qty: 30 CAPSULE | Refills: 2 | Status: SHIPPED | OUTPATIENT
Start: 2021-12-21 | End: 2022-03-24 | Stop reason: SDUPTHER

## 2021-12-21 RX ORDER — INFLUENZA A VIRUS A/VICTORIA/2570/2019 IVR-215 (H1N1) ANTIGEN (FORMALDEHYDE INACTIVATED), INFLUENZA A VIRUS A/TASMANIA/503/2020 IVR-221 (H3N2) ANTIGEN (FORMALDEHYDE INACTIVATED), INFLUENZA B VIRUS B/PHUKET/3073/2013 ANTIGEN (FORMALDEHYDE INACTIVATED), AND INFLUENZA B VIRUS B/WASHINGTON/02/2019 ANTIGEN (FORMALDEHYDE INACTIVATED) 15; 15; 15; 15 UG/.5ML; UG/.5ML; UG/.5ML; UG/.5ML
INJECTION, SUSPENSION INTRAMUSCULAR
COMMUNITY
Start: 2021-09-25 | End: 2022-03-07

## 2022-01-05 ENCOUNTER — OFFICE VISIT (OUTPATIENT)
Dept: URGENT CARE | Facility: CLINIC | Age: 57
End: 2022-01-05
Payer: MEDICARE

## 2022-01-05 VITALS
WEIGHT: 170 LBS | OXYGEN SATURATION: 99 % | SYSTOLIC BLOOD PRESSURE: 115 MMHG | HEIGHT: 67 IN | DIASTOLIC BLOOD PRESSURE: 60 MMHG | BODY MASS INDEX: 26.68 KG/M2 | TEMPERATURE: 98 F | HEART RATE: 81 BPM | RESPIRATION RATE: 18 BRPM

## 2022-01-05 DIAGNOSIS — R59.0 ANTERIOR CERVICAL LYMPHADENOPATHY: ICD-10-CM

## 2022-01-05 DIAGNOSIS — J02.9 SORE THROAT: Primary | ICD-10-CM

## 2022-01-05 DIAGNOSIS — Z20.822 LAB TEST NEGATIVE FOR COVID-19 VIRUS: ICD-10-CM

## 2022-01-05 LAB
CTP QC/QA: YES
CTP QC/QA: YES
MOLECULAR STREP A: NEGATIVE
SARS-COV-2 RDRP RESP QL NAA+PROBE: NEGATIVE

## 2022-01-05 PROCEDURE — U0002: ICD-10-PCS | Mod: QW,S$GLB,, | Performed by: PHYSICIAN ASSISTANT

## 2022-01-05 PROCEDURE — 3078F DIAST BP <80 MM HG: CPT | Mod: CPTII,S$GLB,, | Performed by: PHYSICIAN ASSISTANT

## 2022-01-05 PROCEDURE — 1160F RVW MEDS BY RX/DR IN RCRD: CPT | Mod: CPTII,S$GLB,, | Performed by: PHYSICIAN ASSISTANT

## 2022-01-05 PROCEDURE — 1159F PR MEDICATION LIST DOCUMENTED IN MEDICAL RECORD: ICD-10-PCS | Mod: CPTII,S$GLB,, | Performed by: PHYSICIAN ASSISTANT

## 2022-01-05 PROCEDURE — 3078F PR MOST RECENT DIASTOLIC BLOOD PRESSURE < 80 MM HG: ICD-10-PCS | Mod: CPTII,S$GLB,, | Performed by: PHYSICIAN ASSISTANT

## 2022-01-05 PROCEDURE — 1160F PR REVIEW ALL MEDS BY PRESCRIBER/CLIN PHARMACIST DOCUMENTED: ICD-10-PCS | Mod: CPTII,S$GLB,, | Performed by: PHYSICIAN ASSISTANT

## 2022-01-05 PROCEDURE — 1159F MED LIST DOCD IN RCRD: CPT | Mod: CPTII,S$GLB,, | Performed by: PHYSICIAN ASSISTANT

## 2022-01-05 PROCEDURE — 3008F BODY MASS INDEX DOCD: CPT | Mod: CPTII,S$GLB,, | Performed by: PHYSICIAN ASSISTANT

## 2022-01-05 PROCEDURE — U0002 COVID-19 LAB TEST NON-CDC: HCPCS | Mod: QW,S$GLB,, | Performed by: PHYSICIAN ASSISTANT

## 2022-01-05 PROCEDURE — 87651 STREP A DNA AMP PROBE: CPT | Mod: QW,S$GLB,, | Performed by: PHYSICIAN ASSISTANT

## 2022-01-05 PROCEDURE — 99214 OFFICE O/P EST MOD 30 MIN: CPT | Mod: S$GLB,,, | Performed by: PHYSICIAN ASSISTANT

## 2022-01-05 PROCEDURE — 3074F SYST BP LT 130 MM HG: CPT | Mod: CPTII,S$GLB,, | Performed by: PHYSICIAN ASSISTANT

## 2022-01-05 PROCEDURE — 87651 POCT STREP A MOLECULAR: ICD-10-PCS | Mod: QW,S$GLB,, | Performed by: PHYSICIAN ASSISTANT

## 2022-01-05 PROCEDURE — 3074F PR MOST RECENT SYSTOLIC BLOOD PRESSURE < 130 MM HG: ICD-10-PCS | Mod: CPTII,S$GLB,, | Performed by: PHYSICIAN ASSISTANT

## 2022-01-05 PROCEDURE — 99214 PR OFFICE/OUTPT VISIT, EST, LEVL IV, 30-39 MIN: ICD-10-PCS | Mod: S$GLB,,, | Performed by: PHYSICIAN ASSISTANT

## 2022-01-05 PROCEDURE — 3008F PR BODY MASS INDEX (BMI) DOCUMENTED: ICD-10-PCS | Mod: CPTII,S$GLB,, | Performed by: PHYSICIAN ASSISTANT

## 2022-01-05 RX ORDER — AZITHROMYCIN 250 MG/1
TABLET, FILM COATED ORAL
Qty: 6 TABLET | Refills: 0 | Status: SHIPPED | OUTPATIENT
Start: 2022-01-05 | End: 2022-03-07

## 2022-01-05 NOTE — PATIENT INSTRUCTIONS
Z-Ian given for lymphadenopathy    Sore throat -You can purchase cough drops over the counter to soothe your sore throat.  You may gargle with hot salt water to alleviate some of your throat discomfort.  Drink plenty of fluids, recommend warm tea with honey.   Avoid spicy food, citrus fruits, and red sauces- as this may irritate the throat more.    You can also take a daily anti-histamine such as Zyrtec, Claritin, Xyzal, Allegra, etc to help with runny nose/sneezing/sore throat/cough.    If your symptoms do not improve, you should return to this clinic. If your symptoms worsen, go to the emergency room.     OVER THE COUNTER RECOMMENDATIONS/SUGGESTIONS.--if needed  Remember to switch antihistamines every 3 months, if taken daily.     Make sure to stay well hydrated.    Use Nasal Saline to mechanically move any post nasal drip from your eustachian tube or from the back of your throat.    Use warm salt water gargles to ease your throat pain. Warm salt water gargles as needed for sore throat-  1/2 tsp salt to 1 cup warm water, gargle as desired.    Use an antihistamine such as Claritin, Zyrtec or Allegra to dry you out (NONDROWSY) or Benadryl (DROWSY).    Use pseudoephedrine (behind the counter) to decongest. Pseudoephedrine  30 mg up to 240 mg /day. *BE AWARE- It can raise your blood pressure and give you palpitations.    Use mucinex (guaifenisin) to break up mucous up to 2400mg/day to loosen any mucous.   The mucinex DM pill has a cough suppressant that can be sedating. It can be used at night to stop the tickle at the back of your throat.  You can use Mucinex D (it has guaifenesin and a high dose of pseudoephedrine) in the mornings to help decongest.      Use Flonase 1-2 sprays/nostril per day. It is a local acting steroid nasal spray, if you develop a bloody nose, stop using the medication immediately.    Sometimes Nyquil at night is beneficial to help you get some rest, however it is sedating and it does have an  antihistamine, and tylenol.    Honey is a natural cough suppressant that can be used.    Tylenol up to 4,000 mg a day is safe for short periods and can be used for headache, body aches, pain, and fever. However in high doses and prolonged use it can cause liver irritation.    Ibuprofen is a non-steroidal anti-inflammatory that can be used for headache, body aches, pain, and fever.However it can also cause stomach irritation if over used.      - You must understand that you have received an Urgent Care treatment only and that you may be released before all of your medical problems are known or treated.   - You, the patient, will arrange for follow up care as instructed with your primary care provider or recommended specialist.   - If your condition worsens or fails to improve we recommend that you receive another evaluation at the ER immediately or contact your PCP to discuss your concerns, or return here.   - Please do not drive or make any important decisions for 24 hours if you have received any pain medications, sedatives or mood altering drugs during your visit.    Disclaimer: This document was drafted with the use of a voice recognition device and is likely to have sound alike errors.

## 2022-01-05 NOTE — PROGRESS NOTES
"Subjective:       Patient ID: Carole Moore is a 56 y.o. female.    Vitals:  height is 5' 7" (1.702 m) and weight is 77.1 kg (170 lb). Her oral temperature is 97.9 °F (36.6 °C). Her blood pressure is 115/60 and her pulse is 81. Her respiration is 18 and oxygen saturation is 99%.     Chief Complaint: Sore Throat    56-year-old female presents to urgent care complaining of sore throat which began yesterday.  Is associated ear fullness, congestion, swollen lymph nodes, neck soreness, call, and headache.  She is requesting a strep test today.  Initially declined COVID but was amenable to it after strep test negative.     Sore Throat   This is a new problem. The current episode started yesterday. The problem has been gradually worsening. The pain is worse on the left side. There has been no fever. The pain is at a severity of 8/10. The pain is moderate. Associated symptoms include congestion, coughing, ear pain, headaches, a hoarse voice, neck pain and swollen glands. Pertinent negatives include no abdominal pain, diarrhea, drooling, ear discharge, plugged ear sensation, shortness of breath, stridor or vomiting. She has tried nothing for the symptoms. The treatment provided no relief.       Constitution: Negative for chills, fatigue, generalized weakness and international travel in last 60 days.   HENT: Positive for ear pain, congestion and sore throat. Negative for ear discharge, tinnitus, drooling, tongue pain, facial swelling and voice change.    Neck: Positive for neck pain. Negative for neck stiffness and neck swelling.   Cardiovascular: Negative for chest pain and palpitations.   Eyes: Negative for eye pain, photophobia and vision loss.   Respiratory: Positive for cough. Negative for chest tightness, shortness of breath, stridor and wheezing.    Gastrointestinal: Negative for abdominal pain, nausea, vomiting, constipation and diarrhea.   Genitourinary: Negative for dysuria and hematuria.   Musculoskeletal: " "Negative for pain and back pain.   Skin: Negative for rash, erythema and bruising.   Neurological: Positive for headaches. Negative for dizziness, light-headedness, speech difficulty, altered mental status, loss of consciousness, numbness and tingling.   Psychiatric/Behavioral: Negative for altered mental status.       Objective:       Vitals:    01/05/22 1209   BP: 115/60   Pulse: 81   Resp: 18   Temp: 97.9 °F (36.6 °C)   TempSrc: Oral   SpO2: 99%   Weight: 77.1 kg (170 lb)   Height: 5' 7" (1.702 m)       Physical Exam   Constitutional: She is oriented to person, place, and time. She appears well-developed. She is cooperative.  Non-toxic appearance. She does not appear ill. No distress. awake  HENT:   Head: Normocephalic and atraumatic.   Ears:   Right Ear: Hearing, tympanic membrane, external ear and ear canal normal. No drainage. impacted cerumen  Left Ear: Hearing, tympanic membrane, external ear and ear canal normal. No drainage. impacted cerumen  Nose: Nose normal. No mucosal edema, rhinorrhea, purulent discharge, nasal deformity or congestion. No epistaxis. Right sinus exhibits no maxillary sinus tenderness and no frontal sinus tenderness. Left sinus exhibits no maxillary sinus tenderness and no frontal sinus tenderness.   Mouth/Throat: Uvula is midline and mucous membranes are normal. Mucous membranes are moist. No oral lesions. No trismus in the jaw. Normal dentition. No uvula swelling. No oropharyngeal exudate, posterior oropharyngeal edema, posterior oropharyngeal erythema, tonsillar abscesses or cobblestoning. Tonsils are 0 on the right. Tonsils are 0 on the left. No tonsillar exudate. Oropharynx is clear.   Eyes: Conjunctivae and lids are normal. Pupils are equal, round, and reactive to light. No visual field deficit is present. Right eye exhibits no discharge. Left eye exhibits no discharge. No scleral icterus. Right eye exhibits no nystagmus. Left eye exhibits no nystagmus.      extraocular movement " intact vision grossly intact gaze aligned appropriately periorbital hyperpigmentation  Neck: Trachea normal and phonation normal. Neck supple. No neck rigidity present.   Cardiovascular: Normal rate, regular rhythm, S1 normal, S2 normal, normal heart sounds and normal pulses. Exam reveals no decreased pulses.   Pulmonary/Chest: Effort normal and breath sounds normal. No accessory muscle usage or stridor. No tachypnea and no bradypnea. No respiratory distress. She has no decreased breath sounds. She has no wheezes. She has no rhonchi. She has no rales. She exhibits no tenderness.   Abdominal: Normal appearance and bowel sounds are normal. She exhibits no distension, no pulsatile midline mass and no mass. Soft. There is no abdominal tenderness. There is no rebound, no guarding, no left CVA tenderness and no right CVA tenderness.   Musculoskeletal: Normal range of motion.         General: No deformity. Normal range of motion.      Cervical back: She exhibits no tenderness.      Right lower leg: No edema.      Left lower leg: No edema.   Lymphadenopathy:     She has cervical adenopathy.   Neurological: no focal deficit. She is alert, oriented to person, place, and time and at baseline. She has normal sensation and intact cranial nerves. She displays no weakness and no dysarthria. No cranial nerve deficit. She exhibits normal muscle tone. Gait normal. Coordination and gait normal.   Skin: Skin is warm, dry, intact, not diaphoretic, not pale and no rash. Capillary refill takes less than 2 seconds. No erythema and No lesion   Psychiatric: Her speech is normal and behavior is normal. Judgment and thought content normal. Her affect is tearful.      Comments: Patient reports that her daughter's baby shower scheduled for this week and she is worried about the COVID test--Tearful   Nursing note and vitals reviewed.        Assessment:       1. Sore throat    2. Anterior cervical lymphadenopathy    3. Lab test negative for  COVID-19 virus        Results for orders placed or performed in visit on 01/05/22   POCT Strep A, Molecular   Result Value Ref Range    Molecular Strep A, POC Negative Negative     Acceptable Yes    POCT COVID-19 Rapid Screening   Result Value Ref Range    POC Rapid COVID Negative Negative     Acceptable Yes        Plan:         Sore throat  -     POCT Strep A, Molecular  -     POCT COVID-19 Rapid Screening    Anterior cervical lymphadenopathy  -     azithromycin (Z-NIYAH) 250 MG tablet; Take 2 tablets by mouth on day 1; Take 1 tablet by mouth on days 2-5  Dispense: 6 tablet; Refill: 0    Lab test negative for COVID-19 virus         Patient Instructions   Z-Niyah given for lymphadenopathy    Sore throat -You can purchase cough drops over the counter to soothe your sore throat.  You may gargle with hot salt water to alleviate some of your throat discomfort.  Drink plenty of fluids, recommend warm tea with honey.   Avoid spicy food, citrus fruits, and red sauces- as this may irritate the throat more.    You can also take a daily anti-histamine such as Zyrtec, Claritin, Xyzal, Allegra, etc to help with runny nose/sneezing/sore throat/cough.    If your symptoms do not improve, you should return to this clinic. If your symptoms worsen, go to the emergency room.     OVER THE COUNTER RECOMMENDATIONS/SUGGESTIONS.--if needed  Remember to switch antihistamines every 3 months, if taken daily.     Make sure to stay well hydrated.    Use Nasal Saline to mechanically move any post nasal drip from your eustachian tube or from the back of your throat.    Use warm salt water gargles to ease your throat pain. Warm salt water gargles as needed for sore throat-  1/2 tsp salt to 1 cup warm water, gargle as desired.    Use an antihistamine such as Claritin, Zyrtec or Allegra to dry you out (NONDROWSY) or Benadryl (DROWSY).    Use pseudoephedrine (behind the counter) to decongest. Pseudoephedrine  30 mg up to 240  mg /day. *BE AWARE- It can raise your blood pressure and give you palpitations.    Use mucinex (guaifenisin) to break up mucous up to 2400mg/day to loosen any mucous.   The mucinex DM pill has a cough suppressant that can be sedating. It can be used at night to stop the tickle at the back of your throat.  You can use Mucinex D (it has guaifenesin and a high dose of pseudoephedrine) in the mornings to help decongest.      Use Flonase 1-2 sprays/nostril per day. It is a local acting steroid nasal spray, if you develop a bloody nose, stop using the medication immediately.    Sometimes Nyquil at night is beneficial to help you get some rest, however it is sedating and it does have an antihistamine, and tylenol.    Honey is a natural cough suppressant that can be used.    Tylenol up to 4,000 mg a day is safe for short periods and can be used for headache, body aches, pain, and fever. However in high doses and prolonged use it can cause liver irritation.    Ibuprofen is a non-steroidal anti-inflammatory that can be used for headache, body aches, pain, and fever.However it can also cause stomach irritation if over used.      - You must understand that you have received an Urgent Care treatment only and that you may be released before all of your medical problems are known or treated.   - You, the patient, will arrange for follow up care as instructed with your primary care provider or recommended specialist.   - If your condition worsens or fails to improve we recommend that you receive another evaluation at the ER immediately or contact your PCP to discuss your concerns, or return here.   - Please do not drive or make any important decisions for 24 hours if you have received any pain medications, sedatives or mood altering drugs during your visit.    Disclaimer: This document was drafted with the use of a voice recognition device and is likely to have sound alike errors.

## 2022-01-23 ENCOUNTER — PATIENT MESSAGE (OUTPATIENT)
Dept: ADMINISTRATIVE | Facility: OTHER | Age: 57
End: 2022-01-23
Payer: MEDICARE

## 2022-03-07 ENCOUNTER — OFFICE VISIT (OUTPATIENT)
Dept: INTERNAL MEDICINE | Facility: CLINIC | Age: 57
End: 2022-03-07
Payer: MEDICARE

## 2022-03-07 VITALS
WEIGHT: 165.81 LBS | SYSTOLIC BLOOD PRESSURE: 110 MMHG | HEART RATE: 97 BPM | TEMPERATURE: 97 F | BODY MASS INDEX: 25.97 KG/M2 | DIASTOLIC BLOOD PRESSURE: 70 MMHG

## 2022-03-07 DIAGNOSIS — I69.351 HEMIPARESIS AFFECTING RIGHT SIDE AS LATE EFFECT OF CEREBROVASCULAR ACCIDENT: ICD-10-CM

## 2022-03-07 DIAGNOSIS — Z17.0 MALIGNANT NEOPLASM OF CENTRAL PORTION OF RIGHT BREAST IN FEMALE, ESTROGEN RECEPTOR POSITIVE: ICD-10-CM

## 2022-03-07 DIAGNOSIS — C50.111 MALIGNANT NEOPLASM OF CENTRAL PORTION OF RIGHT BREAST IN FEMALE, ESTROGEN RECEPTOR POSITIVE: ICD-10-CM

## 2022-03-07 DIAGNOSIS — E78.2 MIXED HYPERLIPIDEMIA: ICD-10-CM

## 2022-03-07 DIAGNOSIS — E78.2 MIXED HYPERLIPIDEMIA: Primary | ICD-10-CM

## 2022-03-07 DIAGNOSIS — J06.9 UPPER RESPIRATORY TRACT INFECTION, UNSPECIFIED TYPE: ICD-10-CM

## 2022-03-07 DIAGNOSIS — Z00.00 ROUTINE GENERAL MEDICAL EXAMINATION AT HEALTH CARE FACILITY: Primary | ICD-10-CM

## 2022-03-07 PROCEDURE — 1159F MED LIST DOCD IN RCRD: CPT | Mod: CPTII,S$GLB,, | Performed by: INTERNAL MEDICINE

## 2022-03-07 PROCEDURE — 99499 UNLISTED E&M SERVICE: CPT | Mod: HCNC,S$GLB,, | Performed by: INTERNAL MEDICINE

## 2022-03-07 PROCEDURE — 3008F PR BODY MASS INDEX (BMI) DOCUMENTED: ICD-10-PCS | Mod: CPTII,S$GLB,, | Performed by: INTERNAL MEDICINE

## 2022-03-07 PROCEDURE — 1160F PR REVIEW ALL MEDS BY PRESCRIBER/CLIN PHARMACIST DOCUMENTED: ICD-10-PCS | Mod: CPTII,S$GLB,, | Performed by: INTERNAL MEDICINE

## 2022-03-07 PROCEDURE — 99499 RISK ADDL DX/OHS AUDIT: ICD-10-PCS | Mod: HCNC,S$GLB,, | Performed by: INTERNAL MEDICINE

## 2022-03-07 PROCEDURE — 1159F PR MEDICATION LIST DOCUMENTED IN MEDICAL RECORD: ICD-10-PCS | Mod: CPTII,S$GLB,, | Performed by: INTERNAL MEDICINE

## 2022-03-07 PROCEDURE — 1160F RVW MEDS BY RX/DR IN RCRD: CPT | Mod: CPTII,S$GLB,, | Performed by: INTERNAL MEDICINE

## 2022-03-07 PROCEDURE — 99999 PR PBB SHADOW E&M-EST. PATIENT-LVL III: CPT | Mod: PBBFAC,,, | Performed by: INTERNAL MEDICINE

## 2022-03-07 PROCEDURE — 3074F PR MOST RECENT SYSTOLIC BLOOD PRESSURE < 130 MM HG: ICD-10-PCS | Mod: CPTII,S$GLB,, | Performed by: INTERNAL MEDICINE

## 2022-03-07 PROCEDURE — 3078F DIAST BP <80 MM HG: CPT | Mod: CPTII,S$GLB,, | Performed by: INTERNAL MEDICINE

## 2022-03-07 PROCEDURE — 3074F SYST BP LT 130 MM HG: CPT | Mod: CPTII,S$GLB,, | Performed by: INTERNAL MEDICINE

## 2022-03-07 PROCEDURE — 3078F PR MOST RECENT DIASTOLIC BLOOD PRESSURE < 80 MM HG: ICD-10-PCS | Mod: CPTII,S$GLB,, | Performed by: INTERNAL MEDICINE

## 2022-03-07 PROCEDURE — 99999 PR PBB SHADOW E&M-EST. PATIENT-LVL III: ICD-10-PCS | Mod: PBBFAC,,, | Performed by: INTERNAL MEDICINE

## 2022-03-07 PROCEDURE — 99214 OFFICE O/P EST MOD 30 MIN: CPT | Mod: 25,S$GLB,, | Performed by: INTERNAL MEDICINE

## 2022-03-07 PROCEDURE — 3008F BODY MASS INDEX DOCD: CPT | Mod: CPTII,S$GLB,, | Performed by: INTERNAL MEDICINE

## 2022-03-07 PROCEDURE — 99214 PR OFFICE/OUTPT VISIT, EST, LEVL IV, 30-39 MIN: ICD-10-PCS | Mod: 25,S$GLB,, | Performed by: INTERNAL MEDICINE

## 2022-03-07 NOTE — PATIENT INSTRUCTIONS
Patient Education       Mediterranean Diet   About this topic   This is a heart healthy diet. It is based on widely used foods and cooking styles from many countries around the [place]. The main pattern for the diet is more plant foods and monounsaturated fats, or good fats, like olive oil. Protein in this diet comes from seafood, legumes, nuts, seeds, and poultry and eggs in lowered amounts. You will also eat more whole grains, vegetables, and fruits and moderate amounts of alcohol are also included. This diet has less red meats, dairy products, and processed foods.       What will the results be?   Your diet will have less saturated fat, cholesterol, calories, sodium, and added sugars. Your diet will be higher in fiber. This will help to keep your blood sugar steady. This diet lowers the chance of heart disease and other health problems.  What lifestyle changes are needed?   If you do not often eat this way, you will need to change your eating habits. Be sure to get regular exercise. It is believed to help the health benefits of this diet.  What changes to diet are needed?   You may need to limit the amount of red meat and processed foods in your diet. Ask your dietitian for help planning meals that are right for you.  What foods are good to eat?   Plenty of fish and other seafood  Fresh, frozen, or canned fruits and vegetables  Nuts and nut butters and dried beans, lentils, or peas  Foods high in fiber like whole grains and whole grain products  Olive oil (good fat), peanut or canola oil, margarine, or spreads that list vegetable oil as the first ingredient and do not  contain trans fat or partially hydrogenated oil  Small amounts of poultry and eggs  What foods should be limited or avoided?   Red meats  Sweets, desserts, and processed foods  Butter, oils, and fats that contain trans fats or are hydrogenated or partially hydrogenated  Gravies and sauces  What problems could happen?   Your weight may  rise because your diet will be higher in fat from olive oil and nuts.  You may have lower iron levels. Be sure to eat foods rich in iron. Also, eat foods rich in vitamin C. This will help your body take in iron.  You may have lower calcium levels because you are eating less dairy products. Ask your doctor if you need to take a calcium supplement.  Wine is often thought of as part of a Mediterranean diet. It is not needed and you may choose not to include it. Avoid wine if you are prone to alcohol abuse or are pregnant. Also, avoid it if you are at risk for breast cancer, have liver problems, or have other illnesses that make it important for you to not have alcohol.  When do I need to call the doctor?   If you have any concerns about your diet  Where can I learn more?   Academy of Nutrition and Dietetics  http://www.eatright.org/resource/food/planning-and-prep/cooking-tips-and-trends/make-it-mediterranean   American Heart Association  https://www.heart.org/en/healthy-living/healthy-eating/eat-smart/nutrition-basics/mediterranean-diet   Last Reviewed Date   2021-10-11  Consumer Information Use and Disclaimer   This information is not specific medical advice and does not replace information you receive from your health care provider. This is only a brief summary of general information. It does NOT include all information about conditions, illnesses, injuries, tests, procedures, treatments, therapies, discharge instructions or life-style choices that may apply to you. You must talk with your health care provider for complete information about your health and treatment options. This information should not be used to decide whether or not to accept your health care providers advice, instructions or recommendations. Only your health care provider has the knowledge and training to provide advice that is right for you.  Copyright   Copyright © 2021 [employer] and its affiliates and/or licensors. All rights  reserved.

## 2022-03-07 NOTE — PROGRESS NOTES
Subjective:       Patient ID: Carole Moore is a 56 y.o. female.    Chief Complaint: Sinus Problem, Gastroesophageal Reflux, Cough, and Headache    HPI Patient is a 56-year-old female presenting today with concerns about some upper respiratory symptoms.  She states over last few days she has been having congestion sort of scratchy throat cough runny nose.  She has is states he symptoms began just a few days ago.  She is concerned as she has a new grandchild and want make sure she was not having the flu or COVID.  She is vaccinated for COVID.  She has also had her flu shot.    She has history of hemiparesis on the right side due to a cerebrovascular accident.  There has been no change in her symptoms there.  This is longstanding deficit.  She notes no issues there.    She has a history of hyperlipidemia and she is currently on treatment with statin therapy.  She tolerates statins well without adverse effects.    She is currently remains under treatment for breast cancer.  She had is on Arimidex for an ER positive tumor in the right breast.  She notes no problems with the medications at this time.    Review of Systems   Constitutional: Negative for chills and fever.   HENT: Positive for congestion and sore throat. Negative for drooling, ear discharge, ear pain and trouble swallowing.    Respiratory: Positive for cough. Negative for shortness of breath, wheezing and stridor.    Cardiovascular: Negative for chest pain and palpitations.   Gastrointestinal: Negative for abdominal pain, blood in stool, constipation, diarrhea, nausea and vomiting.   Genitourinary: Negative for dysuria and hematuria.   Musculoskeletal: Negative for neck pain.   Skin: Negative for rash.   Neurological: Negative for headaches.       Objective:   /70   Pulse 97   Temp 97.1 °F (36.2 °C)   Wt 75.2 kg (165 lb 12.6 oz)   LMP 10/11/2009   BMI 25.97 kg/m²      Physical Exam  Vitals reviewed.   Constitutional:       General: She is  not in acute distress.     Appearance: Normal appearance. She is well-developed. She is not ill-appearing.   HENT:      Head: Normocephalic and atraumatic.      Right Ear: Tympanic membrane, ear canal and external ear normal.      Left Ear: Tympanic membrane, ear canal and external ear normal.   Cardiovascular:      Rate and Rhythm: Normal rate and regular rhythm.      Heart sounds: No murmur heard.    No friction rub. No gallop.   Pulmonary:      Effort: Pulmonary effort is normal.      Breath sounds: Normal breath sounds. No wheezing or rales.   Chest:      Chest wall: No tenderness.   Abdominal:      General: Abdomen is flat. Bowel sounds are normal. There is no distension.      Palpations: Abdomen is soft.      Tenderness: There is no abdominal tenderness.   Lymphadenopathy:      Cervical: No cervical adenopathy.   Skin:     Findings: No rash.   Neurological:      General: No focal deficit present.      Mental Status: She is alert and oriented to person, place, and time.         No visits with results within 2 Week(s) from this visit.   Latest known visit with results is:   Office Visit on 01/05/2022   Component Date Value    Molecular Strep A, POC 01/05/2022 Negative      Acceptab* 01/05/2022 Yes     POC Rapid COVID 01/05/2022 Negative      Acceptab* 01/05/2022 Yes        Assessment:       1. Mixed hyperlipidemia    2. Malignant neoplasm of central portion of right breast in female, estrogen receptor positive    3. Hemiparesis affecting right side as late effect of cerebrovascular accident    4. Upper respiratory tract infection, unspecified type        Plan:   Malignant neoplasm of central portion of right breast in female, estrogen receptor positive  Continues on arimidex therapy.  Following with Oncology    Hyperlipidemia  Cholesterol numbers look good.  We will continue the current regimen at this time.  Remain focused on low fat diet and high dietary fiber  intake.      Hemiparesis affecting right side as late effect of cerebrovascular accident  Stable, no recent changes.    Mixed hyperlipidemia    Malignant neoplasm of central portion of right breast in female, estrogen receptor positive    Hemiparesis affecting right side as late effect of cerebrovascular accident    Upper respiratory tract infection, unspecified type  -     POCT COVID-19 Rapid Screening  -     POCT Influenza A/B Molecular    Patient is negative for COVID-19 as well as influenza.  I recommended symptomatic treatment of her upper respiratory symptoms.  His most likely a benign viral issue.  She expresses understanding.

## 2022-03-23 DIAGNOSIS — Z79.811 AROMATASE INHIBITOR USE: ICD-10-CM

## 2022-03-23 RX ORDER — ANASTROZOLE 1 MG/1
1 TABLET ORAL DAILY
Qty: 90 TABLET | Refills: 3 | Status: SHIPPED | OUTPATIENT
Start: 2022-03-23 | End: 2023-04-10 | Stop reason: SDUPTHER

## 2022-03-24 ENCOUNTER — OFFICE VISIT (OUTPATIENT)
Dept: UROLOGY | Facility: CLINIC | Age: 57
End: 2022-03-24
Payer: MEDICARE

## 2022-03-24 VITALS
DIASTOLIC BLOOD PRESSURE: 74 MMHG | BODY MASS INDEX: 25.72 KG/M2 | SYSTOLIC BLOOD PRESSURE: 110 MMHG | WEIGHT: 164.25 LBS

## 2022-03-24 DIAGNOSIS — C50.111 MALIGNANT NEOPLASM OF CENTRAL PORTION OF RIGHT BREAST IN FEMALE, ESTROGEN RECEPTOR POSITIVE: ICD-10-CM

## 2022-03-24 DIAGNOSIS — Z17.0 MALIGNANT NEOPLASM OF CENTRAL PORTION OF RIGHT BREAST IN FEMALE, ESTROGEN RECEPTOR POSITIVE: ICD-10-CM

## 2022-03-24 DIAGNOSIS — N30.80 CYSTITIS CYSTICA: ICD-10-CM

## 2022-03-24 DIAGNOSIS — N39.0 RECURRENT UTI: Primary | ICD-10-CM

## 2022-03-24 PROCEDURE — 99999 PR PBB SHADOW E&M-EST. PATIENT-LVL III: CPT | Mod: PBBFAC,,, | Performed by: UROLOGY

## 2022-03-24 PROCEDURE — 3008F BODY MASS INDEX DOCD: CPT | Mod: CPTII,S$GLB,, | Performed by: UROLOGY

## 2022-03-24 PROCEDURE — 99999 PR PBB SHADOW E&M-EST. PATIENT-LVL III: ICD-10-PCS | Mod: PBBFAC,,, | Performed by: UROLOGY

## 2022-03-24 PROCEDURE — 1159F MED LIST DOCD IN RCRD: CPT | Mod: CPTII,S$GLB,, | Performed by: UROLOGY

## 2022-03-24 PROCEDURE — 99213 OFFICE O/P EST LOW 20 MIN: CPT | Mod: S$GLB,,, | Performed by: UROLOGY

## 2022-03-24 PROCEDURE — 3074F SYST BP LT 130 MM HG: CPT | Mod: CPTII,S$GLB,, | Performed by: UROLOGY

## 2022-03-24 PROCEDURE — 1159F PR MEDICATION LIST DOCUMENTED IN MEDICAL RECORD: ICD-10-PCS | Mod: CPTII,S$GLB,, | Performed by: UROLOGY

## 2022-03-24 PROCEDURE — 3074F PR MOST RECENT SYSTOLIC BLOOD PRESSURE < 130 MM HG: ICD-10-PCS | Mod: CPTII,S$GLB,, | Performed by: UROLOGY

## 2022-03-24 PROCEDURE — 99213 PR OFFICE/OUTPT VISIT, EST, LEVL III, 20-29 MIN: ICD-10-PCS | Mod: S$GLB,,, | Performed by: UROLOGY

## 2022-03-24 PROCEDURE — 3078F DIAST BP <80 MM HG: CPT | Mod: CPTII,S$GLB,, | Performed by: UROLOGY

## 2022-03-24 PROCEDURE — 3008F PR BODY MASS INDEX (BMI) DOCUMENTED: ICD-10-PCS | Mod: CPTII,S$GLB,, | Performed by: UROLOGY

## 2022-03-24 PROCEDURE — 3078F PR MOST RECENT DIASTOLIC BLOOD PRESSURE < 80 MM HG: ICD-10-PCS | Mod: CPTII,S$GLB,, | Performed by: UROLOGY

## 2022-03-24 RX ORDER — NITROFURANTOIN 25; 75 MG/1; MG/1
100 CAPSULE ORAL DAILY
Qty: 30 CAPSULE | Refills: 0 | Status: SHIPPED | OUTPATIENT
Start: 2022-03-24 | End: 2022-09-19

## 2022-03-24 NOTE — PROGRESS NOTES
Chief Complaint: f/u recurrent UTI    HPI:     3/24/22- Daily macrobid seems to be working. No recent UTIs. Pt has an upcoming trip and would like to continue daily abx through then. No dysuria, abdominal pain or gross hematuria.    12/21/21- bladder neck biopsy with chronic cystitis with cystitis cystica and glandularis. Had some bloating after the procedure for a week. No gross hematuria. No dysuria. Has had recurrent UTIs every month or so lately.   11/5/21-here for cysto. CT urogram with tiny right renal cyst.   10/21/77-Kn-rlmkycky for dipstick positive UA for blood, urine culture negative. She reports that a year ago, she was having recurrent UTIs. She reports that after being cathed in the  clinic, she didn't have any more UTIs. Currently, she reports that she is now having frequency and dysuria. She was treated with abx (cipro) a few weeks ago and symptoms improved, but she is scared they will recur. No gross hematuria. She was on oxybutynin, but it gave her dry mouth.   1/30/20: 53 yo woman with breast cancer history about a year ago had a few UTI.  Sole symptom was frequency.   Bought some new underwear and after changing back to old type things got better and no problems since, about 5 mo.  No abd/pelvic pain and no exac/rel factors.  No hematuria.  No urolithiasis.  No urinary bother.  No  history.  Normal sexual function.  Normal OB exam 2 mo ago.  Microhematuria on dipstick UA.  Had a stroke 20 yrs ago, on disability.    Allergies:  Patient has no known allergies.    Medications: has a current medication list which includes the following prescription(s): anastrozole, aspirin, atorvastatin, baclofen, cetirizine, fluocinonide 0.05%, loratadine, mometasone 0.1%, hydrocortisone, and nitrofurantoin (macrocrystal-monohydrate).    Review of Systems:  General: No fever, chills, fatigability, or weight loss.  Skin: No rashes, itching, or changes in color or texture of skin.  Chest: Denies LIPSCOMB, cyanosis,  wheezing, cough, and sputum production.  Abdomen: Appetite fine. No weight loss. Denies diarrhea, abdominal pain, hematemesis, or blood in stool.  Musculoskeletal: No joint stiffness or swelling. Denies back pain.  : As above.  All other review of systems negative.    PMH:   has a past medical history of Breast cancer (2016), Cancer, CVA (cerebral infarction), Diverticulosis, Family history of colon cancer (10/30/2018), Hyperlipidemia, Nausea after anesthesia, Paralysis, PONV (postoperative nausea and vomiting), and Stroke.    PSH:   has a past surgical history that includes Tonsillectomy; Mediport insertion, single; Breast surgery; Colonoscopy (N/A, 10/30/2018); Mastectomy (Right, 2017); Augmentation of breast (Left, 2017); Hysterectomy (2007); and Cystoscopy with biopsy of bladder (N/A, 11/29/2021).    FamHx: family history includes Breast cancer in her paternal aunt; Colon cancer in her father and mother; Melanoma in her maternal uncle; Skin cancer in her maternal uncle.    SocHx:  reports that she has never smoked. She has never used smokeless tobacco. She reports that she does not drink alcohol and does not use drugs.     Physical Exam:  Vitals:   Vitals:    03/24/22 0949   BP: 110/74     General: A&Ox3. No apparent distress. No deformities.  Neck: No masses. Normal thyroid.  Lungs: normal inspiration. No use of accessory muscles.  Heart: normal pulse. No arrhythmias.  Abdomen: Soft. NT. ND. No masses. No hernias. No hepatosplenomegaly.  Lymphatic: Neck and groin nodes negative.  Skin: The skin is warm and dry. No jaundice.  Ext: No c/c/e.  : External genitalia normal.         Labs/Studies:   UA: trace blood, otherwise normal    Impression/Plan:   Recurrent UTI  -     POCT URINE DIPSTICK WITHOUT MICROSCOPE    Malignant neoplasm of central portion of right breast in female, estrogen receptor positive    Cystitis cystica    Other orders  -     nitrofurantoin, macrocrystal-monohydrate, (MACROBID) 100 MG  capsule; Take 1 capsule (100 mg total) by mouth once daily.  Dispense: 30 capsule; Refill: 0       discussed low estrogen status as it relates to recurrent UTI  1 more month of macrobid to extend through upcoming vacation and then stop  Follow up in about 6 months (around 9/24/2022).

## 2022-03-30 ENCOUNTER — PATIENT OUTREACH (OUTPATIENT)
Dept: ADMINISTRATIVE | Facility: OTHER | Age: 57
End: 2022-03-30
Payer: MEDICARE

## 2022-03-31 ENCOUNTER — OFFICE VISIT (OUTPATIENT)
Dept: CARDIOLOGY | Facility: CLINIC | Age: 57
End: 2022-03-31
Payer: MEDICARE

## 2022-03-31 VITALS
SYSTOLIC BLOOD PRESSURE: 114 MMHG | HEART RATE: 62 BPM | OXYGEN SATURATION: 98 % | HEIGHT: 67 IN | DIASTOLIC BLOOD PRESSURE: 80 MMHG | BODY MASS INDEX: 26.02 KG/M2 | WEIGHT: 165.81 LBS

## 2022-03-31 DIAGNOSIS — C50.111 MALIGNANT NEOPLASM OF CENTRAL PORTION OF RIGHT BREAST IN FEMALE, ESTROGEN RECEPTOR POSITIVE: ICD-10-CM

## 2022-03-31 DIAGNOSIS — Z86.73 HISTORY OF CVA (CEREBROVASCULAR ACCIDENT): Primary | ICD-10-CM

## 2022-03-31 DIAGNOSIS — E78.2 MIXED HYPERLIPIDEMIA: ICD-10-CM

## 2022-03-31 DIAGNOSIS — R53.1 RIGHT SIDED WEAKNESS: ICD-10-CM

## 2022-03-31 DIAGNOSIS — Z17.0 MALIGNANT NEOPLASM OF CENTRAL PORTION OF RIGHT BREAST IN FEMALE, ESTROGEN RECEPTOR POSITIVE: ICD-10-CM

## 2022-03-31 PROCEDURE — 3008F PR BODY MASS INDEX (BMI) DOCUMENTED: ICD-10-PCS | Mod: CPTII,S$GLB,, | Performed by: STUDENT IN AN ORGANIZED HEALTH CARE EDUCATION/TRAINING PROGRAM

## 2022-03-31 PROCEDURE — 1159F MED LIST DOCD IN RCRD: CPT | Mod: CPTII,S$GLB,, | Performed by: STUDENT IN AN ORGANIZED HEALTH CARE EDUCATION/TRAINING PROGRAM

## 2022-03-31 PROCEDURE — 99214 PR OFFICE/OUTPT VISIT, EST, LEVL IV, 30-39 MIN: ICD-10-PCS | Mod: S$GLB,,, | Performed by: STUDENT IN AN ORGANIZED HEALTH CARE EDUCATION/TRAINING PROGRAM

## 2022-03-31 PROCEDURE — 99999 PR PBB SHADOW E&M-EST. PATIENT-LVL IV: ICD-10-PCS | Mod: PBBFAC,,, | Performed by: STUDENT IN AN ORGANIZED HEALTH CARE EDUCATION/TRAINING PROGRAM

## 2022-03-31 PROCEDURE — 3079F DIAST BP 80-89 MM HG: CPT | Mod: CPTII,S$GLB,, | Performed by: STUDENT IN AN ORGANIZED HEALTH CARE EDUCATION/TRAINING PROGRAM

## 2022-03-31 PROCEDURE — 3079F PR MOST RECENT DIASTOLIC BLOOD PRESSURE 80-89 MM HG: ICD-10-PCS | Mod: CPTII,S$GLB,, | Performed by: STUDENT IN AN ORGANIZED HEALTH CARE EDUCATION/TRAINING PROGRAM

## 2022-03-31 PROCEDURE — 3074F SYST BP LT 130 MM HG: CPT | Mod: CPTII,S$GLB,, | Performed by: STUDENT IN AN ORGANIZED HEALTH CARE EDUCATION/TRAINING PROGRAM

## 2022-03-31 PROCEDURE — 99999 PR PBB SHADOW E&M-EST. PATIENT-LVL IV: CPT | Mod: PBBFAC,,, | Performed by: STUDENT IN AN ORGANIZED HEALTH CARE EDUCATION/TRAINING PROGRAM

## 2022-03-31 PROCEDURE — 1159F PR MEDICATION LIST DOCUMENTED IN MEDICAL RECORD: ICD-10-PCS | Mod: CPTII,S$GLB,, | Performed by: STUDENT IN AN ORGANIZED HEALTH CARE EDUCATION/TRAINING PROGRAM

## 2022-03-31 PROCEDURE — 3074F PR MOST RECENT SYSTOLIC BLOOD PRESSURE < 130 MM HG: ICD-10-PCS | Mod: CPTII,S$GLB,, | Performed by: STUDENT IN AN ORGANIZED HEALTH CARE EDUCATION/TRAINING PROGRAM

## 2022-03-31 PROCEDURE — 3008F BODY MASS INDEX DOCD: CPT | Mod: CPTII,S$GLB,, | Performed by: STUDENT IN AN ORGANIZED HEALTH CARE EDUCATION/TRAINING PROGRAM

## 2022-03-31 PROCEDURE — 99214 OFFICE O/P EST MOD 30 MIN: CPT | Mod: S$GLB,,, | Performed by: STUDENT IN AN ORGANIZED HEALTH CARE EDUCATION/TRAINING PROGRAM

## 2022-03-31 NOTE — PROGRESS NOTES
Section of Cardiology                  Cardiac Clinic Note    Chief Complaint/Reason for consultation:  Follow-up      HPI:   Carole Moore is a 56 y.o. female with h/o with HLD, CVA 2001, breast cancer s/p radiation, chemotherapy, surgery who comes into Cardiology Clinic for follow-up.    3/31/2022  Patient was seen Dr. Mei in cardiology clinic.  Denies history of needing lasix in the past,no CHF history.  Right leg and arm weakness and speech difficulty since CVA  Smoked for 5 years many years ago. Denies ETOH abuse.   She exercises with bike riding.   Diet: trying to watch her eating habits  Denies chest pain, orthopnea or PND, LE swelling.       EKG 11/2021 normal sinus rhythm, sinus arrhythmia, LAFB, possible lateral infarct, 64 bpm,  ms,  ms    ECHO  2020  CONCLUSIONS     1 - Normal left ventricular systolic function (EF 60-65%).     2 - Normal left ventricular diastolic function.     3 - Normal right ventricular systolic function .     STRESS TEST    [facility]      ROS: All 10 systems reviewed. Please refer to the HPI for pertinent positives. All other systems negative.     Past Medical History  Past Medical History:   Diagnosis Date    Breast cancer 2016    right breast    Cancer     R Breast cancer    CVA (cerebral infarction)     Diverticulosis     Family history of colon cancer 10/30/2018    Her mother and father both had colon cancer.    Hyperlipidemia     Nausea after anesthesia     Paralysis     right side    PONV (postoperative nausea and vomiting)     Stroke     R side weakness       Surgical History  Past Surgical History:   Procedure Laterality Date    AUGMENTATION OF BREAST Left 2017    BREAST SURGERY      COLONOSCOPY N/A 10/30/2018    Procedure: COLONOSCOPY;  Surgeon: Cosme Monson MD;  Location: [facility];  Service: Endoscopy;  Laterality: N/A;    CYSTOSCOPY WITH BIOPSY OF BLADDER N/A 11/29/2021    Procedure: CYSTOSCOPY, WITH BLADDER BIOPSY,  WITH FULGURATION IF INDICATED;  Surgeon: Page Marcelo MD;  Location: [facility];  Service: Urology;  Laterality: N/A;    HYSTERECTOMY  2007    maintains both ovaries, menorrhagia    MASTECTOMY Right 2017    MEDIPORT INSERTION, SINGLE      TONSILLECTOMY            Allergies:   Review of patient's allergies indicates:  No Known Allergies    Social History:  Social History     Socioeconomic History    Marital status:    Tobacco Use    Smoking status: Never Smoker    Smokeless tobacco: Never Used   Substance and Sexual Activity    Alcohol use: No    Drug use: No    Sexual activity: Not Currently     Birth control/protection: Surgical   Other Topics Concern    Are you pregnant or think you may be? No    Breast-feeding No     Social Determinants of Health     Financial Resource Strain: Low Risk     Difficulty of Paying Living Expenses: Not hard at all   Food Insecurity: No Food Insecurity    Worried About Running Out of Food in the Last Year: Never true    Ran Out of Food in the Last Year: Never true   Transportation Needs: No Transportation Needs    Lack of Transportation (Medical): No    Lack of Transportation (Non-Medical): No   Physical Activity: Sufficiently Active    Days of Exercise per Week: 5 days    Minutes of Exercise per Session: 50 min   Stress: No Stress Concern Present    Feeling of Stress : Not at all   Social Connections: Unknown    Frequency of Communication with Friends and Family: More than three times a week    Frequency of Social Gatherings with Friends and Family: Twice a week    Active Member of Clubs or Organizations: No    Attends Club or Organization Meetings: Never    Marital Status:    Housing Stability: Low Risk     Unable to Pay for Housing in the Last Year: No    Number of Places Lived in the Last Year: 1    Unstable Housing in the Last Year: No       Family History:  family history includes Breast cancer in her paternal aunt; Colon cancer in her  "father and mother; Melanoma in her maternal uncle; Skin cancer in her maternal uncle.    Home Medications:  Current Outpatient Medications on File Prior to Visit   Medication Sig Dispense Refill    anastrozole (ARIMIDEX) 1 mg Tab Take 1 tablet (1 mg total) by mouth once daily. 90 tablet 3    aspirin 81 MG Chew Take 81 mg by mouth once daily.      atorvastatin (LIPITOR) 10 MG tablet Take 1 tablet (10 mg total) by mouth once daily. 90 tablet 4    baclofen (LIORESAL) 10 MG tablet Take 1 tablet (10 mg total) by mouth every evening. 60 tablet 1    cetirizine (ZYRTEC) 10 MG tablet Take 10 mg by mouth once daily.      fluocinonide 0.05% (LIDEX) 0.05 % cream Apply topically 2 (two) times daily as needed. 60 g 1    hydrocortisone 2.5 % cream Apply topically 2 (two) times daily. 1 Tube 1    LORATADINE (CLARITIN ORAL) Take by mouth daily as needed.       mometasone 0.1% (ELOCON) 0.1 % cream AAA once daily 45 g 1    nitrofurantoin, macrocrystal-monohydrate, (MACROBID) 100 MG capsule Take 1 capsule (100 mg total) by mouth once daily. 30 capsule 0     No current facility-administered medications on file prior to visit.       Physical exam:  /80 (BP Location: Left arm, Patient Position: Sitting, BP Method: Medium (Manual))   Pulse 62   Ht 5' 7" (1.702 m)   Wt 75.2 kg (165 lb 12.6 oz)   LMP 10/11/2009   SpO2 98%   BMI 25.97 kg/m²         General: Pt is a 56 y.o. year old female who is AAOx3, in NAD, is pleasant, well nourished, looks stated age  HEENT: PERRL, EOMI, Oral mucosa pink & moist  CVS  No abnormal cardiac pulsations noted on inspection. JVP not raised. The apical impulse is normal on palpation, and is located in the left 5th intercostal space in the mid - clavicular line. No palpable thrills or abnormal pulsations noted. RR, S1 - S2 heard, no murmurs, rubs or gallops appreciated.   PUL : CTA B/L. No wheezes/crackles heard   ABD : BS +, soft. No tenderness elicited   LE : No C/C/E. Distal Pulses " palpable B/L         LABS:    Chemistry:   Lab Results   Component Value Date     11/22/2021    K 4.3 11/22/2021     11/22/2021    CO2 27 11/22/2021    BUN 13 11/22/2021    CREATININE 0.7 11/22/2021    CALCIUM 9.2 11/22/2021     Cardiac Markers:   Lab Results   Component Value Date    TROPONINI 0.023 01/10/2017     Cardiac Markers (Last 3):   Lab Results   Component Value Date    TROPONINI 0.023 01/10/2017    TROPONINI 0.012 12/27/2016    TROPONINI 0.007 12/13/2016     CBC:   Lab Results   Component Value Date    WBC 5.26 11/22/2021    HGB 13.1 11/22/2021    HCT 39.6 11/22/2021    MCV 93 11/22/2021     11/22/2021     Lipids:   Lab Results   Component Value Date    CHOL 165 09/15/2021    TRIG 74 09/15/2021    HDL 59 09/15/2021     Coagulation: No results found for: PT, INR, APTT        Assessment        1. History of CVA (cerebrovascular accident)    2. Mixed hyperlipidemia    3. Right sided weakness    4. Malignant neoplasm of central portion of right breast in female, estrogen receptor positive         Plan:    History of CVA  Residual right-sided weakness  Continue aspirin statin    HLD  91 as of 9/21  Continue statin    History of breast cancer  S/p chemotherapy, radiation, surgery  Continue to follow-up with Hem/Onc      This note was prepared using voice recognition system and is likely to have sound alike errors that may have been overlooked even after proofreading.     I have reviewed all pertinent chart information.  Plans and recommendations have been formulated under my direct supervision. All questions answered and patient voiced understanding.   If symptoms persist go to the ED.    RTC in 1 year        Jemma Melendez MD  Cardiology

## 2022-04-13 ENCOUNTER — APPOINTMENT (OUTPATIENT)
Dept: RADIOLOGY | Facility: HOSPITAL | Age: 57
End: 2022-04-13
Attending: NURSE PRACTITIONER
Payer: MEDICARE

## 2022-04-13 ENCOUNTER — OFFICE VISIT (OUTPATIENT)
Dept: PODIATRY | Facility: CLINIC | Age: 57
End: 2022-04-13
Payer: MEDICARE

## 2022-04-13 VITALS — HEIGHT: 67 IN | BODY MASS INDEX: 25.9 KG/M2 | WEIGHT: 165 LBS

## 2022-04-13 DIAGNOSIS — Z79.811 USE OF AROMATASE INHIBITORS: ICD-10-CM

## 2022-04-13 DIAGNOSIS — I69.351 HEMIPARESIS AFFECTING RIGHT SIDE AS LATE EFFECT OF CEREBROVASCULAR ACCIDENT: Primary | ICD-10-CM

## 2022-04-13 DIAGNOSIS — Q82.8 POROKERATOSIS: ICD-10-CM

## 2022-04-13 DIAGNOSIS — B35.1 DERMATOPHYTOSIS OF NAIL: ICD-10-CM

## 2022-04-13 PROCEDURE — 99999 PR PBB SHADOW E&M-EST. PATIENT-LVL II: ICD-10-PCS | Mod: PBBFAC,,, | Performed by: PODIATRIST

## 2022-04-13 PROCEDURE — 1160F PR REVIEW ALL MEDS BY PRESCRIBER/CLIN PHARMACIST DOCUMENTED: ICD-10-PCS | Mod: CPTII,S$GLB,, | Performed by: PODIATRIST

## 2022-04-13 PROCEDURE — 77080 DXA BONE DENSITY AXIAL: CPT | Mod: TC

## 2022-04-13 PROCEDURE — 77080 DEXA BONE DENSITY SPINE HIP: ICD-10-PCS | Mod: 26,,, | Performed by: RADIOLOGY

## 2022-04-13 PROCEDURE — 99203 PR OFFICE/OUTPT VISIT, NEW, LEVL III, 30-44 MIN: ICD-10-PCS | Mod: S$GLB,,, | Performed by: PODIATRIST

## 2022-04-13 PROCEDURE — 1159F PR MEDICATION LIST DOCUMENTED IN MEDICAL RECORD: ICD-10-PCS | Mod: CPTII,S$GLB,, | Performed by: PODIATRIST

## 2022-04-13 PROCEDURE — 3008F BODY MASS INDEX DOCD: CPT | Mod: CPTII,S$GLB,, | Performed by: PODIATRIST

## 2022-04-13 PROCEDURE — 99999 PR PBB SHADOW E&M-EST. PATIENT-LVL II: CPT | Mod: PBBFAC,,, | Performed by: PODIATRIST

## 2022-04-13 PROCEDURE — 99203 OFFICE O/P NEW LOW 30 MIN: CPT | Mod: S$GLB,,, | Performed by: PODIATRIST

## 2022-04-13 PROCEDURE — 3008F PR BODY MASS INDEX (BMI) DOCUMENTED: ICD-10-PCS | Mod: CPTII,S$GLB,, | Performed by: PODIATRIST

## 2022-04-13 PROCEDURE — 77080 DXA BONE DENSITY AXIAL: CPT | Mod: 26,,, | Performed by: RADIOLOGY

## 2022-04-13 PROCEDURE — 1160F RVW MEDS BY RX/DR IN RCRD: CPT | Mod: CPTII,S$GLB,, | Performed by: PODIATRIST

## 2022-04-13 PROCEDURE — 1159F MED LIST DOCD IN RCRD: CPT | Mod: CPTII,S$GLB,, | Performed by: PODIATRIST

## 2022-04-25 NOTE — PROGRESS NOTES
Subjective:     Patient ID: Carole Moore is a 56 y.o. female.    Chief Complaint: Foot Problem (Possible porokeratosis to left ball of foot, hallux discoloration, rates pain 9/10, wears casal shoes with socks, non-diabetic Pt, last seen on 03/07/22 with PCP Dr. Emery)    Carole is a 56 y.o. female who presents to the podiatry clinic  with complaint of  left foot pain. Onset of the symptoms was several years ago. Precipitating event: none known. Current symptoms include: ability to bear weight, but with some pain. Aggravating factors: standing and walking. Symptoms have been intermittent. Patient has had prior foot problems. Patient points to left ball of foot, callus area present. Patient rates pain 9/10. Patient also complains of right hallux nail discoloration. Patient has no other pedal complaints at this time.       Patient Active Problem List   Diagnosis    Hyperlipidemia    History of CVA (cerebrovascular accident)    Malignant neoplasm of central portion of right breast in female, estrogen receptor positive    Use of aromatase inhibitors    Osteopenia of multiple sites    Hemiparesis affecting right side as late effect of cerebrovascular accident    Right sided weakness    Family history of colon cancer    Microhematuria    Cystitis cystica    Recurrent UTI       Medication List with Changes/Refills   Current Medications    ANASTROZOLE (ARIMIDEX) 1 MG TAB    Take 1 tablet (1 mg total) by mouth once daily.    ASPIRIN 81 MG CHEW    Take 81 mg by mouth once daily.    ATORVASTATIN (LIPITOR) 10 MG TABLET    Take 1 tablet (10 mg total) by mouth once daily.    BACLOFEN (LIORESAL) 10 MG TABLET    Take 1 tablet (10 mg total) by mouth every evening.    CETIRIZINE (ZYRTEC) 10 MG TABLET    Take 10 mg by mouth once daily.    FLUOCINONIDE 0.05% (LIDEX) 0.05 % CREAM    Apply topically 2 (two) times daily as needed.    HYDROCORTISONE 2.5 % CREAM    Apply topically 2 (two) times daily.    LORATADINE  (CLARITIN ORAL)    Take by mouth daily as needed.     MOMETASONE 0.1% (ELOCON) 0.1 % CREAM    AAA once daily    NITROFURANTOIN, MACROCRYSTAL-MONOHYDRATE, (MACROBID) 100 MG CAPSULE    Take 1 capsule (100 mg total) by mouth once daily.       Review of patient's allergies indicates:  No Known Allergies    Past Surgical History:   Procedure Laterality Date    AUGMENTATION OF BREAST Left 2017    BREAST SURGERY      COLONOSCOPY N/A 10/30/2018    Procedure: COLONOSCOPY;  Surgeon: Cosme Monson MD;  [facility];  Service: Endoscopy;  Laterality: N/A;    CYSTOSCOPY WITH BIOPSY OF BLADDER N/A 11/29/2021    Procedure: CYSTOSCOPY, WITH BLADDER BIOPSY, WITH FULGURATION IF INDICATED;  Surgeon: Page Marcelo MD;  Location: [facility] OR;  Service: Urology;  Laterality: N/A;    HYSTERECTOMY  2007    maintains both ovaries, menorrhagia    MASTECTOMY Right 2017    MEDIPORT INSERTION, SINGLE      TONSILLECTOMY         Family History   Problem Relation Age of Onset    Breast cancer Paternal Aunt     Colon cancer Father     Colon cancer Mother     Melanoma Maternal Uncle     Skin cancer Maternal Uncle        Social History     Socioeconomic History    Marital status:    Tobacco Use    Smoking status: Never Smoker    Smokeless tobacco: Never Used   Substance and Sexual Activity    Alcohol use: No    Drug use: No    Sexual activity: Not Currently     Birth control/protection: Surgical   Other Topics Concern    Are you pregnant or think you may be? No    Breast-feeding No     Social Determinants of Health     Financial Resource Strain: Low Risk     Difficulty of Paying Living Expenses: Not hard at all   Food Insecurity: No Food Insecurity    Worried About Running Out of Food in the Last Year: Never true    Ran Out of Food in the Last Year: Never true   Transportation Needs: No Transportation Needs    Lack of Transportation (Medical): No    Lack of Transportation (Non-Medical): No   Physical  "Activity: Sufficiently Active    Days of Exercise per Week: 5 days    Minutes of Exercise per Session: 50 min   Stress: No Stress Concern Present    Feeling of Stress : Not at all   Social Connections: Unknown    Frequency of Communication with Friends and Family: More than three times a week    Frequency of Social Gatherings with Friends and Family: Twice a week    Active Member of Clubs or Organizations: No    Attends Club or Organization Meetings: Never    Marital Status:    Housing Stability: Low Risk     Unable to Pay for Housing in the Last Year: No    Number of Places Lived in the Last Year: 1    Unstable Housing in the Last Year: No       Vitals:    04/13/22 1406   Weight: 74.8 kg (165 lb)   Height: 5' 7" (1.702 m)   PainSc:   8   PainLoc: Foot       Review of Systems   Constitutional: Negative for chills and fever.   Respiratory: Negative for shortness of breath.    Cardiovascular: Negative for chest pain, palpitations, orthopnea, claudication and leg swelling.   Gastrointestinal: Negative for diarrhea, nausea and vomiting.   Musculoskeletal: Negative for joint pain.   Skin: Negative for rash.   Neurological: Positive for tingling and sensory change.   Psychiatric/Behavioral: Negative.          Objective:      PHYSICAL EXAM: Apperance: Alert and orient in no distress,well developed, and with good attention to grooming and body habits  Lower Extremity Physical Exam:  VASCULAR: Dorsalis pedis pulses 2/4 bilateral and Posterior Tibial pulses 2/4 bilateral. Capillary fill time <4 seconds bilateral. Mild edema observed bilateral. Varicosities absent bilateral. Skin temperature of the lower extremities is warm to warm, proximal to distal. Hair growth WNL bilateral.  DERMATOLOGICAL: No skin rashes, subcutaneous nodules, or ulcers observed bilateral. Mild lesions noted to left ball of foot with thickened center core. Webspaces 1,2,3,4 clean, dry and without evidence of break in skin integrity " bilateral. Minimal discoloration and thickness noted to right hallux nail.   NEUROLOGICAL: Light touch, sharp-dull, proprioception all present and equal bilaterally.   MUSCULOSKELETAL: Muscle strength is 5/5 for foot inverters, everters, plantarflexors, and dorsiflexors. Muscle tone is normal. Positive pain on palpation of hyperkeratotic lesion left foot. No pain on palpation of right hallux nail.         Assessment:       ICD-10-CM ICD-9-CM   1. Hemiparesis affecting right side as late effect of cerebrovascular accident  I69.351 438.20   2. Porokeratosis - Left Foot  Q82.8 757.39   3. Dermatophytosis of nail - Right Foot  B35.1 110.1       Plan:   Hemiparesis affecting right side as late effect of cerebrovascular accident    Porokeratosis - Left Foot    Dermatophytosis of nail - Right Foot      I counseled the patient on her conditions, regarding findings of my examination, my impressions, and usual treatment plan.   Patient instructed to regularly file area to prevent build up. Patient also instructed to keep area moisturized.   Patient instructed to spray all shoes with Lysol disinfectant spray and let dry before wearing. Patient instructed to wash all socks in hot water and bleach.  Discuss treatment options for nail fungus.  I explained that fungus lives in a warm dark moist environment and therefore patient should make every attempt to keep feet clean and dry.  We discussed drying feet thoroughly after shower particularly between the toes and then applying powder between the toes and in the shoes.    For fungal toenails I prescribed topical medication to be used daily for up to a year.  We also discussed oral Lamisil but I did not recommend it as a first line of treatment since it is an internal medicine that may potentially have side effects, including liver problems. Patient elects for OTC treatment.   Patient to return as needed.                 Aubrey Chaney DPM  [facility]

## 2022-04-28 NOTE — PROGRESS NOTES
Subjective:       Patient ID: Carole Moore is a 56 y.o. female.    Chief Complaint: breast cancer surveillance    Collaborating provider is Dr. Harpal Khan    HPI 56-year-old  female who presents to the hematology oncology clinic today for follow-up for right breast carcinoma.  The patient was previously followed in the outpatient  Hem/Onc clinic by Dr. Alessia Moss and Dr. Huitron.    2/2017-Right-sided breast cancer: Stage IIIA invasive lobular carcinoma, ER/GA positive, [id number] neg. BRCA negative. Given large mass on physical examination, treated with neoadjuvant chemotherapy, using dose-dense AC-T regimen, which patient completed in 2/2017 with decrease in size and firmness of R breast mass. She then underwent R breast mastectomy and SLND 4/2017, with tissue expander placement. Final pathology after neoadjuvant chemotherapy showed tumor 6cm, sentinel LNs positive, and nipple skin positive, we did recommend adjuvant radiation to R chest and axilla. Her case was discussed at tumor board and while axillary LN dissection was discussed, adjuvant radiation to the left chest and axilla were recommended which she completed 7/24/2017- 28 treatments    Anastrazole 1mg PO daily for adjuvant endocrine therapy was initiated end of July 2017. Pt tolerating without difficulty- hot flashes consistent and interupting sleep- did try ditropan for a short while and did not like the way it made her feel. Very dry mouth- willing to try effexor now    Zometa initiated for osteopenia secondary to AI use. Bone density in July 2020 was normal so discontinued-     Bone density 10/29/2021: normal    4/13/2022- dexa- normal- repeat 4/2024      Mammogram 10/29/2021- left breast- normal-     Pt had ED visit 5/4/2022- states woke up with dizziness, nausea and vomiting- went to ED- had imaging of head wnl and labs normal- thought to be inner ear- placed on antivert and symptoms resolved in 24 hours.      No dental  issues or infections. No healing issues and thinks she told her dentist about the medications she is taking.     Has history of heart failure and is followed by cardiology- last visit 3/31/2022    Pt is riding her bike 9 miles a day every day. States feels great. Has intermittent left posterior back soreness when pulls the sliding door or turns on shower - has not tried heat or ice or nsaids-       PAST MEDICAL HISTORY:   1.  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  Dyslipidemia  3.  Osteopenia - resolved  4.  Right breast cancer- Stage IIIA invasive lobular carcinoma, ER/MD positive, [id number] neg. BRCA negative.  5.  Heart failure     SURGICAL HISTORY:   1.  Tonsillectomy  2.  Hysterectomy  3.  Right breast mastectomy with with right deep axilla sentinel lymph node biopsy followed by breast reconstruction with tissue expander done on April 11, 2017 and reconstruction completed in Dec 2017.  4.  Mediport placement and removal     FAMILY HISTORY: She reports that 2 paternal aunts had breast cancer in their late 50s.  A maternal uncle had lung cancer the age of 54.  He used to smoke cigarettes.  A maternal uncle had melanoma at the age of 49.  Her father had colon cancer at the age of 70.  Her mother had colon cancer in her 70s.  She denies any other immediate family members with cancer or bleeding/clotting disorders.     SOCIAL HISTORY: The reports a 5-pack-year smoking history and quit at the age of 23.  She drinks alcohol socially.  She has never used any recreational drugs.  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  She is  and has 2 children.  She lives in [place], [place].     ALLERGIES: [NKDA]     MEDICATIONS: reviewed and reconciled    Review of Systems   Constitutional: Negative.  Negative for appetite change, fatigue, fever and unexpected weight change.        Hot flashes at night- awakening intermittently at 2-3 am-  goes to bed at 7:30 pm- stopped the ditropan and hot flashes are tolerable now   HENT: Negative.    Eyes: Negative.    Respiratory: Negative.    Cardiovascular: Negative.    Gastrointestinal: Negative.  Negative for abdominal pain and blood in stool.        No reflux   Endocrine: Negative.    Genitourinary: Negative.  Negative for hematuria.            Musculoskeletal: Positive for back pain (left latissimus soreness for a couple of weeks- notes more with movement and pulling pushing. does not radiate to any other locatioin).   Skin: Negative.    Allergic/Immunologic: Negative.    Neurological: Negative.    Hematological: Negative.  Negative for adenopathy.   Psychiatric/Behavioral: Negative.        Objective:      Physical Exam  Constitutional:       Appearance: Normal appearance. She is well-developed.   HENT:      Head: Normocephalic and atraumatic.      Right Ear: Ear canal and external ear normal.      Left Ear: Ear canal and external ear normal.      Mouth/Throat:      Pharynx: No oropharyngeal exudate.   Eyes:      General: No scleral icterus.        Right eye: No discharge.         Left eye: No discharge.      Conjunctiva/sclera: Conjunctivae normal.      Pupils: Pupils are equal, round, and reactive to light.   Neck:      Thyroid: No thyromegaly.   Cardiovascular:      Rate and Rhythm: Normal rate and regular rhythm.      Pulses: Normal pulses.      Heart sounds: Normal heart sounds.   Pulmonary:      Effort: Pulmonary effort is normal.      Breath sounds: Normal breath sounds.   Chest:   Breasts:      Right: No inverted nipple, mass, nipple discharge, skin change, tenderness, axillary adenopathy or supraclavicular adenopathy.      Left: No inverted nipple, mass, nipple discharge, skin change, tenderness, axillary adenopathy or supraclavicular adenopathy.         Abdominal:      General: Bowel sounds are normal.      Palpations: Abdomen is soft.   Musculoskeletal:         General: Normal range of motion.       Right shoulder: No crepitus. Normal strength.        Arms:       Cervical back: Normal range of motion and neck supple.   Lymphadenopathy:      Head:      Right side of head: No submental, submandibular, tonsillar, preauricular, posterior auricular or occipital adenopathy.      Left side of head: No submental, submandibular, tonsillar, preauricular, posterior auricular or occipital adenopathy.      Cervical: No cervical adenopathy.      Right cervical: No superficial or posterior cervical adenopathy.     Left cervical: No superficial or posterior cervical adenopathy.      Upper Body:      Right upper body: No supraclavicular or axillary adenopathy.      Left upper body: No supraclavicular or axillary adenopathy.   Skin:     General: Skin is warm and dry.      Coloration: Skin is not pale.      Findings: No erythema or rash.   Neurological:      General: No focal deficit present.      Mental Status: She is alert and oriented to person, place, and time.      Deep Tendon Reflexes: Reflexes are normal and symmetric.      Comments: Right upper extremity hemiparesis noted   Psychiatric:         Mood and Affect: Mood normal.         Behavior: Behavior normal.         Thought Content: Thought content normal.         Judgment: Judgment normal.         Mammogram 10/29/2021- wnl    10/29/2021 and 4/13/2022- Bone density - normal- holding Zometa at this time  Taking Vit D daily- and taking calcium  Pt reports negative genetic testing in 2017    Exercise- rides her bike at least 9 miles- 60 minutes every day.     Assessment:       1. Malignant neoplasm of central portion of right breast in female, estrogen receptor positive    2. Use of aromatase inhibitors    3. Encounter for follow-up surveillance of breast cancer    4. Encounter for monitoring adjuvant hormonal therapy    5. Counseling and coordination of care    6. Counseling on health promotion and disease prevention    7. Health education/counseling    8. Encounter for  breast cancer screening using non-mammogram modality        Plan:       1.        Right-sided breast cancer: Stage IIIA invasive lobular carcinoma, ER/[place] positive, Aet2Mtv neg. S/p AC-T chemotherapy and chest wall/axilla radiation. BRCA negative.   2.        Negative clinical exam- Mammo Oct 29/2021wnl left breast- recommend one year f/u - Nov 2022  2.        Osteopenia- Zometa 4 mg IV discontinued due to normal bone density-  Repeat dexa 4/13/2022 reveals- normal- repeat 2 years- 4/2024     will be on AI for 10 years total.   3.        Continue Anastrazole 1 mg po daily for adjuvant endocrine therapy. Take calcium and vit D. 7/2022 will make 5 years - would recommend pt stay on for 5 more- 7/2027  4.        Hot flashes- improved with ditropan- dry mouth severe- pt stopped med and hot flashes have been more tolerable lately.  5.        Pt is exercising for 60 minutes most days of the week on her bike- taking her calcium regularly. Taking vit d regularly. Congratulated pt on maintaining her exercise and wt.   6.        Gyn f/u was in 1/8/2021   7.        Vertigo- resolved 5/4/2022

## 2022-05-12 ENCOUNTER — LAB VISIT (OUTPATIENT)
Dept: LAB | Facility: HOSPITAL | Age: 57
End: 2022-05-12
Attending: INTERNAL MEDICINE
Payer: MEDICARE

## 2022-05-12 ENCOUNTER — OFFICE VISIT (OUTPATIENT)
Dept: HEMATOLOGY/ONCOLOGY | Facility: CLINIC | Age: 57
End: 2022-05-12
Payer: MEDICARE

## 2022-05-12 VITALS
SYSTOLIC BLOOD PRESSURE: 113 MMHG | HEART RATE: 83 BPM | OXYGEN SATURATION: 95 % | TEMPERATURE: 98 F | BODY MASS INDEX: 25.85 KG/M2 | DIASTOLIC BLOOD PRESSURE: 72 MMHG | HEIGHT: 67 IN | RESPIRATION RATE: 14 BRPM | WEIGHT: 164.69 LBS

## 2022-05-12 DIAGNOSIS — Z17.0 MALIGNANT NEOPLASM OF CENTRAL PORTION OF RIGHT BREAST IN FEMALE, ESTROGEN RECEPTOR POSITIVE: Primary | ICD-10-CM

## 2022-05-12 DIAGNOSIS — C50.111 MALIGNANT NEOPLASM OF CENTRAL PORTION OF RIGHT BREAST IN FEMALE, ESTROGEN RECEPTOR POSITIVE: Primary | ICD-10-CM

## 2022-05-12 DIAGNOSIS — Z17.0 MALIGNANT NEOPLASM OF CENTRAL PORTION OF RIGHT BREAST IN FEMALE, ESTROGEN RECEPTOR POSITIVE: ICD-10-CM

## 2022-05-12 DIAGNOSIS — Z71.89 COUNSELING AND COORDINATION OF CARE: ICD-10-CM

## 2022-05-12 DIAGNOSIS — Z79.811 USE OF AROMATASE INHIBITORS: ICD-10-CM

## 2022-05-12 DIAGNOSIS — Z08 ENCOUNTER FOR FOLLOW-UP SURVEILLANCE OF BREAST CANCER: ICD-10-CM

## 2022-05-12 DIAGNOSIS — Z12.31 ENCOUNTER FOR SCREENING MAMMOGRAM FOR MALIGNANT NEOPLASM OF BREAST: ICD-10-CM

## 2022-05-12 DIAGNOSIS — Z51.81 ENCOUNTER FOR MONITORING ADJUVANT HORMONAL THERAPY: ICD-10-CM

## 2022-05-12 DIAGNOSIS — Z85.3 ENCOUNTER FOR FOLLOW-UP SURVEILLANCE OF BREAST CANCER: ICD-10-CM

## 2022-05-12 DIAGNOSIS — Z12.39 ENCOUNTER FOR BREAST CANCER SCREENING USING NON-MAMMOGRAM MODALITY: ICD-10-CM

## 2022-05-12 DIAGNOSIS — C50.111 MALIGNANT NEOPLASM OF CENTRAL PORTION OF RIGHT BREAST IN FEMALE, ESTROGEN RECEPTOR POSITIVE: ICD-10-CM

## 2022-05-12 DIAGNOSIS — Z79.899 ENCOUNTER FOR MONITORING ADJUVANT HORMONAL THERAPY: ICD-10-CM

## 2022-05-12 DIAGNOSIS — Z71.89 COUNSELING ON HEALTH PROMOTION AND DISEASE PREVENTION: ICD-10-CM

## 2022-05-12 DIAGNOSIS — Z71.9 HEALTH EDUCATION/COUNSELING: ICD-10-CM

## 2022-05-12 LAB
ALBUMIN SERPL BCP-MCNC: 3.6 G/DL (ref 3.5–5.2)
ALP SERPL-CCNC: 63 U/L (ref 55–135)
ALT SERPL W/O P-5'-P-CCNC: 31 U/L (ref 10–44)
ANION GAP SERPL CALC-SCNC: 8 MMOL/L (ref 8–16)
AST SERPL-CCNC: 21 U/L (ref 10–40)
BASOPHILS # BLD AUTO: 0.03 K/UL (ref 0–0.2)
BASOPHILS NFR BLD: 0.5 % (ref 0–1.9)
BILIRUB SERPL-MCNC: 0.7 MG/DL (ref 0.1–1)
BUN SERPL-MCNC: 17 MG/DL (ref 6–20)
CALCIUM SERPL-MCNC: 9.3 MG/DL (ref 8.7–10.5)
CHLORIDE SERPL-SCNC: 103 MMOL/L (ref 95–110)
CO2 SERPL-SCNC: 28 MMOL/L (ref 23–29)
CREAT SERPL-MCNC: 0.7 MG/DL (ref 0.5–1.4)
DIFFERENTIAL METHOD: NORMAL
EOSINOPHIL # BLD AUTO: 0.1 K/UL (ref 0–0.5)
EOSINOPHIL NFR BLD: 2.1 % (ref 0–8)
ERYTHROCYTE [DISTWIDTH] IN BLOOD BY AUTOMATED COUNT: 12.6 % (ref 11.5–14.5)
EST. GFR  (AFRICAN AMERICAN): >60 ML/MIN/1.73 M^2
EST. GFR  (NON AFRICAN AMERICAN): >60 ML/MIN/1.73 M^2
GLUCOSE SERPL-MCNC: 96 MG/DL (ref 70–110)
HCT VFR BLD AUTO: 39.4 % (ref 37–48.5)
HGB BLD-MCNC: 12.9 G/DL (ref 12–16)
IMM GRANULOCYTES # BLD AUTO: 0.01 K/UL (ref 0–0.04)
IMM GRANULOCYTES NFR BLD AUTO: 0.2 % (ref 0–0.5)
LYMPHOCYTES # BLD AUTO: 2.2 K/UL (ref 1–4.8)
LYMPHOCYTES NFR BLD: 39.1 % (ref 18–48)
MCH RBC QN AUTO: 30.5 PG (ref 27–31)
MCHC RBC AUTO-ENTMCNC: 32.7 G/DL (ref 32–36)
MCV RBC AUTO: 93 FL (ref 82–98)
MONOCYTES # BLD AUTO: 0.5 K/UL (ref 0.3–1)
MONOCYTES NFR BLD: 8.8 % (ref 4–15)
NEUTROPHILS # BLD AUTO: 2.8 K/UL (ref 1.8–7.7)
NEUTROPHILS NFR BLD: 49.3 % (ref 38–73)
NRBC BLD-RTO: 0 /100 WBC
PLATELET # BLD AUTO: 284 K/UL (ref 150–450)
PMV BLD AUTO: 9.4 FL (ref 9.2–12.9)
POTASSIUM SERPL-SCNC: 4 MMOL/L (ref 3.5–5.1)
PROT SERPL-MCNC: 6.6 G/DL (ref 6–8.4)
RBC # BLD AUTO: 4.23 M/UL (ref 4–5.4)
SODIUM SERPL-SCNC: 139 MMOL/L (ref 136–145)
WBC # BLD AUTO: 5.68 K/UL (ref 3.9–12.7)

## 2022-05-12 PROCEDURE — 80053 COMPREHEN METABOLIC PANEL: CPT | Performed by: NURSE PRACTITIONER

## 2022-05-12 PROCEDURE — 1159F PR MEDICATION LIST DOCUMENTED IN MEDICAL RECORD: ICD-10-PCS | Mod: CPTII,S$GLB,, | Performed by: NURSE PRACTITIONER

## 2022-05-12 PROCEDURE — 99999 PR PBB SHADOW E&M-EST. PATIENT-LVL IV: ICD-10-PCS | Mod: PBBFAC,,, | Performed by: NURSE PRACTITIONER

## 2022-05-12 PROCEDURE — 1159F MED LIST DOCD IN RCRD: CPT | Mod: CPTII,S$GLB,, | Performed by: NURSE PRACTITIONER

## 2022-05-12 PROCEDURE — 3008F PR BODY MASS INDEX (BMI) DOCUMENTED: ICD-10-PCS | Mod: CPTII,S$GLB,, | Performed by: NURSE PRACTITIONER

## 2022-05-12 PROCEDURE — 3078F DIAST BP <80 MM HG: CPT | Mod: CPTII,S$GLB,, | Performed by: NURSE PRACTITIONER

## 2022-05-12 PROCEDURE — 36415 COLL VENOUS BLD VENIPUNCTURE: CPT | Performed by: NURSE PRACTITIONER

## 2022-05-12 PROCEDURE — 3078F PR MOST RECENT DIASTOLIC BLOOD PRESSURE < 80 MM HG: ICD-10-PCS | Mod: CPTII,S$GLB,, | Performed by: NURSE PRACTITIONER

## 2022-05-12 PROCEDURE — 99214 OFFICE O/P EST MOD 30 MIN: CPT | Mod: S$GLB,,, | Performed by: NURSE PRACTITIONER

## 2022-05-12 PROCEDURE — 85025 COMPLETE CBC W/AUTO DIFF WBC: CPT | Performed by: NURSE PRACTITIONER

## 2022-05-12 PROCEDURE — 3074F SYST BP LT 130 MM HG: CPT | Mod: CPTII,S$GLB,, | Performed by: NURSE PRACTITIONER

## 2022-05-12 PROCEDURE — 99999 PR PBB SHADOW E&M-EST. PATIENT-LVL IV: CPT | Mod: PBBFAC,,, | Performed by: NURSE PRACTITIONER

## 2022-05-12 PROCEDURE — 99214 PR OFFICE/OUTPT VISIT, EST, LEVL IV, 30-39 MIN: ICD-10-PCS | Mod: S$GLB,,, | Performed by: NURSE PRACTITIONER

## 2022-05-12 PROCEDURE — 3008F BODY MASS INDEX DOCD: CPT | Mod: CPTII,S$GLB,, | Performed by: NURSE PRACTITIONER

## 2022-05-12 PROCEDURE — 3074F PR MOST RECENT SYSTOLIC BLOOD PRESSURE < 130 MM HG: ICD-10-PCS | Mod: CPTII,S$GLB,, | Performed by: NURSE PRACTITIONER

## 2022-05-12 RX ORDER — SERTRALINE HYDROCHLORIDE 25 MG/1
25 TABLET, FILM COATED ORAL
COMMUNITY
End: 2022-10-05

## 2022-05-12 RX ORDER — MECLIZINE HYDROCHLORIDE 25 MG/1
25 TABLET ORAL 3 TIMES DAILY PRN
COMMUNITY
Start: 2022-05-04

## 2022-05-12 RX ORDER — OXYBUTYNIN CHLORIDE 5 MG/1
5 TABLET ORAL 2 TIMES DAILY
COMMUNITY
Start: 2022-01-21 | End: 2022-10-05

## 2022-05-27 ENCOUNTER — PES CALL (OUTPATIENT)
Dept: ADMINISTRATIVE | Facility: CLINIC | Age: 57
End: 2022-05-27
Payer: MEDICARE

## 2022-06-24 ENCOUNTER — TELEPHONE (OUTPATIENT)
Dept: UROLOGY | Facility: CLINIC | Age: 57
End: 2022-06-24
Payer: MEDICARE

## 2022-06-28 ENCOUNTER — TELEPHONE (OUTPATIENT)
Dept: ADMINISTRATIVE | Facility: CLINIC | Age: 57
End: 2022-06-28
Payer: MEDICARE

## 2022-06-28 NOTE — TELEPHONE ENCOUNTER
Called pt, informed pt I was calling to remind pt of her in office EAWV on 6/29/22; clinic location provided to patient; pt confirmed appointment

## 2022-06-29 ENCOUNTER — OFFICE VISIT (OUTPATIENT)
Dept: INTERNAL MEDICINE | Facility: CLINIC | Age: 57
End: 2022-06-29
Payer: MEDICARE

## 2022-06-29 VITALS
SYSTOLIC BLOOD PRESSURE: 102 MMHG | BODY MASS INDEX: 26.47 KG/M2 | WEIGHT: 168.63 LBS | HEIGHT: 67 IN | DIASTOLIC BLOOD PRESSURE: 68 MMHG | TEMPERATURE: 98 F | OXYGEN SATURATION: 97 % | HEART RATE: 78 BPM

## 2022-06-29 DIAGNOSIS — M85.89 OSTEOPENIA OF MULTIPLE SITES: ICD-10-CM

## 2022-06-29 DIAGNOSIS — I69.351 HEMIPARESIS AFFECTING RIGHT SIDE AS LATE EFFECT OF CEREBROVASCULAR ACCIDENT: ICD-10-CM

## 2022-06-29 DIAGNOSIS — Z17.0 MALIGNANT NEOPLASM OF CENTRAL PORTION OF RIGHT BREAST IN FEMALE, ESTROGEN RECEPTOR POSITIVE: ICD-10-CM

## 2022-06-29 DIAGNOSIS — E78.2 MIXED HYPERLIPIDEMIA: ICD-10-CM

## 2022-06-29 DIAGNOSIS — Z79.811 USE OF AROMATASE INHIBITORS: ICD-10-CM

## 2022-06-29 DIAGNOSIS — Z86.73 HISTORY OF CVA (CEREBROVASCULAR ACCIDENT): ICD-10-CM

## 2022-06-29 DIAGNOSIS — C50.111 MALIGNANT NEOPLASM OF CENTRAL PORTION OF RIGHT BREAST IN FEMALE, ESTROGEN RECEPTOR POSITIVE: ICD-10-CM

## 2022-06-29 DIAGNOSIS — Z80.0 FAMILY HISTORY OF COLON CANCER: ICD-10-CM

## 2022-06-29 DIAGNOSIS — Z00.00 ENCOUNTER FOR PREVENTIVE HEALTH EXAMINATION: Primary | ICD-10-CM

## 2022-06-29 PROCEDURE — 3074F SYST BP LT 130 MM HG: CPT | Mod: CPTII,S$GLB,, | Performed by: PHYSICIAN ASSISTANT

## 2022-06-29 PROCEDURE — 3078F PR MOST RECENT DIASTOLIC BLOOD PRESSURE < 80 MM HG: ICD-10-PCS | Mod: CPTII,S$GLB,, | Performed by: PHYSICIAN ASSISTANT

## 2022-06-29 PROCEDURE — G0439 PR MEDICARE ANNUAL WELLNESS SUBSEQUENT VISIT: ICD-10-PCS | Mod: S$GLB,,, | Performed by: PHYSICIAN ASSISTANT

## 2022-06-29 PROCEDURE — 99999 PR PBB SHADOW E&M-EST. PATIENT-LVL IV: ICD-10-PCS | Mod: PBBFAC,,, | Performed by: PHYSICIAN ASSISTANT

## 2022-06-29 PROCEDURE — 3078F DIAST BP <80 MM HG: CPT | Mod: CPTII,S$GLB,, | Performed by: PHYSICIAN ASSISTANT

## 2022-06-29 PROCEDURE — 1159F MED LIST DOCD IN RCRD: CPT | Mod: CPTII,S$GLB,, | Performed by: PHYSICIAN ASSISTANT

## 2022-06-29 PROCEDURE — 1160F PR REVIEW ALL MEDS BY PRESCRIBER/CLIN PHARMACIST DOCUMENTED: ICD-10-PCS | Mod: CPTII,S$GLB,, | Performed by: PHYSICIAN ASSISTANT

## 2022-06-29 PROCEDURE — 3008F PR BODY MASS INDEX (BMI) DOCUMENTED: ICD-10-PCS | Mod: CPTII,S$GLB,, | Performed by: PHYSICIAN ASSISTANT

## 2022-06-29 PROCEDURE — 99999 PR PBB SHADOW E&M-EST. PATIENT-LVL IV: CPT | Mod: PBBFAC,,, | Performed by: PHYSICIAN ASSISTANT

## 2022-06-29 PROCEDURE — 3074F PR MOST RECENT SYSTOLIC BLOOD PRESSURE < 130 MM HG: ICD-10-PCS | Mod: CPTII,S$GLB,, | Performed by: PHYSICIAN ASSISTANT

## 2022-06-29 PROCEDURE — 1160F RVW MEDS BY RX/DR IN RCRD: CPT | Mod: CPTII,S$GLB,, | Performed by: PHYSICIAN ASSISTANT

## 2022-06-29 PROCEDURE — G0439 PPPS, SUBSEQ VISIT: HCPCS | Mod: S$GLB,,, | Performed by: PHYSICIAN ASSISTANT

## 2022-06-29 PROCEDURE — 3008F BODY MASS INDEX DOCD: CPT | Mod: CPTII,S$GLB,, | Performed by: PHYSICIAN ASSISTANT

## 2022-06-29 PROCEDURE — 1159F PR MEDICATION LIST DOCUMENTED IN MEDICAL RECORD: ICD-10-PCS | Mod: CPTII,S$GLB,, | Performed by: PHYSICIAN ASSISTANT

## 2022-06-29 NOTE — PATIENT INSTRUCTIONS
Counseling and Referral of Other Preventative  (Italic type indicates deductible and co-insurance are waived)    Patient Name: Carole Moore  Today's Date: 6/29/2022    Health Maintenance       Date Due Completion Date    Pneumococcal Vaccines (Age 0-64) (2 - PCV) 10/16/2020 10/16/2019    COVID-19 Vaccine (4 - Booster for Pfizer series) 01/25/2022 9/25/2021    Lipid Panel 09/15/2022 9/15/2021    High Dose Statin 05/30/2023 5/30/2022    Colorectal Cancer Screening 10/30/2023 10/30/2018    TETANUS VACCINE 01/01/2032 1/1/2022        No orders of the defined types were placed in this encounter.    The following information is provided to all patients.  This information is to help you find resources for any of the problems found today that may be affecting your health:                Living healthy guide: www.[facility].[place].gov      Understanding Diabetes: www.diabetes.org      Eating healthy: www.cdc.gov/healthyweight      [facility] home safety checklist: www.cdc.gov/steadi/patient.html      Agency on Aging: www.goea.[place].[facility]      Alcoholics anonymous (AA): www.aa.org      Physical Activity: www.laxmi.nih.gov/kc7vhrm      Tobacco use: www.quitwithusla.org

## 2022-06-29 NOTE — PROGRESS NOTES
"  Carole Moore presented for a  Medicare AWV and comprehensive Health Risk Assessment today. The following components were reviewed and updated:    · Medical history  · Family History  · Social history  · Allergies and Current Medications  · Health Risk Assessment  · Health Maintenance  · Care Team         ** See Completed Assessments for Annual Wellness Visit within the encounter summary.**         The following assessments were completed:  · Living Situation  · CAGE  · Depression Screening  · Timed Get Up and Go  · Whisper Test  · Cognitive Function Screening  · Nutrition Screening  · ADL Screening  · PAQ Screening        Vitals:    06/29/22 1056   BP: 102/68   BP Location: Left arm   Patient Position: Sitting   BP Method: Large (Manual)   Pulse: 78   Temp: 98.2 °F (36.8 °C)   TempSrc: Tympanic   SpO2: 97%   Weight: 76.5 kg (168 lb 10.4 oz)   Height: 5' 7" (1.702 m)     Body mass index is 26.41 kg/m².  Physical Exam  Vitals and nursing note reviewed.   Constitutional:       General: She is not in acute distress.     Appearance: Normal appearance. She is well-developed. She is not ill-appearing or diaphoretic.   HENT:      Head: Normocephalic and atraumatic.   Cardiovascular:      Rate and Rhythm: Normal rate and regular rhythm.      Heart sounds: Normal heart sounds. No murmur heard.    No friction rub. No gallop.   Pulmonary:      Effort: Pulmonary effort is normal. No respiratory distress.      Breath sounds: Normal breath sounds. No wheezing or rales.   Musculoskeletal:         General: Normal range of motion.   Skin:     General: Skin is warm.      Capillary Refill: Capillary refill takes less than 2 seconds.      Findings: No rash.   Neurological:      Mental Status: She is alert and oriented to person, place, and time.   Psychiatric:         Behavior: Behavior normal.         Thought Content: Thought content normal.         Judgment: Judgment normal.               Diagnoses and health risks identified today " and associated recommendations/orders:    1. Encounter for preventive health examination  -completed today  -up to date    2. History of CVA (cerebrovascular accident)  -Stable and controlled. Continue current treatment plan as previously prescribed with your neurologist.     3. Hemiparesis affecting right side as late effect of cerebrovascular accident  -Stable and controlled. Continue current treatment plan as previously prescribed with your neurologist.     4. Mixed hyperlipidemia  Lab Results   Component Value Date    CHOL 165 09/15/2021    CHOL 166 09/21/2020    CHOL 169 09/06/2019     Lab Results   Component Value Date    HDL 59 09/15/2021    HDL 65 09/21/2020    HDL 66 09/06/2019     Lab Results   Component Value Date    LDLCALC 91.2 09/15/2021    LDLCALC 81.4 09/21/2020    LDLCALC 90.0 09/06/2019     Lab Results   Component Value Date    TRIG 74 09/15/2021    TRIG 98 09/21/2020    TRIG 65 09/06/2019     Lab Results   Component Value Date    CHOLHDL 35.8 09/15/2021    CHOLHDL 39.2 09/21/2020    CHOLHDL 39.1 09/06/2019   -Stable and controlled on lipitor. Continue current treatment plan as previously prescribed with your PCP.       5. Malignant neoplasm of central portion of right breast in female, estrogen receptor positive  -2/2017-Right-sided breast cancer: Stage IIIA invasive lobular carcinoma, ER/RI positive, [id number] neg. BRCA negative. Given large mass on physical examination, treated with neoadjuvant chemotherapy, using dose-dense AC-T regimen, which patient completed in 2/2017 with decrease in size and firmness of R breast mass. She then underwent R breast mastectomy and SLND 4/2017, with tissue expander placement. Final pathology after neoadjuvant chemotherapy showed tumor 6cm, sentinel LNs positive, and nipple skin positive, we did recommend adjuvant radiation to R chest and axilla. Her case was discussed at tumor board and while axillary LN dissection was discussed, adjuvant radiation to the left  chest and axilla were recommended which she completed 7/24/2017- 28 treatments  -Stable and controlled. Continue current treatment plan as previously prescribed with your oncology team    6. Use of aromatase inhibitors  -Stable and controlled on arimidex. Continue current treatment plan as previously prescribed with your oncology team.    7. Family history of colon cancer  -up to date with colonoscopy  -last colonoscopy 8/2018    8. Osteopenia of multiple sites  -Stable and controlled. Continue current treatment plan as previously prescribed with your PCP.   -DEXA 10/29/2021 stable    Provided Carole with a 5-10 year written screening schedule and personal prevention plan. Recommendations were developed using the USPSTF age appropriate recommendations. Education, counseling, and referrals were provided as needed. After Visit Summary printed and given to patient which includes a list of additional screenings\tests needed.    Follow up in about 1 year (around 6/29/2023), or if symptoms worsen or fail to improve.    Keisha Rangel PA-C  I offered to discuss advanced care planning, including how to pick a person who would make decisions for you if you were unable to make them for yourself, called a health care power of , and what kind of decisions you might make such as use of life sustaining treatments such as ventilators and tube feeding when faced with a life limiting illness recorded on a living will that they will need to know. (How you want to be cared for as you near the end of your natural life)     X Patient is interested in learning more about how to make advanced directives.  I provided them paperwork and offered to discuss this with them.

## 2022-07-13 ENCOUNTER — OFFICE VISIT (OUTPATIENT)
Dept: OBSTETRICS AND GYNECOLOGY | Facility: CLINIC | Age: 57
End: 2022-07-13
Payer: MEDICARE

## 2022-07-13 VITALS
BODY MASS INDEX: 26.71 KG/M2 | HEIGHT: 67 IN | WEIGHT: 170.19 LBS | DIASTOLIC BLOOD PRESSURE: 72 MMHG | SYSTOLIC BLOOD PRESSURE: 132 MMHG

## 2022-07-13 DIAGNOSIS — Z01.419 ENCOUNTER FOR GYNECOLOGICAL EXAMINATION WITHOUT ABNORMAL FINDING: Primary | ICD-10-CM

## 2022-07-13 DIAGNOSIS — N95.2 VAGINAL ATROPHY: ICD-10-CM

## 2022-07-13 PROCEDURE — 99212 OFFICE O/P EST SF 10 MIN: CPT | Mod: PBBFAC | Performed by: OBSTETRICS & GYNECOLOGY

## 2022-07-13 PROCEDURE — 99999 PR PBB SHADOW E&M-EST. PATIENT-LVL II: ICD-10-PCS | Mod: PBBFAC,,, | Performed by: OBSTETRICS & GYNECOLOGY

## 2022-07-13 PROCEDURE — G0101 PR CA SCREEN;PELVIC/BREAST EXAM: ICD-10-PCS | Mod: S$PBB,,, | Performed by: OBSTETRICS & GYNECOLOGY

## 2022-07-13 PROCEDURE — 99999 PR PBB SHADOW E&M-EST. PATIENT-LVL II: CPT | Mod: PBBFAC,,, | Performed by: OBSTETRICS & GYNECOLOGY

## 2022-07-13 PROCEDURE — G0101 CA SCREEN;PELVIC/BREAST EXAM: HCPCS | Mod: S$PBB,,, | Performed by: OBSTETRICS & GYNECOLOGY

## 2022-07-13 NOTE — PROGRESS NOTES
CC: Well woman exam    Carole Moore is a 56 y.o. female  presents for a well woman exam.  Symptomatic vaginal atrophy. Not sexually active. Was tried on estrace cream last year but did not like it. Pt has right hemiparesis therefore has difficulty w/ frequent schedule of estrace    Past Medical History:   Diagnosis Date    Antineoplastic chemotherapy induced anemia     Breast cancer 2016    right breast    Cancer     R Breast cancer    CVA (cerebral infarction)     Diverticulosis     Family history of colon cancer 10/30/2018    Her mother and father both had colon cancer.    Hyperlipidemia     Nausea after anesthesia     Paralysis     right side    PONV (postoperative nausea and vomiting)     Stroke     R side weakness    Trouble in sleeping      Past Surgical History:   Procedure Laterality Date    AUGMENTATION OF BREAST Left 2017    BREAST SURGERY      COLONOSCOPY N/A 10/30/2018    Procedure: COLONOSCOPY;  Surgeon: Cosme Monson MD;  Location: [facility];  Service: Endoscopy;  Laterality: N/A;    CYSTOSCOPY WITH BIOPSY OF BLADDER N/A 2021    Procedure: CYSTOSCOPY, WITH BLADDER BIOPSY, WITH FULGURATION IF INDICATED;  Surgeon: Page Marcelo MD;  Location: [facility] OR;  Service: Urology;  Laterality: N/A;    HYSTERECTOMY      maintains both ovaries, menorrhagia    MASTECTOMY Right 2017    MEDIPORT INSERTION, SINGLE      TONSILLECTOMY       Family History   Problem Relation Age of Onset    Breast cancer Paternal Aunt     Colon cancer Father     Colon cancer Mother     Melanoma Maternal Uncle     Skin cancer Maternal Uncle      Social History     Tobacco Use    Smoking status: Never Smoker    Smokeless tobacco: Never Used   Substance Use Topics    Alcohol use: No    Drug use: No     OB History        2    Para   2    Term   1       1    AB   0    Living   2       SAB   0    IAB   0    Ectopic   0    Multiple   0    Live Births                "      Current Outpatient Medications:     anastrozole (ARIMIDEX) 1 mg Tab, Take 1 tablet (1 mg total) by mouth once daily., Disp: 90 tablet, Rfl: 3    aspirin 81 MG Chew, Take 81 mg by mouth once daily., Disp: , Rfl:     atorvastatin (LIPITOR) 10 MG tablet, Take 1 tablet (10 mg total) by mouth once daily., Disp: 90 tablet, Rfl: 4    cetirizine (ZYRTEC) 10 MG tablet, Take 10 mg by mouth once daily., Disp: , Rfl:     fluocinonide 0.05% (LIDEX) 0.05 % cream, Apply topically 2 (two) times daily as needed., Disp: 60 g, Rfl: 1    hydrocortisone 2.5 % cream, Apply topically 2 (two) times daily., Disp: 1 Tube, Rfl: 1    LORATADINE (CLARITIN ORAL), Take by mouth daily as needed. , Disp: , Rfl:     meclizine (ANTIVERT) 25 mg tablet, Take 25 mg by mouth 3 (three) times daily as needed., Disp: , Rfl:     mometasone 0.1% (ELOCON) 0.1 % cream, AAA once daily, Disp: 45 g, Rfl: 1    baclofen (LIORESAL) 10 MG tablet, Take 1 tablet (10 mg total) by mouth every evening. (Patient not taking: Reported on 7/13/2022), Disp: 60 tablet, Rfl: 1    estradioL (ESTRING) 2 mg (7.5 mcg /24 hour) vaginal ring, Place 2 mg vaginally once. Change every 90 days for 1 dose, Disp: 1 each, Rfl: 3    nitrofurantoin, macrocrystal-monohydrate, (MACROBID) 100 MG capsule, Take 1 capsule (100 mg total) by mouth once daily. (Patient not taking: Reported on 7/13/2022), Disp: 30 capsule, Rfl: 0    oxybutynin (DITROPAN) 5 MG Tab, Take 5 mg by mouth 2 (two) times daily., Disp: , Rfl:     sertraline (ZOLOFT) 25 MG tablet, Take 25 mg by mouth., Disp: , Rfl:     GYNECOLOGY HISTORY:  No abnormal pap/std    DATA REVIEWED:  Last pap: normal history  Last mmg: normal f/u Date: 2021    /72   Ht 5' 7" (1.702 m)   Wt 77.2 kg (170 lb 3.1 oz)   LMP 10/11/2009   BMI 26.66 kg/m²     ROS:  GENERAL: Denies weight gain or weight loss. Feeling well overall.   SKIN: Denies rash or lesions.   HEAD: Denies head injury or headache.   NODES: Denies enlarged lymph " nodes.   CHEST: Denies chest pain or shortness of breath.   CARDIOVASCULAR: Denies palpitations or left sided chest pain.   ABDOMEN: No abdominal pain, constipation, diarrhea, nausea, vomiting or rectal bleeding.   URINARY: No frequency, dysuria, hematuria, or burning on urination.  REPRODUCTIVE: See HPI.   BREASTS: The patient denies pain, lumps, or nipple discharge.   HEMATOLOGIC: No easy bruisability or excessive bleeding.   MUSCULOSKELETAL: Denies joint pain or swelling.   NEUROLOGIC: Denies syncope or weakness.   PSYCHIATRIC: Denies depression, anxiety or mood swings.    PE:   APPEARANCE: Well nourished, well developed, in no acute distress.  AFFECT: WNL, alert and oriented x 3.  SKIN: No acne or hirsutism.  NECK: Neck symmetric without masses or thyromegaly.  NODES: No inguinal, cervical, axillary or femoral lymph node enlargement.  CHEST: Good respiratory effort.   ABDOMEN: Soft. No tenderness or masses. No hepatosplenomegaly. No hernias.  BREASTS: surgically altered right breast, no masses/tenderness. Left breast no masses/skin changes. No axillyary LAD  PELVIC: Normal external female genitalia without lesions. Normal hair distribution. Adequate perineal body, normal urethral meatus. Vagina atrophic without lesions or discharge. No significant cystocele or rectocele. Bimanual exam shows uterus and cervix to be surgically absent. Adnexa without masses or tenderness.  EXTREMITIES: No edema.    Encounter for gynecological examination without abnormal finding    Vaginal atrophy    Other orders  -     estradioL (ESTRING) 2 mg (7.5 mcg /24 hour) vaginal ring; Place 2 mg vaginally once. Change every 90 days for 1 dose  Dispense: 1 each; Refill: 3    Patient was counseled today on A.C.S. Pap guidelines (none needed) and recommendations for yearly pelvic exams, yearly mammograms starting age 40, and clinical breast exams; to see her PCP for other health maintenance.

## 2022-08-11 ENCOUNTER — HOSPITAL ENCOUNTER (OUTPATIENT)
Dept: RADIOLOGY | Facility: HOSPITAL | Age: 57
Discharge: HOME | End: 2022-08-11
Attending: NURSE PRACTITIONER
Payer: MEDICARE

## 2022-08-11 ENCOUNTER — OFFICE VISIT (OUTPATIENT)
Dept: HEMATOLOGY/ONCOLOGY | Facility: CLINIC | Age: 57
End: 2022-08-11
Payer: MEDICARE

## 2022-08-11 VITALS
OXYGEN SATURATION: 95 % | RESPIRATION RATE: 14 BRPM | HEART RATE: 87 BPM | SYSTOLIC BLOOD PRESSURE: 107 MMHG | BODY MASS INDEX: 27.57 KG/M2 | WEIGHT: 175.69 LBS | DIASTOLIC BLOOD PRESSURE: 73 MMHG | HEIGHT: 67 IN | TEMPERATURE: 99 F

## 2022-08-11 DIAGNOSIS — M54.6 PAIN IN THORACIC SPINE: ICD-10-CM

## 2022-08-11 DIAGNOSIS — Z71.89 COUNSELING AND COORDINATION OF CARE: ICD-10-CM

## 2022-08-11 DIAGNOSIS — M54.9 DORSALGIA, UNSPECIFIED: ICD-10-CM

## 2022-08-11 DIAGNOSIS — C50.111 MALIGNANT NEOPLASM OF CENTRAL PORTION OF RIGHT BREAST IN FEMALE, ESTROGEN RECEPTOR POSITIVE: Primary | ICD-10-CM

## 2022-08-11 DIAGNOSIS — Z17.0 MALIGNANT NEOPLASM OF CENTRAL PORTION OF RIGHT BREAST IN FEMALE, ESTROGEN RECEPTOR POSITIVE: Primary | ICD-10-CM

## 2022-08-11 PROCEDURE — 3078F DIAST BP <80 MM HG: CPT | Mod: CPTII,S$GLB,, | Performed by: NURSE PRACTITIONER

## 2022-08-11 PROCEDURE — 99999 PR PBB SHADOW E&M-EST. PATIENT-LVL V: ICD-10-PCS | Mod: PBBFAC,,, | Performed by: NURSE PRACTITIONER

## 2022-08-11 PROCEDURE — 3078F PR MOST RECENT DIASTOLIC BLOOD PRESSURE < 80 MM HG: ICD-10-PCS | Mod: CPTII,S$GLB,, | Performed by: NURSE PRACTITIONER

## 2022-08-11 PROCEDURE — 72070 XR THORACIC SPINE AP LATERAL: ICD-10-PCS | Mod: 26,,, | Performed by: RADIOLOGY

## 2022-08-11 PROCEDURE — 72070 X-RAY EXAM THORAC SPINE 2VWS: CPT | Mod: 26,,, | Performed by: RADIOLOGY

## 2022-08-11 PROCEDURE — 99999 PR PBB SHADOW E&M-EST. PATIENT-LVL V: CPT | Mod: PBBFAC,,, | Performed by: NURSE PRACTITIONER

## 2022-08-11 PROCEDURE — 3074F SYST BP LT 130 MM HG: CPT | Mod: CPTII,S$GLB,, | Performed by: NURSE PRACTITIONER

## 2022-08-11 PROCEDURE — 72070 X-RAY EXAM THORAC SPINE 2VWS: CPT | Mod: TC

## 2022-08-11 PROCEDURE — 3008F BODY MASS INDEX DOCD: CPT | Mod: CPTII,S$GLB,, | Performed by: NURSE PRACTITIONER

## 2022-08-11 PROCEDURE — 1160F RVW MEDS BY RX/DR IN RCRD: CPT | Mod: CPTII,S$GLB,, | Performed by: NURSE PRACTITIONER

## 2022-08-11 PROCEDURE — 3008F PR BODY MASS INDEX (BMI) DOCUMENTED: ICD-10-PCS | Mod: CPTII,S$GLB,, | Performed by: NURSE PRACTITIONER

## 2022-08-11 PROCEDURE — 1159F PR MEDICATION LIST DOCUMENTED IN MEDICAL RECORD: ICD-10-PCS | Mod: CPTII,S$GLB,, | Performed by: NURSE PRACTITIONER

## 2022-08-11 PROCEDURE — 99214 OFFICE O/P EST MOD 30 MIN: CPT | Mod: S$GLB,,, | Performed by: NURSE PRACTITIONER

## 2022-08-11 PROCEDURE — 1159F MED LIST DOCD IN RCRD: CPT | Mod: CPTII,S$GLB,, | Performed by: NURSE PRACTITIONER

## 2022-08-11 PROCEDURE — 99214 PR OFFICE/OUTPT VISIT, EST, LEVL IV, 30-39 MIN: ICD-10-PCS | Mod: S$GLB,,, | Performed by: NURSE PRACTITIONER

## 2022-08-11 PROCEDURE — 1160F PR REVIEW ALL MEDS BY PRESCRIBER/CLIN PHARMACIST DOCUMENTED: ICD-10-PCS | Mod: CPTII,S$GLB,, | Performed by: NURSE PRACTITIONER

## 2022-08-11 PROCEDURE — 3074F PR MOST RECENT SYSTOLIC BLOOD PRESSURE < 130 MM HG: ICD-10-PCS | Mod: CPTII,S$GLB,, | Performed by: NURSE PRACTITIONER

## 2022-08-11 RX ORDER — TIZANIDINE 2 MG/1
2 TABLET ORAL EVERY 8 HOURS PRN
Qty: 30 TABLET | Refills: 0 | Status: SHIPPED | OUTPATIENT
Start: 2022-08-11 | End: 2022-08-21

## 2022-08-11 NOTE — PROGRESS NOTES
Subjective:       Patient ID: Carole Moore is a 57 y.o. female.    Chief Complaint: breast cancer surveillance    Collaborating provider is Dr. Harpal Khan    HPI 56-year-old  female who presents to the hematology oncology clinic today for c/o back pain Pt has a history of right breast carcinoma.  The patient was previously followed in the outpatient  Hem/Onc clinic by Dr. Alessia Moss and Dr. Huitron.    Onset- 4 weeks ago- relates babysitting for her daughter 3 days a week and picking up a toddler on the left side for 6 weeks  Location- left mid to low back over the muscle  Quality- ache- especially if leans to the left- rates as an 8  Radiation- none  exac factors- if leans to the left  Relieving factors- heat and tylenol help  Change since noting- none- no worse and no better  Unusual physical activity- lifting grand child  Change in bowel or urine patterns- none      2/2017-Right-sided breast cancer: Stage IIIA invasive lobular carcinoma, ER/CA positive, [id number] neg. BRCA negative. Given large mass on physical examination, treated with neoadjuvant chemotherapy, using dose-dense AC-T regimen, which patient completed in 2/2017 with decrease in size and firmness of R breast mass. She then underwent R breast mastectomy and SLND 4/2017, with tissue expander placement. Final pathology after neoadjuvant chemotherapy showed tumor 6cm, sentinel LNs positive, and nipple skin positive, we did recommend adjuvant radiation to R chest and axilla. Her case was discussed at tumor board and while axillary LN dissection was discussed, adjuvant radiation to the left chest and axilla were recommended which she completed 7/24/2017- 28 treatments    Anastrazole 1mg PO daily for adjuvant endocrine therapy was initiated end of July 2017. Pt tolerating without difficulty- hot flashes consistent and interupting sleep- did try ditropan for a short while and did not like the way it made her feel. Very dry mouth-  willing to try effexor now    Zometa initiated for osteopenia secondary to AI use. Bone density in July 2020 was normal so discontinued-     Bone density 10/29/2021: normal    4/13/2022- dexa- normal- repeat 4/2024      Mammogram 10/29/2021- left breast- normal-     Pt had ED visit 5/4/2022- states woke up with dizziness, nausea and vomiting- went to ED- had imaging of head wnl and labs normal- thought to be inner ear- placed on antivert and symptoms resolved in 24 hours.      No dental issues or infections. No healing issues and thinks she told her dentist about the medications she is taking.     Has history of heart failure and is followed by cardiology- last visit 3/31/2022    Pt is riding her bike 9 miles a day every day. States feels great. Has intermittent left posterior back soreness when pulls the sliding door or turns on shower - has not tried heat or ice or nsaids-       PAST MEDICAL HISTORY:   1.  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  Dyslipidemia  3.  Osteopenia - resolved  4.  Right breast cancer- Stage IIIA invasive lobular carcinoma, ER/VT positive, [id number] neg. BRCA negative.  5.  Heart failure     SURGICAL HISTORY:   1.  Tonsillectomy  2.  Hysterectomy  3.  Right breast mastectomy with with right deep axilla sentinel lymph node biopsy followed by breast reconstruction with tissue expander done on April 11, 2017 and reconstruction completed in Dec 2017.  4.  Mediport placement and removal     FAMILY HISTORY: She reports that 2 paternal aunts had breast cancer in their late 50s.  A maternal uncle had lung cancer the age of 54.  He used to smoke cigarettes.  A maternal uncle had melanoma at the age of 49.  Her father had colon cancer at the age of 70.  Her mother had colon cancer in her 70s.  She denies any other immediate family members with cancer or bleeding/clotting disorders.     SOCIAL HISTORY: The reports a 5-pack-year smoking history and  quit at the age of 23.  She drinks alcohol socially.  She has never used any recreational drugs.  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  She is  and has 2 children.  She lives in [place], [place].     ALLERGIES: [NKDA]     MEDICATIONS: reviewed and reconciled    Review of Systems   Constitutional: Negative.  Negative for appetite change, fatigue, fever and unexpected weight change.        Hot flashes at night- awakening intermittently at 2-3 am- goes to bed at 7:30 pm- stopped the ditropan and hot flashes are tolerable now   HENT: Negative.    Eyes: Negative.    Respiratory: Negative.    Cardiovascular: Negative.    Gastrointestinal: Negative.  Negative for abdominal pain and blood in stool.        No reflux   Endocrine: Negative.    Genitourinary: Negative.  Negative for hematuria.            Musculoskeletal: Positive for back pain (left latissimus soreness for a couple of weeks- notes more with movement and pulling pushing. does not radiate to any other locatioin).   Skin: Negative.    Allergic/Immunologic: Negative.    Neurological: Negative.    Hematological: Negative.  Negative for adenopathy.   Psychiatric/Behavioral: Negative.        Breast - no mass, pain or discharge  Objective:      Physical Exam  Constitutional:       Appearance: Normal appearance. She is well-developed.   HENT:      Head: Normocephalic and atraumatic.      Right Ear: Ear canal and external ear normal.      Left Ear: Ear canal and external ear normal.      Mouth/Throat:      Pharynx: No oropharyngeal exudate.   Eyes:      General: No scleral icterus.        Right eye: No discharge.         Left eye: No discharge.      Conjunctiva/sclera: Conjunctivae normal.      Pupils: Pupils are equal, round, and reactive to light.   Neck:      Thyroid: No thyromegaly.   Cardiovascular:      Rate and Rhythm: Normal rate and regular rhythm.      Pulses: Normal pulses.      Heart sounds: Normal heart sounds.    Pulmonary:      Effort: Pulmonary effort is normal.      Breath sounds: Normal breath sounds.   Chest:   Breasts:      Right: No inverted nipple, mass, nipple discharge, skin change, tenderness, axillary adenopathy or supraclavicular adenopathy.      Left: No inverted nipple, mass, nipple discharge, skin change, tenderness, axillary adenopathy or supraclavicular adenopathy.         Abdominal:      General: Bowel sounds are normal.      Palpations: Abdomen is soft.   Musculoskeletal:         General: Normal range of motion.        Arms:       Cervical back: Normal range of motion and neck supple.   Lymphadenopathy:      Head:      Right side of head: No submental, submandibular, tonsillar, preauricular, posterior auricular or occipital adenopathy.      Left side of head: No submental, submandibular, tonsillar, preauricular, posterior auricular or occipital adenopathy.      Cervical: No cervical adenopathy.      Right cervical: No superficial or posterior cervical adenopathy.     Left cervical: No superficial or posterior cervical adenopathy.      Upper Body:      Right upper body: No supraclavicular or axillary adenopathy.      Left upper body: No supraclavicular or axillary adenopathy.   Skin:     General: Skin is warm and dry.      Coloration: Skin is not pale.      Findings: No erythema or rash.   Neurological:      General: No focal deficit present.      Mental Status: She is alert and oriented to person, place, and time.      Deep Tendon Reflexes: Reflexes are normal and symmetric.      Comments: Right upper extremity hemiparesis noted   Psychiatric:         Mood and Affect: Mood normal.         Behavior: Behavior normal.         Thought Content: Thought content normal.         Judgment: Judgment normal.         Mammogram 10/29/2021- wnl    10/29/2021 and 4/13/2022- Bone density - normal- holding Zometa at this time  Taking Vit D daily- and taking calcium  Pt reports negative genetic testing in  2017    Exercise- rides her bike at least 9 miles- 60 minutes every day.     Assessment:       1. Malignant neoplasm of central portion of right breast in female, estrogen receptor positive    2. Dorsalgia, unspecified    3. Pain in thoracic spine    4. Counseling and coordination of care        Plan:       1.        Right-sided breast cancer: Stage IIIA invasive lobular carcinoma, ER/MN positive, [id number] neg. S/p AC-T chemotherapy and chest wall/axilla radiation. BRCA negative.   2.        Left sided back pain after caring for grand son - not resolving- persisting- could be muscle related- pt will try tizanidine .5 mg hs to see if will help relieve and tylenol. We will obtain plain films of her thoracic and lumbar spine and MRI of thoracic and lumbar spine- will call pt with results  2.        Negative clinical exam- Mammo Oct 29/2021wnl left breast- recommend one year f/u - Nov 2022  2.        Osteopenia- Zometa 4 mg IV discontinued due to normal bone density-  Repeat dexa 4/13/2022 reveals- normal- repeat 2 years- 4/2024     will be on AI for 10 years total.   3.        Continue Anastrazole 1 mg po daily for adjuvant endocrine therapy. Take calcium and vit D. 7/2022 will make 5 years - would recommend pt stay on for 5 more- 7/2027  4.        Hot flashes- improved with ditropan- dry mouth severe- pt stopped med and hot flashes have been more tolerable lately.  5.        Pt is exercising for 60 minutes most days of the week on her bike- taking her calcium regularly. Taking vit d regularly. Congratulated pt on maintaining her exercise and wt.   6.        Gyn f/u was in 1/8/2021   7.        Vertigo- resolved 5/4/2022

## 2022-08-15 ENCOUNTER — TELEPHONE (OUTPATIENT)
Dept: SURGERY | Facility: CLINIC | Age: 57
End: 2022-08-15
Payer: MEDICARE

## 2022-08-31 ENCOUNTER — OFFICE VISIT (OUTPATIENT)
Dept: URGENT CARE | Facility: CLINIC | Age: 57
End: 2022-08-31
Payer: MEDICARE

## 2022-08-31 VITALS
SYSTOLIC BLOOD PRESSURE: 110 MMHG | BODY MASS INDEX: 25.9 KG/M2 | RESPIRATION RATE: 18 BRPM | WEIGHT: 165 LBS | HEART RATE: 84 BPM | TEMPERATURE: 98 F | HEIGHT: 67 IN | OXYGEN SATURATION: 96 % | DIASTOLIC BLOOD PRESSURE: 58 MMHG

## 2022-08-31 DIAGNOSIS — U07.1 COVID-19: Primary | ICD-10-CM

## 2022-08-31 DIAGNOSIS — R05.9 COUGH: ICD-10-CM

## 2022-08-31 LAB
CTP QC/QA: YES
SARS-COV-2 RDRP RESP QL NAA+PROBE: POSITIVE

## 2022-08-31 PROCEDURE — 3074F PR MOST RECENT SYSTOLIC BLOOD PRESSURE < 130 MM HG: ICD-10-PCS | Mod: CPTII,S$GLB,, | Performed by: NURSE PRACTITIONER

## 2022-08-31 PROCEDURE — 3078F PR MOST RECENT DIASTOLIC BLOOD PRESSURE < 80 MM HG: ICD-10-PCS | Mod: CPTII,S$GLB,, | Performed by: NURSE PRACTITIONER

## 2022-08-31 PROCEDURE — 3078F DIAST BP <80 MM HG: CPT | Mod: CPTII,S$GLB,, | Performed by: NURSE PRACTITIONER

## 2022-08-31 PROCEDURE — 3008F BODY MASS INDEX DOCD: CPT | Mod: CPTII,S$GLB,, | Performed by: NURSE PRACTITIONER

## 2022-08-31 PROCEDURE — 1160F PR REVIEW ALL MEDS BY PRESCRIBER/CLIN PHARMACIST DOCUMENTED: ICD-10-PCS | Mod: CPTII,S$GLB,, | Performed by: NURSE PRACTITIONER

## 2022-08-31 PROCEDURE — 1159F PR MEDICATION LIST DOCUMENTED IN MEDICAL RECORD: ICD-10-PCS | Mod: CPTII,S$GLB,, | Performed by: NURSE PRACTITIONER

## 2022-08-31 PROCEDURE — U0002: ICD-10-PCS | Mod: QW,S$GLB,, | Performed by: NURSE PRACTITIONER

## 2022-08-31 PROCEDURE — 3074F SYST BP LT 130 MM HG: CPT | Mod: CPTII,S$GLB,, | Performed by: NURSE PRACTITIONER

## 2022-08-31 PROCEDURE — U0002 COVID-19 LAB TEST NON-CDC: HCPCS | Mod: QW,S$GLB,, | Performed by: NURSE PRACTITIONER

## 2022-08-31 PROCEDURE — 99213 OFFICE O/P EST LOW 20 MIN: CPT | Mod: S$GLB,,, | Performed by: NURSE PRACTITIONER

## 2022-08-31 PROCEDURE — 1159F MED LIST DOCD IN RCRD: CPT | Mod: CPTII,S$GLB,, | Performed by: NURSE PRACTITIONER

## 2022-08-31 PROCEDURE — 1160F RVW MEDS BY RX/DR IN RCRD: CPT | Mod: CPTII,S$GLB,, | Performed by: NURSE PRACTITIONER

## 2022-08-31 PROCEDURE — 99213 PR OFFICE/OUTPT VISIT, EST, LEVL III, 20-29 MIN: ICD-10-PCS | Mod: S$GLB,,, | Performed by: NURSE PRACTITIONER

## 2022-08-31 PROCEDURE — 3008F PR BODY MASS INDEX (BMI) DOCUMENTED: ICD-10-PCS | Mod: CPTII,S$GLB,, | Performed by: NURSE PRACTITIONER

## 2022-08-31 NOTE — PROGRESS NOTES
"Subjective:       Patient ID: Carole Moore is a 57 y.o. female.    Vitals:  height is 5' 7" (1.702 m) and weight is 74.8 kg (165 lb). Her tympanic temperature is 97.8 °F (36.6 °C). Her blood pressure is 110/58 (abnormal) and her pulse is 84. Her respiration is 18 and oxygen saturation is 96%.     Chief Complaint: Sore Throat    Pt. Stated she has been having her symptoms since Friday 8/26    Sore Throat   This is a new problem. The current episode started in the past 7 days. The problem has been gradually worsening. The pain is worse on the right side. There has been no fever. The pain is at a severity of 8/10. The pain is severe. Associated symptoms include congestion, coughing, headaches, a hoarse voice, a plugged ear sensation, neck pain, shortness of breath and swollen glands. Pertinent negatives include no abdominal pain, diarrhea, drooling, ear discharge, ear pain, stridor, trouble swallowing or vomiting. Associated symptoms comments: Back while walking.. She has had no exposure to strep or mono. Treatments tried: otc medication. The treatment provided no relief.     HENT:  Positive for congestion and sore throat. Negative for ear pain, ear discharge, drooling and trouble swallowing.    Neck: Positive for neck pain.   Respiratory:  Positive for cough and shortness of breath. Negative for stridor.    Gastrointestinal:  Negative for abdominal pain, vomiting and diarrhea.   Neurological:  Positive for headaches.     Objective:      Physical Exam   Constitutional: She is oriented to person, place, and time. She appears well-developed. She is cooperative.  Non-toxic appearance. She does not appear ill. No distress.   HENT:   Head: Normocephalic and atraumatic.   Ears:   Right Ear: Hearing and external ear normal.   Left Ear: Hearing and external ear normal.   Nose: Nose normal. No mucosal edema, rhinorrhea or nasal deformity. No epistaxis. Right sinus exhibits no maxillary sinus tenderness and no frontal " sinus tenderness. Left sinus exhibits no maxillary sinus tenderness and no frontal sinus tenderness.   Mouth/Throat: Uvula is midline, oropharynx is clear and moist and mucous membranes are normal. No trismus in the jaw. Normal dentition. No uvula swelling. Cobblestoning present. No oropharyngeal exudate, posterior oropharyngeal edema or posterior oropharyngeal erythema.   Eyes: Conjunctivae and lids are normal. No scleral icterus.   Neck: Trachea normal and phonation normal. Neck supple. No edema present. No erythema present. No neck rigidity present.   Cardiovascular: Normal rate, regular rhythm, normal heart sounds and normal pulses.   Pulmonary/Chest: Effort normal and breath sounds normal. No respiratory distress. She has no decreased breath sounds. She has no wheezes. She has no rhonchi.   Abdominal: Normal appearance.   Musculoskeletal: Normal range of motion.         General: No deformity. Normal range of motion.   Neurological: She is alert and oriented to person, place, and time. She exhibits normal muscle tone. Coordination normal.   Skin: Skin is warm, dry, intact, not diaphoretic and not pale.   Psychiatric: Her speech is normal and behavior is normal. Judgment and thought content normal.   Nursing note and vitals reviewed.      Assessment:       1. COVID-19    2. Cough          Plan:         COVID-19    Cough  -     POCT COVID-19 Rapid Screening               Results for orders placed or performed in visit on 08/31/22   POCT COVID-19 Rapid Screening   Result Value Ref Range    POC Rapid COVID Positive (A) Negative     Acceptable Yes      Lab result reviewed and discussed with patient.    MDM: Pt did inquire about Paxlovid. Informed her that she is already on day 6 of having symptoms and Paxlovid should be initiated within the first 5 days. Pt verbalized understanding.    Remain quarantined per CDC guidelines.  Get plenty of rest.  Increase fluids.   May apply warm compresses as needed  for facial pain and congestion.   Saline nasal spray to loosen nasal congestion.  Humidifier or steamy shower as well as sudafed or guaifenesin (Mucinex) may help with congestion.  Flonase or Nasacort to reduce inflammation in the sinus cavities.  You may take an over the counter 24 hour antihistamine such as Zyrtec or Claritin for allergy symptoms such as sneezing, itchy/watery eyes, scratchy throat, or congestion.  Warm salt water gargles, Cepacol throat lozenges, or Chloraseptic spray for sore throat.  Take Tylenol or Ibuprofen as needed for sore throat, body aches, or fever.  Take an over the counter cough suppressant such as Delsym or Robitussin DM as directed on label for cough.  You may take a multi-symptom cold medication in the place of individual suggestions listed above.  Diet as tolerated.  Follow up with your primary care provider if symptoms persist >10 days or sooner for any new or worsening symptoms.   Report to the ER for any difficulty breathing, chest pains, or loss of consciousness, or any other new and concerning symptoms.

## 2022-09-03 ENCOUNTER — TELEPHONE (OUTPATIENT)
Dept: URGENT CARE | Facility: CLINIC | Age: 57
End: 2022-09-03
Payer: MEDICARE

## 2022-09-03 NOTE — TELEPHONE ENCOUNTER
Courtesy call made to patient. Patient did not answer. I left a voicemail and callback number.   Complex Repair And Flap Additional Text (Will Appearing After The Standard Complex Repair Text): The complex repair was not sufficient to completely close the primary defect. The remaining additional defect was repaired with the flap mentioned below.

## 2022-09-08 ENCOUNTER — PATIENT MESSAGE (OUTPATIENT)
Dept: HEMATOLOGY/ONCOLOGY | Facility: CLINIC | Age: 57
End: 2022-09-08
Payer: MEDICARE

## 2022-09-08 DIAGNOSIS — M54.6 PAIN IN THORACIC SPINE: Primary | ICD-10-CM

## 2022-09-08 RX ORDER — TIZANIDINE 2 MG/1
2 TABLET ORAL NIGHTLY
Qty: 10 TABLET | Refills: 0 | Status: SHIPPED | OUTPATIENT
Start: 2022-09-08 | End: 2022-09-19

## 2022-09-08 NOTE — PROGRESS NOTES
Pt has had left back pain - now radiating to the anterior region- no fever, no dysuria, no sweats, no hematuria. states taking tylenol but not really helping- has appt with urology in one week- pt will try tizanidine 1 mg hs and see if will help and if not will go up to 2 mg. Pt will also reach out to urologist to let them know about her back pain and physical medicine to see if can get in sooner for eval. Pt is scheduled for MRI of thoracic and lumbar spine 9/21. Pt instructed to go to the ED if has increase in pain.

## 2022-09-18 ENCOUNTER — PATIENT MESSAGE (OUTPATIENT)
Dept: INTERNAL MEDICINE | Facility: CLINIC | Age: 57
End: 2022-09-18
Payer: MEDICARE

## 2022-09-18 DIAGNOSIS — N94.89 ADNEXAL MASS: Primary | ICD-10-CM

## 2022-09-19 ENCOUNTER — HOSPITAL ENCOUNTER (OUTPATIENT)
Dept: RADIOLOGY | Facility: HOSPITAL | Age: 57
Discharge: HOME | End: 2022-09-19
Attending: INTERNAL MEDICINE
Payer: MEDICARE

## 2022-09-19 ENCOUNTER — OFFICE VISIT (OUTPATIENT)
Dept: INTERNAL MEDICINE | Facility: CLINIC | Age: 57
End: 2022-09-19
Payer: MEDICARE

## 2022-09-19 VITALS
WEIGHT: 175.69 LBS | SYSTOLIC BLOOD PRESSURE: 94 MMHG | TEMPERATURE: 98 F | BODY MASS INDEX: 27.57 KG/M2 | HEART RATE: 73 BPM | DIASTOLIC BLOOD PRESSURE: 66 MMHG | OXYGEN SATURATION: 96 % | HEIGHT: 67 IN

## 2022-09-19 DIAGNOSIS — N94.89 ADNEXAL MASS: ICD-10-CM

## 2022-09-19 DIAGNOSIS — M54.9 MID BACK PAIN ON LEFT SIDE: Primary | ICD-10-CM

## 2022-09-19 PROCEDURE — 3008F PR BODY MASS INDEX (BMI) DOCUMENTED: ICD-10-PCS | Mod: CPTII,S$GLB,, | Performed by: INTERNAL MEDICINE

## 2022-09-19 PROCEDURE — 99999 PR PBB SHADOW E&M-EST. PATIENT-LVL V: ICD-10-PCS | Mod: PBBFAC,,, | Performed by: INTERNAL MEDICINE

## 2022-09-19 PROCEDURE — 76830 US PELVIS COMP WITH TRANSVAG NON-OB (XPD): ICD-10-PCS | Mod: 26,,, | Performed by: RADIOLOGY

## 2022-09-19 PROCEDURE — 99213 OFFICE O/P EST LOW 20 MIN: CPT | Mod: S$GLB,,, | Performed by: INTERNAL MEDICINE

## 2022-09-19 PROCEDURE — 99213 PR OFFICE/OUTPT VISIT, EST, LEVL III, 20-29 MIN: ICD-10-PCS | Mod: S$GLB,,, | Performed by: INTERNAL MEDICINE

## 2022-09-19 PROCEDURE — 3074F PR MOST RECENT SYSTOLIC BLOOD PRESSURE < 130 MM HG: ICD-10-PCS | Mod: CPTII,S$GLB,, | Performed by: INTERNAL MEDICINE

## 2022-09-19 PROCEDURE — 3078F DIAST BP <80 MM HG: CPT | Mod: CPTII,S$GLB,, | Performed by: INTERNAL MEDICINE

## 2022-09-19 PROCEDURE — 1160F RVW MEDS BY RX/DR IN RCRD: CPT | Mod: CPTII,S$GLB,, | Performed by: INTERNAL MEDICINE

## 2022-09-19 PROCEDURE — 3078F PR MOST RECENT DIASTOLIC BLOOD PRESSURE < 80 MM HG: ICD-10-PCS | Mod: CPTII,S$GLB,, | Performed by: INTERNAL MEDICINE

## 2022-09-19 PROCEDURE — 3074F SYST BP LT 130 MM HG: CPT | Mod: CPTII,S$GLB,, | Performed by: INTERNAL MEDICINE

## 2022-09-19 PROCEDURE — 76856 US EXAM PELVIC COMPLETE: CPT | Mod: 26,,, | Performed by: RADIOLOGY

## 2022-09-19 PROCEDURE — 99999 PR PBB SHADOW E&M-EST. PATIENT-LVL V: CPT | Mod: PBBFAC,,, | Performed by: INTERNAL MEDICINE

## 2022-09-19 PROCEDURE — 76830 TRANSVAGINAL US NON-OB: CPT | Mod: TC

## 2022-09-19 PROCEDURE — 1160F PR REVIEW ALL MEDS BY PRESCRIBER/CLIN PHARMACIST DOCUMENTED: ICD-10-PCS | Mod: CPTII,S$GLB,, | Performed by: INTERNAL MEDICINE

## 2022-09-19 PROCEDURE — 1159F MED LIST DOCD IN RCRD: CPT | Mod: CPTII,S$GLB,, | Performed by: INTERNAL MEDICINE

## 2022-09-19 PROCEDURE — 76830 TRANSVAGINAL US NON-OB: CPT | Mod: 26,,, | Performed by: RADIOLOGY

## 2022-09-19 PROCEDURE — 3008F BODY MASS INDEX DOCD: CPT | Mod: CPTII,S$GLB,, | Performed by: INTERNAL MEDICINE

## 2022-09-19 PROCEDURE — 76856 US PELVIS COMP WITH TRANSVAG NON-OB (XPD): ICD-10-PCS | Mod: 26,,, | Performed by: RADIOLOGY

## 2022-09-19 PROCEDURE — 1159F PR MEDICATION LIST DOCUMENTED IN MEDICAL RECORD: ICD-10-PCS | Mod: CPTII,S$GLB,, | Performed by: INTERNAL MEDICINE

## 2022-09-19 RX ORDER — HYDROCODONE BITARTRATE AND ACETAMINOPHEN 7.5; 325 MG/1; MG/1
1 TABLET ORAL EVERY 4 HOURS PRN
Qty: 42 TABLET | Refills: 0 | Status: SHIPPED | OUTPATIENT
Start: 2022-09-19 | End: 2022-09-26

## 2022-09-19 RX ORDER — BACLOFEN 10 MG/1
10 TABLET ORAL 3 TIMES DAILY
Qty: 30 TABLET | Refills: 0 | Status: ON HOLD | OUTPATIENT
Start: 2022-09-19 | End: 2023-02-09 | Stop reason: HOSPADM

## 2022-09-19 RX ORDER — HYDROCODONE BITARTRATE AND ACETAMINOPHEN 5; 325 MG/1; MG/1
1 TABLET ORAL EVERY 4 HOURS PRN
COMMUNITY
Start: 2022-09-17 | End: 2022-09-19 | Stop reason: ALTCHOICE

## 2022-09-19 RX ORDER — CELECOXIB 200 MG/1
200 CAPSULE ORAL DAILY
Qty: 30 CAPSULE | Refills: 3 | Status: ON HOLD | OUTPATIENT
Start: 2022-09-19 | End: 2023-02-09 | Stop reason: HOSPADM

## 2022-09-19 NOTE — PROGRESS NOTES
"Subjective:       Patient ID: Carole Moore is a 57 y.o. female.    Chief Complaint: Back Pain    HPI Patient is a 57-year-old female presenting today with complaints of back discomfort.  She states been having problems with her back going on for last 3-4 weeks.  She states that her daughter had a baby few months ago and she has been going weekly visit.  She feels like she may have injured her back due to her hemiplegia on the right.  She states that she is helping to care for the baby when she is there and she has picked him up in carries around the takes him in and out of his swing.  With her weakness on the right side this can create some issues with balance in and mobility.  She thinks that may be the imbalance that she has to a counter against while carrying the baby may have caused her to tweaked her back.    She describes pain in the left mid lower back region.  She states that is to the left of the spinal column.  It does not radiate.  It does not travel.  It is relatively severe.    Review of Systems    Objective:   BP 94/66   Pulse 73   Temp 97.6 °F (36.4 °C) (Temporal)   Ht 5' 7" (1.702 m)   Wt 79.7 kg (175 lb 11.3 oz)   LMP 10/11/2009   SpO2 96%   BMI 27.52 kg/m²      Physical Exam  Vitals reviewed.   Constitutional:       General: She is not in acute distress.     Appearance: Normal appearance. She is well-developed. She is not ill-appearing.   HENT:      Head: Normocephalic and atraumatic.      Right Ear: Tympanic membrane, ear canal and external ear normal.      Left Ear: Tympanic membrane, ear canal and external ear normal.   Cardiovascular:      Rate and Rhythm: Normal rate and regular rhythm.      Heart sounds: No murmur heard.    No friction rub. No gallop.   Pulmonary:      Effort: Pulmonary effort is normal.      Breath sounds: Normal breath sounds. No wheezing or rales.   Chest:      Chest wall: No tenderness.   Abdominal:      General: Abdomen is flat. Bowel sounds are normal. " There is no distension.      Palpations: Abdomen is soft.      Tenderness: There is no abdominal tenderness.   Musculoskeletal:      Comments: Right-sided hemiplegia is noted.  Examination of spine reveals no spinous process tenderness.  There is paraspinal muscle tenderness along the left side of the spinal column from around the area of T12 down to around L4.   Lymphadenopathy:      Cervical: No cervical adenopathy.   Skin:     Findings: No rash.   Neurological:      General: No focal deficit present.      Mental Status: She is alert and oriented to person, place, and time.           Assessment:       1. Mid back pain on left side        Plan:   No problem-specific Assessment & Plan notes found for this encounter.    Mid back pain on left side  Comments:  Celebrex 200 mg once daily, baclofen 10 mg three times daily, refer to PT  Orders:  -     celecoxib (CELEBREX) 200 MG capsule; Take 1 capsule (200 mg total) by mouth once daily.  Dispense: 30 capsule; Refill: 3  -     baclofen (LIORESAL) 10 MG tablet; Take 1 tablet (10 mg total) by mouth 3 (three) times daily. for 10 days  Dispense: 30 tablet; Refill: 0  -     Ambulatory referral/consult to Physical/Occupational Therapy; Future; Expected date: 09/26/2022  -     HYDROcodone-acetaminophen (NORCO) 7.5-325 mg per tablet; Take 1 tablet by mouth every 4 (four) hours as needed for Pain.  Dispense: 42 tablet; Refill: 0        Follow up if symptoms worsen or fail to improve.

## 2022-09-21 ENCOUNTER — HOSPITAL ENCOUNTER (OUTPATIENT)
Dept: RADIOLOGY | Facility: HOSPITAL | Age: 57
Discharge: HOME | End: 2022-09-21
Attending: NURSE PRACTITIONER
Payer: MEDICARE

## 2022-09-21 ENCOUNTER — OFFICE VISIT (OUTPATIENT)
Dept: UROLOGY | Facility: CLINIC | Age: 57
End: 2022-09-21
Payer: MEDICARE

## 2022-09-21 VITALS
BODY MASS INDEX: 27.38 KG/M2 | SYSTOLIC BLOOD PRESSURE: 108 MMHG | DIASTOLIC BLOOD PRESSURE: 78 MMHG | WEIGHT: 174.81 LBS

## 2022-09-21 DIAGNOSIS — M54.9 DORSALGIA, UNSPECIFIED: ICD-10-CM

## 2022-09-21 DIAGNOSIS — R31.29 MICROHEMATURIA: Primary | ICD-10-CM

## 2022-09-21 DIAGNOSIS — I69.351 HEMIPARESIS AFFECTING RIGHT SIDE AS LATE EFFECT OF CEREBROVASCULAR ACCIDENT: ICD-10-CM

## 2022-09-21 DIAGNOSIS — C79.51 CARCINOMA OF BREAST METASTATIC TO BONE, UNSPECIFIED LATERALITY: Primary | ICD-10-CM

## 2022-09-21 DIAGNOSIS — M54.6 PAIN IN THORACIC SPINE: ICD-10-CM

## 2022-09-21 DIAGNOSIS — N39.0 RECURRENT UTI: ICD-10-CM

## 2022-09-21 DIAGNOSIS — N30.80 CYSTITIS CYSTICA: ICD-10-CM

## 2022-09-21 DIAGNOSIS — C50.919 CARCINOMA OF BREAST METASTATIC TO BONE, UNSPECIFIED LATERALITY: Primary | ICD-10-CM

## 2022-09-21 DIAGNOSIS — R10.9 FLANK PAIN: ICD-10-CM

## 2022-09-21 PROCEDURE — 3074F SYST BP LT 130 MM HG: CPT | Mod: CPTII,S$GLB,, | Performed by: UROLOGY

## 2022-09-21 PROCEDURE — 1159F MED LIST DOCD IN RCRD: CPT | Mod: CPTII,S$GLB,, | Performed by: UROLOGY

## 2022-09-21 PROCEDURE — 25500020 PHARM REV CODE 255: Mod: PN | Performed by: NURSE PRACTITIONER

## 2022-09-21 PROCEDURE — 3008F PR BODY MASS INDEX (BMI) DOCUMENTED: ICD-10-PCS | Mod: CPTII,S$GLB,, | Performed by: UROLOGY

## 2022-09-21 PROCEDURE — 72157 MRI CHEST SPINE W/O & W/DYE: CPT | Mod: TC,PN

## 2022-09-21 PROCEDURE — 3078F DIAST BP <80 MM HG: CPT | Mod: CPTII,S$GLB,, | Performed by: UROLOGY

## 2022-09-21 PROCEDURE — 3008F BODY MASS INDEX DOCD: CPT | Mod: CPTII,S$GLB,, | Performed by: UROLOGY

## 2022-09-21 PROCEDURE — 99999 PR PBB SHADOW E&M-EST. PATIENT-LVL III: CPT | Mod: PBBFAC,,, | Performed by: UROLOGY

## 2022-09-21 PROCEDURE — 99214 PR OFFICE/OUTPT VISIT, EST, LEVL IV, 30-39 MIN: ICD-10-PCS | Mod: S$GLB,,, | Performed by: UROLOGY

## 2022-09-21 PROCEDURE — 99999 PR PBB SHADOW E&M-EST. PATIENT-LVL III: ICD-10-PCS | Mod: PBBFAC,,, | Performed by: UROLOGY

## 2022-09-21 PROCEDURE — 3074F PR MOST RECENT SYSTOLIC BLOOD PRESSURE < 130 MM HG: ICD-10-PCS | Mod: CPTII,S$GLB,, | Performed by: UROLOGY

## 2022-09-21 PROCEDURE — 72158 MRI LUMBAR SPINE W/O & W/DYE: CPT | Mod: TC,PN

## 2022-09-21 PROCEDURE — 1159F PR MEDICATION LIST DOCUMENTED IN MEDICAL RECORD: ICD-10-PCS | Mod: CPTII,S$GLB,, | Performed by: UROLOGY

## 2022-09-21 PROCEDURE — 3078F PR MOST RECENT DIASTOLIC BLOOD PRESSURE < 80 MM HG: ICD-10-PCS | Mod: CPTII,S$GLB,, | Performed by: UROLOGY

## 2022-09-21 PROCEDURE — 87086 URINE CULTURE/COLONY COUNT: CPT | Performed by: UROLOGY

## 2022-09-21 PROCEDURE — 72100 X-RAY EXAM L-S SPINE 2/3 VWS: CPT | Mod: TC,PN

## 2022-09-21 PROCEDURE — A9585 GADOBUTROL INJECTION: HCPCS | Mod: PN | Performed by: NURSE PRACTITIONER

## 2022-09-21 PROCEDURE — 99214 OFFICE O/P EST MOD 30 MIN: CPT | Mod: S$GLB,,, | Performed by: UROLOGY

## 2022-09-21 RX ORDER — GADOBUTROL 604.72 MG/ML
8 INJECTION INTRAVENOUS
Status: COMPLETED | OUTPATIENT
Start: 2022-09-21 | End: 2022-09-21

## 2022-09-21 RX ORDER — SULFAMETHOXAZOLE AND TRIMETHOPRIM 800; 160 MG/1; MG/1
1 TABLET ORAL 2 TIMES DAILY
Qty: 20 TABLET | Refills: 0 | Status: SHIPPED | OUTPATIENT
Start: 2022-09-21 | End: 2022-10-05

## 2022-09-21 RX ADMIN — GADOBUTROL 8 ML: 604.72 INJECTION INTRAVENOUS at 10:09

## 2022-09-21 NOTE — PROGRESS NOTES
Chief Complaint: f/u recurrent UTI    HPI:     9/21/22- Pt reports left flank and abdominal pain. Having urinary frequency. Went to [facility] and had non-contrast CT, showing grossly normal kidneys without stone or hydronephrosis, per report. No fever. She is now off of daily antibiotics. Prescribed estring by gyn, but not using it. She reports that the pain is primarily with movement. Ongoing x 4 weeks, off and on. Currently on baclofen, norco and celebrex. Has a grandbaby that she has been lifting.    3/24/22- Daily macrobid seems to be working. No recent UTIs. Pt has an upcoming trip and would like to continue daily abx through then. No dysuria, abdominal pain or gross hematuria.    12/21/21- bladder neck biopsy with chronic cystitis with cystitis cystica and glandularis. Had some bloating after the procedure for a week. No gross hematuria. No dysuria. Has had recurrent UTIs every month or so lately.   11/5/21-here for cysto. CT urogram with tiny right renal cyst.   10/21/19-[id number] for dipstick positive UA for blood, urine culture negative. She reports that a year ago, she was having recurrent UTIs. She reports that after being cathed in the  clinic, she didn't have any more UTIs. Currently, she reports that she is now having frequency and dysuria. She was treated with abx (cipro) a few weeks ago and symptoms improved, but she is scared they will recur. No gross hematuria. She was on oxybutynin, but it gave her dry mouth.   1/30/20: 55 yo woman with breast cancer history about a year ago had a few UTI.  Sole symptom was frequency.   Bought some new underwear and after changing back to old type things got better and no problems since, about 5 mo.  No abd/pelvic pain and no exac/rel factors.  No hematuria.  No urolithiasis.  No urinary bother.  No  history.  Normal sexual function.  Normal OB exam 2 mo ago.  Microhematuria on dipstick UA.  Had a stroke 20 yrs ago, on disability.    Allergies:  Patient has no  known allergies.    Medications: has a current medication list which includes the following prescription(s): anastrozole, aspirin, atorvastatin, baclofen, celecoxib, cetirizine, hydrocodone-acetaminophen, meclizine, mometasone 0.1%, oxybutynin, sertraline, estradiol, fluocinonide 0.05%, hydrocortisone, loratadine, and sulfamethoxazole-trimethoprim 800-160mg.    Review of Systems:  General: No fever, chills, fatigability, or weight loss.  Skin: No rashes, itching, or changes in color or texture of skin.  Chest: Denies LIPSCOMB, cyanosis, wheezing, cough, and sputum production.  Abdomen: Appetite fine. No weight loss. Denies diarrhea, abdominal pain, hematemesis, or blood in stool.  Musculoskeletal: No joint stiffness or swelling. Denies back pain.  : As above.  All other review of systems negative.    PMH:   has a past medical history of Antineoplastic chemotherapy induced anemia, Breast cancer (2016), Cancer, CVA (cerebral infarction), Diverticulosis, Family history of colon cancer (10/30/2018), Hyperlipidemia, Nausea after anesthesia, Paralysis, PONV (postoperative nausea and vomiting), Stroke, and Trouble in sleeping.    PSH:   has a past surgical history that includes Tonsillectomy; Mediport insertion, single; Breast surgery; Colonoscopy (N/A, 10/30/2018); Mastectomy (Right, 2017); Augmentation of breast (Left, 2017); Hysterectomy (2007); and Cystoscopy with biopsy of bladder (N/A, 11/29/2021).    FamHx: family history includes Breast cancer in her paternal aunt; Colon cancer in her father and mother; Melanoma in her maternal uncle; Skin cancer in her maternal uncle.    SocHx:  reports that she has never smoked. She has never used smokeless tobacco. She reports that she does not drink alcohol and does not use drugs.     Physical Exam:  Vitals:   Vitals:    09/21/22 0906   BP: 108/78       General: A&Ox3. No apparent distress. No deformities.  Neck: No masses. Normal thyroid.  Lungs: normal inspiration. No use of  accessory muscles.  Heart: normal pulse. No arrhythmias.  Abdomen: Soft. NT. ND. No masses. No hernias. No hepatosplenomegaly.  Lymphatic: Neck and groin nodes negative.  Skin: The skin is warm and dry. No jaundice.  Ext: No c/c/e. R hemiparesis  : External genitalia normal.         Labs/Studies:   UA: 250 blood, trace LE, trace protein    Impression/Plan:   Microhematuria    Cystitis cystica    Recurrent UTI  -     Urine culture    Hemiparesis affecting right side as late effect of cerebrovascular accident    Flank pain    Other orders  -     sulfamethoxazole-trimethoprim 800-160mg (BACTRIM DS) 800-160 mg Tab; Take 1 tablet by mouth 2 (two) times daily.  Dispense: 20 tablet; Refill: 0       May be musculoskeletal, since she primarily uses left side.   Follow up in about 6 months (around 3/21/2023).

## 2022-09-22 ENCOUNTER — OFFICE VISIT (OUTPATIENT)
Dept: HEMATOLOGY/ONCOLOGY | Facility: CLINIC | Age: 57
End: 2022-09-22
Payer: MEDICARE

## 2022-09-22 ENCOUNTER — OFFICE VISIT (OUTPATIENT)
Dept: RADIATION ONCOLOGY | Facility: CLINIC | Age: 57
End: 2022-09-22
Payer: MEDICARE

## 2022-09-22 ENCOUNTER — LAB VISIT (OUTPATIENT)
Dept: LAB | Facility: HOSPITAL | Age: 57
End: 2022-09-22
Attending: INTERNAL MEDICINE
Payer: MEDICARE

## 2022-09-22 VITALS
WEIGHT: 174.38 LBS | OXYGEN SATURATION: 99 % | RESPIRATION RATE: 18 BRPM | TEMPERATURE: 97 F | HEIGHT: 67 IN | SYSTOLIC BLOOD PRESSURE: 98 MMHG | HEIGHT: 67 IN | TEMPERATURE: 97 F | HEART RATE: 73 BPM | DIASTOLIC BLOOD PRESSURE: 69 MMHG | BODY MASS INDEX: 27.37 KG/M2 | WEIGHT: 174.38 LBS | HEART RATE: 73 BPM | RESPIRATION RATE: 18 BRPM | OXYGEN SATURATION: 99 % | DIASTOLIC BLOOD PRESSURE: 69 MMHG | SYSTOLIC BLOOD PRESSURE: 98 MMHG | BODY MASS INDEX: 27.37 KG/M2

## 2022-09-22 DIAGNOSIS — Z86.73 HISTORY OF CVA (CEREBROVASCULAR ACCIDENT): ICD-10-CM

## 2022-09-22 DIAGNOSIS — C50.919 CARCINOMA OF BREAST METASTATIC TO BONE, UNSPECIFIED LATERALITY: Primary | ICD-10-CM

## 2022-09-22 DIAGNOSIS — C50.919 CARCINOMA OF BREAST METASTATIC TO BONE, UNSPECIFIED LATERALITY: ICD-10-CM

## 2022-09-22 DIAGNOSIS — Z17.0 MALIGNANT NEOPLASM OF CENTRAL PORTION OF RIGHT BREAST IN FEMALE, ESTROGEN RECEPTOR POSITIVE: ICD-10-CM

## 2022-09-22 DIAGNOSIS — C79.51 CARCINOMA OF BREAST METASTATIC TO BONE, UNSPECIFIED LATERALITY: Primary | ICD-10-CM

## 2022-09-22 DIAGNOSIS — C79.51 CARCINOMA OF BREAST METASTATIC TO BONE, UNSPECIFIED LATERALITY: ICD-10-CM

## 2022-09-22 DIAGNOSIS — C50.111 MALIGNANT NEOPLASM OF CENTRAL PORTION OF RIGHT BREAST IN FEMALE, ESTROGEN RECEPTOR POSITIVE: ICD-10-CM

## 2022-09-22 LAB
ALBUMIN SERPL BCP-MCNC: 4 G/DL (ref 3.5–5.2)
ALP SERPL-CCNC: 83 U/L (ref 55–135)
ALT SERPL W/O P-5'-P-CCNC: 21 U/L (ref 10–44)
ANION GAP SERPL CALC-SCNC: 11 MMOL/L (ref 8–16)
AST SERPL-CCNC: 21 U/L (ref 10–40)
BASOPHILS # BLD AUTO: 0.02 K/UL (ref 0–0.2)
BASOPHILS NFR BLD: 0.4 % (ref 0–1.9)
BILIRUB SERPL-MCNC: 1 MG/DL (ref 0.1–1)
BUN SERPL-MCNC: 20 MG/DL (ref 6–20)
CALCIUM SERPL-MCNC: 9.8 MG/DL (ref 8.7–10.5)
CHLORIDE SERPL-SCNC: 105 MMOL/L (ref 95–110)
CO2 SERPL-SCNC: 25 MMOL/L (ref 23–29)
CREAT SERPL-MCNC: 0.8 MG/DL (ref 0.5–1.4)
DIFFERENTIAL METHOD: NORMAL
EOSINOPHIL # BLD AUTO: 0.1 K/UL (ref 0–0.5)
EOSINOPHIL NFR BLD: 1.6 % (ref 0–8)
ERYTHROCYTE [DISTWIDTH] IN BLOOD BY AUTOMATED COUNT: 12.3 % (ref 11.5–14.5)
EST. GFR  (NO RACE VARIABLE): >60 ML/MIN/1.73 M^2
GLUCOSE SERPL-MCNC: 97 MG/DL (ref 70–110)
HCT VFR BLD AUTO: 39.9 % (ref 37–48.5)
HGB BLD-MCNC: 13.1 G/DL (ref 12–16)
IMM GRANULOCYTES # BLD AUTO: 0.01 K/UL (ref 0–0.04)
IMM GRANULOCYTES NFR BLD AUTO: 0.2 % (ref 0–0.5)
LYMPHOCYTES # BLD AUTO: 1.6 K/UL (ref 1–4.8)
LYMPHOCYTES NFR BLD: 27.6 % (ref 18–48)
MCH RBC QN AUTO: 30.1 PG (ref 27–31)
MCHC RBC AUTO-ENTMCNC: 32.8 G/DL (ref 32–36)
MCV RBC AUTO: 92 FL (ref 82–98)
MONOCYTES # BLD AUTO: 0.5 K/UL (ref 0.3–1)
MONOCYTES NFR BLD: 9.6 % (ref 4–15)
NEUTROPHILS # BLD AUTO: 3.4 K/UL (ref 1.8–7.7)
NEUTROPHILS NFR BLD: 60.6 % (ref 38–73)
NRBC BLD-RTO: 0 /100 WBC
PLATELET # BLD AUTO: 297 K/UL (ref 150–450)
PMV BLD AUTO: 9.7 FL (ref 9.2–12.9)
POTASSIUM SERPL-SCNC: 5 MMOL/L (ref 3.5–5.1)
PROT SERPL-MCNC: 7.6 G/DL (ref 6–8.4)
RBC # BLD AUTO: 4.35 M/UL (ref 4–5.4)
SODIUM SERPL-SCNC: 141 MMOL/L (ref 136–145)
WBC # BLD AUTO: 5.62 K/UL (ref 3.9–12.7)

## 2022-09-22 PROCEDURE — 99999 PR PBB SHADOW E&M-EST. PATIENT-LVL IV: ICD-10-PCS | Mod: PBBFAC,,, | Performed by: RADIOLOGY

## 2022-09-22 PROCEDURE — 99999 PR PBB SHADOW E&M-EST. PATIENT-LVL V: ICD-10-PCS | Mod: PBBFAC,,, | Performed by: INTERNAL MEDICINE

## 2022-09-22 PROCEDURE — 3008F PR BODY MASS INDEX (BMI) DOCUMENTED: ICD-10-PCS | Mod: CPTII,S$GLB,, | Performed by: RADIOLOGY

## 2022-09-22 PROCEDURE — 99499 UNLISTED E&M SERVICE: CPT | Mod: HCNC,S$GLB,, | Performed by: RADIOLOGY

## 2022-09-22 PROCEDURE — 1159F PR MEDICATION LIST DOCUMENTED IN MEDICAL RECORD: ICD-10-PCS | Mod: CPTII,S$GLB,, | Performed by: INTERNAL MEDICINE

## 2022-09-22 PROCEDURE — 3074F PR MOST RECENT SYSTOLIC BLOOD PRESSURE < 130 MM HG: ICD-10-PCS | Mod: CPTII,S$GLB,, | Performed by: RADIOLOGY

## 2022-09-22 PROCEDURE — 3008F BODY MASS INDEX DOCD: CPT | Mod: CPTII,S$GLB,, | Performed by: RADIOLOGY

## 2022-09-22 PROCEDURE — 99205 OFFICE O/P NEW HI 60 MIN: CPT | Mod: S$GLB,,, | Performed by: RADIOLOGY

## 2022-09-22 PROCEDURE — 85025 COMPLETE CBC W/AUTO DIFF WBC: CPT | Performed by: INTERNAL MEDICINE

## 2022-09-22 PROCEDURE — 3078F PR MOST RECENT DIASTOLIC BLOOD PRESSURE < 80 MM HG: ICD-10-PCS | Mod: CPTII,S$GLB,, | Performed by: RADIOLOGY

## 2022-09-22 PROCEDURE — 1160F RVW MEDS BY RX/DR IN RCRD: CPT | Mod: CPTII,S$GLB,, | Performed by: INTERNAL MEDICINE

## 2022-09-22 PROCEDURE — 3074F SYST BP LT 130 MM HG: CPT | Mod: CPTII,S$GLB,, | Performed by: RADIOLOGY

## 2022-09-22 PROCEDURE — 3008F BODY MASS INDEX DOCD: CPT | Mod: CPTII,S$GLB,, | Performed by: INTERNAL MEDICINE

## 2022-09-22 PROCEDURE — 99999 PR PBB SHADOW E&M-EST. PATIENT-LVL IV: CPT | Mod: PBBFAC,,, | Performed by: RADIOLOGY

## 2022-09-22 PROCEDURE — 3008F PR BODY MASS INDEX (BMI) DOCUMENTED: ICD-10-PCS | Mod: CPTII,S$GLB,, | Performed by: INTERNAL MEDICINE

## 2022-09-22 PROCEDURE — 3074F SYST BP LT 130 MM HG: CPT | Mod: CPTII,S$GLB,, | Performed by: INTERNAL MEDICINE

## 2022-09-22 PROCEDURE — 99205 PR OFFICE/OUTPT VISIT, NEW, LEVL V, 60-74 MIN: ICD-10-PCS | Mod: S$GLB,,, | Performed by: RADIOLOGY

## 2022-09-22 PROCEDURE — 3078F PR MOST RECENT DIASTOLIC BLOOD PRESSURE < 80 MM HG: ICD-10-PCS | Mod: CPTII,S$GLB,, | Performed by: INTERNAL MEDICINE

## 2022-09-22 PROCEDURE — 3078F DIAST BP <80 MM HG: CPT | Mod: CPTII,S$GLB,, | Performed by: INTERNAL MEDICINE

## 2022-09-22 PROCEDURE — 99499 RISK ADDL DX/OHS AUDIT: ICD-10-PCS | Mod: HCNC,S$GLB,, | Performed by: RADIOLOGY

## 2022-09-22 PROCEDURE — 99215 OFFICE O/P EST HI 40 MIN: CPT | Mod: S$GLB,,, | Performed by: INTERNAL MEDICINE

## 2022-09-22 PROCEDURE — 1160F PR REVIEW ALL MEDS BY PRESCRIBER/CLIN PHARMACIST DOCUMENTED: ICD-10-PCS | Mod: CPTII,S$GLB,, | Performed by: INTERNAL MEDICINE

## 2022-09-22 PROCEDURE — 80053 COMPREHEN METABOLIC PANEL: CPT | Performed by: INTERNAL MEDICINE

## 2022-09-22 PROCEDURE — 99215 PR OFFICE/OUTPT VISIT, EST, LEVL V, 40-54 MIN: ICD-10-PCS | Mod: S$GLB,,, | Performed by: INTERNAL MEDICINE

## 2022-09-22 PROCEDURE — 3078F DIAST BP <80 MM HG: CPT | Mod: CPTII,S$GLB,, | Performed by: RADIOLOGY

## 2022-09-22 PROCEDURE — 1159F MED LIST DOCD IN RCRD: CPT | Mod: CPTII,S$GLB,, | Performed by: INTERNAL MEDICINE

## 2022-09-22 PROCEDURE — 3074F PR MOST RECENT SYSTOLIC BLOOD PRESSURE < 130 MM HG: ICD-10-PCS | Mod: CPTII,S$GLB,, | Performed by: INTERNAL MEDICINE

## 2022-09-22 PROCEDURE — 99999 PR PBB SHADOW E&M-EST. PATIENT-LVL V: CPT | Mod: PBBFAC,,, | Performed by: INTERNAL MEDICINE

## 2022-09-22 PROCEDURE — 36415 COLL VENOUS BLD VENIPUNCTURE: CPT | Performed by: INTERNAL MEDICINE

## 2022-09-22 RX ORDER — POLYETHYLENE GLYCOL 3350 17 G/17G
17 POWDER, FOR SOLUTION ORAL DAILY
Qty: 510 G | Refills: 11 | Status: SHIPPED | OUTPATIENT
Start: 2022-09-22

## 2022-09-22 RX ORDER — OXYCODONE AND ACETAMINOPHEN 5; 325 MG/1; MG/1
1 TABLET ORAL EVERY 4 HOURS PRN
Qty: 60 TABLET | Refills: 0 | Status: ON HOLD | OUTPATIENT
Start: 2022-09-22 | End: 2023-02-09 | Stop reason: HOSPADM

## 2022-09-22 NOTE — PROGRESS NOTES
Subjective:       Patient ID: Carole Moore is a 57 y.o. female.    Chief Complaint: Results and Breast Cancer    HPI 57-year-old female history of stage IIB breast carcinoma 2016 treated with dose dense AC followed by Taxol continues on Arimidex 1 mg daily.  ERPR positive HER2 negative disease patient presents with increasing back pain over the last month recent imaging demonstrated on MRI suggestive of widespread metastatic disease in bone.  Patient has had previous CVA in ECOG status 2    Past Medical History:   Diagnosis Date    Antineoplastic chemotherapy induced anemia     Breast cancer 2016    right breast    Cancer     R Breast cancer    CVA (cerebral infarction)     Diverticulosis     Family history of colon cancer 10/30/2018    Her mother and father both had colon cancer.    Hyperlipidemia     Nausea after anesthesia     Paralysis     right side    PONV (postoperative nausea and vomiting)     Stroke     R side weakness    Trouble in sleeping      Family History   Problem Relation Age of Onset    Breast cancer Paternal Aunt     Colon cancer Father     Colon cancer Mother     Melanoma Maternal Uncle     Skin cancer Maternal Uncle      Social History     Socioeconomic History    Marital status:    Tobacco Use    Smoking status: Never    Smokeless tobacco: Never   Substance and Sexual Activity    Alcohol use: No    Drug use: No    Sexual activity: Not Currently     Birth control/protection: Surgical   Other Topics Concern    Are you pregnant or think you may be? No    Breast-feeding No     Social Determinants of Health     Financial Resource Strain: Low Risk     Difficulty of Paying Living Expenses: Not hard at all   Food Insecurity: No Food Insecurity    Worried About Running Out of Food in the Last Year: Never true    Ran Out of Food in the Last Year: Never true   Transportation Needs: No Transportation Needs    Lack of Transportation (Medical): No    Lack of Transportation (Non-Medical): No    Physical Activity: Insufficiently Active    Days of Exercise per Week: 2 days    Minutes of Exercise per Session: 30 min   Stress: Stress Concern Present    Feeling of Stress : To some extent   Social Connections: Unknown    Frequency of Communication with Friends and Family: More than three times a week    Frequency of Social Gatherings with Friends and Family: Three times a week    Active Member of Clubs or Organizations: No    Attends Club or Organization Meetings: Never    Marital Status:    Housing Stability: Low Risk     Unable to Pay for Housing in the Last Year: No    Number of Places Lived in the Last Year: 1    Unstable Housing in the Last Year: No     Past Surgical History:   Procedure Laterality Date    AUGMENTATION OF BREAST Left 2017    BREAST SURGERY      COLONOSCOPY N/A 10/30/2018    Procedure: COLONOSCOPY;  Surgeon: Cosme Monson MD;  Location: [facility];  Service: Endoscopy;  Laterality: N/A;    CYSTOSCOPY WITH BIOPSY OF BLADDER N/A 11/29/2021    Procedure: CYSTOSCOPY, WITH BLADDER BIOPSY, WITH FULGURATION IF INDICATED;  Surgeon: Page Marcelo MD;  Location: [facility] OR;  Service: Urology;  Laterality: N/A;    HYSTERECTOMY  2007    maintains both ovaries, menorrhagia    MASTECTOMY Right 2017    MEDIPORT INSERTION, SINGLE      TONSILLECTOMY         Labs:  Lab Results   Component Value Date    WBC 5.68 05/12/2022    HGB 12.9 05/12/2022    HCT 39.4 05/12/2022    MCV 93 05/12/2022     05/12/2022     BMP  Lab Results   Component Value Date     09/21/2022    K 4.6 09/21/2022     09/21/2022    CO2 29 09/21/2022    BUN 17 09/21/2022    CREATININE 0.7 09/21/2022    CALCIUM 10.0 09/21/2022    ANIONGAP 8 09/21/2022    ESTGFRAFRICA >60 05/12/2022    EGFRNONAA >60 05/12/2022     Lab Results   Component Value Date    ALT 31 05/12/2022    AST 21 05/12/2022    ALKPHOS 63 05/12/2022    BILITOT 0.7 05/12/2022       No results found for: IRON, TIBC, FERRITIN, SATURATEDIRO  No results  found for: [id number]  No results found for: FOLATE  Lab Results   Component Value Date    TSH 0.793 10/29/2021         Review of Systems   Constitutional:  Positive for activity change, appetite change, fatigue and unexpected weight change. Negative for chills, diaphoresis and fever.   HENT:  Negative for congestion, dental problem, drooling, ear discharge, ear pain, facial swelling, hearing loss, mouth sores, nosebleeds, postnasal drip, rhinorrhea, sinus pressure, sneezing, sore throat, tinnitus, trouble swallowing and voice change.    Eyes:  Negative for photophobia, pain, discharge, redness, itching and visual disturbance.   Respiratory:  Negative for cough, choking, chest tightness, shortness of breath, wheezing and stridor.    Cardiovascular:  Negative for chest pain, palpitations and leg swelling.   Gastrointestinal:  Negative for abdominal distention, abdominal pain, anal bleeding, blood in stool, constipation, diarrhea, nausea, rectal pain and vomiting.   Endocrine: Negative for cold intolerance, heat intolerance, polydipsia, polyphagia and polyuria.   Genitourinary:  Negative for decreased urine volume, difficulty urinating, dyspareunia, dysuria, enuresis, flank pain, frequency, genital sores, hematuria, menstrual problem, pelvic pain, urgency, vaginal bleeding, vaginal discharge and vaginal pain.   Musculoskeletal:  Positive for arthralgias, back pain, gait problem and myalgias. Negative for joint swelling, neck pain and neck stiffness.   Skin:  Negative for color change, pallor and rash.   Allergic/Immunologic: Negative for environmental allergies, food allergies and immunocompromised state.   Neurological:  Positive for weakness. Negative for dizziness, tremors, seizures, syncope, facial asymmetry, speech difficulty, light-headedness, numbness and headaches.   Hematological:  Negative for adenopathy. Does not bruise/bleed easily.   Psychiatric/Behavioral:  Positive for dysphoric mood. Negative for  agitation, behavioral problems, confusion, decreased concentration, hallucinations, self-injury, sleep disturbance and suicidal ideas. The patient is nervous/anxious. The patient is not hyperactive.      Objective:      Physical Exam  Vitals reviewed.   Constitutional:       General: She is not in acute distress.     Appearance: She is well-developed. She is ill-appearing. She is not diaphoretic.   HENT:      Head: Normocephalic and atraumatic.      Right Ear: External ear normal.      Left Ear: External ear normal.      Nose: Nose normal.      Right Sinus: No maxillary sinus tenderness or frontal sinus tenderness.      Left Sinus: No maxillary sinus tenderness or frontal sinus tenderness.      Mouth/Throat:      Pharynx: No oropharyngeal exudate.   Eyes:      General: Lids are normal. No scleral icterus.        Right eye: No discharge.         Left eye: No discharge.      Conjunctiva/sclera: Conjunctivae normal.      Right eye: Right conjunctiva is not injected. No hemorrhage.     Left eye: Left conjunctiva is not injected. No hemorrhage.     Pupils: Pupils are equal, round, and reactive to light.   Neck:      Thyroid: No thyromegaly.      Vascular: No JVD.      Trachea: No tracheal deviation.   Cardiovascular:      Rate and Rhythm: Normal rate.   Pulmonary:      Effort: Pulmonary effort is normal. No respiratory distress.      Breath sounds: No stridor.   Chest:      Chest wall: No tenderness.   Abdominal:      General: Bowel sounds are normal. There is no distension.      Palpations: Abdomen is soft. There is no hepatomegaly, splenomegaly or mass.      Tenderness: There is no abdominal tenderness. There is no rebound.   Musculoskeletal:         General: No tenderness. Normal range of motion.      Cervical back: Normal range of motion and neck supple.   Lymphadenopathy:      Cervical: No cervical adenopathy.      Upper Body:      Right upper body: No supraclavicular adenopathy.      Left upper body: No  supraclavicular adenopathy.   Skin:     General: Skin is dry.      Findings: No erythema or rash.   Neurological:      Mental Status: She is alert and oriented to person, place, and time.      Cranial Nerves: No cranial nerve deficit.      Motor: Weakness present.      Coordination: Coordination abnormal.      Gait: Gait abnormal.   Psychiatric:         Behavior: Behavior normal.         Thought Content: Thought content normal.         Judgment: Judgment normal.           Assessment:      1. Carcinoma of breast metastatic to bone, unspecified laterality    2. Malignant neoplasm of central portion of right breast in female, estrogen receptor positive    3. History of CVA (cerebrovascular accident)           Plan:     MRI highly consistent with metastatic breast carcinoma to bone.  Do not have tissue confirmation will proceed with PET scan and MRI of brain.  Return after above scheduled to see radiation oncology today.  CBC CMP ordered will ask for documentation that she is had expanded testing for germ line mutation.  I have very extensive conversation with patient family said that metastatic breast cancer is highly treatable but not curable malignancy.  At this time patient family wish to expedite as soon as possible nurse navigation room and proceed with evaluation      Harpal Khan Jr, MD FACP

## 2022-09-22 NOTE — PROGRESS NOTES
[facility] [place]  RADIATION ONCOLOGY CONSULTATION    Name: Carole Moore  : 1965      Patient Referred To Radiation Oncology [place]  [id number] [place] [address]    DIAGNOSIS: metastatic breast cancer, stage IV    HISTORY OF PRESENT ILLNESS:  Carole Moore is a 57 y.o. female who presents for consultation for the above diagnosis.   Dx with R breast cancer stage IIIA lobular ER/AK+ in 2017.   Completed ddACT, R mastectomy and sentinel node biopsy  with TE placement.  Completed adjuvant radiation 2017.   Was taking Arimidex.  Noted back pain recently.    MRI 22 showed multiple lesions consistent with metastatic disease in T8, T9, T10, T11, T12, L1, L2, L4, L5 vertebral bodies, also bilateral iliac bone lesions. No epidural or LM disease.  Pathologic fracture of T10.  T8 has posterior element involvement without cord compression, probably L T7 rib lesion.    Today, she has mid thoracic back pain that radiates left anteriorly.  No new focal motor/sensory deficits, bowel/bladder changes.   Stable R hemiparesis from CVA .    REVIEW OF SYSTEMS: (Positive findings bold, otherwise negative)   Constitutional: fever, fatigue, weight change  Eyes: blurred vision in the past 3 months, double vision   ENT: ear pain, new mouth lesions, jaw pain, difficulty swallowing, sore throat  Cardiovascular: chest pain on exertion, reflux, leg swelling  Respiratory: shortness of breath, dyspnea, cough, hemoptysis.   GI: abdominal pain, diarrhea, constipation, blood in stool, painful bowel movements  : painful or burning urination, blood in urine  Musculoskeletal: new bone or joint pains  Neurologic: headache, seizure, focal numbness or tingling, balance changes, speech changes  Lymph: new or enlarged lymph nodes  Psychiatric: depression, anxiety    PRIOR RADIATION HISTORY: as above    PAST MEDICAL HISTORY:  Past Medical History:    Diagnosis Date    Antineoplastic chemotherapy induced anemia     Breast cancer 2016    right breast    Cancer     R Breast cancer    CVA (cerebral infarction)     Diverticulosis     Family history of colon cancer 10/30/2018    Her mother and father both had colon cancer.    Hyperlipidemia     Nausea after anesthesia     Paralysis     right side    PONV (postoperative nausea and vomiting)     Stroke     R side weakness    Trouble in sleeping        PAST SURGICAL HISTORY:  Past Surgical History:   Procedure Laterality Date    AUGMENTATION OF BREAST Left 2017    BREAST SURGERY      COLONOSCOPY N/A 10/30/2018    Procedure: COLONOSCOPY;  Surgeon: Cosme Monson MD;  [facility];  Service: Endoscopy;  Laterality: N/A;    CYSTOSCOPY WITH BIOPSY OF BLADDER N/A 11/29/2021    Procedure: CYSTOSCOPY, WITH BLADDER BIOPSY, WITH FULGURATION IF INDICATED;  Surgeon: Page Marcelo MD;  Location: [facility] OR;  Service: Urology;  Laterality: N/A;    HYSTERECTOMY  2007    maintains both ovaries, menorrhagia    MASTECTOMY Right 2017    MEDIPORT INSERTION, SINGLE      TONSILLECTOMY         ALLERGIES:   Review of patient's allergies indicates:  No Known Allergies    MEDICATIONS:    Current Outpatient Medications:     anastrozole (ARIMIDEX) 1 mg Tab, Take 1 tablet (1 mg total) by mouth once daily., Disp: 90 tablet, Rfl: 3    aspirin 81 MG Chew, Take 81 mg by mouth once daily., Disp: , Rfl:     atorvastatin (LIPITOR) 10 MG tablet, Take 1 tablet (10 mg total) by mouth once daily., Disp: 90 tablet, Rfl: 4    baclofen (LIORESAL) 10 MG tablet, Take 1 tablet (10 mg total) by mouth 3 (three) times daily. for 10 days, Disp: 30 tablet, Rfl: 0    celecoxib (CELEBREX) 200 MG capsule, Take 1 capsule (200 mg total) by mouth once daily., Disp: 30 capsule, Rfl: 3    cetirizine (ZYRTEC) 10 MG tablet, Take 10 mg by mouth once daily., Disp: , Rfl:     estradioL (ESTRING) 2 mg (7.5 mcg /24 hour) vaginal ring, Place 2 mg vaginally once. Change  every 90 days for 1 dose, Disp: 1 each, Rfl: 3    fluocinonide 0.05% (LIDEX) 0.05 % cream, Apply topically 2 (two) times daily as needed., Disp: 60 g, Rfl: 1    HYDROcodone-acetaminophen (NORCO) 7.5-325 mg per tablet, Take 1 tablet by mouth every 4 (four) hours as needed for Pain., Disp: 42 tablet, Rfl: 0    hydrocortisone 2.5 % cream, Apply topically 2 (two) times daily., Disp: 1 Tube, Rfl: 1    LORATADINE (CLARITIN ORAL), Take by mouth daily as needed. , Disp: , Rfl:     meclizine (ANTIVERT) 25 mg tablet, Take 25 mg by mouth 3 (three) times daily as needed., Disp: , Rfl:     mometasone 0.1% (ELOCON) 0.1 % cream, AAA once daily, Disp: 45 g, Rfl: 1    oxybutynin (DITROPAN) 5 MG Tab, Take 5 mg by mouth 2 (two) times daily., Disp: , Rfl:     oxyCODONE-acetaminophen (PERCOCET) 5-325 mg per tablet, Take 1 tablet by mouth every 4 (four) hours as needed for Pain., Disp: 60 tablet, Rfl: 0    polyethylene glycol (GLYCOLAX) 17 gram/dose powder, Take 17 g by mouth once daily., Disp: 510 g, Rfl: 11    sertraline (ZOLOFT) 25 MG tablet, Take 25 mg by mouth., Disp: , Rfl:     sulfamethoxazole-trimethoprim 800-160mg (BACTRIM DS) 800-160 mg Tab, Take 1 tablet by mouth 2 (two) times daily., Disp: 20 tablet, Rfl: 0    SOCIAL HISTORY:  Social History     Socioeconomic History    Marital status:    Tobacco Use    Smoking status: Never    Smokeless tobacco: Never   Substance and Sexual Activity    Alcohol use: No    Drug use: No    Sexual activity: Not Currently     Birth control/protection: Surgical   Other Topics Concern    Are you pregnant or think you may be? No    Breast-feeding No     Social Determinants of Health     Financial Resource Strain: Low Risk     Difficulty of Paying Living Expenses: Not hard at all   Food Insecurity: No Food Insecurity    Worried About Running Out of Food in the Last Year: Never true    Ran Out of Food in the Last Year: Never true   Transportation Needs: No Transportation Needs    Lack of  "Transportation (Medical): No    Lack of Transportation (Non-Medical): No   Physical Activity: Insufficiently Active    Days of Exercise per Week: 2 days    Minutes of Exercise per Session: 30 min   Stress: Stress Concern Present    Feeling of Stress : To some extent   Social Connections: Unknown    Frequency of Communication with Friends and Family: More than three times a week    Frequency of Social Gatherings with Friends and Family: Three times a week    Active Member of Clubs or Organizations: No    Attends Club or Organization Meetings: Never    Marital Status:    Housing Stability: Low Risk     Unable to Pay for Housing in the Last Year: No    Number of Places Lived in the Last Year: 1    Unstable Housing in the Last Year: No     Lives in [place]    FAMILY HISTORY:  Family History   Problem Relation Age of Onset    Breast cancer Paternal Aunt     Colon cancer Father     Colon cancer Mother     Melanoma Maternal Uncle     Skin cancer Maternal Uncle        PHYSICAL EXAMINATION:  Constitutional: well appearing, no acute distress, ECOG 1 - Ambulates, capable of light work  Vitals:    BP 98/69   Pulse 73   Temp 97.4 °F (36.3 °C)   Resp 18   Ht 5' 7" (1.702 m)   Wt 79.1 kg (174 lb 6.1 oz)   LMP 10/11/2009   SpO2 99%   BMI 27.31 kg/m²   Eyes: sclera anicteric, EOMI, pupils equal, round and reactive to light  ENT: oral cavity without lesions, moist mucous membranes  Neck: trachea midline, neck supple  Lymphatic: no cervical, supraclavicular or axillary adenopathy  Cardiovascular: regular rate, no edema of the upper or lower extremities, radial pulse 2+  Respiratory: unlabored effort, clear to auscultation, no wheezes  Abdomen: soft, non-tender, no rigidity, no masses, no hepatomegaly  Neuro: Cranial nerves III-XII intact, speech not slurred, gait non-ataxic, no dysdiadochokinesia, strength 5/5 upper and lower extremities - R side hemiparesis  Spine: non-tender to percussion cervical, thoracic and " lumbosacral spine    IMAGING AND LABORATORY FINDINGS: As per HPI; images reviewed personally.    ASSESSMENT: 57 y.o. female with metastatic breast cancer, stage IV      PLAN: Ms. Moore has what appears to be metastatic progression of breast cancer in bones with extensive spinal involvement.  Based on review of MRI, T8 area is the most concerning given posterior element involvement. I cannot give radiation to all the involved levels of spine.  Would recommend palliative radiation to T7-10 to improve pain and decrease chance of neurologic deficit.     Will also have neurosurgery review images to see if would benefit from decompression or stabilization prior to proceeding with radiation, given posterior element involvement and also T10 compression fracture.  If no role for surgical intervention first, I would recommend 20Gy/5fx palliative radiation for which planning could begin shortly.  If surgery is planned, I would recommend adjuvant radiation to surgical bed/hardware to 30Gy/10fx.    We discussed the techniques, toxicities and indications of radiation and I answered the patient's questions to their apparent satisfaction. Informed consent was obtained.    Will wait for neurosurgical decision prior to beginning radiation planning.    I spent approximately 60 minutes reviewing the available records and evaluating the patient, out of which over 50% of the time was spent face to face with the patient in counseling and coordinating this patient's care.    Ben Barbosa III, M.D.  Radiation Oncology  [facility] [address] [place] [address]  Ph: [phone]  [email or web address]

## 2022-09-23 LAB — BACTERIA UR CULT: NO GROWTH

## 2022-09-27 ENCOUNTER — PATIENT MESSAGE (OUTPATIENT)
Dept: HEMATOLOGY/ONCOLOGY | Facility: CLINIC | Age: 57
End: 2022-09-27
Payer: MEDICARE

## 2022-09-27 ENCOUNTER — HOSPITAL ENCOUNTER (OUTPATIENT)
Dept: RADIOLOGY | Facility: HOSPITAL | Age: 57
Discharge: HOME | End: 2022-09-27
Attending: INTERNAL MEDICINE
Payer: MEDICARE

## 2022-09-27 ENCOUNTER — DOCUMENTATION ONLY (OUTPATIENT)
Dept: HEMATOLOGY/ONCOLOGY | Facility: CLINIC | Age: 57
End: 2022-09-27
Payer: MEDICARE

## 2022-09-27 DIAGNOSIS — C79.51 CARCINOMA OF BREAST METASTATIC TO BONE, UNSPECIFIED LATERALITY: ICD-10-CM

## 2022-09-27 DIAGNOSIS — C50.919 CARCINOMA OF BREAST METASTATIC TO BONE, UNSPECIFIED LATERALITY: ICD-10-CM

## 2022-09-27 PROCEDURE — 78815 PET IMAGE W/CT SKULL-THIGH: CPT | Mod: 26,PS,, | Performed by: RADIOLOGY

## 2022-09-27 PROCEDURE — 78815 NM PET CT ROUTINE: ICD-10-PCS | Mod: 26,PS,, | Performed by: RADIOLOGY

## 2022-09-27 PROCEDURE — 78815 PET IMAGE W/CT SKULL-THIGH: CPT | Mod: TC

## 2022-09-27 NOTE — PROGRESS NOTES
SW received referral via distress screening tool completed at recent visit. Pt rated overall distress level as a 8. SW sent [employer] message to address distress score and provide contact information should pt wish to discuss resources and support available. Will remain available.     Oncology Social Work   Intake/Intervention  Date of referral to social work: 09/22/22  Initial social work contact: 09/27/22  Referral to initial contact timeline: 5  Referral contact method: Workqueue       Concerns: distress score 8     Supportive Checklist      Follow Up  No follow-ups on file.

## 2022-09-28 ENCOUNTER — HOSPITAL ENCOUNTER (OUTPATIENT)
Dept: RADIOLOGY | Facility: HOSPITAL | Age: 57
Discharge: HOME | End: 2022-09-28
Attending: INTERNAL MEDICINE
Payer: MEDICARE

## 2022-09-28 DIAGNOSIS — C50.919 CARCINOMA OF BREAST METASTATIC TO BONE, UNSPECIFIED LATERALITY: ICD-10-CM

## 2022-09-28 DIAGNOSIS — C79.51 CARCINOMA OF BREAST METASTATIC TO BONE, UNSPECIFIED LATERALITY: ICD-10-CM

## 2022-09-28 PROCEDURE — 25500020 PHARM REV CODE 255: Mod: PN | Performed by: INTERNAL MEDICINE

## 2022-09-28 PROCEDURE — 70553 MRI BRAIN STEM W/O & W/DYE: CPT | Mod: TC,PN

## 2022-09-28 PROCEDURE — A9585 GADOBUTROL INJECTION: HCPCS | Mod: PN | Performed by: INTERNAL MEDICINE

## 2022-09-28 RX ORDER — GADOBUTROL 604.72 MG/ML
8 INJECTION INTRAVENOUS
Status: COMPLETED | OUTPATIENT
Start: 2022-09-28 | End: 2022-09-28

## 2022-09-28 RX ADMIN — GADOBUTROL 8 ML: 604.72 INJECTION INTRAVENOUS at 01:09

## 2022-09-29 ENCOUNTER — PATIENT MESSAGE (OUTPATIENT)
Dept: HEMATOLOGY/ONCOLOGY | Facility: CLINIC | Age: 57
End: 2022-09-29
Payer: MEDICARE

## 2022-10-03 ENCOUNTER — OFFICE VISIT (OUTPATIENT)
Dept: HEMATOLOGY/ONCOLOGY | Facility: CLINIC | Age: 57
End: 2022-10-03
Payer: MEDICARE

## 2022-10-03 ENCOUNTER — HOSPITAL ENCOUNTER (OUTPATIENT)
Dept: RADIATION THERAPY | Facility: HOSPITAL | Age: 57
Discharge: HOME | End: 2022-10-03
Attending: RADIOLOGY
Payer: MEDICARE

## 2022-10-03 DIAGNOSIS — D84.821 IMMUNODEFICIENCY DUE TO CHEMOTHERAPY: ICD-10-CM

## 2022-10-03 DIAGNOSIS — Z86.73 HISTORY OF CVA (CEREBROVASCULAR ACCIDENT): ICD-10-CM

## 2022-10-03 DIAGNOSIS — I69.351 HEMIPARESIS AFFECTING RIGHT SIDE AS LATE EFFECT OF CEREBROVASCULAR ACCIDENT: ICD-10-CM

## 2022-10-03 DIAGNOSIS — T45.1X5A IMMUNODEFICIENCY DUE TO CHEMOTHERAPY: ICD-10-CM

## 2022-10-03 DIAGNOSIS — C50.111 MALIGNANT NEOPLASM OF CENTRAL PORTION OF RIGHT BREAST IN FEMALE, ESTROGEN RECEPTOR POSITIVE: Primary | ICD-10-CM

## 2022-10-03 DIAGNOSIS — Z17.0 MALIGNANT NEOPLASM OF CENTRAL PORTION OF RIGHT BREAST IN FEMALE, ESTROGEN RECEPTOR POSITIVE: Primary | ICD-10-CM

## 2022-10-03 DIAGNOSIS — C79.51 SECONDARY CANCER OF BONE: ICD-10-CM

## 2022-10-03 DIAGNOSIS — Z79.899 IMMUNODEFICIENCY DUE TO CHEMOTHERAPY: ICD-10-CM

## 2022-10-03 PROBLEM — Z79.69 IMMUNODEFICIENCY DUE TO CHEMOTHERAPY: Status: ACTIVE | Noted: 2022-10-03

## 2022-10-03 PROCEDURE — 99214 OFFICE O/P EST MOD 30 MIN: CPT | Mod: 95,,, | Performed by: INTERNAL MEDICINE

## 2022-10-03 PROCEDURE — 99214 PR OFFICE/OUTPT VISIT, EST, LEVL IV, 30-39 MIN: ICD-10-PCS | Mod: 95,,, | Performed by: INTERNAL MEDICINE

## 2022-10-03 RX ORDER — LAMOTRIGINE 25 MG/1
500 TABLET ORAL
Status: CANCELLED | OUTPATIENT
Start: 2022-10-04

## 2022-10-03 RX ORDER — LAMOTRIGINE 25 MG/1
500 TABLET ORAL
Status: CANCELLED | OUTPATIENT
Start: 2022-10-21

## 2022-10-03 NOTE — PROGRESS NOTES
Subjective:       Patient ID: Carole Moore is a 57 y.o. female.    Chief Complaint: Results    HPI 57-year-old female ERPR positive breast carcinoma receiving Arimidex 1 mg daily patient has increasing back pain underwent imaging studies demonstrating clearly progressive disease while on aromatase inhibitor seen in video visit consultation ECOG status 2    Past Medical History:   Diagnosis Date    Antineoplastic chemotherapy induced anemia     Breast cancer 2016    right breast    Cancer     R Breast cancer    CVA (cerebral infarction)     Diverticulosis     Family history of colon cancer 10/30/2018    Her mother and father both had colon cancer.    Hyperlipidemia     Nausea after anesthesia     Paralysis     right side    PONV (postoperative nausea and vomiting)     Stroke     R side weakness    Trouble in sleeping      Family History   Problem Relation Age of Onset    Breast cancer Paternal Aunt     Colon cancer Father     Colon cancer Mother     Melanoma Maternal Uncle     Skin cancer Maternal Uncle      Social History     Socioeconomic History    Marital status:    Tobacco Use    Smoking status: Never    Smokeless tobacco: Never   Substance and Sexual Activity    Alcohol use: No    Drug use: No    Sexual activity: Not Currently     Birth control/protection: Surgical   Other Topics Concern    Are you pregnant or think you may be? No    Breast-feeding No     Social Determinants of Health     Financial Resource Strain: Low Risk     Difficulty of Paying Living Expenses: Not hard at all   Food Insecurity: No Food Insecurity    Worried About Running Out of Food in the Last Year: Never true    Ran Out of Food in the Last Year: Never true   Transportation Needs: No Transportation Needs    Lack of Transportation (Medical): No    Lack of Transportation (Non-Medical): No   Physical Activity: Insufficiently Active    Days of Exercise per Week: 2 days    Minutes of Exercise per Session: 30 min   Stress: No  Stress Concern Present    Feeling of Stress : Not at all   Social Connections: Unknown    Frequency of Communication with Friends and Family: More than three times a week    Frequency of Social Gatherings with Friends and Family: Three times a week    Active Member of Clubs or Organizations: No    Attends Club or Organization Meetings: Never    Marital Status:    Housing Stability: Low Risk     Unable to Pay for Housing in the Last Year: No    Number of Places Lived in the Last Year: 1    Unstable Housing in the Last Year: No     Past Surgical History:   Procedure Laterality Date    AUGMENTATION OF BREAST Left 2017    BREAST SURGERY      COLONOSCOPY N/A 10/30/2018    Procedure: COLONOSCOPY;  Surgeon: Cosme Monson MD;  Location: [facility];  Service: Endoscopy;  Laterality: N/A;    CYSTOSCOPY WITH BIOPSY OF BLADDER N/A 11/29/2021    Procedure: CYSTOSCOPY, WITH BLADDER BIOPSY, WITH FULGURATION IF INDICATED;  Surgeon: Page Marcelo MD;  Location: [facility] OR;  Service: Urology;  Laterality: N/A;    HYSTERECTOMY  2007    maintains both ovaries, menorrhagia    MASTECTOMY Right 2017    MEDIPORT INSERTION, SINGLE      TONSILLECTOMY         Labs:  Lab Results   Component Value Date    WBC 5.62 09/22/2022    HGB 13.1 09/22/2022    HCT 39.9 09/22/2022    MCV 92 09/22/2022     09/22/2022     BMP  Lab Results   Component Value Date     09/22/2022    K 5.0 09/22/2022     09/22/2022    CO2 25 09/22/2022    BUN 20 09/22/2022    CREATININE 0.8 09/22/2022    CALCIUM 9.8 09/22/2022    ANIONGAP 11 09/22/2022    ESTGFRAFRICA >60 05/12/2022    EGFRNONAA >60 05/12/2022     Lab Results   Component Value Date    ALT 21 09/22/2022    AST 21 09/22/2022    ALKPHOS 83 09/22/2022    BILITOT 1.0 09/22/2022       No results found for: IRON, TIBC, FERRITIN, SATURATEDIRO  No results found for: [id number]  No results found for: FOLATE  Lab Results   Component Value Date    TSH 0.793 10/29/2021         Review of Systems    Musculoskeletal:  Positive for back pain.   Neurological:  Positive for weakness.   Psychiatric/Behavioral:  Positive for dysphoric mood. The patient is nervous/anxious.      Objective:      Physical Exam  Constitutional:       Appearance: She is ill-appearing.   Neurological:      Motor: Weakness present.           Assessment:      1. Malignant neoplasm of central portion of right breast in female, estrogen receptor positive           Plan:     The patient location is:  Home  The chief complaint leading to consultation is:  Breast cancer    Visit type: audiovisual    Face to Face time with patient: 25 minutes of total time spent on the encounter, which includes face to face time and non-face to face time preparing to see the patient (eg, review of tests), Obtaining and/or reviewing separately obtained history, Documenting clinical information in the electronic or other health record, Independently interpreting results (not separately reported) and communicating results to the patient/family/caregiver, or Care coordination (not separately reported).         Each patient to whom he or she provides medical services by telemedicine is:  (1) informed of the relationship between the physician and patient and the respective role of any other health care provider with respect to management of the patient; and (2) notified that he or she may decline to receive medical services by telemedicine and may withdraw from such care at any time.    Notes:      Extensive conversation reviewed information with patient.  At this time patient clearly has progressive disease I have placed the patient's case through the Oncology pathways and agree with treatment with Ibrance and Fast low dex orders have been written.  In addition I will ask Radiation Oncology to see the patient for evaluation of palliative radiation therapy to painful areas in back.  Discussed implications with her nurse navigation team is to be made aware she will need  to sign consents when she comes in for her 1st dosing of both Fast low dex and Ibrance prescription for Ibrance sent [facility] will need to discuss palliative care consultation for noncurative disease    Harpal Khan Jr, MD FACP

## 2022-10-04 ENCOUNTER — SPECIALTY PHARMACY (OUTPATIENT)
Dept: PHARMACY | Facility: CLINIC | Age: 57
End: 2022-10-04
Payer: MEDICARE

## 2022-10-04 NOTE — TELEPHONE ENCOUNTER
Marvin, this is Olivia Camilo with [facility].  We are working on your prescription that your doctor has sent us. We will be working with your insurance to get this approved for you. We will be calling you along the way with updates on your medication.  If you have any questions, you can reach us at [phone].    Welcome call outcome: Patient/caregiver reached

## 2022-10-05 ENCOUNTER — SPECIALTY PHARMACY (OUTPATIENT)
Dept: PHARMACY | Facility: CLINIC | Age: 57
End: 2022-10-05
Payer: MEDICARE

## 2022-10-05 ENCOUNTER — PATIENT MESSAGE (OUTPATIENT)
Dept: PHARMACY | Facility: CLINIC | Age: 57
End: 2022-10-05
Payer: MEDICARE

## 2022-10-05 DIAGNOSIS — C50.111 MALIGNANT NEOPLASM OF CENTRAL PORTION OF RIGHT BREAST IN FEMALE, ESTROGEN RECEPTOR POSITIVE: Primary | ICD-10-CM

## 2022-10-05 DIAGNOSIS — Z17.0 MALIGNANT NEOPLASM OF CENTRAL PORTION OF RIGHT BREAST IN FEMALE, ESTROGEN RECEPTOR POSITIVE: Primary | ICD-10-CM

## 2022-10-05 RX ORDER — PROCHLORPERAZINE MALEATE 5 MG
5 TABLET ORAL 4 TIMES DAILY PRN
Qty: 20 TABLET | Refills: 11 | Status: SHIPPED | OUTPATIENT
Start: 2022-10-05 | End: 2023-10-24

## 2022-10-05 RX ORDER — ONDANSETRON 4 MG/1
4 TABLET, FILM COATED ORAL EVERY 8 HOURS PRN
Qty: 20 TABLET | Refills: 11 | Status: SHIPPED | OUTPATIENT
Start: 2022-10-05

## 2022-10-05 NOTE — TELEPHONE ENCOUNTER
Specialty Pharmacy - Initial Clinical Assessment    Specialty Medication Orders Linked to Encounter      Flowsheet Row Most Recent Value   Medication #1 palbociclib 125 mg Tab (Order#[id number], Rx#[id number])          Patient Diagnosis   C50.111, Z17.0 - Malignant neoplasm of central portion of right breast in female, estrogen receptor positive    Ana Laura Moore is a 57 y.o. female, who is followed by the specialty pharmacy service for management and education.    Recent Encounters       Date Type Provider Description    10/05/2022 Specialty Pharmacy Olivia Camilo PharmD Initial Clinical Assessment    10/04/2022 Specialty Pharmacy Olivia Camilo, Wilfredo Referral Authorization          Clinical call attempts since last clinical assessment   No call attempts found.     Current Outpatient Medications   Medication Sig    anastrozole (ARIMIDEX) 1 mg Tab Take 1 tablet (1 mg total) by mouth once daily.    aspirin 81 MG Chew Take 81 mg by mouth once daily.    atorvastatin (LIPITOR) 10 MG tablet Take 1 tablet (10 mg total) by mouth once daily.    baclofen (LIORESAL) 10 MG tablet Take 1 tablet (10 mg total) by mouth 3 (three) times daily. for 10 days    celecoxib (CELEBREX) 200 MG capsule Take 1 capsule (200 mg total) by mouth once daily.    cetirizine (ZYRTEC) 10 MG tablet Take 10 mg by mouth once daily.    LORATADINE (CLARITIN ORAL) Take by mouth daily as needed.     meclizine (ANTIVERT) 25 mg tablet Take 25 mg by mouth 3 (three) times daily as needed.    oxyCODONE-acetaminophen (PERCOCET) 5-325 mg per tablet Take 1 tablet by mouth every 4 (four) hours as needed for Pain.    palbociclib 125 mg Tab Take 125 mg by mouth As instructed. Take 1 tablet (125 mg) on days 1-21 of each 28 day cycle. Take with food.    polyethylene glycol (GLYCOLAX) 17 gram/dose powder Take 17 g by mouth once daily.   Last reviewed on 10/5/2022 12:05 PM by Olivia Camilo PharmD    Review of patient's allergies  indicates:  No Known AllergiesLast reviewed on  10/5/2022 12:04 PM by Olivia Camilo    Drug Interactions    Drug interactions evaluated: yes  Clinically relevant drug interactions identified: no  Provided the patient with educational material regarding drug interactions: not applicable         Adverse Effects    Activity change: Pos  Back pain: Pos  Skin: System reviewed and is negative  Eyes: System reviewed and is negative  Endocrine and Metabolic: System reviewed and is negative  Gastrointestinal: System reviewed and is negative  Genitourinary: System reviewed and is negative  Cardiovascular: System reviewed and is negative  Hematologic: System reviewed and is negative  HENT: System reviewed and is negative  Neurological: System reviewed and is negative  Psychiatric: System reviewed and is negative  Respiratory: System reviewed and is negative       Assessment Questions - Documented Responses      Flowsheet Row Most Recent Value   Assessment    Medication Reconciliation completed for patient Yes   During the past 4 weeks, has patient missed any activities due to condition or medication? No   During the past 4 weeks, did patient have any of the following urgent care visits? None   Goals of Therapy Status Discussed (new start)   Status of the patients ability to self-administer: Is Able   All education points have been covered with patient? Yes, supplemental printed education provided   Welcome packet contents reviewed and discussed with patient? Yes   Assesment completed? Yes   Plan Therapy being initiated   Do you need to open a clinical intervention (i-vent)? No   Do you want to schedule first shipment? Yes   Medication #1 Assessment Info    Patient status New medication, New to [facility]   Is this medication appropriate for the patient? Yes   Is this medication effective? Not yet started          Refill Questions - Documented Responses      Flowsheet Row Most Recent Value   Patient Availability and HIPAA  "Verification    Does patient want to proceed with activity? Yes   HIPAA/medical authority confirmed? Yes   Relationship to patient of person spoken to? Self   Refill Screening Questions    When does the patient need to receive the medication? 10/06/22   Refill Delivery Questions    How will the patient receive the medication? MEDRx   When does the patient need to receive the medication? 10/06/22   Shipping Address Home   Address in [facility] confirmed and updated if neccessary? Yes   Expected Copay ($) 9.24   Is the patient able to afford the medication copay? Yes   Payment Method CC on file   Days supply of Refill 28   Supplies needed? No supplies needed   Refill activity completed? 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    Tried/failed medications: Arimidex    BP Readings from Last 4 Encounters:   09/22/22 98/69   09/22/22 98/69   09/21/22 108/78   09/19/22 94/66     Ht Readings from Last 4 Encounters:   09/22/22 5' 7" (1.702 m)   09/22/22 5' 7" (1.702 m)   09/19/22 5' 7" (1.702 m)   08/31/22 5' 7" (1.702 m)     Wt Readings from Last 4 Encounters:   09/22/22 79.1 kg (174 lb 6.1 oz)   09/22/22 79.1 kg (174 lb 6.1 oz)   09/21/22 79.3 kg (174 lb 13.2 oz)   09/19/22 79.7 kg (175 lb 11.3 oz)     Recent Labs   Lab Result Units 09/22/22  1145 09/21/22  0852   RBC M/uL 4.35  --    Hemoglobin g/dL 13.1  --    Hematocrit % 39.9  --    WBC K/uL 5.62  --    Gran # (ANC) K/uL 3.4  --    Gran % % 60.6  --    Platelets K/uL 297  --    Sodium mmol/L 141 140   Potassium mmol/L 5.0 4.6   Chloride " mmol/L 105 103   Glucose mg/dL 97 63 L   BUN mg/dL 20 17   Creatinine mg/dL 0.8 0.7   Calcium mg/dL 9.8 10.0   Total Protein g/dL 7.6  --    Albumin g/dL 4.0  --    Total Bilirubin mg/dL 1.0  --    Alkaline Phosphatase U/L 83  --    AST U/L 21  --    ALT U/L 21  --      The goals of cancer treatment include:  Achieving remission of cancer, if possible  Reducing tumor size and spread of cancer, if remission is not possible  Minimizing pain and symptoms of the cancer  Preventing infection and other complications of treatment  Promoting adequate nutrition  Encouraging proper hydration  Improving or maintaining quality of life  Maintaining optimal therapy adherence  Minimizing and managing side effects    Goals of Therapy Status: Discussed (new start)    Assessment/Plan  Patient plans to start therapy on 10/06/22      Indication, dosage, appropriateness, effectiveness, safety and convenience of her specialty medication(s) were reviewed today.     Patient Education   Patient received education on the following:   Expectations and possible outcomes of therapy  Proper use, timely administration, and missed dose management  Duration of therapy  Side effects, including prevention, minimization, and management  Contraindications and safety precautions  New or changed medications, including prescribe and over the counter medications and supplements  Reviews recommended vaccinations, as appropriate  Storage, safe handling, and disposal    Patient was instructed to notify provider once she receives medication. Calendar will be included with shipment.     Messaged provider to see if she should continue taking anastrozole or if she should discontinue.    Will message provider to see if they can send in anti-nausea medication to her local pharmacy - [facility]    Tasks added this encounter   10/5/2022 - Refill Call  3/27/2023 - Clinical - Follow Up Assesement (180 day)   Tasks due within next 3  months   No tasks due.     Olivia Camilo, PharmD  [facility]  [address]  [facility] [address]  Phone: [phone]  Fax: [phone]

## 2022-10-06 ENCOUNTER — PATIENT MESSAGE (OUTPATIENT)
Dept: HEMATOLOGY/ONCOLOGY | Facility: CLINIC | Age: 57
End: 2022-10-06
Payer: MEDICARE

## 2022-10-11 ENCOUNTER — PATIENT MESSAGE (OUTPATIENT)
Dept: HEMATOLOGY/ONCOLOGY | Facility: CLINIC | Age: 57
End: 2022-10-11
Payer: MEDICARE

## 2022-10-12 ENCOUNTER — HOSPITAL ENCOUNTER (OUTPATIENT)
Dept: RADIOLOGY | Facility: HOSPITAL | Age: 57
Discharge: HOME | End: 2022-10-12
Attending: RADIOLOGY
Payer: MEDICARE

## 2022-10-12 ENCOUNTER — HOSPITAL ENCOUNTER (OUTPATIENT)
Dept: RADIATION THERAPY | Facility: HOSPITAL | Age: 57
Discharge: HOME | End: 2022-10-12
Attending: INTERNAL MEDICINE
Payer: MEDICARE

## 2022-10-12 ENCOUNTER — PATIENT MESSAGE (OUTPATIENT)
Dept: RADIATION ONCOLOGY | Facility: CLINIC | Age: 57
End: 2022-10-12

## 2022-10-12 PROCEDURE — 77334 RADIATION TREATMENT AID(S): CPT | Mod: 26,,, | Performed by: RADIOLOGY

## 2022-10-12 PROCEDURE — 77263 PR  RADIATION THERAPY PLAN COMPLEX: ICD-10-PCS | Mod: ,,, | Performed by: RADIOLOGY

## 2022-10-12 PROCEDURE — 77014 HC CT GUIDANCE RADIATION THERAPY FLDS PLACEMENT: CPT | Mod: TC | Performed by: RADIOLOGY

## 2022-10-12 PROCEDURE — 77290 PR  SET RADN THERAPY FIELD COMPLEX: ICD-10-PCS | Mod: 26,,, | Performed by: RADIOLOGY

## 2022-10-12 PROCEDURE — 77290 THER RAD SIMULAJ FIELD CPLX: CPT | Mod: 26,,, | Performed by: RADIOLOGY

## 2022-10-12 PROCEDURE — 77334 RADIATION TREATMENT AID(S): CPT | Mod: TC | Performed by: RADIOLOGY

## 2022-10-12 PROCEDURE — 77263 THER RADIOLOGY TX PLNG CPLX: CPT | Mod: ,,, | Performed by: RADIOLOGY

## 2022-10-12 PROCEDURE — 77334 PR  RADN TREATMENT AID(S) COMPLX: ICD-10-PCS | Mod: 26,,, | Performed by: RADIOLOGY

## 2022-10-12 PROCEDURE — 77290 THER RAD SIMULAJ FIELD CPLX: CPT | Mod: TC | Performed by: RADIOLOGY

## 2022-10-14 PROCEDURE — 77295 3-D RADIOTHERAPY PLAN: CPT | Mod: 26,,, | Performed by: RADIOLOGY

## 2022-10-14 PROCEDURE — 77334 RADIATION TREATMENT AID(S): CPT | Mod: TC | Performed by: RADIOLOGY

## 2022-10-14 PROCEDURE — 77295 PR 3D RADIOTHERAPY PLAN: ICD-10-PCS | Mod: 26,,, | Performed by: RADIOLOGY

## 2022-10-14 PROCEDURE — 77300 RADIATION THERAPY DOSE PLAN: CPT | Mod: TC | Performed by: RADIOLOGY

## 2022-10-14 PROCEDURE — 77334 RADIATION TREATMENT AID(S): CPT | Mod: 26,,, | Performed by: RADIOLOGY

## 2022-10-14 PROCEDURE — 77334 PR  RADN TREATMENT AID(S) COMPLX: ICD-10-PCS | Mod: 26,,, | Performed by: RADIOLOGY

## 2022-10-14 PROCEDURE — 77295 3-D RADIOTHERAPY PLAN: CPT | Mod: TC | Performed by: RADIOLOGY

## 2022-10-14 PROCEDURE — 77300 PR RADIATION THERAPY,DOSIMETRY PLAN: ICD-10-PCS | Mod: 26,,, | Performed by: RADIOLOGY

## 2022-10-14 PROCEDURE — 77300 RADIATION THERAPY DOSE PLAN: CPT | Mod: 26,,, | Performed by: RADIOLOGY

## 2022-10-18 ENCOUNTER — DOCUMENTATION ONLY (OUTPATIENT)
Dept: RADIATION ONCOLOGY | Facility: CLINIC | Age: 57
End: 2022-10-18
Payer: MEDICARE

## 2022-10-18 ENCOUNTER — DOCUMENTATION ONLY (OUTPATIENT)
Dept: HEMATOLOGY/ONCOLOGY | Facility: CLINIC | Age: 57
End: 2022-10-18
Payer: MEDICARE

## 2022-10-18 ENCOUNTER — TELEPHONE (OUTPATIENT)
Dept: NEUROSURGERY | Facility: CLINIC | Age: 57
End: 2022-10-18

## 2022-10-18 ENCOUNTER — OFFICE VISIT (OUTPATIENT)
Dept: NEUROSURGERY | Facility: CLINIC | Age: 57
End: 2022-10-18
Payer: MEDICARE

## 2022-10-18 ENCOUNTER — PATIENT MESSAGE (OUTPATIENT)
Dept: HEMATOLOGY/ONCOLOGY | Facility: CLINIC | Age: 57
End: 2022-10-18
Payer: MEDICARE

## 2022-10-18 VITALS
WEIGHT: 174.19 LBS | DIASTOLIC BLOOD PRESSURE: 65 MMHG | HEIGHT: 67 IN | RESPIRATION RATE: 18 BRPM | BODY MASS INDEX: 27.34 KG/M2 | HEART RATE: 80 BPM | SYSTOLIC BLOOD PRESSURE: 103 MMHG

## 2022-10-18 DIAGNOSIS — M51.36 DDD (DEGENERATIVE DISC DISEASE), LUMBAR: Primary | ICD-10-CM

## 2022-10-18 DIAGNOSIS — C79.51 METASTATIC CANCER TO SPINE: Primary | ICD-10-CM

## 2022-10-18 DIAGNOSIS — C50.919 CARCINOMA OF BREAST METASTATIC TO BONE, UNSPECIFIED LATERALITY: ICD-10-CM

## 2022-10-18 DIAGNOSIS — C79.51 CARCINOMA OF BREAST METASTATIC TO BONE, UNSPECIFIED LATERALITY: ICD-10-CM

## 2022-10-18 DIAGNOSIS — M51.34 DEGENERATIVE DISC DISEASE, THORACIC: ICD-10-CM

## 2022-10-18 PROCEDURE — 77014 PR  CT GUIDANCE PLACEMENT RAD THERAPY FIELDS: ICD-10-PCS | Mod: 26,,, | Performed by: RADIOLOGY

## 2022-10-18 PROCEDURE — 1159F PR MEDICATION LIST DOCUMENTED IN MEDICAL RECORD: ICD-10-PCS | Mod: CPTII,S$GLB,, | Performed by: NEUROLOGICAL SURGERY

## 2022-10-18 PROCEDURE — 77014 PR  CT GUIDANCE PLACEMENT RAD THERAPY FIELDS: CPT | Mod: 26,,, | Performed by: RADIOLOGY

## 2022-10-18 PROCEDURE — 3078F PR MOST RECENT DIASTOLIC BLOOD PRESSURE < 80 MM HG: ICD-10-PCS | Mod: CPTII,S$GLB,, | Performed by: NEUROLOGICAL SURGERY

## 2022-10-18 PROCEDURE — 99999 PR PBB SHADOW E&M-EST. PATIENT-LVL III: CPT | Mod: PBBFAC,,, | Performed by: NEUROLOGICAL SURGERY

## 2022-10-18 PROCEDURE — 3074F SYST BP LT 130 MM HG: CPT | Mod: CPTII,S$GLB,, | Performed by: NEUROLOGICAL SURGERY

## 2022-10-18 PROCEDURE — 99499 UNLISTED E&M SERVICE: CPT | Mod: HCNC,S$GLB,, | Performed by: NEUROLOGICAL SURGERY

## 2022-10-18 PROCEDURE — 3078F DIAST BP <80 MM HG: CPT | Mod: CPTII,S$GLB,, | Performed by: NEUROLOGICAL SURGERY

## 2022-10-18 PROCEDURE — 99204 OFFICE O/P NEW MOD 45 MIN: CPT | Mod: S$GLB,,, | Performed by: NEUROLOGICAL SURGERY

## 2022-10-18 PROCEDURE — 77412 RADIATION TX DELIVERY LVL 3: CPT | Performed by: RADIOLOGY

## 2022-10-18 PROCEDURE — 99999 PR PBB SHADOW E&M-EST. PATIENT-LVL III: ICD-10-PCS | Mod: PBBFAC,,, | Performed by: NEUROLOGICAL SURGERY

## 2022-10-18 PROCEDURE — 99204 PR OFFICE/OUTPT VISIT, NEW, LEVL IV, 45-59 MIN: ICD-10-PCS | Mod: S$GLB,,, | Performed by: NEUROLOGICAL SURGERY

## 2022-10-18 PROCEDURE — 3074F PR MOST RECENT SYSTOLIC BLOOD PRESSURE < 130 MM HG: ICD-10-PCS | Mod: CPTII,S$GLB,, | Performed by: NEUROLOGICAL SURGERY

## 2022-10-18 PROCEDURE — 1159F MED LIST DOCD IN RCRD: CPT | Mod: CPTII,S$GLB,, | Performed by: NEUROLOGICAL SURGERY

## 2022-10-18 PROCEDURE — 77014 HC CT GUIDANCE RADIATION THERAPY FLDS PLACEMENT: CPT | Mod: TC | Performed by: RADIOLOGY

## 2022-10-18 RX ORDER — HYDROCODONE BITARTRATE AND ACETAMINOPHEN 5; 325 MG/1; MG/1
TABLET ORAL
Status: ON HOLD | COMMUNITY
Start: 2022-09-17 | End: 2023-02-09 | Stop reason: HOSPADM

## 2022-10-18 NOTE — PROGRESS NOTES
Subjective:      Patient ID: Carole Moore is a 57 y.o. female.    Chief Complaint: Lumbar Spine Pain (L-Spine) (The pt presents today with c/o of back pain that radiates to her abdomen. The pt stated she was diagnosed with spine cancer and during a routine MRI a compression Fx was found. The pt stated day to day activity and playing with her grandson worsens her pain and relaxing helps with the pain, The pt denies any recent falls and rates her pain today 5/10)    Pt here with MR T L spine   Hx of Breast Ca diagnosed in 2016   Since May has been having progressive lower back pain   MR shows osseous involvement of the thoracic spine     No pain in legs   No weakness   No N/T   Ambulates unassisted   Of note she had prior L CVA with resultant weakness       MR thoracic spine   Multiple metastatic lesions consistent with skeletal metastatic disease involving the T8, T9, T10, T11 and T12 vertebral bodies.  Associated pathologic superior endplate fracture T10.  Associated posterior element involvement at T8 with mild dorsal epidural tumor with mild dorsal sac impingement.  No definite cord compression.  Probable left T7 rib lesion.  See above for detail.    PET      Skeletal: Multiple FDG avid lesions are seen throughout the spine and pelvis as well as the left posterior 7th rib, in keeping with known multifocal osseous metastatic disease.  Index lesions demonstrate SUV max of 12.0 in the right iliac bone, 13.0 in the left posterior elements of T8, and 13.0 in the T11 vertebral body.       Review of Systems   Constitutional:  Negative for activity change, appetite change and chills.   HENT:  Negative for hearing loss, sore throat and tinnitus.    Eyes:  Negative for pain, discharge and itching.   Cardiovascular:  Negative for chest pain.   Gastrointestinal:  Negative for abdominal pain.   Endocrine: Negative for cold intolerance and heat intolerance.   Genitourinary:  Negative for difficulty urinating and  dysuria.   Musculoskeletal:  Positive for back pain and gait problem.   Allergic/Immunologic: Negative for environmental allergies.   Neurological:  Negative for dizziness, tremors, light-headedness and headaches.   Hematological:  Negative for adenopathy.   Psychiatric/Behavioral:  Negative for agitation, behavioral problems and confusion.        Objective:       Neurosurgery Physical Exam  Ortho/SPM Exam  Ortho Exam    Nursing note and vitals reviewed  Gen:Oriented to person, place, and time.             Appears stated age   Skin: no rashes or lesions identified   Head:Normocephalic and atraumatic.  Nose: Nose normal.    Eyes: EOM are normal. Pupils are equal, round, and reactive to light.   Neck: Neck supple. No masses or lesions palpated  Cardiovascular: Intact distal pulses.    Abdominal: Soft.   Neurological: Alert and oriented to person, place, and time.  No cranial nerve deficit.  Coordination normal. Normal muscle tone  Psychiatric: Normal mood and affect. Behavior is normal.      Back: R side weak from prior stroke        None  Paraspinal muscle spasms   None  Pain with flexion and extention   WNL  Range of motion    Neg  Straight leg raise     Motor   Right Right Left Left  Level Group   5 4 5  L2 Hip flexor (Psoas)   5 4 5  L3 Leg extension (Quads)   5 4 5  L4 Dorsiflexion & foot inversion (Tibialis Anterior)   5 4 5  L5 Great toe extension ( EHL)   5 4 5  S1 Foot eversion (Gastroc, PL & PB)     Sensation  NL Decreased (R/L/BL) Level Sensation    X  L2 Anterio-medial thigh   X  L3 Medial thigh around knee   X  L4 Medial foot   X  L5 Dorsum foot   X  S1 Lateral foot     Reflex  2+  Patellar tendon (L4)   2+  Achilles tendon (S1)         Plan:     Metastatic cancer to spine    Degenerative disc disease, thoracic    Pat with multiple metastatic osseous lesion involving the spine   Most significant is the posterior elements involving the Left T 8 vertebra  There is no compression of the cord   There is  encroachment of the thecal sac   Patient scheduled to start radiation today   Will monitor her clinically    Can consider decompression if she develops any LE pain/N/T weakness or any other neuro symptoms     Thank you for the referral   Please call with any questions    Josse Pelayo MD  Neurosurgery     Disclaimer: This note was prepared using a voice recognition system and is likely to have sound alike errors within the text.

## 2022-10-18 NOTE — PROGRESS NOTES
Called and spoke with pt about scheduling faslodex injection. Pt states she was told not to take the pills until after she completes 10 days of xrt, told her I will confirm with Carmela Khan and Familia on whether or not to start faslodex. Informed pt that I confirmed with MD's ok to start faslodex injections. Together we scheduled first loading dose faslodex 10/20 @ 1pm at the , since pt has xrt @ 130pm. Told her pharmacy gone for the day but will check to see if they have med so that she can get 1st dose tomorrow (10/19). Told her will call her in the am to let her know. She voiced understanding this information and has my call back number. Will f/u with  pharmacy in the am.   Oncology Navigation   Intake  Date Worked: 10/18/22     Treatment                          Concerns: distress score 8     Acuity      Follow Up  Follow up in 2 days (on 10/20/2022) for C1D1 Faslodex loading dose #1.

## 2022-10-18 NOTE — PLAN OF CARE
Day 1 of outpatient xrt to the spine. Short term side effects handout & verbal instructions were given to the patient. Skin care & side effects were reviewed. Contact info was provided. Patient verbalized understanding.

## 2022-10-19 ENCOUNTER — TELEPHONE (OUTPATIENT)
Dept: HEMATOLOGY/ONCOLOGY | Facility: CLINIC | Age: 57
End: 2022-10-19
Payer: MEDICARE

## 2022-10-19 ENCOUNTER — DOCUMENTATION ONLY (OUTPATIENT)
Dept: RADIATION ONCOLOGY | Facility: CLINIC | Age: 57
End: 2022-10-19
Payer: MEDICARE

## 2022-10-19 PROCEDURE — 77412 RADIATION TX DELIVERY LVL 3: CPT | Performed by: RADIOLOGY

## 2022-10-19 PROCEDURE — 77387 GUIDANCE FOR RADJ TX DLVR: CPT | Mod: TC,HCNC | Performed by: RADIOLOGY

## 2022-10-19 PROCEDURE — 77014 HC CT GUIDANCE RADIATION THERAPY FLDS PLACEMENT: CPT | Mod: TC | Performed by: RADIOLOGY

## 2022-10-19 PROCEDURE — G6002 PR STEREOSCOPIC XRAY GUIDE FOR RADIATION TX DELIV: ICD-10-PCS | Mod: 26,HCNC,, | Performed by: RADIOLOGY

## 2022-10-19 PROCEDURE — G6002 STEREOSCOPIC X-RAY GUIDANCE: HCPCS | Mod: 26,HCNC,, | Performed by: RADIOLOGY

## 2022-10-19 NOTE — TELEPHONE ENCOUNTER
SW intern called Pt regarding distress score 5. SW intern left voicemail. SW intern will remain available.

## 2022-10-19 NOTE — PLAN OF CARE
Day 2 of outpatient xrt to the spine. New start yesterday; skin care and side effects reviewed. Will continue to monitor.

## 2022-10-20 ENCOUNTER — INFUSION (OUTPATIENT)
Dept: INFUSION THERAPY | Facility: HOSPITAL | Age: 57
End: 2022-10-20
Attending: INTERNAL MEDICINE
Payer: MEDICARE

## 2022-10-20 VITALS
TEMPERATURE: 98 F | HEART RATE: 105 BPM | RESPIRATION RATE: 18 BRPM | DIASTOLIC BLOOD PRESSURE: 82 MMHG | OXYGEN SATURATION: 95 % | SYSTOLIC BLOOD PRESSURE: 129 MMHG

## 2022-10-20 DIAGNOSIS — C50.111 MALIGNANT NEOPLASM OF CENTRAL PORTION OF RIGHT BREAST IN FEMALE, ESTROGEN RECEPTOR POSITIVE: Primary | ICD-10-CM

## 2022-10-20 DIAGNOSIS — Z17.0 MALIGNANT NEOPLASM OF CENTRAL PORTION OF RIGHT BREAST IN FEMALE, ESTROGEN RECEPTOR POSITIVE: Primary | ICD-10-CM

## 2022-10-20 PROCEDURE — G6002 STEREOSCOPIC X-RAY GUIDANCE: HCPCS | Mod: 26,,, | Performed by: RADIOLOGY

## 2022-10-20 PROCEDURE — 63600175 PHARM REV CODE 636 W HCPCS: Mod: JG | Performed by: INTERNAL MEDICINE

## 2022-10-20 PROCEDURE — 77412 RADIATION TX DELIVERY LVL 3: CPT | Performed by: RADIOLOGY

## 2022-10-20 PROCEDURE — G6002 PR STEREOSCOPIC XRAY GUIDE FOR RADIATION TX DELIV: ICD-10-PCS | Mod: 26,,, | Performed by: RADIOLOGY

## 2022-10-20 PROCEDURE — 96402 CHEMO HORMON ANTINEOPL SQ/IM: CPT

## 2022-10-20 PROCEDURE — 77387 GUIDANCE FOR RADJ TX DLVR: CPT | Mod: TC | Performed by: RADIOLOGY

## 2022-10-20 RX ORDER — LAMOTRIGINE 25 MG/1
500 TABLET ORAL
Status: COMPLETED | OUTPATIENT
Start: 2022-10-20 | End: 2022-10-20

## 2022-10-20 RX ADMIN — FULVESTRANT 500 MG: 50 INJECTION, SOLUTION INTRAMUSCULAR at 01:10

## 2022-10-20 NOTE — DISCHARGE INSTRUCTIONS
.[facility]  [address] [facility]  [address]  [phone] phone     [phone] fax  Hours of Operation: Monday- Friday 8:00am- 5:00pm  After hours phone  [phone]  Hematology / Oncology Physicians on call    Dr. Bill Morocho      Nurse Practitioners:    Bianca Mendoza, GIULIANO Camilo, GIULIANO Miranda, GIULIANO Valdivia, GIULIANO Perry, GIULIANO Jimenez, PA      Please don't hesitate to call if you have any concerns.    .WAYS TO HELP PREVENT INFECTION        WASH YOUR HANDS OFTEN DURING THE DAY, ESPECIALLY BEFORE YOU EAT, AFTER USING THE BATHROOM, AND AFTER TOUCHING ANIMALS    STAY AWAY FROM PEOPLE WHO HAVE ILLNESSES YOU CAN CATCH; SUCH AS COLDS, FLU, CHICKEN POX    TRY TO AVOID CROWDS    STAY AWAY FROM CHILDREN WHO RECENTLY HAVE RECEIVED LIVE VIRUS VACCINES    MAINTAIN GOOD MOUTH CARE    DO NOT SQUEEZE OR SCRATCH PIMPLES    CLEAN CUTS & SCRAPES RIGHT AWAY AND DAILY UNTIL HEALED WITH WARM WATER, SOAP & AN ANTISEPTIC    AVOID CONTACT WITH LITTER BOXES, BIRD CAGES, & FISH TANKS    AVOID STANDING WATER, IE., BIRD BATHS, FLOWER POTS/VASES, OR HUMIDIFIERS    WEAR GLOVES WHEN GARDENING OR CLEANING UP AFTER OTHERS, ESPECIALLY BABIES & SMALL CHILDREN    DO NOT EAT RAW FISH, SEAFOOD, MEAT, OR EGGS

## 2022-10-20 NOTE — PLAN OF CARE
Patient stated she felt tired today. Discussed POC.     Problem: Adult Inpatient Plan of Care  Goal: Plan of Care Review  Outcome: Ongoing, Progressing  Flowsheets (Taken 10/20/2022 1342)  Plan of Care Reviewed With: patient  Goal: Patient-Specific Goal (Individualized)  Outcome: Ongoing, Progressing  Flowsheets (Taken 10/20/2022 1342)  Anxieties, Fears or Concerns: None expressed  Individualized Care Needs: Right arm restriction, declined warm blanket or refreshments  Patient-Specific Goals (Include Timeframe): Pt tolerated injections     Problem: Infection  Goal: Absence of Infection Signs and Symptoms  Outcome: Ongoing, Progressing

## 2022-10-21 PROCEDURE — G6002 STEREOSCOPIC X-RAY GUIDANCE: HCPCS | Mod: 26,,, | Performed by: RADIOLOGY

## 2022-10-21 PROCEDURE — 77387 GUIDANCE FOR RADJ TX DLVR: CPT | Mod: TC | Performed by: RADIOLOGY

## 2022-10-21 PROCEDURE — G6002 PR STEREOSCOPIC XRAY GUIDE FOR RADIATION TX DELIV: ICD-10-PCS | Mod: 26,,, | Performed by: RADIOLOGY

## 2022-10-21 PROCEDURE — 77412 RADIATION TX DELIVERY LVL 3: CPT | Performed by: RADIOLOGY

## 2022-10-24 PROCEDURE — G6002 STEREOSCOPIC X-RAY GUIDANCE: HCPCS | Mod: 26,,, | Performed by: RADIOLOGY

## 2022-10-24 PROCEDURE — 77387 GUIDANCE FOR RADJ TX DLVR: CPT | Mod: TC | Performed by: RADIOLOGY

## 2022-10-24 PROCEDURE — 77412 RADIATION TX DELIVERY LVL 3: CPT | Performed by: RADIOLOGY

## 2022-10-24 PROCEDURE — G6002 PR STEREOSCOPIC XRAY GUIDE FOR RADIATION TX DELIV: ICD-10-PCS | Mod: 26,,, | Performed by: RADIOLOGY

## 2022-10-24 PROCEDURE — 77336 RADIATION PHYSICS CONSULT: CPT | Performed by: RADIOLOGY

## 2022-10-25 PROCEDURE — G6002 STEREOSCOPIC X-RAY GUIDANCE: HCPCS | Mod: 26,,, | Performed by: RADIOLOGY

## 2022-10-25 PROCEDURE — 77412 RADIATION TX DELIVERY LVL 3: CPT | Performed by: RADIOLOGY

## 2022-10-25 PROCEDURE — 77387 GUIDANCE FOR RADJ TX DLVR: CPT | Mod: TC | Performed by: RADIOLOGY

## 2022-10-25 PROCEDURE — G6002 PR STEREOSCOPIC XRAY GUIDE FOR RADIATION TX DELIV: ICD-10-PCS | Mod: 26,,, | Performed by: RADIOLOGY

## 2022-10-26 PROCEDURE — G6002 STEREOSCOPIC X-RAY GUIDANCE: HCPCS | Mod: 26,,, | Performed by: RADIOLOGY

## 2022-10-26 PROCEDURE — 77412 RADIATION TX DELIVERY LVL 3: CPT | Performed by: RADIOLOGY

## 2022-10-26 PROCEDURE — 77387 GUIDANCE FOR RADJ TX DLVR: CPT | Mod: TC | Performed by: RADIOLOGY

## 2022-10-26 PROCEDURE — G6002 PR STEREOSCOPIC XRAY GUIDE FOR RADIATION TX DELIV: ICD-10-PCS | Mod: 26,,, | Performed by: RADIOLOGY

## 2022-10-27 ENCOUNTER — SPECIALTY PHARMACY (OUTPATIENT)
Dept: PHARMACY | Facility: CLINIC | Age: 57
End: 2022-10-27
Payer: MEDICARE

## 2022-10-27 PROCEDURE — 77387 GUIDANCE FOR RADJ TX DLVR: CPT | Mod: TC | Performed by: RADIOLOGY

## 2022-10-27 PROCEDURE — G6002 STEREOSCOPIC X-RAY GUIDANCE: HCPCS | Mod: 26,,, | Performed by: RADIOLOGY

## 2022-10-27 PROCEDURE — 77412 RADIATION TX DELIVERY LVL 3: CPT | Performed by: RADIOLOGY

## 2022-10-27 PROCEDURE — G6002 PR STEREOSCOPIC XRAY GUIDE FOR RADIATION TX DELIV: ICD-10-PCS | Mod: 26,,, | Performed by: RADIOLOGY

## 2022-10-27 NOTE — TELEPHONE ENCOUNTER
Patient returned call for Ibrance refill and declined refilling at this time. She states her provider placed the Ibrance on hold while she is completing radiation. She plans to resume her cycle on 11/7 and has 10 tablets on hand left over. Will need for 11/16. Pending refill accordingly.

## 2022-10-28 ENCOUNTER — DOCUMENTATION ONLY (OUTPATIENT)
Dept: RADIATION ONCOLOGY | Facility: CLINIC | Age: 57
End: 2022-10-28
Payer: MEDICARE

## 2022-10-28 PROCEDURE — 77387 GUIDANCE FOR RADJ TX DLVR: CPT | Mod: TC | Performed by: RADIOLOGY

## 2022-10-28 PROCEDURE — G6002 PR STEREOSCOPIC XRAY GUIDE FOR RADIATION TX DELIV: ICD-10-PCS | Mod: 26,,, | Performed by: RADIOLOGY

## 2022-10-28 PROCEDURE — G6002 STEREOSCOPIC X-RAY GUIDANCE: HCPCS | Mod: 26,,, | Performed by: RADIOLOGY

## 2022-10-28 PROCEDURE — 77412 RADIATION TX DELIVERY LVL 3: CPT | Performed by: RADIOLOGY

## 2022-10-28 NOTE — PLAN OF CARE
Day 9 of outpatient xrt to the spine. Reports pain to the lower back 3/10, also reports tingling to the throat & epigastric area mostly at night, & dry cough. Will continue to monitor.

## 2022-10-31 ENCOUNTER — DOCUMENTATION ONLY (OUTPATIENT)
Dept: RADIATION ONCOLOGY | Facility: CLINIC | Age: 57
End: 2022-10-31
Payer: MEDICARE

## 2022-10-31 PROCEDURE — 77387 GUIDANCE FOR RADJ TX DLVR: CPT | Mod: TC | Performed by: RADIOLOGY

## 2022-10-31 PROCEDURE — 77336 RADIATION PHYSICS CONSULT: CPT | Performed by: RADIOLOGY

## 2022-10-31 PROCEDURE — 77412 RADIATION TX DELIVERY LVL 3: CPT | Performed by: RADIOLOGY

## 2022-10-31 PROCEDURE — G6002 STEREOSCOPIC X-RAY GUIDANCE: HCPCS | Mod: 26,,, | Performed by: RADIOLOGY

## 2022-10-31 PROCEDURE — G6002 PR STEREOSCOPIC XRAY GUIDE FOR RADIATION TX DELIV: ICD-10-PCS | Mod: 26,,, | Performed by: RADIOLOGY

## 2022-11-02 ENCOUNTER — PATIENT MESSAGE (OUTPATIENT)
Dept: INTERNAL MEDICINE | Facility: CLINIC | Age: 57
End: 2022-11-02
Payer: MEDICARE

## 2022-11-03 ENCOUNTER — INFUSION (OUTPATIENT)
Dept: INFUSION THERAPY | Facility: HOSPITAL | Age: 57
End: 2022-11-03
Attending: INTERNAL MEDICINE
Payer: MEDICARE

## 2022-11-03 VITALS
TEMPERATURE: 98 F | SYSTOLIC BLOOD PRESSURE: 110 MMHG | HEART RATE: 95 BPM | OXYGEN SATURATION: 97 % | RESPIRATION RATE: 18 BRPM | DIASTOLIC BLOOD PRESSURE: 70 MMHG

## 2022-11-03 DIAGNOSIS — Z17.0 MALIGNANT NEOPLASM OF CENTRAL PORTION OF RIGHT BREAST IN FEMALE, ESTROGEN RECEPTOR POSITIVE: Primary | ICD-10-CM

## 2022-11-03 DIAGNOSIS — C50.111 MALIGNANT NEOPLASM OF CENTRAL PORTION OF RIGHT BREAST IN FEMALE, ESTROGEN RECEPTOR POSITIVE: Primary | ICD-10-CM

## 2022-11-03 PROCEDURE — 63600175 PHARM REV CODE 636 W HCPCS: Mod: JG | Performed by: INTERNAL MEDICINE

## 2022-11-03 PROCEDURE — 96402 CHEMO HORMON ANTINEOPL SQ/IM: CPT

## 2022-11-03 RX ORDER — LAMOTRIGINE 25 MG/1
500 TABLET ORAL
Status: COMPLETED | OUTPATIENT
Start: 2022-11-03 | End: 2022-11-03

## 2022-11-03 RX ADMIN — FULVESTRANT 500 MG: 50 INJECTION, SOLUTION INTRAMUSCULAR at 10:11

## 2022-11-03 NOTE — DISCHARGE INSTRUCTIONS
.[facility]  [address] [facility] [address]  [address]  [phone] phone     [phone] fax  Hours of Operation: Monday- Friday 8:00am- 5:00pm  After hours phone  [phone]  Hematology / Oncology Physicians on call    Dr. Bill Morocho      Nurse Practitioners:    Bianca Mendoza, NP  Amairani Camilo, GIULIANO Miranda, GIULIANO Valdivia, GIULIANO Perry, GIULIANO Jimenez, PA      Please don't hesitate to call if you have any concerns.     .WAYS TO HELP PREVENT INFECTION        WASH YOUR HANDS OFTEN DURING THE DAY, ESPECIALLY BEFORE YOU EAT, AFTER USING THE BATHROOM, AND AFTER TOUCHING ANIMALS    STAY AWAY FROM PEOPLE WHO HAVE ILLNESSES YOU CAN CATCH; SUCH AS COLDS, FLU, CHICKEN POX    TRY TO AVOID CROWDS    STAY AWAY FROM CHILDREN WHO RECENTLY HAVE RECEIVED LIVE VIRUS VACCINES    MAINTAIN GOOD MOUTH CARE    DO NOT SQUEEZE OR SCRATCH PIMPLES    CLEAN CUTS & SCRAPES RIGHT AWAY AND DAILY UNTIL HEALED WITH WARM WATER, SOAP & AN ANTISEPTIC    AVOID CONTACT WITH LITTER BOXES, BIRD CAGES, & FISH TANKS    AVOID STANDING WATER, IE., BIRD BATHS, FLOWER POTS/VASES, OR HUMIDIFIERS    WEAR GLOVES WHEN GARDENING OR CLEANING UP AFTER OTHERS, ESPECIALLY BABIES & SMALL CHILDREN    DO NOT EAT RAW FISH, SEAFOOD, MEAT, OR EGGS     .FALL PREVENTION   Falls often occur due to slipping, tripping or losing your balance. Here are ways to reduce your risk of falling again.   Was there anything that caused your fall that can be fixed, removed or replaced?   Make your home safe by keeping walkways clear of objects you may trip over.   Use non-slip pads under rugs.   Do not walk in poorly lit areas.   Do not stand on chairs or wobbly ladders.   Use caution when reaching overhead or looking upward. This position can cause a loss of balance.   Be sure your shoes fit properly, have non-slip bottoms and are in good condition.   Be cautious when going  up and down stairs, curbs, and when walking on uneven sidewalks.   If your balance is poor, consider using a cane or walker.   If your fall was related to alcohol use, stop or limit alcohol intake.   If your fall was related to use of sleeping medicines, talk to your doctor about this. You may need to reduce your dosage at bedtime if you awaken during the night to go to the bathroom.   To reduce the need for nighttime bathroom trips:   Avoid drinking fluids for several hours before going to bed   Empty your bladder before going to bed   Men can keep a urinal at the bedside   © 9778-1835 [facility], [address] [place] [address], [place] [address]. All rights reserved. This information is not intended as a substitute for professional medical care. Always follow your healthcare professional's instructions.

## 2022-11-03 NOTE — PLAN OF CARE
Patient reports pain to her back but otherwise doing well. Tolerated injections well today with no adverse reactions. Patient to return in two weeks for follow-up with Dr. Khan.

## 2022-11-03 NOTE — NURSING
.Injections given without difficulties.Bandaids applied. Patient instructed to stay in the clinic for 15 minutes. Patient verbalized understanding and will notify nurse with any complaints.

## 2022-11-08 NOTE — TELEPHONE ENCOUNTER
Specialty Pharmacy - Refill Coordination    Specialty Medication Orders Linked to Encounter      Flowsheet Row Most Recent Value   Medication #1 palbociclib 125 mg Tab (Order#[id number], Rx#[id number])            Refill Questions - Documented Responses      Flowsheet Row Most Recent Value   Patient Availability and HIPAA Verification    Does patient want to proceed with activity? Yes   HIPAA/medical authority confirmed? Yes   Relationship to patient of person spoken to? Self   Refill Screening Questions    Changes to allergies? No   Changes to medications? No   New conditions since last clinic visit? No   Unplanned office visit, urgent care, ED, or hospital admission in the last 4 weeks? No   How does patient/caregiver feel medication is working? Good   Financial problems or insurance changes? No   How many doses of your specialty medications were missed in the last 4 weeks? 0   Would patient like to speak to a pharmacist? No   When does the patient need to receive the medication? 11/14/22   Refill Delivery Questions    How will the patient receive the medication? MEDRx   When does the patient need to receive the medication? 11/14/22   Shipping Address Home   Address in [facility] confirmed and updated if neccessary? Yes   Expected Copay ($) 0   Is the patient able to afford the medication copay? Yes   Payment Method zero copay   Days supply of Refill 28   Supplies needed? No supplies needed   Refill activity completed? Yes   Refill activity plan Refill scheduled   Shipment/Pickup Date: 11/09/22            Current Outpatient Medications   Medication Sig    anastrozole (ARIMIDEX) 1 mg Tab Take 1 tablet (1 mg total) by mouth once daily.    aspirin 81 MG Chew Take 81 mg by mouth once daily.    atorvastatin (LIPITOR) 10 MG tablet Take 1 tablet (10 mg total) by mouth once daily.    baclofen (LIORESAL) 10 MG tablet Take 1 tablet (10 mg total) by mouth 3 (three) times daily. for 10 days    celecoxib (CELEBREX) 200  MG capsule Take 1 capsule (200 mg total) by mouth once daily.    cetirizine (ZYRTEC) 10 MG tablet Take 10 mg by mouth once daily.    HYDROcodone-acetaminophen (NORCO) 5-325 mg per tablet Take by mouth.    LORATADINE (CLARITIN ORAL) Take by mouth daily as needed.     meclizine (ANTIVERT) 25 mg tablet Take 25 mg by mouth 3 (three) times daily as needed.    ondansetron (ZOFRAN) 4 MG tablet Take 1 tablet (4 mg total) by mouth every 8 (eight) hours as needed for Nausea.    oxyCODONE-acetaminophen (PERCOCET) 5-325 mg per tablet Take 1 tablet by mouth every 4 (four) hours as needed for Pain.    palbociclib 125 mg Tab Take 125 mg by mouth As instructed. Take 1 tablet (125 mg) on days 1-21 of each 28 day cycle. Take with food.    polyethylene glycol (GLYCOLAX) 17 gram/dose powder Take 17 g by mouth once daily.    prochlorperazine (COMPAZINE) 5 MG tablet Take 1 tablet (5 mg total) by mouth 4 (four) times daily as needed for Nausea.   Last reviewed on 11/3/2022 10:06 AM by Valdo Hewitt RN    Review of patient's allergies indicates:  No Known Allergies Last reviewed on  11/3/2022 10:06 AM by Valdo Hewitt      Tasks added this encounter   12/5/2022 - Refill Call (Auto Added)   Tasks due within next 3 months   No tasks due.     [facility]  [address] [place] [id number]  Phone: [phone]  Fax: [phone]

## 2022-11-15 ENCOUNTER — HOSPITAL ENCOUNTER (OUTPATIENT)
Dept: RADIOLOGY | Facility: HOSPITAL | Age: 57
Discharge: HOME | End: 2022-11-15
Attending: NURSE PRACTITIONER
Payer: MEDICARE

## 2022-11-15 DIAGNOSIS — C50.111 MALIGNANT NEOPLASM OF CENTRAL PORTION OF RIGHT BREAST IN FEMALE, ESTROGEN RECEPTOR POSITIVE: ICD-10-CM

## 2022-11-15 DIAGNOSIS — Z17.0 MALIGNANT NEOPLASM OF CENTRAL PORTION OF RIGHT BREAST IN FEMALE, ESTROGEN RECEPTOR POSITIVE: ICD-10-CM

## 2022-11-15 DIAGNOSIS — Z12.31 ENCOUNTER FOR SCREENING MAMMOGRAM FOR MALIGNANT NEOPLASM OF BREAST: ICD-10-CM

## 2022-11-15 PROCEDURE — 77067 MAMMO DIGITAL SCREENING LEFT WITH TOMO: ICD-10-PCS | Mod: 26,,, | Performed by: RADIOLOGY

## 2022-11-15 PROCEDURE — 77067 SCR MAMMO BI INCL CAD: CPT | Mod: 26,,, | Performed by: RADIOLOGY

## 2022-11-15 PROCEDURE — 77063 BREAST TOMOSYNTHESIS BI: CPT | Mod: TC

## 2022-11-15 PROCEDURE — 77063 BREAST TOMOSYNTHESIS BI: CPT | Mod: 26,,, | Performed by: RADIOLOGY

## 2022-11-15 PROCEDURE — 77063 MAMMO DIGITAL SCREENING LEFT WITH TOMO: ICD-10-PCS | Mod: 26,,, | Performed by: RADIOLOGY

## 2022-11-15 PROCEDURE — 77067 SCR MAMMO BI INCL CAD: CPT | Mod: TC

## 2022-11-15 NOTE — PROGRESS NOTES
Subjective:       Patient ID: Carole Moore is a 57 y.o. female.    Chief Complaint: breast cancer surveillance    Collaborating provider is Dr. Harpal Khan    HPI 57-year-old  female who presents to the hematology oncology clinic today for c/o back pain Pt has a history of right breast carcinoma.  The patient was previously followed in the outpatient  Hem/Onc clinic by Dr. Alessia Moss and Dr. Huitron.    Last visit in August pt c/o left mid to low back pain over the muscle- plain imaging revealed degenerative changes- MRI was ordered- widespread metastatic disease noted in spine.    Pt is on Ibrance, Faslodex and palliative radiation to the spine.     Pain is 2 now    2/2017-Right-sided breast cancer: Stage IIIA invasive lobular carcinoma, ER/IN positive, Oda2Ldk neg. BRCA negative. Given large mass on physical examination, treated with neoadjuvant chemotherapy, using dose-dense AC-T regimen, which patient completed in 2/2017 with decrease in size and firmness of R breast mass. She then underwent R breast mastectomy and SLND 4/2017, with tissue expander placement. Final pathology after neoadjuvant chemotherapy showed tumor 6cm, sentinel LNs positive, and nipple skin positive, we did recommend adjuvant radiation to R chest and axilla. Her case was discussed at tumor board and while axillary LN dissection was discussed, adjuvant radiation to the left chest and axilla were recommended which she completed 7/24/2017- 28 treatments    Anastrazole 1mg PO daily for adjuvant endocrine therapy was initiated end of July 2017. Pt tolerating without difficulty- hot flashes consistent and interupting sleep- did try ditropan for a short while and did not like the way it made her feel. Very dry mouth-     Bone density 10/29/2021: normal    4/13/2022- dexa- normal- repeat 4/2024      Mammogram 10/29/2021- left breast- normal-     Pt had ED visit 5/4/2022- states woke up with dizziness, nausea and vomiting-  went to ED- had imaging of head wnl and labs normal- thought to be inner ear- placed on antivert and symptoms resolved in 24 hours.      Has history of heart failure and is followed by cardiology- last visit 3/31/2022      PAST MEDICAL HISTORY:   1.  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  Dyslipidemia  3.  Osteopenia - resolved  4.  Right breast cancer- Stage IIIA invasive lobular carcinoma, ER/MI positive, [id number] neg. BRCA negative.  5.  Heart failure     SURGICAL HISTORY:   1.  Tonsillectomy  2.  Hysterectomy  3.  Right breast mastectomy with with right deep axilla sentinel lymph node biopsy followed by breast reconstruction with tissue expander done on April 11, 2017 and reconstruction completed in Dec 2017.  4.  Mediport placement and removal     FAMILY HISTORY: She reports that 2 paternal aunts had breast cancer in their late 50s.  A maternal uncle had lung cancer the age of 54.  He used to smoke cigarettes.  A maternal uncle had melanoma at the age of 49.  Her father had colon cancer at the age of 70.  Her mother had colon cancer in her 70s.  She denies any other immediate family members with cancer or bleeding/clotting disorders.     SOCIAL HISTORY: The reports a 5-pack-year smoking history and quit at the age of 23.  She drinks alcohol socially.  She has never used any recreational drugs.  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  She is  and has 2 children.  She lives in [place], [place].     ALLERGIES: [NKDA]     MEDICATIONS: reviewed and reconciled    Review of Systems   Constitutional: Negative.  Negative for appetite change, fatigue, fever and unexpected weight change.   HENT: Negative.     Eyes: Negative.    Respiratory: Negative.     Cardiovascular: Negative.    Gastrointestinal: Negative.  Negative for abdominal pain and blood in stool.        No reflux   Endocrine: Negative.    Genitourinary: Negative.   Negative for hematuria.            Musculoskeletal:  Positive for back pain (back pain much improved- mainly with standing and walking long time).   Skin: Negative.    Allergic/Immunologic: Negative.    Neurological: Negative.    Hematological: Negative.  Negative for adenopathy.   Psychiatric/Behavioral: Negative.       Breast - no mass, pain or discharge  Objective:      Physical Exam  Constitutional:       Appearance: Normal appearance. She is well-developed.   HENT:      Head: Normocephalic and atraumatic.      Right Ear: Ear canal and external ear normal.      Left Ear: Ear canal and external ear normal.      Mouth/Throat:      Pharynx: No oropharyngeal exudate.   Eyes:      General: No scleral icterus.        Right eye: No discharge.         Left eye: No discharge.      Conjunctiva/sclera: Conjunctivae normal.      Pupils: Pupils are equal, round, and reactive to light.   Neck:      Thyroid: No thyromegaly.   Cardiovascular:      Rate and Rhythm: Normal rate and regular rhythm.      Pulses: Normal pulses.      Heart sounds: Normal heart sounds.   Pulmonary:      Effort: Pulmonary effort is normal.      Breath sounds: Normal breath sounds.   Chest:   Breasts:     Right: No inverted nipple, mass, nipple discharge, skin change or tenderness.      Left: No inverted nipple, mass, nipple discharge, skin change or tenderness.       Abdominal:      General: Bowel sounds are normal.      Palpations: Abdomen is soft.   Musculoskeletal:         General: Normal range of motion.        Arms:       Cervical back: Normal range of motion and neck supple.   Lymphadenopathy:      Head:      Right side of head: No submental, submandibular, tonsillar, preauricular, posterior auricular or occipital adenopathy.      Left side of head: No submental, submandibular, tonsillar, preauricular, posterior auricular or occipital adenopathy.      Cervical: No cervical adenopathy.      Right cervical: No superficial or posterior cervical  adenopathy.     Left cervical: No superficial or posterior cervical adenopathy.      Upper Body:      Right upper body: No supraclavicular or axillary adenopathy.      Left upper body: No supraclavicular or axillary adenopathy.   Skin:     General: Skin is warm and dry.      Coloration: Skin is not pale.      Findings: No erythema or rash.   Neurological:      General: No focal deficit present.      Mental Status: She is alert and oriented to person, place, and time.      Deep Tendon Reflexes: Reflexes are normal and symmetric.      Comments: Right upper extremity hemiparesis noted   Psychiatric:         Mood and Affect: Mood normal.         Behavior: Behavior normal.         Thought Content: Thought content normal.         Judgment: Judgment normal.       Mammogram 11/15/2022- wnl    10/29/2021 and 4/13/2022- Bone density - normal-   Taking Vit D daily- and taking calcium  Pt reports negative genetic testing in 2017    Exercise- not riding - off for 2 more months due to back mets.     Assessment:       1. Malignant neoplasm of central portion of right breast in female, estrogen receptor positive    2. Secondary cancer of bone    3. Use of aromatase inhibitors    4. Encounter for follow-up surveillance of breast cancer    5. Encounter for monitoring adjuvant hormonal therapy    6. Counseling and coordination of care    7. Counseling on health promotion and disease prevention    8. Health education/counseling    9. Encounter for breast cancer screening using non-mammogram modality          Plan:       1.        Right-sided breast cancer: Stage IIIA invasive lobular carcinoma, ER/KY positive, [id number] neg. S/p AC-T chemotherapy and chest wall/axilla radiation. BRCA negative.   2.        Left sided back pain - MRI revealed metastatic disease- palliative radiation to spine and is on faslodex and will resume Ibrance/Arimidex after completes radiation  2.        Negative clinical exam- Mammo -11/15/2022- wnl  2.         dexa 4/13/2022 reveals- normal- repeat 2 years- 4/2024     will be on AI for 10 years total.   3.        Continue therapy as directed by Oncology. Take calcium and vit D.   4.        Hot flashes- improved   5.        Gyn f/u was in 7/13/2022   7.        Continue f/u with Dr. Khan and Dr. Barbosa  8.        RTC for exam June 2023

## 2022-11-16 ENCOUNTER — LAB VISIT (OUTPATIENT)
Dept: LAB | Facility: HOSPITAL | Age: 57
End: 2022-11-16
Attending: INTERNAL MEDICINE
Payer: MEDICARE

## 2022-11-16 DIAGNOSIS — C79.51 CARCINOMA OF BREAST METASTATIC TO BONE, UNSPECIFIED LATERALITY: ICD-10-CM

## 2022-11-16 DIAGNOSIS — C50.919 CARCINOMA OF BREAST METASTATIC TO BONE, UNSPECIFIED LATERALITY: ICD-10-CM

## 2022-11-16 LAB
ALBUMIN SERPL BCP-MCNC: 3.6 G/DL (ref 3.5–5.2)
ALP SERPL-CCNC: 109 U/L (ref 55–135)
ALT SERPL W/O P-5'-P-CCNC: 21 U/L (ref 10–44)
ANION GAP SERPL CALC-SCNC: 8 MMOL/L (ref 8–16)
AST SERPL-CCNC: 21 U/L (ref 10–40)
BASOPHILS # BLD AUTO: 0.05 K/UL (ref 0–0.2)
BASOPHILS NFR BLD: 1.6 % (ref 0–1.9)
BILIRUB SERPL-MCNC: 1.1 MG/DL (ref 0.1–1)
BUN SERPL-MCNC: 19 MG/DL (ref 6–20)
CALCIUM SERPL-MCNC: 9 MG/DL (ref 8.7–10.5)
CHLORIDE SERPL-SCNC: 105 MMOL/L (ref 95–110)
CO2 SERPL-SCNC: 26 MMOL/L (ref 23–29)
CREAT SERPL-MCNC: 0.8 MG/DL (ref 0.5–1.4)
DIFFERENTIAL METHOD: ABNORMAL
EOSINOPHIL # BLD AUTO: 0.2 K/UL (ref 0–0.5)
EOSINOPHIL NFR BLD: 5.8 % (ref 0–8)
ERYTHROCYTE [DISTWIDTH] IN BLOOD BY AUTOMATED COUNT: 13 % (ref 11.5–14.5)
EST. GFR  (NO RACE VARIABLE): >60 ML/MIN/1.73 M^2
GLUCOSE SERPL-MCNC: 95 MG/DL (ref 70–110)
HCT VFR BLD AUTO: 37.9 % (ref 37–48.5)
HGB BLD-MCNC: 12.7 G/DL (ref 12–16)
IMM GRANULOCYTES # BLD AUTO: 0.01 K/UL (ref 0–0.04)
IMM GRANULOCYTES NFR BLD AUTO: 0.3 % (ref 0–0.5)
LYMPHOCYTES # BLD AUTO: 0.5 K/UL (ref 1–4.8)
LYMPHOCYTES NFR BLD: 16.2 % (ref 18–48)
MCH RBC QN AUTO: 31.4 PG (ref 27–31)
MCHC RBC AUTO-ENTMCNC: 33.5 G/DL (ref 32–36)
MCV RBC AUTO: 94 FL (ref 82–98)
MONOCYTES # BLD AUTO: 0.3 K/UL (ref 0.3–1)
MONOCYTES NFR BLD: 10 % (ref 4–15)
NEUTROPHILS # BLD AUTO: 2 K/UL (ref 1.8–7.7)
NEUTROPHILS NFR BLD: 66.1 % (ref 38–73)
NRBC BLD-RTO: 0 /100 WBC
PLATELET # BLD AUTO: 231 K/UL (ref 150–450)
PMV BLD AUTO: 10.3 FL (ref 9.2–12.9)
POTASSIUM SERPL-SCNC: 4.4 MMOL/L (ref 3.5–5.1)
PROT SERPL-MCNC: 7 G/DL (ref 6–8.4)
RBC # BLD AUTO: 4.05 M/UL (ref 4–5.4)
SODIUM SERPL-SCNC: 139 MMOL/L (ref 136–145)
WBC # BLD AUTO: 3.09 K/UL (ref 3.9–12.7)

## 2022-11-16 PROCEDURE — 36415 COLL VENOUS BLD VENIPUNCTURE: CPT | Mod: PO | Performed by: INTERNAL MEDICINE

## 2022-11-16 PROCEDURE — 85025 COMPLETE CBC W/AUTO DIFF WBC: CPT | Performed by: INTERNAL MEDICINE

## 2022-11-16 PROCEDURE — 80053 COMPREHEN METABOLIC PANEL: CPT | Performed by: INTERNAL MEDICINE

## 2022-11-17 ENCOUNTER — INFUSION (OUTPATIENT)
Dept: INFUSION THERAPY | Facility: HOSPITAL | Age: 57
End: 2022-11-17
Attending: INTERNAL MEDICINE
Payer: MEDICARE

## 2022-11-17 ENCOUNTER — OFFICE VISIT (OUTPATIENT)
Dept: HEMATOLOGY/ONCOLOGY | Facility: CLINIC | Age: 57
End: 2022-11-17
Payer: MEDICARE

## 2022-11-17 VITALS
TEMPERATURE: 97 F | WEIGHT: 179.69 LBS | BODY MASS INDEX: 28.2 KG/M2 | HEIGHT: 67 IN | OXYGEN SATURATION: 97 % | SYSTOLIC BLOOD PRESSURE: 113 MMHG | HEART RATE: 82 BPM | DIASTOLIC BLOOD PRESSURE: 69 MMHG

## 2022-11-17 VITALS
WEIGHT: 179.69 LBS | DIASTOLIC BLOOD PRESSURE: 72 MMHG | HEART RATE: 82 BPM | SYSTOLIC BLOOD PRESSURE: 133 MMHG | OXYGEN SATURATION: 98 % | RESPIRATION RATE: 18 BRPM | BODY MASS INDEX: 28.2 KG/M2 | HEIGHT: 67 IN | TEMPERATURE: 97 F

## 2022-11-17 DIAGNOSIS — R53.1 RIGHT SIDED WEAKNESS: ICD-10-CM

## 2022-11-17 DIAGNOSIS — Z17.0 MALIGNANT NEOPLASM OF CENTRAL PORTION OF RIGHT BREAST IN FEMALE, ESTROGEN RECEPTOR POSITIVE: Primary | ICD-10-CM

## 2022-11-17 DIAGNOSIS — T45.1X5A IMMUNODEFICIENCY DUE TO CHEMOTHERAPY: ICD-10-CM

## 2022-11-17 DIAGNOSIS — Z79.899 IMMUNODEFICIENCY DUE TO CHEMOTHERAPY: ICD-10-CM

## 2022-11-17 DIAGNOSIS — Z86.73 HISTORY OF CVA (CEREBROVASCULAR ACCIDENT): ICD-10-CM

## 2022-11-17 DIAGNOSIS — D84.821 IMMUNODEFICIENCY DUE TO CHEMOTHERAPY: ICD-10-CM

## 2022-11-17 DIAGNOSIS — C50.111 MALIGNANT NEOPLASM OF CENTRAL PORTION OF RIGHT BREAST IN FEMALE, ESTROGEN RECEPTOR POSITIVE: Primary | ICD-10-CM

## 2022-11-17 DIAGNOSIS — Z79.811 USE OF AROMATASE INHIBITORS: ICD-10-CM

## 2022-11-17 DIAGNOSIS — I69.351 HEMIPARESIS AFFECTING RIGHT SIDE AS LATE EFFECT OF CEREBROVASCULAR ACCIDENT: ICD-10-CM

## 2022-11-17 PROCEDURE — 3074F SYST BP LT 130 MM HG: CPT | Mod: CPTII,S$GLB,, | Performed by: INTERNAL MEDICINE

## 2022-11-17 PROCEDURE — 63600175 PHARM REV CODE 636 W HCPCS: Mod: JG | Performed by: INTERNAL MEDICINE

## 2022-11-17 PROCEDURE — 3008F BODY MASS INDEX DOCD: CPT | Mod: CPTII,S$GLB,, | Performed by: INTERNAL MEDICINE

## 2022-11-17 PROCEDURE — 96372 THER/PROPH/DIAG INJ SC/IM: CPT

## 2022-11-17 PROCEDURE — 99999 PR PBB SHADOW E&M-EST. PATIENT-LVL IV: CPT | Mod: PBBFAC,,, | Performed by: INTERNAL MEDICINE

## 2022-11-17 PROCEDURE — 99215 OFFICE O/P EST HI 40 MIN: CPT | Mod: 25,S$GLB,, | Performed by: INTERNAL MEDICINE

## 2022-11-17 PROCEDURE — 3078F PR MOST RECENT DIASTOLIC BLOOD PRESSURE < 80 MM HG: ICD-10-PCS | Mod: CPTII,S$GLB,, | Performed by: INTERNAL MEDICINE

## 2022-11-17 PROCEDURE — 99999 PR PBB SHADOW E&M-EST. PATIENT-LVL IV: ICD-10-PCS | Mod: PBBFAC,,, | Performed by: INTERNAL MEDICINE

## 2022-11-17 PROCEDURE — 3074F PR MOST RECENT SYSTOLIC BLOOD PRESSURE < 130 MM HG: ICD-10-PCS | Mod: CPTII,S$GLB,, | Performed by: INTERNAL MEDICINE

## 2022-11-17 PROCEDURE — 3078F DIAST BP <80 MM HG: CPT | Mod: CPTII,S$GLB,, | Performed by: INTERNAL MEDICINE

## 2022-11-17 PROCEDURE — 3008F PR BODY MASS INDEX (BMI) DOCUMENTED: ICD-10-PCS | Mod: CPTII,S$GLB,, | Performed by: INTERNAL MEDICINE

## 2022-11-17 PROCEDURE — 99499 UNLISTED E&M SERVICE: CPT | Mod: HCNC,S$GLB,, | Performed by: INTERNAL MEDICINE

## 2022-11-17 PROCEDURE — 99215 PR OFFICE/OUTPT VISIT, EST, LEVL V, 40-54 MIN: ICD-10-PCS | Mod: 25,S$GLB,, | Performed by: INTERNAL MEDICINE

## 2022-11-17 PROCEDURE — 96402 CHEMO HORMON ANTINEOPL SQ/IM: CPT

## 2022-11-17 RX ORDER — LAMOTRIGINE 25 MG/1
500 TABLET ORAL
Status: COMPLETED | OUTPATIENT
Start: 2022-11-17 | End: 2022-11-17

## 2022-11-17 RX ORDER — LAMOTRIGINE 25 MG/1
500 TABLET ORAL
Status: CANCELLED | OUTPATIENT
Start: 2022-11-17

## 2022-11-17 RX ADMIN — FULVESTRANT 500 MG: 50 INJECTION, SOLUTION INTRAMUSCULAR at 11:11

## 2022-11-17 NOTE — NURSING
Patient to unit today for Faslodex. Injection given without difficulties to bilateral buttocks. Bandaid applied. Patient instructed to stay in the clinic for 15 minutes. Patient verbalized understanding and will notify nurse with any complaints.

## 2022-11-17 NOTE — PROGRESS NOTES
Subjective:       Patient ID: Carole Moore is a 57 y.o. female.    Chief Complaint: Results, Chemotherapy, and Breast Cancer    HPI: 57-year-old female history of metastatic breast carcinoma presents for cycle 2 day 1 of Fast low dex with Ibrance 125 mg days 1 through 21 on a 28 day cycle currently day 10 ECOG status 1    Past Medical History:   Diagnosis Date    Antineoplastic chemotherapy induced anemia     Breast cancer 2016    right breast    Cancer     R Breast cancer    CVA (cerebral infarction)     Diverticulosis     Family history of colon cancer 10/30/2018    Her mother and father both had colon cancer.    Hyperlipidemia     Immunodeficiency due to chemotherapy 10/3/2022    Nausea after anesthesia     Paralysis     right side    PONV (postoperative nausea and vomiting)     Stroke     R side weakness    Trouble in sleeping      Family History   Problem Relation Age of Onset    Breast cancer Paternal Aunt     Colon cancer Father     Colon cancer Mother     Melanoma Maternal Uncle     Skin cancer Maternal Uncle      Social History     Socioeconomic History    Marital status:    Tobacco Use    Smoking status: Never    Smokeless tobacco: Never   Substance and Sexual Activity    Alcohol use: No    Drug use: No    Sexual activity: Not Currently     Birth control/protection: Surgical   Other Topics Concern    Are you pregnant or think you may be? No    Breast-feeding No     Social Determinants of Health     Financial Resource Strain: Low Risk     Difficulty of Paying Living Expenses: Not hard at all   Food Insecurity: No Food Insecurity    Worried About Running Out of Food in the Last Year: Never true    Ran Out of Food in the Last Year: Never true   Transportation Needs: No Transportation Needs    Lack of Transportation (Medical): No    Lack of Transportation (Non-Medical): No   Physical Activity: Inactive    Days of Exercise per Week: 0 days    Minutes of Exercise per Session: 0 min   Stress: No  Stress Concern Present    Feeling of Stress : Only a little   Social Connections: Unknown    Frequency of Communication with Friends and Family: More than three times a week    Frequency of Social Gatherings with Friends and Family: More than three times a week    Active Member of Clubs or Organizations: No    Attends Club or Organization Meetings: Patient refused    Marital Status:    Housing Stability: Low Risk     Unable to Pay for Housing in the Last Year: No    Number of Places Lived in the Last Year: 1    Unstable Housing in the Last Year: No     Past Surgical History:   Procedure Laterality Date    AUGMENTATION OF BREAST Left 2017    BREAST SURGERY      COLONOSCOPY N/A 10/30/2018    Procedure: COLONOSCOPY;  Surgeon: Cosme Monson MD;  Location: [facility];  Service: Endoscopy;  Laterality: N/A;    CYSTOSCOPY WITH BIOPSY OF BLADDER N/A 11/29/2021    Procedure: CYSTOSCOPY, WITH BLADDER BIOPSY, WITH FULGURATION IF INDICATED;  Surgeon: Page Marcelo MD;  Location: [facility] OR;  Service: Urology;  Laterality: N/A;    HYSTERECTOMY  2007    maintains both ovaries, menorrhagia    MASTECTOMY Right 2017    MEDIPORT INSERTION, SINGLE      TONSILLECTOMY         Labs:  Lab Results   Component Value Date    WBC 3.09 (L) 11/16/2022    HGB 12.7 11/16/2022    HCT 37.9 11/16/2022    MCV 94 11/16/2022     11/16/2022     BMP  Lab Results   Component Value Date     11/16/2022    K 4.4 11/16/2022     11/16/2022    CO2 26 11/16/2022    BUN 19 11/16/2022    CREATININE 0.8 11/16/2022    CALCIUM 9.0 11/16/2022    ANIONGAP 8 11/16/2022    ESTGFRAFRICA >60 05/12/2022    EGFRNONAA >60 05/12/2022     Lab Results   Component Value Date    ALT 21 11/16/2022    AST 21 11/16/2022    ALKPHOS 109 11/16/2022    BILITOT 1.1 (H) 11/16/2022       No results found for: IRON, TIBC, FERRITIN, SATURATEDIRO  No results found for: XLXLEVAB25  No results found for: FOLATE  Lab Results   Component Value Date    TSH 0.793  10/29/2021         Review of Systems   Constitutional:  Positive for activity change and fatigue. Negative for appetite change, chills, diaphoresis, fever and unexpected weight change.   HENT:  Negative for congestion, dental problem, drooling, ear discharge, ear pain, facial swelling, hearing loss, mouth sores, nosebleeds, postnasal drip, rhinorrhea, sinus pressure, sneezing, sore throat, tinnitus, trouble swallowing and voice change.    Eyes:  Negative for photophobia, pain, discharge, redness, itching and visual disturbance.   Respiratory:  Negative for cough, choking, chest tightness, shortness of breath, wheezing and stridor.    Cardiovascular:  Negative for chest pain, palpitations and leg swelling.   Gastrointestinal:  Negative for abdominal distention, abdominal pain, anal bleeding, blood in stool, constipation, diarrhea, nausea, rectal pain and vomiting.   Endocrine: Negative for cold intolerance, heat intolerance, polydipsia, polyphagia and polyuria.   Genitourinary:  Negative for decreased urine volume, difficulty urinating, dyspareunia, dysuria, enuresis, flank pain, frequency, genital sores, hematuria, menstrual problem, pelvic pain, urgency, vaginal bleeding, vaginal discharge and vaginal pain.   Musculoskeletal:  Positive for gait problem. Negative for arthralgias, back pain, joint swelling, myalgias, neck pain and neck stiffness.   Skin:  Negative for color change, pallor and rash.   Allergic/Immunologic: Negative for environmental allergies, food allergies and immunocompromised state.   Neurological:  Positive for weakness. Negative for dizziness, tremors, seizures, syncope, facial asymmetry, speech difficulty, light-headedness, numbness and headaches.   Hematological:  Negative for adenopathy. Does not bruise/bleed easily.   Psychiatric/Behavioral:  Positive for dysphoric mood. Negative for agitation, behavioral problems, confusion, decreased concentration, hallucinations, self-injury, sleep  disturbance and suicidal ideas. The patient is nervous/anxious. The patient is not hyperactive.      Objective:      Physical Exam  Vitals reviewed.   Constitutional:       General: She is not in acute distress.     Appearance: She is well-developed. She is not diaphoretic.   HENT:      Head: Normocephalic and atraumatic.      Right Ear: External ear normal.      Left Ear: External ear normal.      Nose: Nose normal.      Right Sinus: No maxillary sinus tenderness or frontal sinus tenderness.      Left Sinus: No maxillary sinus tenderness or frontal sinus tenderness.      Mouth/Throat:      Pharynx: No oropharyngeal exudate.   Eyes:      General: Lids are normal. No scleral icterus.        Right eye: No discharge.         Left eye: No discharge.      Conjunctiva/sclera: Conjunctivae normal.      Right eye: Right conjunctiva is not injected. No hemorrhage.     Left eye: Left conjunctiva is not injected. No hemorrhage.     Pupils: Pupils are equal, round, and reactive to light.   Neck:      Thyroid: No thyromegaly.      Vascular: No JVD.      Trachea: No tracheal deviation.   Cardiovascular:      Rate and Rhythm: Normal rate.   Pulmonary:      Effort: Pulmonary effort is normal. No respiratory distress.      Breath sounds: No stridor.   Chest:      Chest wall: No tenderness.   Abdominal:      General: Bowel sounds are normal. There is no distension.      Palpations: Abdomen is soft. There is no hepatomegaly, splenomegaly or mass.      Tenderness: There is no abdominal tenderness. There is no rebound.   Musculoskeletal:         General: No tenderness. Normal range of motion.      Cervical back: Normal range of motion and neck supple.   Lymphadenopathy:      Cervical: No cervical adenopathy.      Upper Body:      Right upper body: No supraclavicular adenopathy.      Left upper body: No supraclavicular adenopathy.   Skin:     General: Skin is dry.      Findings: No erythema or rash.   Neurological:      Mental Status:  She is alert and oriented to person, place, and time.      Cranial Nerves: No cranial nerve deficit.      Motor: Weakness present.      Coordination: Coordination normal.      Gait: Gait abnormal.   Psychiatric:         Behavior: Behavior normal.         Thought Content: Thought content normal.         Judgment: Judgment normal.           Assessment:      1. Malignant neoplasm of central portion of right breast in female, estrogen receptor positive    2. Use of aromatase inhibitors    3. Immunodeficiency due to chemotherapy    4. Right sided weakness    5. Hemiparesis affecting right side as late effect of cerebrovascular accident    6. History of CVA (cerebrovascular accident)           Plan:     Extensive conversation with patient reviewed information.  With basilar dex today completion Ibrance dosing.  Patient will return in 1 month to accommodated vacation which will restart Faslodex and Ibrance dosing same time.  Standing labs have been placed.  Prescription sent [facility]        Harpal Khan Jr, MD FACP

## 2022-11-18 ENCOUNTER — HOSPITAL ENCOUNTER (OUTPATIENT)
Dept: RADIOLOGY | Facility: HOSPITAL | Age: 57
Discharge: HOME | End: 2022-11-18
Attending: NEUROLOGICAL SURGERY
Payer: MEDICARE

## 2022-11-18 ENCOUNTER — OFFICE VISIT (OUTPATIENT)
Dept: NEUROSURGERY | Facility: CLINIC | Age: 57
End: 2022-11-18
Payer: MEDICARE

## 2022-11-18 VITALS
HEART RATE: 75 BPM | WEIGHT: 179 LBS | HEIGHT: 67 IN | SYSTOLIC BLOOD PRESSURE: 91 MMHG | RESPIRATION RATE: 18 BRPM | DIASTOLIC BLOOD PRESSURE: 62 MMHG | BODY MASS INDEX: 28.09 KG/M2

## 2022-11-18 DIAGNOSIS — M51.36 DDD (DEGENERATIVE DISC DISEASE), LUMBAR: ICD-10-CM

## 2022-11-18 DIAGNOSIS — M54.6 PAIN IN THORACIC SPINE: Primary | ICD-10-CM

## 2022-11-18 DIAGNOSIS — C79.51 METASTATIC CANCER TO SPINE: ICD-10-CM

## 2022-11-18 DIAGNOSIS — C79.51 CARCINOMA OF BREAST METASTATIC TO BONE, UNSPECIFIED LATERALITY: ICD-10-CM

## 2022-11-18 DIAGNOSIS — M51.34 DEGENERATIVE DISC DISEASE, THORACIC: ICD-10-CM

## 2022-11-18 DIAGNOSIS — C50.919 CARCINOMA OF BREAST METASTATIC TO BONE, UNSPECIFIED LATERALITY: ICD-10-CM

## 2022-11-18 PROCEDURE — 99999 PR PBB SHADOW E&M-EST. PATIENT-LVL III: CPT | Mod: PBBFAC,,, | Performed by: NEUROLOGICAL SURGERY

## 2022-11-18 PROCEDURE — 99999 PR PBB SHADOW E&M-EST. PATIENT-LVL III: ICD-10-PCS | Mod: PBBFAC,,, | Performed by: NEUROLOGICAL SURGERY

## 2022-11-18 PROCEDURE — 72070 X-RAY EXAM THORAC SPINE 2VWS: CPT | Mod: TC

## 2022-11-18 PROCEDURE — 3008F PR BODY MASS INDEX (BMI) DOCUMENTED: ICD-10-PCS | Mod: CPTII,S$GLB,, | Performed by: NEUROLOGICAL SURGERY

## 2022-11-18 PROCEDURE — 3008F BODY MASS INDEX DOCD: CPT | Mod: CPTII,S$GLB,, | Performed by: NEUROLOGICAL SURGERY

## 2022-11-18 PROCEDURE — 72100 XR LUMBAR SPINE AP AND LATERAL: ICD-10-PCS | Mod: 26,,, | Performed by: STUDENT IN AN ORGANIZED HEALTH CARE EDUCATION/TRAINING PROGRAM

## 2022-11-18 PROCEDURE — 99213 PR OFFICE/OUTPT VISIT, EST, LEVL III, 20-29 MIN: ICD-10-PCS | Mod: S$GLB,,, | Performed by: NEUROLOGICAL SURGERY

## 2022-11-18 PROCEDURE — 99213 OFFICE O/P EST LOW 20 MIN: CPT | Mod: S$GLB,,, | Performed by: NEUROLOGICAL SURGERY

## 2022-11-18 PROCEDURE — 3078F DIAST BP <80 MM HG: CPT | Mod: CPTII,S$GLB,, | Performed by: NEUROLOGICAL SURGERY

## 2022-11-18 PROCEDURE — 3078F PR MOST RECENT DIASTOLIC BLOOD PRESSURE < 80 MM HG: ICD-10-PCS | Mod: CPTII,S$GLB,, | Performed by: NEUROLOGICAL SURGERY

## 2022-11-18 PROCEDURE — 72100 X-RAY EXAM L-S SPINE 2/3 VWS: CPT | Mod: TC

## 2022-11-18 PROCEDURE — 1159F MED LIST DOCD IN RCRD: CPT | Mod: CPTII,S$GLB,, | Performed by: NEUROLOGICAL SURGERY

## 2022-11-18 PROCEDURE — 3074F SYST BP LT 130 MM HG: CPT | Mod: CPTII,S$GLB,, | Performed by: NEUROLOGICAL SURGERY

## 2022-11-18 PROCEDURE — 72070 X-RAY EXAM THORAC SPINE 2VWS: CPT | Mod: 26,,, | Performed by: STUDENT IN AN ORGANIZED HEALTH CARE EDUCATION/TRAINING PROGRAM

## 2022-11-18 PROCEDURE — 3074F PR MOST RECENT SYSTOLIC BLOOD PRESSURE < 130 MM HG: ICD-10-PCS | Mod: CPTII,S$GLB,, | Performed by: NEUROLOGICAL SURGERY

## 2022-11-18 PROCEDURE — 72100 X-RAY EXAM L-S SPINE 2/3 VWS: CPT | Mod: 26,,, | Performed by: STUDENT IN AN ORGANIZED HEALTH CARE EDUCATION/TRAINING PROGRAM

## 2022-11-18 PROCEDURE — 1159F PR MEDICATION LIST DOCUMENTED IN MEDICAL RECORD: ICD-10-PCS | Mod: CPTII,S$GLB,, | Performed by: NEUROLOGICAL SURGERY

## 2022-11-18 PROCEDURE — 72070 XR THORACIC SPINE AP LATERAL: ICD-10-PCS | Mod: 26,,, | Performed by: STUDENT IN AN ORGANIZED HEALTH CARE EDUCATION/TRAINING PROGRAM

## 2022-11-18 NOTE — PROGRESS NOTES
Subjective:      Patient ID: Carole Moore is a 57 y.o. female.    Chief Complaint: Follow-up (Pt is here today for f/u pt c/o lbp pt states that the pain does not travel rating pain 3/10. Pt states that the pain worsens when she walks, pt denies physical therapy and currently takes Hydrocodone to help ease pain. )      Patient here for follow up with XR     Pain is 3/10   No LE symptoms   No other new issues from prior visit   Recently finished radiation     XR T spine     Frontal lateral views of the thoracic spine demonstrate a grossly unchanged compression deformity of the T10 vertebral body.  There is no further loss of height.  The underlying mass lesion is not well appreciated on this exam.     Impression:     Pathologic compression deformity of T10, grossly unchanged from prior PET CT.  The patient's known osseous metastatic disease is not well appreciated on this exam.        PREVIOUS NOTES    Pt here with MR T L spine   Hx of Breast Ca diagnosed in 2016   Since May has been having progressive lower back pain   MR shows osseous involvement of the thoracic spine     No pain in legs   No weakness   No N/T   Ambulates unassisted   Of note she had prior L CVA with resultant weakness       MR thoracic spine   Multiple metastatic lesions consistent with skeletal metastatic disease involving the T8, T9, T10, T11 and T12 vertebral bodies.  Associated pathologic superior endplate fracture T10.  Associated posterior element involvement at T8 with mild dorsal epidural tumor with mild dorsal sac impingement.  No definite cord compression.  Probable left T7 rib lesion.  See above for detail.    PET      Skeletal: Multiple FDG avid lesions are seen throughout the spine and pelvis as well as the left posterior 7th rib, in keeping with known multifocal osseous metastatic disease.  Index lesions demonstrate SUV max of 12.0 in the right iliac bone, 13.0 in the left posterior elements of T8, and 13.0 in the T11  vertebral body.       Review of Systems   Constitutional:  Negative for activity change, appetite change and chills.   HENT:  Negative for hearing loss, sore throat and tinnitus.    Eyes:  Negative for pain, discharge and itching.   Cardiovascular:  Negative for chest pain.   Gastrointestinal:  Negative for abdominal pain.   Endocrine: Negative for cold intolerance and heat intolerance.   Genitourinary:  Negative for difficulty urinating and dysuria.   Musculoskeletal:  Positive for back pain and gait problem.   Allergic/Immunologic: Negative for environmental allergies.   Neurological:  Positive for weakness. Negative for dizziness, tremors, light-headedness and headaches.   Hematological:  Negative for adenopathy.   Psychiatric/Behavioral:  Negative for agitation, behavioral problems and confusion.        Objective:       Neurosurgery Physical Exam  Ortho/SPM Exam  Ortho Exam    Nursing note and vitals reviewed  Gen:Oriented to person, place, and time.             Appears stated age   Skin: no rashes or lesions identified   Head:Normocephalic and atraumatic.  Nose: Nose normal.    Eyes: EOM are normal. Pupils are equal, round, and reactive to light.   Neck: Neck supple. No masses or lesions palpated  Cardiovascular: Intact distal pulses.    Abdominal: Soft.   Neurological: Alert and oriented to person, place, and time.  No cranial nerve deficit.  Coordination normal. Normal muscle tone  Psychiatric: Normal mood and affect. Behavior is normal.      Back: R side weak from prior stroke        None  Paraspinal muscle spasms   None  Pain with flexion and extention   WNL  Range of motion    Neg  Straight leg raise     Motor   Right Right Left Left  Level Group   5 4 5  L2 Hip flexor (Psoas)   5 4 5  L3 Leg extension (Quads)   5 4 5  L4 Dorsiflexion & foot inversion (Tibialis Anterior)   5 4 5  L5 Great toe extension ( EHL)   5 4 5  S1 Foot eversion (Gastroc, PL & PB)     Sensation  NL Decreased (R/L/BL) Level Sensation     X  L2 Anterio-medial thigh   X  L3 Medial thigh around knee   X  L4 Medial foot   X  L5 Dorsum foot   X  S1 Lateral foot     Reflex  2+  Patellar tendon (L4)   2+  Achilles tendon (S1)         Plan:     DDD (degenerative disc disease), lumbar    Metastatic cancer to spine    Degenerative disc disease, thoracic    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

## 2022-11-29 ENCOUNTER — OFFICE VISIT (OUTPATIENT)
Dept: SURGERY | Facility: CLINIC | Age: 57
End: 2022-11-29
Payer: MEDICARE

## 2022-11-29 VITALS
HEART RATE: 76 BPM | DIASTOLIC BLOOD PRESSURE: 75 MMHG | SYSTOLIC BLOOD PRESSURE: 110 MMHG | TEMPERATURE: 98 F | WEIGHT: 181 LBS | OXYGEN SATURATION: 97 % | HEIGHT: 67 IN | BODY MASS INDEX: 28.41 KG/M2

## 2022-11-29 DIAGNOSIS — Z79.899 ENCOUNTER FOR MONITORING ADJUVANT HORMONAL THERAPY: ICD-10-CM

## 2022-11-29 DIAGNOSIS — Z71.9 HEALTH EDUCATION/COUNSELING: ICD-10-CM

## 2022-11-29 DIAGNOSIS — Z08 ENCOUNTER FOR FOLLOW-UP SURVEILLANCE OF BREAST CANCER: ICD-10-CM

## 2022-11-29 DIAGNOSIS — Z51.81 ENCOUNTER FOR MONITORING ADJUVANT HORMONAL THERAPY: ICD-10-CM

## 2022-11-29 DIAGNOSIS — C79.51 SECONDARY CANCER OF BONE: ICD-10-CM

## 2022-11-29 DIAGNOSIS — Z17.0 MALIGNANT NEOPLASM OF CENTRAL PORTION OF RIGHT BREAST IN FEMALE, ESTROGEN RECEPTOR POSITIVE: Primary | ICD-10-CM

## 2022-11-29 DIAGNOSIS — Z71.89 COUNSELING AND COORDINATION OF CARE: ICD-10-CM

## 2022-11-29 DIAGNOSIS — Z12.39 ENCOUNTER FOR BREAST CANCER SCREENING USING NON-MAMMOGRAM MODALITY: ICD-10-CM

## 2022-11-29 DIAGNOSIS — C50.111 MALIGNANT NEOPLASM OF CENTRAL PORTION OF RIGHT BREAST IN FEMALE, ESTROGEN RECEPTOR POSITIVE: Primary | ICD-10-CM

## 2022-11-29 DIAGNOSIS — Z79.811 USE OF AROMATASE INHIBITORS: ICD-10-CM

## 2022-11-29 DIAGNOSIS — Z85.3 ENCOUNTER FOR FOLLOW-UP SURVEILLANCE OF BREAST CANCER: ICD-10-CM

## 2022-11-29 DIAGNOSIS — Z71.89 COUNSELING ON HEALTH PROMOTION AND DISEASE PREVENTION: ICD-10-CM

## 2022-11-29 PROCEDURE — 99214 OFFICE O/P EST MOD 30 MIN: CPT | Mod: S$GLB,,, | Performed by: NURSE PRACTITIONER

## 2022-11-29 PROCEDURE — 3074F PR MOST RECENT SYSTOLIC BLOOD PRESSURE < 130 MM HG: ICD-10-PCS | Mod: CPTII,S$GLB,, | Performed by: NURSE PRACTITIONER

## 2022-11-29 PROCEDURE — 3078F DIAST BP <80 MM HG: CPT | Mod: CPTII,S$GLB,, | Performed by: NURSE PRACTITIONER

## 2022-11-29 PROCEDURE — 1160F PR REVIEW ALL MEDS BY PRESCRIBER/CLIN PHARMACIST DOCUMENTED: ICD-10-PCS | Mod: CPTII,S$GLB,, | Performed by: NURSE PRACTITIONER

## 2022-11-29 PROCEDURE — 99214 PR OFFICE/OUTPT VISIT, EST, LEVL IV, 30-39 MIN: ICD-10-PCS | Mod: S$GLB,,, | Performed by: NURSE PRACTITIONER

## 2022-11-29 PROCEDURE — 3008F PR BODY MASS INDEX (BMI) DOCUMENTED: ICD-10-PCS | Mod: CPTII,S$GLB,, | Performed by: NURSE PRACTITIONER

## 2022-11-29 PROCEDURE — 99999 PR PBB SHADOW E&M-EST. PATIENT-LVL III: CPT | Mod: PBBFAC,,, | Performed by: NURSE PRACTITIONER

## 2022-11-29 PROCEDURE — 99999 PR PBB SHADOW E&M-EST. PATIENT-LVL III: ICD-10-PCS | Mod: PBBFAC,,, | Performed by: NURSE PRACTITIONER

## 2022-11-29 PROCEDURE — 3008F BODY MASS INDEX DOCD: CPT | Mod: CPTII,S$GLB,, | Performed by: NURSE PRACTITIONER

## 2022-11-29 PROCEDURE — 3078F PR MOST RECENT DIASTOLIC BLOOD PRESSURE < 80 MM HG: ICD-10-PCS | Mod: CPTII,S$GLB,, | Performed by: NURSE PRACTITIONER

## 2022-11-29 PROCEDURE — 1159F MED LIST DOCD IN RCRD: CPT | Mod: CPTII,S$GLB,, | Performed by: NURSE PRACTITIONER

## 2022-11-29 PROCEDURE — 1160F RVW MEDS BY RX/DR IN RCRD: CPT | Mod: CPTII,S$GLB,, | Performed by: NURSE PRACTITIONER

## 2022-11-29 PROCEDURE — 1159F PR MEDICATION LIST DOCUMENTED IN MEDICAL RECORD: ICD-10-PCS | Mod: CPTII,S$GLB,, | Performed by: NURSE PRACTITIONER

## 2022-11-29 PROCEDURE — 3074F SYST BP LT 130 MM HG: CPT | Mod: CPTII,S$GLB,, | Performed by: NURSE PRACTITIONER

## 2022-11-29 PROCEDURE — 99499 UNLISTED E&M SERVICE: CPT | Mod: HCNC,S$GLB,, | Performed by: NURSE PRACTITIONER

## 2022-12-02 NOTE — PATIENT INSTRUCTIONS
Discharge    Patient discharged to home via wheelchair with family  Care plan note - tele/IV removed - discharged on O2 - A/Ox4     Listed belongings gathered and given to patient (including from security/pharmacy). Yes  Care Plan and Patient education resolved: Yes  Prescriptions if needed, hard copies sent with patient  Yes  Medication Bin checked and emptied on discharge Yes  SW/care coordinator/charge RN aware of discharge: Yes   New Skin Care Regimen  Soap: Dove sensitive skin bar or liquid  Moisturizer: ceraVe or cetaphil cream  Detergent: Tide Free, All Free, or Cheer Free   Fabric softener: do not use  Colognes/Perfumes/Fragrances: do not use  Bathing: shower or bathe with lukewarm water < 10 minutes    XEROSIS (DRY SKIN)      1. Definition    Xerosis is the term for dry skin.  We all have a natural oil coating over our skin produced by the skin oil glands.  If this oil is removed, the skin becomes dry which can lead to cracking, which can lead to inflammation.  Xerosis is usually a long-term problem that recurs often, especially in the winter.    2. Cause     Long hot baths or showers can remove our natural oil and lead to xerosis.  One should never take more than one bath or shower a day and for no longer than ten minutes.   Use of harsh soaps such as Zest, Dial, Thai Spring, Lever and Ivory can worsen and cause xerosis.   Cold winter weather worsens xerosis because the amount of moisture contained in cold air is much less than the amount of moisture in warm air.    3. Treatment     Treatment is intended to restore the natural oil to your skin.  Keep the skin lubricated.     Do not take more than one bath or shower a day.  Use lukewarm water, not hot.  Hot water dries out the skin.     Use a gentle moisturizing soap such as Cetaphil soap, Dove, or Cetaphil Restoraderm cleanser.     When toweling dry, dont rub.  Blot the skin so there is still some water left on the skin.  You should apply a moisturizing cream to all of the skin such as Cerave cream, Cetaphil cream, Restoraderm or Eucerin Original Formula cream.   Alpha hydroxyacid lotions, i.e., AmLactin, also work very well for preventing dry skin, but may burn when used on inflamed or reddened skin.      [facility]   DERMATOLOGY  [address] [facility] [address] [place] [address]    Dept: [phone]   Dept Fax: [phone]

## 2022-12-05 ENCOUNTER — SPECIALTY PHARMACY (OUTPATIENT)
Dept: PHARMACY | Facility: CLINIC | Age: 57
End: 2022-12-05
Payer: MEDICARE

## 2022-12-05 NOTE — TELEPHONE ENCOUNTER
Specialty Pharmacy - Refill Coordination    Specialty Medication Orders Linked to Encounter      Flowsheet Row Most Recent Value   Medication #1 palbociclib 125 mg Tab (Order#[id number], Rx#[id number])            Refill Questions - Documented Responses      Flowsheet Row Most Recent Value   Patient Availability and HIPAA Verification    Does patient want to proceed with activity? Yes   HIPAA/medical authority confirmed? Yes   Relationship to patient of person spoken to? Self   Refill Screening Questions    Changes to allergies? No   Changes to medications? No   New conditions since last clinic visit? No   Unplanned office visit, urgent care, ED, or hospital admission in the last 4 weeks? No   How does patient/caregiver feel medication is working? Good   Financial problems or insurance changes? No   How many doses of your specialty medications were missed in the last 4 weeks? 0   Would patient like to speak to a pharmacist? No   When does the patient need to receive the medication? 12/12/22   Refill Delivery Questions    How will the patient receive the medication? [facility]   When does the patient need to receive the medication? 12/12/22   Shipping Address Home   Address in [facility] confirmed and updated if neccessary? Yes   Expected Copay ($) 0   Is the patient able to afford the medication copay? Yes   Payment Method zero copay   Days supply of Refill 28   Supplies needed? No supplies needed   Refill activity completed? Yes   Refill activity plan Refill scheduled   Shipment/Pickup Date: 12/07/22            Current Outpatient Medications   Medication Sig    anastrozole (ARIMIDEX) 1 mg Tab Take 1 tablet (1 mg total) by mouth once daily.    aspirin 81 MG Chew Take 81 mg by mouth once daily.    atorvastatin (LIPITOR) 10 MG tablet Take 1 tablet (10 mg total) by mouth once daily.    baclofen (LIORESAL) 10 MG tablet Take 1 tablet (10 mg total) by mouth 3 (three) times daily. for 10 days    celecoxib (CELEBREX) 200  MG capsule Take 1 capsule (200 mg total) by mouth once daily.    cetirizine (ZYRTEC) 10 MG tablet Take 10 mg by mouth once daily.    HYDROcodone-acetaminophen (NORCO) 5-325 mg per tablet Take by mouth.    LORATADINE (CLARITIN ORAL) Take by mouth daily as needed.     meclizine (ANTIVERT) 25 mg tablet Take 25 mg by mouth 3 (three) times daily as needed.    ondansetron (ZOFRAN) 4 MG tablet Take 1 tablet (4 mg total) by mouth every 8 (eight) hours as needed for Nausea.    oxyCODONE-acetaminophen (PERCOCET) 5-325 mg per tablet Take 1 tablet by mouth every 4 (four) hours as needed for Pain.    palbociclib 125 mg Tab Take 125 mg by mouth As instructed. Take 1 tablet (125 mg) on days 1-21 of each 28 day cycle. Take with food.    polyethylene glycol (GLYCOLAX) 17 gram/dose powder Take 17 g by mouth once daily.    prochlorperazine (COMPAZINE) 5 MG tablet Take 1 tablet (5 mg total) by mouth 4 (four) times daily as needed for Nausea.   Last reviewed on 11/29/2022  9:48 AM by Bianca Mendoza NP    Review of patient's allergies indicates:  No Known Allergies Last reviewed on  11/29/2022 9:01 AM by Soumya Sunshine      Tasks added this encounter   1/2/2023 - Refill Call (Auto Added)   Tasks due within next 3 months   No tasks due.     Olivia Guzman [facility]  [address] [place] [id number]  Phone: [phone]  Fax: [phone]

## 2022-12-14 ENCOUNTER — LAB VISIT (OUTPATIENT)
Dept: LAB | Facility: HOSPITAL | Age: 57
End: 2022-12-14
Attending: INTERNAL MEDICINE
Payer: MEDICARE

## 2022-12-14 DIAGNOSIS — C50.919 CARCINOMA OF BREAST METASTATIC TO BONE, UNSPECIFIED LATERALITY: ICD-10-CM

## 2022-12-14 DIAGNOSIS — C79.51 CARCINOMA OF BREAST METASTATIC TO BONE, UNSPECIFIED LATERALITY: ICD-10-CM

## 2022-12-14 LAB
ALBUMIN SERPL BCP-MCNC: 3.6 G/DL (ref 3.5–5.2)
ALP SERPL-CCNC: 90 U/L (ref 55–135)
ALT SERPL W/O P-5'-P-CCNC: 33 U/L (ref 10–44)
ANION GAP SERPL CALC-SCNC: 8 MMOL/L (ref 8–16)
AST SERPL-CCNC: 29 U/L (ref 10–40)
BASOPHILS # BLD AUTO: 0.07 K/UL (ref 0–0.2)
BASOPHILS NFR BLD: 3.2 % (ref 0–1.9)
BILIRUB SERPL-MCNC: 0.9 MG/DL (ref 0.1–1)
BUN SERPL-MCNC: 15 MG/DL (ref 6–20)
CALCIUM SERPL-MCNC: 8.9 MG/DL (ref 8.7–10.5)
CHLORIDE SERPL-SCNC: 107 MMOL/L (ref 95–110)
CO2 SERPL-SCNC: 25 MMOL/L (ref 23–29)
CREAT SERPL-MCNC: 0.7 MG/DL (ref 0.5–1.4)
DIFFERENTIAL METHOD: ABNORMAL
EOSINOPHIL # BLD AUTO: 0.1 K/UL (ref 0–0.5)
EOSINOPHIL NFR BLD: 3.2 % (ref 0–8)
ERYTHROCYTE [DISTWIDTH] IN BLOOD BY AUTOMATED COUNT: 14.6 % (ref 11.5–14.5)
EST. GFR  (NO RACE VARIABLE): >60 ML/MIN/1.73 M^2
GLUCOSE SERPL-MCNC: 94 MG/DL (ref 70–110)
HCT VFR BLD AUTO: 36.9 % (ref 37–48.5)
HGB BLD-MCNC: 12.4 G/DL (ref 12–16)
IMM GRANULOCYTES # BLD AUTO: 0.01 K/UL (ref 0–0.04)
IMM GRANULOCYTES NFR BLD AUTO: 0.5 % (ref 0–0.5)
LYMPHOCYTES # BLD AUTO: 0.7 K/UL (ref 1–4.8)
LYMPHOCYTES NFR BLD: 30.6 % (ref 18–48)
MCH RBC QN AUTO: 31.9 PG (ref 27–31)
MCHC RBC AUTO-ENTMCNC: 33.6 G/DL (ref 32–36)
MCV RBC AUTO: 95 FL (ref 82–98)
MONOCYTES # BLD AUTO: 0.5 K/UL (ref 0.3–1)
MONOCYTES NFR BLD: 24.5 % (ref 4–15)
NEUTROPHILS # BLD AUTO: 0.8 K/UL (ref 1.8–7.7)
NEUTROPHILS NFR BLD: 38 % (ref 38–73)
NRBC BLD-RTO: 0 /100 WBC
PLATELET # BLD AUTO: 269 K/UL (ref 150–450)
PMV BLD AUTO: 9.2 FL (ref 9.2–12.9)
POTASSIUM SERPL-SCNC: 4.5 MMOL/L (ref 3.5–5.1)
PROT SERPL-MCNC: 6.7 G/DL (ref 6–8.4)
RBC # BLD AUTO: 3.89 M/UL (ref 4–5.4)
SODIUM SERPL-SCNC: 140 MMOL/L (ref 136–145)
WBC # BLD AUTO: 2.16 K/UL (ref 3.9–12.7)

## 2022-12-14 PROCEDURE — 80053 COMPREHEN METABOLIC PANEL: CPT | Performed by: INTERNAL MEDICINE

## 2022-12-14 PROCEDURE — 36415 COLL VENOUS BLD VENIPUNCTURE: CPT | Mod: PO | Performed by: INTERNAL MEDICINE

## 2022-12-14 PROCEDURE — 85025 COMPLETE CBC W/AUTO DIFF WBC: CPT | Performed by: INTERNAL MEDICINE

## 2022-12-15 ENCOUNTER — PATIENT MESSAGE (OUTPATIENT)
Dept: HEMATOLOGY/ONCOLOGY | Facility: CLINIC | Age: 57
End: 2022-12-15

## 2022-12-15 ENCOUNTER — OFFICE VISIT (OUTPATIENT)
Dept: HEMATOLOGY/ONCOLOGY | Facility: CLINIC | Age: 57
End: 2022-12-15
Payer: MEDICARE

## 2022-12-15 ENCOUNTER — INFUSION (OUTPATIENT)
Dept: INFUSION THERAPY | Facility: HOSPITAL | Age: 57
End: 2022-12-15
Attending: INTERNAL MEDICINE
Payer: MEDICARE

## 2022-12-15 ENCOUNTER — TELEPHONE (OUTPATIENT)
Dept: HEMATOLOGY/ONCOLOGY | Facility: CLINIC | Age: 57
End: 2022-12-15
Payer: MEDICARE

## 2022-12-15 ENCOUNTER — TELEPHONE (OUTPATIENT)
Dept: GENETICS | Facility: CLINIC | Age: 57
End: 2022-12-15
Payer: MEDICARE

## 2022-12-15 VITALS
DIASTOLIC BLOOD PRESSURE: 55 MMHG | BODY MASS INDEX: 28.2 KG/M2 | HEIGHT: 67 IN | OXYGEN SATURATION: 97 % | SYSTOLIC BLOOD PRESSURE: 99 MMHG | TEMPERATURE: 98 F | WEIGHT: 179.69 LBS | HEART RATE: 85 BPM | DIASTOLIC BLOOD PRESSURE: 69 MMHG | SYSTOLIC BLOOD PRESSURE: 118 MMHG | RESPIRATION RATE: 18 BRPM | OXYGEN SATURATION: 97 % | HEART RATE: 91 BPM | TEMPERATURE: 98 F

## 2022-12-15 DIAGNOSIS — T45.1X5A IMMUNODEFICIENCY DUE TO CHEMOTHERAPY: ICD-10-CM

## 2022-12-15 DIAGNOSIS — C50.111 MALIGNANT NEOPLASM OF CENTRAL PORTION OF RIGHT BREAST IN FEMALE, ESTROGEN RECEPTOR POSITIVE: Primary | ICD-10-CM

## 2022-12-15 DIAGNOSIS — Z17.0 MALIGNANT NEOPLASM OF CENTRAL PORTION OF RIGHT BREAST IN FEMALE, ESTROGEN RECEPTOR POSITIVE: Primary | ICD-10-CM

## 2022-12-15 DIAGNOSIS — D84.822 IMMUNODEFICIENCY SECONDARY TO RADIATION THERAPY: ICD-10-CM

## 2022-12-15 DIAGNOSIS — Z86.73 HISTORY OF CVA (CEREBROVASCULAR ACCIDENT): ICD-10-CM

## 2022-12-15 DIAGNOSIS — Z80.0 FAMILY HISTORY OF COLON CANCER: ICD-10-CM

## 2022-12-15 DIAGNOSIS — D61.810 PANCYTOPENIA DUE TO ANTINEOPLASTIC CHEMOTHERAPY: ICD-10-CM
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
## 2022-12-15 DIAGNOSIS — T45.1X5A PANCYTOPENIA DUE TO ANTINEOPLASTIC CHEMOTHERAPY: ICD-10-CM

## 2022-12-15 DIAGNOSIS — C79.51 SECONDARY CANCER OF BONE: ICD-10-CM

## 2022-12-15 DIAGNOSIS — Z79.899 IMMUNODEFICIENCY DUE TO CHEMOTHERAPY: ICD-10-CM

## 2022-12-15 DIAGNOSIS — D84.821 IMMUNODEFICIENCY DUE TO CHEMOTHERAPY: ICD-10-CM

## 2022-12-15 DIAGNOSIS — I69.351 HEMIPARESIS AFFECTING RIGHT SIDE AS LATE EFFECT OF CEREBROVASCULAR ACCIDENT: ICD-10-CM

## 2022-12-15 DIAGNOSIS — Z79.811 USE OF AROMATASE INHIBITORS: ICD-10-CM

## 2022-12-15 PROCEDURE — 3008F BODY MASS INDEX DOCD: CPT | Mod: CPTII,S$GLB,, | Performed by: INTERNAL MEDICINE

## 2022-12-15 PROCEDURE — 63600175 PHARM REV CODE 636 W HCPCS: Mod: JG | Performed by: INTERNAL MEDICINE

## 2022-12-15 PROCEDURE — 3074F PR MOST RECENT SYSTOLIC BLOOD PRESSURE < 130 MM HG: ICD-10-PCS | Mod: CPTII,S$GLB,, | Performed by: INTERNAL MEDICINE

## 2022-12-15 PROCEDURE — 99215 OFFICE O/P EST HI 40 MIN: CPT | Mod: 25,S$GLB,, | Performed by: INTERNAL MEDICINE

## 2022-12-15 PROCEDURE — 99999 PR PBB SHADOW E&M-EST. PATIENT-LVL III: ICD-10-PCS | Mod: PBBFAC,,, | Performed by: INTERNAL MEDICINE

## 2022-12-15 PROCEDURE — 3008F PR BODY MASS INDEX (BMI) DOCUMENTED: ICD-10-PCS | Mod: CPTII,S$GLB,, | Performed by: INTERNAL MEDICINE

## 2022-12-15 PROCEDURE — 99999 PR PBB SHADOW E&M-EST. PATIENT-LVL III: CPT | Mod: PBBFAC,,, | Performed by: INTERNAL MEDICINE

## 2022-12-15 PROCEDURE — 99215 PR OFFICE/OUTPT VISIT, EST, LEVL V, 40-54 MIN: ICD-10-PCS | Mod: 25,S$GLB,, | Performed by: INTERNAL MEDICINE

## 2022-12-15 PROCEDURE — 3074F SYST BP LT 130 MM HG: CPT | Mod: CPTII,S$GLB,, | Performed by: INTERNAL MEDICINE

## 2022-12-15 PROCEDURE — 3078F DIAST BP <80 MM HG: CPT | Mod: CPTII,S$GLB,, | Performed by: INTERNAL MEDICINE

## 2022-12-15 PROCEDURE — 96402 CHEMO HORMON ANTINEOPL SQ/IM: CPT

## 2022-12-15 PROCEDURE — 3078F PR MOST RECENT DIASTOLIC BLOOD PRESSURE < 80 MM HG: ICD-10-PCS | Mod: CPTII,S$GLB,, | Performed by: INTERNAL MEDICINE

## 2022-12-15 RX ORDER — LAMOTRIGINE 25 MG/1
500 TABLET ORAL
Status: COMPLETED | OUTPATIENT
Start: 2022-12-15 | End: 2022-12-15

## 2022-12-15 RX ORDER — LAMOTRIGINE 25 MG/1
500 TABLET ORAL
Status: CANCELLED | OUTPATIENT
Start: 2022-12-15

## 2022-12-15 RX ADMIN — FULVESTRANT 500 MG: 50 INJECTION, SOLUTION INTRAMUSCULAR at 12:12

## 2022-12-15 NOTE — PLAN OF CARE
Problem: Adult Inpatient Plan of Care  Goal: Plan of Care Review  Description: Patient likes both injections at same time and cup of water  12/15/2022 1440 by Sally Pressley RN  Outcome: Ongoing, Progressing  12/15/2022 1440 by Sally Pressley RN  Outcome: Ongoing, Progressing  Goal: Patient-Specific Goal (Individualized)  Description: Patient tolerated injections well today with no adverse reactions.   12/15/2022 1440 by Sally Pressley RN  Outcome: Ongoing, Progressing  12/15/2022 1440 by Sally Pressley RN  Outcome: Ongoing, Progressing  Goal: Optimal Comfort and Wellbeing  Outcome: Ongoing, Progressing  Intervention: Provide Person-Centered Care  Flowsheets (Taken 12/15/2022 1440)  Trust Relationship/Rapport:   care explained   reassurance provided   choices provided   thoughts/feelings acknowledged   emotional support provided   empathic listening provided   questions answered   questions encouraged

## 2022-12-15 NOTE — TELEPHONE ENCOUNTER
Pt. Contacted about the lab order that was put in today by Dr. Khan, labs scheduled at [facility] on 11/22/22 @ 10:40

## 2022-12-15 NOTE — PROGRESS NOTES
Subjective:       Patient ID: Carole Moore is a 57 y.o. female.    Chief Complaint: Results, Breast Cancer, and Chemotherapy    HPI:  57-year-old female history of metastatic breast carcinoma continuing on Ibrance and Fast low dex.  Patient returns with repeat CBC today for next dosing of basilar dex.  ECOG status 2    Past Medical History:   Diagnosis Date    Antineoplastic chemotherapy induced anemia     Breast cancer 2016    right breast    Cancer     R Breast cancer    CVA (cerebral infarction)     Diverticulosis     Family history of colon cancer 10/30/2018    Her mother and father both had colon cancer.   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    Nausea after anesthesia     Paralysis     right side    PONV (postoperative nausea and vomiting)     Stroke     R side weakness    Trouble in sleeping      Family History   Problem Relation Age of Onset    Breast cancer Paternal Aunt     Colon cancer Father     Colon cancer Mother     Melanoma Maternal Uncle     Skin cancer Maternal Uncle      Social History     Socioeconomic History    Marital status:    Tobacco Use    Smoking status: Never    Smokeless tobacco: Never   Substance and Sexual Activity    Alcohol use: No    Drug use: No    Sexual activity: Not Currently     Birth control/protection: Surgical   Other Topics Concern    Are you pregnant or think you may be? No    Breast-feeding No     Social Determinants of Health     Financial Resource Strain: Low Risk     Difficulty of Paying Living Expenses: Not hard at all   Food Insecurity: No Food Insecurity    Worried About Running Out of Food in the Last Year: Never true    Ran Out of Food in the Last Year: Never true   Transportation Needs: No Transportation Needs    Lack of Transportation (Medical): No    Lack of Transportation (Non-Medical): No   Physical Activity: Inactive    Days of Exercise per Week: 0 days    Minutes of Exercise  per Session: 0 min   Stress: No Stress Concern Present    Feeling of Stress : Only a little   Social Connections: Unknown    Frequency of Communication with Friends and Family: More than three times a week    Frequency of Social Gatherings with Friends and Family: More than three times a week    Active Member of Clubs or Organizations: No    Attends Club or Organization Meetings: Never    Marital Status:    Housing Stability: Low Risk     Unable to Pay for Housing in the Last Year: No    Number of Places Lived in the Last Year: 1    Unstable Housing in the Last Year: No     Past Surgical History:   Procedure Laterality Date    AUGMENTATION OF BREAST Left 2017    BREAST SURGERY      COLONOSCOPY N/A 10/30/2018    Procedure: COLONOSCOPY;  Surgeon: Cosme Monson MD;  Location: [facility];  Service: Endoscopy;  Laterality: N/A;    CYSTOSCOPY WITH BIOPSY OF BLADDER N/A 11/29/2021    Procedure: CYSTOSCOPY, WITH BLADDER BIOPSY, WITH FULGURATION IF INDICATED;  Surgeon: Page Marcelo MD;  Location: [facility] OR;  Service: Urology;  Laterality: N/A;    HYSTERECTOMY  2007    maintains both ovaries, menorrhagia    MASTECTOMY Right 2017    MEDIPORT INSERTION, SINGLE      TONSILLECTOMY         Labs:  Lab Results   Component Value Date    WBC 2.16 (L) 12/14/2022    HGB 12.4 12/14/2022    HCT 36.9 (L) 12/14/2022    MCV 95 12/14/2022     12/14/2022     BMP  Lab Results   Component Value Date     12/14/2022    K 4.5 12/14/2022     12/14/2022    CO2 25 12/14/2022    BUN 15 12/14/2022    CREATININE 0.7 12/14/2022    CALCIUM 8.9 12/14/2022    ANIONGAP 8 12/14/2022    ESTGFRAFRICA >60 05/12/2022    EGFRNONAA >60 05/12/2022     Lab Results   Component Value Date    ALT 33 12/14/2022    AST 29 12/14/2022    ALKPHOS 90 12/14/2022    BILITOT 0.9 12/14/2022       No results found for: IRON, TIBC, FERRITIN, SATURATEDIRO  No results found for: [id number]  No results found for: FOLATE  Lab Results    Component Value Date    TSH 0.793 10/29/2021         Review of Systems   Constitutional:  Positive for activity change, appetite change and fatigue. Negative for chills, diaphoresis, fever and unexpected weight change.   HENT:  Negative for congestion, dental problem, drooling, ear discharge, ear pain, facial swelling, hearing loss, mouth sores, nosebleeds, postnasal drip, rhinorrhea, sinus pressure, sneezing, sore throat, tinnitus, trouble swallowing and voice change.    Eyes:  Negative for photophobia, pain, discharge, redness, itching and visual disturbance.   Respiratory:  Negative for cough, choking, chest tightness, shortness of breath, wheezing and stridor.    Cardiovascular:  Negative for chest pain, palpitations and leg swelling.   Gastrointestinal:  Negative for abdominal distention, abdominal pain, anal bleeding, blood in stool, constipation, diarrhea, nausea, rectal pain and vomiting.   Endocrine: Negative for cold intolerance, heat intolerance, polydipsia, polyphagia and polyuria.   Genitourinary:  Negative for decreased urine volume, difficulty urinating, dyspareunia, dysuria, enuresis, flank pain, frequency, genital sores, hematuria, menstrual problem, pelvic pain, urgency, vaginal bleeding, vaginal discharge and vaginal pain.   Musculoskeletal:  Positive for arthralgias, gait problem and myalgias. Negative for back pain, joint swelling, neck pain and neck stiffness.   Skin:  Negative for color change, pallor and rash.   Allergic/Immunologic: Negative for environmental allergies, food allergies and immunocompromised state.   Neurological:  Positive for weakness. Negative for dizziness, tremors, seizures, syncope, facial asymmetry, speech difficulty, light-headedness, numbness and headaches.   Hematological:  Negative for adenopathy. Does not bruise/bleed easily.   Psychiatric/Behavioral:  Positive for dysphoric mood. Negative for agitation, behavioral problems, confusion, decreased concentration,  hallucinations, self-injury, sleep disturbance and suicidal ideas. The patient is nervous/anxious. The patient is not hyperactive.      Objective:      Physical Exam  Vitals reviewed.   Constitutional:       General: She is not in acute distress.     Appearance: She is well-developed. She is obese. She is ill-appearing. She is not diaphoretic.   HENT:      Head: Normocephalic and atraumatic.      Right Ear: External ear normal.      Left Ear: External ear normal.      Nose: Nose normal.      Right Sinus: No maxillary sinus tenderness or frontal sinus tenderness.      Left Sinus: No maxillary sinus tenderness or frontal sinus tenderness.      Mouth/Throat:      Pharynx: No oropharyngeal exudate.   Eyes:      General: Lids are normal. No scleral icterus.        Right eye: No discharge.         Left eye: No discharge.      Conjunctiva/sclera: Conjunctivae normal.      Right eye: Right conjunctiva is not injected. No hemorrhage.     Left eye: Left conjunctiva is not injected. No hemorrhage.     Pupils: Pupils are equal, round, and reactive to light.   Neck:      Thyroid: No thyromegaly.      Vascular: No JVD.      Trachea: No tracheal deviation.   Cardiovascular:      Rate and Rhythm: Normal rate.   Pulmonary:      Effort: Pulmonary effort is normal. No respiratory distress.      Breath sounds: No stridor.   Chest:      Chest wall: No tenderness.   Abdominal:      General: Bowel sounds are normal. There is no distension.      Palpations: Abdomen is soft. There is no hepatomegaly, splenomegaly or mass.      Tenderness: There is no abdominal tenderness. There is no rebound.   Musculoskeletal:         General: No tenderness. Normal range of motion.      Cervical back: Normal range of motion and neck supple.   Lymphadenopathy:      Cervical: No cervical adenopathy.      Upper Body:      Right upper body: No supraclavicular adenopathy.      Left upper body: No supraclavicular adenopathy.   Skin:     General: Skin is dry.       Findings: No erythema or rash.   Neurological:      Mental Status: She is alert and oriented to person, place, and time.      Cranial Nerves: No cranial nerve deficit.      Motor: Weakness present.      Coordination: Coordination abnormal.      Gait: Gait abnormal.   Psychiatric:         Behavior: Behavior normal.         Thought Content: Thought content normal.         Judgment: Judgment normal.           Assessment:      1. Malignant neoplasm of central portion of right breast in female, estrogen receptor positive    2. Immunodeficiency due to chemotherapy    3. Family history of colon cancer    4. Secondary cancer of bone    5. History of CVA (cerebrovascular accident)    6. Hemiparesis affecting right side as late effect of cerebrovascular accident    7. Use of aromatase inhibitors    8. Immunodeficiency secondary to radiation therapy     Pancytopenia chemotherapy      Plan:     Extensive conversation ANC of 0.8 hold Ibrance today.  Patient will return in 1 week repeat CBC can be done at local clinic in prior Advil.  Otherwise return in 5 weeks for next dosing of with CBC CMP for next dosing of basilar dex after today's dosing along with PET scan for my review.  Will also want to make sure that patient can be seen and evaluated by Medical Genetics for evaluation of germ line testing strong family history of colon carcinoma and breast carcinoma.  Discussed implications and answered questions.    Med Onc Chart Routing      Follow up with physician 4 weeks.   Follow up with [facility]    Infusion scheduling note    Injection scheduling note Fast low dex today and again in 4-5 weeks in follow-up   Labs CBC and CMP   Lab interval:  CBC in 1 week and follow-up in 5 weeks with repeat CBC CMP and PET scan   Imaging PET scan      Pharmacy appointment    Other referrals           Harpal Khan Jr, MD FACP

## 2022-12-16 ENCOUNTER — OFFICE VISIT (OUTPATIENT)
Dept: RADIATION ONCOLOGY | Facility: CLINIC | Age: 57
End: 2022-12-16
Payer: MEDICARE

## 2022-12-16 VITALS
DIASTOLIC BLOOD PRESSURE: 56 MMHG | HEIGHT: 67 IN | WEIGHT: 180.38 LBS | RESPIRATION RATE: 18 BRPM | SYSTOLIC BLOOD PRESSURE: 100 MMHG | TEMPERATURE: 98 F | HEART RATE: 81 BPM | BODY MASS INDEX: 28.31 KG/M2 | OXYGEN SATURATION: 98 %

## 2022-12-16 DIAGNOSIS — Z17.0 MALIGNANT NEOPLASM OF CENTRAL PORTION OF RIGHT BREAST IN FEMALE, ESTROGEN RECEPTOR POSITIVE: ICD-10-CM

## 2022-12-16 DIAGNOSIS — C50.919 CARCINOMA OF BREAST METASTATIC TO BONE, UNSPECIFIED LATERALITY: Primary | ICD-10-CM

## 2022-12-16 DIAGNOSIS — C79.51 CARCINOMA OF BREAST METASTATIC TO BONE, UNSPECIFIED LATERALITY: Primary | ICD-10-CM

## 2022-12-16 DIAGNOSIS — C50.111 MALIGNANT NEOPLASM OF CENTRAL PORTION OF RIGHT BREAST IN FEMALE, ESTROGEN RECEPTOR POSITIVE: ICD-10-CM

## 2022-12-16 PROCEDURE — 99213 OFFICE O/P EST LOW 20 MIN: CPT | Mod: S$GLB,,, | Performed by: RADIOLOGY

## 2022-12-16 PROCEDURE — 3078F PR MOST RECENT DIASTOLIC BLOOD PRESSURE < 80 MM HG: ICD-10-PCS | Mod: CPTII,S$GLB,, | Performed by: RADIOLOGY

## 2022-12-16 PROCEDURE — 3078F DIAST BP <80 MM HG: CPT | Mod: CPTII,S$GLB,, | Performed by: RADIOLOGY

## 2022-12-16 PROCEDURE — 3074F SYST BP LT 130 MM HG: CPT | Mod: CPTII,S$GLB,, | Performed by: RADIOLOGY

## 2022-12-16 PROCEDURE — 3074F PR MOST RECENT SYSTOLIC BLOOD PRESSURE < 130 MM HG: ICD-10-PCS | Mod: CPTII,S$GLB,, | Performed by: RADIOLOGY

## 2022-12-16 PROCEDURE — 99999 PR PBB SHADOW E&M-EST. PATIENT-LVL III: ICD-10-PCS | Mod: PBBFAC,,, | Performed by: RADIOLOGY

## 2022-12-16 PROCEDURE — 99213 PR OFFICE/OUTPT VISIT, EST, LEVL III, 20-29 MIN: ICD-10-PCS | Mod: S$GLB,,, | Performed by: RADIOLOGY

## 2022-12-16 PROCEDURE — 3008F BODY MASS INDEX DOCD: CPT | Mod: CPTII,S$GLB,, | Performed by: RADIOLOGY

## 2022-12-16 PROCEDURE — 3008F PR BODY MASS INDEX (BMI) DOCUMENTED: ICD-10-PCS | Mod: CPTII,S$GLB,, | Performed by: RADIOLOGY

## 2022-12-16 PROCEDURE — 99999 PR PBB SHADOW E&M-EST. PATIENT-LVL III: CPT | Mod: PBBFAC,,, | Performed by: RADIOLOGY

## 2022-12-16 NOTE — PROGRESS NOTES
"[facility]  RADIATION ONCOLOGY FOLLOW UP    Name: Carole Moore : 1965     DIAGNOSIS: metastatic breast cancer, stage IV    TREATMENT HISTORY:   ddACT, R mastectomy, adjuvant radiation completed 2017  Arimidex  30Gy/10fx to T7-11 completed 10/31/22    INTERVAL HISTORY: Carole Moore is a 57 y.o. female who presents today for follow-up.  This is their first follow up since completing radiation. During treatment, completed 30Gy/10fx from T7-T11 without interruption. Palbociclib was held prior to during treatment to reduce risk of esophagitis. Imaging setup was good throughout. Some improvement of pain before finishing. Some mild odynophagia but not warranting medications.   Counseled that esophagitis could worsen and to call if any problems.     Since then, improved pain in back now down to 2-3/10  Today, no new/woresning neuro deficits  Never any esophagitis  Ibrance held for low WBCs  No other sites of bony pain    PHYSICAL EXAM:   Constitutional: well appearing, no acute distress, ECOG 0 - Fully Active  Vitals:    BP (!) 100/56   Pulse 81   Temp 97.6 °F (36.4 °C)   Resp 18   Ht 5' 7" (1.702 m)   Wt 81.8 kg (180 lb 6.4 oz)   LMP 10/11/2009   SpO2 98%   BMI 28.25 kg/m²   Eyes: sclera anicteric, EOMI, pupils equal, round and reactive to light  ENT: oral cavity without lesions, moist mucous membranes  Neck: trachea midline, neck supple  Lymphatic: no cervical, supraclavicular or axillary adenopathy  Cardiovascular: regular rate, no edema of the upper or lower extremities, radial pulse 2+  Respiratory: unlabored effort, clear to auscultation, no wheezes  Abdomen: soft, non-tender, no rigidity, no masses, no hepatomegaly  Neuro: Cranial nerves III-XII intact, speech not slurred, gait non-ataxic, no dysdiadochokinesia, strength 5/5 upper and lower extremities  Spine: non-tender to percussion cervical, thoracic and lumbosacral spine    Laboratory & X-Ray Findings: " Per above.  Images reviewed personally.    ASSESSMENT: recovering well from acute toxicities of radiation with goo pain response    PLAN: Ms. Moore responded well to T spine radiation and pain has improved tremendously.  Will f/u with NSG next month and if pain progresses or still residual and amenable to vertebroplasty, would favor that instead of second course of radiation    Can resumed palbociclib per medonc recs given counts; now that toxicities resolved and should be out of window for excess GI/luminal structure toxicity exacerbation.    Follow up with me as needed.  Could offer palliative XRT to any symptomatic bony areas in future if needed.    I spent approximately 20 minutes reviewing the available records and evaluating the patient, out of which over 50% of the time was spent face to face with the patient in counseling and coordinating this patient's care.    Ben Barbosa III, M.D.  Radiation Oncology  [facility] [address] [place] [address]  Ph: [phone]  [email or web address]

## 2022-12-22 ENCOUNTER — LAB VISIT (OUTPATIENT)
Dept: LAB | Facility: HOSPITAL | Age: 57
End: 2022-12-22
Attending: INTERNAL MEDICINE
Payer: MEDICARE

## 2022-12-22 DIAGNOSIS — C50.111 MALIGNANT NEOPLASM OF CENTRAL PORTION OF RIGHT BREAST IN FEMALE, ESTROGEN RECEPTOR POSITIVE: ICD-10-CM

## 2022-12-22 DIAGNOSIS — D84.821 IMMUNODEFICIENCY DUE TO CHEMOTHERAPY: ICD-10-CM

## 2022-12-22 DIAGNOSIS — Z17.0 MALIGNANT NEOPLASM OF CENTRAL PORTION OF RIGHT BREAST IN FEMALE, ESTROGEN RECEPTOR POSITIVE: ICD-10-CM

## 2022-12-22 DIAGNOSIS — I69.351 HEMIPARESIS AFFECTING RIGHT SIDE AS LATE EFFECT OF CEREBROVASCULAR ACCIDENT: ICD-10-CM

## 2022-12-22 DIAGNOSIS — T45.1X5A IMMUNODEFICIENCY DUE TO CHEMOTHERAPY: ICD-10-CM

## 2022-12-22 DIAGNOSIS — C79.51 SECONDARY CANCER OF BONE: ICD-10-CM

## 2022-12-22 DIAGNOSIS — Z79.899 IMMUNODEFICIENCY DUE TO CHEMOTHERAPY: ICD-10-CM

## 2022-12-22 DIAGNOSIS — Z86.73 HISTORY OF CVA (CEREBROVASCULAR ACCIDENT): ICD-10-CM

## 2022-12-22 LAB
ALBUMIN SERPL BCP-MCNC: 3.7 G/DL (ref 3.5–5.2)
ALP SERPL-CCNC: 90 U/L (ref 55–135)
ALT SERPL W/O P-5'-P-CCNC: 19 U/L (ref 10–44)
ANION GAP SERPL CALC-SCNC: 9 MMOL/L (ref 8–16)
AST SERPL-CCNC: 19 U/L (ref 10–40)
BASOPHILS # BLD AUTO: 0.05 K/UL (ref 0–0.2)
BASOPHILS NFR BLD: 1.6 % (ref 0–1.9)
BILIRUB SERPL-MCNC: 0.8 MG/DL (ref 0.1–1)
BUN SERPL-MCNC: 18 MG/DL (ref 6–20)
CALCIUM SERPL-MCNC: 9.3 MG/DL (ref 8.7–10.5)
CHLORIDE SERPL-SCNC: 105 MMOL/L (ref 95–110)
CO2 SERPL-SCNC: 28 MMOL/L (ref 23–29)
CREAT SERPL-MCNC: 0.7 MG/DL (ref 0.5–1.4)
DIFFERENTIAL METHOD: ABNORMAL
EOSINOPHIL # BLD AUTO: 0.1 K/UL (ref 0–0.5)
EOSINOPHIL NFR BLD: 4.1 % (ref 0–8)
ERYTHROCYTE [DISTWIDTH] IN BLOOD BY AUTOMATED COUNT: 14 % (ref 11.5–14.5)
EST. GFR  (NO RACE VARIABLE): >60 ML/MIN/1.73 M^2
GLUCOSE SERPL-MCNC: 85 MG/DL (ref 70–110)
HCT VFR BLD AUTO: 39.2 % (ref 37–48.5)
HGB BLD-MCNC: 12.9 G/DL (ref 12–16)
IMM GRANULOCYTES # BLD AUTO: 0 K/UL (ref 0–0.04)
IMM GRANULOCYTES NFR BLD AUTO: 0 % (ref 0–0.5)
LYMPHOCYTES # BLD AUTO: 0.8 K/UL (ref 1–4.8)
LYMPHOCYTES NFR BLD: 23.4 % (ref 18–48)
MCH RBC QN AUTO: 32.3 PG (ref 27–31)
MCHC RBC AUTO-ENTMCNC: 32.9 G/DL (ref 32–36)
MCV RBC AUTO: 98 FL (ref 82–98)
MONOCYTES # BLD AUTO: 0.5 K/UL (ref 0.3–1)
MONOCYTES NFR BLD: 14.7 % (ref 4–15)
NEUTROPHILS # BLD AUTO: 1.8 K/UL (ref 1.8–7.7)
NEUTROPHILS NFR BLD: 56.2 % (ref 38–73)
NRBC BLD-RTO: 0 /100 WBC
PLATELET # BLD AUTO: 347 K/UL (ref 150–450)
PMV BLD AUTO: 10.1 FL (ref 9.2–12.9)
POTASSIUM SERPL-SCNC: 4.3 MMOL/L (ref 3.5–5.1)
PROT SERPL-MCNC: 6.9 G/DL (ref 6–8.4)
RBC # BLD AUTO: 3.99 M/UL (ref 4–5.4)
SODIUM SERPL-SCNC: 142 MMOL/L (ref 136–145)
WBC # BLD AUTO: 3.2 K/UL (ref 3.9–12.7)

## 2022-12-22 PROCEDURE — 36415 COLL VENOUS BLD VENIPUNCTURE: CPT | Mod: PO | Performed by: INTERNAL MEDICINE

## 2022-12-22 PROCEDURE — 85025 COMPLETE CBC W/AUTO DIFF WBC: CPT | Performed by: INTERNAL MEDICINE

## 2022-12-22 PROCEDURE — 80053 COMPREHEN METABOLIC PANEL: CPT | Performed by: INTERNAL MEDICINE

## 2022-12-23 ENCOUNTER — TELEPHONE (OUTPATIENT)
Dept: HEMATOLOGY/ONCOLOGY | Facility: CLINIC | Age: 57
End: 2022-12-23
Payer: MEDICARE

## 2022-12-23 NOTE — TELEPHONE ENCOUNTER
SW spoke with pt re: distress score of 4. Back pain related per pt but stated that her pain is not severe and she will speak with Dr. Khan during visit next month if pain meds are needed. No other needs expressed. Pt will call SW if needed. SW will remain available. Bridgette Mcdowell LMSW

## 2023-01-03 ENCOUNTER — PATIENT MESSAGE (OUTPATIENT)
Dept: PHARMACY | Facility: CLINIC | Age: 58
End: 2023-01-03
Payer: MEDICARE

## 2023-01-03 ENCOUNTER — SPECIALTY PHARMACY (OUTPATIENT)
Dept: PHARMACY | Facility: CLINIC | Age: 58
End: 2023-01-03
Payer: MEDICARE

## 2023-01-03 NOTE — TELEPHONE ENCOUNTER
Incoming call regarding Ibrance. Pt stated she has not started her med from our dec delivery. Pt stated the provider told her not to start it yet due to low blood count. Pt goes back to the provider on 1/12/23. Routing to assigned Pharmacist.

## 2023-01-05 ENCOUNTER — PATIENT MESSAGE (OUTPATIENT)
Dept: HEMATOLOGY/ONCOLOGY | Facility: CLINIC | Age: 58
End: 2023-01-05
Payer: MEDICARE

## 2023-01-11 ENCOUNTER — PATIENT MESSAGE (OUTPATIENT)
Dept: HEMATOLOGY/ONCOLOGY | Facility: CLINIC | Age: 58
End: 2023-01-11
Payer: MEDICARE

## 2023-01-11 ENCOUNTER — HOSPITAL ENCOUNTER (OUTPATIENT)
Dept: RADIOLOGY | Facility: HOSPITAL | Age: 58
Discharge: HOME | End: 2023-01-11
Attending: INTERNAL MEDICINE
Payer: MEDICARE

## 2023-01-11 DIAGNOSIS — C50.111 MALIGNANT NEOPLASM OF CENTRAL PORTION OF RIGHT BREAST IN FEMALE, ESTROGEN RECEPTOR POSITIVE: ICD-10-CM

## 2023-01-11 DIAGNOSIS — Z17.0 MALIGNANT NEOPLASM OF CENTRAL PORTION OF RIGHT BREAST IN FEMALE, ESTROGEN RECEPTOR POSITIVE: ICD-10-CM

## 2023-01-11 PROCEDURE — 78815 PET IMAGE W/CT SKULL-THIGH: CPT | Mod: PS,TC,HCNC

## 2023-01-11 PROCEDURE — 78815 NM PET CT ROUTINE: ICD-10-PCS | Mod: 26,PS,HCNC, | Performed by: RADIOLOGY

## 2023-01-11 PROCEDURE — 78815 PET IMAGE W/CT SKULL-THIGH: CPT | Mod: 26,PS,HCNC, | Performed by: RADIOLOGY

## 2023-01-12 ENCOUNTER — SPECIALTY PHARMACY (OUTPATIENT)
Dept: PHARMACY | Facility: CLINIC | Age: 58
End: 2023-01-12
Payer: MEDICARE

## 2023-01-12 ENCOUNTER — OFFICE VISIT (OUTPATIENT)
Dept: HEMATOLOGY/ONCOLOGY | Facility: CLINIC | Age: 58
End: 2023-01-12
Payer: MEDICARE

## 2023-01-12 ENCOUNTER — INFUSION (OUTPATIENT)
Dept: INFUSION THERAPY | Facility: HOSPITAL | Age: 58
End: 2023-01-12
Attending: INTERNAL MEDICINE
Payer: MEDICARE

## 2023-01-12 VITALS
HEART RATE: 77 BPM | WEIGHT: 175.69 LBS | BODY MASS INDEX: 27.57 KG/M2 | OXYGEN SATURATION: 97 % | HEIGHT: 67 IN | DIASTOLIC BLOOD PRESSURE: 72 MMHG | RESPIRATION RATE: 18 BRPM | TEMPERATURE: 97 F | DIASTOLIC BLOOD PRESSURE: 73 MMHG | OXYGEN SATURATION: 96 % | SYSTOLIC BLOOD PRESSURE: 114 MMHG | SYSTOLIC BLOOD PRESSURE: 101 MMHG | TEMPERATURE: 98 F | HEART RATE: 74 BPM

## 2023-01-12 DIAGNOSIS — Z17.0 MALIGNANT NEOPLASM OF CENTRAL PORTION OF RIGHT BREAST IN FEMALE, ESTROGEN RECEPTOR POSITIVE: Primary | ICD-10-CM

## 2023-01-12 DIAGNOSIS — D84.821 IMMUNODEFICIENCY DUE TO CHEMOTHERAPY: ICD-10-CM

## 2023-01-12 DIAGNOSIS — C79.51 SECONDARY CANCER OF BONE: ICD-10-CM

## 2023-01-12 DIAGNOSIS — I69.351 HEMIPARESIS AFFECTING RIGHT SIDE AS LATE EFFECT OF CEREBROVASCULAR ACCIDENT: ICD-10-CM

## 2023-01-12 DIAGNOSIS — C50.111 MALIGNANT NEOPLASM OF CENTRAL PORTION OF RIGHT BREAST IN FEMALE, ESTROGEN RECEPTOR POSITIVE: Primary | ICD-10-CM

## 2023-01-12 DIAGNOSIS — Z79.899 IMMUNODEFICIENCY DUE TO CHEMOTHERAPY: ICD-10-CM

## 2023-01-12 DIAGNOSIS — D61.810 PANCYTOPENIA DUE TO ANTINEOPLASTIC CHEMOTHERAPY: ICD-10-CM

## 2023-01-12 DIAGNOSIS — D84.822 IMMUNODEFICIENCY SECONDARY TO RADIATION THERAPY: ICD-10-CM

## 2023-01-12 DIAGNOSIS — Z79.811 USE OF AROMATASE INHIBITORS: ICD-10-CM
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
## 2023-01-12 DIAGNOSIS — T45.1X5A PANCYTOPENIA DUE TO ANTINEOPLASTIC CHEMOTHERAPY: ICD-10-CM

## 2023-01-12 DIAGNOSIS — T45.1X5A IMMUNODEFICIENCY DUE TO CHEMOTHERAPY: ICD-10-CM

## 2023-01-12 DIAGNOSIS — C79.51 SECONDARY MALIGNANT NEOPLASM OF BONE: ICD-10-CM

## 2023-01-12 PROCEDURE — 1159F MED LIST DOCD IN RCRD: CPT | Mod: HCNC,CPTII,S$GLB, | Performed by: INTERNAL MEDICINE

## 2023-01-12 PROCEDURE — 96401 CHEMO ANTI-NEOPL SQ/IM: CPT | Mod: HCNC

## 2023-01-12 PROCEDURE — 3008F PR BODY MASS INDEX (BMI) DOCUMENTED: ICD-10-PCS | Mod: HCNC,CPTII,S$GLB, | Performed by: INTERNAL MEDICINE

## 2023-01-12 PROCEDURE — 99215 PR OFFICE/OUTPT VISIT, EST, LEVL V, 40-54 MIN: ICD-10-PCS | Mod: 25,HCNC,S$GLB, | Performed by: INTERNAL MEDICINE

## 2023-01-12 PROCEDURE — 99999 PR PBB SHADOW E&M-EST. PATIENT-LVL IV: CPT | Mod: PBBFAC,HCNC,, | Performed by: INTERNAL MEDICINE

## 2023-01-12 PROCEDURE — 3078F PR MOST RECENT DIASTOLIC BLOOD PRESSURE < 80 MM HG: ICD-10-PCS | Mod: HCNC,CPTII,S$GLB, | Performed by: INTERNAL MEDICINE

## 2023-01-12 PROCEDURE — 1160F RVW MEDS BY RX/DR IN RCRD: CPT | Mod: HCNC,CPTII,S$GLB, | Performed by: INTERNAL MEDICINE

## 2023-01-12 PROCEDURE — 3078F DIAST BP <80 MM HG: CPT | Mod: HCNC,CPTII,S$GLB, | Performed by: INTERNAL MEDICINE

## 2023-01-12 PROCEDURE — 96402 CHEMO HORMON ANTINEOPL SQ/IM: CPT | Mod: HCNC

## 2023-01-12 PROCEDURE — 3074F PR MOST RECENT SYSTOLIC BLOOD PRESSURE < 130 MM HG: ICD-10-PCS | Mod: HCNC,CPTII,S$GLB, | Performed by: INTERNAL MEDICINE

## 2023-01-12 PROCEDURE — 63600175 PHARM REV CODE 636 W HCPCS: Mod: HCNC | Performed by: INTERNAL MEDICINE

## 2023-01-12 PROCEDURE — 99215 OFFICE O/P EST HI 40 MIN: CPT | Mod: 25,HCNC,S$GLB, | Performed by: INTERNAL MEDICINE

## 2023-01-12 PROCEDURE — 3008F BODY MASS INDEX DOCD: CPT | Mod: HCNC,CPTII,S$GLB, | Performed by: INTERNAL MEDICINE

## 2023-01-12 PROCEDURE — 99999 PR PBB SHADOW E&M-EST. PATIENT-LVL IV: ICD-10-PCS | Mod: PBBFAC,HCNC,, | Performed by: INTERNAL MEDICINE

## 2023-01-12 PROCEDURE — 1160F PR REVIEW ALL MEDS BY PRESCRIBER/CLIN PHARMACIST DOCUMENTED: ICD-10-PCS | Mod: HCNC,CPTII,S$GLB, | Performed by: INTERNAL MEDICINE

## 2023-01-12 PROCEDURE — 1159F PR MEDICATION LIST DOCUMENTED IN MEDICAL RECORD: ICD-10-PCS | Mod: HCNC,CPTII,S$GLB, | Performed by: INTERNAL MEDICINE

## 2023-01-12 PROCEDURE — 3074F SYST BP LT 130 MM HG: CPT | Mod: HCNC,CPTII,S$GLB, | Performed by: INTERNAL MEDICINE

## 2023-01-12 RX ORDER — LAMOTRIGINE 25 MG/1
500 TABLET ORAL
Status: CANCELLED | OUTPATIENT
Start: 2023-02-13

## 2023-01-12 RX ORDER — SODIUM CHLORIDE 0.9 % (FLUSH) 0.9 %
10 SYRINGE (ML) INJECTION
Status: CANCELLED | OUTPATIENT
Start: 2023-01-12

## 2023-01-12 RX ORDER — LAMOTRIGINE 25 MG/1
500 TABLET ORAL
Status: CANCELLED | OUTPATIENT
Start: 2023-03-13

## 2023-01-12 RX ORDER — LAMOTRIGINE 25 MG/1
500 TABLET ORAL
Status: CANCELLED | OUTPATIENT
Start: 2023-01-12

## 2023-01-12 RX ORDER — HEPARIN 100 UNIT/ML
500 SYRINGE INTRAVENOUS
Status: CANCELLED | OUTPATIENT
Start: 2023-01-12

## 2023-01-12 RX ORDER — LAMOTRIGINE 25 MG/1
500 TABLET ORAL
Status: COMPLETED | OUTPATIENT
Start: 2023-01-12 | End: 2023-01-12

## 2023-01-12 RX ADMIN — FULVESTRANT 500 MG: 50 INJECTION, SOLUTION INTRAMUSCULAR at 09:01

## 2023-01-12 NOTE — TELEPHONE ENCOUNTER
Per 1/12 OV, pt has been cleared to resume Ibrance. Her ANC has greatly improved. Outgoing call to pt about refill. Stated she has a full cycle on hand. Was agreeable to call in 3 weeks for next refill.

## 2023-01-12 NOTE — PROGRESS NOTES
Subjective:       Patient ID: Carole Moore is a 57 y.o. female.    Chief Complaint: Results, Chemotherapy, and Breast Cancer    HPI:  57-year-old female history of metastatic breast carcinoma patient returns for review of PET scan patient currently is on Ibrance Fast low dex.  Patient is scheduled to be seen by Neurosurgery for potential kyphoplasty Dr. Josse Pelayo ECOG status 1    Past Medical History:   Diagnosis Date    Antineoplastic chemotherapy induced anemia     Breast cancer 2016    right breast    Cancer     R Breast cancer    CVA (cerebral infarction)     Diverticulosis     Family history of colon cancer 10/30/2018    Her mother and father both had colon cancer.   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    Nausea after anesthesia     Paralysis     right side    PONV (postoperative nausea and vomiting)     Stroke     R side weakness    Trouble in sleeping      Family History   Problem Relation Age of Onset    Breast cancer Paternal Aunt     Colon cancer Father     Colon cancer Mother     Melanoma Maternal Uncle     Skin cancer Maternal Uncle      Social History     Socioeconomic History    Marital status:    Tobacco Use    Smoking status: Never    Smokeless tobacco: Never   Substance and Sexual Activity    Alcohol use: No    Drug use: No    Sexual activity: Not Currently     Birth control/protection: Surgical   Other Topics Concern    Are you pregnant or think you may be? No    Breast-feeding No     Social Determinants of Health     Financial Resource Strain: Low Risk     Difficulty of Paying Living Expenses: Not hard at all   Food Insecurity: No Food Insecurity    Worried About Running Out of Food in the Last Year: Never true    Ran Out of Food in the Last Year: Never true   Transportation Needs: No Transportation Needs    Lack of Transportation (Medical): No    Lack of Transportation (Non-Medical): No   Physical Activity:  Inactive    Days of Exercise per Week: 0 days    Minutes of Exercise per Session: 0 min   Stress: No Stress Concern Present    Feeling of Stress : Only a little   Social Connections: Unknown    Frequency of Communication with Friends and Family: More than three times a week    Frequency of Social Gatherings with Friends and Family: Twice a week    Active Member of Clubs or Organizations: No    Attends Club or Organization Meetings: Never    Marital Status:    Housing Stability: Low Risk     Unable to Pay for Housing in the Last Year: No    Number of Places Lived in the Last Year: 1    Unstable Housing in the Last Year: No     Past Surgical History:   Procedure Laterality Date    AUGMENTATION OF BREAST Left 2017    BREAST SURGERY      COLONOSCOPY N/A 10/30/2018    Procedure: COLONOSCOPY;  Surgeon: Cosme Monson MD;  [facility];  Service: Endoscopy;  Laterality: N/A;    CYSTOSCOPY WITH BIOPSY OF BLADDER N/A 11/29/2021    Procedure: CYSTOSCOPY, WITH BLADDER BIOPSY, WITH FULGURATION IF INDICATED;  Surgeon: Page Marcelo MD;  Location: [facility] OR;  Service: Urology;  Laterality: N/A;    HYSTERECTOMY  2007    maintains both ovaries, menorrhagia    MASTECTOMY Right 2017    MEDIPORT INSERTION, SINGLE      TONSILLECTOMY         Labs:  Lab Results   Component Value Date    WBC 4.54 01/11/2023    HGB 13.9 01/11/2023    HCT 42.6 01/11/2023    MCV 98 01/11/2023     01/11/2023     BMP  Lab Results   Component Value Date     01/11/2023    K 4.5 01/11/2023     01/11/2023    CO2 25 01/11/2023    BUN 16 01/11/2023    CREATININE 0.6 01/11/2023    CALCIUM 10.1 01/11/2023    ANIONGAP 9 01/11/2023    ESTGFRAFRICA >60 05/12/2022    EGFRNONAA >60 05/12/2022     Lab Results   Component Value Date    ALT 17 01/11/2023    AST 20 01/11/2023    ALKPHOS 76 01/11/2023    BILITOT 1.1 (H) 01/11/2023       No results found for: IRON, TIBC, FERRITIN, SATURATEDIRO  No results found for:  [id number]  No results found for: FOLATE  Lab Results   Component Value Date    TSH 0.793 10/29/2021         Review of Systems   Constitutional:  Negative for activity change, appetite change, chills, diaphoresis, fatigue, fever and unexpected weight change.   HENT:  Negative for congestion, dental problem, drooling, ear discharge, ear pain, facial swelling, hearing loss, mouth sores, nosebleeds, postnasal drip, rhinorrhea, sinus pressure, sneezing, sore throat, tinnitus, trouble swallowing and voice change.    Eyes:  Negative for photophobia, pain, discharge, redness, itching and visual disturbance.   Respiratory:  Negative for cough, choking, chest tightness, shortness of breath, wheezing and stridor.    Cardiovascular:  Negative for chest pain, palpitations and leg swelling.   Gastrointestinal:  Negative for abdominal distention, abdominal pain, anal bleeding, blood in stool, constipation, diarrhea, nausea, rectal pain and vomiting.   Endocrine: Negative for cold intolerance, heat intolerance, polydipsia, polyphagia and polyuria.   Genitourinary:  Negative for decreased urine volume, difficulty urinating, dyspareunia, dysuria, enuresis, flank pain, frequency, genital sores, hematuria, menstrual problem, pelvic pain, urgency, vaginal bleeding, vaginal discharge and vaginal pain.   Musculoskeletal:  Positive for arthralgias, back pain and gait problem. Negative for joint swelling, myalgias, neck pain and neck stiffness.   Skin:  Negative for color change, pallor and rash.   Allergic/Immunologic: Negative for environmental allergies, food allergies and immunocompromised state.   Neurological:  Negative for dizziness, tremors, seizures, syncope, facial asymmetry, speech difficulty, weakness, light-headedness, numbness and headaches.   Hematological:  Negative for adenopathy. Does not bruise/bleed easily.   Psychiatric/Behavioral:  Positive for dysphoric mood. Negative for agitation, behavioral problems, confusion,  decreased concentration, hallucinations, self-injury, sleep disturbance and suicidal ideas. The patient is nervous/anxious. The patient is not hyperactive.      Objective:      Physical Exam  Vitals reviewed.   Constitutional:       General: She is not in acute distress.     Appearance: She is well-developed. She is not diaphoretic.   HENT:      Head: Normocephalic and atraumatic.      Right Ear: External ear normal.      Left Ear: External ear normal.      Nose: Nose normal.      Right Sinus: No maxillary sinus tenderness or frontal sinus tenderness.      Left Sinus: No maxillary sinus tenderness or frontal sinus tenderness.      Mouth/Throat:      Pharynx: No oropharyngeal exudate.   Eyes:      General: Lids are normal. No scleral icterus.        Right eye: No discharge.         Left eye: No discharge.      Conjunctiva/sclera: Conjunctivae normal.      Right eye: Right conjunctiva is not injected. No hemorrhage.     Left eye: Left conjunctiva is not injected. No hemorrhage.     Pupils: Pupils are equal, round, and reactive to light.   Neck:      Thyroid: No thyromegaly.      Vascular: No JVD.      Trachea: No tracheal deviation.   Cardiovascular:      Rate and Rhythm: Normal rate.   Pulmonary:      Effort: Pulmonary effort is normal. No respiratory distress.      Breath sounds: No stridor.   Chest:      Chest wall: No tenderness.   Abdominal:      General: Bowel sounds are normal. There is no distension.      Palpations: Abdomen is soft. There is no hepatomegaly, splenomegaly or mass.      Tenderness: There is no abdominal tenderness. There is no rebound.   Musculoskeletal:         General: No tenderness. Normal range of motion.      Cervical back: Normal range of motion and neck supple.   Lymphadenopathy:      Cervical: No cervical adenopathy.      Upper Body:      Right upper body: No supraclavicular adenopathy.      Left upper body: No supraclavicular adenopathy.   Skin:     General: Skin is dry.      Findings:  No erythema or rash.   Neurological:      Mental Status: She is alert and oriented to person, place, and time.      Cranial Nerves: No cranial nerve deficit.      Motor: Weakness present.      Coordination: Coordination abnormal.      Gait: Gait abnormal.   Psychiatric:         Behavior: Behavior normal.         Thought Content: Thought content normal.         Judgment: Judgment normal.           Assessment:      1. Malignant neoplasm of central portion of right breast in female, estrogen receptor positive    2. Secondary malignant neoplasm of bone    3. Secondary cancer of bone    4. Use of aromatase inhibitors    5. Pancytopenia due to antineoplastic chemotherapy    6. Hemiparesis affecting right side as late effect of cerebrovascular accident    7. Immunodeficiency secondary to radiation therapy    8. Immunodeficiency due to chemotherapy           Plan:     Resolution of pancytopenia.  Standing orders have been placed patient can be seen back by APAP Q 4-5 weeks x2 and myself in 3 months.  Done remarkably well cleared from my standpoint from kyphoplasty do recommend CBC be done day prior to make sure counts are adequate.  Refill prescription [facility].  Discussed pros and cons recent dental cleaning no evidence of and day dental extractions needed.  Orders written reviewed excellent response reviewed images personally with family    Med Onc Chart Routing      Follow up with physician 3 months. I will see in 3 months.  CBC CMP with next dosing of Zometa   Follow up with [facility] 4 weeks. Can be seen monthly with CBC CMP prior to next dosing of Fast low dex and Ibrance.  Can be seen by APAP on a monthly basis x2   Infusion scheduling note    Injection scheduling note Fast low dex monthly Zometa q.3 months   Labs CBC and CMP   Lab interval:     Imaging    Pharmacy appointment    Other referrals           Harpal Khan Jr, MD FACP

## 2023-01-12 NOTE — NURSING
Injection given without difficulties.Bandaids applied. Patient instructed to stay in the clinic for 15 minutes. Patient verbalized understanding and will notify nurse with any complaints.

## 2023-01-18 ENCOUNTER — INFUSION (OUTPATIENT)
Dept: INFUSION THERAPY | Facility: HOSPITAL | Age: 58
End: 2023-01-18
Attending: INTERNAL MEDICINE
Payer: MEDICARE

## 2023-01-18 VITALS
TEMPERATURE: 98 F | HEART RATE: 58 BPM | OXYGEN SATURATION: 96 % | RESPIRATION RATE: 16 BRPM | DIASTOLIC BLOOD PRESSURE: 59 MMHG | SYSTOLIC BLOOD PRESSURE: 122 MMHG

## 2023-01-18 DIAGNOSIS — C79.51 SECONDARY MALIGNANT NEOPLASM OF BONE: Primary | ICD-10-CM

## 2023-01-18 DIAGNOSIS — C50.111 MALIGNANT NEOPLASM OF CENTRAL PORTION OF RIGHT BREAST IN FEMALE, ESTROGEN RECEPTOR POSITIVE: ICD-10-CM

## 2023-01-18 DIAGNOSIS — Z17.0 MALIGNANT NEOPLASM OF CENTRAL PORTION OF RIGHT BREAST IN FEMALE, ESTROGEN RECEPTOR POSITIVE: ICD-10-CM

## 2023-01-18 PROCEDURE — 96374 THER/PROPH/DIAG INJ IV PUSH: CPT | Mod: HCNC

## 2023-01-18 PROCEDURE — 63600175 PHARM REV CODE 636 W HCPCS: Mod: HCNC | Performed by: INTERNAL MEDICINE

## 2023-01-18 RX ORDER — ZOLEDRONIC ACID 0.04 MG/ML
4 INJECTION, SOLUTION INTRAVENOUS
Status: COMPLETED | OUTPATIENT
Start: 2023-01-18 | End: 2023-01-18

## 2023-01-18 RX ADMIN — ZOLEDRONIC ACID 4 MG: 0.04 INJECTION, SOLUTION INTRAVENOUS at 08:01

## 2023-01-18 NOTE — PLAN OF CARE
Problem: Adult Inpatient Plan of Care  Goal: Plan of Care Review  Outcome: Ongoing, Progressing  Flowsheets (Taken 1/18/2023 0828)  Plan of Care Reviewed With: patient  Goal: Patient-Specific Goal (Individualized)  Outcome: Ongoing, Progressing  Flowsheets (Taken 1/18/2023 0828)  Anxieties, Fears or Concerns: none  Individualized Care Needs: warm blanket, feet up, lots of water  Goal: Optimal Comfort and Wellbeing  Outcome: Ongoing, Progressing  Intervention: Provide Person-Centered Care  Flowsheets (Taken 1/18/2023 0828)  Trust Relationship/Rapport:   care explained   questions encouraged   choices provided   reassurance provided   emotional support provided   thoughts/feelings acknowledged   empathic listening provided   questions answered     Problem: Fall Injury Risk  Goal: Absence of Fall and Fall-Related Injury  Outcome: Ongoing, Progressing  Intervention: Identify and Manage Contributors  Flowsheets (Taken 1/18/2023 0828)  Self-Care Promotion: BADL personal routines maintained  Medication Review/Management: medications reviewed  Intervention: Promote Injury-Free Environment  Flowsheets (Taken 1/18/2023 0828)  Safety Promotion/Fall Prevention:   in recliner, wheels locked   nonskid shoes/socks when out of bed   room near unit station

## 2023-01-18 NOTE — DISCHARGE INSTRUCTIONS
THANKS FOR ALLOWING ME TO CARE FOR YOU TODAY!!! ~Le          THANKS FOR CHOOSING [facility]!!!      [facility]  [address] [facility]  [id number] [place] [address] Drive  [phone] phone     [phone] fax  Hours of Operation: Monday- Friday 8:00am- 5:00pm  After hours phone  [phone]  Hematology / Oncology Physicians on call      DIANE Apodaca Dr[address] Dr., NP Sydney Prescott, GIULIANO Valdivia, MAREK Hubbard    Please call with any concerns regarding your appointment today.

## 2023-01-20 ENCOUNTER — HOSPITAL ENCOUNTER (OUTPATIENT)
Dept: RADIOLOGY | Facility: HOSPITAL | Age: 58
Discharge: HOME | End: 2023-01-20
Attending: NEUROLOGICAL SURGERY
Payer: MEDICARE

## 2023-01-20 ENCOUNTER — HOSPITAL ENCOUNTER (OUTPATIENT)
Dept: CARDIOLOGY | Facility: HOSPITAL | Age: 58
Discharge: HOME | End: 2023-01-20
Attending: NEUROLOGICAL SURGERY
Payer: MEDICARE

## 2023-01-20 ENCOUNTER — OFFICE VISIT (OUTPATIENT)
Dept: NEUROSURGERY | Facility: CLINIC | Age: 58
End: 2023-01-20
Payer: MEDICARE

## 2023-01-20 VITALS
BODY MASS INDEX: 27.34 KG/M2 | HEART RATE: 72 BPM | HEIGHT: 67 IN | SYSTOLIC BLOOD PRESSURE: 98 MMHG | RESPIRATION RATE: 18 BRPM | WEIGHT: 174.19 LBS | DIASTOLIC BLOOD PRESSURE: 62 MMHG

## 2023-01-20 DIAGNOSIS — M54.6 PAIN IN THORACIC SPINE: ICD-10-CM

## 2023-01-20 DIAGNOSIS — Z01.818 PRE-OP TESTING: ICD-10-CM

## 2023-01-20 DIAGNOSIS — C79.51 CARCINOMA OF BREAST METASTATIC TO BONE, UNSPECIFIED LATERALITY: ICD-10-CM

## 2023-01-20 DIAGNOSIS — M51.34 DEGENERATIVE DISC DISEASE, THORACIC: ICD-10-CM

## 2023-01-20 DIAGNOSIS — C79.51 METASTATIC CANCER TO SPINE: ICD-10-CM

## 2023-01-20 DIAGNOSIS — C50.919 CARCINOMA OF BREAST METASTATIC TO BONE, UNSPECIFIED LATERALITY: ICD-10-CM

## 2023-01-20 DIAGNOSIS — M51.36 DDD (DEGENERATIVE DISC DISEASE), LUMBAR: ICD-10-CM

## 2023-01-20 DIAGNOSIS — Z01.818 PRE-OP TESTING: Primary | ICD-10-CM

## 2023-01-20 PROCEDURE — 3008F BODY MASS INDEX DOCD: CPT | Mod: HCNC,CPTII,S$GLB, | Performed by: NEUROLOGICAL SURGERY

## 2023-01-20 PROCEDURE — 1159F MED LIST DOCD IN RCRD: CPT | Mod: HCNC,CPTII,S$GLB, | Performed by: NEUROLOGICAL SURGERY

## 2023-01-20 PROCEDURE — 99999 PR PBB SHADOW E&M-EST. PATIENT-LVL IV: ICD-10-PCS | Mod: PBBFAC,HCNC,, | Performed by: NEUROLOGICAL SURGERY

## 2023-01-20 PROCEDURE — 99214 OFFICE O/P EST MOD 30 MIN: CPT | Mod: HCNC,S$GLB,, | Performed by: NEUROLOGICAL SURGERY

## 2023-01-20 PROCEDURE — 3074F PR MOST RECENT SYSTOLIC BLOOD PRESSURE < 130 MM HG: ICD-10-PCS | Mod: HCNC,CPTII,S$GLB, | Performed by: NEUROLOGICAL SURGERY

## 2023-01-20 PROCEDURE — 3008F PR BODY MASS INDEX (BMI) DOCUMENTED: ICD-10-PCS | Mod: HCNC,CPTII,S$GLB, | Performed by: NEUROLOGICAL SURGERY

## 2023-01-20 PROCEDURE — 99214 PR OFFICE/OUTPT VISIT, EST, LEVL IV, 30-39 MIN: ICD-10-PCS | Mod: HCNC,S$GLB,, | Performed by: NEUROLOGICAL SURGERY

## 2023-01-20 PROCEDURE — 3078F PR MOST RECENT DIASTOLIC BLOOD PRESSURE < 80 MM HG: ICD-10-PCS | Mod: HCNC,CPTII,S$GLB, | Performed by: NEUROLOGICAL SURGERY

## 2023-01-20 PROCEDURE — 93010 EKG 12-LEAD: ICD-10-PCS | Mod: HCNC,,, | Performed by: INTERNAL MEDICINE

## 2023-01-20 PROCEDURE — 71046 XR CHEST PA AND LATERAL PRE-OP: ICD-10-PCS | Mod: 26,HCNC,, | Performed by: STUDENT IN AN ORGANIZED HEALTH CARE EDUCATION/TRAINING PROGRAM

## 2023-01-20 PROCEDURE — 71046 X-RAY EXAM CHEST 2 VIEWS: CPT | Mod: 26,HCNC,, | Performed by: STUDENT IN AN ORGANIZED HEALTH CARE EDUCATION/TRAINING PROGRAM

## 2023-01-20 PROCEDURE — 93005 ELECTROCARDIOGRAM TRACING: CPT | Mod: HCNC

## 2023-01-20 PROCEDURE — 72128 CT CHEST SPINE W/O DYE: CPT | Mod: 26,HCNC,, | Performed by: RADIOLOGY

## 2023-01-20 PROCEDURE — 93010 ELECTROCARDIOGRAM REPORT: CPT | Mod: HCNC,,, | Performed by: INTERNAL MEDICINE

## 2023-01-20 PROCEDURE — 71046 X-RAY EXAM CHEST 2 VIEWS: CPT | Mod: TC,HCNC

## 2023-01-20 PROCEDURE — 3078F DIAST BP <80 MM HG: CPT | Mod: HCNC,CPTII,S$GLB, | Performed by: NEUROLOGICAL SURGERY

## 2023-01-20 PROCEDURE — 1159F PR MEDICATION LIST DOCUMENTED IN MEDICAL RECORD: ICD-10-PCS | Mod: HCNC,CPTII,S$GLB, | Performed by: NEUROLOGICAL SURGERY

## 2023-01-20 PROCEDURE — 72128 CT THORACIC SPINE WITHOUT CONTRAST: ICD-10-PCS | Mod: 26,HCNC,, | Performed by: RADIOLOGY

## 2023-01-20 PROCEDURE — 72128 CT CHEST SPINE W/O DYE: CPT | Mod: TC,HCNC

## 2023-01-20 PROCEDURE — 99999 PR PBB SHADOW E&M-EST. PATIENT-LVL IV: CPT | Mod: PBBFAC,HCNC,, | Performed by: NEUROLOGICAL SURGERY

## 2023-01-20 PROCEDURE — 3074F SYST BP LT 130 MM HG: CPT | Mod: HCNC,CPTII,S$GLB, | Performed by: NEUROLOGICAL SURGERY

## 2023-01-20 RX ORDER — LAMOTRIGINE 25 MG/1
250 TABLET ORAL
COMMUNITY
Start: 2022-11-03

## 2023-01-20 RX ORDER — GADOBUTROL 604.72 MG/ML
INJECTION INTRAVENOUS
COMMUNITY
Start: 2022-09-28

## 2023-01-23 NOTE — H&P (VIEW-ONLY)
Subjective:      Patient ID: Carole Moore is a 57 y.o. female.    Chief Complaint: Lumbar Spine Pain (L-Spine) (The pt presents today 2 month f/u w/CT. The pt c/o LBP and stated her pain is 5/10 today and denies having any recent falls..)        Patient here for follow up with XR as well as  PET CT  History of metastatic breast carcinoma in remission stage IV with multiple metastases  I would previously seen her for multiple metastatic lesions to the spine  She has multiple lytic lesions from T8-T11  She underwent radiation with the Radiation Oncology Service  When she was initially seen she did not have any epidural or thecal sac extension  Pain is 3/10 at rest however will rate between 6 and 7/10 with movement  Baseline weakness on the right upper and lower extremities from previous CVA  No new symptoms in the upper or lower extremities  On follow-up imaging there is a slight progression of the endplate fracture at T10 without any epidural extension  At T8 there is slight extension posterior involving the posterior elements      XR T spine     Frontal lateral views of the thoracic spine demonstrate a grossly unchanged compression deformity of the T10 vertebral body.  There is no further loss of height.  The underlying mass lesion is not well appreciated on this exam.     Impression:     Pathologic compression deformity of T10, grossly unchanged from prior PET CT.          PREVIOUS NOTES    Pt here with MR T L spine   Hx of Breast Ca diagnosed in 2016   Since May has been having progressive lower back pain   MR shows osseous involvement of the thoracic spine   Has known weakness on the right lower extremities upper and lower  Ambulates unassisted   Of note she had prior L CVA with resultant weakness       MR thoracic spine   Multiple metastatic lesions consistent with skeletal metastatic disease involving the T8, T9, T10, T11 and T12 vertebral bodies.  Associated pathologic superior endplate fracture T10.   Associated posterior element involvement at T8 with mild dorsal epidural tumor with mild dorsal sac impingement.  No definite cord compression.  Probable left T7 rib lesion.  See above for detail.    PET      Skeletal: Multiple FDG avid lesions are seen throughout the spine and pelvis as well as the left posterior 7th rib, in keeping with known multifocal osseous metastatic disease.  Index lesions demonstrate SUV max of 12.0 in the right iliac bone, 13.0 in the left posterior elements of T8, and 13.0 in the T11 vertebral body.       Review of Systems   Constitutional:  Negative for activity change, appetite change and chills.   HENT:  Negative for hearing loss, sore throat and tinnitus.    Eyes:  Negative for pain, discharge and itching.   Cardiovascular:  Negative for chest pain.   Gastrointestinal:  Negative for abdominal pain.   Endocrine: Negative for cold intolerance and heat intolerance.   Genitourinary:  Negative for difficulty urinating and dysuria.   Musculoskeletal:  Positive for back pain and gait problem.   Allergic/Immunologic: Negative for environmental allergies.   Neurological:  Positive for weakness. Negative for dizziness, tremors, light-headedness and headaches.   Hematological:  Negative for adenopathy.   Psychiatric/Behavioral:  Negative for agitation, behavioral problems and confusion.        Objective:       Neurosurgery Physical Exam  Ortho/SPM Exam  Ortho Exam    Nursing note and vitals reviewed  Gen:Oriented to person, place, and time.             Appears stated age   Skin: no rashes or lesions identified   Head:Normocephalic and atraumatic.  Nose: Nose normal.    Eyes: EOM are normal. Pupils are equal, round, and reactive to light.   Neck: Neck supple. No masses or lesions palpated  Cardiovascular: Intact distal pulses.    Abdominal: Soft.   Neurological: Alert and oriented to person, place, and time.  No cranial nerve deficit.  Coordination normal. Normal muscle tone  Psychiatric: Normal  mood and affect. Behavior is normal.      Back: R side weak from prior stroke        None  Paraspinal muscle spasms   None  Pain with flexion and extention   WNL  Range of motion    Neg  Straight leg raise     Motor   Right Right Left Left  Level Group   5 4 5  L2 Hip flexor (Psoas)   5 4 5  L3 Leg extension (Quads)   5 4 5  L4 Dorsiflexion & foot inversion (Tibialis Anterior)   5 4 5  L5 Great toe extension ( EHL)   5 4 5  S1 Foot eversion (Gastroc, PL & PB)     Sensation  NL Decreased (R/L/BL) Level Sensation    X  L2 Anterio-medial thigh   X  L3 Medial thigh around knee   X  L4 Medial foot   X  L5 Dorsum foot   X  S1 Lateral foot     Reflex  2+  Patellar tendon (L4)   2+  Achilles tendon (S1)     For the review of the imaging please see the above interpretation    Plan:     Pre-op testing  -     CBC Auto Differential; Future; Expected date: 01/20/2023  -     Comprehensive Metabolic Panel; Future; Expected date: 01/20/2023  -     URINALYSIS; Future; Expected date: 01/20/2023  -     X-Ray Chest PA And Lateral Pre-OP; Future; Expected date: 01/20/2023  -     SCHEDULED EKG 12-LEAD (to [place]); Future    Metastatic cancer to spine    Pain in thoracic spine    DDD (degenerative disc disease), lumbar    Carcinoma of breast metastatic to bone, unspecified laterality    Degenerative disc disease, thoracic      Patient with stage IV metastatic breast carcinoma  Multiple metastatic lesions to the thoracic spine with compression fracture associated with T10  No epidural or thecal sac compression  Patient with consistent progressive symptoms despite radiation  I have offered her a T10 kyphoplasty with radiofrequency ablation for pain control    There is evidence of some new involvement of the T8 level with some involvement of the posterior elements without any evidence of instability at this level I have reviewed this with the patient and we agreed to continue monitoring      The patient was informed of all benefits and  potential risk of the operation including but not limited to:  Pain, infection, bleeding, coma, paralysis, death.  Cerebrospinal fluid leak, failure of any instrumentation, the need for additional procedures in the future. No guarantee was given that this procedure would alleviate all of the symptoms.    Consents signed today in the office will offer her a surgical date after medical clearance in the near future    Thank you for the referral   Please call with any questions    Josse Pelayo MD  Neurosurgery     Disclaimer: This note was prepared using a voice recognition system and is likely to have sound alike errors within the text.

## 2023-01-23 NOTE — PROGRESS NOTES
Subjective:      Patient ID: Carole Moore is a 57 y.o. female.    Chief Complaint: Lumbar Spine Pain (L-Spine) (The pt presents today 2 month f/u w/CT. The pt c/o LBP and stated her pain is 5/10 today and denies having any recent falls..)        Patient here for follow up with XR as well as  PET CT  History of metastatic breast carcinoma in remission stage IV with multiple metastases  I would previously seen her for multiple metastatic lesions to the spine  She has multiple lytic lesions from T8-T11  She underwent radiation with the Radiation Oncology Service  When she was initially seen she did not have any epidural or thecal sac extension  Pain is 3/10 at rest however will rate between 6 and 7/10 with movement  Baseline weakness on the right upper and lower extremities from previous CVA  No new symptoms in the upper or lower extremities  On follow-up imaging there is a slight progression of the endplate fracture at T10 without any epidural extension  At T8 there is slight extension posterior involving the posterior elements      XR T spine     Frontal lateral views of the thoracic spine demonstrate a grossly unchanged compression deformity of the T10 vertebral body.  There is no further loss of height.  The underlying mass lesion is not well appreciated on this exam.     Impression:     Pathologic compression deformity of T10, grossly unchanged from prior PET CT.          PREVIOUS NOTES    Pt here with MR T L spine   Hx of Breast Ca diagnosed in 2016   Since May has been having progressive lower back pain   MR shows osseous involvement of the thoracic spine   Has known weakness on the right lower extremities upper and lower  Ambulates unassisted   Of note she had prior L CVA with resultant weakness       MR thoracic spine   Multiple metastatic lesions consistent with skeletal metastatic disease involving the T8, T9, T10, T11 and T12 vertebral bodies.  Associated pathologic superior endplate fracture T10.   Associated posterior element involvement at T8 with mild dorsal epidural tumor with mild dorsal sac impingement.  No definite cord compression.  Probable left T7 rib lesion.  See above for detail.    PET      Skeletal: Multiple FDG avid lesions are seen throughout the spine and pelvis as well as the left posterior 7th rib, in keeping with known multifocal osseous metastatic disease.  Index lesions demonstrate SUV max of 12.0 in the right iliac bone, 13.0 in the left posterior elements of T8, and 13.0 in the T11 vertebral body.       Review of Systems   Constitutional:  Negative for activity change, appetite change and chills.   HENT:  Negative for hearing loss, sore throat and tinnitus.    Eyes:  Negative for pain, discharge and itching.   Cardiovascular:  Negative for chest pain.   Gastrointestinal:  Negative for abdominal pain.   Endocrine: Negative for cold intolerance and heat intolerance.   Genitourinary:  Negative for difficulty urinating and dysuria.   Musculoskeletal:  Positive for back pain and gait problem.   Allergic/Immunologic: Negative for environmental allergies.   Neurological:  Positive for weakness. Negative for dizziness, tremors, light-headedness and headaches.   Hematological:  Negative for adenopathy.   Psychiatric/Behavioral:  Negative for agitation, behavioral problems and confusion.        Objective:       Neurosurgery Physical Exam  Ortho/SPM Exam  Ortho Exam    Nursing note and vitals reviewed  Gen:Oriented to person, place, and time.             Appears stated age   Skin: no rashes or lesions identified   Head:Normocephalic and atraumatic.  Nose: Nose normal.    Eyes: EOM are normal. Pupils are equal, round, and reactive to light.   Neck: Neck supple. No masses or lesions palpated  Cardiovascular: Intact distal pulses.    Abdominal: Soft.   Neurological: Alert and oriented to person, place, and time.  No cranial nerve deficit.  Coordination normal. Normal muscle tone  Psychiatric: Normal  mood and affect. Behavior is normal.      Back: R side weak from prior stroke        None  Paraspinal muscle spasms   None  Pain with flexion and extention   WNL  Range of motion    Neg  Straight leg raise     Motor   Right Right Left Left  Level Group   5 4 5  L2 Hip flexor (Psoas)   5 4 5  L3 Leg extension (Quads)   5 4 5  L4 Dorsiflexion & foot inversion (Tibialis Anterior)   5 4 5  L5 Great toe extension ( EHL)   5 4 5  S1 Foot eversion (Gastroc, PL & PB)     Sensation  NL Decreased (R/L/BL) Level Sensation    X  L2 Anterio-medial thigh   X  L3 Medial thigh around knee   X  L4 Medial foot   X  L5 Dorsum foot   X  S1 Lateral foot     Reflex  2+  Patellar tendon (L4)   2+  Achilles tendon (S1)     For the review of the imaging please see the above interpretation    Plan:     Pre-op testing  -     CBC Auto Differential; Future; Expected date: 01/20/2023  -     Comprehensive Metabolic Panel; Future; Expected date: 01/20/2023  -     URINALYSIS; Future; Expected date: 01/20/2023  -     X-Ray Chest PA And Lateral Pre-OP; Future; Expected date: 01/20/2023  -     SCHEDULED EKG 12-LEAD (to [place]); Future    Metastatic cancer to spine    Pain in thoracic spine    DDD (degenerative disc disease), lumbar    Carcinoma of breast metastatic to bone, unspecified laterality    Degenerative disc disease, thoracic      Patient with stage IV metastatic breast carcinoma  Multiple metastatic lesions to the thoracic spine with compression fracture associated with T10  No epidural or thecal sac compression  Patient with consistent progressive symptoms despite radiation  I have offered her a T10 kyphoplasty with radiofrequency ablation for pain control    There is evidence of some new involvement of the T8 level with some involvement of the posterior elements without any evidence of instability at this level I have reviewed this with the patient and we agreed to continue monitoring      The patient was informed of all benefits and  potential risk of the operation including but not limited to:  Pain, infection, bleeding, coma, paralysis, death.  Cerebrospinal fluid leak, failure of any instrumentation, the need for additional procedures in the future. No guarantee was given that this procedure would alleviate all of the symptoms.    Consents signed today in the office will offer her a surgical date after medical clearance in the near future    Thank you for the referral   Please call with any questions    Josse Pelayo MD  Neurosurgery     Disclaimer: This note was prepared using a voice recognition system and is likely to have sound alike errors within the text.

## 2023-01-24 ENCOUNTER — TELEPHONE (OUTPATIENT)
Dept: GENETICS | Facility: CLINIC | Age: 58
End: 2023-01-24
Payer: MEDICARE

## 2023-01-24 ENCOUNTER — TELEPHONE (OUTPATIENT)
Dept: NEUROSURGERY | Facility: CLINIC | Age: 58
End: 2023-01-24
Payer: MEDICARE

## 2023-01-24 DIAGNOSIS — M51.36 DDD (DEGENERATIVE DISC DISEASE), LUMBAR: Primary | ICD-10-CM

## 2023-01-24 NOTE — TELEPHONE ENCOUNTER
Contacted patient to discuss today's genetics appointment. Discussed her genetic test results from 2016 were negative (copy in her chart). Explained the meaning of the genetic test results for her and her relatives. Recommended relatives to continue cancer screenings given the family history of cancer.     Informed patient that a copy of her genetic test results are in her chart. Her genetics appointment has been canceled. Encouraged her to reach out if she has any questions. Patient was appreciative of the call.

## 2023-02-01 ENCOUNTER — PATIENT MESSAGE (OUTPATIENT)
Dept: NEUROSURGERY | Facility: CLINIC | Age: 58
End: 2023-02-01
Payer: MEDICARE

## 2023-02-02 ENCOUNTER — SPECIALTY PHARMACY (OUTPATIENT)
Dept: PHARMACY | Facility: CLINIC | Age: 58
End: 2023-02-02
Payer: MEDICARE

## 2023-02-02 ENCOUNTER — PATIENT MESSAGE (OUTPATIENT)
Dept: HEMATOLOGY/ONCOLOGY | Facility: CLINIC | Age: 58
End: 2023-02-02
Payer: MEDICARE

## 2023-02-02 ENCOUNTER — PATIENT MESSAGE (OUTPATIENT)
Dept: PHARMACY | Facility: CLINIC | Age: 58
End: 2023-02-02
Payer: MEDICARE

## 2023-02-02 DIAGNOSIS — M48.50XA PATHOLOGICAL COMPRESSION FRACTURE OF VERTEBRA, INITIAL ENCOUNTER: Primary | ICD-10-CM

## 2023-02-02 DIAGNOSIS — C79.51 METASTATIC CANCER TO SPINE: ICD-10-CM

## 2023-02-02 DIAGNOSIS — M54.6 PAIN IN THORACIC SPINE: ICD-10-CM

## 2023-02-02 NOTE — TELEPHONE ENCOUNTER
Incoming call regarding Ibrance, pt is starts her off week on 2/3/23 and supposed to restart on 2/9/23, Pt stated that she is having minor back procedure 2/9/23.

## 2023-02-03 NOTE — TELEPHONE ENCOUNTER
Specialty Pharmacy - Refill Coordination    Ms. Louis has a minor surgery on 2/9 which is her anticipated start date. She confirm she will start her cycle on 2/13 after seeing Dr. Khan.     Specialty Medication Orders Linked to Encounter      Flowsheet Row Most Recent Value   Medication #1 palbociclib 125 mg Tab (Order#[id number], Rx#[id number])            Refill Questions - Documented Responses      Flowsheet Row Most Recent Value   Patient Availability and HIPAA Verification    Does patient want to proceed with activity? Yes   HIPAA/medical authority confirmed? Yes   Relationship to patient of person spoken to? Self   Refill Screening Questions    Changes to allergies? No   Changes to medications? No   New conditions since last clinic visit? No   Unplanned office visit, urgent care, ED, or hospital admission in the last 4 weeks? No   How does patient/caregiver feel medication is working? Good   Financial problems or insurance changes? No   How many doses of your specialty medications were missed in the last 4 weeks? 0   Would patient like to speak to a pharmacist? No   When does the patient need to receive the medication? 02/13/23   Refill Delivery Questions    How will the patient receive the medication? MEDRx   When does the patient need to receive the medication? 02/13/23   Shipping Address Home   Address in [facility] confirmed and updated if neccessary? Yes   Expected Copay ($) 9.8   Is the patient able to afford the medication copay? Yes   Payment Method CC on file   Days supply of Refill 28   Supplies needed? No supplies needed   Refill activity completed? Yes   Refill activity plan Refill scheduled   Shipment/Pickup Date: 02/06/23            Current Outpatient Medications   Medication Sig    anastrozole (ARIMIDEX) 1 mg Tab Take 1 tablet (1 mg total) by mouth once daily.    aspirin 81 MG Chew Take 81 mg by mouth once daily.    atorvastatin (LIPITOR) 10 MG tablet Take 1 tablet (10 mg total) by  mouth once daily.    baclofen (LIORESAL) 10 MG tablet Take 1 tablet (10 mg total) by mouth 3 (three) times daily. for 10 days    celecoxib (CELEBREX) 200 MG capsule Take 1 capsule (200 mg total) by mouth once daily.    cetirizine (ZYRTEC) 10 MG tablet Take 10 mg by mouth once daily.    fulvestrant (FASLODEX) 250 mg/5 mL injection     GADAVIST 10 mmol/10 mL (1 mmol/mL) Soln injection     HYDROcodone-acetaminophen (NORCO) 5-325 mg per tablet Take by mouth.    LORATADINE (CLARITIN ORAL) Take by mouth daily as needed.     meclizine (ANTIVERT) 25 mg tablet Take 25 mg by mouth 3 (three) times daily as needed.    ondansetron (ZOFRAN) 4 MG tablet Take 1 tablet (4 mg total) by mouth every 8 (eight) hours as needed for Nausea.    oxyCODONE-acetaminophen (PERCOCET) 5-325 mg per tablet Take 1 tablet by mouth every 4 (four) hours as needed for Pain.    palbociclib 125 mg Tab Take 125 mg by mouth As instructed. Take 1 tablet (125 mg) on days 1-21 of each 28 day cycle. Take with food.    polyethylene glycol (GLYCOLAX) 17 gram/dose powder Take 17 g by mouth once daily.    prochlorperazine (COMPAZINE) 5 MG tablet Take 1 tablet (5 mg total) by mouth 4 (four) times daily as needed for Nausea.   Last reviewed on 1/20/2023 11:49 AM by Nia Arguello MA    Review of patient's allergies indicates:  No Known Allergies Last reviewed on  1/20/2023 11:59 AM by Nia Arguello      Tasks added this encounter   No tasks added.   Tasks due within next 3 months   3/27/2023 - Clinical - Follow Up Assesement (180 day)  2/2/2023 - Refill Call (Auto Added)     HERMELINDO BOOTH, PharmD  [facility]  [address] [place] [id number]  Phone: [phone]  Fax: [phone]

## 2023-02-03 NOTE — PRE ADMISSION SCREENING
Pre op instructions reviewed with patient per phone on 2/3/23: Spoke about pre op process and surgery instructions, verbalized understanding.    Surgery is scheduled on 2/09/23.  We will call you the business day prior to surgery to confirm arrival time (after 2:30 pm), as it is subject to change due to cancellations & emergencies.    Please report to the [facility] (1st Floor) at [facility] located off of [facility] (2nd building on the left, in front of the [facility]),address: [address] [place] [address] [place] [id number]    INSTRUCTIONS IMPORTANT!!!  Do Not Eat, Drink, or Smoke after 12 midnight! NO WATER after midnight! OK to brush teeth, no gum, candy or mints!    Take only these medicines with a small swallow of water-morning of surgery:  none    ____  NO Acrylic/fake nails or nail polish worn day of surgery (specifically hand/arm & foot surgeries).  ____  NO powder, lotions, deodorants, oils or creams on body.  ____  Please Remove All jewelry & piercings prior to surgery.  ____  Please Remove Dentures, Hearing Aids & Contact Lens prior to the start of surgery.  ____  Please bring photo ID and insurance information to hospital (Leave Valuables at Home).  ____  If going home the same day, arrange for a ride home. You will not be able to drive 24 hrs if Anesthesia was used.   ____  Females (ages 11-60) may need to give a urine sample the morning of surgery; please see Pre op Nurse prior to voiding.  ____  Wear clean, loose fitting clothing. Allow for dressings, bandages.  ____  Stop all Aspirin products, Ibuprofen, Advil, Motrin & Aleve at least 5-7 days before surgery, unless otherwise instructed by your doctor, or the nurse.   ____  Blood Thinners are stopped based on your Provider's recommendation; Call Surgeon's Office to inquire when to stop/hold.  ____  Stop taking any Fish Oil supplements or Vitamins at least 5 days prior to surgery, unless instructed otherwise by your Doctor.            Diabetic  Patients: If you take diabetic medication, do NOT take morning of surgery unless instructed by             Doctor. Metformin to be stopped 24 hrs prior to surgery time. DO NOT take long-acting insulin the evening before surgery. Blood sugars will be checked in pre-op morning of.    Bathing Instructions:    -Do not shave your face or body the day before or the day of surgery.              -Shower & Rinse your body as usual with anti-bacterial Soap (Dial)              -Rinse your body thoroughly.     [facility] Visitor/Ride Policy:  Only 2 adults allowed (over the age of 18) to accompany you to the Hospital. You Must have a ride home from a responsible adult that you know and trust. Medical Transport, Uber or Lyft can only be used if patient has a responsible adult to accompany them during ride home.    Post-Op Instructions: You will receive Post-op/Discharge instructions by your Discharge Nurse prior to going home. Please call your Surgeon's office with any post-surgery questions/concerns.    *Call [facility] Pre-Admissions Department with surgery instruction questions @ [phone], [phone] or [phone] (Mon-Fri 8 am to 4 pm)    *If you are running late or have questions the morning of surgery, please call the Surgery Dept @ [phone]  *Insurance/ Financial Questions, please call [phone].

## 2023-02-07 DIAGNOSIS — Z00.00 ENCOUNTER FOR MEDICARE ANNUAL WELLNESS EXAM: ICD-10-CM

## 2023-02-08 ENCOUNTER — TELEPHONE (OUTPATIENT)
Dept: PREADMISSION TESTING | Facility: HOSPITAL | Age: 58
End: 2023-02-08
Payer: MEDICARE

## 2023-02-08 ENCOUNTER — TELEPHONE (OUTPATIENT)
Dept: CARDIOLOGY | Facility: CLINIC | Age: 58
End: 2023-02-08
Payer: MEDICARE

## 2023-02-08 NOTE — TELEPHONE ENCOUNTER
Spoke with pt in regards to scheduling appt with Dr. Melendez. Her cardiologist retired and she wants to establish care with another cardiologist. Pt has previously seen Dr. Melendez. Appt was scheduled for 3/9/23

## 2023-02-09 ENCOUNTER — ANESTHESIA (OUTPATIENT)
Dept: SURGERY | Facility: HOSPITAL | Age: 58
End: 2023-02-09
Payer: MEDICARE

## 2023-02-09 ENCOUNTER — ANESTHESIA EVENT (OUTPATIENT)
Dept: SURGERY | Facility: HOSPITAL | Age: 58
End: 2023-02-09
Payer: MEDICARE

## 2023-02-09 ENCOUNTER — HOSPITAL ENCOUNTER (OUTPATIENT)
Facility: HOSPITAL | Age: 58
Discharge: HOME | End: 2023-02-09
Attending: NEUROLOGICAL SURGERY | Admitting: NEUROLOGICAL SURGERY
Payer: MEDICARE

## 2023-02-09 DIAGNOSIS — C79.51 SECONDARY MALIGNANT NEOPLASM OF BONE: Primary | ICD-10-CM

## 2023-02-09 DIAGNOSIS — S22.000A COMPRESSION FX, THORACIC SPINE: ICD-10-CM

## 2023-02-09 DIAGNOSIS — Z00.00 ENCOUNTER FOR MEDICARE ANNUAL WELLNESS EXAM: ICD-10-CM

## 2023-02-09 PROCEDURE — 36000707: Mod: HCNC | Performed by: NEUROLOGICAL SURGERY

## 2023-02-09 PROCEDURE — 20982 ABLATE BONE TUMOR(S) PERQ: CPT | Mod: 51,HCNC,, | Performed by: NEUROLOGICAL SURGERY

## 2023-02-09 PROCEDURE — C1886 CATHETER, ABLATION: HCPCS | Mod: HCNC | Performed by: NEUROLOGICAL SURGERY

## 2023-02-09 PROCEDURE — 71000033 HC RECOVERY, INTIAL HOUR: Mod: HCNC | Performed by: NEUROLOGICAL SURGERY

## 2023-02-09 PROCEDURE — 63600175 PHARM REV CODE 636 W HCPCS: Mod: HCNC | Performed by: NURSE ANESTHETIST, CERTIFIED REGISTERED

## 2023-02-09 PROCEDURE — 71000015 HC POSTOP RECOV 1ST HR: Mod: HCNC | Performed by: NEUROLOGICAL SURGERY

## 2023-02-09 PROCEDURE — 25000003 PHARM REV CODE 250: Mod: HCNC | Performed by: ANESTHESIOLOGY

## 2023-02-09 PROCEDURE — 25000003 PHARM REV CODE 250: Mod: HCNC | Performed by: NURSE ANESTHETIST, CERTIFIED REGISTERED

## 2023-02-09 PROCEDURE — 63600175 PHARM REV CODE 636 W HCPCS: Mod: HCNC | Performed by: ANESTHESIOLOGY

## 2023-02-09 PROCEDURE — 25000003 PHARM REV CODE 250: Mod: HCNC | Performed by: NEUROLOGICAL SURGERY

## 2023-02-09 PROCEDURE — C1726 CATH, BAL DIL, NON-VASCULAR: HCPCS | Mod: HCNC | Performed by: NEUROLOGICAL SURGERY

## 2023-02-09 PROCEDURE — 20982 PR ABLATE, BONE TUMOR(S) PERQ: ICD-10-PCS | Mod: 51,HCNC,, | Performed by: NEUROLOGICAL SURGERY

## 2023-02-09 PROCEDURE — 22513 PERQ VERTEBRAL AUGMENTATION: CPT | Mod: HCNC,,, | Performed by: NEUROLOGICAL SURGERY

## 2023-02-09 PROCEDURE — 37000009 HC ANESTHESIA EA ADD 15 MINS: Mod: HCNC | Performed by: NEUROLOGICAL SURGERY

## 2023-02-09 PROCEDURE — 25500020 PHARM REV CODE 255: Mod: HCNC | Performed by: NEUROLOGICAL SURGERY

## 2023-02-09 PROCEDURE — 22513 PR PERQ VERTEBRAL AUGMENTATION UNI/BILAT THORACIC: ICD-10-PCS | Mod: HCNC,,, | Performed by: NEUROLOGICAL SURGERY

## 2023-02-09 PROCEDURE — C1713 ANCHOR/SCREW BN/BN,TIS/BN: HCPCS | Mod: HCNC | Performed by: NEUROLOGICAL SURGERY

## 2023-02-09 PROCEDURE — 27201423 OPTIME MED/SURG SUP & DEVICES STERILE SUPPLY: Mod: HCNC | Performed by: NEUROLOGICAL SURGERY

## 2023-02-09 PROCEDURE — 37000008 HC ANESTHESIA 1ST 15 MINUTES: Mod: HCNC | Performed by: NEUROLOGICAL SURGERY

## 2023-02-09 PROCEDURE — 36000706: Mod: HCNC | Performed by: NEUROLOGICAL SURGERY

## 2023-02-09 PROCEDURE — 63600175 PHARM REV CODE 636 W HCPCS: Mod: HCNC | Performed by: NEUROLOGICAL SURGERY

## 2023-02-09 DEVICE — CEMENT KYPHOPLASTY HI VISC: Type: IMPLANTABLE DEVICE | Site: SPINE THORACIC | Status: FUNCTIONAL

## 2023-02-09 RX ORDER — ROCURONIUM BROMIDE 10 MG/ML
INJECTION, SOLUTION INTRAVENOUS
Status: DISCONTINUED | OUTPATIENT
Start: 2023-02-09 | End: 2023-02-09

## 2023-02-09 RX ORDER — PROPOFOL 10 MG/ML
VIAL (ML) INTRAVENOUS
Status: DISCONTINUED | OUTPATIENT
Start: 2023-02-09 | End: 2023-02-09

## 2023-02-09 RX ORDER — ONDANSETRON 2 MG/ML
INJECTION INTRAMUSCULAR; INTRAVENOUS
Status: DISCONTINUED | OUTPATIENT
Start: 2023-02-09 | End: 2023-02-09

## 2023-02-09 RX ORDER — SCOLOPAMINE TRANSDERMAL SYSTEM 1 MG/1
1 PATCH, EXTENDED RELEASE TRANSDERMAL
Status: DISCONTINUED | OUTPATIENT
Start: 2023-02-09 | End: 2023-02-09 | Stop reason: HOSPADM

## 2023-02-09 RX ORDER — OXYCODONE AND ACETAMINOPHEN 10; 325 MG/1; MG/1
1 TABLET ORAL EVERY 6 HOURS PRN
Qty: 30 TABLET | Refills: 0 | Status: SHIPPED | OUTPATIENT
Start: 2023-02-09

## 2023-02-09 RX ORDER — SODIUM CHLORIDE, SODIUM LACTATE, POTASSIUM CHLORIDE, CALCIUM CHLORIDE 600; 310; 30; 20 MG/100ML; MG/100ML; MG/100ML; MG/100ML
INJECTION, SOLUTION INTRAVENOUS CONTINUOUS PRN
Status: DISCONTINUED | OUTPATIENT
Start: 2023-02-09 | End: 2023-02-09

## 2023-02-09 RX ORDER — ONDANSETRON 2 MG/ML
4 INJECTION INTRAMUSCULAR; INTRAVENOUS DAILY PRN
Status: DISCONTINUED | OUTPATIENT
Start: 2023-02-09 | End: 2023-02-09 | Stop reason: HOSPADM

## 2023-02-09 RX ORDER — HYDROMORPHONE HYDROCHLORIDE 2 MG/ML
0.2 INJECTION, SOLUTION INTRAMUSCULAR; INTRAVENOUS; SUBCUTANEOUS EVERY 5 MIN PRN
Status: DISCONTINUED | OUTPATIENT
Start: 2023-02-09 | End: 2023-02-09 | Stop reason: HOSPADM

## 2023-02-09 RX ORDER — LIDOCAINE HYDROCHLORIDE 10 MG/ML
INJECTION, SOLUTION EPIDURAL; INFILTRATION; INTRACAUDAL; PERINEURAL
Status: DISCONTINUED | OUTPATIENT
Start: 2023-02-09 | End: 2023-02-09

## 2023-02-09 RX ORDER — MIDAZOLAM HYDROCHLORIDE 1 MG/ML
INJECTION INTRAMUSCULAR; INTRAVENOUS
Status: DISCONTINUED | OUTPATIENT
Start: 2023-02-09 | End: 2023-02-09

## 2023-02-09 RX ORDER — OXYCODONE HYDROCHLORIDE 5 MG/1
5 TABLET ORAL
Status: DISCONTINUED | OUTPATIENT
Start: 2023-02-09 | End: 2023-02-09 | Stop reason: HOSPADM

## 2023-02-09 RX ORDER — MEPERIDINE HYDROCHLORIDE 25 MG/ML
12.5 INJECTION INTRAMUSCULAR; INTRAVENOUS; SUBCUTANEOUS ONCE
Status: DISCONTINUED | OUTPATIENT
Start: 2023-02-09 | End: 2023-02-09 | Stop reason: HOSPADM

## 2023-02-09 RX ORDER — DEXAMETHASONE SODIUM PHOSPHATE 4 MG/ML
INJECTION, SOLUTION INTRA-ARTICULAR; INTRALESIONAL; INTRAMUSCULAR; INTRAVENOUS; SOFT TISSUE
Status: DISCONTINUED | OUTPATIENT
Start: 2023-02-09 | End: 2023-02-09

## 2023-02-09 RX ORDER — SODIUM CHLORIDE 0.9 % (FLUSH) 0.9 %
3 SYRINGE (ML) INJECTION
Status: DISCONTINUED | OUTPATIENT
Start: 2023-02-09 | End: 2023-02-09 | Stop reason: HOSPADM

## 2023-02-09 RX ORDER — DIPHENHYDRAMINE HYDROCHLORIDE 50 MG/ML
25 INJECTION INTRAMUSCULAR; INTRAVENOUS EVERY 6 HOURS PRN
Status: DISCONTINUED | OUTPATIENT
Start: 2023-02-09 | End: 2023-02-09 | Stop reason: HOSPADM

## 2023-02-09 RX ORDER — METHOCARBAMOL 750 MG/1
750 TABLET, FILM COATED ORAL 3 TIMES DAILY
Qty: 60 TABLET | Refills: 0 | Status: SHIPPED | OUTPATIENT
Start: 2023-02-09 | End: 2023-04-13 | Stop reason: SDUPTHER

## 2023-02-09 RX ORDER — PROCHLORPERAZINE EDISYLATE 5 MG/ML
5 INJECTION INTRAMUSCULAR; INTRAVENOUS EVERY 30 MIN PRN
Status: DISCONTINUED | OUTPATIENT
Start: 2023-02-09 | End: 2023-02-09 | Stop reason: HOSPADM

## 2023-02-09 RX ORDER — ALBUTEROL SULFATE 0.83 MG/ML
2.5 SOLUTION RESPIRATORY (INHALATION) EVERY 4 HOURS PRN
Status: DISCONTINUED | OUTPATIENT
Start: 2023-02-09 | End: 2023-02-09 | Stop reason: HOSPADM

## 2023-02-09 RX ORDER — FENTANYL CITRATE 50 UG/ML
INJECTION, SOLUTION INTRAMUSCULAR; INTRAVENOUS
Status: DISCONTINUED | OUTPATIENT
Start: 2023-02-09 | End: 2023-02-09

## 2023-02-09 RX ADMIN — SODIUM CHLORIDE, SODIUM LACTATE, POTASSIUM CHLORIDE, AND CALCIUM CHLORIDE: .6; .31; .03; .02 INJECTION, SOLUTION INTRAVENOUS at 01:02

## 2023-02-09 RX ADMIN — SUGAMMADEX 200 MG: 100 INJECTION, SOLUTION INTRAVENOUS at 02:02

## 2023-02-09 RX ADMIN — SCOPOLAMINE 1 PATCH: 1 SYSTEM TRANSDERMAL at 11:02

## 2023-02-09 RX ADMIN — MIDAZOLAM HYDROCHLORIDE 2 MG: 1 INJECTION, SOLUTION INTRAMUSCULAR; INTRAVENOUS at 01:02

## 2023-02-09 RX ADMIN — ROCURONIUM BROMIDE 40 MG: 10 INJECTION, SOLUTION INTRAVENOUS at 01:02

## 2023-02-09 RX ADMIN — ROCURONIUM BROMIDE 20 MG: 10 INJECTION, SOLUTION INTRAVENOUS at 02:02

## 2023-02-09 RX ADMIN — FENTANYL CITRATE 50 MCG: 50 INJECTION, SOLUTION INTRAMUSCULAR; INTRAVENOUS at 01:02

## 2023-02-09 RX ADMIN — ROCURONIUM BROMIDE 10 MG: 10 INJECTION, SOLUTION INTRAVENOUS at 01:02

## 2023-02-09 RX ADMIN — PROPOFOL 110 MG: 10 INJECTION, EMULSION INTRAVENOUS at 01:02

## 2023-02-09 RX ADMIN — HYDROMORPHONE HYDROCHLORIDE 0.2 MG: 2 INJECTION INTRAMUSCULAR; INTRAVENOUS; SUBCUTANEOUS at 03:02

## 2023-02-09 RX ADMIN — DEXAMETHASONE SODIUM PHOSPHATE 8 MG: 4 INJECTION, SOLUTION INTRAMUSCULAR; INTRAVENOUS at 01:02

## 2023-02-09 RX ADMIN — ONDANSETRON 4 MG: 2 INJECTION, SOLUTION INTRAMUSCULAR; INTRAVENOUS at 02:02

## 2023-02-09 RX ADMIN — VANCOMYCIN HYDROCHLORIDE 1000 MG: 1 INJECTION, POWDER, LYOPHILIZED, FOR SOLUTION INTRAVENOUS at 01:02

## 2023-02-09 RX ADMIN — LIDOCAINE HYDROCHLORIDE 100 MG: 10 INJECTION, SOLUTION EPIDURAL; INFILTRATION; INTRACAUDAL; PERINEURAL at 01:02

## 2023-02-09 RX ADMIN — OXYCODONE HYDROCHLORIDE 5 MG: 5 TABLET ORAL at 03:02

## 2023-02-09 NOTE — TRANSFER OF CARE
"Anesthesia Transfer of Care Note    Patient: Carole Moore    Procedure(s) Performed: Procedure(s) (LRB):  KYPHOPLASTY (Bilateral)  RADIOFREQUENCY ABLATION,BONE,PERCUTANEOUS (Bilateral)    Patient location: PACU    Anesthesia Type: general    Transport from OR: Transported from OR on room air with adequate spontaneous ventilation    Post pain: adequate analgesia    Post assessment: no apparent anesthetic complications and tolerated procedure well    Post vital signs: stable    Level of consciousness: awake and alert    Nausea/Vomiting: no nausea/vomiting    Complications: none    Transfer of care protocol was followed      Last vitals:   Visit Vitals  BP (!) 114/58   Pulse 91   Temp 36.7 °C (98.1 °F) (Temporal)   Resp 16   Ht 5' 7" (1.702 m)   Wt 77.3 kg (170 lb 8.4 oz)   LMP 10/11/2009   SpO2 96%   Breastfeeding No   BMI 26.71 kg/m²     "

## 2023-02-09 NOTE — ANESTHESIA PREPROCEDURE EVALUATION
02/09/2023  Carole Moore is a 57 y.o., female.    Patient Active Problem List   Diagnosis    Hyperlipidemia    History of CVA (cerebrovascular accident)    Malignant neoplasm of central portion of right breast in female, estrogen receptor positive    Use of aromatase inhibitors    Osteopenia of multiple sites    Hemiparesis affecting right side as late effect of cerebrovascular accident    Right sided weakness    Family history of colon cancer    Microhematuria    Cystitis cystica    Recurrent UTI    Immunodeficiency due to chemotherapy    Immunodeficiency secondary to radiation therapy    Secondary malignant neoplasm of bone     Pre-op Assessment    I have reviewed the Patient Summary Reports.     I have reviewed the Nursing Notes. I have reviewed the NPO Status.   I have reviewed the Medications.     Review of Systems  Anesthesia Hx:  Denies Family Hx of Anesthesia complications.  Personal Hx of Anesthesia complications, Post-Operative Nausea/Vomiting   Hematology/Oncology:  Hematology Normal      Current/Recent Cancer. Breast chemotherapy, radiation and surgery Oncology Comments: Stage IV breast cancer post mastectomy now with vertebral mets and pathologic fracture     EENT/Dental:EENT/Dental Normal   Cardiovascular:  Cardiovascular Normal  ECG has been reviewed.    Pulmonary:  Pulmonary Normal    Renal/:  Renal/ Normal     Hepatic/GI:  Hepatic/GI Normal    Musculoskeletal:  Spine Disorders:    Neurological:   CVA, residual symptoms Right hemiparesis post CVA 22 years ago felt due to OCA's   Endocrine:  Endocrine Normal    Dermatological:  Skin Normal    Psych:  Psychiatric Normal           Physical Exam  General: Alert and Oriented    Airway:  Mallampati: II   Mouth Opening: Normal  TM Distance: Normal  Tongue: Normal  Neck ROM: Normal ROM        Anesthesia Plan  Type of  Anesthesia, risks & benefits discussed:    Anesthesia Type: Gen ETT  Intra-op Monitoring Plan: Standard ASA Monitors  Induction:  IV  Informed Consent: Informed consent signed with the Patient and all parties understand the risks and agree with anesthesia plan.  All questions answered.   ASA Score: 3  Day of Surgery Review of History & Physical: I have interviewed and examined the patient. I have reviewed the patient's H&P dated:     Ready For Surgery From Anesthesia Perspective.     .

## 2023-02-09 NOTE — ANESTHESIA POSTPROCEDURE EVALUATION
Anesthesia Post Evaluation    Patient: Carole Moore    Procedure(s) Performed: Procedure(s) (LRB):  KYPHOPLASTY (Bilateral)  RADIOFREQUENCY ABLATION,BONE,PERCUTANEOUS (Bilateral)    Final Anesthesia Type: general      Patient location during evaluation: PACU  Patient participation: Yes- Able to Participate  Level of consciousness: awake  Post-procedure vital signs: reviewed and stable  Pain management: adequate  Airway patency: patent    PONV status at discharge: No PONV  Anesthetic complications: no      Cardiovascular status: hemodynamically stable  Respiratory status: unassisted  Hydration status: euvolemic  Follow-up not needed.          Vitals Value Taken Time   /60 02/09/23 1501   Temp 36.2 °C (97.2 °F) 02/09/23 1500   Pulse 81 02/09/23 1503   Resp 22 02/09/23 1503   SpO2 93 % 02/09/23 1503   Vitals shown include unvalidated device data.      No case tracking events are documented in the log.      Pain/Dimas Score: No data recorded

## 2023-02-09 NOTE — BRIEF OP NOTE
[facility] - Surgery (Hospital)  Brief Operative Note    SUMMARY     Surgery Date: 2/9/2023     Surgeon(s) and Role:     * Josse Pelayo MD - Primary    Assisting Surgeon: None    Pre-op Diagnosis:  Pathological compression fracture of vertebra, initial encounter [M48.50XA]  Pain in thoracic spine [M54.6]  Metastatic cancer to spine [C79.51]    Post-op Diagnosis:  Post-Op Diagnosis Codes:     * Pathological compression fracture of vertebra, initial encounter [M48.50XA]     * Pain in thoracic spine [M54.6]     * Metastatic cancer to spine [C79.51]    Procedure(s) (LRB):  KYPHOPLASTY (Bilateral)  RADIOFREQUENCY ABLATION,BONE,PERCUTANEOUS (Bilateral)    Anesthesia: General    Operative Findings: compression fracture from metastatic breast CA T10     Estimated Blood Loss: * No values recorded between 2/9/2023  1:54 PM and 2/9/2023  2:52 PM *    Estimated Blood Loss has been documented.         Specimens:   Specimen (24h ago, onward)      None            [id number]

## 2023-02-10 VITALS
RESPIRATION RATE: 16 BRPM | HEART RATE: 79 BPM | WEIGHT: 170.5 LBS | SYSTOLIC BLOOD PRESSURE: 111 MMHG | HEIGHT: 67 IN | DIASTOLIC BLOOD PRESSURE: 92 MMHG | TEMPERATURE: 97 F | OXYGEN SATURATION: 94 % | BODY MASS INDEX: 26.76 KG/M2

## 2023-02-10 NOTE — DISCHARGE SUMMARY
[facility] ([facility])  Neurosurgery  Discharge Summary      Patient Name: Carole Moore  MRN: [id number]  Admission Date: 2/9/2023  Hospital Length of Stay: 0 days  Discharge Date and Time: 2/9/2023  4:10 PM  Attending Physician: No att. providers found   Discharging Provider: Josse Pelayo MD  Primary Care Provider: Angel Emery MD    HPI:   No notes on file    Procedure(s) (LRB):  KYPHOPLASTY (Bilateral)  RADIOFREQUENCY ABLATION,BONE,PERCUTANEOUS (Bilateral)     Hospital Course: No notes on file    Goals of Care Treatment Preferences:  Code Status: Full Code      Consults:     Significant Diagnostic Studies: none    Pending Diagnostic Studies:       None          There are no hospital problems to display for this patient.     Discharged Condition: good     Disposition: Home or Self Care    Follow Up:   Follow-up Information       Zunilda Isbell PA-C. Go in 2 week(s).    Specialty: Neurosurgery  Contact information:  [address] [place] [address] DR Caro [place]STO [address] [phone]                           Patient Instructions:      Diet Adult Regular     Lifting restrictions   Order Comments: No lifting greater than 10lbs     Notify your health care provider if you experience any of the following:  temperature >100.4     Notify your health care provider if you experience any of the following:  persistent nausea and vomiting or diarrhea     Notify your health care provider if you experience any of the following:  severe uncontrolled pain     Notify your health care provider if you experience any of the following:  difficulty breathing or increased cough     Notify your health care provider if you experience any of the following:  severe persistent headache     Notify your health care provider if you experience any of the following:  persistent dizziness, light-headedness, or visual disturbances     Notify your health care provider if you experience any of the following:  increased confusion or  weakness     No dressing needed   Order Comments: Clean incision daily with soap and water     Medications:  Reconciled Home Medications:      Medication List        START taking these medications      methocarbamoL 750 MG Tab  Commonly known as: ROBAXIN  Take 1 tablet (750 mg total) by mouth 3 (three) times daily.     oxyCODONE-acetaminophen  mg per tablet  Commonly known as: PERCOCET  Take 1 tablet by mouth every 6 (six) hours as needed for Pain.  Replaces: oxyCODONE-acetaminophen 5-325 mg per tablet            CONTINUE taking these medications      anastrozole 1 mg Tab  Commonly known as: ARIMIDEX  Take 1 tablet (1 mg total) by mouth once daily.     atorvastatin 10 MG tablet  Commonly known as: LIPITOR  Take 1 tablet (10 mg total) by mouth once daily.     cetirizine 10 MG tablet  Commonly known as: ZYRTEC  Take 10 mg by mouth daily as needed.     fulvestrant 250 mg/5 mL injection  Commonly known as: FASLODEX  Inject 250 mg into the muscle every 30 days.     GADAVIST 10 mmol/10 mL (1 mmol/mL) Soln injection  Generic drug: gadobutroL  every 6 (six) months. Once yearly     IBRANCE 125 mg Tab  Generic drug: palbociclib  Take 125 mg by mouth As instructed. Take 1 tablet (125 mg) on days 1-21 of each 28 day cycle. Take with food.     meclizine 25 mg tablet  Commonly known as: ANTIVERT  Take 25 mg by mouth 3 (three) times daily as needed.     ondansetron 4 MG tablet  Commonly known as: ZOFRAN  Take 1 tablet (4 mg total) by mouth every 8 (eight) hours as needed for Nausea.     polyethylene glycol 17 gram/dose powder  Commonly known as: GLYCOLAX  Take 17 g by mouth once daily.     prochlorperazine 5 MG tablet  Commonly known as: COMPAZINE  Take 1 tablet (5 mg total) by mouth 4 (four) times daily as needed for Nausea.            STOP taking these medications      aspirin 81 MG Chew     baclofen 10 MG tablet  Commonly known as: LIORESAL     celecoxib 200 MG capsule  Commonly known as: CeleBREX     CLARITIN ORAL      HYDROcodone-acetaminophen 5-325 mg per tablet  Commonly known as: NORCO     oxyCODONE-acetaminophen 5-325 mg per tablet  Commonly known as: PERCOCET  Replaced by: oxyCODONE-acetaminophen  mg per tablet              Josse Pelayo MD  Neurosurgery  [facility] - Surgery ([facility])

## 2023-02-10 NOTE — DISCHARGE SUMMARY
[facility] ([facility])  Neurosurgery  Operative Note    SUMMARY      Date of Procedure: 2/9/2023     Procedure: Procedure(s) (LRB):  KYPHOPLASTY (Bilateral)  RADIOFREQUENCY ABLATION,BONE,PERCUTANEOUS (Bilateral)     Surgeon(s) and Role:     * Josse Pelayo MD - Primary    Assisting Surgeon: None    Pre-Operative Diagnosis: Pathological compression fracture of vertebra, initial encounter [M48.50XA]  Pain in thoracic spine [M54.6]  Metastatic cancer to spine [C79.51]    Post-Operative Diagnosis: Post-Op Diagnosis Codes:     * Pathological compression fracture of vertebra, initial encounter [M48.50XA]     * Pain in thoracic spine [M54.6]     * Metastatic cancer to spine [C79.51]    Anesthesia: General    Operative Findings (including complications, if any): compression fracture T10     Description of Technical Procedures:   T10 kyphoplasty   Radiofrequency ablation       Estimated Blood Loss (EBL): * No values recorded between 2/9/2023  1:54 PM and 2/9/2023  2:58 PM *           Specimens:   Specimen (24h ago, onward)      None             Implants:   Implant Name Type Inv. Item Serial No.  Lot No. LRB No. Used Action   CEMENT KYPHOPLASTY HI VISC - [id number]  CEMENT KYPHOPLASTY HI VISC  KYPHON [id number] Bilateral 1 Implanted              Condition: Good    Disposition: PACU - hemodynamically stable.    Attestation: I was present and scrubbed for the entire procedure.         Pt is a 57 y.o. female who presented with signs, symptoms and radiographic evidence of lower back pain with compression fracture at  T10 from metastatic breast CA. She previously underwent radiation for extensive metastatic spine disease several months prior   I watched her in clinic without any lasting relief   She had repeat MR which also showed posterior element involvement at T8 with mild thecal sac compression   She had axial back pain without radicular symptoms   The T8 lesion was not felt to be unstable     Given the  progression of the symptoms, it was explained to the patient that  they could possibly benefit from a less invasive alternative to pain control including vertebroplasty versus kyphoplasty with radiofrequency ablation to prevent further progression of the compression fracture and alleviate pain.     After explaining the surgical risks the patient as well as the family members were well apprised of all the objectives, benefits, risks and potential complications of the procedure, including but not limited to:  Worsening of current status, the possible need for further procedures, the risk of infection, headaches, CSF leak, possible spinal nerve injury resulting in paralysis, infection, injury to major vessels causing hemorrhage, stroke, loss of language function, coma and even death.  No assurance was given whether the symptoms would improve following the procedure.  Informed consent was obtained and secured in the chart after the patient and family voiced understanding of these risks and decided to proceed with the operation.     Description of procedure:  The patient was transferred to the operating room and was given preoperative prophylactic IV antibiotics.    Patient was then intubated with anesthesia and rolled prone onto the Mason table.  Yuki Hugger was placed over the upper body to maintain core control of the body temperature.  A Kenyon catheter was inserted prior.     Operative technique:  There area was prepped and draped in the standard sterile fashion.  A spinal needle was placed to codey the T10 level with biplanar fluoroscopy.     The cannulated transpedicular trocar from [employer]n was then placed bilaterally under fluoroscopic guidance.  AP and lateral fluoroscopy then confirmed the trocars were in the appropriate position.  Next the drill was used to gain access into the vertebral body     The ablation probes were introduced through the cannula and then the ablation treatment was completed       The  ablation probes were then removed.   The balloon tamps were inserted in the deflated position into each pedicle.  Using radiopaque material and fluoroscopic guidance the balloon tamps were inflated to nominal pressure approximately 200 psi. The balloon tamps were then deflated and removed.        approximately 8 cc of cement was injected until a good fill through the vertebral body was obtained      The cannula was then rotated and removed. Hemostasis obtained by holding manual pressure over the puncture site.  The patient remained prone on the table until the cement in the mixing bowl had hardened completely.  The percutaneous pin was then removed. Derma mcginnis was used  close the incisions at each of the entry sites.  Patient tolerated the procedure well and was turned supine onto the operative table following the procedure.  The patient was then extubated with anesthesia and taken the recovery room without any deficits noted.        Josse Pelayo MD  Neurosurgery     Disclaimer: This note was prepared using a voice recognition system and is likely to have sound alike errors within the text.

## 2023-02-13 ENCOUNTER — LAB VISIT (OUTPATIENT)
Dept: LAB | Facility: HOSPITAL | Age: 58
End: 2023-02-13
Attending: INTERNAL MEDICINE
Payer: MEDICARE

## 2023-02-13 ENCOUNTER — OFFICE VISIT (OUTPATIENT)
Dept: HEMATOLOGY/ONCOLOGY | Facility: CLINIC | Age: 58
End: 2023-02-13
Payer: MEDICARE

## 2023-02-13 ENCOUNTER — INFUSION (OUTPATIENT)
Dept: INFUSION THERAPY | Facility: HOSPITAL | Age: 58
End: 2023-02-13
Attending: INTERNAL MEDICINE
Payer: MEDICARE

## 2023-02-13 VITALS
HEART RATE: 82 BPM | BODY MASS INDEX: 27.51 KG/M2 | TEMPERATURE: 100 F | OXYGEN SATURATION: 97 % | SYSTOLIC BLOOD PRESSURE: 92 MMHG | DIASTOLIC BLOOD PRESSURE: 61 MMHG | HEIGHT: 67 IN | WEIGHT: 175.25 LBS

## 2023-02-13 VITALS
SYSTOLIC BLOOD PRESSURE: 129 MMHG | TEMPERATURE: 97 F | OXYGEN SATURATION: 98 % | RESPIRATION RATE: 18 BRPM | DIASTOLIC BLOOD PRESSURE: 69 MMHG | HEART RATE: 91 BPM

## 2023-02-13 DIAGNOSIS — Z17.0 MALIGNANT NEOPLASM OF CENTRAL PORTION OF RIGHT BREAST IN FEMALE, ESTROGEN RECEPTOR POSITIVE: Primary | ICD-10-CM

## 2023-02-13 DIAGNOSIS — C50.111 MALIGNANT NEOPLASM OF CENTRAL PORTION OF RIGHT BREAST IN FEMALE, ESTROGEN RECEPTOR POSITIVE: Primary | ICD-10-CM

## 2023-02-13 DIAGNOSIS — Z17.0 MALIGNANT NEOPLASM OF CENTRAL PORTION OF RIGHT BREAST IN FEMALE, ESTROGEN RECEPTOR POSITIVE: ICD-10-CM

## 2023-02-13 DIAGNOSIS — C50.111 MALIGNANT NEOPLASM OF CENTRAL PORTION OF RIGHT BREAST IN FEMALE, ESTROGEN RECEPTOR POSITIVE: ICD-10-CM

## 2023-02-13 DIAGNOSIS — C79.51 SECONDARY MALIGNANT NEOPLASM OF BONE: ICD-10-CM

## 2023-02-13 LAB
ALBUMIN SERPL BCP-MCNC: 3.5 G/DL (ref 3.5–5.2)
ALP SERPL-CCNC: 70 U/L (ref 55–135)
ALT SERPL W/O P-5'-P-CCNC: 20 U/L (ref 10–44)
ANION GAP SERPL CALC-SCNC: 8 MMOL/L (ref 8–16)
AST SERPL-CCNC: 20 U/L (ref 10–40)
BASOPHILS # BLD AUTO: 0.06 K/UL (ref 0–0.2)
BASOPHILS NFR BLD: 2.3 % (ref 0–1.9)
BILIRUB SERPL-MCNC: 0.7 MG/DL (ref 0.1–1)
BUN SERPL-MCNC: 12 MG/DL (ref 6–20)
CALCIUM SERPL-MCNC: 9.5 MG/DL (ref 8.7–10.5)
CHLORIDE SERPL-SCNC: 105 MMOL/L (ref 95–110)
CO2 SERPL-SCNC: 25 MMOL/L (ref 23–29)
CREAT SERPL-MCNC: 0.8 MG/DL (ref 0.5–1.4)
DIFFERENTIAL METHOD: ABNORMAL
EOSINOPHIL # BLD AUTO: 0 K/UL (ref 0–0.5)
EOSINOPHIL NFR BLD: 1.6 % (ref 0–8)
ERYTHROCYTE [DISTWIDTH] IN BLOOD BY AUTOMATED COUNT: 13.7 % (ref 11.5–14.5)
EST. GFR  (NO RACE VARIABLE): >60 ML/MIN/1.73 M^2
GLUCOSE SERPL-MCNC: 91 MG/DL (ref 70–110)
HCT VFR BLD AUTO: 37.3 % (ref 37–48.5)
HGB BLD-MCNC: 12.5 G/DL (ref 12–16)
IMM GRANULOCYTES # BLD AUTO: 0.01 K/UL (ref 0–0.04)
IMM GRANULOCYTES NFR BLD AUTO: 0.4 % (ref 0–0.5)
LYMPHOCYTES # BLD AUTO: 0.6 K/UL (ref 1–4.8)
LYMPHOCYTES NFR BLD: 23.4 % (ref 18–48)
MCH RBC QN AUTO: 32.4 PG (ref 27–31)
MCHC RBC AUTO-ENTMCNC: 33.5 G/DL (ref 32–36)
MCV RBC AUTO: 97 FL (ref 82–98)
MONOCYTES # BLD AUTO: 0.6 K/UL (ref 0.3–1)
MONOCYTES NFR BLD: 21.5 % (ref 4–15)
NEUTROPHILS # BLD AUTO: 1.3 K/UL (ref 1.8–7.7)
NEUTROPHILS NFR BLD: 50.8 % (ref 38–73)
NRBC BLD-RTO: 0 /100 WBC
PLATELET # BLD AUTO: 242 K/UL (ref 150–450)
PMV BLD AUTO: 8.6 FL (ref 9.2–12.9)
POTASSIUM SERPL-SCNC: 5.2 MMOL/L (ref 3.5–5.1)
PROT SERPL-MCNC: 6.9 G/DL (ref 6–8.4)
RBC # BLD AUTO: 3.86 M/UL (ref 4–5.4)
SODIUM SERPL-SCNC: 138 MMOL/L (ref 136–145)
WBC # BLD AUTO: 2.56 K/UL (ref 3.9–12.7)

## 2023-02-13 PROCEDURE — 63600175 PHARM REV CODE 636 W HCPCS: Mod: TB,JG,HCNC | Performed by: INTERNAL MEDICINE

## 2023-02-13 PROCEDURE — 1160F PR REVIEW ALL MEDS BY PRESCRIBER/CLIN PHARMACIST DOCUMENTED: ICD-10-PCS | Mod: HCNC,CPTII,S$GLB, | Performed by: NURSE PRACTITIONER

## 2023-02-13 PROCEDURE — 1159F PR MEDICATION LIST DOCUMENTED IN MEDICAL RECORD: ICD-10-PCS | Mod: HCNC,CPTII,S$GLB, | Performed by: NURSE PRACTITIONER

## 2023-02-13 PROCEDURE — 99214 PR OFFICE/OUTPT VISIT, EST, LEVL IV, 30-39 MIN: ICD-10-PCS | Mod: 25,HCNC,S$GLB, | Performed by: NURSE PRACTITIONER

## 2023-02-13 PROCEDURE — 96402 CHEMO HORMON ANTINEOPL SQ/IM: CPT | Mod: HCNC

## 2023-02-13 PROCEDURE — 1159F MED LIST DOCD IN RCRD: CPT | Mod: HCNC,CPTII,S$GLB, | Performed by: NURSE PRACTITIONER

## 2023-02-13 PROCEDURE — 3008F PR BODY MASS INDEX (BMI) DOCUMENTED: ICD-10-PCS | Mod: HCNC,CPTII,S$GLB, | Performed by: NURSE PRACTITIONER

## 2023-02-13 PROCEDURE — 3078F PR MOST RECENT DIASTOLIC BLOOD PRESSURE < 80 MM HG: ICD-10-PCS | Mod: HCNC,CPTII,S$GLB, | Performed by: NURSE PRACTITIONER

## 2023-02-13 PROCEDURE — 99999 PR PBB SHADOW E&M-EST. PATIENT-LVL IV: CPT | Mod: PBBFAC,HCNC,, | Performed by: NURSE PRACTITIONER

## 2023-02-13 PROCEDURE — 3078F DIAST BP <80 MM HG: CPT | Mod: HCNC,CPTII,S$GLB, | Performed by: NURSE PRACTITIONER

## 2023-02-13 PROCEDURE — 99214 OFFICE O/P EST MOD 30 MIN: CPT | Mod: 25,HCNC,S$GLB, | Performed by: NURSE PRACTITIONER

## 2023-02-13 PROCEDURE — 1160F RVW MEDS BY RX/DR IN RCRD: CPT | Mod: HCNC,CPTII,S$GLB, | Performed by: NURSE PRACTITIONER

## 2023-02-13 PROCEDURE — 3074F PR MOST RECENT SYSTOLIC BLOOD PRESSURE < 130 MM HG: ICD-10-PCS | Mod: HCNC,CPTII,S$GLB, | Performed by: NURSE PRACTITIONER

## 2023-02-13 PROCEDURE — 80053 COMPREHEN METABOLIC PANEL: CPT | Mod: HCNC | Performed by: INTERNAL MEDICINE

## 2023-02-13 PROCEDURE — 85025 COMPLETE CBC W/AUTO DIFF WBC: CPT | Mod: HCNC | Performed by: INTERNAL MEDICINE

## 2023-02-13 PROCEDURE — 3074F SYST BP LT 130 MM HG: CPT | Mod: HCNC,CPTII,S$GLB, | Performed by: NURSE PRACTITIONER

## 2023-02-13 PROCEDURE — 99999 PR PBB SHADOW E&M-EST. PATIENT-LVL IV: ICD-10-PCS | Mod: PBBFAC,HCNC,, | Performed by: NURSE PRACTITIONER

## 2023-02-13 PROCEDURE — 36415 COLL VENOUS BLD VENIPUNCTURE: CPT | Mod: HCNC | Performed by: INTERNAL MEDICINE

## 2023-02-13 PROCEDURE — 3008F BODY MASS INDEX DOCD: CPT | Mod: HCNC,CPTII,S$GLB, | Performed by: NURSE PRACTITIONER

## 2023-02-13 RX ORDER — ASPIRIN 81 MG/1
81 TABLET ORAL DAILY
COMMUNITY

## 2023-02-13 RX ORDER — LAMOTRIGINE 25 MG/1
500 TABLET ORAL
Status: COMPLETED | OUTPATIENT
Start: 2023-02-13 | End: 2023-02-13

## 2023-02-13 RX ADMIN — FULVESTRANT 500 MG: 50 INJECTION, SOLUTION INTRAMUSCULAR at 11:02

## 2023-02-13 NOTE — NURSING
Injections given without difficulties.Bandaids applied. Patient instructed to stay in the clinic for 15 minutes. Patient verbalized understanding and will notify nurse with any complaints.

## 2023-02-13 NOTE — PROGRESS NOTES
Subjective:       Patient ID: Carole Moore is a 57 y.o. female.    Chief Complaint: Review labs. Faslodex    HPI: 57 y.o female with metastatic breast cancer currently being treated with Ibrance days 1-21 of 28 day cycle, Faslodex Q 4 weeks, Zometa Q 3 months    Presenting today for lab review and consideration of Faslodex. Started new cycle Ibrance today. She reports feeling well following recent Kyphoplasty. Notes improvement in pain  Social History     Socioeconomic History    Marital status:    Tobacco Use    Smoking status: Never    Smokeless tobacco: Never   Substance and Sexual Activity    Alcohol use: Never    Drug use: No    Sexual activity: Not Currently     Birth control/protection: Surgical   Other Topics Concern    Are you pregnant or think you may be? No    Breast-feeding No     Social Determinants of Health     Financial Resource Strain: Low Risk     Difficulty of Paying Living Expenses: Not hard at all   Food Insecurity: No Food Insecurity    Worried About Running Out of Food in the Last Year: Never true    Ran Out of Food in the Last Year: Never true   Transportation Needs: No Transportation Needs    Lack of Transportation (Medical): No    Lack of Transportation (Non-Medical): No   Physical Activity: Inactive    Days of Exercise per Week: 0 days    Minutes of Exercise per Session: 0 min   Stress: No Stress Concern Present    Feeling of Stress : Only a little   Social Connections: Unknown    Frequency of Communication with Friends and Family: More than three times a week    Frequency of Social Gatherings with Friends and Family: Twice a week    Active Member of Clubs or Organizations: No    Attends Club or Organization Meetings: Never    Marital Status:    Housing Stability: Low Risk     Unable to Pay for Housing in the Last Year: No    Number of Places Lived in the Last Year: 1    Unstable Housing in the Last Year: No       Past Medical History:   Diagnosis Date     Antineoplastic chemotherapy induced anemia     Breast cancer 2016    right breast    Cancer     R Breast cancer    CVA (cerebral infarction)     Diverticulosis     Family history of colon cancer 10/30/2018    Her mother and father both had colon cancer.    Genetic testing of female 12/27/2016    Tee Hilario (28 genes) - NEGATIVE    Hyperlipidemia     Immunodeficiency due to chemotherapy 10/03/2022    Nausea after anesthesia     Paralysis     right side    PONV (postoperative nausea and vomiting)     Stroke 2001    R side weakness    Trouble in sleeping        Family History   Problem Relation Age of Onset    Breast cancer Paternal Aunt     Colon cancer Father     Colon cancer Mother     Melanoma Maternal Uncle     Skin cancer Maternal Uncle        Past Surgical History:   Procedure Laterality Date    AUGMENTATION OF BREAST Left 2017    BREAST SURGERY      COLONOSCOPY N/A 10/30/2018    Procedure: COLONOSCOPY;  Surgeon: Cosme Monson MD;  Location: [facility];  Service: Endoscopy;  Laterality: N/A;    CYSTOSCOPY WITH BIOPSY OF BLADDER N/A 11/29/2021    Procedure: CYSTOSCOPY, WITH BLADDER BIOPSY, WITH FULGURATION IF INDICATED;  Surgeon: Page Marcelo MD;  Location: [facility] OR;  Service: Urology;  Laterality: N/A;    FIXATION KYPHOPLASTY Bilateral 2/9/2023    Procedure: KYPHOPLASTY;  Surgeon: Josse Pelayo MD;  Location: [facility] OR;  Service: Neurosurgery;  Laterality: Bilateral;  Kyphoplasty T10 with ablation    HYSTERECTOMY  2007    maintains both ovaries, menorrhagia    MASTECTOMY Right 2017    MEDIPORT INSERTION, SINGLE      RADIOFREQUENCY ABLATION, BONE, PERCUTANEOUS Bilateral 2/9/2023    Procedure: RADIOFREQUENCY ABLATION,BONE,PERCUTANEOUS;  Surgeon: Josse Pelayo MD;  Location: [facility] OR;  Service: Neurosurgery;  Laterality: Bilateral;    TONSILLECTOMY         Review of Systems   Constitutional:  Negative for activity change, appetite change, chills, fatigue, fever and unexpected weight change.   HENT:   Negative for congestion, mouth sores, nosebleeds, sore throat, trouble swallowing and voice change.    Eyes:  Negative for visual disturbance.   Respiratory:  Negative for cough, chest tightness, shortness of breath and wheezing.    Cardiovascular:  Negative for chest pain and leg swelling.   Gastrointestinal:  Negative for abdominal distention, abdominal pain, blood in stool, constipation, diarrhea, nausea and vomiting.   Genitourinary:  Negative for difficulty urinating, dysuria and hematuria.   Musculoskeletal:  Negative for arthralgias, back pain and myalgias.   Skin:  Negative for pallor, rash and wound.   Neurological:  Negative for dizziness, syncope, weakness and headaches.   Hematological:  Negative for adenopathy. Does not bruise/bleed easily.   Psychiatric/Behavioral:  The patient is nervous/anxious.        Medication List with Changes/Refills   Current Medications    ANASTROZOLE (ARIMIDEX) 1 MG TAB    Take 1 tablet (1 mg total) by mouth once daily.    ASPIRIN (ECOTRIN) 81 MG EC TABLET    Take 81 mg by mouth once daily.    ATORVASTATIN (LIPITOR) 10 MG TABLET    Take 1 tablet (10 mg total) by mouth once daily.    CETIRIZINE (ZYRTEC) 10 MG TABLET    Take 10 mg by mouth daily as needed.    FULVESTRANT (FASLODEX) 250 MG/5 ML INJECTION    Inject 250 mg into the muscle every 30 days.    GADAVIST 10 MMOL/10 ML (1 MMOL/ML) SOLN INJECTION    every 6 (six) months. Once yearly    MECLIZINE (ANTIVERT) 25 MG TABLET    Take 25 mg by mouth 3 (three) times daily as needed.    METHOCARBAMOL (ROBAXIN) 750 MG TAB    Take 1 tablet (750 mg total) by mouth 3 (three) times daily.    ONDANSETRON (ZOFRAN) 4 MG TABLET    Take 1 tablet (4 mg total) by mouth every 8 (eight) hours as needed for Nausea.    OXYCODONE-ACETAMINOPHEN (PERCOCET)  MG PER TABLET    Take 1 tablet by mouth every 6 (six) hours as needed for Pain.    PALBOCICLIB 125 MG TAB    Take 125 mg by mouth As instructed. Take 1 tablet (125 mg) on days 1-21 of each  28 day cycle. Take with food.    POLYETHYLENE GLYCOL (GLYCOLAX) 17 GRAM/DOSE POWDER    Take 17 g by mouth once daily.    PROCHLORPERAZINE (COMPAZINE) 5 MG TABLET    Take 1 tablet (5 mg total) by mouth 4 (four) times daily as needed for Nausea.     Objective:     Vitals:    02/13/23 1038   BP: 92/61   Pulse: 82   Temp: 99.7 °F (37.6 °C)     Lab Results   Component Value Date    WBC 2.56 (L) 02/13/2023    HGB 12.5 02/13/2023    HCT 37.3 02/13/2023    MCV 97 02/13/2023     02/13/2023     BMP  Lab Results   Component Value Date     02/13/2023    K 5.2 (H) 02/13/2023     02/13/2023    CO2 25 02/13/2023    BUN 12 02/13/2023    CREATININE 0.8 02/13/2023    CALCIUM 9.5 02/13/2023    ANIONGAP 8 02/13/2023    EGFRNORACEVR >60 02/13/2023     Lab Results   Component Value Date    ALT 20 02/13/2023    AST 20 02/13/2023    ALKPHOS 70 02/13/2023    BILITOT 0.7 02/13/2023         Physical Exam  Vitals reviewed.   Constitutional:       Appearance: She is well-developed.   HENT:      Head: Normocephalic.      Right Ear: External ear normal.      Left Ear: External ear normal.   Eyes:      General: Lids are normal. No scleral icterus.        Right eye: No discharge.         Left eye: No discharge.      Conjunctiva/sclera: Conjunctivae normal.   Neck:      Thyroid: No thyroid mass.   Cardiovascular:      Rate and Rhythm: Normal rate and regular rhythm.      Heart sounds: Normal heart sounds.   Pulmonary:      Effort: Pulmonary effort is normal. No respiratory distress.      Breath sounds: Normal breath sounds. No wheezing or rales.   Abdominal:      General: There is no distension.   Genitourinary:     Comments: deferred  Musculoskeletal:         General: Normal range of motion.      Cervical back: Normal range of motion.   Skin:     General: Skin is warm and dry.   Neurological:      Mental Status: She is alert and oriented to person, place, and time.   Psychiatric:         Speech: Speech normal.         Behavior:  Behavior normal. Behavior is cooperative.         Thought Content: Thought content normal.        Assessment:     Problem List Items Addressed This Visit          Oncology    Malignant neoplasm of central portion of right breast in female, estrogen receptor positive     Labs overall stable. Note ANC 1.3. started Ibrance cycle today    Discussed with Primary Oncologist    Okay to proceed with Faslodex. Continue Ibrance. F/u 4 weeks with cbc, cmp for next Faslodex. Q 3 monthly Zometa due 4/2023    DEXA due 10/2023              Plan:     Malignant neoplasm of central portion of right breast in female, estrogen receptor positive            Med Onc Chart Routing      Follow up with physician . Please review appointments. will need to cancel/update appointments.   Follow up with [facility] 4 weeks. please schedule next appt 3/13. labs and faslodex   Infusion scheduling note    Injection scheduling note faslodex   Labs CBC and CMP   Lab interval:     Imaging None      Pharmacy appointment No pharmacy appointment needed      Other referrals No additional referrals needed          [facility]

## 2023-02-13 NOTE — ASSESSMENT & PLAN NOTE
Labs overall stable. Note ANC 1.3. started Ibrance cycle today    Discussed with Primary Oncologist    Okay to proceed with Faslodex. Continue Ibrance. F/u 4 weeks with cbc, cmp for next Faslodex. Q 3 monthly Zometa due 4/2023    DEXA due 10/2023

## 2023-02-14 ENCOUNTER — PATIENT MESSAGE (OUTPATIENT)
Dept: HEMATOLOGY/ONCOLOGY | Facility: CLINIC | Age: 58
End: 2023-02-14
Payer: MEDICARE

## 2023-02-21 NOTE — OP NOTE
[facility] ([facility])  Neurosurgery  Operative Note    SUMMARY      Date of Procedure: 2/9/2023     Procedure: Procedure(s) (LRB):  KYPHOPLASTY (Bilateral)  RADIOFREQUENCY ABLATION,BONE,PERCUTANEOUS (Bilateral)     Surgeon(s) and Role:     * Josse Pelayo MD - Primary    Assisting Surgeon: None    Pre-Operative Diagnosis: Pathological compression fracture of vertebra, initial encounter [M48.50XA]  Pain in thoracic spine [M54.6]  Metastatic cancer to spine [C79.51]    Post-Operative Diagnosis: Post-Op Diagnosis Codes:     * Pathological compression fracture of vertebra, initial encounter [M48.50XA]     * Pain in thoracic spine [M54.6]     * Metastatic cancer to spine [C79.51]    Anesthesia: General    Operative Findings (including complications, if any): compression fracture T10     Description of Technical Procedures:   T10 kyphoplasty   Radiofrequency ablation       Estimated Blood Loss (EBL): * No values recorded between 2/9/2023  1:54 PM and 2/9/2023  2:58 PM *           Specimens:   Specimen (24h ago, onward)      None             Implants:   Implant Name Type Inv. Item Serial No.  Lot No. LRB No. Used Action   CEMENT KYPHOPLASTY HI VISC - [id number]  CEMENT KYPHOPLASTY HI VISC  KYPHON [id number] Bilateral 1 Implanted              Condition: Good    Disposition: PACU - hemodynamically stable.    Attestation: I was present and scrubbed for the entire procedure.         Pt is a 57 y.o. female who presented with signs, symptoms and radiographic evidence of lower back pain with compression fracture at  T10 from metastatic breast CA. She previously underwent radiation for extensive metastatic spine disease several months prior   I watched her in clinic without any lasting relief   She had repeat MR which also showed posterior element involvement at T8 with mild thecal sac compression   She had axial back pain without radicular symptoms   The T8 lesion was not felt to be unstable     Given the  progression of the symptoms, it was explained to the patient that  they could possibly benefit from a less invasive alternative to pain control including vertebroplasty versus kyphoplasty with radiofrequency ablation to prevent further progression of the compression fracture and alleviate pain.     After explaining the surgical risks the patient as well as the family members were well apprised of all the objectives, benefits, risks and potential complications of the procedure, including but not limited to:  Worsening of current status, the possible need for further procedures, the risk of infection, headaches, CSF leak, possible spinal nerve injury resulting in paralysis, infection, injury to major vessels causing hemorrhage, stroke, loss of language function, coma and even death.  No assurance was given whether the symptoms would improve following the procedure.  Informed consent was obtained and secured in the chart after the patient and family voiced understanding of these risks and decided to proceed with the operation.     Description of procedure:  The patient was transferred to the operating room and was given preoperative prophylactic IV antibiotics.    Patient was then intubated with anesthesia and rolled prone onto the Mason table.  Yuki Hugger was placed over the upper body to maintain core control of the body temperature.  A Kenyon catheter was inserted prior.     Operative technique:  There area was prepped and draped in the standard sterile fashion.  A spinal needle was placed to codey the T10 level with biplanar fluoroscopy.     The cannulated transpedicular trocar from [employer] was then placed bilaterally under fluoroscopic guidance.  AP and lateral fluoroscopy then confirmed the trocars were in the appropriate position.  Next the drill was used to gain access into the vertebral body     The ablation probes were introduced through the cannula and then the ablation treatment was completed       The  ablation probes were then removed.   The balloon tamps were inserted in the deflated position into each pedicle.  Using radiopaque material and fluoroscopic guidance the balloon tamps were inflated to nominal pressure approximately 200 psi. The balloon tamps were then deflated and removed.        approximately 8 cc of cement was injected until a good fill through the vertebral body was obtained      The cannula was then rotated and removed. Hemostasis obtained by holding manual pressure over the puncture site.  The patient remained prone on the table until the cement in the mixing bowl had hardened completely.  The percutaneous pin was then removed. Derma mcginnis was used  close the incisions at each of the entry sites.  Patient tolerated the procedure well and was turned supine onto the operative table following the procedure.  The patient was then extubated with anesthesia and taken the recovery room without any deficits noted.        Josse Pelayo MD  Neurosurgery     Disclaimer: This note was prepared using a voice recognition system and is likely to have sound alike errors within the text.

## 2023-02-23 ENCOUNTER — OFFICE VISIT (OUTPATIENT)
Dept: NEUROSURGERY | Facility: CLINIC | Age: 58
End: 2023-02-23
Payer: MEDICARE

## 2023-02-23 VITALS
WEIGHT: 175 LBS | DIASTOLIC BLOOD PRESSURE: 63 MMHG | SYSTOLIC BLOOD PRESSURE: 92 MMHG | HEART RATE: 84 BPM | HEIGHT: 67 IN | BODY MASS INDEX: 27.47 KG/M2

## 2023-02-23 DIAGNOSIS — Z48.89 ENCOUNTER FOR POSTOPERATIVE WOUND CHECK: ICD-10-CM

## 2023-02-23 DIAGNOSIS — Z98.890 STATUS POST KYPHOPLASTY: Primary | ICD-10-CM

## 2023-02-23 PROCEDURE — 3008F PR BODY MASS INDEX (BMI) DOCUMENTED: ICD-10-PCS | Mod: HCNC,CPTII,S$GLB, | Performed by: PHYSICIAN ASSISTANT

## 2023-02-23 PROCEDURE — 99999 PR PBB SHADOW E&M-EST. PATIENT-LVL IV: CPT | Mod: PBBFAC,HCNC,, | Performed by: PHYSICIAN ASSISTANT

## 2023-02-23 PROCEDURE — 3074F PR MOST RECENT SYSTOLIC BLOOD PRESSURE < 130 MM HG: ICD-10-PCS | Mod: HCNC,CPTII,S$GLB, | Performed by: PHYSICIAN ASSISTANT

## 2023-02-23 PROCEDURE — 99999 PR PBB SHADOW E&M-EST. PATIENT-LVL IV: ICD-10-PCS | Mod: PBBFAC,HCNC,, | Performed by: PHYSICIAN ASSISTANT

## 2023-02-23 PROCEDURE — 99024 POSTOP FOLLOW-UP VISIT: CPT | Mod: HCNC,S$GLB,, | Performed by: PHYSICIAN ASSISTANT

## 2023-02-23 PROCEDURE — 3008F BODY MASS INDEX DOCD: CPT | Mod: HCNC,CPTII,S$GLB, | Performed by: PHYSICIAN ASSISTANT

## 2023-02-23 PROCEDURE — 3078F DIAST BP <80 MM HG: CPT | Mod: HCNC,CPTII,S$GLB, | Performed by: PHYSICIAN ASSISTANT

## 2023-02-23 PROCEDURE — 3078F PR MOST RECENT DIASTOLIC BLOOD PRESSURE < 80 MM HG: ICD-10-PCS | Mod: HCNC,CPTII,S$GLB, | Performed by: PHYSICIAN ASSISTANT

## 2023-02-23 PROCEDURE — 3074F SYST BP LT 130 MM HG: CPT | Mod: HCNC,CPTII,S$GLB, | Performed by: PHYSICIAN ASSISTANT

## 2023-02-23 PROCEDURE — 99024 PR POST-OP FOLLOW-UP VISIT: ICD-10-PCS | Mod: HCNC,S$GLB,, | Performed by: PHYSICIAN ASSISTANT

## 2023-02-23 NOTE — PROGRESS NOTES
Subjective:      Patient ID: Carole Moore is a 57 y.o. female.    HPI  Pain is at 1/10 today. Feels like the surgery was successful in providing ehr some relief.  No issues with the incision sites  Discontinued the oxy and robaxin on Friday.    Denies fever, HA, dizziness, BB issues, CP, new SOB, calf pain or any other symptoms at this time.       Objective:     Body mass index is 27.41 kg/m².  Vitals:    02/23/23 1300   BP: 92/63   Pulse: 84        Back:  None  Paraspinal muscle spasms   None  Pain with flexion and extention   WNL  Range of motion    Neg  Straight leg raise     Motor   Right Right Left Left  Level Group   5  5  L2 Hip flexor (Psoas)   5  5  L3 Leg extension (Quads)   5  5  L4 Dorsiflexion & foot inversion (Tibialis Anterior)   5  5  L5 Great toe extension ( EHL)   5  5  S1 Foot eversion (Gastroc, PL & PB)     Sensation  NL Decreased (R/L/BL) Level Sensation    X  L2 Anterio-medial thigh   X  L3 Medial thigh around knee   X  L4 Medial foot   X  L5 Dorsum foot   X  S1 Lateral foot     Reflex  2+  Patellar tendon (L4)   2+  Achilles tendon (S1)       No results found for this or any previous visit.     No results found for this or any previous visit.    Results for orders placed during the hospital encounter of 11/18/22    X-Ray Lumbar Spine AP And Lateral    Narrative  EXAMINATION:  Three radiographic views of the LUMBAR SPINE.    CLINICAL HISTORY:  Other intervertebral disc degeneration, lumbar region    TECHNIQUE:  3 radiographic views of the LUMBAR SPINE.    COMPARISON:  Lumbar spine radiograph 09/21/2022.    FINDINGS:  Three views of the lumbar spine demonstrate 5 non-rib-bearing lumbar vertebral bodies.  There is normal alignment.  There is no spondylolisthesis.  There is no loss of vertebral body or disc space height. There is mild lower lumbar facet arthropathy.    Impression  Mild lower lumbar facet arthropathy.      Electronically signed by: Alpesh Reddy  MD  Date:    11/18/2022  Time:    13:10            Lab Results   Component Value Date    WBC 2.56 (L) 02/13/2023    HCT 37.3 02/13/2023           INDEPENDENT INTERPRETATION OF TEST:  ***    Relevant imaging results reviewed and interpreted by me, discussed with the patient and / or family today.  Assessment:     No diagnosis found.  Plan:     There are no diagnoses linked to this encounter.    Zunilda Isbell PA-C  [facility]

## 2023-02-23 NOTE — PROGRESS NOTES
Subjective:      Patient ID: Carloe Moore is a 57 y.o. female.    Chief Complaint: Post-op Evaluation (Pt presents to the clinic today s/p Kypho (DOS 2/9/2023))    HPI  The patient is here today for postop evaluation #1.  Pain is at 1/10 today.   Feels like the surgery was very successful in providing back pain relief.  No issues with the incision sites.  Discontinued the oxy and robaxin on Friday.     Denies fever, HA, dizziness, B/B changes, CP, new SOB, calf pain or any other symptoms at this time.     Date of Procedure: 2/9/2023    Procedure: Procedure(s) (LRB):  KYPHOPLASTY (Bilateral)  RADIOFREQUENCY ABLATION,BONE,PERCUTANEOUS (Bilateral)    Operative Findings (including complications, if any): compression fracture T10    Description of Technical Procedures:   T10 kyphoplasty   Radiofrequency ablation         Objective:            General    Constitutional: She is oriented to person, place, and time. She appears well-developed and well-nourished.   Cardiovascular:  Normal rate and regular rhythm.            Pulmonary/Chest: Effort normal.   Abdominal: Soft.   Neurological: She is alert and oriented to person, place, and time.   Psychiatric: She has a normal mood and affect. Her behavior is normal.           Neurosurgery Exam:  Vitals reviewed  Incision(s) CDI  Moves all 4 ext well            Assessment:       Encounter Diagnoses   Name Primary?    Encounter for postoperative wound check     Status post kyphoplasty Yes          Plan:       Carole was seen today for post-op evaluation.    Diagnoses and all orders for this visit:    Status post kyphoplasty  -     X-Ray Thoracic Spine AP Lateral; Future    Encounter for postoperative wound check  -     X-Ray Thoracic Spine AP Lateral; Future      Patient is doing well following recent kyphoplasty.  She will follow-up in April to check on her progress.   Will have her complete x-rays at that time.  Please reach out with any changes        Zunilda Isbell  DIANE

## 2023-03-03 ENCOUNTER — PATIENT MESSAGE (OUTPATIENT)
Dept: HEMATOLOGY/ONCOLOGY | Facility: CLINIC | Age: 58
End: 2023-03-03
Payer: MEDICARE

## 2023-03-06 ENCOUNTER — SPECIALTY PHARMACY (OUTPATIENT)
Dept: PHARMACY | Facility: CLINIC | Age: 58
End: 2023-03-06
Payer: MEDICARE

## 2023-03-06 NOTE — TELEPHONE ENCOUNTER
Specialty Pharmacy - Refill Coordination    Specialty Medication Orders Linked to Encounter      Flowsheet Row Most Recent Value   Medication #1 palbociclib 125 mg Tab (Order#[id number], Rx#[id number])            Refill Questions - Documented Responses      Flowsheet Row Most Recent Value   Patient Availability and HIPAA Verification    Does patient want to proceed with activity? Yes   HIPAA/medical authority confirmed? Yes   Relationship to patient of person spoken to? Self   Refill Screening Questions    Changes to allergies? No   Changes to medications? No   New conditions since last clinic visit? No   Unplanned office visit, urgent care, ED, or hospital admission in the last 4 weeks? No   How does patient/caregiver feel medication is working? Good   Financial problems or insurance changes? No   How many doses of your specialty medications were missed in the last 4 weeks? 0   When does the patient need to receive the medication? 03/12/23   Refill Delivery Questions    How will the patient receive the medication? MED[facility]   When does the patient need to receive the medication? 03/12/23   Shipping Address Home   Address in [facility] confirmed and updated if neccessary? Yes   Expected Copay ($) 0   Is the patient able to afford the medication copay? Yes   Payment Method zero copay   Days supply of Refill 28   Supplies needed? No supplies needed   Refill activity completed? Yes   Refill activity plan Refill scheduled   Shipment/Pickup Date: 03/07/23            Current Outpatient Medications   Medication Sig    anastrozole (ARIMIDEX) 1 mg Tab Take 1 tablet (1 mg total) by mouth once daily.    aspirin (ECOTRIN) 81 MG EC tablet Take 81 mg by mouth once daily.    atorvastatin (LIPITOR) 10 MG tablet Take 1 tablet (10 mg total) by mouth once daily.    cetirizine (ZYRTEC) 10 MG tablet Take 10 mg by mouth daily as needed.    fulvestrant (FASLODEX) 250 mg/5 mL injection Inject 250 mg into the muscle every 30 days.     GADAVIST 10 mmol/10 mL (1 mmol/mL) Soln injection every 6 (six) months. Once yearly    meclizine (ANTIVERT) 25 mg tablet Take 25 mg by mouth 3 (three) times daily as needed.    methocarbamoL (ROBAXIN) 750 MG Tab Take 1 tablet (750 mg total) by mouth 3 (three) times daily. (Patient not taking: Reported on 2/23/2023)    ondansetron (ZOFRAN) 4 MG tablet Take 1 tablet (4 mg total) by mouth every 8 (eight) hours as needed for Nausea.    oxyCODONE-acetaminophen (PERCOCET)  mg per tablet Take 1 tablet by mouth every 6 (six) hours as needed for Pain. (Patient not taking: Reported on 2/23/2023)    palbociclib 125 mg Tab Take 125 mg by mouth As instructed. Take 1 tablet (125 mg) on days 1-21 of each 28 day cycle. Take with food.    polyethylene glycol (GLYCOLAX) 17 gram/dose powder Take 17 g by mouth once daily.    prochlorperazine (COMPAZINE) 5 MG tablet Take 1 tablet (5 mg total) by mouth 4 (four) times daily as needed for Nausea.   Last reviewed on 2/23/2023  1:06 PM by Maggi Jo MA    Review of patient's allergies indicates:  No Known Allergies Last reviewed on  2/23/2023 1:02 PM by Maggi Jo      Tasks added this encounter   4/2/2023 - Refill Call (Auto Added)   Tasks due within next 3 months   3/27/2023 - Clinical - Follow Up Assesement (180 day)     [facility]ia [facility] - Specialty Pharmacy  [address] [facility] [address]  Phone: [phone]  Fax: [phone]

## 2023-03-09 ENCOUNTER — OFFICE VISIT (OUTPATIENT)
Dept: CARDIOLOGY | Facility: CLINIC | Age: 58
End: 2023-03-09
Payer: MEDICARE

## 2023-03-09 VITALS
BODY MASS INDEX: 26.85 KG/M2 | SYSTOLIC BLOOD PRESSURE: 110 MMHG | HEIGHT: 67 IN | DIASTOLIC BLOOD PRESSURE: 64 MMHG | WEIGHT: 171.06 LBS | HEART RATE: 89 BPM | OXYGEN SATURATION: 98 %

## 2023-03-09 DIAGNOSIS — Z17.0 MALIGNANT NEOPLASM OF CENTRAL PORTION OF RIGHT BREAST IN FEMALE, ESTROGEN RECEPTOR POSITIVE: ICD-10-CM

## 2023-03-09 DIAGNOSIS — I69.351 HEMIPARESIS AFFECTING RIGHT SIDE AS LATE EFFECT OF CEREBROVASCULAR ACCIDENT: ICD-10-CM

## 2023-03-09 DIAGNOSIS — C50.111 MALIGNANT NEOPLASM OF CENTRAL PORTION OF RIGHT BREAST IN FEMALE, ESTROGEN RECEPTOR POSITIVE: ICD-10-CM

## 2023-03-09 DIAGNOSIS — Z86.73 HISTORY OF CVA (CEREBROVASCULAR ACCIDENT): Primary | ICD-10-CM

## 2023-03-09 DIAGNOSIS — E78.2 MIXED HYPERLIPIDEMIA: ICD-10-CM

## 2023-03-09 PROCEDURE — 1159F PR MEDICATION LIST DOCUMENTED IN MEDICAL RECORD: ICD-10-PCS | Mod: HCNC,CPTII,S$GLB, | Performed by: STUDENT IN AN ORGANIZED HEALTH CARE EDUCATION/TRAINING PROGRAM

## 2023-03-09 PROCEDURE — 99999 PR PBB SHADOW E&M-EST. PATIENT-LVL III: CPT | Mod: PBBFAC,HCNC,, | Performed by: STUDENT IN AN ORGANIZED HEALTH CARE EDUCATION/TRAINING PROGRAM

## 2023-03-09 PROCEDURE — 99999 PR PBB SHADOW E&M-EST. PATIENT-LVL III: ICD-10-PCS | Mod: PBBFAC,HCNC,, | Performed by: STUDENT IN AN ORGANIZED HEALTH CARE EDUCATION/TRAINING PROGRAM

## 2023-03-09 PROCEDURE — 99214 PR OFFICE/OUTPT VISIT, EST, LEVL IV, 30-39 MIN: ICD-10-PCS | Mod: HCNC,S$GLB,, | Performed by: STUDENT IN AN ORGANIZED HEALTH CARE EDUCATION/TRAINING PROGRAM

## 2023-03-09 PROCEDURE — 3078F DIAST BP <80 MM HG: CPT | Mod: HCNC,CPTII,S$GLB, | Performed by: STUDENT IN AN ORGANIZED HEALTH CARE EDUCATION/TRAINING PROGRAM

## 2023-03-09 PROCEDURE — 3008F BODY MASS INDEX DOCD: CPT | Mod: HCNC,CPTII,S$GLB, | Performed by: STUDENT IN AN ORGANIZED HEALTH CARE EDUCATION/TRAINING PROGRAM

## 2023-03-09 PROCEDURE — 1159F MED LIST DOCD IN RCRD: CPT | Mod: HCNC,CPTII,S$GLB, | Performed by: STUDENT IN AN ORGANIZED HEALTH CARE EDUCATION/TRAINING PROGRAM

## 2023-03-09 PROCEDURE — 3074F PR MOST RECENT SYSTOLIC BLOOD PRESSURE < 130 MM HG: ICD-10-PCS | Mod: HCNC,CPTII,S$GLB, | Performed by: STUDENT IN AN ORGANIZED HEALTH CARE EDUCATION/TRAINING PROGRAM

## 2023-03-09 PROCEDURE — 3074F SYST BP LT 130 MM HG: CPT | Mod: HCNC,CPTII,S$GLB, | Performed by: STUDENT IN AN ORGANIZED HEALTH CARE EDUCATION/TRAINING PROGRAM

## 2023-03-09 PROCEDURE — 3008F PR BODY MASS INDEX (BMI) DOCUMENTED: ICD-10-PCS | Mod: HCNC,CPTII,S$GLB, | Performed by: STUDENT IN AN ORGANIZED HEALTH CARE EDUCATION/TRAINING PROGRAM

## 2023-03-09 PROCEDURE — 99214 OFFICE O/P EST MOD 30 MIN: CPT | Mod: HCNC,S$GLB,, | Performed by: STUDENT IN AN ORGANIZED HEALTH CARE EDUCATION/TRAINING PROGRAM

## 2023-03-09 PROCEDURE — 3078F PR MOST RECENT DIASTOLIC BLOOD PRESSURE < 80 MM HG: ICD-10-PCS | Mod: HCNC,CPTII,S$GLB, | Performed by: STUDENT IN AN ORGANIZED HEALTH CARE EDUCATION/TRAINING PROGRAM

## 2023-03-09 NOTE — PROGRESS NOTES
Section of Cardiology                  Cardiac Clinic Note    Chief Complaint/Reason for consultation:  Follow-up      HPI:   Carole Moore is a 57 y.o. female with h/o with HLD, CVA 2001, breast cancer s/p radiation, chemotherapy, surgery who comes into Cardiology Clinic for follow-up.    3/31/2022  Patient was seen Dr. Mei in cardiology clinic.  Denies history of needing lasix in the past,no CHF history.  Right leg and arm weakness and speech difficulty since CVA  Smoked for 5 years many years ago. Denies ETOH abuse.   She exercises with bike riding.   Diet: trying to watch her eating habits  Denies chest pain, orthopnea or PND, LE swelling.       3/9/23  Comes in with   Breast cancer with now mets to the spine  Still right sided weakness  Has not active   Difficult to ride the bike  Had kyphoplasty recently   Lost 4 lbs since 1/23    Denies palpitations, syncope, dizziness           EKG 11/2021 normal sinus rhythm, sinus arrhythmia, LAFB, possible lateral infarct, 64 bpm,  ms,  ms    ECHO  2020  CONCLUSIONS     1 - Normal left ventricular systolic function (EF 60-65%).     2 - Normal left ventricular diastolic function.     3 - Normal right ventricular systolic function .     STRESS TEST    [facility]      ROS: All 10 systems reviewed. Please refer to the HPI for pertinent positives. All other systems negative.     Past Medical History  Past Medical History:   Diagnosis Date    Antineoplastic chemotherapy induced anemia     Breast cancer 2016    right breast    Cancer     R Breast cancer    CVA (cerebral infarction)     Diverticulosis     Family history of colon cancer 10/30/2018    Her mother and father both had colon cancer.    Genetic testing of female 12/27/2016    [employer] (28 genes) - NEGATIVE    Hyperlipidemia     Immunodeficiency due to chemotherapy 10/03/2022    Nausea after anesthesia     Paralysis     right side    PONV (postoperative nausea and vomiting)      Stroke 2001    R side weakness    Trouble in sleeping        Surgical History  Past Surgical History:   Procedure Laterality Date    AUGMENTATION OF BREAST Left 2017    BREAST SURGERY      COLONOSCOPY N/A 10/30/2018    Procedure: COLONOSCOPY;  Surgeon: Cosme Monson MD;  Location: [facility];  Service: Endoscopy;  Laterality: N/A;    CYSTOSCOPY WITH BIOPSY OF BLADDER N/A 11/29/2021    Procedure: CYSTOSCOPY, WITH BLADDER BIOPSY, WITH FULGURATION IF INDICATED;  Surgeon: Page Marcelo MD;  Location: [facility] OR;  Service: Urology;  Laterality: N/A;    FIXATION KYPHOPLASTY Bilateral 2/9/2023    Procedure: KYPHOPLASTY;  Surgeon: Josse Pelayo MD;  Location: [facility] OR;  Service: Neurosurgery;  Laterality: Bilateral;  Kyphoplasty T10 with ablation    HYSTERECTOMY  2007    maintains both ovaries, menorrhagia    MASTECTOMY Right 2017    MEDIPORT INSERTION, SINGLE      RADIOFREQUENCY ABLATION, BONE, PERCUTANEOUS Bilateral 2/9/2023    Procedure: RADIOFREQUENCY ABLATION,BONE,PERCUTANEOUS;  Surgeon: Josse Pelayo MD;  Location: [facility];  Service: Neurosurgery;  Laterality: Bilateral;    TONSILLECTOMY            Allergies:   Review of patient's allergies indicates:  No Known Allergies    Social History:  Social History     Socioeconomic History    Marital status:    Tobacco Use    Smoking status: Never    Smokeless tobacco: Never   Substance and Sexual Activity    Alcohol use: Never    Drug use: No    Sexual activity: Not Currently     Birth control/protection: Surgical   Other Topics Concern    Are you pregnant or think you may be? No    Breast-feeding No     Social Determinants of Health     Financial Resource Strain: Low Risk     Difficulty of Paying Living Expenses: Not hard at all   Food Insecurity: No Food Insecurity    Worried About Running Out of Food in the Last Year: Never true    Ran Out of Food in the Last Year: Never true   Transportation Needs: No Transportation Needs    Lack of Transportation  (Medical): No    Lack of Transportation (Non-Medical): No   Physical Activity: Inactive    Days of Exercise per Week: 0 days    Minutes of Exercise per Session: 0 min   Stress: No Stress Concern Present    Feeling of Stress : Only a little   Social Connections: Unknown    Frequency of Communication with Friends and Family: More than three times a week    Frequency of Social Gatherings with Friends and Family: Twice a week    Active Member of Clubs or Organizations: No    Attends Club or Organization Meetings: Never    Marital Status:    Housing Stability: Low Risk     Unable to Pay for Housing in the Last Year: No    Number of Places Lived in the Last Year: 1    Unstable Housing in the Last Year: No       Family History:  family history includes Breast cancer in her paternal aunt; Colon cancer in her father and mother; Melanoma in her maternal uncle; Skin cancer in her maternal uncle.    Home Medications:  Current Outpatient Medications on File Prior to Visit   Medication Sig Dispense Refill    anastrozole (ARIMIDEX) 1 mg Tab Take 1 tablet (1 mg total) by mouth once daily. 90 tablet 3    aspirin (ECOTRIN) 81 MG EC tablet Take 81 mg by mouth once daily.      atorvastatin (LIPITOR) 10 MG tablet Take 1 tablet (10 mg total) by mouth once daily. 90 tablet 4    cetirizine (ZYRTEC) 10 MG tablet Take 10 mg by mouth daily as needed.      fulvestrant (FASLODEX) 250 mg/5 mL injection Inject 250 mg into the muscle every 30 days.      GADAVIST 10 mmol/10 mL (1 mmol/mL) Soln injection every 6 (six) months. Once yearly      meclizine (ANTIVERT) 25 mg tablet Take 25 mg by mouth 3 (three) times daily as needed.      methocarbamoL (ROBAXIN) 750 MG Tab Take 1 tablet (750 mg total) by mouth 3 (three) times daily. 60 tablet 0    ondansetron (ZOFRAN) 4 MG tablet Take 1 tablet (4 mg total) by mouth every 8 (eight) hours as needed for Nausea. 20 tablet 11    oxyCODONE-acetaminophen (PERCOCET)  mg per tablet Take 1 tablet by  "mouth every 6 (six) hours as needed for Pain. 30 tablet 0    palbociclib 125 mg Tab Take 1 tablet (125 mg) by mouth on days 1-21 of each 28 day cycle. Take with food. 21 tablet 11    polyethylene glycol (GLYCOLAX) 17 gram/dose powder Take 17 g by mouth once daily. 510 g 11    prochlorperazine (COMPAZINE) 5 MG tablet Take 1 tablet (5 mg total) by mouth 4 (four) times daily as needed for Nausea. 20 tablet 11     No current facility-administered medications on file prior to visit.       Physical exam:  /64   Pulse 89   Ht 5' 7" (1.702 m)   Wt 77.6 kg (171 lb 1.2 oz)   LMP 10/11/2009   SpO2 98%   BMI 26.79 kg/m²         General: Pt is a 57 y.o. year old female who is AAOx3, in NAD, is pleasant, well nourished, looks stated age  HEENT: PERRL, EOMI, Oral mucosa pink & moist  CVS  No abnormal cardiac pulsations noted on inspection. JVP not raised. The apical impulse is normal on palpation, and is located in the left 5th intercostal space in the mid - clavicular line. No palpable thrills or abnormal pulsations noted. RR, S1 - S2 heard, no murmurs, rubs or gallops appreciated.   PUL : CTA B/L. No wheezes/crackles heard   ABD : BS +, soft. No tenderness elicited   LE : No C/C/E. Distal Pulses palpable B/L. Right UE with paralysis, weakness in right LE        LABS:    Chemistry:   Lab Results   Component Value Date     02/13/2023    K 5.2 (H) 02/13/2023     02/13/2023    CO2 25 02/13/2023    BUN 12 02/13/2023    CREATININE 0.8 02/13/2023    CALCIUM 9.5 02/13/2023     Cardiac Markers:   Lab Results   Component Value Date    TROPONINI 0.023 01/10/2017     Cardiac Markers (Last 3):   Lab Results   Component Value Date    TROPONINI 0.023 01/10/2017    TROPONINI 0.012 12/27/2016    TROPONINI 0.007 12/13/2016     CBC:   Lab Results   Component Value Date    WBC 2.56 (L) 02/13/2023    HGB 12.5 02/13/2023    HCT 37.3 02/13/2023    MCV 97 02/13/2023     02/13/2023     Lipids:   Lab Results   Component " Value Date    CHOL 165 09/15/2021    TRIG 74 09/15/2021    HDL 59 09/15/2021     Coagulation: No results found for: PT, INR, APTT        Assessment    1. History of CVA (cerebrovascular accident)    2. Hemiparesis affecting right side as late effect of cerebrovascular accident    3. Mixed hyperlipidemia    4. Malignant neoplasm of central portion of right breast in female, estrogen receptor positive           Plan:    History of CVA  Residual right-sided weakness  Continue aspirin, statin    HLD  91 as of 9/21  Continue statin  Check lipid panel     History of breast cancer  S/p chemotherapy, radiation, surgery  Has mets to spine, receiving ibrance  Continue to follow-up with Hem/Onc      This note was prepared using voice recognition system and is likely to have sound alike errors that may have been overlooked even after proofreading.     I have reviewed all pertinent chart information.  Plans and recommendations have been formulated under my direct supervision. All questions answered and patient voiced understanding.   If symptoms persist go to the ED.    RTC in 1 year        Jemma Melendez MD  Cardiology

## 2023-03-13 ENCOUNTER — INFUSION (OUTPATIENT)
Dept: INFUSION THERAPY | Facility: HOSPITAL | Age: 58
End: 2023-03-13
Attending: INTERNAL MEDICINE
Payer: MEDICARE

## 2023-03-13 ENCOUNTER — OFFICE VISIT (OUTPATIENT)
Dept: HEMATOLOGY/ONCOLOGY | Facility: CLINIC | Age: 58
End: 2023-03-13
Payer: MEDICARE

## 2023-03-13 VITALS
HEIGHT: 67 IN | HEART RATE: 86 BPM | TEMPERATURE: 97 F | BODY MASS INDEX: 26.75 KG/M2 | SYSTOLIC BLOOD PRESSURE: 107 MMHG | DIASTOLIC BLOOD PRESSURE: 75 MMHG | OXYGEN SATURATION: 98 % | WEIGHT: 170.44 LBS

## 2023-03-13 VITALS
TEMPERATURE: 98 F | OXYGEN SATURATION: 98 % | DIASTOLIC BLOOD PRESSURE: 56 MMHG | BODY MASS INDEX: 26.75 KG/M2 | HEART RATE: 77 BPM | HEIGHT: 67 IN | WEIGHT: 170.44 LBS | SYSTOLIC BLOOD PRESSURE: 117 MMHG | RESPIRATION RATE: 18 BRPM

## 2023-03-13 DIAGNOSIS — Z17.0 MALIGNANT NEOPLASM OF CENTRAL PORTION OF RIGHT BREAST IN FEMALE, ESTROGEN RECEPTOR POSITIVE: ICD-10-CM

## 2023-03-13 DIAGNOSIS — T45.1X5A IMMUNODEFICIENCY DUE TO CHEMOTHERAPY: ICD-10-CM

## 2023-03-13 DIAGNOSIS — Z79.899 IMMUNODEFICIENCY DUE TO CHEMOTHERAPY: ICD-10-CM

## 2023-03-13 DIAGNOSIS — Z17.0 MALIGNANT NEOPLASM OF CENTRAL PORTION OF RIGHT BREAST IN FEMALE, ESTROGEN RECEPTOR POSITIVE: Primary | ICD-10-CM

## 2023-03-13 DIAGNOSIS — C50.111 MALIGNANT NEOPLASM OF CENTRAL PORTION OF RIGHT BREAST IN FEMALE, ESTROGEN RECEPTOR POSITIVE: Primary | ICD-10-CM

## 2023-03-13 DIAGNOSIS — D84.821 IMMUNODEFICIENCY DUE TO CHEMOTHERAPY: ICD-10-CM

## 2023-03-13 DIAGNOSIS — C50.111 MALIGNANT NEOPLASM OF CENTRAL PORTION OF RIGHT BREAST IN FEMALE, ESTROGEN RECEPTOR POSITIVE: ICD-10-CM

## 2023-03-13 PROCEDURE — 3074F PR MOST RECENT SYSTOLIC BLOOD PRESSURE < 130 MM HG: ICD-10-PCS | Mod: HCNC,CPTII,S$GLB, | Performed by: NURSE PRACTITIONER

## 2023-03-13 PROCEDURE — 3078F DIAST BP <80 MM HG: CPT | Mod: HCNC,CPTII,S$GLB, | Performed by: NURSE PRACTITIONER

## 2023-03-13 PROCEDURE — 99214 PR OFFICE/OUTPT VISIT, EST, LEVL IV, 30-39 MIN: ICD-10-PCS | Mod: 25,HCNC,S$GLB, | Performed by: NURSE PRACTITIONER

## 2023-03-13 PROCEDURE — 1159F PR MEDICATION LIST DOCUMENTED IN MEDICAL RECORD: ICD-10-PCS | Mod: HCNC,CPTII,S$GLB, | Performed by: NURSE PRACTITIONER

## 2023-03-13 PROCEDURE — 3078F PR MOST RECENT DIASTOLIC BLOOD PRESSURE < 80 MM HG: ICD-10-PCS | Mod: HCNC,CPTII,S$GLB, | Performed by: NURSE PRACTITIONER

## 2023-03-13 PROCEDURE — 3008F BODY MASS INDEX DOCD: CPT | Mod: HCNC,CPTII,S$GLB, | Performed by: NURSE PRACTITIONER

## 2023-03-13 PROCEDURE — 3074F SYST BP LT 130 MM HG: CPT | Mod: HCNC,CPTII,S$GLB, | Performed by: NURSE PRACTITIONER

## 2023-03-13 PROCEDURE — 1160F PR REVIEW ALL MEDS BY PRESCRIBER/CLIN PHARMACIST DOCUMENTED: ICD-10-PCS | Mod: HCNC,CPTII,S$GLB, | Performed by: NURSE PRACTITIONER

## 2023-03-13 PROCEDURE — 99214 OFFICE O/P EST MOD 30 MIN: CPT | Mod: 25,HCNC,S$GLB, | Performed by: NURSE PRACTITIONER

## 2023-03-13 PROCEDURE — 3008F PR BODY MASS INDEX (BMI) DOCUMENTED: ICD-10-PCS | Mod: HCNC,CPTII,S$GLB, | Performed by: NURSE PRACTITIONER

## 2023-03-13 PROCEDURE — 63600175 PHARM REV CODE 636 W HCPCS: Mod: JZ,TB,JG,HCNC | Performed by: INTERNAL MEDICINE

## 2023-03-13 PROCEDURE — 1160F RVW MEDS BY RX/DR IN RCRD: CPT | Mod: HCNC,CPTII,S$GLB, | Performed by: NURSE PRACTITIONER

## 2023-03-13 PROCEDURE — 96402 CHEMO HORMON ANTINEOPL SQ/IM: CPT | Mod: HCNC

## 2023-03-13 PROCEDURE — 99999 PR PBB SHADOW E&M-EST. PATIENT-LVL III: ICD-10-PCS | Mod: PBBFAC,HCNC,, | Performed by: NURSE PRACTITIONER

## 2023-03-13 PROCEDURE — 1159F MED LIST DOCD IN RCRD: CPT | Mod: HCNC,CPTII,S$GLB, | Performed by: NURSE PRACTITIONER

## 2023-03-13 PROCEDURE — 99999 PR PBB SHADOW E&M-EST. PATIENT-LVL III: CPT | Mod: PBBFAC,HCNC,, | Performed by: NURSE PRACTITIONER

## 2023-03-13 RX ORDER — LAMOTRIGINE 25 MG/1
500 TABLET ORAL
Status: COMPLETED | OUTPATIENT
Start: 2023-03-13 | End: 2023-03-13

## 2023-03-13 RX ADMIN — FULVESTRANT 500 MG: 50 INJECTION, SOLUTION INTRAMUSCULAR at 09:03

## 2023-03-13 NOTE — ASSESSMENT & PLAN NOTE
CMP unremarkable. Awaiting CBC. Lab having difficulty processing CBC's today    Will review with Primary Oncologist once labs resulted   --update. Critical WBC noted. ANC 0.7. Hold Ibrance. F/u 1 week with cbc    Okay to proceed with Faslodex. Awaiting labs to determine if okay to resume Ibrance. Plan to arrange 4 weeks f/u with cbc, cmp for next Faslodex/Zometa due around 4/19/2023.     DEXA due 10/2023

## 2023-03-13 NOTE — DISCHARGE INSTRUCTIONS
THANKS FOR ALLOWING ME TO CARE FOR YOU TODAY!!! ~Le          THANKS FOR CHOOSING [facility]!!!      [facility]  [address] [facility]  [id number] [place] [address] Drive  [phone] phone     [phone] fax  Hours of Operation: Monday- Friday 8:00am- 5:00pm  After hours phone  [phone]  Hematology / Oncology Physicians on call      DIANE [address] Dr[address] Dr., NP Sydney Prescott, GIULIANO Valdivia, MAREK Hubbard    Please call with any concerns regarding your appointment today.

## 2023-03-13 NOTE — PROGRESS NOTES
Subjective:       Patient ID: Carole Moore is a 57 y.o. female.    Chief Complaint: Review labs. Faslodex    HPI: 57 y.o female with metastatic breast cancer currently being treated with Ibrance days 1-21 of 28 day cycle, Faslodex Q 4 weeks, Zometa Q 3 months (due around 4/19/2023)    Presenting today for lab review and consideration of Faslodex. Planned to initiate new cycle Ibrance today. She reports feeling well following recent Kyphoplasty. Notes improvement in pain. Current back pain 2-3/10. Taking muscle relaxer PRN.    Social History     Socioeconomic History    Marital status:    Tobacco Use    Smoking status: Never    Smokeless tobacco: Never   Substance and Sexual Activity    Alcohol use: Never    Drug use: No    Sexual activity: Not Currently     Birth control/protection: Surgical   Other Topics Concern    Are you pregnant or think you may be? No    Breast-feeding No     Social Determinants of Health     Financial Resource Strain: Low Risk     Difficulty of Paying Living Expenses: Not hard at all   Food Insecurity: No Food Insecurity    Worried About Running Out of Food in the Last Year: Never true    Ran Out of Food in the Last Year: Never true   Transportation Needs: No Transportation Needs    Lack of Transportation (Medical): No    Lack of Transportation (Non-Medical): No   Physical Activity: Inactive    Days of Exercise per Week: 0 days    Minutes of Exercise per Session: 0 min   Stress: No Stress Concern Present    Feeling of Stress : Only a little   Social Connections: Unknown    Frequency of Communication with Friends and Family: More than three times a week    Frequency of Social Gatherings with Friends and Family: Twice a week    Active Member of Clubs or Organizations: No    Attends Club or Organization Meetings: Never    Marital Status:    Housing Stability: Low Risk     Unable to Pay for Housing in the Last Year: No    Number of Places Lived in  the Last Year: 1    Unstable Housing in the Last Year: No       Past Medical History:   Diagnosis Date    Antineoplastic chemotherapy induced anemia     Breast cancer 2016    right breast    Cancer     R Breast cancer    CVA (cerebral infarction)     Diverticulosis     Family history of colon cancer 10/30/2018    Her mother and father both had colon cancer.   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    Nausea after anesthesia     Paralysis     right side    PONV (postoperative nausea and vomiting)     Stroke 2001    R side weakness    Trouble in sleeping        Family History   Problem Relation Age of Onset    Breast cancer Paternal Aunt     Colon cancer Father     Colon cancer Mother     Melanoma Maternal Uncle     Skin cancer Maternal Uncle        Past Surgical History:   Procedure Laterality Date    AUGMENTATION OF BREAST Left 2017    BREAST SURGERY      COLONOSCOPY N/A 10/30/2018    Procedure: COLONOSCOPY;  Surgeon: Cosme Monson MD;  Location: [facility];  Service: Endoscopy;  Laterality: N/A;    CYSTOSCOPY WITH BIOPSY OF BLADDER N/A 11/29/2021    Procedure: CYSTOSCOPY, WITH BLADDER BIOPSY, WITH FULGURATION IF INDICATED;  Surgeon: Page Marcelo MD;  Location: [facility] OR;  Service: Urology;  Laterality: N/A;    FIXATION KYPHOPLASTY Bilateral 2/9/2023    Procedure: KYPHOPLASTY;  Surgeon: Josse Pelayo MD;  Location: [facility] OR;  Service: Neurosurgery;  Laterality: Bilateral;  Kyphoplasty T10 with ablation    HYSTERECTOMY  2007    maintains both ovaries, menorrhagia    MASTECTOMY Right 2017    MEDIPORT INSERTION, SINGLE      RADIOFREQUENCY ABLATION, BONE, PERCUTANEOUS Bilateral 2/9/2023    Procedure: RADIOFREQUENCY ABLATION,BONE,PERCUTANEOUS;  Surgeon: Josse Pelayo MD;  Location: [facility] OR;  Service: Neurosurgery;  Laterality: Bilateral;    TONSILLECTOMY         Review of  Systems   Constitutional:  Negative for activity change, appetite change, chills, fatigue, fever and unexpected weight change.   HENT:  Negative for congestion, mouth sores, nosebleeds, sore throat, trouble swallowing and voice change.    Eyes:  Negative for visual disturbance.   Respiratory:  Negative for cough, chest tightness, shortness of breath and wheezing.    Cardiovascular:  Negative for chest pain and leg swelling.   Gastrointestinal:  Negative for abdominal distention, abdominal pain, blood in stool, constipation, diarrhea, nausea and vomiting.   Genitourinary:  Negative for difficulty urinating, dysuria and hematuria.   Musculoskeletal:  Positive for back pain (reports significant improvement following kyphoplasty). Negative for arthralgias and myalgias.   Skin:  Negative for pallor, rash and wound.   Neurological:  Negative for dizziness, syncope, weakness and headaches.   Hematological:  Negative for adenopathy. Does not bruise/bleed easily.   Psychiatric/Behavioral:  The patient is not nervous/anxious.        Medication List with Changes/Refills   Current Medications    ANASTROZOLE (ARIMIDEX) 1 MG TAB    Take 1 tablet (1 mg total) by mouth once daily.    ASPIRIN (ECOTRIN) 81 MG EC TABLET    Take 81 mg by mouth once daily.    ATORVASTATIN (LIPITOR) 10 MG TABLET    Take 1 tablet (10 mg total) by mouth once daily.    CETIRIZINE (ZYRTEC) 10 MG TABLET    Take 10 mg by mouth daily as needed.    FULVESTRANT (FASLODEX) 250 MG/5 ML INJECTION    Inject 250 mg into the muscle every 30 days.    GADAVIST 10 MMOL/10 ML (1 MMOL/ML) SOLN INJECTION    every 6 (six) months. Once yearly    MECLIZINE (ANTIVERT) 25 MG TABLET    Take 25 mg by mouth 3 (three) times daily as needed.    METHOCARBAMOL (ROBAXIN) 750 MG TAB    Take 1 tablet (750 mg total) by mouth 3 (three) times daily.    ONDANSETRON (ZOFRAN) 4 MG TABLET    Take 1 tablet (4 mg total) by mouth every 8 (eight) hours as needed for Nausea.    OXYCODONE-ACETAMINOPHEN  (PERCOCET)  MG PER TABLET    Take 1 tablet by mouth every 6 (six) hours as needed for Pain.    PALBOCICLIB 125 MG TAB    Take 1 tablet (125 mg) by mouth on days 1-21 of each 28 day cycle. Take with food.    POLYETHYLENE GLYCOL (GLYCOLAX) 17 GRAM/DOSE POWDER    Take 17 g by mouth once daily.    PROCHLORPERAZINE (COMPAZINE) 5 MG TABLET    Take 1 tablet (5 mg total) by mouth 4 (four) times daily as needed for Nausea.     Objective:     Vitals:    03/13/23 0849   BP: 107/75   Pulse: 86   Temp: 97.2 °F (36.2 °C)     Lab Results   Component Value Date    WBC 1.66 (LL) 03/13/2023    HGB 12.8 03/13/2023    HCT 37.3 03/13/2023    MCV 97 03/13/2023     03/13/2023     BMP  Lab Results   Component Value Date     03/13/2023    K 4.9 03/13/2023     03/13/2023    CO2 27 03/13/2023    BUN 20 03/13/2023    CREATININE 0.7 03/13/2023    CALCIUM 9.3 03/13/2023    ANIONGAP 8 03/13/2023    EGFRNORACEVR >60 03/13/2023     Lab Results   Component Value Date    ALT 18 03/13/2023    AST 17 03/13/2023    ALKPHOS 58 03/13/2023    BILITOT 0.7 03/13/2023         Physical Exam  Vitals reviewed.   Constitutional:       Appearance: She is well-developed.   HENT:      Head: Normocephalic.      Right Ear: External ear normal.      Left Ear: External ear normal.   Eyes:      General: Lids are normal. No scleral icterus.        Right eye: No discharge.         Left eye: No discharge.      Conjunctiva/sclera: Conjunctivae normal.   Neck:      Thyroid: No thyroid mass.   Cardiovascular:      Rate and Rhythm: Normal rate and regular rhythm.      Heart sounds: Normal heart sounds.   Pulmonary:      Effort: Pulmonary effort is normal. No respiratory distress.      Breath sounds: Normal breath sounds. No wheezing or rales.   Abdominal:      General: There is no distension.   Genitourinary:     Comments: deferred  Musculoskeletal:         General: Normal range of motion.      Cervical back: Normal range of motion.   Skin:     General:  Skin is warm and dry.   Neurological:      Mental Status: She is alert and oriented to person, place, and time.   Psychiatric:         Speech: Speech normal.         Behavior: Behavior normal. Behavior is cooperative.         Thought Content: Thought content normal.        Assessment:     Problem List Items Addressed This Visit          Immunology/Multi System    Immunodeficiency due to chemotherapy     Infection precautions            Oncology    Malignant neoplasm of central portion of right breast in female, estrogen receptor positive     CMP unremarkable. Awaiting CBC. Lab having difficulty processing CBC's today    Will review with Primary Oncologist once labs resulted   --update. Critical WBC noted. ANC 0.7. Hold Ibrance. F/u 1 week with cbc    Okay to proceed with Faslodex. Awaiting labs to determine if okay to resume Ibrance. Plan to arrange 4 weeks f/u with cbc, cmp for next Faslodex/Zometa due around 4/19/2023.     DEXA due 10/2023         Relevant Orders    CBC Auto Differential         Plan:     Malignant neoplasm of central portion of right breast in female, estrogen receptor positive  -     CBC Auto Differential; Future; Expected date: 03/13/2023    Immunodeficiency due to chemotherapy            Med Onc Chart Routing      Follow up with physician 4 weeks. Dr. Khan   Follow up with MCKINLEY 1 week. 1 week MCKINLEY follow up with cbc   Infusion scheduling note zometa/faslodex   Injection scheduling note    Labs CBC and CMP   Scheduling:  Preferred lab:  Lab interval:     Imaging None      Pharmacy appointment No pharmacy appointment needed      Other referrals  No additional referrals needed            NOVA Garland

## 2023-03-13 NOTE — PLAN OF CARE
Problem: Adult Inpatient Plan of Care  Goal: Plan of Care Review  Outcome: Ongoing, Progressing  Flowsheets (Taken 3/13/2023 0936)  Plan of Care Reviewed With: patient  Goal: Patient-Specific Goal (Individualized)  Outcome: Ongoing, Progressing  Flowsheets (Taken 3/13/2023 0936)  Anxieties, Fears or Concerns: none  Individualized Care Needs: give both injections at the same time  Goal: Optimal Comfort and Wellbeing  Outcome: Ongoing, Progressing  Intervention: Provide Person-Centered Care  Flowsheets (Taken 3/13/2023 0936)  Trust Relationship/Rapport:   care explained   questions encouraged   choices provided   reassurance provided   emotional support provided   thoughts/feelings acknowledged   empathic listening provided   questions answered     Problem: Fall Injury Risk  Goal: Absence of Fall and Fall-Related Injury  Outcome: Ongoing, Progressing  Intervention: Identify and Manage Contributors  Flowsheets (Taken 3/13/2023 0936)  Self-Care Promotion: BADL personal routines maintained  Medication Review/Management: medications reviewed  Intervention: Promote Injury-Free Environment  Flowsheets (Taken 3/13/2023 0936)  Safety Promotion/Fall Prevention:   in recliner, wheels locked   nonskid shoes/socks when out of bed   room near unit station   medications reviewed   lighting adjusted      Rifampin Pregnancy And Lactation Text: This medication is Pregnancy Category C and it isn't know if it is safe during pregnancy. It is also excreted in breast milk and should not be used if you are breast feeding.

## 2023-03-13 NOTE — NURSING
I checked with Raven Miranda to see if I had to wait for the CBC to proceed and she said no. Gm, patient in waiting area for faslodex. I'm waiting on her cbc to let her know if okay to resume her Ibrance. please let her know lab says about 20 more minutes before cbc results. Patient reicived injections per 2 nurses.

## 2023-03-22 ENCOUNTER — OFFICE VISIT (OUTPATIENT)
Dept: UROLOGY | Facility: CLINIC | Age: 58
End: 2023-03-22
Payer: MEDICARE

## 2023-03-22 VITALS
RESPIRATION RATE: 18 BRPM | TEMPERATURE: 98 F | DIASTOLIC BLOOD PRESSURE: 70 MMHG | WEIGHT: 171.31 LBS | HEIGHT: 67 IN | SYSTOLIC BLOOD PRESSURE: 106 MMHG | HEART RATE: 79 BPM | BODY MASS INDEX: 26.89 KG/M2

## 2023-03-22 DIAGNOSIS — I69.351 HEMIPARESIS AFFECTING RIGHT SIDE AS LATE EFFECT OF CEREBROVASCULAR ACCIDENT: ICD-10-CM

## 2023-03-22 DIAGNOSIS — N95.8 GENITOURINARY SYNDROME OF MENOPAUSE: ICD-10-CM

## 2023-03-22 DIAGNOSIS — R31.29 MICROHEMATURIA: ICD-10-CM

## 2023-03-22 DIAGNOSIS — N39.0 RECURRENT UTI: Primary | ICD-10-CM

## 2023-03-22 PROCEDURE — 99213 OFFICE O/P EST LOW 20 MIN: CPT | Mod: HCNC,S$GLB,, | Performed by: UROLOGY

## 2023-03-22 PROCEDURE — 81002 PR URINALYSIS NONAUTO W/O SCOPE: ICD-10-PCS | Mod: HCNC,S$GLB,, | Performed by: UROLOGY

## 2023-03-22 PROCEDURE — 3008F PR BODY MASS INDEX (BMI) DOCUMENTED: ICD-10-PCS | Mod: HCNC,CPTII,S$GLB, | Performed by: UROLOGY

## 2023-03-22 PROCEDURE — 3078F DIAST BP <80 MM HG: CPT | Mod: HCNC,CPTII,S$GLB, | Performed by: UROLOGY

## 2023-03-22 PROCEDURE — 3008F BODY MASS INDEX DOCD: CPT | Mod: HCNC,CPTII,S$GLB, | Performed by: UROLOGY

## 2023-03-22 PROCEDURE — 3074F SYST BP LT 130 MM HG: CPT | Mod: HCNC,CPTII,S$GLB, | Performed by: UROLOGY

## 2023-03-22 PROCEDURE — 1160F RVW MEDS BY RX/DR IN RCRD: CPT | Mod: HCNC,CPTII,S$GLB, | Performed by: UROLOGY

## 2023-03-22 PROCEDURE — 1159F MED LIST DOCD IN RCRD: CPT | Mod: HCNC,CPTII,S$GLB, | Performed by: UROLOGY

## 2023-03-22 PROCEDURE — 99213 PR OFFICE/OUTPT VISIT, EST, LEVL III, 20-29 MIN: ICD-10-PCS | Mod: HCNC,S$GLB,, | Performed by: UROLOGY

## 2023-03-22 PROCEDURE — 51798 US URINE CAPACITY MEASURE: CPT | Mod: HCNC,S$GLB,, | Performed by: UROLOGY

## 2023-03-22 PROCEDURE — 81002 URINALYSIS NONAUTO W/O SCOPE: CPT | Mod: HCNC,S$GLB,, | Performed by: UROLOGY

## 2023-03-22 PROCEDURE — 51798 PR MEAS,POST-VOID RES,US,NON-IMAGING: ICD-10-PCS | Mod: HCNC,S$GLB,, | Performed by: UROLOGY

## 2023-03-22 PROCEDURE — 1159F PR MEDICATION LIST DOCUMENTED IN MEDICAL RECORD: ICD-10-PCS | Mod: HCNC,CPTII,S$GLB, | Performed by: UROLOGY

## 2023-03-22 PROCEDURE — 3074F PR MOST RECENT SYSTOLIC BLOOD PRESSURE < 130 MM HG: ICD-10-PCS | Mod: HCNC,CPTII,S$GLB, | Performed by: UROLOGY

## 2023-03-22 PROCEDURE — 1160F PR REVIEW ALL MEDS BY PRESCRIBER/CLIN PHARMACIST DOCUMENTED: ICD-10-PCS | Mod: HCNC,CPTII,S$GLB, | Performed by: UROLOGY

## 2023-03-22 PROCEDURE — 99999 PR PBB SHADOW E&M-EST. PATIENT-LVL IV: CPT | Mod: PBBFAC,HCNC,, | Performed by: UROLOGY

## 2023-03-22 PROCEDURE — 99999 PR PBB SHADOW E&M-EST. PATIENT-LVL IV: ICD-10-PCS | Mod: PBBFAC,HCNC,, | Performed by: UROLOGY

## 2023-03-22 PROCEDURE — 3078F PR MOST RECENT DIASTOLIC BLOOD PRESSURE < 80 MM HG: ICD-10-PCS | Mod: HCNC,CPTII,S$GLB, | Performed by: UROLOGY

## 2023-03-22 NOTE — PROGRESS NOTES
Chief Complaint: f/u recurrent UTI    HPI:   3/22/23-sometimes she has frequency, but then it resolves. No recent UTIs. Had lumbar surgery since her last visit. She does have vaginal dryness.     9/21/22- Pt reports left flank and abdominal pain. Having urinary frequency. Went to [facility] and had non-contrast CT, showing grossly normal kidneys without stone or hydronephrosis, per report. No fever. She is now off of daily antibiotics. Prescribed estring by gyn, but not using it. She reports that the pain is primarily with movement. Ongoing x 4 weeks, off and on. Currently on baclofen, norco and celebrex. Has a grandbaby that she has been lifting.    3/24/22- Daily macrobid seems to be working. No recent UTIs. Pt has an upcoming trip and would like to continue daily abx through then. No dysuria, abdominal pain or gross hematuria.    12/21/21- bladder neck biopsy with chronic cystitis with cystitis cystica and glandularis. Had some bloating after the procedure for a week. No gross hematuria. No dysuria. Has had recurrent UTIs every month or so lately.   11/5/21-here for cysto. CT urogram with tiny right renal cyst.   10/21/44-Km-[id number] for dipstick positive UA for blood, urine culture negative. She reports that a year ago, she was having recurrent UTIs. She reports that after being cathed in the  clinic, she didn't have any more UTIs. Currently, she reports that she is now having frequency and dysuria. She was treated with abx (cipro) a few weeks ago and symptoms improved, but she is scared they will recur. No gross hematuria. She was on oxybutynin, but it gave her dry mouth.   1/30/20: 53 yo woman with breast cancer history about a year ago had a few UTI.  Sole symptom was frequency.   Bought some new underwear and after changing back to old type things got better and no problems since, about 5 mo.  No abd/pelvic pain and no exac/rel factors.  No hematuria.  No urolithiasis.  No urinary bother.  No  history.  Normal  sexual function.  Normal OB exam 2 mo ago.  Microhematuria on dipstick UA.  Had a stroke 20 yrs ago, on disability.    Allergies:  Patient has no known allergies.    Medications: has a current medication list which includes the following prescription(s): anastrozole, aspirin, atorvastatin, cetirizine, fulvestrant, gadavist, meclizine, methocarbamol, ondansetron, oxycodone-acetaminophen, palbociclib, polyethylene glycol, and prochlorperazine.    Review of Systems:  General: No fever, chills, fatigability, or weight loss.  Skin: No rashes, itching, or changes in color or texture of skin.  Chest: Denies LIPSCOMB, cyanosis, wheezing, cough, and sputum production.  Abdomen: Appetite fine. No weight loss. Denies diarrhea, abdominal pain, hematemesis, or blood in stool.  Musculoskeletal: No joint stiffness or swelling. Denies back pain.  : As above.  All other review of systems negative.    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    PSH:   has a past surgical history that includes Tonsillectomy; Mediport insertion, single; Breast surgery; Colonoscopy (N/A, 10/30/2018); Mastectomy (Right, 2017); Augmentation of breast (Left, 2017); Hysterectomy (2007); Cystoscopy with biopsy of bladder (N/A, 11/29/2021); Fixation Kyphoplasty (Bilateral, 2/9/2023); and radiofrequency ablation, bone, percutaneous (Bilateral, 2/9/2023).    FamHx: family history includes Breast cancer in her paternal aunt; Colon cancer in her father and mother; Melanoma in her maternal uncle; Skin cancer in her maternal uncle.    SocHx:  reports that she has never smoked. She has never  used smokeless tobacco. She reports that she does not drink alcohol and does not use drugs.     Physical Exam:  Vitals:   Vitals:    03/22/23 1101   BP: 106/70   Pulse: 79   Resp: 18   Temp: 97.7 °F (36.5 °C)       General: A&Ox3. No apparent distress. No deformities.  Neck: No masses. Normal thyroid.  Lungs: normal inspiration. No use of accessory muscles.  Heart: normal pulse. No arrhythmias.  Abdomen: Soft. NT. ND. No masses. No hernias. No hepatosplenomegaly.  Lymphatic: Neck and groin nodes negative.  Skin: The skin is warm and dry. No jaundice.  Ext: No c/c/e. R hemiparesis  : External genitalia normal.         Labs/Studies:   50 blood, negative for LE, nit    Impression/Plan:   Recurrent UTI  -     POCT URINE DIPSTICK WITHOUT MICROSCOPE  -     POCT Bladder Scan    Hemiparesis affecting right side as late effect of cerebrovascular accident    Microhematuria    Genitourinary syndrome of menopause             Follow up in about 6 months (around 9/22/2023).

## 2023-03-28 ENCOUNTER — SPECIALTY PHARMACY (OUTPATIENT)
Dept: PHARMACY | Facility: CLINIC | Age: 58
End: 2023-03-28
Payer: MEDICARE

## 2023-03-28 NOTE — TELEPHONE ENCOUNTER
Specialty Pharmacy - Clinical Reassessment    Specialty Medication Orders Linked to Encounter      Flowsheet Row Most Recent Value   Medication #1 palbociclib 125 mg Tab (Order#[id number], Rx#[id number])          Patient Diagnosis   C50.111, Z17.0 - Malignant neoplasm of central portion of right breast in female, estrogen receptor positive    Carole Moore is a 57 y.o. female, who is followed by the specialty pharmacy service for management and education of her Ibrance.  She has been on therapy with Ibrance for 5 months.  I have reviewed her electronic medical record and current medication list and determined that specialty medication adjustment Is needed at this time.    Patient has experienced adverse events such as Fatigue, and is managed in the following way(s): taking extra rest.  She Is adherent reporting 0 missed doses since last review.  Adherence has been encouraged with the following mechanism(s): Habit.  She is meeting goals of therapy and will continue treatment.        3/6/2023 2/3/2023 12/5/2022 11/8/2022 10/5/2022   Follow Up Review   # of missed doses 0 0 0 0    New Medications? No No No No    New Conditions? No No No No    New Allergies? No No No No    Med Effective? Good Good Good Good    Missed activities?     No   Urgent Care? No No No No    Requested Pharmacist?  No No No             Therapy is appropriate to hold.    Therapy is effective: Yes  On scale of 1 to 10, how does patient rank quality of life? (10 - Best): 9  Recommendations:  Follow up with provider to assess if a dose change is required.  Review Method: Patient Contact    Tasks added this encounter   No tasks added.   Tasks due within next 3 months   3/27/2023 - Clinical - Follow Up Assesement (180 day)  4/2/2023 - Refill Call (Auto Added)     Omid Pradhan, PharmD  [facility]  [address]  [facility] [address]  Phone: [phone]  Fax: [phone]

## 2023-03-28 NOTE — TELEPHONE ENCOUNTER
Patient is currently holding Ibrance and has a full cycle of medication on hand. Told patient we will reach out 4/11 to assess if a dose change is required.

## 2023-04-04 LAB
BILIRUB SERPL-MCNC: NORMAL MG/DL
BLOOD URINE, POC: 50
CLARITY, POC UA: CLEAR
COLOR, POC UA: YELLOW
GLUCOSE UR QL STRIP: NORMAL
KETONES UR QL STRIP: NORMAL
LEUKOCYTE ESTERASE URINE, POC: NORMAL
NITRITE, POC UA: NORMAL
PH, POC UA: 6
POC RESIDUAL URINE VOLUME: 0 ML (ref 0–100)
PROTEIN, POC: NORMAL
SPECIFIC GRAVITY, POC UA: 1.01
UROBILINOGEN, POC UA: NORMAL

## 2023-04-10 ENCOUNTER — INFUSION (OUTPATIENT)
Dept: INFUSION THERAPY | Facility: HOSPITAL | Age: 58
End: 2023-04-10
Attending: INTERNAL MEDICINE
Payer: MEDICARE

## 2023-04-10 ENCOUNTER — OFFICE VISIT (OUTPATIENT)
Dept: HEMATOLOGY/ONCOLOGY | Facility: CLINIC | Age: 58
End: 2023-04-10
Payer: MEDICARE

## 2023-04-10 VITALS
TEMPERATURE: 97 F | SYSTOLIC BLOOD PRESSURE: 113 MMHG | DIASTOLIC BLOOD PRESSURE: 69 MMHG | OXYGEN SATURATION: 97 % | HEART RATE: 67 BPM | WEIGHT: 176.38 LBS | RESPIRATION RATE: 18 BRPM | HEIGHT: 67 IN | BODY MASS INDEX: 27.68 KG/M2

## 2023-04-10 VITALS
TEMPERATURE: 98 F | HEART RATE: 81 BPM | OXYGEN SATURATION: 96 % | WEIGHT: 176.38 LBS | HEIGHT: 67 IN | DIASTOLIC BLOOD PRESSURE: 67 MMHG | SYSTOLIC BLOOD PRESSURE: 97 MMHG | BODY MASS INDEX: 27.68 KG/M2 | RESPIRATION RATE: 18 BRPM

## 2023-04-10 DIAGNOSIS — C79.51 SECONDARY CANCER OF BONE: ICD-10-CM

## 2023-04-10 DIAGNOSIS — C79.51 SECONDARY MALIGNANT NEOPLASM OF BONE: ICD-10-CM

## 2023-04-10 DIAGNOSIS — D84.821 IMMUNODEFICIENCY DUE TO CHEMOTHERAPY: ICD-10-CM

## 2023-04-10 DIAGNOSIS — C79.51 SECONDARY MALIGNANT NEOPLASM OF BONE: Primary | ICD-10-CM

## 2023-04-10 DIAGNOSIS — Z79.899 IMMUNODEFICIENCY DUE TO CHEMOTHERAPY: ICD-10-CM

## 2023-04-10 DIAGNOSIS — Z79.811 AROMATASE INHIBITOR USE: ICD-10-CM

## 2023-04-10 DIAGNOSIS — C50.111 MALIGNANT NEOPLASM OF CENTRAL PORTION OF RIGHT BREAST IN FEMALE, ESTROGEN RECEPTOR POSITIVE: ICD-10-CM

## 2023-04-10 DIAGNOSIS — T45.1X5A IMMUNODEFICIENCY DUE TO CHEMOTHERAPY: ICD-10-CM

## 2023-04-10 DIAGNOSIS — Z17.0 MALIGNANT NEOPLASM OF CENTRAL PORTION OF RIGHT BREAST IN FEMALE, ESTROGEN RECEPTOR POSITIVE: ICD-10-CM

## 2023-04-10 DIAGNOSIS — Z79.811 USE OF AROMATASE INHIBITORS: ICD-10-CM

## 2023-04-10 DIAGNOSIS — Z17.0 MALIGNANT NEOPLASM OF CENTRAL PORTION OF RIGHT BREAST IN FEMALE, ESTROGEN RECEPTOR POSITIVE: Primary | ICD-10-CM

## 2023-04-10 DIAGNOSIS — C50.111 MALIGNANT NEOPLASM OF CENTRAL PORTION OF RIGHT BREAST IN FEMALE, ESTROGEN RECEPTOR POSITIVE: Primary | ICD-10-CM

## 2023-04-10 PROCEDURE — 1159F PR MEDICATION LIST DOCUMENTED IN MEDICAL RECORD: ICD-10-PCS | Mod: HCNC,CPTII,S$GLB, | Performed by: INTERNAL MEDICINE

## 2023-04-10 PROCEDURE — 3074F SYST BP LT 130 MM HG: CPT | Mod: HCNC,CPTII,S$GLB, | Performed by: INTERNAL MEDICINE

## 2023-04-10 PROCEDURE — 99999 PR PBB SHADOW E&M-EST. PATIENT-LVL IV: ICD-10-PCS | Mod: PBBFAC,HCNC,, | Performed by: INTERNAL MEDICINE

## 2023-04-10 PROCEDURE — 1160F PR REVIEW ALL MEDS BY PRESCRIBER/CLIN PHARMACIST DOCUMENTED: ICD-10-PCS | Mod: HCNC,CPTII,S$GLB, | Performed by: INTERNAL MEDICINE

## 2023-04-10 PROCEDURE — 63600175 PHARM REV CODE 636 W HCPCS: Mod: JZ,JG,HCNC | Performed by: INTERNAL MEDICINE

## 2023-04-10 PROCEDURE — 25000003 PHARM REV CODE 250: Mod: HCNC | Performed by: INTERNAL MEDICINE

## 2023-04-10 PROCEDURE — 3008F PR BODY MASS INDEX (BMI) DOCUMENTED: ICD-10-PCS | Mod: HCNC,CPTII,S$GLB, | Performed by: INTERNAL MEDICINE

## 2023-04-10 PROCEDURE — 96365 THER/PROPH/DIAG IV INF INIT: CPT | Mod: HCNC

## 2023-04-10 PROCEDURE — 1159F MED LIST DOCD IN RCRD: CPT | Mod: HCNC,CPTII,S$GLB, | Performed by: INTERNAL MEDICINE

## 2023-04-10 PROCEDURE — 99215 OFFICE O/P EST HI 40 MIN: CPT | Mod: 25,HCNC,S$GLB, | Performed by: INTERNAL MEDICINE

## 2023-04-10 PROCEDURE — 96367 TX/PROPH/DG ADDL SEQ IV INF: CPT | Mod: HCNC

## 2023-04-10 PROCEDURE — 3008F BODY MASS INDEX DOCD: CPT | Mod: HCNC,CPTII,S$GLB, | Performed by: INTERNAL MEDICINE

## 2023-04-10 PROCEDURE — 99999 PR PBB SHADOW E&M-EST. PATIENT-LVL IV: CPT | Mod: PBBFAC,HCNC,, | Performed by: INTERNAL MEDICINE

## 2023-04-10 PROCEDURE — 99215 PR OFFICE/OUTPT VISIT, EST, LEVL V, 40-54 MIN: ICD-10-PCS | Mod: 25,HCNC,S$GLB, | Performed by: INTERNAL MEDICINE

## 2023-04-10 PROCEDURE — 3078F PR MOST RECENT DIASTOLIC BLOOD PRESSURE < 80 MM HG: ICD-10-PCS | Mod: HCNC,CPTII,S$GLB, | Performed by: INTERNAL MEDICINE

## 2023-04-10 PROCEDURE — 3078F DIAST BP <80 MM HG: CPT | Mod: HCNC,CPTII,S$GLB, | Performed by: INTERNAL MEDICINE

## 2023-04-10 PROCEDURE — 3074F PR MOST RECENT SYSTOLIC BLOOD PRESSURE < 130 MM HG: ICD-10-PCS | Mod: HCNC,CPTII,S$GLB, | Performed by: INTERNAL MEDICINE

## 2023-04-10 PROCEDURE — 96402 CHEMO HORMON ANTINEOPL SQ/IM: CPT | Mod: HCNC

## 2023-04-10 PROCEDURE — 1160F RVW MEDS BY RX/DR IN RCRD: CPT | Mod: HCNC,CPTII,S$GLB, | Performed by: INTERNAL MEDICINE

## 2023-04-10 RX ORDER — SODIUM CHLORIDE 0.9 % (FLUSH) 0.9 %
10 SYRINGE (ML) INJECTION
Status: CANCELLED | OUTPATIENT
Start: 2023-04-10

## 2023-04-10 RX ORDER — HEPARIN 100 UNIT/ML
500 SYRINGE INTRAVENOUS
Status: CANCELLED | OUTPATIENT
Start: 2023-04-10

## 2023-04-10 RX ORDER — ANASTROZOLE 1 MG/1
1 TABLET ORAL DAILY
Qty: 90 TABLET | Refills: 3 | Status: SHIPPED | OUTPATIENT
Start: 2023-04-10 | End: 2023-06-06

## 2023-04-10 RX ORDER — ZOLEDRONIC ACID 0.04 MG/ML
INJECTION, SOLUTION INTRAVENOUS
COMMUNITY
Start: 2023-01-18

## 2023-04-10 RX ORDER — LAMOTRIGINE 25 MG/1
500 TABLET ORAL
Status: COMPLETED | OUTPATIENT
Start: 2023-04-10 | End: 2023-04-10

## 2023-04-10 RX ORDER — ZOLEDRONIC ACID 0.04 MG/ML
4 INJECTION, SOLUTION INTRAVENOUS
Status: COMPLETED | OUTPATIENT
Start: 2023-04-10 | End: 2023-04-10

## 2023-04-10 RX ORDER — LAMOTRIGINE 25 MG/1
500 TABLET ORAL
Status: CANCELLED | OUTPATIENT
Start: 2023-04-10

## 2023-04-10 RX ADMIN — SODIUM CHLORIDE: 9 INJECTION, SOLUTION INTRAVENOUS at 01:04

## 2023-04-10 RX ADMIN — FULVESTRANT 500 MG: 50 INJECTION, SOLUTION INTRAMUSCULAR at 02:04

## 2023-04-10 RX ADMIN — ZOLEDRONIC ACID 4 MG: 0.04 INJECTION, SOLUTION INTRAVENOUS at 02:04

## 2023-04-10 NOTE — NURSING
Injection given subq to right and left ventrogluteal areas x 2 nurses without difficulties.Bandaids applied. Patient instructed to stay in the clinic for 15 minutes. Patient verbalized understanding and will notify nurse with any complaints.   Vivienne lab draw completed. Specimen labeled and given to Pari in clinic to be packaged and mailed out.

## 2023-04-10 NOTE — PROGRESS NOTES
Subjective:       Patient ID: Carole Moore is a 57 y.o. female.    Chief Complaint: Results, Breast Cancer, and Chemotherapy    HPI:  57-year-old female history of metastatic breast cancer.  Patient continues to be treated with Ibrance and Faslodex intermittent dose delays secondary to pancytopenia ECOG status 1    Past Medical History:   Diagnosis Date    Antineoplastic chemotherapy induced anemia     Breast cancer 2016    right breast    Cancer     R Breast cancer    CVA (cerebral infarction)     Diverticulosis     Family history of colon cancer 10/30/2018    Her mother and father both had colon cancer.    Genetic testing of female 12/27/2016    what3words (28 genes) - NEGATIVE    Hyperlipidemia     Immunodeficiency due to chemotherapy 10/03/2022    Nausea after anesthesia     Paralysis     right side    PONV (postoperative nausea and vomiting)     Stroke 2001    R side weakness    Trouble in sleeping      Family History   Problem Relation Age of Onset    Breast cancer Paternal Aunt     Colon cancer Father     Colon cancer Mother     Melanoma Maternal Uncle     Skin cancer Maternal Uncle      Social History     Socioeconomic History    Marital status:    Tobacco Use    Smoking status: Never    Smokeless tobacco: Never   Substance and Sexual Activity    Alcohol use: Never    Drug use: No    Sexual activity: Not Currently     Birth control/protection: Surgical   Other Topics Concern    Are you pregnant or think you may be? No    Breast-feeding No     Social Determinants of Health     Financial Resource Strain: Low Risk     Difficulty of Paying Living Expenses: Not hard at all   Food Insecurity: No Food Insecurity    Worried About Running Out of Food in the Last Year: Never true    Ran Out of Food in the Last Year: Never true   Transportation Needs: No Transportation Needs    Lack of Transportation (Medical): No    Lack of Transportation (Non-Medical): No   Physical Activity: Insufficiently Active     Days of Exercise per Week: 1 day    Minutes of Exercise per Session: 20 min   Stress: No Stress Concern Present    Feeling of Stress : Only a little   Social Connections: Unknown    Frequency of Communication with Friends and Family: More than three times a week    Frequency of Social Gatherings with Friends and Family: Twice a week    Active Member of Clubs or Organizations: No    Attends Club or Organization Meetings: Never    Marital Status:    Housing Stability: Low Risk     Unable to Pay for Housing in the Last Year: No    Number of Places Lived in the Last Year: 1    Unstable Housing in the Last Year: No     Past Surgical History:   Procedure Laterality Date    AUGMENTATION OF BREAST Left 2017    BREAST SURGERY      COLONOSCOPY N/A 10/30/2018    Procedure: COLONOSCOPY;  Surgeon: Cosme Monson MD;  [facility];  Service: Endoscopy;  Laterality: N/A;    CYSTOSCOPY WITH BIOPSY OF BLADDER N/A 11/29/2021    Procedure: CYSTOSCOPY, WITH BLADDER BIOPSY, WITH FULGURATION IF INDICATED;  Surgeon: Page Marcelo MD;  Location: [facility] OR;  Service: Urology;  Laterality: N/A;    FIXATION KYPHOPLASTY Bilateral 2/9/2023    Procedure: KYPHOPLASTY;  Surgeon: Josse Pelayo MD;  Location: [facility];  Service: Neurosurgery;  Laterality: Bilateral;  Kyphoplasty T10 with ablation    HYSTERECTOMY  2007    maintains both ovaries, menorrhagia    MASTECTOMY Right 2017    MEDIPORT INSERTION, SINGLE      RADIOFREQUENCY ABLATION, BONE, PERCUTANEOUS Bilateral 2/9/2023    Procedure: RADIOFREQUENCY ABLATION,BONE,PERCUTANEOUS;  Surgeon: Josse Pelayo MD;  Location: [facility];  Service: Neurosurgery;  Laterality: Bilateral;    TONSILLECTOMY         Labs:  Lab Results   Component Value Date    WBC 5.08 04/10/2023    HGB 12.5 04/10/2023    HCT 36.7 (L) 04/10/2023    MCV 97 04/10/2023     04/10/2023     BMP  Lab Results   Component Value Date     04/10/2023    K 4.5 04/10/2023     04/10/2023    CO2 26  04/10/2023    BUN 16 04/10/2023    CREATININE 0.7 04/10/2023    CALCIUM 9.3 04/10/2023    ANIONGAP 8 04/10/2023    ESTGFRAFRICA >60 05/12/2022    EGFRNONAA >60 05/12/2022     Lab Results   Component Value Date    ALT 25 04/10/2023    AST 18 04/10/2023    ALKPHOS 54 (L) 04/10/2023    BILITOT 0.6 04/10/2023       No results found for: IRON, TIBC, FERRITIN, SATURATEDIRO  No results found for: [id number]  No results found for: FOLATE  Lab Results   Component Value Date    TSH 0.793 10/29/2021         Review of Systems   Constitutional:  Negative for activity change, appetite change, chills, diaphoresis, fatigue, fever and unexpected weight change.   HENT:  Negative for congestion, dental problem, drooling, ear discharge, ear pain, facial swelling, hearing loss, mouth sores, nosebleeds, postnasal drip, rhinorrhea, sinus pressure, sneezing, sore throat, tinnitus, trouble swallowing and voice change.    Eyes:  Negative for photophobia, pain, discharge, redness, itching and visual disturbance.   Respiratory:  Negative for cough, choking, chest tightness, shortness of breath, wheezing and stridor.    Cardiovascular:  Negative for chest pain, palpitations and leg swelling.   Gastrointestinal:  Negative for abdominal distention, abdominal pain, anal bleeding, blood in stool, constipation, diarrhea, nausea, rectal pain and vomiting.   Endocrine: Negative for cold intolerance, heat intolerance, polydipsia, polyphagia and polyuria.   Genitourinary:  Negative for decreased urine volume, difficulty urinating, dyspareunia, dysuria, enuresis, flank pain, frequency, genital sores, hematuria, menstrual problem, pelvic pain, urgency, vaginal bleeding, vaginal discharge and vaginal pain.   Musculoskeletal:  Negative for arthralgias, back pain, gait problem, joint swelling, myalgias, neck pain and neck stiffness.   Skin:  Negative for color change, pallor and rash.   Allergic/Immunologic: Negative for environmental allergies, food  allergies and immunocompromised state.   Neurological:  Negative for dizziness, tremors, seizures, syncope, facial asymmetry, speech difficulty, weakness, light-headedness, numbness and headaches.   Hematological:  Negative for adenopathy. Does not bruise/bleed easily.   Psychiatric/Behavioral:  Negative for agitation, behavioral problems, confusion, decreased concentration, dysphoric mood, hallucinations, self-injury, sleep disturbance and suicidal ideas. The patient is not nervous/anxious and is not hyperactive.      Objective:      Physical Exam  Vitals reviewed.   Constitutional:       General: She is not in acute distress.     Appearance: She is well-developed. She is not diaphoretic.   HENT:      Head: Normocephalic and atraumatic.      Right Ear: External ear normal.      Left Ear: External ear normal.      Nose: Nose normal.      Right Sinus: No maxillary sinus tenderness or frontal sinus tenderness.      Left Sinus: No maxillary sinus tenderness or frontal sinus tenderness.      Mouth/Throat:      Pharynx: No oropharyngeal exudate.   Eyes:      General: Lids are normal. No scleral icterus.        Right eye: No discharge.         Left eye: No discharge.      Conjunctiva/sclera: Conjunctivae normal.      Right eye: Right conjunctiva is not injected. No hemorrhage.     Left eye: Left conjunctiva is not injected. No hemorrhage.     Pupils: Pupils are equal, round, and reactive to light.   Neck:      Thyroid: No thyromegaly.      Vascular: No JVD.      Trachea: No tracheal deviation.   Cardiovascular:      Rate and Rhythm: Normal rate.   Pulmonary:      Effort: Pulmonary effort is normal. No respiratory distress.      Breath sounds: No stridor.   Chest:      Chest wall: No tenderness.   Abdominal:      General: Bowel sounds are normal. There is no distension.      Palpations: Abdomen is soft. There is no hepatomegaly, splenomegaly or mass.      Tenderness: There is no abdominal tenderness. There is no rebound.    Musculoskeletal:         General: No tenderness. Normal range of motion.      Cervical back: Normal range of motion and neck supple.   Lymphadenopathy:      Cervical: No cervical adenopathy.      Upper Body:      Right upper body: No supraclavicular adenopathy.      Left upper body: No supraclavicular adenopathy.   Skin:     General: Skin is dry.      Findings: No erythema or rash.   Neurological:      Mental Status: She is alert and oriented to person, place, and time.      Cranial Nerves: No cranial nerve deficit.      Coordination: Coordination normal.   Psychiatric:         Behavior: Behavior normal.         Thought Content: Thought content normal.         Judgment: Judgment normal.           Assessment:      1. Malignant neoplasm of central portion of right breast in female, estrogen receptor positive    2. Immunodeficiency due to chemotherapy    3. Secondary malignant neoplasm of bone    4. Secondary cancer of bone    5. Aromatase inhibitor use           Med Onc Chart Routing      Follow up with physician 3 months. RTC to see me 3 months after PET scan CBC and CMP with Zometa   Follow up with MCKINLEY 4 weeks. Standing labs with APAP visit.  For Faslodex and dosing of Ibrance   Infusion scheduling note    Injection scheduling note Basilar dex monthly in re-dosing of Ibrance depending on ANC   Labs CBC and CEA   Scheduling:  Preferred lab:  Lab interval:  PET scan in June of 2023   Imaging PET scan      Pharmacy appointment    Other referrals            Plan:     Basilar dex monthly Ibrance monthly AP P can see to assess dosing.  Standing labs placed return in June of 2023 with PET scan and CBC CMP orders placed Zometa dosing today and again in 3 months        Harpal Khan Jr, MD FACP

## 2023-04-10 NOTE — DISCHARGE INSTRUCTIONS
WAYS TO HELP PREVENT INFECTION        WASH YOUR HANDS OFTEN DURING THE DAY, ESPECIALLY BEFORE YOU EAT, AFTER USING THE BATHROOM, AND AFTER TOUCHING ANIMALS    STAY AWAY FROM PEOPLE WHO HAVE ILLNESSES YOU CAN CATCH; SUCH AS COLDS, FLU, CHICKEN POX    TRY TO AVOID CROWDS    STAY AWAY FROM CHILDREN WHO RECENTLY HAVE RECEIVED LIVE VIRUS VACCINES    MAINTAIN GOOD MOUTH CARE    DO NOT SQUEEZE OR SCRATCH PIMPLES    CLEAN CUTS & SCRAPES RIGHT AWAY AND DAILY UNTIL HEALED WITH WARM WATER, SOAP & AN ANTISEPTIC    AVOID CONTACT WITH LITTER BOXES, BIRD CAGES, & FISH TANKS    AVOID STANDING WATER, IE., BIRD BATHS, FLOWER POTS/VASES, OR HUMIDIFIERS    WEAR GLOVES WHEN GARDENING OR CLEANING UP AFTER OTHERS, ESPECIALLY BABIES & SMALL CHILDREN    DO NOT EAT RAW FISH, SEAFOOD, MEAT, OR EGGS  FALL PREVENTION   Falls often occur due to slipping, tripping or losing your balance. Here are ways to reduce your risk of falling again.   Was there anything that caused your fall that can be fixed, removed or replaced?   Make your home safe by keeping walkways clear of objects you may trip over.   Use non-slip pads under rugs.   Do not walk in poorly lit areas.   Do not stand on chairs or wobbly ladders.   Use caution when reaching overhead or looking upward. This position can cause a loss of balance.   Be sure your shoes fit properly, have non-slip bottoms and are in good condition.   Be cautious when going up and down stairs, curbs, and when walking on uneven sidewalks.   If your balance is poor, consider using a cane or walker.   If your fall was related to alcohol use, stop or limit alcohol intake.   If your fall was related to use of sleeping medicines, talk to your doctor about this. You may need to reduce your dosage at bedtime if you awaken during the night to go to the bathroom.   To reduce the need for nighttime bathroom trips:   Avoid drinking fluids for several hours before going to bed   Empty your bladder before going to bed    Men can keep a urinal at the bedside   © 9809-6537 [employer]en[employer], [address], [place] [address]. All rights reserved. This information is not intended as a substitute for professional medical care. Always follow your healthcare professional's instructions.  Thank you for letting me take care of YOU!!    MARGAUX Carter          [facility]  [address] [address]  [phone] phone     [phone] fax  Hours of Operation: Monday- Friday 8:00am- 5:00pm  After hours phone  [phone]  Hematology / Oncology Physicians on call:    Dr. Bill Sosa        Nurse Practitioners:    Bianca Mendoza, GIULIANO Miranda, TRI Rojas, GIULIANO Perry, GIULIANO Camilo, GIULIANO      Please don't hesitate to call if you have any concerns.

## 2023-04-11 ENCOUNTER — TELEPHONE (OUTPATIENT)
Dept: HEMATOLOGY/ONCOLOGY | Facility: CLINIC | Age: 58
End: 2023-04-11
Payer: MEDICARE

## 2023-04-11 ENCOUNTER — SPECIALTY PHARMACY (OUTPATIENT)
Dept: PHARMACY | Facility: CLINIC | Age: 58
End: 2023-04-11
Payer: MEDICARE

## 2023-04-11 NOTE — TELEPHONE ENCOUNTER
Patient restarted Ibrance on 4/10/23 and has a full cycle of medication on hand. Will pend refill call to 5/1 to follow up before next cycle

## 2023-04-13 ENCOUNTER — OFFICE VISIT (OUTPATIENT)
Dept: NEUROSURGERY | Facility: CLINIC | Age: 58
End: 2023-04-13
Payer: MEDICARE

## 2023-04-13 ENCOUNTER — HOSPITAL ENCOUNTER (OUTPATIENT)
Dept: RADIOLOGY | Facility: HOSPITAL | Age: 58
Discharge: HOME | End: 2023-04-13
Attending: PHYSICIAN ASSISTANT
Payer: MEDICARE

## 2023-04-13 VITALS
BODY MASS INDEX: 27.62 KG/M2 | HEIGHT: 67 IN | DIASTOLIC BLOOD PRESSURE: 71 MMHG | SYSTOLIC BLOOD PRESSURE: 107 MMHG | WEIGHT: 176 LBS | HEART RATE: 78 BPM | RESPIRATION RATE: 18 BRPM

## 2023-04-13 DIAGNOSIS — M54.50 DORSALGIA OF THORACOLUMBAR REGION: ICD-10-CM

## 2023-04-13 DIAGNOSIS — M54.6 DORSALGIA OF THORACOLUMBAR REGION: ICD-10-CM

## 2023-04-13 DIAGNOSIS — Z98.890 STATUS POST KYPHOPLASTY: Primary | ICD-10-CM

## 2023-04-13 DIAGNOSIS — Z48.89 ENCOUNTER FOR POSTOPERATIVE WOUND CHECK: ICD-10-CM

## 2023-04-13 DIAGNOSIS — Z98.890 STATUS POST KYPHOPLASTY: ICD-10-CM

## 2023-04-13 PROCEDURE — 3008F BODY MASS INDEX DOCD: CPT | Mod: HCNC,CPTII,S$GLB, | Performed by: PHYSICIAN ASSISTANT

## 2023-04-13 PROCEDURE — 3078F DIAST BP <80 MM HG: CPT | Mod: HCNC,CPTII,S$GLB, | Performed by: PHYSICIAN ASSISTANT

## 2023-04-13 PROCEDURE — 1159F MED LIST DOCD IN RCRD: CPT | Mod: HCNC,CPTII,S$GLB, | Performed by: PHYSICIAN ASSISTANT

## 2023-04-13 PROCEDURE — 3008F PR BODY MASS INDEX (BMI) DOCUMENTED: ICD-10-PCS | Mod: HCNC,CPTII,S$GLB, | Performed by: PHYSICIAN ASSISTANT

## 2023-04-13 PROCEDURE — 99999 PR PBB SHADOW E&M-EST. PATIENT-LVL III: CPT | Mod: PBBFAC,HCNC,, | Performed by: PHYSICIAN ASSISTANT

## 2023-04-13 PROCEDURE — 1159F PR MEDICATION LIST DOCUMENTED IN MEDICAL RECORD: ICD-10-PCS | Mod: HCNC,CPTII,S$GLB, | Performed by: PHYSICIAN ASSISTANT

## 2023-04-13 PROCEDURE — 99024 POSTOP FOLLOW-UP VISIT: CPT | Mod: HCNC,S$GLB,, | Performed by: PHYSICIAN ASSISTANT

## 2023-04-13 PROCEDURE — 72070 X-RAY EXAM THORAC SPINE 2VWS: CPT | Mod: 26,HCNC,, | Performed by: STUDENT IN AN ORGANIZED HEALTH CARE EDUCATION/TRAINING PROGRAM

## 2023-04-13 PROCEDURE — 99999 PR PBB SHADOW E&M-EST. PATIENT-LVL III: ICD-10-PCS | Mod: PBBFAC,HCNC,, | Performed by: PHYSICIAN ASSISTANT

## 2023-04-13 PROCEDURE — 3078F PR MOST RECENT DIASTOLIC BLOOD PRESSURE < 80 MM HG: ICD-10-PCS | Mod: HCNC,CPTII,S$GLB, | Performed by: PHYSICIAN ASSISTANT

## 2023-04-13 PROCEDURE — 72070 XR THORACIC SPINE AP LATERAL: ICD-10-PCS | Mod: 26,HCNC,, | Performed by: STUDENT IN AN ORGANIZED HEALTH CARE EDUCATION/TRAINING PROGRAM

## 2023-04-13 PROCEDURE — 3074F SYST BP LT 130 MM HG: CPT | Mod: HCNC,CPTII,S$GLB, | Performed by: PHYSICIAN ASSISTANT

## 2023-04-13 PROCEDURE — 72070 X-RAY EXAM THORAC SPINE 2VWS: CPT | Mod: TC,HCNC

## 2023-04-13 PROCEDURE — 99024 PR POST-OP FOLLOW-UP VISIT: ICD-10-PCS | Mod: HCNC,S$GLB,, | Performed by: PHYSICIAN ASSISTANT

## 2023-04-13 PROCEDURE — 3074F PR MOST RECENT SYSTOLIC BLOOD PRESSURE < 130 MM HG: ICD-10-PCS | Mod: HCNC,CPTII,S$GLB, | Performed by: PHYSICIAN ASSISTANT

## 2023-04-13 RX ORDER — METHOCARBAMOL 750 MG/1
750 TABLET, FILM COATED ORAL 3 TIMES DAILY
Qty: 60 TABLET | Refills: 3 | Status: SHIPPED | OUTPATIENT
Start: 2023-04-13 | End: 2023-06-28 | Stop reason: SDUPTHER

## 2023-04-13 NOTE — PROGRESS NOTES
Subjective:      Patient ID: Carole Moore is a 57 y.o. female.    Chief Complaint: Post-op Evaluation (Pt is here today for p/o #2 kypho pt c/o mild back pain rating pain 2/10. Pt denies any recent falls and currently takes Robaxin to help ease pain. )    HPI  The patient is here today for postop evaluation #2.  She has good days and days where her pain is mild to moderate.   Localizes her pain to lower T- spine, upper L spine.  She was taking Robaxin but is out. Requests a refill.  Takes this as needed but it is helpful.    Does not report or c/o pain that radiates down her legs or to the front of her spine.   Denies chest pain.  The patient is happy to report that the kyphoplasty helped her pain a lot and she is more active now.    Last note: 2/23/23  The patient is here today for postop evaluation #1.  Pain is at 1/10 today.   Feels like the surgery was very successful in providing back pain relief.  No issues with the incision sites.  Discontinued the oxy and robaxin on Friday.   Denies fever, HA, dizziness, B/B changes, CP, new SOB, calf pain or any other symptoms at this time.      Date of Procedure: 2/9/2023    Procedure: Procedure(s) (LRB):  KYPHOPLASTY (Bilateral)  RADIOFREQUENCY ABLATION,BONE,PERCUTANEOUS (Bilateral)    Operative Findings (including complications, if any): compression fracture T10    Description of Technical Procedures:   T10 kyphoplasty   Radiofrequency ablation           Objective:            General    Constitutional: She is oriented to person, place, and time. She appears well-developed and well-nourished.   Cardiovascular:  Normal rate and regular rhythm.            Pulmonary/Chest: Effort normal.   Abdominal: Soft.   Neurological: She is alert and oriented to person, place, and time.   Psychiatric: She has a normal mood and affect. Her behavior is normal.           Neurosurgery exam:  Vitals reviewed  Moves LUE and LLE well  Right side is weaker from prev stroke  No pain on  exam            X-rays:  FINDINGS:  Compression deformity of T10 noted with evidence of prior vertebroplasty.  No other compression deformity or acute fracture seen. Bones appear somewhat demineralized. The left pedicle of T8 is poorly visualized.     Alignment is within normal limits.   Intervertebral disc heights are relatively well preserved.  Mild multilevel degenerative endplate osteophytosis noted.   Visualized soft tissues are unremarkable.        Impression:   Chronic appearing compression deformity of T10 with evidence of prior vertebroplasty.   Indistinct left pedicle of T8, suspicious for metastatic disease.    Assessment:       Encounter Diagnoses   Name Primary?    Status post kyphoplasty Yes    Encounter for postoperative wound check     Dorsalgia of thoracolumbar region           Plan:       Carole was seen today for post-op evaluation.    Diagnoses and all orders for this visit:    Status post kyphoplasty    Encounter for postoperative wound check    Dorsalgia of thoracolumbar region    Other orders  -     methocarbamoL (ROBAXIN) 750 MG Tab; Take 1 tablet (750 mg total) by mouth 3 (three) times daily.        Patient is doing well following kyphoplasty and reports that she is more active since the procedure.  Discussed x-rays with patient and  today.   - Left pedicle of T8 not well visualized. Discussed with Dr. Pelayo who recommends to keep NM PET as is; move up (sooner) if pain changes or worsens.  Follow-up after PET in June if any changes.  Message sent to patient on portal.  Please reach out with any changes.  Rx given today for Robaxin.            Zunilda Isbell PA-C

## 2023-04-23 ENCOUNTER — PATIENT MESSAGE (OUTPATIENT)
Dept: NEUROSURGERY | Facility: CLINIC | Age: 58
End: 2023-04-23
Payer: MEDICARE

## 2023-04-24 ENCOUNTER — OFFICE VISIT (OUTPATIENT)
Dept: OPHTHALMOLOGY | Facility: CLINIC | Age: 58
End: 2023-04-24
Payer: MEDICARE

## 2023-04-24 DIAGNOSIS — H52.4 ASTIGMATISM WITH PRESBYOPIA, BILATERAL: ICD-10-CM

## 2023-04-24 DIAGNOSIS — H52.203 ASTIGMATISM WITH PRESBYOPIA, BILATERAL: ICD-10-CM

## 2023-04-24 DIAGNOSIS — H25.013 CORTICAL AGE-RELATED CATARACT OF BOTH EYES: Primary | ICD-10-CM

## 2023-04-24 PROCEDURE — 1160F RVW MEDS BY RX/DR IN RCRD: CPT | Mod: HCNC,CPTII,S$GLB, | Performed by: OPTOMETRIST

## 2023-04-24 PROCEDURE — 92015 DETERMINE REFRACTIVE STATE: CPT | Mod: HCNC,S$GLB,, | Performed by: OPTOMETRIST

## 2023-04-24 PROCEDURE — 1159F MED LIST DOCD IN RCRD: CPT | Mod: HCNC,CPTII,S$GLB, | Performed by: OPTOMETRIST

## 2023-04-24 PROCEDURE — 1160F PR REVIEW ALL MEDS BY PRESCRIBER/CLIN PHARMACIST DOCUMENTED: ICD-10-PCS | Mod: HCNC,CPTII,S$GLB, | Performed by: OPTOMETRIST

## 2023-04-24 PROCEDURE — 92015 PR REFRACTION: ICD-10-PCS | Mod: HCNC,S$GLB,, | Performed by: OPTOMETRIST

## 2023-04-24 PROCEDURE — 92004 COMPRE OPH EXAM NEW PT 1/>: CPT | Mod: HCNC,S$GLB,, | Performed by: OPTOMETRIST

## 2023-04-24 PROCEDURE — 92004 PR EYE EXAM, NEW PATIENT,COMPREHESV: ICD-10-PCS | Mod: HCNC,S$GLB,, | Performed by: OPTOMETRIST

## 2023-04-24 PROCEDURE — 99999 PR PBB SHADOW E&M-EST. PATIENT-LVL III: ICD-10-PCS | Mod: PBBFAC,HCNC,, | Performed by: OPTOMETRIST

## 2023-04-24 PROCEDURE — 1159F PR MEDICATION LIST DOCUMENTED IN MEDICAL RECORD: ICD-10-PCS | Mod: HCNC,CPTII,S$GLB, | Performed by: OPTOMETRIST

## 2023-04-24 PROCEDURE — 99999 PR PBB SHADOW E&M-EST. PATIENT-LVL III: CPT | Mod: PBBFAC,HCNC,, | Performed by: OPTOMETRIST

## 2023-04-24 NOTE — PROGRESS NOTES
HPI    New to [facility]  Vision changes since last eye exam?: yes, OD    Any eye pain today: no    Other ocular symptoms: yes, an old floater    Interested in contact lens fitting today? yes             Last edited by Caitlyn Choi on 4/24/2023  1:03 PM.            Assessment /Plan     For exam results, see Encounter Report.    Cortical age-related cataract of both eyes  Cataracts not significantly affecting activities of daily living and therefore surgery is not indicated at this time.   Will continue to monitor over the next 12 months. Pt to call or RTC with any significant change in vision prior to next visit.     Astigmatism with presbyopia, bilateral  Eyeglass Final Rx       Eyeglass Final Rx         Sphere Cylinder Axis Add    Right [place] +1.00 155 +2.50    Left [place] +2.25 020 +2.50      Expiration Date: 4/24/2024                      RTC 1 yr for dilated eye exam or PRN if any problems.   Discussed above and answered questions.

## 2023-04-26 ENCOUNTER — PATIENT MESSAGE (OUTPATIENT)
Dept: INTERNAL MEDICINE | Facility: CLINIC | Age: 58
End: 2023-04-26
Payer: MEDICARE

## 2023-04-28 ENCOUNTER — PES CALL (OUTPATIENT)
Dept: ADMINISTRATIVE | Facility: CLINIC | Age: 58
End: 2023-04-28
Payer: MEDICARE

## 2023-04-30 ENCOUNTER — PATIENT MESSAGE (OUTPATIENT)
Dept: HEMATOLOGY/ONCOLOGY | Facility: CLINIC | Age: 58
End: 2023-04-30
Payer: MEDICARE

## 2023-05-01 ENCOUNTER — PATIENT MESSAGE (OUTPATIENT)
Dept: INFUSION THERAPY | Facility: HOSPITAL | Age: 58
End: 2023-05-01
Payer: MEDICARE

## 2023-05-01 NOTE — TELEPHONE ENCOUNTER
Specialty Pharmacy - Refill Coordination    Specialty Medication Orders Linked to Encounter      Flowsheet Row Most Recent Value   Medication #1 palbociclib 125 mg Tab (Order#[id number], Rx#[id number])          Pt to Fu with MD next week to get ok to proceed with next cycle    Refill Questions - Documented Responses      Flowsheet Row Most Recent Value   Patient Availability and HIPAA Verification    Does patient want to proceed with activity? Yes   HIPAA/medical authority confirmed? Yes   Relationship to patient of person spoken to? Self   Refill Screening Questions    Changes to allergies? No   Changes to medications? No   New conditions since last clinic visit? No   Unplanned office visit, urgent care, ED, or hospital admission in the last 4 weeks? No   How does patient/caregiver feel medication is working? Good   Financial problems or insurance changes? No   How many doses of your specialty medications were missed in the last 4 weeks? 0   Would patient like to speak to a pharmacist? No   When does the patient need to receive the medication? 05/09/23   Refill Delivery Questions    How will the patient receive the medication? [facility]   When does the patient need to receive the medication? 05/09/23   Shipping Address Home   Address in [facility] confirmed and updated if neccessary? Yes   Expected Copay ($) 0   Is the patient able to afford the medication copay? Yes   Payment Method zero copay   Days supply of Refill 28   Supplies needed? No supplies needed   Refill activity completed? Yes   Refill activity plan Refill scheduled   Shipment/Pickup Date: 05/03/23            Current Outpatient Medications   Medication Sig    anastrozole (ARIMIDEX) 1 mg Tab Take 1 tablet (1 mg total) by mouth once daily.    aspirin (ECOTRIN) 81 MG EC tablet Take 81 mg by mouth once daily.    atorvastatin (LIPITOR) 10 MG tablet Take 1 tablet (10 mg total) by mouth once daily.    cetirizine (ZYRTEC) 10 MG tablet Take 10 mg by mouth  daily as needed.    fulvestrant (FASLODEX) 250 mg/5 mL injection Inject 250 mg into the muscle every 30 days.    GADAVIST 10 mmol/10 mL (1 mmol/mL) Soln injection every 6 (six) months. Once yearly    meclizine (ANTIVERT) 25 mg tablet Take 25 mg by mouth 3 (three) times daily as needed.    methocarbamoL (ROBAXIN) 750 MG Tab Take 1 tablet (750 mg total) by mouth 3 (three) times daily.    ondansetron (ZOFRAN) 4 MG tablet Take 1 tablet (4 mg total) by mouth every 8 (eight) hours as needed for Nausea.    oxyCODONE-acetaminophen (PERCOCET)  mg per tablet Take 1 tablet by mouth every 6 (six) hours as needed for Pain.    palbociclib 125 mg Tab Take 1 tablet (125 mg) by mouth on days 1-21 of each 28 day cycle. Take with food.    polyethylene glycol (GLYCOLAX) 17 gram/dose powder Take 17 g by mouth once daily.    prochlorperazine (COMPAZINE) 5 MG tablet Take 1 tablet (5 mg total) by mouth 4 (four) times daily as needed for Nausea.    zoledronic 4 mg/100 mL PgBk infusion    Last reviewed on 4/24/2023  1:29 PM by Roberto Mercer, MAK    Review of patient's allergies indicates:  No Known Allergies Last reviewed on  4/24/2023 1:29 PM by Roberto Mercer      Tasks added this encounter   No tasks added.   Tasks due within next 3 months   5/1/2023 - Refill Coordination Outreach (1 time occurrence)     Caroline Mendoza, PharmD  [facility]  [address] [place] [id number]  Phone: [phone]  Fax: [phone]

## 2023-05-08 ENCOUNTER — INFUSION (OUTPATIENT)
Dept: INFUSION THERAPY | Facility: HOSPITAL | Age: 58
End: 2023-05-08
Attending: INTERNAL MEDICINE
Payer: MEDICARE

## 2023-05-08 ENCOUNTER — OFFICE VISIT (OUTPATIENT)
Dept: HEMATOLOGY/ONCOLOGY | Facility: CLINIC | Age: 58
End: 2023-05-08
Payer: MEDICARE

## 2023-05-08 ENCOUNTER — TELEPHONE (OUTPATIENT)
Dept: INFUSION THERAPY | Facility: HOSPITAL | Age: 58
End: 2023-05-08
Payer: MEDICARE

## 2023-05-08 VITALS
RESPIRATION RATE: 18 BRPM | DIASTOLIC BLOOD PRESSURE: 63 MMHG | OXYGEN SATURATION: 96 % | WEIGHT: 171.5 LBS | SYSTOLIC BLOOD PRESSURE: 101 MMHG | HEIGHT: 67 IN | HEART RATE: 78 BPM | TEMPERATURE: 97 F | BODY MASS INDEX: 26.92 KG/M2

## 2023-05-08 VITALS
BODY MASS INDEX: 26.92 KG/M2 | WEIGHT: 171.5 LBS | HEART RATE: 88 BPM | DIASTOLIC BLOOD PRESSURE: 61 MMHG | OXYGEN SATURATION: 94 % | HEIGHT: 67 IN | TEMPERATURE: 97 F | SYSTOLIC BLOOD PRESSURE: 100 MMHG

## 2023-05-08 DIAGNOSIS — C79.51 SECONDARY MALIGNANT NEOPLASM OF BONE: ICD-10-CM

## 2023-05-08 DIAGNOSIS — D84.822 IMMUNODEFICIENCY SECONDARY TO RADIATION THERAPY: ICD-10-CM

## 2023-05-08 DIAGNOSIS — Z17.0 MALIGNANT NEOPLASM OF CENTRAL PORTION OF RIGHT BREAST IN FEMALE, ESTROGEN RECEPTOR POSITIVE: ICD-10-CM
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
## 2023-05-08 DIAGNOSIS — T45.1X5A IMMUNODEFICIENCY DUE TO CHEMOTHERAPY: Primary | ICD-10-CM

## 2023-05-08 DIAGNOSIS — Z79.899 IMMUNODEFICIENCY DUE TO CHEMOTHERAPY: Primary | ICD-10-CM

## 2023-05-08 DIAGNOSIS — Z17.0 MALIGNANT NEOPLASM OF CENTRAL PORTION OF RIGHT BREAST IN FEMALE, ESTROGEN RECEPTOR POSITIVE: Primary | ICD-10-CM

## 2023-05-08 DIAGNOSIS — D84.821 IMMUNODEFICIENCY DUE TO CHEMOTHERAPY: Primary | ICD-10-CM

## 2023-05-08 DIAGNOSIS — C50.111 MALIGNANT NEOPLASM OF CENTRAL PORTION OF RIGHT BREAST IN FEMALE, ESTROGEN RECEPTOR POSITIVE: Primary | ICD-10-CM

## 2023-05-08 DIAGNOSIS — Z79.811 USE OF AROMATASE INHIBITORS: ICD-10-CM

## 2023-05-08 DIAGNOSIS — C50.111 MALIGNANT NEOPLASM OF CENTRAL PORTION OF RIGHT BREAST IN FEMALE, ESTROGEN RECEPTOR POSITIVE: ICD-10-CM

## 2023-05-08 PROCEDURE — 3074F PR MOST RECENT SYSTOLIC BLOOD PRESSURE < 130 MM HG: ICD-10-PCS | Mod: HCNC,CPTII,S$GLB,

## 2023-05-08 PROCEDURE — 99215 PR OFFICE/OUTPT VISIT, EST, LEVL V, 40-54 MIN: ICD-10-PCS | Mod: HCNC,S$GLB,,

## 2023-05-08 PROCEDURE — 3074F SYST BP LT 130 MM HG: CPT | Mod: HCNC,CPTII,S$GLB,

## 2023-05-08 PROCEDURE — 3008F BODY MASS INDEX DOCD: CPT | Mod: HCNC,CPTII,S$GLB,

## 2023-05-08 PROCEDURE — 99999 PR PBB SHADOW E&M-EST. PATIENT-LVL IV: CPT | Mod: PBBFAC,HCNC,,

## 2023-05-08 PROCEDURE — 96402 CHEMO HORMON ANTINEOPL SQ/IM: CPT | Mod: HCNC

## 2023-05-08 PROCEDURE — 3078F DIAST BP <80 MM HG: CPT | Mod: HCNC,CPTII,S$GLB,

## 2023-05-08 PROCEDURE — 99215 OFFICE O/P EST HI 40 MIN: CPT | Mod: HCNC,S$GLB,,

## 2023-05-08 PROCEDURE — 3008F PR BODY MASS INDEX (BMI) DOCUMENTED: ICD-10-PCS | Mod: HCNC,CPTII,S$GLB,

## 2023-05-08 PROCEDURE — 99999 PR PBB SHADOW E&M-EST. PATIENT-LVL IV: ICD-10-PCS | Mod: PBBFAC,HCNC,,

## 2023-05-08 PROCEDURE — 3078F PR MOST RECENT DIASTOLIC BLOOD PRESSURE < 80 MM HG: ICD-10-PCS | Mod: HCNC,CPTII,S$GLB,

## 2023-05-08 PROCEDURE — 63600175 PHARM REV CODE 636 W HCPCS: Mod: JZ,JG,HCNC

## 2023-05-08 RX ORDER — LAMOTRIGINE 25 MG/1
500 TABLET ORAL
Status: COMPLETED | OUTPATIENT
Start: 2023-05-08 | End: 2023-05-08

## 2023-05-08 RX ORDER — LAMOTRIGINE 25 MG/1
500 TABLET ORAL
Status: CANCELLED | OUTPATIENT
Start: 2023-05-08

## 2023-05-08 RX ADMIN — FULVESTRANT 500 MG: 50 INJECTION, SOLUTION INTRAMUSCULAR at 01:05

## 2023-05-08 NOTE — PROGRESS NOTES
Subjective:       Patient ID: Carole Moore is a 57 y.o. female.    Chief Complaint: Chemotherapy and Breast Cancer    Primary Oncologist/Hematologist: Dr. Khan    HPI: Ms. Moore is a 57 year old female who is following up for her metastatic breast cancer. She is on fulvestrant (faslodex) q 4 weeks + arimidex daily. She is also getting zometa q 3 months, last on 4/10/23. She was previously treated with Ibrance days 1-21 of 28 day cycle, but was held due to low ANC previously.   Cancer Hx: ddACT, R mastectomy, adjuvant radiation completed 2017  Arimidex  30Gy/10fx to T7-11 completed 10/31/22  Pmhx: follows with neurosurg, Dr. Pelayo    Today: She states she has a good appetite and has been staying hydrated. She denies any v/d/c, fevers, illnesses, rash, pain. She takes zofran prior to each ibrance medication which helps with nausea. She continues to take calcium and vitamin D. She is supposed to start er next cycle of ibrance tomorrow. She continues to take her arimidex.     Social History     Socioeconomic History    Marital status:    Tobacco Use    Smoking status: Never    Smokeless tobacco: Never   Substance and Sexual Activity    Alcohol use: Never    Drug use: No    Sexual activity: Not Currently     Birth control/protection: Surgical   Other Topics Concern    Are you pregnant or think you may be? No    Breast-feeding No     Social Determinants of Health     Financial Resource Strain: Low Risk     Difficulty of Paying Living Expenses: Not hard at all   Food Insecurity: No Food Insecurity    Worried About Running Out of Food in the Last Year: Never true    Ran Out of Food in the Last Year: Never true   Transportation Needs: No Transportation Needs    Lack of Transportation (Medical): No    Lack of Transportation (Non-Medical): No   Physical Activity: Inactive    Days of Exercise per Week: 0 days    Minutes of Exercise per Session: 0 min   Stress: Stress Concern Present    Feeling of Stress :  To some extent   Social Connections: Unknown    Frequency of Communication with Friends and Family: More than three times a week    Frequency of Social Gatherings with Friends and Family: Three times a week    Active Member of Clubs or Organizations: Yes    Attends Club or Organization Meetings: More than 4 times per year    Marital Status:    Housing Stability: Low Risk     Unable to Pay for Housing in the Last Year: No    Number of Places Lived in the Last Year: 1    Unstable Housing in the Last Year: No       Past Medical History:   Diagnosis Date    Antineoplastic chemotherapy induced anemia     Breast cancer 2016    right breast    Cancer     R Breast cancer    CVA (cerebral infarction)     Diverticulosis     Family history of colon cancer 10/30/2018    Her mother and father both had colon cancer.    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    Nausea after anesthesia     Paralysis     right side    PONV (postoperative nausea and vomiting)     Stroke 2001    R side weakness    Trouble in sleeping        Family History   Problem Relation Age of Onset    Breast cancer Paternal Aunt     Colon cancer Father     Colon cancer Mother     Melanoma Maternal Uncle     Skin cancer Maternal Uncle        Past Surgical History:   Procedure Laterality Date    AUGMENTATION OF BREAST Left 2017    BREAST SURGERY      COLONOSCOPY N/A 10/30/2018    Procedure: COLONOSCOPY;  Surgeon: Cosme Monson MD;  Location: [facility];  Service: Endoscopy;  Laterality: N/A;    CYSTOSCOPY WITH BIOPSY OF BLADDER N/A 11/29/2021    Procedure: CYSTOSCOPY, WITH BLADDER BIOPSY, WITH FULGURATION IF INDICATED;  Surgeon: Page Marcelo MD;  Location: [facility] OR;  Service: Urology;  Laterality: N/A;    FIXATION KYPHOPLASTY Bilateral 2/9/2023    Procedure: KYPHOPLASTY;  Surgeon: Josse Pelayo MD;  Location: [facility] OR;  Service: Neurosurgery;   Laterality: Bilateral;  Kyphoplasty T10 with ablation    HYSTERECTOMY  2007    maintains both ovaries, menorrhagia    MASTECTOMY Right 2017    MEDIPORT INSERTION, SINGLE      RADIOFREQUENCY ABLATION, BONE, PERCUTANEOUS Bilateral 2/9/2023    Procedure: RADIOFREQUENCY ABLATION,BONE,PERCUTANEOUS;  Surgeon: Josse Pelayo MD;  Location: [facility];  Service: Neurosurgery;  Laterality: Bilateral;    TONSILLECTOMY         Review of Systems   Constitutional:  Negative for activity change, appetite change, chills, diaphoresis, fatigue, fever and unexpected weight change.   HENT:  Negative for congestion, nosebleeds and rhinorrhea.    Respiratory:  Negative for cough and shortness of breath.    Cardiovascular:  Negative for chest pain and leg swelling.   Gastrointestinal:  Positive for nausea. Negative for abdominal pain, anal bleeding, blood in stool, constipation, diarrhea and vomiting.   Genitourinary:  Negative for hematuria.   Musculoskeletal:  Negative for arthralgias and myalgias.   Skin:  Negative for color change and pallor.   Neurological:  Negative for dizziness, weakness, light-headedness and headaches.       Medication List with Changes/Refills   Current Medications    ANASTROZOLE (ARIMIDEX) 1 MG TAB    Take 1 tablet (1 mg total) by mouth once daily.    ASPIRIN (ECOTRIN) 81 MG EC TABLET    Take 81 mg by mouth once daily.    ATORVASTATIN (LIPITOR) 10 MG TABLET    Take 1 tablet (10 mg total) by mouth once daily.    CETIRIZINE (ZYRTEC) 10 MG TABLET    Take 10 mg by mouth daily as needed.    FULVESTRANT (FASLODEX) 250 MG/5 ML INJECTION    Inject 250 mg into the muscle every 30 days.    GADAVIST 10 MMOL/10 ML (1 MMOL/ML) SOLN INJECTION    every 6 (six) months. Once yearly    MECLIZINE (ANTIVERT) 25 MG TABLET    Take 25 mg by mouth 3 (three) times daily as needed.    METHOCARBAMOL (ROBAXIN) 750 MG TAB    Take 1 tablet (750 mg total) by mouth 3 (three) times daily.    ONDANSETRON (ZOFRAN) 4 MG TABLET    Take 1 tablet  (4 mg total) by mouth every 8 (eight) hours as needed for Nausea.    OXYCODONE-ACETAMINOPHEN (PERCOCET)  MG PER TABLET    Take 1 tablet by mouth every 6 (six) hours as needed for Pain.    PALBOCICLIB 125 MG TAB    Take 1 tablet (125 mg) by mouth on days 1-21 of each 28 day cycle. Take with food.    POLYETHYLENE GLYCOL (GLYCOLAX) 17 GRAM/DOSE POWDER    Take 17 g by mouth once daily.    PROCHLORPERAZINE (COMPAZINE) 5 MG TABLET    Take 1 tablet (5 mg total) by mouth 4 (four) times daily as needed for Nausea.    ZOLEDRONIC 4 MG/100 ML PGBK INFUSION         Objective:     Vitals:    05/08/23 1247   BP: 100/61   Pulse: 88   Temp: 97.2 °F (36.2 °C)       Physical Exam  Vitals reviewed.   Constitutional:       General: She is not in acute distress.     Appearance: She is not ill-appearing, toxic-appearing or diaphoretic.   HENT:      Head: Normocephalic and atraumatic.   Eyes:      Conjunctiva/sclera: Conjunctivae normal.   Cardiovascular:      Rate and Rhythm: Normal rate.   Pulmonary:      Effort: Pulmonary effort is normal.   Abdominal:      General: Bowel sounds are normal.   Musculoskeletal:      Right lower leg: No edema.      Left lower leg: No edema.   Skin:     General: Skin is warm.      Coloration: Skin is not jaundiced or pale.      Findings: No bruising, erythema, lesion or rash.   Neurological:      Mental Status: She is alert.      Motor: No weakness.      Gait: Gait normal.   Psychiatric:         Mood and Affect: Mood normal.         Behavior: Behavior normal.         Thought Content: Thought content normal.          Labs/Results:    CMP  Sodium   Date Value Ref Range Status   05/08/2023 140 136 - 145 mmol/L Final     Potassium   Date Value Ref Range Status   05/08/2023 4.1 3.5 - 5.1 mmol/L Final     Chloride   Date Value Ref Range Status   05/08/2023 106 95 - 110 mmol/L Final     CO2   Date Value Ref Range Status   05/08/2023 24 23 - 29 mmol/L Final     Glucose   Date Value Ref Range Status    05/08/2023 99 70 - 110 mg/dL Final     BUN   Date Value Ref Range Status   05/08/2023 13 6 - 20 mg/dL Final     Creatinine   Date Value Ref Range Status   05/08/2023 0.7 0.5 - 1.4 mg/dL Final     Calcium   Date Value Ref Range Status   05/08/2023 9.5 8.7 - 10.5 mg/dL Final     Total Protein   Date Value Ref Range Status   05/08/2023 7.1 6.0 - 8.4 g/dL Final     Albumin   Date Value Ref Range Status   05/08/2023 4.1 3.5 - 5.2 g/dL Final     Total Bilirubin   Date Value Ref Range Status   05/08/2023 0.6 0.1 - 1.0 mg/dL Final     Comment:     For infants and newborns, interpretation of results should be based  on gestational age, weight and in agreement with clinical  observations.    Premature Infant recommended reference ranges:  Up to 24 hours.............<8.0 mg/dL  Up to 48 hours............<12.0 mg/dL  3-5 days..................<15.0 mg/dL  6-29 days.................<15.0 mg/dL       Alkaline Phosphatase   Date Value Ref Range Status   05/08/2023 55 55 - 135 U/L Final     AST   Date Value Ref Range Status   05/08/2023 18 10 - 40 U/L Final     ALT   Date Value Ref Range Status   05/08/2023 17 10 - 44 U/L Final     Anion Gap   Date Value Ref Range Status   05/08/2023 10 8 - 16 mmol/L Final     eGFR   Date Value Ref Range Status   05/08/2023 >60 >60 mL/min/1.73 m^2 Final     Lab Results   Component Value Date    WBC 2.15 (L) 05/08/2023    RBC 3.65 (L) 05/08/2023    HGB 12.2 05/08/2023    HCT 35.7 (L) 05/08/2023    MCV 98 05/08/2023    MCH 33.4 (H) 05/08/2023    MCHC 34.2 05/08/2023    RDW 13.8 05/08/2023     05/08/2023    MPV 8.7 (L) 05/08/2023    GRAN 0.9 (L) 05/08/2023    GRAN 40.0 05/08/2023    LYMPH 0.8 (L) 05/08/2023    LYMPH 38.6 05/08/2023    MONO 0.4 05/08/2023    MONO 18.1 (H) 05/08/2023    EOS 0.0 05/08/2023    BASO 0.04 05/08/2023    EOSINOPHIL 1.4 05/08/2023    BASOPHIL 1.9 05/08/2023       Pet scan 1/11/23  Impression:  1. Diffuse osseous metastatic disease again noted.  No new sites of osseous  metastatic disease identified.  The SUV max of the previously identified osseous metastatic lesions for the most part demonstrate significant interval decrease when compared to the prior study consistent with treatment response.  A few of the sites of osseous metastatic disease demonstrates stable uptake    Assessment:     Problem List Items Addressed This Visit          Immunology/Multi System    Immunodeficiency due to chemotherapy - Primary    Immunodeficiency secondary to radiation therapy       Oncology    Malignant neoplasm of central portion of right breast in female, estrogen receptor positive    Use of aromatase inhibitors    Secondary malignant neoplasm of bone     Plan:     Malignant neoplasm of central portion of right breast in female, estrogen receptor positive, Use of aromatase inhibitors, Secondary malignant neoplasm of bone  --continue with zometa q 3 months. Last given 4/10/23  --continue with fulvestrant (faslodex) q 4 weeks  --continue with arimidex daily  --hold palbociclib 125mg on days 1-21 on 28 day cycle due to neutropenia  --fever precautions given  --next pet scan is due 6/2023  --DEXA due 10/2023  --continue with calcium and vitamin D supplement      Follow-Up:  4 weeks with cbc cmp prior for next faslodex     Vicky Jimenez PA-C  Hematology Oncology    Route Chart for Scheduling    Med Onc Chart Routing      Follow up with physician    Follow up with [facility] . Week of June 5th with cbc cmp prior- [facility]   Infusion scheduling note week of June 5th for next faslodex   Injection scheduling note    Labs CMP and CBC   Scheduling:  Preferred lab:  Lab interval:  standing orders in   Imaging    Pharmacy appointment    Other referrals           Treatment Plan Information   OP PALBOCICLIB FULVESTRANT Q4W   Harpal Khan MD   Upcoming Treatment Dates - OP PALBOCICLIB FULVESTRANT Q4W    6/5/2023       Chemotherapy       fulvestrant (FASLODEX) injection 500 mg  7/3/2023       Chemotherapy        fulvestrant (FASLODEX) injection 500 mg  7/31/2023       Chemotherapy       fulvestrant (FASLODEX) injection 500 mg  8/28/2023       Chemotherapy       fulvestrant (FASLODEX) injection 500 mg    Supportive Plan Information  OP ZOLEDRONIC ACID (ZOMETA) Q4W   Harpal Khan MD   Upcoming Treatment Dates - OP ZOLEDRONIC ACID (ZOMETA) Q4W    7/9/2023       Medications       zoledronic 4 mg/100 mL infusion 4 mg  10/7/2023       Medications       zoledronic 4 mg/100 mL infusion 4 mg  11/4/2023       Medications       zoledronic 4 mg/100 mL infusion 4 mg  12/2/2023       Medications       zoledronic 4 mg/100 mL infusion 4 mg

## 2023-05-08 NOTE — DISCHARGE INSTRUCTIONS
Thank you for allowing me to care for you today,  SOILA YapN, RN    [facility]  [address] [address]  [phone] phone     [phone] fax  Hours of Operation: Monday- Friday 8:00am- 5:00pm  After hours phone  [phone]  Hematology / Oncology Physicians on call      Dr. Bill Jimenez, TRI Mendoza, GIULIANO Trinidad NP Phaon Dunbar, GIULIANO Vieyra, GIULIANO    Please call with any concerns regarding your appointment today.

## 2023-05-08 NOTE — NURSING
1330: Faslodex Injection given without difficulties to bilateral buttocks. Bandaid applied. Patient instructed to stay in the clinic for 15 minutes. Patient verbalized understanding and will notify nurse with any complaints.

## 2023-05-08 NOTE — PLAN OF CARE
Pt is stable. Pt voiced she was doing well today. Pt administered Faslodex, pt will follow up in four weeks.      Problem: Fall Injury Risk  Goal: Absence of Fall and Fall-Related Injury  Outcome: Ongoing, Progressing     Problem: Anemia (Chemotherapy Effects)  Goal: Anemia Symptom Improvement  Outcome: Ongoing, Progressing     Problem: Urinary Bleeding Risk or Actual (Chemotherapy Effects)  Goal: Absence of Hematuria  Outcome: Ongoing, Progressing     Problem: Nausea and Vomiting (Chemotherapy Effects)  Goal: Fluid and Electrolyte Balance  Outcome: Ongoing, Progressing     Problem: Neurotoxicity (Chemotherapy Effects)  Goal: Neurotoxicity Symptom Control  Outcome: Ongoing, Progressing     Problem: Neutropenia (Chemotherapy Effects)  Goal: Absence of Infection  Outcome: Ongoing, Progressing     Problem: Oral Mucositis (Chemotherapy Effects)  Goal: Improved Oral Mucous Membrane Integrity  Outcome: Ongoing, Progressing     Problem: Thrombocytopenia Bleeding Risk (Chemotherapy Effects)  Goal: Absence of Bleeding  Outcome: Ongoing, Progressing     Problem: Adult Inpatient Plan of Care  Goal: Plan of Care Review  Outcome: Ongoing, Progressing  Goal: Patient-Specific Goal (Individualized)  Outcome: Ongoing, Progressing  Flowsheets (Taken 5/8/2023 8868)  Anxieties, Fears or Concerns: Pt denies any.  Individualized Care Needs: Pt reuest injections be given at same time.

## 2023-05-16 PROBLEM — S22.000A COMPRESSION FRACTURE OF THORACIC VERTEBRA: Status: ACTIVE | Noted: 2023-05-16

## 2023-05-16 PROBLEM — S22.070A COMPRESSION FRACTURE OF T10 VERTEBRA: Status: ACTIVE | Noted: 2023-05-16

## 2023-05-23 ENCOUNTER — OFFICE VISIT (OUTPATIENT)
Dept: INTERNAL MEDICINE | Facility: CLINIC | Age: 58
End: 2023-05-23
Payer: MEDICARE

## 2023-05-23 VITALS
HEART RATE: 80 BPM | DIASTOLIC BLOOD PRESSURE: 80 MMHG | HEIGHT: 67 IN | BODY MASS INDEX: 27.12 KG/M2 | SYSTOLIC BLOOD PRESSURE: 100 MMHG | RESPIRATION RATE: 20 BRPM | WEIGHT: 172.81 LBS

## 2023-05-23 DIAGNOSIS — R79.9 ABNORMAL FINDING OF BLOOD CHEMISTRY, UNSPECIFIED: ICD-10-CM

## 2023-05-23 DIAGNOSIS — E78.5 HYPERLIPIDEMIA, UNSPECIFIED HYPERLIPIDEMIA TYPE: ICD-10-CM

## 2023-05-23 DIAGNOSIS — M85.89 OSTEOPENIA OF MULTIPLE SITES: ICD-10-CM

## 2023-05-23 DIAGNOSIS — C50.111 MALIGNANT NEOPLASM OF CENTRAL PORTION OF RIGHT BREAST IN FEMALE, ESTROGEN RECEPTOR POSITIVE: ICD-10-CM

## 2023-05-23 DIAGNOSIS — Z13.1 DIABETES MELLITUS SCREENING: ICD-10-CM

## 2023-05-23 DIAGNOSIS — C79.51 SECONDARY MALIGNANT NEOPLASM OF BONE: ICD-10-CM

## 2023-05-23 DIAGNOSIS — D84.822 IMMUNODEFICIENCY SECONDARY TO RADIATION THERAPY: ICD-10-CM

## 2023-05-23 DIAGNOSIS — Z17.0 MALIGNANT NEOPLASM OF CENTRAL PORTION OF RIGHT BREAST IN FEMALE, ESTROGEN RECEPTOR POSITIVE: ICD-10-CM

## 2023-05-23 DIAGNOSIS — D61.810 PANCYTOPENIA DUE TO ANTINEOPLASTIC CHEMOTHERAPY: ICD-10-CM

## 2023-05-23 DIAGNOSIS — Z79.899 IMMUNODEFICIENCY DUE TO CHEMOTHERAPY: ICD-10-CM

## 2023-05-23 DIAGNOSIS — I69.351 HEMIPARESIS AFFECTING RIGHT SIDE AS LATE EFFECT OF CEREBROVASCULAR ACCIDENT: ICD-10-CM

## 2023-05-23 DIAGNOSIS — Z00.00 ENCOUNTER FOR MEDICARE ANNUAL WELLNESS EXAM: ICD-10-CM

## 2023-05-23 DIAGNOSIS — Z00.00 ENCOUNTER FOR PREVENTATIVE ADULT HEALTH CARE EXAMINATION: Primary | ICD-10-CM

## 2023-05-23 DIAGNOSIS — N39.0 RECURRENT UTI: ICD-10-CM

## 2023-05-23 DIAGNOSIS — Z00.00 ENCOUNTER FOR PREVENTIVE HEALTH EXAMINATION: ICD-10-CM

## 2023-05-23 DIAGNOSIS — S22.070S COMPRESSION FRACTURE OF T10 VERTEBRA, SEQUELA: ICD-10-CM

## 2023-05-23 DIAGNOSIS — Z86.73 HISTORY OF CVA (CEREBROVASCULAR ACCIDENT): ICD-10-CM

## 2023-05-23 DIAGNOSIS — T45.1X5A IMMUNODEFICIENCY DUE TO CHEMOTHERAPY: ICD-10-CM

## 2023-05-23 DIAGNOSIS — T45.1X5A PANCYTOPENIA DUE TO ANTINEOPLASTIC CHEMOTHERAPY: ICD-10-CM

## 2023-05-23 DIAGNOSIS — Z74.09 OTHER REDUCED MOBILITY: ICD-10-CM

## 2023-05-23 DIAGNOSIS — D84.821 IMMUNODEFICIENCY DUE TO CHEMOTHERAPY: ICD-10-CM
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
## 2023-05-23 PROCEDURE — 3074F SYST BP LT 130 MM HG: CPT | Mod: CPTII,S$GLB,, | Performed by: NURSE PRACTITIONER

## 2023-05-23 PROCEDURE — 3079F DIAST BP 80-89 MM HG: CPT | Mod: CPTII,S$GLB,, | Performed by: NURSE PRACTITIONER

## 2023-05-23 PROCEDURE — G0439 PR MEDICARE ANNUAL WELLNESS SUBSEQUENT VISIT: ICD-10-PCS | Mod: S$GLB,,, | Performed by: NURSE PRACTITIONER

## 2023-05-23 PROCEDURE — 3008F PR BODY MASS INDEX (BMI) DOCUMENTED: ICD-10-PCS | Mod: CPTII,S$GLB,, | Performed by: NURSE PRACTITIONER

## 2023-05-23 PROCEDURE — G0439 PPPS, SUBSEQ VISIT: HCPCS | Mod: S$GLB,,, | Performed by: NURSE PRACTITIONER

## 2023-05-23 PROCEDURE — 99999 PR PBB SHADOW E&M-EST. PATIENT-LVL V: CPT | Mod: PBBFAC,,, | Performed by: NURSE PRACTITIONER

## 2023-05-23 PROCEDURE — 99215 OFFICE O/P EST HI 40 MIN: CPT | Performed by: NURSE PRACTITIONER

## 2023-05-23 PROCEDURE — 3008F BODY MASS INDEX DOCD: CPT | Mod: CPTII,S$GLB,, | Performed by: NURSE PRACTITIONER

## 2023-05-23 PROCEDURE — 3079F PR MOST RECENT DIASTOLIC BLOOD PRESSURE 80-89 MM HG: ICD-10-PCS | Mod: CPTII,S$GLB,, | Performed by: NURSE PRACTITIONER

## 2023-05-23 PROCEDURE — 1160F RVW MEDS BY RX/DR IN RCRD: CPT | Mod: CPTII,S$GLB,, | Performed by: NURSE PRACTITIONER

## 2023-05-23 PROCEDURE — 1160F PR REVIEW ALL MEDS BY PRESCRIBER/CLIN PHARMACIST DOCUMENTED: ICD-10-PCS | Mod: CPTII,S$GLB,, | Performed by: NURSE PRACTITIONER

## 2023-05-23 PROCEDURE — 1159F MED LIST DOCD IN RCRD: CPT | Mod: CPTII,S$GLB,, | Performed by: NURSE PRACTITIONER

## 2023-05-23 PROCEDURE — 1159F PR MEDICATION LIST DOCUMENTED IN MEDICAL RECORD: ICD-10-PCS | Mod: CPTII,S$GLB,, | Performed by: NURSE PRACTITIONER

## 2023-05-23 PROCEDURE — 99999 PR PBB SHADOW E&M-EST. PATIENT-LVL V: ICD-10-PCS | Mod: PBBFAC,,, | Performed by: NURSE PRACTITIONER

## 2023-05-23 PROCEDURE — 3074F PR MOST RECENT SYSTOLIC BLOOD PRESSURE < 130 MM HG: ICD-10-PCS | Mod: CPTII,S$GLB,, | Performed by: NURSE PRACTITIONER

## 2023-05-23 NOTE — PATIENT INSTRUCTIONS
Counseling and Referral of Other Preventative  (Italic type indicates deductible and co-insurance are waived)    Patient Name: Carole Moore  Today's Date: 5/23/2023    Health Maintenance       Date Due Completion Date    Hemoglobin A1c (Diabetic Prevention Screening) Never done ---    Pneumococcal Vaccines (Age 0-64) (2 - PCV) 10/16/2020 10/16/2019    COVID-19 Vaccine (4 - Booster for Pfizer series) 11/20/2021 9/25/2021    Lipid Panel 09/15/2022 9/15/2021    Colorectal Cancer Screening 10/30/2023 10/30/2018    High Dose Statin 05/23/2024 5/23/2023    TETANUS VACCINE 01/01/2032 1/1/2022        Orders Placed This Encounter   Procedures    HEMOGLOBIN A1C     The following information is provided to all patients.  This information is to help you find resources for any of the problems found today that may be affecting your health:                Living healthy guide: www.[facility].[place]      Understanding Diabetes: www.diabetes.org      Eating healthy: www.cdc.gov/healthyweight      [facility] home safety checklist: www.cdc.gov/steadi/patient.html      Agency on Aging: www.goea.[place]      Alcoholics anonymous (AA): www.aa.org      Physical Activity: www.laxmi.nih.gov/eo9xzne      Tobacco use: www.quitwithusla.org     Counseling and Referral of Other Preventative  (Italic type indicates deductible and co-insurance are waived)    Patient Name: Carole Moore  Today's Date: 5/23/2023    Health Maintenance       Date Due Completion Date    Hemoglobin A1c (Diabetic Prevention Screening) Never done ---    Pneumococcal Vaccines (Age 0-64) (2 - PCV) 10/16/2020 10/16/2019    COVID-19 Vaccine (4 - Booster for Pfizer series) 11/20/2021 9/25/2021    Lipid Panel 09/15/2022 9/15/2021    Colorectal Cancer Screening 10/30/2023 10/30/2018    High Dose Statin 05/23/2024 5/23/2023    TETANUS VACCINE 01/01/2032 1/1/2022        Orders Placed This Encounter   Procedures    HEMOGLOBIN A1C     The following information is provided to all  patients.  This information is to help you find resources for any of the problems found today that may be affecting your health:                Living healthy guide: www.[facility].[place].[facility]      Understanding Diabetes: www.diabetes.org      Eating healthy: www.cdc.gov/healthyweight      CDC home safety checklist: www.cdc.gov/steadi/patient.html      Agency on Aging: www.goea.[place]      Alcoholics anonymous (AA): www.aa.org      Physical Activity: www.laxmi.nih.gov/en5acca      Tobacco use: www.quitwithusla.org

## 2023-05-23 NOTE — PROGRESS NOTES
"      Carole Moore presented for a  Medicare AWV and comprehensive Health Risk Assessment today. The following components were reviewed and updated:    Medical history  Family History  Social history  Allergies and Current Medications  Health Risk Assessment  Health Maintenance  Care Team         ** See Completed Assessments for Annual Wellness Visit within the encounter summary.**         The following assessments were completed:  Living Situation  CAGE  Depression Screening  Timed Get Up and Go  Whisper Test  Cognitive Function Screening  Nutrition Screening  ADL Screening  PAQ Screening        Vitals:    05/23/23 0809   BP: 100/80   BP Location: Left arm   Patient Position: Sitting   Pulse: 80   Resp: 20   Weight: 78.4 kg (172 lb 13.5 oz)   Height:      5' 7" (1.702 m)     Body mass index is 27.07 kg/m².  Physical Exam  Constitutional:       General: She is not in acute distress.     Appearance: She is not ill-appearing or diaphoretic.   HENT:      Head: Normocephalic and atraumatic.      Mouth/Throat:      Mouth: Mucous membranes are moist.   Eyes:      General:         Right eye: No discharge.         Left eye: No discharge.      Extraocular Movements: Extraocular movements intact.      Conjunctiva/sclera: Conjunctivae normal.   Cardiovascular:      Rate and Rhythm: Normal rate and regular rhythm.   Pulmonary:      Effort: Pulmonary effort is normal. No respiratory distress.   Skin:     General: Skin is warm and dry.   Neurological:      Mental Status: She is alert and oriented to person, place, and time. Mental status is at baseline.   Psychiatric:         Mood and Affect: Mood normal.         Behavior: Behavior normal.         Thought Content: Thought content normal.         Judgment: Judgment normal.           Diagnoses and health risks identified today and associated recommendations/orders:    1. Encounter for preventative adult health care examination, Encounter for Medicare annual wellness exam  - " Ambulatory Referral/Consult to Enhanced Annual Wellness Visit (eAWV)  Review for opioid screening: Patient does have Rx for Opioids- percocet  Patient on chronic opioids-. percocet  Followed by physician   Risk factors reviewed. Risk factors may include Sleep-disordered breathing, renal or hepatic insufficiency, increased risk for falls and fractures, risk of opioid misuse/overdose/opioid use disorder.  Pain evaluated during visit, documented under vitals.  Discussed nonpharmacologic treatments options, will follow up with prescribing provider to discuss further    Review for substance use disorder: Patient does not use substance per chart    Patient states she does not drink alcohol    I did not offer at this time to discuss advanced care planning, including how to pick a person who would make decisions for you if you were unable to make them for yourself, called a health care power of , and what kind of decisions you might make such as use of life sustaining treatments such as ventilators and tube feeding when faced with a life limiting illness recorded on a living will that they will need to know. (How you want to be cared for as you near the end of your natural life)     X  I did not engage patient in a discussion regarding advanced directives at this time    2. History of CVA (cerebrovascular accident), Hemiparesis affecting right side as late effect of cerebrovascular accident, Other reduced mobility  Monitor  Fall precautions    3. Malignant neoplasm of central portion of right breast in female, estrogen receptor positive, Pancytopenia due to antineoplastic chemotherapy, Secondary malignant neoplasm of bone, Immunodeficiency due to chemotherapy, Immunodeficiency secondary to radiation therapy  Monitor  Follow up with oncology as directed  Continue home arimidex  Discussed possible use of antioxidants in diet. Patient to discuss adding any nutritional supplements to diet with Oncology for  clearance/approval before adding. Patient verbalized understanding    4. Diabetes mellitus screening, Abnormal finding of blood chemistry, unspecified   Health screening  - HEMOGLOBIN A1C; Future- Ordered    5. Osteopenia of multiple sites  Monitor  Follow up with PCP  Continue home calcium, vitamin D    6.  Hyperlipidemia, unspecified hyperlipidemia type  Monitor  Follow up with PCP  Continue home lipitor    7. Recurrent UTI   Monitor  Follow up with PCP  Patient currently has no symptoms of UTI    8. Compression fracture of T10 vertebra, sequela  Monitor  Follow up as directed   Continue home robaxin, percocet                    Provided Carole with a 5-10 year written screening schedule and personal prevention plan. Recommendations were developed using the USPSTF age appropriate recommendations. Education, counseling, and referrals were provided as needed. After Visit Summary printed and given to patient which includes a list of additional screenings\tests needed.    Follow up in about 1 year (around 5/23/2024) for Annual wellness visit.    MAREK Florentino

## 2023-05-24 ENCOUNTER — SPECIALTY PHARMACY (OUTPATIENT)
Dept: PHARMACY | Facility: CLINIC | Age: 58
End: 2023-05-24
Payer: MEDICARE

## 2023-05-24 NOTE — TELEPHONE ENCOUNTER
Outgoing call regarding Ibrance refill, pt decline refill, stating she has the pervious refill on hand due to her blood work coming back low, provider had her hold the medication. Pt states she restart her medication on 6/5. Informed pt will follow up accordingly.

## 2023-05-25 NOTE — TELEPHONE ENCOUNTER
Pt confirmed she is currently holding Ibrance and her next cycle starts 6/5. She had an entire cycle on hand. Pending out refill, will FU at refill. Pt knows to call if needing anything sooner.

## 2023-06-05 ENCOUNTER — INFUSION (OUTPATIENT)
Dept: INFUSION THERAPY | Facility: HOSPITAL | Age: 58
End: 2023-06-05
Attending: INTERNAL MEDICINE
Payer: MEDICARE

## 2023-06-05 ENCOUNTER — OFFICE VISIT (OUTPATIENT)
Dept: HEMATOLOGY/ONCOLOGY | Facility: CLINIC | Age: 58
End: 2023-06-05
Payer: MEDICARE

## 2023-06-05 VITALS
SYSTOLIC BLOOD PRESSURE: 127 MMHG | TEMPERATURE: 98 F | OXYGEN SATURATION: 98 % | OXYGEN SATURATION: 99 % | RESPIRATION RATE: 20 BRPM | RESPIRATION RATE: 18 BRPM | HEIGHT: 67 IN | SYSTOLIC BLOOD PRESSURE: 114 MMHG | TEMPERATURE: 98 F | DIASTOLIC BLOOD PRESSURE: 79 MMHG | HEART RATE: 109 BPM | DIASTOLIC BLOOD PRESSURE: 68 MMHG | WEIGHT: 175.06 LBS | BODY MASS INDEX: 27.48 KG/M2 | HEART RATE: 79 BPM
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
## 2023-06-05 DIAGNOSIS — Z17.0 MALIGNANT NEOPLASM OF CENTRAL PORTION OF RIGHT BREAST IN FEMALE, ESTROGEN RECEPTOR POSITIVE: Primary | ICD-10-CM

## 2023-06-05 DIAGNOSIS — D84.822 IMMUNODEFICIENCY SECONDARY TO RADIATION THERAPY: Primary | ICD-10-CM

## 2023-06-05 DIAGNOSIS — T45.1X5A IMMUNODEFICIENCY DUE TO CHEMOTHERAPY: ICD-10-CM

## 2023-06-05 DIAGNOSIS — D61.810 PANCYTOPENIA DUE TO ANTINEOPLASTIC CHEMOTHERAPY: ICD-10-CM

## 2023-06-05 DIAGNOSIS — C50.111 MALIGNANT NEOPLASM OF CENTRAL PORTION OF RIGHT BREAST IN FEMALE, ESTROGEN RECEPTOR POSITIVE: ICD-10-CM

## 2023-06-05 DIAGNOSIS — D84.821 IMMUNODEFICIENCY DUE TO CHEMOTHERAPY: ICD-10-CM

## 2023-06-05 DIAGNOSIS — T45.1X5A PANCYTOPENIA DUE TO ANTINEOPLASTIC CHEMOTHERAPY: ICD-10-CM

## 2023-06-05 DIAGNOSIS — Z17.0 MALIGNANT NEOPLASM OF CENTRAL PORTION OF RIGHT BREAST IN FEMALE, ESTROGEN RECEPTOR POSITIVE: ICD-10-CM

## 2023-06-05 DIAGNOSIS — C79.51 SECONDARY MALIGNANT NEOPLASM OF BONE: ICD-10-CM

## 2023-06-05 DIAGNOSIS — Z79.899 IMMUNODEFICIENCY DUE TO CHEMOTHERAPY: ICD-10-CM

## 2023-06-05 DIAGNOSIS — C50.111 MALIGNANT NEOPLASM OF CENTRAL PORTION OF RIGHT BREAST IN FEMALE, ESTROGEN RECEPTOR POSITIVE: Primary | ICD-10-CM

## 2023-06-05 PROCEDURE — 3008F BODY MASS INDEX DOCD: CPT | Mod: CPTII,S$GLB,, | Performed by: INTERNAL MEDICINE

## 2023-06-05 PROCEDURE — 3078F DIAST BP <80 MM HG: CPT | Mod: CPTII,S$GLB,, | Performed by: INTERNAL MEDICINE

## 2023-06-05 PROCEDURE — 99215 PR OFFICE/OUTPT VISIT, EST, LEVL V, 40-54 MIN: ICD-10-PCS | Mod: 25,S$GLB,, | Performed by: INTERNAL MEDICINE

## 2023-06-05 PROCEDURE — 3078F PR MOST RECENT DIASTOLIC BLOOD PRESSURE < 80 MM HG: ICD-10-PCS | Mod: CPTII,S$GLB,, | Performed by: INTERNAL MEDICINE

## 2023-06-05 PROCEDURE — 99999 PR PBB SHADOW E&M-EST. PATIENT-LVL V: ICD-10-PCS | Mod: PBBFAC,,, | Performed by: INTERNAL MEDICINE

## 2023-06-05 PROCEDURE — 3008F PR BODY MASS INDEX (BMI) DOCUMENTED: ICD-10-PCS | Mod: CPTII,S$GLB,, | Performed by: INTERNAL MEDICINE

## 2023-06-05 PROCEDURE — 99999 PR PBB SHADOW E&M-EST. PATIENT-LVL V: CPT | Mod: PBBFAC,,, | Performed by: INTERNAL MEDICINE

## 2023-06-05 PROCEDURE — 3074F SYST BP LT 130 MM HG: CPT | Mod: CPTII,S$GLB,, | Performed by: INTERNAL MEDICINE

## 2023-06-05 PROCEDURE — 96402 CHEMO HORMON ANTINEOPL SQ/IM: CPT

## 2023-06-05 PROCEDURE — 63600175 PHARM REV CODE 636 W HCPCS: Mod: JZ,JG | Performed by: INTERNAL MEDICINE

## 2023-06-05 PROCEDURE — 99215 OFFICE O/P EST HI 40 MIN: CPT | Mod: 25,S$GLB,, | Performed by: INTERNAL MEDICINE

## 2023-06-05 PROCEDURE — 3074F PR MOST RECENT SYSTOLIC BLOOD PRESSURE < 130 MM HG: ICD-10-PCS | Mod: CPTII,S$GLB,, | Performed by: INTERNAL MEDICINE

## 2023-06-05 RX ORDER — LAMOTRIGINE 25 MG/1
500 TABLET ORAL
Status: CANCELLED | OUTPATIENT
Start: 2023-06-05

## 2023-06-05 RX ORDER — LAMOTRIGINE 25 MG/1
500 TABLET ORAL
Status: COMPLETED | OUTPATIENT
Start: 2023-06-05 | End: 2023-06-05

## 2023-06-05 RX ADMIN — FULVESTRANT 500 MG: 50 INJECTION, SOLUTION INTRAMUSCULAR at 01:06

## 2023-06-05 NOTE — PLAN OF CARE
Discussed plan of care with pt. Addressed any and ongoing concerns. Pt denies    Problem: Adult Inpatient Plan of Care  Goal: Plan of Care Review  Outcome: Ongoing, Progressing  Flowsheets (Taken 6/5/2023 1301)  Plan of Care Reviewed With: patient  Goal: Patient-Specific Goal (Individualized)  Outcome: Ongoing, Progressing  Goal: Absence of Hospital-Acquired Illness or Injury  Outcome: Ongoing, Progressing  Intervention: Identify and Manage Fall Risk  Flowsheets (Taken 6/5/2023 1301)  Safety Promotion/Fall Prevention:   room near unit station   nonskid shoes/socks when out of bed   in recliner, wheels locked  Intervention: Prevent Infection  Flowsheets (Taken 6/5/2023 1301)  Infection Prevention:   hand hygiene promoted   equipment surfaces disinfected  Goal: Optimal Comfort and Wellbeing  Outcome: Ongoing, Progressing  Intervention: Monitor Pain and Promote Comfort  Flowsheets (Taken 6/5/2023 1301)  Pain Management Interventions:   quiet environment facilitated   warm blanket provided  Intervention: Provide Person-Centered Care  Flowsheets (Taken 6/5/2023 1301)  Trust Relationship/Rapport:   questions encouraged   care explained   choices provided   reassurance provided   thoughts/feelings acknowledged   emotional support provided   empathic listening provided   questions answered

## 2023-06-05 NOTE — PROGRESS NOTES
Subjective:       Patient ID: Craole Moore is a 57 y.o. female.    Chief Complaint: Results, Chemotherapy, and Breast Cancer    HPI:  57-year-old female history of metastatic breast carcinoma to bone patient is continuing with Faslodex and Ibrance.  Scheduled for PET scan 06/26/2023.  Patient is tolerating therapy well no nausea vomiting fevers chills night sweats ECOG status 2    Past Medical History:   Diagnosis Date    Antineoplastic chemotherapy induced anemia     Breast cancer 2016    right breast    Cancer     R Breast cancer    CVA (cerebral infarction)     Diverticulosis     Family history of colon cancer 10/30/2018    Her mother and father both had colon cancer.    Genetic testing of female 12/27/2016    [employer]istics (28 genes) - NEGATIVE    Hyperlipidemia     Immunodeficiency due to chemotherapy 10/03/2022    Nausea after anesthesia     Paralysis     right side    PONV (postoperative nausea and vomiting)     Stroke 2001    R side weakness    Trouble in sleeping      Family History   Problem Relation Age of Onset    Breast cancer Paternal Aunt     Colon cancer Father     Colon cancer Mother     Melanoma Maternal Uncle     Skin cancer Maternal Uncle      Social History     Socioeconomic History    Marital status:    Tobacco Use    Smoking status: Never    Smokeless tobacco: Never   Substance and Sexual Activity    Alcohol use: Never    Drug use: No    Sexual activity: Not Currently     Birth control/protection: Surgical   Other Topics Concern    Are you pregnant or think you may be? No    Breast-feeding No     Social Determinants of Health     Financial Resource Strain: Low Risk     Difficulty of Paying Living Expenses: Not hard at all   Food Insecurity: No Food Insecurity    Worried About Running Out of Food in the Last Year: Never true    Ran Out of Food in the Last Year: Never true   Transportation Needs: No Transportation Needs    Lack of Transportation (Medical): No    Lack of  Transportation (Non-Medical): No   Physical Activity: Inactive    Days of Exercise per Week: 0 days    Minutes of Exercise per Session: 0 min   Stress: No Stress Concern Present    Feeling of Stress : Only a little   Social Connections: Moderately Integrated    Frequency of Communication with Friends and Family: More than three times a week    Frequency of Social Gatherings with Friends and Family: Three times a week    Attends [employer] Services: More than 4 times per year    Active Member of Clubs or Organizations: No    Attends Club or Organization Meetings: Never    Marital Status:    Housing Stability: Low Risk     Unable to Pay for Housing in the Last Year: No    Number of Places Lived in the Last Year: 1    Unstable Housing in the Last Year: No     Past Surgical History:   Procedure Laterality Date    AUGMENTATION OF BREAST Left 2017    BREAST SURGERY      COLONOSCOPY N/A 10/30/2018    Procedure: COLONOSCOPY;  Surgeon: Cosme Monson MD;  Location: [facility];  Service: Endoscopy;  Laterality: N/A;    CYSTOSCOPY WITH BIOPSY OF BLADDER N/A 11/29/2021    Procedure: CYSTOSCOPY, WITH BLADDER BIOPSY, WITH FULGURATION IF INDICATED;  Surgeon: Page Marcelo MD;  Location: [facility];  Service: Urology;  Laterality: N/A;    FIXATION KYPHOPLASTY Bilateral 2/9/2023    Procedure: KYPHOPLASTY;  Surgeon: Josse Pelayo MD;  Location: [facility];  Service: Neurosurgery;  Laterality: Bilateral;  Kyphoplasty T10 with ablation    HYSTERECTOMY  2007    maintains both ovaries, menorrhagia    MASTECTOMY Right 2017    MEDIPORT INSERTION, SINGLE      RADIOFREQUENCY ABLATION, BONE, PERCUTANEOUS Bilateral 2/9/2023    Procedure: RADIOFREQUENCY ABLATION,BONE,PERCUTANEOUS;  Surgeon: Josse Pelayo MD;  Location: [facility];  Service: Neurosurgery;  Laterality: Bilateral;    TONSILLECTOMY         Labs:  Lab Results   Component Value Date    WBC 4.53 06/05/2023    HGB 13.5 06/05/2023    HCT 40.3 06/05/2023    MCV 97 06/05/2023      06/05/2023     BMP  Lab Results   Component Value Date     06/05/2023    K 5.0 06/05/2023     06/05/2023    CO2 28 06/05/2023    BUN 13 06/05/2023    CREATININE 0.7 06/05/2023    CALCIUM 9.4 06/05/2023    ANIONGAP 9 06/05/2023    ESTGFRAFRICA >60 05/12/2022    EGFRNONAA >60 05/12/2022     Lab Results   Component Value Date    ALT 20 06/05/2023    AST 20 06/05/2023    ALKPHOS 54 (L) 06/05/2023    BILITOT 0.6 06/05/2023       No results found for: IRON, TIBC, FERRITIN, SATURATEDIRO  No results found for: [id number]  No results found for: FOLATE  Lab Results   Component Value Date    TSH 0.793 10/29/2021         Review of Systems   Constitutional:  Negative for activity change, appetite change, chills, diaphoresis, fatigue, fever and unexpected weight change.   HENT:  Negative for congestion, dental problem, drooling, ear discharge, ear pain, facial swelling, hearing loss, mouth sores, nosebleeds, postnasal drip, rhinorrhea, sinus pressure, sneezing, sore throat, tinnitus, trouble swallowing and voice change.    Eyes:  Negative for photophobia, pain, discharge, redness, itching and visual disturbance.   Respiratory:  Negative for cough, choking, chest tightness, shortness of breath, wheezing and stridor.    Cardiovascular:  Negative for chest pain, palpitations and leg swelling.   Gastrointestinal:  Negative for abdominal distention, abdominal pain, anal bleeding, blood in stool, constipation, diarrhea, nausea, rectal pain and vomiting.   Endocrine: Negative for cold intolerance, heat intolerance, polydipsia, polyphagia and polyuria.   Genitourinary:  Negative for decreased urine volume, difficulty urinating, dyspareunia, dysuria, enuresis, flank pain, frequency, genital sores, hematuria, menstrual problem, pelvic pain, urgency, vaginal bleeding, vaginal discharge and vaginal pain.   Musculoskeletal:  Positive for gait problem. Negative for arthralgias, back pain, joint swelling, myalgias, neck  pain and neck stiffness.   Skin:  Negative for color change, pallor and rash.   Allergic/Immunologic: Negative for environmental allergies, food allergies and immunocompromised state.   Neurological:  Negative for dizziness, tremors, seizures, syncope, facial asymmetry, speech difficulty, weakness, light-headedness, numbness and headaches.   Hematological:  Negative for adenopathy. Does not bruise/bleed easily.   Psychiatric/Behavioral:  Positive for dysphoric mood. Negative for agitation, behavioral problems, confusion, decreased concentration, hallucinations, self-injury, sleep disturbance and suicidal ideas. The patient is nervous/anxious. The patient is not hyperactive.      Objective:      Physical Exam  Vitals reviewed.   Constitutional:       General: She is not in acute distress.     Appearance: She is well-developed. She is not diaphoretic.   HENT:      Head: Normocephalic and atraumatic.      Right Ear: External ear normal.      Left Ear: External ear normal.      Nose: Nose normal.      Right Sinus: No maxillary sinus tenderness or frontal sinus tenderness.      Left Sinus: No maxillary sinus tenderness or frontal sinus tenderness.      Mouth/Throat:      Pharynx: No oropharyngeal exudate.   Eyes:      General: Lids are normal. No scleral icterus.        Right eye: No discharge.         Left eye: No discharge.      Conjunctiva/sclera: Conjunctivae normal.      Right eye: Right conjunctiva is not injected. No hemorrhage.     Left eye: Left conjunctiva is not injected. No hemorrhage.     Pupils: Pupils are equal, round, and reactive to light.   Neck:      Thyroid: No thyromegaly.      Vascular: No JVD.      Trachea: No tracheal deviation.   Cardiovascular:      Rate and Rhythm: Normal rate and regular rhythm.      Heart sounds: Normal heart sounds.   Pulmonary:      Effort: Pulmonary effort is normal. No respiratory distress.      Breath sounds: Normal breath sounds. No stridor.   Chest:      Chest wall: No  tenderness.   Abdominal:      General: Bowel sounds are normal. There is no distension.      Palpations: Abdomen is soft. There is no hepatomegaly, splenomegaly or mass.      Tenderness: There is no abdominal tenderness. There is no rebound.   Musculoskeletal:         General: No tenderness. Normal range of motion.      Cervical back: Normal range of motion and neck supple.   Lymphadenopathy:      Cervical: No cervical adenopathy.      Upper Body:      Right upper body: No supraclavicular adenopathy.      Left upper body: No supraclavicular adenopathy.   Skin:     General: Skin is dry.      Findings: No erythema or rash.   Neurological:      Mental Status: She is alert and oriented to person, place, and time.      Cranial Nerves: No cranial nerve deficit.      Motor: Weakness present.      Coordination: Coordination abnormal.      Gait: Gait abnormal.   Psychiatric:         Behavior: Behavior normal.         Thought Content: Thought content normal.         Judgment: Judgment normal.           Assessment:      1. Immunodeficiency secondary to radiation therapy    2. Malignant neoplasm of central portion of right breast in female, estrogen receptor positive    3. Immunodeficiency due to chemotherapy    4. Secondary malignant neoplasm of bone    5. Pancytopenia due to antineoplastic chemotherapy           Med Onc Chart Routing      Follow up with physician . Return to clinic in 5-6 weeks can be seen either by myself or APAP with standing CBC CMP   Follow up with [facility]    Infusion scheduling note    Injection scheduling note Faslodex in 1 month as well as Zometa   Labs CBC, CMP and LDH   Scheduling:  Preferred lab:  Lab interval:     Imaging PET scan   PET scan is scheduled for 06/26/2023   Pharmacy appointment    Other referrals             Plan:     Continue with current dosing of Faslodex on a monthly basis may need to stretch Ibrance out to every 5 weeks or reduce dose from 125 mg 200 mg.  PET scan at the end of June  communicate results through portal.  Zometa to be given in July of 2023 discussed implications of answered questions with patient patient has done remarkably well stable with no clear evidence of progression      Harpal Khan Jr, MD FACP

## 2023-06-06 DIAGNOSIS — C50.111 MALIGNANT NEOPLASM OF CENTRAL PORTION OF RIGHT BREAST IN FEMALE, ESTROGEN RECEPTOR POSITIVE: ICD-10-CM

## 2023-06-06 DIAGNOSIS — Z17.0 MALIGNANT NEOPLASM OF CENTRAL PORTION OF RIGHT BREAST IN FEMALE, ESTROGEN RECEPTOR POSITIVE: ICD-10-CM

## 2023-06-14 NOTE — PROGRESS NOTES
Subjective:       Patient ID: Carole Moore is a 57 y.o. female.    Chief Complaint: breast cancer surveillance    Collaborating provider is Dr. Harpal Khan    HPI 57-year-old  female who presents to the hematology oncology clinic today for c/o back pain Pt has a history of right breast carcinoma.  The patient was previously followed in the outpatient  Hem/Onc clinic by Dr. Alessia Moss and Dr. Huitron.    Last visit in August pt c/o left mid to low back pain over the muscle- plain imaging revealed degenerative changes- MRI was ordered- widespread metastatic disease noted in spine.    Pt is on Ibrance, Faslodex and palliative radiation to the spine.     Pain is 2 now    2/2017-Right-sided breast cancer: Stage IIIA invasive lobular carcinoma, ER/MO positive, [id number] neg. BRCA negative. Given large mass on physical examination, treated with neoadjuvant chemotherapy, using dose-dense AC-T regimen, which patient completed in 2/2017 with decrease in size and firmness of R breast mass. She then underwent R breast mastectomy and SLND 4/2017, with tissue expander placement. Final pathology after neoadjuvant chemotherapy showed tumor 6cm, sentinel LNs positive, and nipple skin positive, we did recommend adjuvant radiation to R chest and axilla. Her case was discussed at tumor board and while axillary LN dissection was discussed, adjuvant radiation to the left chest and axilla were recommended which she completed 7/24/2017- 28 treatments    Anastrazole 1mg PO daily for adjuvant endocrine therapy was initiated end of July 2017- discontinued when metastatic disease noted August 2022     Bone density 10/29/2021: normal    4/13/2022- dexa- normal- repeat 4/2024      Has history of heart failure and is followed by cardiology- last visit 3/9/2023      PAST MEDICAL HISTORY:   1.  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  Dyslipidemia  3.  Osteopenia -  resolved  4.  Right breast cancer- Stage IIIA invasive lobular carcinoma, ER/MA positive, [id number] neg. BRCA negative. Bone metastatic disease 8/2022  5.  Heart failure     SURGICAL HISTORY:   1.  Tonsillectomy  2.  Hysterectomy  3.  Right breast mastectomy with with right deep axilla sentinel lymph node biopsy followed by breast reconstruction with tissue expander done on April 11, 2017 and reconstruction completed in Dec 2017.  4.  Mediport placement and removal     FAMILY HISTORY: She reports that 2 paternal aunts had breast cancer in their late 50s.  A maternal uncle had lung cancer the age of 54.  He used to smoke cigarettes.  A maternal uncle had melanoma at the age of 49.  Her father had colon cancer at the age of 70.  Her mother had colon cancer in her 70s.  She denies any other immediate family members with cancer or bleeding/clotting disorders.     SOCIAL HISTORY: The reports a 5-pack-year smoking history and quit at the age of 23.  She drinks alcohol socially.  She has never used any recreational drugs.  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  She is  and has 2 children.  She lives in [place], [place].     ALLERGIES: [NKDA]     MEDICATIONS: reviewed and reconciled    Interval History:     Persistent symptoms/side effects of treatment: fatigue -     Functional changes: ROM, neuropathy, weakness or fatigue- not able to exercise daily due to back pain    Psychosocial challenges- anxiety related to potential further spread of cancer- does not talk with family about it - states it hits her when she is trying to sleep at night- has not seen any counselors    Lymphedema- stable    Diet/Nutrition- wt stable- not riding bike due to back pain and heat    Sexual Dysfunction or challenges - none    Review of Systems   Constitutional: Negative.  Negative for appetite change, fatigue, fever and unexpected weight change.   HENT: Negative.     Eyes: Negative.    Respiratory:  Negative.     Cardiovascular: Negative.    Gastrointestinal: Negative.  Negative for abdominal pain and blood in stool.        No reflux   Endocrine: Negative.    Genitourinary: Negative.  Negative for hematuria.            Musculoskeletal:  Positive for back pain (back pain much improved- mainly with standing and walking long time).   Skin: Negative.    Allergic/Immunologic: Negative.    Neurological: Negative.    Hematological: Negative.  Negative for adenopathy.   Psychiatric/Behavioral: Negative.          Anxiety related to fear of further recurrence and dying- not able to talk with her family- does not want to stress them out.      Breast - no mass, pain or discharge  Objective:      Physical Exam  Constitutional:       Appearance: Normal appearance. She is well-developed.   HENT:      Head: Normocephalic and atraumatic.      Right Ear: Ear canal and external ear normal.      Left Ear: Ear canal and external ear normal.      Mouth/Throat:      Pharynx: No oropharyngeal exudate.   Eyes:      General: No scleral icterus.        Right eye: No discharge.         Left eye: No discharge.      Conjunctiva/sclera: Conjunctivae normal.      Pupils: Pupils are equal, round, and reactive to light.   Neck:      Thyroid: No thyromegaly.   Cardiovascular:      Rate and Rhythm: Normal rate and regular rhythm.      Pulses: Normal pulses.      Heart sounds: Normal heart sounds.   Pulmonary:      Effort: Pulmonary effort is normal.      Breath sounds: Normal breath sounds.   Chest:   Breasts:     Right: No inverted nipple, mass, nipple discharge, skin change or tenderness.      Left: No inverted nipple, mass, nipple discharge, skin change or tenderness.       Abdominal:      General: Bowel sounds are normal.      Palpations: Abdomen is soft.   Musculoskeletal:         General: Normal range of motion.      Cervical back: Normal range of motion and neck supple.   Lymphadenopathy:      Head:      Right side of head: No submental,  submandibular, tonsillar, preauricular, posterior auricular or occipital adenopathy.      Left side of head: No submental, submandibular, tonsillar, preauricular, posterior auricular or occipital adenopathy.      Cervical: No cervical adenopathy.      Right cervical: No superficial or posterior cervical adenopathy.     Left cervical: No superficial or posterior cervical adenopathy.      Upper Body:      Right upper body: No supraclavicular or axillary adenopathy.      Left upper body: No supraclavicular or axillary adenopathy.   Skin:     General: Skin is warm and dry.      Coloration: Skin is not pale.      Findings: No erythema or rash.   Neurological:      General: No focal deficit present.      Mental Status: She is alert and oriented to person, place, and time.      Deep Tendon Reflexes: Reflexes are normal and symmetric.      Comments: Right upper extremity hemiparesis noted   Psychiatric:         Mood and Affect: Mood normal.         Behavior: Behavior normal.         Thought Content: Thought content normal.         Judgment: Judgment normal.       Mammogram - left-11/15/2022- wnl    10/29/2021 and 4/13/2022- Bone density - normal-   Taking Vit D daily- and taking calcium  Pt reports negative genetic testing in 2017    Exercise- not riding bike due to pain.     Assessment:       1. Secondary malignant neoplasm of bone    2. Malignant neoplasm of central portion of right breast in female, estrogen receptor positive    3. Encounter for breast cancer screening using non-mammogram modality    4. Counseling on health promotion and disease prevention    5. Counseling and coordination of care            Plan:       1.        Right-sided breast cancer: Stage IIIA invasive lobular carcinoma, ER/IL positive, [id number] neg. S/p AC-T chemotherapy and chest wall/axilla radiation. BRCA negative.   2.        Left sided back pain - MRI revealed metastatic disease- palliative radiation to spine and is on faslodex, zometa and  Ibrance  2.        Negative clinical exam- Mammo -11/15/2022- wnl  2.        dexa 4/13/2022 reveals- normal- repeat 2 years- 4/2024     3.        Continue therapy as directed by Oncology. Take calcium and vit D.   4.        Hot flashes- improved   5.        Gyn f/u was in 7/13/2022   7.        Continue f/u with Dr. Khan and Rad Onc  8.        RTC for mammo and exam November 2023  9.        Anxiety related to fear of further recurrence and death- referral placed to oncology psych- pt agrees that talking to some one would be helpful

## 2023-06-26 ENCOUNTER — HOSPITAL ENCOUNTER (OUTPATIENT)
Dept: RADIOLOGY | Facility: HOSPITAL | Age: 58
Discharge: HOME | End: 2023-06-26
Attending: INTERNAL MEDICINE
Payer: MEDICARE

## 2023-06-26 DIAGNOSIS — Z79.899 IMMUNODEFICIENCY DUE TO CHEMOTHERAPY: ICD-10-CM

## 2023-06-26 DIAGNOSIS — D84.821 IMMUNODEFICIENCY DUE TO CHEMOTHERAPY: ICD-10-CM

## 2023-06-26 DIAGNOSIS — C50.111 MALIGNANT NEOPLASM OF CENTRAL PORTION OF RIGHT BREAST IN FEMALE, ESTROGEN RECEPTOR POSITIVE: ICD-10-CM

## 2023-06-26 DIAGNOSIS — Z17.0 MALIGNANT NEOPLASM OF CENTRAL PORTION OF RIGHT BREAST IN FEMALE, ESTROGEN RECEPTOR POSITIVE: ICD-10-CM

## 2023-06-26 DIAGNOSIS — C79.51 SECONDARY MALIGNANT NEOPLASM OF BONE: ICD-10-CM

## 2023-06-26 DIAGNOSIS — C79.51 SECONDARY CANCER OF BONE: ICD-10-CM

## 2023-06-26 DIAGNOSIS — T45.1X5A IMMUNODEFICIENCY DUE TO CHEMOTHERAPY: ICD-10-CM

## 2023-06-26 PROCEDURE — A9552 F18 FDG: HCPCS | Mod: HCNC

## 2023-06-26 PROCEDURE — 78815 PET IMAGE W/CT SKULL-THIGH: CPT | Mod: 26,PS,HCNC, | Performed by: RADIOLOGY

## 2023-06-26 PROCEDURE — 78815 NM PET CT ROUTINE: ICD-10-PCS | Mod: 26,PS,HCNC, | Performed by: RADIOLOGY

## 2023-06-26 NOTE — TELEPHONE ENCOUNTER
Specialty Pharmacy - Refill Coordination  Specialty Pharmacy - Clinical Intervention    Specialty Medication Orders Linked to Encounter      Flowsheet Row Most Recent Value   Medication #1 palbociclib (IBRANCE) 125 mg Cap (Order#[id number], Rx#[id number])            Refill Questions - Documented Responses      Flowsheet Row Most Recent Value   Patient Availability and HIPAA Verification    Does patient want to proceed with activity? Yes   HIPAA/medical authority confirmed? Yes   Relationship to patient of person spoken to? Self   Refill Screening Questions    Changes to allergies? No   Changes to medications? No   New conditions since last clinic visit? No   Unplanned office visit, urgent care, ED, or hospital admission in the last 4 weeks? No   How does patient/caregiver feel medication is working? Good   Financial problems or insurance changes? No   How many doses of your specialty medications were missed in the last 4 weeks? 0   Would patient like to speak to a pharmacist? No   When does the patient need to receive the medication? 07/03/23   Refill Delivery Questions    How will the patient receive the medication? [facility]   When does the patient need to receive the medication? 07/03/23   Shipping Address Home   Address in [facility] confirmed and updated if neccessary? Yes   Expected Copay ($) 0   Is the patient able to afford the medication copay? Yes   Payment Method zero copay   Days supply of Refill 28   Supplies needed? No supplies needed   Refill activity completed? Yes   Refill activity plan Refill scheduled   Shipment/Pickup Date: 06/29/23            Current Outpatient Medications   Medication Sig    ASCORBIC ACID, VITAMIN C, ORAL Take by mouth once daily.    aspirin (ECOTRIN) 81 MG EC tablet Take 81 mg by mouth once daily.    atorvastatin (LIPITOR) 10 MG tablet Take 1 tablet (10 mg total) by mouth once daily.    CALCIUM CARBONATE ORAL Take 1 tablet by mouth once daily.    cetirizine (ZYRTEC) 10 MG  tablet Take 10 mg by mouth daily as needed.    CHOLECALCIFEROL, VITAMIN D3, ORAL Take by mouth once daily.    fulvestrant (FASLODEX) 250 mg/5 mL injection Inject 250 mg into the muscle every 30 days.    GADAVIST 10 mmol/10 mL (1 mmol/mL) Soln injection every 6 (six) months. Once yearly    meclizine (ANTIVERT) 25 mg tablet Take 25 mg by mouth 3 (three) times daily as needed.    methocarbamoL (ROBAXIN) 750 MG Tab Take 1 tablet (750 mg total) by mouth 3 (three) times daily.    multivitamin capsule Take 1 capsule by mouth once daily.    ondansetron (ZOFRAN) 4 MG tablet Take 1 tablet (4 mg total) by mouth every 8 (eight) hours as needed for Nausea.    oxyCODONE-acetaminophen (PERCOCET)  mg per tablet Take 1 tablet by mouth every 6 (six) hours as needed for Pain.    palbociclib (IBRANCE) 125 mg Cap Take 1 capsule (125 mg) by mouth once daily on days 1-21 of a 28 day cycle.    polyethylene glycol (GLYCOLAX) 17 gram/dose powder Take 17 g by mouth once daily.    prochlorperazine (COMPAZINE) 5 MG tablet Take 1 tablet (5 mg total) by mouth 4 (four) times daily as needed for Nausea.    zoledronic 4 mg/100 mL PgBk infusion    Last reviewed on 6/5/2023 12:59 PM by Emma Yan RN    Review of patient's allergies indicates:  No Known Allergies Last reviewed on  6/6/2023 1:37 PM by Harpal Khan      Tasks added this encounter   No tasks added.   Tasks due within next 3 months   9/17/2023 - Clinical Assessment (6 month recurrence)     Caroline Mendoza, PharmD  [facility]  [address] [place] [id number]  Phone: [phone]  Fax: [phone]

## 2023-06-28 ENCOUNTER — OFFICE VISIT (OUTPATIENT)
Dept: SURGERY | Facility: CLINIC | Age: 58
End: 2023-06-28
Payer: MEDICARE

## 2023-06-28 VITALS
DIASTOLIC BLOOD PRESSURE: 60 MMHG | HEART RATE: 90 BPM | BODY MASS INDEX: 27.48 KG/M2 | SYSTOLIC BLOOD PRESSURE: 106 MMHG | WEIGHT: 175.06 LBS | HEIGHT: 67 IN

## 2023-06-28 DIAGNOSIS — Z71.89 COUNSELING AND COORDINATION OF CARE: ICD-10-CM

## 2023-06-28 DIAGNOSIS — Z17.0 MALIGNANT NEOPLASM OF CENTRAL PORTION OF RIGHT BREAST IN FEMALE, ESTROGEN RECEPTOR POSITIVE: ICD-10-CM

## 2023-06-28 DIAGNOSIS — F41.9 ANXIETY: ICD-10-CM

## 2023-06-28 DIAGNOSIS — C50.111 MALIGNANT NEOPLASM OF CENTRAL PORTION OF RIGHT BREAST IN FEMALE, ESTROGEN RECEPTOR POSITIVE: ICD-10-CM

## 2023-06-28 DIAGNOSIS — Z71.89 COUNSELING ON HEALTH PROMOTION AND DISEASE PREVENTION: ICD-10-CM

## 2023-06-28 DIAGNOSIS — C79.51 SECONDARY MALIGNANT NEOPLASM OF BONE: Primary | ICD-10-CM

## 2023-06-28 DIAGNOSIS — Z12.39 ENCOUNTER FOR BREAST CANCER SCREENING USING NON-MAMMOGRAM MODALITY: ICD-10-CM

## 2023-06-28 PROCEDURE — 1159F MED LIST DOCD IN RCRD: CPT | Mod: HCNC,CPTII,S$GLB, | Performed by: NURSE PRACTITIONER

## 2023-06-28 PROCEDURE — 3074F SYST BP LT 130 MM HG: CPT | Mod: HCNC,CPTII,S$GLB, | Performed by: NURSE PRACTITIONER

## 2023-06-28 PROCEDURE — 3008F BODY MASS INDEX DOCD: CPT | Mod: HCNC,CPTII,S$GLB, | Performed by: NURSE PRACTITIONER

## 2023-06-28 PROCEDURE — 3008F PR BODY MASS INDEX (BMI) DOCUMENTED: ICD-10-PCS | Mod: HCNC,CPTII,S$GLB, | Performed by: NURSE PRACTITIONER

## 2023-06-28 PROCEDURE — 1159F PR MEDICATION LIST DOCUMENTED IN MEDICAL RECORD: ICD-10-PCS | Mod: HCNC,CPTII,S$GLB, | Performed by: NURSE PRACTITIONER

## 2023-06-28 PROCEDURE — 1160F RVW MEDS BY RX/DR IN RCRD: CPT | Mod: HCNC,CPTII,S$GLB, | Performed by: NURSE PRACTITIONER

## 2023-06-28 PROCEDURE — 3078F DIAST BP <80 MM HG: CPT | Mod: HCNC,CPTII,S$GLB, | Performed by: NURSE PRACTITIONER

## 2023-06-28 PROCEDURE — 99999 PR PBB SHADOW E&M-EST. PATIENT-LVL IV: ICD-10-PCS | Mod: PBBFAC,HCNC,, | Performed by: NURSE PRACTITIONER

## 2023-06-28 PROCEDURE — 3074F PR MOST RECENT SYSTOLIC BLOOD PRESSURE < 130 MM HG: ICD-10-PCS | Mod: HCNC,CPTII,S$GLB, | Performed by: NURSE PRACTITIONER

## 2023-06-28 PROCEDURE — 1160F PR REVIEW ALL MEDS BY PRESCRIBER/CLIN PHARMACIST DOCUMENTED: ICD-10-PCS | Mod: HCNC,CPTII,S$GLB, | Performed by: NURSE PRACTITIONER

## 2023-06-28 PROCEDURE — 99214 OFFICE O/P EST MOD 30 MIN: CPT | Mod: HCNC,S$GLB,, | Performed by: NURSE PRACTITIONER

## 2023-06-28 PROCEDURE — 3078F PR MOST RECENT DIASTOLIC BLOOD PRESSURE < 80 MM HG: ICD-10-PCS | Mod: HCNC,CPTII,S$GLB, | Performed by: NURSE PRACTITIONER

## 2023-06-28 PROCEDURE — 99214 PR OFFICE/OUTPT VISIT, EST, LEVL IV, 30-39 MIN: ICD-10-PCS | Mod: HCNC,S$GLB,, | Performed by: NURSE PRACTITIONER

## 2023-06-28 PROCEDURE — 99999 PR PBB SHADOW E&M-EST. PATIENT-LVL IV: CPT | Mod: PBBFAC,HCNC,, | Performed by: NURSE PRACTITIONER

## 2023-06-28 RX ORDER — METHOCARBAMOL 750 MG/1
750 TABLET, FILM COATED ORAL 3 TIMES DAILY
Qty: 60 TABLET | Refills: 3 | Status: SHIPPED | OUTPATIENT
Start: 2023-06-28 | End: 2023-07-13 | Stop reason: SDUPTHER

## 2023-07-03 ENCOUNTER — OFFICE VISIT (OUTPATIENT)
Dept: HEMATOLOGY/ONCOLOGY | Facility: CLINIC | Age: 58
End: 2023-07-03
Payer: MEDICARE

## 2023-07-03 ENCOUNTER — INFUSION (OUTPATIENT)
Dept: INFUSION THERAPY | Facility: HOSPITAL | Age: 58
End: 2023-07-03
Attending: INTERNAL MEDICINE
Payer: MEDICARE

## 2023-07-03 VITALS
HEIGHT: 67 IN | HEART RATE: 81 BPM | DIASTOLIC BLOOD PRESSURE: 66 MMHG | WEIGHT: 177.25 LBS | BODY MASS INDEX: 27.82 KG/M2 | TEMPERATURE: 98 F | SYSTOLIC BLOOD PRESSURE: 104 MMHG | OXYGEN SATURATION: 98 %

## 2023-07-03 VITALS
TEMPERATURE: 98 F | DIASTOLIC BLOOD PRESSURE: 61 MMHG | RESPIRATION RATE: 18 BRPM | HEART RATE: 80 BPM | OXYGEN SATURATION: 98 % | SYSTOLIC BLOOD PRESSURE: 100 MMHG

## 2023-07-03 DIAGNOSIS — T45.1X5A PANCYTOPENIA DUE TO ANTINEOPLASTIC CHEMOTHERAPY: ICD-10-CM

## 2023-07-03 DIAGNOSIS — D84.822 IMMUNODEFICIENCY SECONDARY TO RADIATION THERAPY: ICD-10-CM

## 2023-07-03 DIAGNOSIS — Z17.0 MALIGNANT NEOPLASM OF CENTRAL PORTION OF RIGHT BREAST IN FEMALE, ESTROGEN RECEPTOR POSITIVE: ICD-10-CM

## 2023-07-03 DIAGNOSIS — Z17.0 MALIGNANT NEOPLASM OF CENTRAL PORTION OF RIGHT BREAST IN FEMALE, ESTROGEN RECEPTOR POSITIVE: Primary | ICD-10-CM

## 2023-07-03 DIAGNOSIS — T45.1X5A IMMUNODEFICIENCY DUE TO CHEMOTHERAPY: ICD-10-CM

## 2023-07-03 DIAGNOSIS — C79.51 SECONDARY MALIGNANT NEOPLASM OF BONE: Primary | ICD-10-CM
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
## 2023-07-03 DIAGNOSIS — D61.810 PANCYTOPENIA DUE TO ANTINEOPLASTIC CHEMOTHERAPY: ICD-10-CM

## 2023-07-03 DIAGNOSIS — C50.111 MALIGNANT NEOPLASM OF CENTRAL PORTION OF RIGHT BREAST IN FEMALE, ESTROGEN RECEPTOR POSITIVE: ICD-10-CM

## 2023-07-03 DIAGNOSIS — D84.821 IMMUNODEFICIENCY DUE TO CHEMOTHERAPY: ICD-10-CM

## 2023-07-03 DIAGNOSIS — C50.111 MALIGNANT NEOPLASM OF CENTRAL PORTION OF RIGHT BREAST IN FEMALE, ESTROGEN RECEPTOR POSITIVE: Primary | ICD-10-CM

## 2023-07-03 DIAGNOSIS — Z79.899 IMMUNODEFICIENCY DUE TO CHEMOTHERAPY: ICD-10-CM

## 2023-07-03 PROCEDURE — 96402 CHEMO HORMON ANTINEOPL SQ/IM: CPT | Mod: HCNC

## 2023-07-03 PROCEDURE — 99999 PR PBB SHADOW E&M-EST. PATIENT-LVL V: ICD-10-PCS | Mod: PBBFAC,HCNC,, | Performed by: INTERNAL MEDICINE

## 2023-07-03 PROCEDURE — 96365 THER/PROPH/DIAG IV INF INIT: CPT | Mod: HCNC

## 2023-07-03 PROCEDURE — 3078F DIAST BP <80 MM HG: CPT | Mod: HCNC,CPTII,S$GLB, | Performed by: INTERNAL MEDICINE

## 2023-07-03 PROCEDURE — 96401 CHEMO ANTI-NEOPL SQ/IM: CPT | Mod: HCNC

## 2023-07-03 PROCEDURE — 3074F SYST BP LT 130 MM HG: CPT | Mod: HCNC,CPTII,S$GLB, | Performed by: INTERNAL MEDICINE

## 2023-07-03 PROCEDURE — 99999 PR PBB SHADOW E&M-EST. PATIENT-LVL V: CPT | Mod: PBBFAC,HCNC,, | Performed by: INTERNAL MEDICINE

## 2023-07-03 PROCEDURE — 3008F PR BODY MASS INDEX (BMI) DOCUMENTED: ICD-10-PCS | Mod: HCNC,CPTII,S$GLB, | Performed by: INTERNAL MEDICINE

## 2023-07-03 PROCEDURE — 3078F PR MOST RECENT DIASTOLIC BLOOD PRESSURE < 80 MM HG: ICD-10-PCS | Mod: HCNC,CPTII,S$GLB, | Performed by: INTERNAL MEDICINE

## 2023-07-03 PROCEDURE — 99215 OFFICE O/P EST HI 40 MIN: CPT | Mod: 25,HCNC,S$GLB, | Performed by: INTERNAL MEDICINE

## 2023-07-03 PROCEDURE — 99215 PR OFFICE/OUTPT VISIT, EST, LEVL V, 40-54 MIN: ICD-10-PCS | Mod: 25,HCNC,S$GLB, | Performed by: INTERNAL MEDICINE

## 2023-07-03 PROCEDURE — 3074F PR MOST RECENT SYSTOLIC BLOOD PRESSURE < 130 MM HG: ICD-10-PCS | Mod: HCNC,CPTII,S$GLB, | Performed by: INTERNAL MEDICINE

## 2023-07-03 PROCEDURE — 63600175 PHARM REV CODE 636 W HCPCS: Mod: HCNC | Performed by: INTERNAL MEDICINE

## 2023-07-03 PROCEDURE — 3008F BODY MASS INDEX DOCD: CPT | Mod: HCNC,CPTII,S$GLB, | Performed by: INTERNAL MEDICINE

## 2023-07-03 RX ORDER — SODIUM CHLORIDE 0.9 % (FLUSH) 0.9 %
10 SYRINGE (ML) INJECTION
Status: DISCONTINUED | OUTPATIENT
Start: 2023-07-03 | End: 2023-07-03 | Stop reason: HOSPADM

## 2023-07-03 RX ORDER — LAMOTRIGINE 25 MG/1
500 TABLET ORAL
Status: CANCELLED | OUTPATIENT
Start: 2023-07-03

## 2023-07-03 RX ORDER — HEPARIN 100 UNIT/ML
500 SYRINGE INTRAVENOUS
Status: CANCELLED | OUTPATIENT
Start: 2023-07-03

## 2023-07-03 RX ORDER — ZOLEDRONIC ACID 0.04 MG/ML
4 INJECTION, SOLUTION INTRAVENOUS
Status: COMPLETED | OUTPATIENT
Start: 2023-07-03 | End: 2023-07-03

## 2023-07-03 RX ORDER — LAMOTRIGINE 25 MG/1
500 TABLET ORAL
Status: COMPLETED | OUTPATIENT
Start: 2023-07-03 | End: 2023-07-03

## 2023-07-03 RX ORDER — SODIUM CHLORIDE 0.9 % (FLUSH) 0.9 %
10 SYRINGE (ML) INJECTION
Status: CANCELLED | OUTPATIENT
Start: 2023-07-03

## 2023-07-03 RX ADMIN — FULVESTRANT 500 MG: 50 INJECTION, SOLUTION INTRAMUSCULAR at 01:07

## 2023-07-03 RX ADMIN — ZOLEDRONIC ACID 4 MG: 0.04 INJECTION, SOLUTION INTRAVENOUS at 01:07

## 2023-07-03 NOTE — PLAN OF CARE
Problem: Adult Inpatient Plan of Care  Goal: Plan of Care Review  Outcome: Ongoing, Progressing  Flowsheets (Taken 7/3/2023 1425)  Plan of Care Reviewed With: patient  Goal: Patient-Specific Goal (Individualized)  Outcome: Ongoing, Progressing  Flowsheets (Taken 7/3/2023 1425)  Anxieties, Fears or Concerns: denies  Individualized Care Needs: feet  elevated, pillow and warm blanket provided  Goal: Absence of Hospital-Acquired Illness or Injury  Outcome: Ongoing, Progressing  Intervention: Identify and Manage Fall Risk  Flowsheets (Taken 7/3/2023 1425)  Safety Promotion/Fall Prevention:   assistive device/personal item within reach   Fall Risk reviewed with patient/family   in recliner, wheels locked   medications reviewed   instructed to call staff for mobility  Intervention: Prevent Infection  Flowsheets (Taken 7/3/2023 1425)  Infection Prevention:   environmental surveillance performed   equipment surfaces disinfected   hand hygiene promoted   personal protective equipment utilized  Goal: Optimal Comfort and Wellbeing  Outcome: Ongoing, Progressing  Intervention: Monitor Pain and Promote Comfort  Flowsheets (Taken 7/3/2023 1425)  Pain Management Interventions:   relaxation techniques promoted   warm blanket provided  Intervention: Provide Person-Centered Care  Flowsheets (Taken 7/3/2023 1425)  Trust Relationship/Rapport:   care explained   questions encouraged   choices provided   reassurance provided   emotional support provided   thoughts/feelings acknowledged   empathic listening provided   questions answered     Problem: Fall Injury Risk  Goal: Absence of Fall and Fall-Related Injury  Outcome: Ongoing, Progressing  Intervention: Identify and Manage Contributors  Flowsheets (Taken 7/3/2023 1425)  Self-Care Promotion:   independence encouraged   BADL personal objects within reach   BADL personal routines maintained   safe use of adaptive equipment encouraged  Medication Review/Management: medications  reviewed  Intervention: Promote Injury-Free Environment  Flowsheets (Taken 7/3/2023 1425)  Safety Promotion/Fall Prevention:   assistive device/personal item within reach   Fall Risk reviewed with patient/family   in recliner, wheels locked   medications reviewed   instructed to call staff for mobility     Problem: Anemia (Chemotherapy Effects)  Goal: Anemia Symptom Improvement  Outcome: Ongoing, Progressing  Intervention: Monitor and Manage Anemia  Flowsheets (Taken 7/3/2023 1425)  Safety Promotion/Fall Prevention:   assistive device/personal item within reach   Fall Risk reviewed with patient/family   in recliner, wheels locked   medications reviewed   instructed to call staff for mobility  Fatigue Management: activity schedule adjusted     Problem: Neutropenia (Chemotherapy Effects)  Goal: Absence of Infection  Outcome: Ongoing, Progressing  Intervention: Prevent Infection and Maximize Resistance  Flowsheets (Taken 7/3/2023 1425)  Infection Prevention:   environmental surveillance performed   equipment surfaces disinfected   hand hygiene promoted   personal protective equipment utilized     Problem: Thrombocytopenia Bleeding Risk (Chemotherapy Effects)  Goal: Absence of Bleeding  Outcome: Ongoing, Progressing  Intervention: Minimize Bleeding Risk  Flowsheets (Taken 7/3/2023 1425)  Bleeding Precautions:   blood pressure closely monitored   monitored for signs of bleeding

## 2023-07-03 NOTE — PROGRESS NOTES
Subjective:       Patient ID: Carole Moore is a 57 y.o. female.    Chief Complaint: Results, Chemotherapy, and Breast Cancer    HPI:  57-year-old female history of metastatic breast carcinoma continuing with Faslodex q.month Ibrance Q 21 days.  In Zometa q.3 months patient is doing remarkably well feels good recent PET scan demonstrates stable disease ECOG status 2    Past Medical History:   Diagnosis Date    Antineoplastic chemotherapy induced anemia     Breast cancer 2016    right breast    Cancer     R Breast cancer    CVA (cerebral infarction)     Diverticulosis     Family history of colon cancer 10/30/2018    Her mother and father both had colon cancer.    Genetic testing of female 12/27/2016    [employer] (28 genes) - NEGATIVE    Hyperlipidemia     Immunodeficiency due to chemotherapy 10/03/2022    Nausea after anesthesia     Paralysis     right side    PONV (postoperative nausea and vomiting)     Stroke 2001    R side weakness    Trouble in sleeping      Family History   Problem Relation Age of Onset    Breast cancer Paternal Aunt     Colon cancer Father     Colon cancer Mother     Melanoma Maternal Uncle     Skin cancer Maternal Uncle      Social History     Socioeconomic History    Marital status:    Tobacco Use    Smoking status: Never    Smokeless tobacco: Never   Substance and Sexual Activity    Alcohol use: Never    Drug use: No    Sexual activity: Not Currently     Birth control/protection: Surgical   Other Topics Concern    Are you pregnant or think you may be? No    Breast-feeding No     Social Determinants of Health     Financial Resource Strain: Low Risk     Difficulty of Paying Living Expenses: Not hard at all   Food Insecurity: No Food Insecurity    Worried About Running Out of Food in the Last Year: Never true    Ran Out of Food in the Last Year: Never true   Transportation Needs: No Transportation Needs    Lack of Transportation (Medical): No    Lack of Transportation  (Non-Medical): No   Physical Activity: Insufficiently Active    Days of Exercise per Week: 1 day    Minutes of Exercise per Session: 20 min   Stress: Stress Concern Present    Feeling of Stress : To some extent   Social Connections: Moderately Integrated    Frequency of Communication with Friends and Family: More than three times a week    Frequency of Social Gatherings with Friends and Family: Three times a week    Attends [employer] Services: More than 4 times per year    Active Member of Clubs or Organizations: No    Attends Club or Organization Meetings: Never    Marital Status:    Housing Stability: Low Risk     Unable to Pay for Housing in the Last Year: No    Number of Places Lived in the Last Year: 1    Unstable Housing in the Last Year: No     Past Surgical History:   Procedure Laterality Date    AUGMENTATION OF BREAST Left 2017    BREAST SURGERY      COLONOSCOPY N/A 10/30/2018    Procedure: COLONOSCOPY;  Surgeon: Cosme Monson MD;  Location: [facility];  Service: Endoscopy;  Laterality: N/A;    CYSTOSCOPY WITH BIOPSY OF BLADDER N/A 11/29/2021    Procedure: CYSTOSCOPY, WITH BLADDER BIOPSY, WITH FULGURATION IF INDICATED;  Surgeon: Page Marcelo MD;  Location: [facility];  Service: Urology;  Laterality: N/A;    FIXATION KYPHOPLASTY Bilateral 2/9/2023    Procedure: KYPHOPLASTY;  Surgeon: Josse Pelayo MD;  Location: [facility];  Service: Neurosurgery;  Laterality: Bilateral;  Kyphoplasty T10 with ablation    HYSTERECTOMY  2007    maintains both ovaries, menorrhagia    MASTECTOMY Right 2017    MEDIPORT INSERTION, SINGLE      RADIOFREQUENCY ABLATION, BONE, PERCUTANEOUS Bilateral 2/9/2023    Procedure: RADIOFREQUENCY ABLATION,BONE,PERCUTANEOUS;  Surgeon: Josse Pelayo MD;  Location: [facility];  Service: Neurosurgery;  Laterality: Bilateral;    TONSILLECTOMY         Labs:  Lab Results   Component Value Date    WBC 2.09 (L) 07/03/2023    HGB 12.4 07/03/2023    HCT 36.0 (L) 07/03/2023    MCV 97  07/03/2023     07/03/2023     BMP  Lab Results   Component Value Date     07/03/2023    K 3.9 07/03/2023     07/03/2023    CO2 27 07/03/2023    BUN 15 07/03/2023    CREATININE 0.8 07/03/2023    CALCIUM 9.7 07/03/2023    ANIONGAP 9 07/03/2023    ESTGFRAFRICA >60 05/12/2022    EGFRNONAA >60 05/12/2022     Lab Results   Component Value Date    ALT 17 07/03/2023    AST 16 07/03/2023    ALKPHOS 48 (L) 07/03/2023    BILITOT 0.5 07/03/2023       No results found for: IRON, TIBC, FERRITIN, SATURATEDIRO  No results found for: [id number]  No results found for: FOLATE  Lab Results   Component Value Date    TSH 0.793 10/29/2021         Review of Systems   Constitutional:  Positive for fatigue. Negative for activity change, appetite change, chills, diaphoresis, fever and unexpected weight change.   HENT:  Negative for congestion, dental problem, drooling, ear discharge, ear pain, facial swelling, hearing loss, mouth sores, nosebleeds, postnasal drip, rhinorrhea, sinus pressure, sneezing, sore throat, tinnitus, trouble swallowing and voice change.    Eyes:  Negative for photophobia, pain, discharge, redness, itching and visual disturbance.   Respiratory:  Negative for cough, choking, chest tightness, shortness of breath, wheezing and stridor.    Cardiovascular:  Negative for chest pain, palpitations and leg swelling.   Gastrointestinal:  Negative for abdominal distention, abdominal pain, anal bleeding, blood in stool, constipation, diarrhea, nausea, rectal pain and vomiting.   Endocrine: Negative for cold intolerance, heat intolerance, polydipsia, polyphagia and polyuria.   Genitourinary:  Negative for decreased urine volume, difficulty urinating, dyspareunia, dysuria, enuresis, flank pain, frequency, genital sores, hematuria, menstrual problem, pelvic pain, urgency, vaginal bleeding, vaginal discharge and vaginal pain.   Musculoskeletal:  Positive for gait problem. Negative for arthralgias, back pain, joint  swelling, myalgias, neck pain and neck stiffness.   Skin:  Negative for color change, pallor and rash.   Allergic/Immunologic: Negative for environmental allergies, food allergies and immunocompromised state.   Neurological:  Positive for weakness. Negative for dizziness, tremors, seizures, syncope, facial asymmetry, speech difficulty, light-headedness, numbness and headaches.   Hematological:  Negative for adenopathy. Does not bruise/bleed easily.   Psychiatric/Behavioral:  Negative for agitation, behavioral problems, confusion, decreased concentration, dysphoric mood, hallucinations, self-injury, sleep disturbance and suicidal ideas. The patient is not nervous/anxious and is not hyperactive.      Objective:      Physical Exam  Vitals reviewed.   Constitutional:       General: She is not in acute distress.     Appearance: She is well-developed. She is not diaphoretic.   HENT:      Head: Normocephalic and atraumatic.      Right Ear: External ear normal.      Left Ear: External ear normal.      Nose: Nose normal.      Right Sinus: No maxillary sinus tenderness or frontal sinus tenderness.      Left Sinus: No maxillary sinus tenderness or frontal sinus tenderness.      Mouth/Throat:      Pharynx: No oropharyngeal exudate.   Eyes:      General: Lids are normal. No scleral icterus.        Right eye: No discharge.         Left eye: No discharge.      Conjunctiva/sclera: Conjunctivae normal.      Right eye: Right conjunctiva is not injected. No hemorrhage.     Left eye: Left conjunctiva is not injected. No hemorrhage.     Pupils: Pupils are equal, round, and reactive to light.   Neck:      Thyroid: No thyromegaly.      Vascular: No JVD.      Trachea: No tracheal deviation.   Cardiovascular:      Rate and Rhythm: Normal rate.   Pulmonary:      Effort: Pulmonary effort is normal. No respiratory distress.      Breath sounds: No stridor.   Chest:      Chest wall: No tenderness.   Abdominal:      General: Bowel sounds are  normal. There is no distension.      Palpations: Abdomen is soft. There is no hepatomegaly, splenomegaly or mass.      Tenderness: There is no abdominal tenderness. There is no rebound.   Musculoskeletal:         General: No tenderness. Normal range of motion.      Cervical back: Normal range of motion and neck supple.   Lymphadenopathy:      Cervical: No cervical adenopathy.      Upper Body:      Right upper body: No supraclavicular adenopathy.      Left upper body: No supraclavicular adenopathy.   Skin:     General: Skin is dry.      Findings: No erythema or rash.   Neurological:      Mental Status: She is alert and oriented to person, place, and time.      Cranial Nerves: No cranial nerve deficit.      Coordination: Coordination normal.      Gait: Gait abnormal.   Psychiatric:         Behavior: Behavior normal.         Thought Content: Thought content normal.         Judgment: Judgment normal.           Assessment:      1. Malignant neoplasm of central portion of right breast in female, estrogen receptor positive    2. Pancytopenia due to antineoplastic chemotherapy    3. Immunodeficiency secondary to radiation therapy    4. Immunodeficiency due to chemotherapy           Med Onc Chart Routing      Follow up with physician 4 months. 4 months with me CBC CMP Faslodex   Follow up with [facility] . Patient can be seen back monthly CBC CMP for Faslodex dosing x3   Infusion scheduling note    Injection scheduling note Faslodex Q month x3 with APAP CBC CMP   Labs    Imaging PET scan   Six-month PET scan prior to end of year   Pharmacy appointment    Other referrals             Plan:   Patient continues to do well.  At this time no evidence of progression on PET scan reviewed information with her would recommend that patient have Zometa given q.3 months Faslodex on a monthly basis and Ibrance Q 21 days may need dose modify Ibrance or delays start based on counts.  Discussed implications of answered questions.  Orders written  reviewed       Harpal Khan Jr, MD FACP

## 2023-07-09 ENCOUNTER — PATIENT MESSAGE (OUTPATIENT)
Dept: HEMATOLOGY/ONCOLOGY | Facility: CLINIC | Age: 58
End: 2023-07-09
Payer: MEDICARE

## 2023-07-13 ENCOUNTER — OFFICE VISIT (OUTPATIENT)
Dept: INTERNAL MEDICINE | Facility: CLINIC | Age: 58
End: 2023-07-13
Payer: MEDICARE

## 2023-07-13 VITALS
HEART RATE: 87 BPM | OXYGEN SATURATION: 97 % | WEIGHT: 176.38 LBS | DIASTOLIC BLOOD PRESSURE: 64 MMHG | BODY MASS INDEX: 27.68 KG/M2 | TEMPERATURE: 98 F | SYSTOLIC BLOOD PRESSURE: 100 MMHG | HEIGHT: 67 IN

## 2023-07-13 DIAGNOSIS — Z12.11 COLON CANCER SCREENING: ICD-10-CM

## 2023-07-13 DIAGNOSIS — I69.351 HEMIPARESIS AFFECTING RIGHT SIDE AS LATE EFFECT OF CEREBROVASCULAR ACCIDENT: Primary | ICD-10-CM

## 2023-07-13 DIAGNOSIS — E78.2 MIXED HYPERLIPIDEMIA: ICD-10-CM

## 2023-07-13 DIAGNOSIS — C79.51 SECONDARY MALIGNANT NEOPLASM OF BONE: ICD-10-CM

## 2023-07-13 DIAGNOSIS — C50.111 MALIGNANT NEOPLASM OF CENTRAL PORTION OF RIGHT BREAST IN FEMALE, ESTROGEN RECEPTOR POSITIVE: ICD-10-CM

## 2023-07-13 DIAGNOSIS — Z17.0 MALIGNANT NEOPLASM OF CENTRAL PORTION OF RIGHT BREAST IN FEMALE, ESTROGEN RECEPTOR POSITIVE: ICD-10-CM

## 2023-07-13 PROCEDURE — 3074F PR MOST RECENT SYSTOLIC BLOOD PRESSURE < 130 MM HG: ICD-10-PCS | Mod: HCNC,CPTII,S$GLB, | Performed by: INTERNAL MEDICINE

## 2023-07-13 PROCEDURE — 3008F PR BODY MASS INDEX (BMI) DOCUMENTED: ICD-10-PCS | Mod: HCNC,CPTII,S$GLB, | Performed by: INTERNAL MEDICINE

## 2023-07-13 PROCEDURE — 99999 PR PBB SHADOW E&M-EST. PATIENT-LVL V: CPT | Mod: PBBFAC,HCNC,, | Performed by: INTERNAL MEDICINE

## 2023-07-13 PROCEDURE — 1160F RVW MEDS BY RX/DR IN RCRD: CPT | Mod: HCNC,CPTII,S$GLB, | Performed by: INTERNAL MEDICINE

## 2023-07-13 PROCEDURE — 3078F PR MOST RECENT DIASTOLIC BLOOD PRESSURE < 80 MM HG: ICD-10-PCS | Mod: HCNC,CPTII,S$GLB, | Performed by: INTERNAL MEDICINE

## 2023-07-13 PROCEDURE — 1159F MED LIST DOCD IN RCRD: CPT | Mod: HCNC,CPTII,S$GLB, | Performed by: INTERNAL MEDICINE

## 2023-07-13 PROCEDURE — 1160F PR REVIEW ALL MEDS BY PRESCRIBER/CLIN PHARMACIST DOCUMENTED: ICD-10-PCS | Mod: HCNC,CPTII,S$GLB, | Performed by: INTERNAL MEDICINE

## 2023-07-13 PROCEDURE — 99999 PR PBB SHADOW E&M-EST. PATIENT-LVL V: ICD-10-PCS | Mod: PBBFAC,HCNC,, | Performed by: INTERNAL MEDICINE

## 2023-07-13 PROCEDURE — 3074F SYST BP LT 130 MM HG: CPT | Mod: HCNC,CPTII,S$GLB, | Performed by: INTERNAL MEDICINE

## 2023-07-13 PROCEDURE — 99214 PR OFFICE/OUTPT VISIT, EST, LEVL IV, 30-39 MIN: ICD-10-PCS | Mod: HCNC,S$GLB,, | Performed by: INTERNAL MEDICINE

## 2023-07-13 PROCEDURE — 99214 OFFICE O/P EST MOD 30 MIN: CPT | Mod: HCNC,S$GLB,, | Performed by: INTERNAL MEDICINE

## 2023-07-13 PROCEDURE — 3008F BODY MASS INDEX DOCD: CPT | Mod: HCNC,CPTII,S$GLB, | Performed by: INTERNAL MEDICINE

## 2023-07-13 PROCEDURE — 1159F PR MEDICATION LIST DOCUMENTED IN MEDICAL RECORD: ICD-10-PCS | Mod: HCNC,CPTII,S$GLB, | Performed by: INTERNAL MEDICINE

## 2023-07-13 PROCEDURE — 3078F DIAST BP <80 MM HG: CPT | Mod: HCNC,CPTII,S$GLB, | Performed by: INTERNAL MEDICINE

## 2023-07-13 RX ORDER — ATORVASTATIN CALCIUM 10 MG/1
10 TABLET, FILM COATED ORAL DAILY
Qty: 90 TABLET | Refills: 4 | Status: SHIPPED | OUTPATIENT
Start: 2023-07-13

## 2023-07-13 NOTE — PROGRESS NOTES
Subjective:       Patient ID: Carole Moore is a 57 y.o. female.    Chief Complaint: Annual Exam      HPI:  Patient is a 57-year-old female presenting today following up on chronic health issues.  Patient has a history of chronic hemiparesis on the right side secondary to a CVA.  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  She also has a history of hyperlipidemia.      Regards to her hemiparesis everything has been stable is been no issues at this time  there has been no evidence of further stroke activity nor any deterioration in her functional status.      In regards to the breast cancer she is currently following with Oncology for treatment of the spinal lesions.  She is on protocol which seems to be working well her most recent PET scan showed no evidence of activity in the spinal lesions.  She is confident that things are going well at this time.    She has history of hyperlipidemia and remains on atorvastatin.  This has been stable over time.  This part of her secondary prevention.  She is not having any issues.      We discussed colon cancer screening.  She had a tubular adenoma noted on her last colonoscopy in 2018.  She also has a family history of colon cancer in both parents.  She was unsure she needed proceed with screening at this time given the breast cancer.  After discussing and I have recommended that we go ahead and plan to proceed with colon cancer screening.  The only reason I would see that we would stop routine screenings in her setting would be if they breast cancer returned and was not treatable and which case stroke further screenings would be less warranted.  After discussing this she is in agreement that she would like to proceed with the screenings as planned.    Review of Systems   Constitutional:  Negative for chills and fever.   Respiratory:  Negative for cough, shortness of breath and  "wheezing.    Cardiovascular:  Negative for chest pain and palpitations.   Gastrointestinal:  Negative for blood in stool, constipation, nausea and vomiting.   Genitourinary:  Negative for dysuria and hematuria.   Skin:  Negative for rash.     Objective:   /64   Pulse 87   Temp 97.5 °F (36.4 °C) (Tympanic)   Ht 5' 7" (1.702 m)   Wt 80 kg (176 lb 5.9 oz)   LMP 10/11/2009   SpO2 97%   BMI 27.62 kg/m²      Physical Exam  Vitals reviewed.   Constitutional:       Appearance: Normal appearance. She is well-developed. She is not ill-appearing.   HENT:      Head: Normocephalic and atraumatic.      Right Ear: Tympanic membrane, ear canal and external ear normal.      Left Ear: Tympanic membrane, ear canal and external ear normal.   Eyes:      Pupils: Pupils are equal, round, and reactive to light.   Neck:      Thyroid: No thyromegaly.   Cardiovascular:      Rate and Rhythm: Normal rate and regular rhythm.      Heart sounds: No murmur heard.    No friction rub. No gallop.   Pulmonary:      Effort: Pulmonary effort is normal.      Breath sounds: Normal breath sounds. No wheezing or rales.   Chest:      Chest wall: No tenderness.   Abdominal:      General: Abdomen is flat. Bowel sounds are normal. There is no distension.      Palpations: Abdomen is soft. There is no mass.      Tenderness: There is no abdominal tenderness. There is no guarding or rebound.   Musculoskeletal:      Cervical back: Normal range of motion and neck supple.   Lymphadenopathy:      Cervical: No cervical adenopathy.   Skin:     General: Skin is warm and dry.      Findings: No rash.   Neurological:      General: No focal deficit present.      Mental Status: She is alert and oriented to person, place, and time.      Cranial Nerves: No cranial nerve deficit.      Deep Tendon Reflexes: Reflexes are normal and symmetric. Reflexes normal.      Comments: Hemiparesis on the right.  Hyper-reflexia on the right.   Psychiatric:         Behavior: Behavior " normal.         Judgment: Judgment normal.       Lab Visit on 07/03/2023   Component Date Value    Sodium 07/03/2023 141     Potassium 07/03/2023 3.9     Chloride 07/03/2023 105     CO2 07/03/2023 27     Glucose 07/03/2023 116 (H)     BUN 07/03/2023 15     Creatinine 07/03/2023 0.8     Calcium 07/03/2023 9.7     Total Protein 07/03/2023 6.8     Albumin 07/03/2023 3.7     Total Bilirubin 07/03/2023 0.5     Alkaline Phosphatase 07/03/2023 48 (L)     AST 07/03/2023 16     ALT 07/03/2023 17     eGFR 07/03/2023 >60     Anion Gap 07/03/2023 9     WBC 07/03/2023 2.09 (L)     RBC 07/03/2023 3.73 (L)     Hemoglobin 07/03/2023 12.4     Hematocrit 07/03/2023 36.0 (L)     MCV 07/03/2023 97     MCH 07/03/2023 33.2 (H)     MCHC 07/03/2023 34.4     RDW 07/03/2023 14.0     Platelets 07/03/2023 198     MPV 07/03/2023 8.8 (L)     Immature Granulocytes 07/03/2023 0.0     Gran # (ANC) 07/03/2023 0.8 (L)     Immature Grans (Abs) 07/03/2023 0.00     Lymph # 07/03/2023 0.9 (L)     Mono # 07/03/2023 0.3     Eos # 07/03/2023 0.0     Baso # 07/03/2023 0.03     nRBC 07/03/2023 0     Gran % 07/03/2023 40.2     Lymph % 07/03/2023 43.1     Mono % 07/03/2023 13.9     Eosinophil % 07/03/2023 1.4     Basophil % 07/03/2023 1.4     Platelet Estimate 07/03/2023 Appears normal     Aniso 07/03/2023 Slight     Poik 07/03/2023 Slight     Poly 07/03/2023 Occasional     Ovalocytes 07/03/2023 Occasional     Large/Giant Platelets 07/03/2023 Present     Differential Method 07/03/2023 Automated        Assessment:       1. Hemiparesis affecting right side as late effect of cerebrovascular accident    2. Malignant neoplasm of central portion of right breast in female, estrogen receptor positive    3. Secondary malignant neoplasm of bone    4. Colon cancer screening    5. Mixed hyperlipidemia        Plan:   No problem-specific Assessment & Plan notes found for this encounter.    Hemiparesis affecting right side as late effect of cerebrovascular  accident  Comments:  stable, no changes over time.  No further stroke activity    Malignant neoplasm of central portion of right breast in female, estrogen receptor positive  Comments:  Following with hem/onc. No noted breast activity but mets in spine    Secondary malignant neoplasm of bone  Comments:  Continuing chemo under guidance of oncology.  Mets showed no activity on recent PET. No changes today    Colon cancer screening  -     Ambulatory referral/consult to Endo Procedure ; Future; Expected date: 11/20/2023    Mixed hyperlipidemia  Comments:  continue statin  Orders:  -     atorvastatin (LIPITOR) 10 MG tablet; Take 1 tablet (10 mg total) by mouth once daily.  Dispense: 90 tablet; Refill: 4  -     Lipid Panel; Future; Expected date: 07/31/2023          Follow up in about 6 months (around 1/13/2024).

## 2023-07-14 ENCOUNTER — LAB VISIT (OUTPATIENT)
Dept: LAB | Facility: HOSPITAL | Age: 58
End: 2023-07-14
Attending: INTERNAL MEDICINE
Payer: MEDICARE

## 2023-07-14 DIAGNOSIS — E78.2 MIXED HYPERLIPIDEMIA: ICD-10-CM

## 2023-07-14 LAB
CHOLEST SERPL-MCNC: 177 MG/DL (ref 120–199)
CHOLEST/HDLC SERPL: 2.7 {RATIO} (ref 2–5)
HDLC SERPL-MCNC: 65 MG/DL (ref 40–75)
HDLC SERPL: 36.7 % (ref 20–50)
LDLC SERPL CALC-MCNC: 98.4 MG/DL (ref 63–159)
NONHDLC SERPL-MCNC: 112 MG/DL
TRIGL SERPL-MCNC: 68 MG/DL (ref 30–150)

## 2023-07-14 PROCEDURE — 36415 COLL VENOUS BLD VENIPUNCTURE: CPT | Mod: HCNC,PO | Performed by: INTERNAL MEDICINE

## 2023-07-14 PROCEDURE — 80061 LIPID PANEL: CPT | Mod: HCNC | Performed by: INTERNAL MEDICINE

## 2023-07-14 RX ORDER — METHOCARBAMOL 750 MG/1
750 TABLET, FILM COATED ORAL 3 TIMES DAILY
Qty: 60 TABLET | Refills: 3 | Status: SHIPPED | OUTPATIENT
Start: 2023-07-14

## 2023-07-17 ENCOUNTER — HOSPITAL ENCOUNTER (OUTPATIENT)
Dept: PREADMISSION TESTING | Facility: HOSPITAL | Age: 58
Discharge: HOME | End: 2023-07-17
Attending: INTERNAL MEDICINE
Payer: MEDICARE

## 2023-07-17 DIAGNOSIS — Z12.11 COLON CANCER SCREENING: Primary | ICD-10-CM

## 2023-07-17 RX ORDER — SODIUM, POTASSIUM,MAG SULFATES 17.5-3.13G
1 SOLUTION, RECONSTITUTED, ORAL ORAL DAILY
Qty: 1 KIT | Refills: 0 | Status: SHIPPED | OUTPATIENT
Start: 2023-07-17 | End: 2023-07-19

## 2023-07-20 ENCOUNTER — SPECIALTY PHARMACY (OUTPATIENT)
Dept: PHARMACY | Facility: CLINIC | Age: 58
End: 2023-07-20

## 2023-07-20 ENCOUNTER — PATIENT MESSAGE (OUTPATIENT)
Dept: PHARMACY | Facility: CLINIC | Age: 58
End: 2023-07-20
Payer: MEDICARE

## 2023-07-20 NOTE — TELEPHONE ENCOUNTER
Patient states that she is currently holding her medication per her provider. Patient states that she will follow up with provider on 7/28. Will notify assigned pharmacist.

## 2023-07-31 ENCOUNTER — INFUSION (OUTPATIENT)
Dept: INFUSION THERAPY | Facility: HOSPITAL | Age: 58
End: 2023-07-31
Attending: INTERNAL MEDICINE
Payer: MEDICARE

## 2023-07-31 ENCOUNTER — OFFICE VISIT (OUTPATIENT)
Dept: HEMATOLOGY/ONCOLOGY | Facility: CLINIC | Age: 58
End: 2023-07-31
Payer: MEDICARE

## 2023-07-31 VITALS
OXYGEN SATURATION: 96 % | WEIGHT: 175.94 LBS | RESPIRATION RATE: 18 BRPM | TEMPERATURE: 98 F | HEART RATE: 75 BPM | DIASTOLIC BLOOD PRESSURE: 70 MMHG | BODY MASS INDEX: 27.55 KG/M2 | SYSTOLIC BLOOD PRESSURE: 104 MMHG
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
## 2023-07-31 DIAGNOSIS — T45.1X5A IMMUNODEFICIENCY DUE TO CHEMOTHERAPY: ICD-10-CM

## 2023-07-31 DIAGNOSIS — Z17.0 MALIGNANT NEOPLASM OF CENTRAL PORTION OF RIGHT BREAST IN FEMALE, ESTROGEN RECEPTOR POSITIVE: Primary | ICD-10-CM

## 2023-07-31 DIAGNOSIS — C50.111 MALIGNANT NEOPLASM OF CENTRAL PORTION OF RIGHT BREAST IN FEMALE, ESTROGEN RECEPTOR POSITIVE: Primary | ICD-10-CM

## 2023-07-31 DIAGNOSIS — Z17.0 MALIGNANT NEOPLASM OF CENTRAL PORTION OF RIGHT BREAST IN FEMALE, ESTROGEN RECEPTOR POSITIVE: ICD-10-CM

## 2023-07-31 DIAGNOSIS — C79.51 SECONDARY MALIGNANT NEOPLASM OF BONE: ICD-10-CM

## 2023-07-31 DIAGNOSIS — Z79.899 IMMUNODEFICIENCY DUE TO CHEMOTHERAPY: ICD-10-CM

## 2023-07-31 DIAGNOSIS — D84.822 IMMUNODEFICIENCY SECONDARY TO RADIATION THERAPY: ICD-10-CM

## 2023-07-31 DIAGNOSIS — D84.821 IMMUNODEFICIENCY DUE TO CHEMOTHERAPY: ICD-10-CM

## 2023-07-31 DIAGNOSIS — C50.111 MALIGNANT NEOPLASM OF CENTRAL PORTION OF RIGHT BREAST IN FEMALE, ESTROGEN RECEPTOR POSITIVE: ICD-10-CM

## 2023-07-31 PROCEDURE — 96402 CHEMO HORMON ANTINEOPL SQ/IM: CPT | Mod: HCNC

## 2023-07-31 PROCEDURE — 1159F MED LIST DOCD IN RCRD: CPT | Mod: HCNC,CPTII,95, | Performed by: NURSE PRACTITIONER

## 2023-07-31 PROCEDURE — 1160F PR REVIEW ALL MEDS BY PRESCRIBER/CLIN PHARMACIST DOCUMENTED: ICD-10-PCS | Mod: HCNC,CPTII,95, | Performed by: NURSE PRACTITIONER

## 2023-07-31 PROCEDURE — 1160F RVW MEDS BY RX/DR IN RCRD: CPT | Mod: HCNC,CPTII,95, | Performed by: NURSE PRACTITIONER

## 2023-07-31 PROCEDURE — 99214 OFFICE O/P EST MOD 30 MIN: CPT | Mod: 25,HCNC,95, | Performed by: NURSE PRACTITIONER

## 2023-07-31 PROCEDURE — 63600175 PHARM REV CODE 636 W HCPCS: Mod: JZ,JG,HCNC | Performed by: NURSE PRACTITIONER

## 2023-07-31 PROCEDURE — 1159F PR MEDICATION LIST DOCUMENTED IN MEDICAL RECORD: ICD-10-PCS | Mod: HCNC,CPTII,95, | Performed by: NURSE PRACTITIONER

## 2023-07-31 PROCEDURE — 99214 PR OFFICE/OUTPT VISIT, EST, LEVL IV, 30-39 MIN: ICD-10-PCS | Mod: 25,HCNC,95, | Performed by: NURSE PRACTITIONER

## 2023-07-31 RX ORDER — LAMOTRIGINE 25 MG/1
500 TABLET ORAL
Status: CANCELLED | OUTPATIENT
Start: 2023-07-31

## 2023-07-31 RX ORDER — LAMOTRIGINE 25 MG/1
500 TABLET ORAL
Status: COMPLETED | OUTPATIENT
Start: 2023-07-31 | End: 2023-07-31

## 2023-07-31 RX ADMIN — FULVESTRANT 500 MG: 50 INJECTION, SOLUTION INTRAMUSCULAR at 10:07

## 2023-07-31 NOTE — PROGRESS NOTES
Subjective:       Patient ID: Carole Moore is a 58 y.o. female.    Chief Complaint: Review labs. Faslodex    The patient location is: the [place] exam room  The chief complaint leading to consultation is: lab review. Faslodex    Visit type: audiovisual    Face to Face time with patient: 10 minutes     30 minutes of total time spent on the encounter, which includes face to face time and non-face to face time preparing to see the patient (eg, review of tests), Obtaining and/or reviewing separately obtained history, Documenting clinical information in the electronic or other health record, Independently interpreting results (not separately reported) and communicating results to the patient/family/caregiver, or Care coordination (not separately reported).         Each patient to whom he or she provides medical services by telemedicine is:  (1) informed of the relationship between the physician and patient and the respective role of any other health care provider with respect to management of the patient; and (2) notified that he or she may decline to receive medical services by telemedicine and may withdraw from such care at any time.    Notes:       HPI: 57 y.o female with metastatic breast cancer currently being treated with Ibrance days 1-21 of 28 day cycle, Faslodex Q 4 weeks, Zometa Q 3 months (due around 8/3/2023)    Presenting today for lab review and consideration of Faslodex. Planned to initiate new cycle Ibrance today. She reports feeling well following prior Kyphoplasty.    Social History     Socioeconomic History    Marital status:    Tobacco Use    Smoking status: Never    Smokeless tobacco: Never   Substance and Sexual Activity    Alcohol use: Never    Drug use: No    Sexual activity: Not Currently     Birth control/protection: Surgical   Other Topics Concern    Are you pregnant or think you may be? No    Breast-feeding No     Social Determinants of Health     Financial Resource Strain: Low  Risk  (7/30/2023)    Overall Financial Resource Strain (CARDIA)     Difficulty of Paying Living Expenses: Not hard at all   Food Insecurity: No Food Insecurity (7/30/2023)    Hunger Vital Sign     Worried About Running Out of Food in the Last Year: Never true     Ran Out of Food in the Last Year: Never true   Transportation Needs: No Transportation Needs (7/30/2023)    PRAPARE - Transportation     Lack of Transportation (Medical): No     Lack of Transportation (Non-Medical): No   Physical Activity: Inactive (7/30/2023)    Exercise Vital Sign     Days of Exercise per Week: 0 days     Minutes of Exercise per Session: 0 min   Stress: No Stress Concern Present (7/30/2023)    [place] [employer] - Occupational Stress Questionnaire     Feeling of Stress : Only a little   Recent Concern: Stress - Stress Concern Present (7/3/2023)    [place] [employer] - Occupational Stress Questionnaire     Feeling of Stress : To some extent   Social Connections: Moderately Integrated (7/30/2023)    Social Connection and Isolation Panel [NHANES]     Frequency of Communication with Friends and Family: More than three times a week     Frequency of Social Gatherings with Friends and Family: More than three times a week     Attends Nondenominational Services: More than 4 times per year     Active Member of Clubs or Organizations: No     Attends Club or Organization Meetings: Never     Marital Status:    Housing Stability: Low Risk  (7/30/2023)    Housing Stability Vital Sign     Unable to Pay for Housing in the Last Year: No     Number of Places Lived in the Last Year: 1     Unstable Housing in the Last Year: No       Past Medical History:   Diagnosis Date    Antineoplastic chemotherapy induced anemia     Breast cancer 2016    right breast    Cancer     R Breast cancer    CVA (cerebral infarction)     Diverticulosis     Family history of colon cancer 10/30/2018    Her mother and father both had colon  cancer.    Genetic testing of female 12/27/2016    [employer] (28 genes) - NEGATIVE    Hyperlipidemia     Immunodeficiency due to chemotherapy 10/03/2022    Nausea after anesthesia     Paralysis     right side    PONV (postoperative nausea and vomiting)     Stroke 2001    R side weakness    Trouble in sleeping        Family History   Problem Relation Age of Onset    Breast cancer Paternal Aunt     Colon cancer Father     Colon cancer Mother     Melanoma Maternal Uncle     Skin cancer Maternal Uncle        Past Surgical History:   Procedure Laterality Date    AUGMENTATION OF BREAST Left 2017    BREAST SURGERY      COLONOSCOPY N/A 10/30/2018    Procedure: COLONOSCOPY;  Surgeon: Cosme Monson MD;  Location: [facility];  Service: Endoscopy;  Laterality: N/A;    CYSTOSCOPY WITH BIOPSY OF BLADDER N/A 11/29/2021    Procedure: CYSTOSCOPY, WITH BLADDER BIOPSY, WITH FULGURATION IF INDICATED;  Surgeon: Page Marcelo MD;  Location: [facility] OR;  Service: Urology;  Laterality: N/A;    FIXATION KYPHOPLASTY Bilateral 2/9/2023    Procedure: KYPHOPLASTY;  Surgeon: Josse Pelayo MD;  Location: [facility] OR;  Service: Neurosurgery;  Laterality: Bilateral;  Kyphoplasty T10 with ablation    HYSTERECTOMY  2007    maintains both ovaries, menorrhagia    MASTECTOMY Right 2017    MEDIPORT INSERTION, SINGLE      RADIOFREQUENCY ABLATION, BONE, PERCUTANEOUS Bilateral 2/9/2023    Procedure: RADIOFREQUENCY ABLATION,BONE,PERCUTANEOUS;  Surgeon: Josse Pelayo MD;  Location: [facility];  Service: Neurosurgery;  Laterality: Bilateral;    TONSILLECTOMY         Review of Systems   Constitutional:  Negative for activity change, appetite change, chills, fatigue, fever and unexpected weight change.   HENT:  Negative for congestion, mouth sores, nosebleeds, sore throat, trouble swallowing and voice change.    Eyes:  Negative for visual disturbance.   Respiratory:  Negative for cough, chest tightness, shortness of breath and wheezing.     Cardiovascular:  Negative for chest pain and leg swelling.   Gastrointestinal:  Negative for abdominal distention, abdominal pain, blood in stool, constipation, diarrhea, nausea and vomiting.   Genitourinary:  Negative for difficulty urinating, dysuria and hematuria.   Musculoskeletal:  Negative for arthralgias, back pain and myalgias.   Skin:  Negative for pallor, rash and wound.   Neurological:  Negative for dizziness, syncope, weakness and headaches.   Hematological:  Negative for adenopathy. Does not bruise/bleed easily.   Psychiatric/Behavioral:  The patient is not nervous/anxious.        Medication List with Changes/Refills   Current Medications    ASCORBIC ACID, VITAMIN C, ORAL    Take by mouth once daily.    ASPIRIN (ECOTRIN) 81 MG EC TABLET    Take 81 mg by mouth once daily.    ATORVASTATIN (LIPITOR) 10 MG TABLET    Take 1 tablet (10 mg total) by mouth once daily.    CALCIUM CARBONATE ORAL    Take 1 tablet by mouth once daily.    CETIRIZINE (ZYRTEC) 10 MG TABLET    Take 10 mg by mouth daily as needed.    CHOLECALCIFEROL, VITAMIN D3, ORAL    Take by mouth once daily.    FULVESTRANT (FASLODEX) 250 MG/5 ML INJECTION    Inject 250 mg into the muscle every 30 days.    GADAVIST 10 MMOL/10 ML (1 MMOL/ML) SOLN INJECTION    every 6 (six) months. Once yearly    MECLIZINE (ANTIVERT) 25 MG TABLET    Take 25 mg by mouth 3 (three) times daily as needed.    METHOCARBAMOL (ROBAXIN) 750 MG TAB    Take 1 tablet (750 mg total) by mouth 3 (three) times daily.    MULTIVITAMIN CAPSULE    Take 1 capsule by mouth once daily.    ONDANSETRON (ZOFRAN) 4 MG TABLET    Take 1 tablet (4 mg total) by mouth every 8 (eight) hours as needed for Nausea.    OXYCODONE-ACETAMINOPHEN (PERCOCET)  MG PER TABLET    Take 1 tablet by mouth every 6 (six) hours as needed for Pain.    PALBOCICLIB (IBRANCE) 125 MG CAP    Take 1 capsule (125 mg) by mouth once daily on days 1-21 of a 28 day cycle.    POLYETHYLENE GLYCOL (GLYCOLAX) 17 GRAM/DOSE  POWDER    Take 17 g by mouth once daily.    PROCHLORPERAZINE (COMPAZINE) 5 MG TABLET    Take 1 tablet (5 mg total) by mouth 4 (four) times daily as needed for Nausea.    ZOLEDRONIC 4 MG/100 ML PGBK INFUSION         Objective:     There were no vitals filed for this visit.    Lab Results   Component Value Date    WBC 4.56 07/31/2023    HGB 13.5 07/31/2023    HCT 40.1 07/31/2023    MCV 96 07/31/2023     07/31/2023     BMP  Lab Results   Component Value Date     07/31/2023    K 4.6 07/31/2023     07/31/2023    CO2 26 07/31/2023    BUN 18 07/31/2023    CREATININE 0.8 07/31/2023    CALCIUM 9.5 07/31/2023    ANIONGAP 10 07/31/2023    EGFRNORACEVR >60 07/31/2023     Lab Results   Component Value Date    ALT 26 07/31/2023    AST 22 07/31/2023    ALKPHOS 48 (L) 07/31/2023    BILITOT 0.9 07/31/2023         Physical Exam  Pulmonary:      Effort: Pulmonary effort is normal. No respiratory distress.   Neurological:      Mental Status: She is alert and oriented to person, place, and time.        Assessment:     Problem List Items Addressed This Visit          Immunology/Multi System    Immunodeficiency due to chemotherapy     Infection precautions         Immunodeficiency secondary to radiation therapy     Infection precautions            Oncology    Malignant neoplasm of central portion of right breast in female, estrogen receptor positive     CBC, CMP unremarkable     Okay to proceed with monthly Faslodex. Okay to initiate next Ibrance cycle. Plan to arrange 4 weeks f/u with cbc, cmp for next Faslodex. Last Q 3 monthly Zometa  7/3/2023.    DEXA due 10/2023         Secondary malignant neoplasm of bone     Continue current management               Plan:     Immunodeficiency due to chemotherapy    Immunodeficiency secondary to radiation therapy    Malignant neoplasm of central portion of right breast in female, estrogen receptor positive  -     Cancel: fulvestrant (FASLODEX) injection 500 mg    Secondary  malignant neoplasm of bone            Med Onc Chart Routing      Follow up with physician    Follow up with [facility] 4 weeks.   Infusion scheduling note    Injection scheduling note faslodex   Labs CBC and CMP   Scheduling:  Preferred lab:  Lab interval:     Imaging None      Pharmacy appointment No pharmacy appointment needed      Other referrals     No additional referrals needed            WADE GarlandP-C

## 2023-07-31 NOTE — ASSESSMENT & PLAN NOTE
CBC, CMP unremarkable     Okay to proceed with monthly Faslodex. Okay to initiate next Ibrance cycle. Plan to arrange 4 weeks f/u with cbc, cmp for next Faslodex. Last Q 3 monthly Zometa  7/3/2023.    DEXA due 10/2023

## 2023-07-31 NOTE — PLAN OF CARE
Problem: Fall Injury Risk  Goal: Absence of Fall and Fall-Related Injury  Outcome: Ongoing, Progressing  Intervention: Identify and Manage Contributors  Flowsheets (Taken 7/31/2023 1128)  Self-Care Promotion:   independence encouraged   BADL personal objects within reach   BADL personal routines maintained   safe use of adaptive equipment encouraged  Medication Review/Management: medications reviewed  Intervention: Promote Injury-Free Environment  Flowsheets (Taken 7/31/2023 1128)  Safety Promotion/Fall Prevention:   assistive device/personal item within reach   Fall Risk reviewed with patient/family   in recliner, wheels locked   medications reviewed   instructed to call staff for mobility     Problem: Adult Inpatient Plan of Care  Goal: Plan of Care Review  Outcome: Ongoing, Progressing  Flowsheets (Taken 7/31/2023 1128)  Plan of Care Reviewed With: patient  Goal: Patient-Specific Goal (Individualized)  Outcome: Ongoing, Progressing  Flowsheets (Taken 7/31/2023 1128)  Anxieties, Fears or Concerns: denies  Individualized Care Needs: denies  Goal: Absence of Hospital-Acquired Illness or Injury  Outcome: Ongoing, Progressing  Intervention: Identify and Manage Fall Risk  Flowsheets (Taken 7/31/2023 1128)  Safety Promotion/Fall Prevention:   assistive device/personal item within reach   Fall Risk reviewed with patient/family   in recliner, wheels locked   medications reviewed   instructed to call staff for mobility  Intervention: Prevent Infection  Flowsheets (Taken 7/31/2023 1128)  Infection Prevention:   environmental surveillance performed   equipment surfaces disinfected   personal protective equipment utilized   hand hygiene promoted  Goal: Optimal Comfort and Wellbeing  Outcome: Ongoing, Progressing  Intervention: Monitor Pain and Promote Comfort  Flowsheets (Taken 7/31/2023 1128)  Pain Management Interventions:   position adjusted   relaxation techniques promoted  Intervention: Provide Person-Centered  Care  Flowsheets (Taken 7/31/2023 1128)  Trust Relationship/Rapport:   care explained   choices provided   emotional support provided   empathic listening provided   questions answered   questions encouraged   reassurance provided   thoughts/feelings acknowledged

## 2023-08-21 NOTE — TELEPHONE ENCOUNTER
Specialty Pharmacy - Refill Coordination    Specialty Medication Orders Linked to Encounter      Flowsheet Row Most Recent Value   Medication #1 palbociclib (IBRANCE) 125 mg Cap (Order#[id number], Rx#[id number])            Refill Questions - Documented Responses      Flowsheet Row Most Recent Value   Patient Availability and HIPAA Verification    Does patient want to proceed with activity? Yes   HIPAA/medical authority confirmed? Yes   Relationship to patient of person spoken to? Self   Refill Screening Questions    Changes to allergies? No   Changes to medications? No   New conditions since last clinic visit? No   Unplanned office visit, urgent care, ED, or hospital admission in the last 4 weeks? No   How does patient/caregiver feel medication is working? Good   Financial problems or insurance changes? No   How many doses of your specialty medications were missed in the last 4 weeks? 0   Would patient like to speak to a pharmacist? No   When does the patient need to receive the medication? 08/28/23   Refill Delivery Questions    How will the patient receive the medication? [facility]   When does the patient need to receive the medication? 08/28/23   Shipping Address Home   Address in [facility] confirmed and updated if neccessary? Yes   Expected Copay ($) 0   Is the patient able to afford the medication copay? Yes   Payment Method zero copay   Days supply of Refill 28   Supplies needed? No supplies needed   Refill activity completed? Yes   Refill activity plan Refill scheduled   Shipment/Pickup Date: 08/22/23            Current Outpatient Medications   Medication Sig    ASCORBIC ACID, VITAMIN C, ORAL Take by mouth once daily.    aspirin (ECOTRIN) 81 MG EC tablet Take 81 mg by mouth once daily.    atorvastatin (LIPITOR) 10 MG tablet Take 1 tablet (10 mg total) by mouth once daily.    CALCIUM CARBONATE ORAL Take 1 tablet by mouth once daily.    cetirizine (ZYRTEC) 10 MG tablet Take 10 mg by mouth daily as needed.     CHOLECALCIFEROL, VITAMIN D3, ORAL Take by mouth once daily.    fulvestrant (FASLODEX) 250 mg/5 mL injection Inject 250 mg into the muscle every 30 days.    GADAVIST 10 mmol/10 mL (1 mmol/mL) Soln injection every 6 (six) months. Once yearly    meclizine (ANTIVERT) 25 mg tablet Take 25 mg by mouth 3 (three) times daily as needed.    methocarbamoL (ROBAXIN) 750 MG Tab Take 1 tablet (750 mg total) by mouth 3 (three) times daily.    multivitamin capsule Take 1 capsule by mouth once daily.    ondansetron (ZOFRAN) 4 MG tablet Take 1 tablet (4 mg total) by mouth every 8 (eight) hours as needed for Nausea.    oxyCODONE-acetaminophen (PERCOCET)  mg per tablet Take 1 tablet by mouth every 6 (six) hours as needed for Pain.    palbociclib (IBRANCE) 125 mg Cap Take 1 capsule (125 mg) by mouth once daily on days 1-21 of a 28 day cycle.    polyethylene glycol (GLYCOLAX) 17 gram/dose powder Take 17 g by mouth once daily.    prochlorperazine (COMPAZINE) 5 MG tablet Take 1 tablet (5 mg total) by mouth 4 (four) times daily as needed for Nausea.    zoledronic 4 mg/100 mL PgBk infusion    Last reviewed on 7/31/2023 10:36 AM by Raven Miranda NP    Review of patient's allergies indicates:  No Known Allergies Last reviewed on  7/31/2023 10:36 AM by Raven Miranda      Tasks added this encounter   No tasks added.   Tasks due within next 3 months   9/17/2023 - Clinical Assessment (6 month recurrence)     Omid Pradhan, PharmD  [facility]  [address] [place] [id number]  Phone: [phone]  Fax: [phone]

## 2023-08-28 ENCOUNTER — OFFICE VISIT (OUTPATIENT)
Dept: HEMATOLOGY/ONCOLOGY | Facility: CLINIC | Age: 58
End: 2023-08-28
Payer: MEDICARE

## 2023-08-28 ENCOUNTER — INFUSION (OUTPATIENT)
Dept: INFUSION THERAPY | Facility: HOSPITAL | Age: 58
End: 2023-08-28
Attending: INTERNAL MEDICINE
Payer: MEDICARE

## 2023-08-28 VITALS — BODY MASS INDEX: 27.61 KG/M2 | WEIGHT: 175.94 LBS | HEIGHT: 67 IN

## 2023-08-28 DIAGNOSIS — R39.11 URINARY HESITANCY: Primary | ICD-10-CM

## 2023-08-28 DIAGNOSIS — C79.51 SECONDARY MALIGNANT NEOPLASM OF BONE: ICD-10-CM

## 2023-08-28 DIAGNOSIS — D84.822 IMMUNODEFICIENCY SECONDARY TO RADIATION THERAPY: ICD-10-CM

## 2023-08-28 DIAGNOSIS — Z17.0 MALIGNANT NEOPLASM OF CENTRAL PORTION OF RIGHT BREAST IN FEMALE, ESTROGEN RECEPTOR POSITIVE: ICD-10-CM

## 2023-08-28 DIAGNOSIS — D84.821 IMMUNODEFICIENCY DUE TO CHEMOTHERAPY: ICD-10-CM

## 2023-08-28 DIAGNOSIS — Z17.0 MALIGNANT NEOPLASM OF CENTRAL PORTION OF RIGHT BREAST IN FEMALE, ESTROGEN RECEPTOR POSITIVE: Primary | ICD-10-CM
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
## 2023-08-28 DIAGNOSIS — C50.111 MALIGNANT NEOPLASM OF CENTRAL PORTION OF RIGHT BREAST IN FEMALE, ESTROGEN RECEPTOR POSITIVE: Primary | ICD-10-CM

## 2023-08-28 DIAGNOSIS — Z79.899 IMMUNODEFICIENCY DUE TO CHEMOTHERAPY: ICD-10-CM

## 2023-08-28 DIAGNOSIS — C50.111 MALIGNANT NEOPLASM OF CENTRAL PORTION OF RIGHT BREAST IN FEMALE, ESTROGEN RECEPTOR POSITIVE: ICD-10-CM

## 2023-08-28 DIAGNOSIS — T45.1X5A IMMUNODEFICIENCY DUE TO CHEMOTHERAPY: ICD-10-CM

## 2023-08-28 DIAGNOSIS — M85.89 OSTEOPENIA OF MULTIPLE SITES: ICD-10-CM

## 2023-08-28 PROCEDURE — 96402 CHEMO HORMON ANTINEOPL SQ/IM: CPT | Mod: HCNC

## 2023-08-28 PROCEDURE — 99214 PR OFFICE/OUTPT VISIT, EST, LEVL IV, 30-39 MIN: ICD-10-PCS | Mod: 25,HCNC,95, | Performed by: NURSE PRACTITIONER

## 2023-08-28 PROCEDURE — 1160F PR REVIEW ALL MEDS BY PRESCRIBER/CLIN PHARMACIST DOCUMENTED: ICD-10-PCS | Mod: HCNC,CPTII,95, | Performed by: NURSE PRACTITIONER

## 2023-08-28 PROCEDURE — 1159F MED LIST DOCD IN RCRD: CPT | Mod: HCNC,CPTII,95, | Performed by: NURSE PRACTITIONER

## 2023-08-28 PROCEDURE — 99214 OFFICE O/P EST MOD 30 MIN: CPT | Mod: 25,HCNC,95, | Performed by: NURSE PRACTITIONER

## 2023-08-28 PROCEDURE — 63600175 PHARM REV CODE 636 W HCPCS: Mod: JZ,JG,HCNC | Performed by: NURSE PRACTITIONER

## 2023-08-28 PROCEDURE — 1160F RVW MEDS BY RX/DR IN RCRD: CPT | Mod: HCNC,CPTII,95, | Performed by: NURSE PRACTITIONER

## 2023-08-28 PROCEDURE — 1159F PR MEDICATION LIST DOCUMENTED IN MEDICAL RECORD: ICD-10-PCS | Mod: HCNC,CPTII,95, | Performed by: NURSE PRACTITIONER

## 2023-08-28 RX ORDER — LAMOTRIGINE 25 MG/1
500 TABLET ORAL
Status: COMPLETED | OUTPATIENT
Start: 2023-08-28 | End: 2023-08-28

## 2023-08-28 RX ORDER — LAMOTRIGINE 25 MG/1
500 TABLET ORAL
Status: CANCELLED | OUTPATIENT
Start: 2023-08-28

## 2023-08-28 RX ADMIN — FULVESTRANT 500 MG: 50 INJECTION, SOLUTION INTRAMUSCULAR at 11:08

## 2023-08-28 NOTE — PROGRESS NOTES
Subjective:       Patient ID: Carole Moore is a 58 y.o. female.    Chief Complaint: Review labs. Faslodex    The patient location is: the [place] exam room  The chief complaint leading to consultation is: lab review. Faslodex    Visit type: audiovisual    Face to Face time with patient: 10 minutes     30 minutes of total time spent on the encounter, which includes face to face time and non-face to face time preparing to see the patient (eg, review of tests), Obtaining and/or reviewing separately obtained history, Documenting clinical information in the electronic or other health record, Independently interpreting results (not separately reported) and communicating results to the patient/family/caregiver, or Care coordination (not separately reported).         Each patient to whom he or she provides medical services by telemedicine is:  (1) informed of the relationship between the physician and patient and the respective role of any other health care provider with respect to management of the patient; and (2) notified that he or she may decline to receive medical services by telemedicine and may withdraw from such care at any time.    Notes:       HPI: 58 y.o female with metastatic breast cancer currently being treated with Ibrance days 1-21 of 28 day cycle, Faslodex Q 4 weeks, Zometa Q 3 months (due around 10/3/2023)    Presenting today for lab review and consideration of Faslodex. Planned to initiate new cycle Ibrance today. She reports improved pain following prior Kyphoplasty. Notes increasing fatigue towards end of Ibrance cycle     Social History     Socioeconomic History    Marital status:    Tobacco Use    Smoking status: Never    Smokeless tobacco: Never   Substance and Sexual Activity    Alcohol use: Never    Drug use: No    Sexual activity: Not Currently     Birth control/protection: Surgical   Other Topics Concern    Are you pregnant or think you may be? No    Breast-feeding No     Social  Determinants of Health     Financial Resource Strain: Low Risk  (8/28/2023)    Overall Financial Resource Strain (CARDIA)     Difficulty of Paying Living Expenses: Not hard at all   Food Insecurity: No Food Insecurity (8/28/2023)    Hunger Vital Sign     Worried About Running Out of Food in the Last Year: Never true     Ran Out of Food in the Last Year: Never true   Transportation Needs: Unknown (8/28/2023)    PRAPARE - Transportation     Lack of Transportation (Medical): Patient refused     Lack of Transportation (Non-Medical): Patient refused   Physical Activity: Inactive (8/28/2023)    Exercise Vital Sign     Days of Exercise per Week: 2 days     Minutes of Exercise per Session: 0 min   Stress: No Stress Concern Present (8/28/2023)    [employer] [place] [employer] - Occupational Stress Questionnaire     Feeling of Stress : Only a little   Recent Concern: Stress - Stress Concern Present (7/3/2023)    [place] [place] [employer] - Occupational Stress Questionnaire     Feeling of Stress : To some extent   Social Connections: Socially Integrated (8/28/2023)    Social Connection and Isolation Panel [NHANES]     Frequency of Communication with Friends and Family: Once a week     Frequency of Social Gatherings with Friends and Family: Twice a week     Attends Bahai Services: More than 4 times per year     Active Member of Clubs or Organizations: Yes     Attends Club or Organization Meetings: 1 to 4 times per year     Marital Status:    Housing Stability: Unknown (8/28/2023)    Housing Stability Vital Sign     Unable to Pay for Housing in the Last Year: Patient refused     Number of Places Lived in the Last Year: 1     Unstable Housing in the Last Year: Patient refused       Past Medical History:   Diagnosis Date    Antineoplastic chemotherapy induced anemia     Breast cancer 2016    right breast    Cancer     R Breast cancer    CVA (cerebral infarction)     Diverticulosis     Family  history of colon cancer 10/30/2018    Her mother and father both had colon cancer.    Genetic testing of female 12/27/2016    [employer] (28 genes) - NEGATIVE    Hyperlipidemia     Immunodeficiency due to chemotherapy 10/03/2022    Nausea after anesthesia     Paralysis     right side    PONV (postoperative nausea and vomiting)     Stroke 2001    R side weakness    Trouble in sleeping        Family History   Problem Relation Age of Onset    Breast cancer Paternal Aunt     Colon cancer Father     Colon cancer Mother     Melanoma Maternal Uncle     Skin cancer Maternal Uncle        Past Surgical History:   Procedure Laterality Date    AUGMENTATION OF BREAST Left 2017    BREAST SURGERY      COLONOSCOPY N/A 10/30/2018    Procedure: COLONOSCOPY;  Surgeon: Cosme Monson MD;  Location: [facility];  Service: Endoscopy;  Laterality: N/A;    CYSTOSCOPY WITH BIOPSY OF BLADDER N/A 11/29/2021    Procedure: CYSTOSCOPY, WITH BLADDER BIOPSY, WITH FULGURATION IF INDICATED;  Surgeon: Page Marcelo MD;  Location: [facility] OR;  Service: Urology;  Laterality: N/A;    FIXATION KYPHOPLASTY Bilateral 2/9/2023    Procedure: KYPHOPLASTY;  Surgeon: Josse Pelayo MD;  Location: [facility] OR;  Service: Neurosurgery;  Laterality: Bilateral;  Kyphoplasty T10 with ablation    HYSTERECTOMY  2007    maintains both ovaries, menorrhagia    MASTECTOMY Right 2017    MEDIPORT INSERTION, SINGLE      RADIOFREQUENCY ABLATION, BONE, PERCUTANEOUS Bilateral 2/9/2023    Procedure: RADIOFREQUENCY ABLATION,BONE,PERCUTANEOUS;  Surgeon: Josse Pelayo MD;  Location: [facility];  Service: Neurosurgery;  Laterality: Bilateral;    TONSILLECTOMY         Review of Systems   Constitutional:  Positive for fatigue. Negative for activity change, appetite change, chills, fever and unexpected weight change.   HENT:  Negative for congestion, mouth sores, nosebleeds, sore throat, trouble swallowing and voice change.    Eyes:  Negative for visual disturbance.   Respiratory:   Negative for cough, chest tightness, shortness of breath and wheezing.    Cardiovascular:  Negative for chest pain and leg swelling.   Gastrointestinal:  Negative for abdominal distention, abdominal pain, blood in stool, constipation, diarrhea, nausea and vomiting.   Genitourinary:  Negative for difficulty urinating, dysuria and hematuria.   Musculoskeletal:  Negative for arthralgias, back pain and myalgias.   Skin:  Negative for pallor, rash and wound.   Neurological:  Negative for dizziness, syncope, weakness and headaches.   Hematological:  Negative for adenopathy. Does not bruise/bleed easily.   Psychiatric/Behavioral:  The patient is not nervous/anxious.        Medication List with Changes/Refills   Current Medications    ASCORBIC ACID, VITAMIN C, ORAL    Take by mouth once daily.    ASPIRIN (ECOTRIN) 81 MG EC TABLET    Take 81 mg by mouth once daily.    ATORVASTATIN (LIPITOR) 10 MG TABLET    Take 1 tablet (10 mg total) by mouth once daily.    CALCIUM CARBONATE ORAL    Take 1 tablet by mouth once daily.    CETIRIZINE (ZYRTEC) 10 MG TABLET    Take 10 mg by mouth daily as needed.    CHOLECALCIFEROL, VITAMIN D3, ORAL    Take by mouth once daily.    FULVESTRANT (FASLODEX) 250 MG/5 ML INJECTION    Inject 250 mg into the muscle every 30 days.    GADAVIST 10 MMOL/10 ML (1 MMOL/ML) SOLN INJECTION    every 6 (six) months. Once yearly    MECLIZINE (ANTIVERT) 25 MG TABLET    Take 25 mg by mouth 3 (three) times daily as needed.    METHOCARBAMOL (ROBAXIN) 750 MG TAB    Take 1 tablet (750 mg total) by mouth 3 (three) times daily.    MULTIVITAMIN CAPSULE    Take 1 capsule by mouth once daily.    ONDANSETRON (ZOFRAN) 4 MG TABLET    Take 1 tablet (4 mg total) by mouth every 8 (eight) hours as needed for Nausea.    OXYCODONE-ACETAMINOPHEN (PERCOCET)  MG PER TABLET    Take 1 tablet by mouth every 6 (six) hours as needed for Pain.    PALBOCICLIB (IBRANCE) 125 MG CAP    Take 1 capsule (125 mg) by mouth once daily on days 1-21  of a 28 day cycle.    POLYETHYLENE GLYCOL (GLYCOLAX) 17 GRAM/DOSE POWDER    Take 17 g by mouth once daily.    PROCHLORPERAZINE (COMPAZINE) 5 MG TABLET    Take 1 tablet (5 mg total) by mouth 4 (four) times daily as needed for Nausea.    ZOLEDRONIC 4 MG/100 ML PGBK INFUSION         Objective:     There were no vitals filed for this visit.    Lab Results   Component Value Date    WBC 2.05 (L) 08/28/2023    HGB 12.3 08/28/2023    HCT 36.1 (L) 08/28/2023    MCV 97 08/28/2023     08/28/2023     BMP  Lab Results   Component Value Date     08/28/2023    K 4.4 08/28/2023     08/28/2023    CO2 25 08/28/2023    BUN 12 08/28/2023    CREATININE 0.7 08/28/2023    CALCIUM 9.0 08/28/2023    ANIONGAP 8 08/28/2023    EGFRNORACEVR >60 08/28/2023     Lab Results   Component Value Date    ALT 19 08/28/2023    AST 16 08/28/2023    ALKPHOS 48 (L) 08/28/2023    BILITOT 0.6 08/28/2023         Physical Exam  Pulmonary:      Effort: Pulmonary effort is normal. No respiratory distress.   Neurological:      Mental Status: She is alert and oriented to person, place, and time.        Assessment:     Problem List Items Addressed This Visit          Immunology/Multi System    Immunodeficiency due to chemotherapy     Infection precautions         Immunodeficiency secondary to radiation therapy     Infection precautions            Oncology    Malignant neoplasm of central portion of right breast in female, estrogen receptor positive     CBC notable for neutropenia. ANC 1.0, otherwise unremarkable. CMP unremarkable    Okay to proceed with monthly Faslodex. Hold off on start of next Ibrance cycle.     -arrange 1 week f/u with CBC prior for consideration of Ibrance start  -Last Q 3 monthly Zometa  7/3/2023.    DEXA due 10/2023         Secondary malignant neoplasm of bone     Continue current management             Orthopedic    Osteopenia of multiple sites     DEXA due 10/2023          Other Visit Diagnoses       Urinary hesitancy     -  Primary    Relevant Orders    Urinalysis, Reflex to Urine Culture Urine, Clean Catch              Plan:     Urinary hesitancy  -     Urinalysis, Reflex to Urine Culture Urine, Clean Catch    Immunodeficiency secondary to radiation therapy    Immunodeficiency due to chemotherapy    Malignant neoplasm of central portion of right breast in female, estrogen receptor positive    Secondary malignant neoplasm of bone    Osteopenia of multiple sites            Med Onc Chart Routing      Follow up with physician    Follow up with [facility] 1 week. possible resumption of Ibrance   Infusion scheduling note    Injection scheduling note    Labs CBC   Scheduling:  Preferred lab:  Lab interval:     Imaging None      Pharmacy appointment No pharmacy appointment needed      Other referrals     No additional referrals needed            Raven Miranda, WADEP-C

## 2023-08-28 NOTE — DISCHARGE INSTRUCTIONS
Thank you for allowing me to care for you today,  SOILA YapN, RN    [facility]  [address] [address]  [phone] phone     [phone] fax  Hours of Operation: Monday- Friday 8:00am- 5:00pm  After hours phone  [phone]  Hematology / Oncology Physicians on call      Dr. Bill Jimenez, TRI Mendoza, GIULIANO Trinidad NP Phaon Dunbar, GIULIANO Vieyra, GIULIANO    Please call with any concerns regarding your appointment today.

## 2023-08-28 NOTE — NURSING
1151: Faslodex Injection given without difficulties.Bandaid applied. Patient instructed to stay in the clinic for 15 minutes. Patient verbalized understanding and will notify nurse with any complaints.

## 2023-08-28 NOTE — ASSESSMENT & PLAN NOTE
CBC notable for neutropenia. ANC 1.0, otherwise unremarkable. CMP unremarkable    Okay to proceed with monthly Faslodex. Hold off on start of next Ibrance cycle.     -arrange 1 week f/u with CBC prior for consideration of Ibrance start  -Last Q 3 monthly Zometa  7/3/2023.    DEXA due 10/2023

## 2023-08-28 NOTE — PLAN OF CARE
"Pt is stable. Pt administered Faslodex today. Pt voiced she was doing "well," today. Pt will follow up in one week.      Problem: Fall Injury Risk  Goal: Absence of Fall and Fall-Related Injury  Outcome: Ongoing, Progressing     Problem: Anemia (Chemotherapy Effects)  Goal: Anemia Symptom Improvement  Outcome: Ongoing, Progressing     Problem: Urinary Bleeding Risk or Actual (Chemotherapy Effects)  Goal: Absence of Hematuria  Outcome: Ongoing, Progressing     Problem: Nausea and Vomiting (Chemotherapy Effects)  Goal: Fluid and Electrolyte Balance  Outcome: Ongoing, Progressing     Problem: Neurotoxicity (Chemotherapy Effects)  Goal: Neurotoxicity Symptom Control  Outcome: Ongoing, Progressing     Problem: Oral Mucositis (Chemotherapy Effects)  Goal: Improved Oral Mucous Membrane Integrity  Outcome: Ongoing, Progressing     Problem: Thrombocytopenia Bleeding Risk (Chemotherapy Effects)  Goal: Absence of Bleeding  Outcome: Ongoing, Progressing     Problem: Adult Inpatient Plan of Care  Goal: Plan of Care Review  Outcome: Ongoing, Progressing  Goal: Patient-Specific Goal (Individualized)  Outcome: Ongoing, Progressing  Flowsheets (Taken 8/28/2023 1222)  Anxieties, Fears or Concerns: Pt denies.  Individualized Care Needs: Pt denies.     "

## 2023-09-01 ENCOUNTER — OFFICE VISIT (OUTPATIENT)
Dept: URGENT CARE | Facility: CLINIC | Age: 58
End: 2023-09-01
Payer: MEDICARE

## 2023-09-01 VITALS
OXYGEN SATURATION: 98 % | HEIGHT: 67 IN | BODY MASS INDEX: 26.53 KG/M2 | HEART RATE: 81 BPM | RESPIRATION RATE: 16 BRPM | TEMPERATURE: 98 F | SYSTOLIC BLOOD PRESSURE: 134 MMHG | DIASTOLIC BLOOD PRESSURE: 60 MMHG | WEIGHT: 169 LBS

## 2023-09-01 DIAGNOSIS — R30.0 DYSURIA: Primary | ICD-10-CM

## 2023-09-01 DIAGNOSIS — R39.9 UTI SYMPTOMS: ICD-10-CM

## 2023-09-01 LAB
BILIRUB UR QL STRIP: NEGATIVE
COLOR UR: ABNORMAL
GLUCOSE UR QL STRIP: NEGATIVE
KETONES UR QL STRIP: NEGATIVE
LEUKOCYTE ESTERASE UR QL STRIP: POSITIVE
PH, POC UA: 5
POC BLOOD, URINE: POSITIVE
POC NITRATES, URINE: NEGATIVE
PROT UR QL STRIP: POSITIVE
SP GR UR STRIP: 1.02 (ref 1–1.03)
UROBILINOGEN UR STRIP-ACNC: NORMAL (ref 0.1–1.1)

## 2023-09-01 PROCEDURE — 87086 URINE CULTURE/COLONY COUNT: CPT | Mod: HCNC

## 2023-09-01 PROCEDURE — 99213 PR OFFICE/OUTPT VISIT, EST, LEVL III, 20-29 MIN: ICD-10-PCS | Mod: S$GLB,,,

## 2023-09-01 PROCEDURE — 99213 OFFICE O/P EST LOW 20 MIN: CPT | Mod: S$GLB,,,

## 2023-09-01 PROCEDURE — 81003 URINALYSIS AUTO W/O SCOPE: CPT | Mod: QW,S$GLB,,

## 2023-09-01 PROCEDURE — 81003 POCT URINALYSIS, DIPSTICK, AUTOMATED, W/O SCOPE: ICD-10-PCS | Mod: QW,S$GLB,,

## 2023-09-01 RX ORDER — SULFAMETHOXAZOLE AND TRIMETHOPRIM 800; 160 MG/1; MG/1
1 TABLET ORAL 2 TIMES DAILY
Qty: 20 TABLET | Refills: 0 | Status: SHIPPED | OUTPATIENT
Start: 2023-09-01 | End: 2023-09-11

## 2023-09-01 NOTE — PROGRESS NOTES
"Subjective:      Patient ID: Carole Moore is a 58 y.o. female.    Vitals:  height is 5' 7" (1.702 m) and weight is 76.7 kg (169 lb). Her tympanic temperature is 97.9 °F (36.6 °C). Her blood pressure is 134/60 and her pulse is 81. Her respiration is 16 and oxygen saturation is 98%.     Chief Complaint: Dysuria    Carole Moore is a 58 y.o. female with hx of breast cancer currently on chemotherapy who presents for dysuria which onset last weekend. Associated sxs include chills and increased urinary frequency. Patient denies any fever, abd pain, flank pain, hematuria, vaginal discharge, and decreased urination. Patient does have a hx of UTIs. No hx of pyelonephritis. Prior Tx includes 2 of a remaining abx of Macrobid. She had a polyp in the urinary tract removed 2 years ago. She has an upcoming urology appt at the end of the month.    Dysuria   This is a new problem. The current episode started 1 to 4 weeks ago. The problem has been gradually worsening. The pain is at a severity of 8/10. There has been no fever. She is Not sexually active. There is No history of pyelonephritis. Associated symptoms include chills and frequency. Pertinent negatives include no behavior changes, discharge, flank pain, hematuria, hesitancy, nausea, possible pregnancy, sweats, urgency, vomiting, weight loss, bubble bath use, constipation, rash or withholding. She has tried home medications for the symptoms. Her past medical history is significant for recurrent UTIs and a urological procedure. There is no history of catheterization, diabetes insipidus, diabetes mellitus, genitourinary reflux, hypertension, kidney stones, a single kidney, STD or urinary stasis.       Constitution: Positive for chills.   Gastrointestinal:  Negative for nausea, vomiting and constipation.   Genitourinary:  Positive for dysuria and frequency. Negative for urgency, flank pain and hematuria.   Skin:  Negative for rash.      Objective:     Physical " Exam   Constitutional: She is oriented to person, place, and time. She appears well-developed.   HENT:   Head: Normocephalic and atraumatic.   Ears:   Right Ear: External ear normal.   Left Ear: External ear normal.   Nose: Nose normal.   Mouth/Throat: Mucous membranes are normal.   Eyes: Conjunctivae and lids are normal.   Neck: Trachea normal. Neck supple.   Cardiovascular: Normal rate, regular rhythm and normal heart sounds.   Pulmonary/Chest: Effort normal and breath sounds normal. No respiratory distress.   Abdominal: Normal appearance and bowel sounds are normal. She exhibits no distension and no mass. Soft. There is no abdominal tenderness. There is no rebound, no guarding, no left CVA tenderness and no right CVA tenderness.   Musculoskeletal: Normal range of motion.         General: Normal range of motion.   Neurological: She is alert and oriented to person, place, and time. She has normal strength.   Skin: Skin is warm, dry, intact, not diaphoretic and not pale.   Psychiatric: Her speech is normal and behavior is normal. Judgment and thought content normal.   Nursing note and vitals reviewed.      Assessment:     1. Dysuria    2. UTI symptoms        Results for orders placed or performed in visit on 09/01/23   POCT Urinalysis, Dipstick, Automated, W/O Scope   Result Value Ref Range    POC Blood, Urine Positive (A) Negative    POC Bilirubin, Urine Negative Negative    POC Urobilinogen, Urine Normal 0.1 - 1.1    POC Ketones, Urine Negative Negative    POC Protein, Urine Positive (A) Negative    POC Nitrates, Urine Negative Negative    POC Glucose, Urine Negative Negative    pH, UA 5.0     POC Specific Gravity, Urine 1.025 1.003 - 1.029    POC Leukocytes, Urine Positive (A) Negative    Color, UA Light Yellow Light Yellow, Yellow       Plan:       Dysuria  -     POCT Urinalysis, Dipstick, Automated, W/O Scope  -     CULTURE, URINE    UTI symptoms  -     sulfamethoxazole-trimethoprim 800-160mg (BACTRIM DS)  800-160 mg Tab; Take 1 tablet by mouth 2 (two) times daily. for 10 days  Dispense: 20 tablet; Refill: 0      Afebrile.  Reviewed abnormal urinalysis with patient who verbalized understanding.  Urine culture was sent. Will notify patient of results in 3-5 days and follow sensitivities. Will make changes to antibiotic therapy as necessary.  Meds: Bactrim sent to preferred pharmacy.  Increase fluid intake.  Discussed proper hygiene practices such as wiping front to back, take showers not baths, no scented soaps, wear breathable cotton underwear, and urinate after sexual intercourse.  RTC for any worsening symptoms.  Discussed ER precautions including fever, worsening flank pain, vomiting, etc.

## 2023-09-03 ENCOUNTER — TELEPHONE (OUTPATIENT)
Dept: URGENT CARE | Facility: CLINIC | Age: 58
End: 2023-09-03
Payer: MEDICARE

## 2023-09-03 LAB — BACTERIA UR CULT: NO GROWTH

## 2023-09-03 NOTE — TELEPHONE ENCOUNTER
Patient reports that she is starting to feel better on Bactrim.    -Take your antibiotics as directed and complete the entire course.  -If your symptoms are not improving or worsen, you will need to follow-up with primary care or go to the emergency department    Patient verbalized agreement and understanding of plan.  All questions answered and addressed.    Results for orders placed or performed in visit on 09/01/23   CULTURE, URINE    Specimen: Urine, Clean Catch   Result Value Ref Range    Urine Culture, Routine No growth    POCT Urinalysis, Dipstick, Automated, W/O Scope   Result Value Ref Range    POC Blood, Urine Positive (A) Negative    POC Bilirubin, Urine Negative Negative    POC Urobilinogen, Urine Normal 0.1 - 1.1    POC Ketones, Urine Negative Negative    POC Protein, Urine Positive (A) Negative    POC Nitrates, Urine Negative Negative    POC Glucose, Urine Negative Negative    pH, UA 5.0     POC Specific Gravity, Urine 1.025 1.003 - 1.029    POC Leukocytes, Urine Positive (A) Negative    Color, UA Light Yellow Light Yellow, Yellow

## 2023-09-04 ENCOUNTER — TELEPHONE (OUTPATIENT)
Dept: URGENT CARE | Facility: CLINIC | Age: 58
End: 2023-09-04
Payer: MEDICARE

## 2023-09-05 ENCOUNTER — OFFICE VISIT (OUTPATIENT)
Dept: HEMATOLOGY/ONCOLOGY | Facility: CLINIC | Age: 58
End: 2023-09-05
Payer: MEDICARE

## 2023-09-05 ENCOUNTER — LAB VISIT (OUTPATIENT)
Dept: LAB | Facility: HOSPITAL | Age: 58
End: 2023-09-05
Attending: INTERNAL MEDICINE
Payer: MEDICARE

## 2023-09-05 DIAGNOSIS — D84.821 IMMUNODEFICIENCY DUE TO CHEMOTHERAPY: ICD-10-CM

## 2023-09-05 DIAGNOSIS — T45.1X5A PANCYTOPENIA DUE TO ANTINEOPLASTIC CHEMOTHERAPY: ICD-10-CM

## 2023-09-05 DIAGNOSIS — Z17.0 MALIGNANT NEOPLASM OF CENTRAL PORTION OF RIGHT BREAST IN FEMALE, ESTROGEN RECEPTOR POSITIVE: ICD-10-CM

## 2023-09-05 DIAGNOSIS — Z17.0 MALIGNANT NEOPLASM OF CENTRAL PORTION OF RIGHT BREAST IN FEMALE, ESTROGEN RECEPTOR POSITIVE: Primary | ICD-10-CM

## 2023-09-05 DIAGNOSIS — D61.810 PANCYTOPENIA DUE TO ANTINEOPLASTIC CHEMOTHERAPY: ICD-10-CM

## 2023-09-05 DIAGNOSIS — D84.822 IMMUNODEFICIENCY SECONDARY TO RADIATION THERAPY: ICD-10-CM

## 2023-09-05 DIAGNOSIS — C50.111 MALIGNANT NEOPLASM OF CENTRAL PORTION OF RIGHT BREAST IN FEMALE, ESTROGEN RECEPTOR POSITIVE: ICD-10-CM

## 2023-09-05 DIAGNOSIS — C79.51 SECONDARY MALIGNANT NEOPLASM OF BONE: ICD-10-CM

## 2023-09-05 DIAGNOSIS — Z79.899 IMMUNODEFICIENCY DUE TO CHEMOTHERAPY: ICD-10-CM
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
## 2023-09-05 DIAGNOSIS — C50.111 MALIGNANT NEOPLASM OF CENTRAL PORTION OF RIGHT BREAST IN FEMALE, ESTROGEN RECEPTOR POSITIVE: Primary | ICD-10-CM

## 2023-09-05 DIAGNOSIS — T45.1X5A IMMUNODEFICIENCY DUE TO CHEMOTHERAPY: ICD-10-CM

## 2023-09-05 LAB
BASOPHILS # BLD AUTO: 0.06 K/UL (ref 0–0.2)
BASOPHILS NFR BLD: 2.1 % (ref 0–1.9)
DIFFERENTIAL METHOD: ABNORMAL
EOSINOPHIL # BLD AUTO: 0 K/UL (ref 0–0.5)
EOSINOPHIL NFR BLD: 1.4 % (ref 0–8)
ERYTHROCYTE [DISTWIDTH] IN BLOOD BY AUTOMATED COUNT: 13.9 % (ref 11.5–14.5)
HCT VFR BLD AUTO: 37.5 % (ref 37–48.5)
HGB BLD-MCNC: 12.6 G/DL (ref 12–16)
IMM GRANULOCYTES # BLD AUTO: 0 K/UL (ref 0–0.04)
IMM GRANULOCYTES NFR BLD AUTO: 0 % (ref 0–0.5)
LYMPHOCYTES # BLD AUTO: 0.9 K/UL (ref 1–4.8)
LYMPHOCYTES NFR BLD: 30.4 % (ref 18–48)
MCH RBC QN AUTO: 32.6 PG (ref 27–31)
MCHC RBC AUTO-ENTMCNC: 33.6 G/DL (ref 32–36)
MCV RBC AUTO: 97 FL (ref 82–98)
MONOCYTES # BLD AUTO: 0.4 K/UL (ref 0.3–1)
MONOCYTES NFR BLD: 14.7 % (ref 4–15)
NEUTROPHILS # BLD AUTO: 1.5 K/UL (ref 1.8–7.7)
NEUTROPHILS NFR BLD: 51.4 % (ref 38–73)
NRBC BLD-RTO: 0 /100 WBC
PLATELET # BLD AUTO: 279 K/UL (ref 150–450)
PMV BLD AUTO: 9.3 FL (ref 9.2–12.9)
RBC # BLD AUTO: 3.87 M/UL (ref 4–5.4)
WBC # BLD AUTO: 2.86 K/UL (ref 3.9–12.7)

## 2023-09-05 PROCEDURE — 36415 COLL VENOUS BLD VENIPUNCTURE: CPT | Mod: HCNC,PO | Performed by: INTERNAL MEDICINE

## 2023-09-05 PROCEDURE — 85025 COMPLETE CBC W/AUTO DIFF WBC: CPT | Mod: HCNC | Performed by: INTERNAL MEDICINE

## 2023-09-05 PROCEDURE — 1160F RVW MEDS BY RX/DR IN RCRD: CPT | Mod: HCNC,CPTII,95, | Performed by: INTERNAL MEDICINE

## 2023-09-05 PROCEDURE — 99214 PR OFFICE/OUTPT VISIT, EST, LEVL IV, 30-39 MIN: ICD-10-PCS | Mod: HCNC,95,, | Performed by: INTERNAL MEDICINE

## 2023-09-05 PROCEDURE — 99214 OFFICE O/P EST MOD 30 MIN: CPT | Mod: HCNC,95,, | Performed by: INTERNAL MEDICINE

## 2023-09-05 PROCEDURE — 1160F PR REVIEW ALL MEDS BY PRESCRIBER/CLIN PHARMACIST DOCUMENTED: ICD-10-PCS | Mod: HCNC,CPTII,95, | Performed by: INTERNAL MEDICINE

## 2023-09-05 PROCEDURE — 1159F PR MEDICATION LIST DOCUMENTED IN MEDICAL RECORD: ICD-10-PCS | Mod: HCNC,CPTII,95, | Performed by: INTERNAL MEDICINE

## 2023-09-05 PROCEDURE — 1159F MED LIST DOCD IN RCRD: CPT | Mod: HCNC,CPTII,95, | Performed by: INTERNAL MEDICINE

## 2023-09-05 RX ORDER — HEPARIN 100 UNIT/ML
500 SYRINGE INTRAVENOUS
Status: CANCELLED | OUTPATIENT
Start: 2023-10-01

## 2023-09-05 RX ORDER — SODIUM CHLORIDE 0.9 % (FLUSH) 0.9 %
10 SYRINGE (ML) INJECTION
Status: CANCELLED | OUTPATIENT
Start: 2023-10-01

## 2023-09-05 NOTE — PROGRESS NOTES
Subjective:       Patient ID: Carole Moore is a 58 y.o. female.    Chief Complaint: Results, Chemotherapy, and Breast Cancer    HPI:  58-year-old female history of metastatic breast cancer continue on Faslodex on a monthly basis and Ibrance days 1 through 21 on a 28 day cycle but needing to stretch to 35 days ECOG status 1 seen in video visit consultation    Past Medical History:   Diagnosis Date    Antineoplastic chemotherapy induced anemia     Breast cancer 2016    right breast    Cancer     R Breast cancer    CVA (cerebral infarction)     Diverticulosis     Family history of colon cancer 10/30/2018    Her mother and father both had colon cancer.    Genetic testing of female 12/27/2016    [employer] (28 genes) - NEGATIVE    Hyperlipidemia     Immunodeficiency due to chemotherapy 10/03/2022    Nausea after anesthesia     Paralysis     right side    PONV (postoperative nausea and vomiting)     Stroke 2001    R side weakness    Trouble in sleeping      Family History   Problem Relation Age of Onset    Breast cancer Paternal Aunt     Colon cancer Father     Colon cancer Mother     Melanoma Maternal Uncle     Skin cancer Maternal Uncle      Social History     Socioeconomic History    Marital status:    Tobacco Use    Smoking status: Never    Smokeless tobacco: Never   Substance and Sexual Activity    Alcohol use: Never    Drug use: No    Sexual activity: Not Currently     Birth control/protection: Surgical   Other Topics Concern    Are you pregnant or think you may be? No    Breast-feeding No     Social Determinants of Health     Financial Resource Strain: Low Risk  (8/29/2023)    Overall Financial Resource Strain (CARDIA)     Difficulty of Paying Living Expenses: Not hard at all   Food Insecurity: No Food Insecurity (8/29/2023)    Hunger Vital Sign     Worried About Running Out of Food in the Last Year: Never true     Ran Out of Food in the Last Year: Never true   Transportation Needs: No  Transportation Needs (8/29/2023)    PRAPARE - Transportation     Lack of Transportation (Medical): No     Lack of Transportation (Non-Medical): No   Physical Activity: Inactive (8/29/2023)    Exercise Vital Sign     Days of Exercise per Week: 0 days     Minutes of Exercise per Session: 0 min   Stress: Stress Concern Present (8/29/2023)    [employer] [place] [employer] - Occupational Stress Questionnaire     Feeling of Stress : To some extent   Social Connections: Moderately Integrated (8/29/2023)    Social Connection and Isolation Panel [NHANES]     Frequency of Communication with Friends and Family: More than three times a week     Frequency of Social Gatherings with Friends and Family: Three times a week     Attends Faith Services: More than 4 times per year     Active Member of Clubs or Organizations: No     Attends Club or Organization Meetings: Never     Marital Status:    Housing Stability: Low Risk  (8/29/2023)    Housing Stability Vital Sign     Unable to Pay for Housing in the Last Year: No     Number of Places Lived in the Last Year: 1     Unstable Housing in the Last Year: No     Past Surgical History:   Procedure Laterality Date    AUGMENTATION OF BREAST Left 2017    BREAST SURGERY      COLONOSCOPY N/A 10/30/2018    Procedure: COLONOSCOPY;  Surgeon: Cosme Monson MD;  Location: [facility];  Service: Endoscopy;  Laterality: N/A;    CYSTOSCOPY WITH BIOPSY OF BLADDER N/A 11/29/2021    Procedure: CYSTOSCOPY, WITH BLADDER BIOPSY, WITH FULGURATION IF INDICATED;  Surgeon: Page Marcelo MD;  Location: [facility] OR;  Service: Urology;  Laterality: N/A;    FIXATION KYPHOPLASTY Bilateral 2/9/2023    Procedure: KYPHOPLASTY;  Surgeon: Josse Pelayo MD;  Location: [facility] OR;  Service: Neurosurgery;  Laterality: Bilateral;  Kyphoplasty T10 with ablation    HYSTERECTOMY  2007    maintains both ovaries, menorrhagia    MASTECTOMY Right 2017    MEDIPORT INSERTION, SINGLE      RADIOFREQUENCY  "ABLATION, BONE, PERCUTANEOUS Bilateral 2/9/2023    Procedure: RADIOFREQUENCY ABLATION,BONE,PERCUTANEOUS;  Surgeon: Josse Pelayo MD;  Location: [facility];  Service: Neurosurgery;  Laterality: Bilateral;    TONSILLECTOMY         Labs:  Lab Results   Component Value Date    WBC 2.86 (L) 09/05/2023    HGB 12.6 09/05/2023    HCT 37.5 09/05/2023    MCV 97 09/05/2023     09/05/2023     BMP  Lab Results   Component Value Date     08/28/2023    K 4.4 08/28/2023     08/28/2023    CO2 25 08/28/2023    BUN 12 08/28/2023    CREATININE 0.7 08/28/2023    CALCIUM 9.0 08/28/2023    ANIONGAP 8 08/28/2023    ESTGFRAFRICA >60 05/12/2022    EGFRNONAA >60 05/12/2022     Lab Results   Component Value Date    ALT 19 08/28/2023    AST 16 08/28/2023    ALKPHOS 48 (L) 08/28/2023    BILITOT 0.6 08/28/2023       No results found for: "IRON", "TIBC", "FERRITIN", "SATURATEDIRO"  No results found for: "IREXSZNM31"  No results found for: "FOLATE"  Lab Results   Component Value Date    TSH 0.793 10/29/2021         Review of Systems   Constitutional:  Negative for activity change, appetite change, chills, diaphoresis, fatigue, fever and unexpected weight change.   HENT:  Negative for congestion, dental problem, drooling, ear discharge, ear pain, facial swelling, hearing loss, mouth sores, nosebleeds, postnasal drip, rhinorrhea, sinus pressure, sneezing, sore throat, tinnitus, trouble swallowing and voice change.    Eyes:  Negative for photophobia, pain, discharge, redness, itching and visual disturbance.   Respiratory:  Negative for cough, choking, chest tightness, shortness of breath, wheezing and stridor.    Cardiovascular:  Negative for chest pain, palpitations and leg swelling.   Gastrointestinal:  Negative for abdominal distention, abdominal pain, anal bleeding, blood in stool, constipation, diarrhea, nausea, rectal pain and vomiting.   Endocrine: Negative for cold intolerance, heat intolerance, polydipsia, polyphagia and " polyuria.   Genitourinary:  Negative for decreased urine volume, difficulty urinating, dyspareunia, dysuria, enuresis, flank pain, frequency, genital sores, hematuria, menstrual problem, pelvic pain, urgency, vaginal bleeding, vaginal discharge and vaginal pain.   Musculoskeletal:  Negative for arthralgias, back pain, gait problem, joint swelling, myalgias, neck pain and neck stiffness.   Skin:  Negative for color change, pallor and rash.   Allergic/Immunologic: Negative for environmental allergies, food allergies and immunocompromised state.   Neurological:  Negative for dizziness, tremors, seizures, syncope, facial asymmetry, speech difficulty, weakness, light-headedness, numbness and headaches.   Hematological:  Negative for adenopathy. Does not bruise/bleed easily.   Psychiatric/Behavioral:  Negative for agitation, behavioral problems, confusion, decreased concentration, dysphoric mood, hallucinations, self-injury, sleep disturbance and suicidal ideas. The patient is not nervous/anxious and is not hyperactive.        Objective:      Physical Exam  Constitutional:       Appearance: Normal appearance.             Assessment:      1. Malignant neoplasm of central portion of right breast in female, estrogen receptor positive    2. Immunodeficiency secondary to radiation therapy    3. Immunodeficiency due to chemotherapy    4. Pancytopenia due to antineoplastic chemotherapy    5. Secondary malignant neoplasm of bone           Med Onc Chart Routing      Follow up with physician . Return to clinic after PET scan in November 2023   Follow up with [facility]    Infusion scheduling note    Injection scheduling note Faslodex on a monthly basis next dose of Zometa in October orders have been signed   Labs CBC and CMP   Scheduling:  Preferred lab:  Lab interval:  Q 5 weeks   Imaging PET scan   PET scan in no end of November 2023   Pharmacy appointment    Other referrals               Plan:     The patient location is:  Home  The  chief complaint leading to consultation is:  breast cancer    Visit type: audiovisual    Face to Face time with patient: 25 minutes of total time spent on the encounter, which includes face to face time and non-face to face time preparing to see the patient (eg, review of tests), Obtaining and/or reviewing separately obtained history, Documenting clinical information in the electronic or other health record, Independently interpreting results (not separately reported) and communicating results to the patient/family/caregiver, or Care coordination (not separately reported).         Each patient to whom he or she provides medical services by telemedicine is:  (1) informed of the relationship between the physician and patient and the respective role of any other health care provider with respect to management of the patient; and (2) notified that he or she may decline to receive medical services by telemedicine and may withdraw from such care at any time.    Notes:   Reviewed information with patient ANC is increased to 1500.  Continue with current dosing of Ibrance would recommend moving from a 28 day schedule to 35 day schedule.  Q Saint dosing intensity Zometa given q.3 months orders signed for next dosing in early October 2023.  Would recommend follow-up with us near the end of November or early December of 2023 with CBC CMP and PET scan.  Discussed implications with her  Harpal Khan Jr, MD FACP

## 2023-09-07 ENCOUNTER — PATIENT MESSAGE (OUTPATIENT)
Dept: INFUSION THERAPY | Facility: HOSPITAL | Age: 58
End: 2023-09-07
Payer: MEDICARE

## 2023-09-12 ENCOUNTER — SPECIALTY PHARMACY (OUTPATIENT)
Dept: PHARMACY | Facility: CLINIC | Age: 58
End: 2023-09-12
Payer: MEDICARE

## 2023-09-12 DIAGNOSIS — C50.111 MALIGNANT NEOPLASM OF CENTRAL PORTION OF RIGHT BREAST IN FEMALE, ESTROGEN RECEPTOR POSITIVE: Primary | ICD-10-CM

## 2023-09-12 DIAGNOSIS — Z17.0 MALIGNANT NEOPLASM OF CENTRAL PORTION OF RIGHT BREAST IN FEMALE, ESTROGEN RECEPTOR POSITIVE: Primary | ICD-10-CM

## 2023-09-12 DIAGNOSIS — C50.111 MALIGNANT NEOPLASM OF CENTRAL PORTION OF RIGHT BREAST IN FEMALE, ESTROGEN RECEPTOR POSITIVE: ICD-10-CM

## 2023-09-12 DIAGNOSIS — Z17.0 MALIGNANT NEOPLASM OF CENTRAL PORTION OF RIGHT BREAST IN FEMALE, ESTROGEN RECEPTOR POSITIVE: ICD-10-CM

## 2023-09-12 NOTE — TELEPHONE ENCOUNTER
Pt started most recent cycle on 9/5 and confirmed she will have a 35 day cycle. Message to MD for updated rx. Pended refill.

## 2023-09-18 ENCOUNTER — SPECIALTY PHARMACY (OUTPATIENT)
Dept: PHARMACY | Facility: CLINIC | Age: 58
End: 2023-09-18
Payer: MEDICARE

## 2023-09-20 NOTE — TELEPHONE ENCOUNTER
Patient Diagnosis   C50.111, Z17.0 - Malignant neoplasm of central portion of right breast in female, estrogen receptor positive    Carole Moore is a 58 y.o. female, who is followed by the specialty pharmacy service for management and education of her Ibrance.  She has been on therapy with Ibrance for 12 months.  I have reviewed her electronic medical record and current medication list and determined that specialty medication adjustment Is not needed at this time.    Patient has experienced adverse events such as fatigue, neutropenia, and is managed in the following way(s): MD adjusted cycle from q28d to q35d.  She Is adherent reporting 0 missed doses since last review.  Adherence has been encouraged with the following mechanism(s): Proactive refill calls.  She is meeting goals of therapy and will continue treatment.        8/21/2023 6/26/2023 5/1/2023 3/6/2023 2/3/2023 12/5/2022 11/8/2022   Follow Up Review   # of missed doses 0 0 0 0 0 0 0   New Medications? No No No No No No No   New Conditions? No No No No No No No   New Allergies? No No No No No No No   Med Effective? Good Good Good Good Good Good Good   Urgent Care? No No No No No No No   Requested Pharmacist? No No No  No No No         Therapy is appropriate to continue.    Therapy is effective: Yes  On scale of 1 to 10, how does patient rank quality of life? (10 - Best): Unable to Assess  Recommendations: none at this time.  Review Method: Chart Review    Labs and medication reviewed. Stable disease on PET in 4/2023.    Tasks added this encounter   No tasks added.   Tasks due within next 3 months   9/17/2023 - Clinical Assessment (6 month recurrence)  10/3/2023 - Refill Coordination Outreach (1 time occurrence)     Caroline Mendoza, PharmD  [facility]  [address] [facility] [address]  Phone: [phone]  Fax: [phone]

## 2023-09-22 RX ORDER — LAMOTRIGINE 25 MG/1
500 TABLET ORAL
Status: CANCELLED | OUTPATIENT
Start: 2023-09-25

## 2023-09-25 ENCOUNTER — INFUSION (OUTPATIENT)
Dept: INFUSION THERAPY | Facility: HOSPITAL | Age: 58
End: 2023-09-25
Attending: INTERNAL MEDICINE
Payer: MEDICARE

## 2023-09-25 VITALS
TEMPERATURE: 98 F | OXYGEN SATURATION: 97 % | WEIGHT: 180.56 LBS | HEART RATE: 76 BPM | DIASTOLIC BLOOD PRESSURE: 58 MMHG | BODY MASS INDEX: 28.34 KG/M2 | RESPIRATION RATE: 18 BRPM | HEIGHT: 67 IN | SYSTOLIC BLOOD PRESSURE: 92 MMHG

## 2023-09-25 DIAGNOSIS — Z17.0 MALIGNANT NEOPLASM OF CENTRAL PORTION OF RIGHT BREAST IN FEMALE, ESTROGEN RECEPTOR POSITIVE: Primary | ICD-10-CM

## 2023-09-25 DIAGNOSIS — C50.111 MALIGNANT NEOPLASM OF CENTRAL PORTION OF RIGHT BREAST IN FEMALE, ESTROGEN RECEPTOR POSITIVE: Primary | ICD-10-CM

## 2023-09-25 PROCEDURE — 96402 CHEMO HORMON ANTINEOPL SQ/IM: CPT | Mod: HCNC

## 2023-09-25 PROCEDURE — 63600175 PHARM REV CODE 636 W HCPCS: Mod: JZ,JG,HCNC | Performed by: INTERNAL MEDICINE

## 2023-09-25 RX ORDER — LAMOTRIGINE 25 MG/1
500 TABLET ORAL
Status: COMPLETED | OUTPATIENT
Start: 2023-09-25 | End: 2023-09-25

## 2023-09-25 RX ADMIN — FULVESTRANT 500 MG: 50 INJECTION, SOLUTION INTRAMUSCULAR at 12:09

## 2023-09-25 NOTE — NURSING
Faslodex injections given without difficulty. Bandaids applied. Patient remained in the clinic for 15 minutes. Patient verbalized understanding and will notify nurse with any complaints. Next appointments made for Faslodex and Zometa.  Infection precautions reviewed.  Pt states full understanding to call with any sign of infection, including temp >100.4.

## 2023-09-26 ENCOUNTER — APPOINTMENT (OUTPATIENT)
Dept: UROLOGY | Facility: CLINIC | Age: 58
End: 2023-09-26
Payer: MEDICARE

## 2023-09-26 DIAGNOSIS — N39.0 RECURRENT UTI: Primary | ICD-10-CM

## 2023-09-26 PROCEDURE — 87086 URINE CULTURE/COLONY COUNT: CPT | Mod: HCNC | Performed by: UROLOGY

## 2023-09-27 ENCOUNTER — TELEPHONE (OUTPATIENT)
Dept: UROLOGY | Facility: CLINIC | Age: 58
End: 2023-09-27
Payer: MEDICARE

## 2023-09-27 DIAGNOSIS — N39.0 RECURRENT UTI: Primary | ICD-10-CM

## 2023-09-28 LAB — BACTERIA UR CULT: NO GROWTH

## 2023-09-28 RX ORDER — METHENAMINE, SODIUM PHOSPHATE, MONOBASIC, MONOHYDRATE, PHENYL SALICYLATE, METHYLENE BLUE, AND HYOSCYAMINE SULFATE 118; 40.8; 36; 10; .12 MG/1; MG/1; MG/1; MG/1; MG/1
CAPSULE ORAL
Qty: 30 CAPSULE | Refills: 1 | Status: SHIPPED | OUTPATIENT
Start: 2023-09-28 | End: 2024-03-21

## 2023-10-05 NOTE — TELEPHONE ENCOUNTER
Patient contacted [facility] to review medication delivery information. Confirmed delivery is set up for tomorrow. She has no further questions at this time

## 2023-10-10 ENCOUNTER — INFUSION (OUTPATIENT)
Dept: INFUSION THERAPY | Facility: HOSPITAL | Age: 58
End: 2023-10-10
Attending: INTERNAL MEDICINE
Payer: MEDICARE

## 2023-10-10 VITALS
BODY MASS INDEX: 28.21 KG/M2 | DIASTOLIC BLOOD PRESSURE: 71 MMHG | OXYGEN SATURATION: 96 % | TEMPERATURE: 97 F | SYSTOLIC BLOOD PRESSURE: 118 MMHG | RESPIRATION RATE: 18 BRPM | WEIGHT: 180.13 LBS | HEART RATE: 72 BPM

## 2023-10-10 DIAGNOSIS — C50.111 MALIGNANT NEOPLASM OF CENTRAL PORTION OF RIGHT BREAST IN FEMALE, ESTROGEN RECEPTOR POSITIVE: ICD-10-CM

## 2023-10-10 DIAGNOSIS — Z17.0 MALIGNANT NEOPLASM OF CENTRAL PORTION OF RIGHT BREAST IN FEMALE, ESTROGEN RECEPTOR POSITIVE: ICD-10-CM

## 2023-10-10 DIAGNOSIS — C79.51 SECONDARY MALIGNANT NEOPLASM OF BONE: Primary | ICD-10-CM

## 2023-10-10 PROCEDURE — 96365 THER/PROPH/DIAG IV INF INIT: CPT | Mod: HCNC

## 2023-10-10 PROCEDURE — 63600175 PHARM REV CODE 636 W HCPCS: Mod: HCNC | Performed by: INTERNAL MEDICINE

## 2023-10-10 RX ORDER — SODIUM CHLORIDE 0.9 % (FLUSH) 0.9 %
10 SYRINGE (ML) INJECTION
Status: DISCONTINUED | OUTPATIENT
Start: 2023-10-10 | End: 2023-10-10 | Stop reason: HOSPADM

## 2023-10-10 RX ORDER — ZOLEDRONIC ACID 0.04 MG/ML
4 INJECTION, SOLUTION INTRAVENOUS
Status: COMPLETED | OUTPATIENT
Start: 2023-10-10 | End: 2023-10-10

## 2023-10-10 RX ADMIN — ZOLEDRONIC ACID 4 MG: 0.04 INJECTION, SOLUTION INTRAVENOUS at 08:10

## 2023-10-10 NOTE — PLAN OF CARE
Problem: Fall Injury Risk  Goal: Absence of Fall and Fall-Related Injury  10/10/2023 0844 by Jenna Childers RN  Outcome: Ongoing, Progressing  10/10/2023 0842 by Jenna Childers RN  Outcome: Ongoing, Progressing  Intervention: Identify and Manage Contributors  Flowsheets (Taken 10/10/2023 0842)  Self-Care Promotion:   independence encouraged   BADL personal objects within reach   BADL personal routines maintained   safe use of adaptive equipment encouraged  Medication Review/Management: medications reviewed  Intervention: Promote Injury-Free Environment  Flowsheets (Taken 10/10/2023 0842)  Safety Promotion/Fall Prevention:   assistive device/personal item within reach   instructed to call staff for mobility   Fall Risk reviewed with patient/family   lighting adjusted     Problem: Adult Inpatient Plan of Care  Goal: Plan of Care Review  10/10/2023 0844 by Jenna Childers RN  Outcome: Ongoing, Progressing  10/10/2023 0842 by Jenna Childers RN  Outcome: Ongoing, Progressing  Flowsheets (Taken 10/10/2023 0842)  Plan of Care Reviewed With: patient  Goal: Patient-Specific Goal (Individualized)  10/10/2023 0844 by Jenna Childers RN  Outcome: Ongoing, Progressing  10/10/2023 0842 by Jenna Childers RN  Outcome: Ongoing, Progressing  Flowsheets (Taken 10/10/2023 0842)  Anxieties, Fears or Concerns: denies  Individualized Care Needs: feet elevated, warm blanket provided

## 2023-10-11 ENCOUNTER — OFFICE VISIT (OUTPATIENT)
Dept: UROLOGY | Facility: CLINIC | Age: 58
End: 2023-10-11
Payer: MEDICARE

## 2023-10-11 ENCOUNTER — DOCUMENTATION ONLY (OUTPATIENT)
Dept: PALLIATIVE MEDICINE | Facility: CLINIC | Age: 58
End: 2023-10-11
Payer: MEDICARE

## 2023-10-11 VITALS
BODY MASS INDEX: 28.23 KG/M2 | DIASTOLIC BLOOD PRESSURE: 81 MMHG | TEMPERATURE: 98 F | RESPIRATION RATE: 18 BRPM | SYSTOLIC BLOOD PRESSURE: 119 MMHG | WEIGHT: 179.88 LBS | HEIGHT: 67 IN | HEART RATE: 75 BPM

## 2023-10-11 DIAGNOSIS — N39.3 SUI (STRESS URINARY INCONTINENCE, FEMALE): ICD-10-CM

## 2023-10-11 DIAGNOSIS — N95.8 GENITOURINARY SYNDROME OF MENOPAUSE: ICD-10-CM

## 2023-10-11 DIAGNOSIS — N39.0 RECURRENT UTI: Primary | ICD-10-CM

## 2023-10-11 DIAGNOSIS — R31.29 MICROHEMATURIA: ICD-10-CM

## 2023-10-11 PROCEDURE — 1159F PR MEDICATION LIST DOCUMENTED IN MEDICAL RECORD: ICD-10-PCS | Mod: HCNC,CPTII,S$GLB, | Performed by: UROLOGY

## 2023-10-11 PROCEDURE — 99999 PR PBB SHADOW E&M-EST. PATIENT-LVL IV: CPT | Mod: PBBFAC,HCNC,, | Performed by: UROLOGY

## 2023-10-11 PROCEDURE — 99214 OFFICE O/P EST MOD 30 MIN: CPT | Mod: HCNC,S$GLB,, | Performed by: UROLOGY

## 2023-10-11 PROCEDURE — 99214 PR OFFICE/OUTPT VISIT, EST, LEVL IV, 30-39 MIN: ICD-10-PCS | Mod: HCNC,S$GLB,, | Performed by: UROLOGY

## 2023-10-11 PROCEDURE — 1159F MED LIST DOCD IN RCRD: CPT | Mod: HCNC,CPTII,S$GLB, | Performed by: UROLOGY

## 2023-10-11 PROCEDURE — 3008F BODY MASS INDEX DOCD: CPT | Mod: HCNC,CPTII,S$GLB, | Performed by: UROLOGY

## 2023-10-11 PROCEDURE — 3079F DIAST BP 80-89 MM HG: CPT | Mod: HCNC,CPTII,S$GLB, | Performed by: UROLOGY

## 2023-10-11 PROCEDURE — 3079F PR MOST RECENT DIASTOLIC BLOOD PRESSURE 80-89 MM HG: ICD-10-PCS | Mod: HCNC,CPTII,S$GLB, | Performed by: UROLOGY

## 2023-10-11 PROCEDURE — 99999 PR PBB SHADOW E&M-EST. PATIENT-LVL IV: ICD-10-PCS | Mod: PBBFAC,HCNC,, | Performed by: UROLOGY

## 2023-10-11 PROCEDURE — 3074F SYST BP LT 130 MM HG: CPT | Mod: HCNC,CPTII,S$GLB, | Performed by: UROLOGY

## 2023-10-11 PROCEDURE — 3008F PR BODY MASS INDEX (BMI) DOCUMENTED: ICD-10-PCS | Mod: HCNC,CPTII,S$GLB, | Performed by: UROLOGY

## 2023-10-11 PROCEDURE — 3074F PR MOST RECENT SYSTOLIC BLOOD PRESSURE < 130 MM HG: ICD-10-PCS | Mod: HCNC,CPTII,S$GLB, | Performed by: UROLOGY

## 2023-10-11 RX ORDER — NITROFURANTOIN 25; 75 MG/1; MG/1
100 CAPSULE ORAL 2 TIMES DAILY
Qty: 14 CAPSULE | Refills: 0 | Status: SHIPPED | OUTPATIENT
Start: 2023-10-11

## 2023-10-11 NOTE — PROGRESS NOTES
Chief Complaint: f/u recurrent UTI    HPI:     10/11/23-Pt had a couple of negative cultures last month. She was having frequency and dysuria when she was having symptoms. Took 1 uribel and it helped. Pt reports that sometimes she has diarrhea related to stool softeners that she takes related to anti-cancer medication she is on. Wonders if it is related.   Stress Incontinence is stable when she coughs. 1 pad per day. No gross hematuria.     3/22/23-sometimes she has frequency, but then it resolves. No recent UTIs. Had lumbar surgery since her last visit. She does have vaginal dryness.     9/21/22- Pt reports left flank and abdominal pain. Having urinary frequency. Went to [facility] and had non-contrast CT, showing grossly normal kidneys without stone or hydronephrosis, per report. No fever. She is now off of daily antibiotics. Prescribed estring by gyn, but not using it. She reports that the pain is primarily with movement. Ongoing x 4 weeks, off and on. Currently on baclofen, norco and celebrex. Has a grandbaby that she has been lifting.    3/24/22- Daily macrobid seems to be working. No recent UTIs. Pt has an upcoming trip and would like to continue daily abx through then. No dysuria, abdominal pain or gross hematuria.    12/21/21- bladder neck biopsy with chronic cystitis with cystitis cystica and glandularis. Had some bloating after the procedure for a week. No gross hematuria. No dysuria. Has had recurrent UTIs every month or so lately.   11/5/21-here for cysto. CT urogram with tiny right renal cyst.   10/21/06-Qn-[id number] for dipstick positive UA for blood, urine culture negative. She reports that a year ago, she was having recurrent UTIs. She reports that after being cathed in the  clinic, she didn't have any more UTIs. Currently, she reports that she is now having frequency and dysuria. She was treated with abx (cipro) a few weeks ago and symptoms improved, but she is scared they will recur. No gross hematuria.  She was on oxybutynin, but it gave her dry mouth.   1/30/20: 55 yo woman with breast cancer history about a year ago had a few UTI.  Sole symptom was frequency.   Bought some new underwear and after changing back to old type things got better and no problems since, about 5 mo.  No abd/pelvic pain and no exac/rel factors.  No hematuria.  No urolithiasis.  No urinary bother.  No  history.  Normal sexual function.  Normal OB exam 2 mo ago.  Microhematuria on dipstick UA.  Had a stroke 20 yrs ago, on disability.    Allergies:  Patient has no known allergies.    Medications: has a current medication list which includes the following prescription(s): ascorbic acid, aspirin, atorvastatin, calcium carbonate, cetirizine, cholecalciferol (vitamin d3), fulvestrant, gadavist, meclizine, uribel, methocarbamol, multivitamin, ondansetron, oxycodone-acetaminophen, palbociclib, polyethylene glycol, prochlorperazine, zoledronic, and nitrofurantoin (macrocrystal-monohydrate).    Review of Systems:  General: No fever, chills, fatigability, or weight loss.  Skin: No rashes, itching, or changes in color or texture of skin.  Chest: Denies LIPSCOMB, cyanosis, wheezing, cough, and sputum production.  Abdomen: Appetite fine. No weight loss. Denies diarrhea, abdominal pain, hematemesis, or blood in stool.  Musculoskeletal: No joint stiffness or swelling. Denies back pain.  : As above.  All other review of systems negative.    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    PSH:   has a past surgical history that includes Tonsillectomy; Mediport insertion, single; Breast  surgery; Colonoscopy (N/A, 10/30/2018); Mastectomy (Right, 2017); Augmentation of breast (Left, 2017); Hysterectomy (2007); Cystoscopy with biopsy of bladder (N/A, 11/29/2021); Fixation Kyphoplasty (Bilateral, 2/9/2023); and radiofrequency ablation, bone, percutaneous (Bilateral, 2/9/2023).    FamHx: family history includes Breast cancer in her paternal aunt; Colon cancer in her father and mother; Melanoma in her maternal uncle; Skin cancer in her maternal uncle.    SocHx:  reports that she has never smoked. She has never used smokeless tobacco. She reports that she does not drink alcohol and does not use drugs.     Physical Exam:  Vitals:   Vitals:    10/11/23 1001   BP: 119/81   Pulse: 75   Resp: 18   Temp: 97.9 °F (36.6 °C)       General: A&Ox3. No apparent distress. No deformities.  Neck: No masses. Normal thyroid.  Lungs: normal inspiration. No use of accessory muscles.  Heart: normal pulse. No arrhythmias.  Abdomen: Soft. NT. ND. No masses. No hernias. No hepatosplenomegaly.  Lymphatic: Neck and groin nodes negative.  Skin: The skin is warm and dry. No jaundice.  Ext: No c/c/e. R hemiparesis  : External genitalia normal.     Labs/Studies:   Trace blood, negative for LE, nit    Impression/Plan:   Recurrent UTI  -     POCT Urinalysis, Dipstick, Automated, W/O Scope    Microhematuria    Genitourinary syndrome of menopause    JANEL (stress urinary incontinence, female)    Other orders  -     nitrofurantoin, macrocrystal-monohydrate, (MACROBID) 100 MG capsule; Take 1 capsule (100 mg total) by mouth 2 (two) times daily.  Dispense: 14 capsule; Refill: 0      Self-start macrobid for her upcoming vacation  Continue uribel PRN      Follow up in about 6 months (around 4/11/2024).

## 2023-10-11 NOTE — PROGRESS NOTES
Palliative Referral Note  Nurse reached out to pt for rescheduling of her oct. 17 at 9:30am apt, no answer, voice message left to call office for rescheduling.

## 2023-10-20 RX ORDER — LAMOTRIGINE 25 MG/1
500 TABLET ORAL
Status: CANCELLED | OUTPATIENT
Start: 2023-10-23

## 2023-10-23 ENCOUNTER — INFUSION (OUTPATIENT)
Dept: INFUSION THERAPY | Facility: HOSPITAL | Age: 58
End: 2023-10-23
Attending: INTERNAL MEDICINE
Payer: MEDICARE

## 2023-10-23 VITALS
SYSTOLIC BLOOD PRESSURE: 114 MMHG | TEMPERATURE: 98 F | HEIGHT: 67 IN | BODY MASS INDEX: 28.37 KG/M2 | HEART RATE: 82 BPM | DIASTOLIC BLOOD PRESSURE: 69 MMHG | RESPIRATION RATE: 18 BRPM | WEIGHT: 180.75 LBS | OXYGEN SATURATION: 97 %

## 2023-10-23 DIAGNOSIS — Z17.0 MALIGNANT NEOPLASM OF CENTRAL PORTION OF RIGHT BREAST IN FEMALE, ESTROGEN RECEPTOR POSITIVE: Primary | ICD-10-CM

## 2023-10-23 DIAGNOSIS — C50.111 MALIGNANT NEOPLASM OF CENTRAL PORTION OF RIGHT BREAST IN FEMALE, ESTROGEN RECEPTOR POSITIVE: Primary | ICD-10-CM

## 2023-10-23 PROCEDURE — 96402 CHEMO HORMON ANTINEOPL SQ/IM: CPT | Mod: HCNC

## 2023-10-23 PROCEDURE — 96401 CHEMO ANTI-NEOPL SQ/IM: CPT | Mod: HCNC

## 2023-10-23 PROCEDURE — 63600175 PHARM REV CODE 636 W HCPCS: Mod: JZ,JG,HCNC | Performed by: INTERNAL MEDICINE

## 2023-10-23 RX ORDER — LAMOTRIGINE 25 MG/1
500 TABLET ORAL
Status: COMPLETED | OUTPATIENT
Start: 2023-10-23 | End: 2023-10-23

## 2023-10-23 RX ADMIN — FULVESTRANT 500 MG: 50 INJECTION, SOLUTION INTRAMUSCULAR at 11:10

## 2023-10-23 NOTE — NURSING
Faslodex 500 mg IM administered via left and right gluteal .  Patient tolerated medication without complication.  Applied bandaid to sites.  Discharged with future appointments.

## 2023-10-24 DIAGNOSIS — Z17.0 MALIGNANT NEOPLASM OF CENTRAL PORTION OF RIGHT BREAST IN FEMALE, ESTROGEN RECEPTOR POSITIVE: ICD-10-CM

## 2023-10-24 DIAGNOSIS — C50.111 MALIGNANT NEOPLASM OF CENTRAL PORTION OF RIGHT BREAST IN FEMALE, ESTROGEN RECEPTOR POSITIVE: ICD-10-CM

## 2023-10-24 RX ORDER — PROCHLORPERAZINE MALEATE 5 MG
TABLET ORAL
Qty: 20 TABLET | Refills: 11 | Status: SHIPPED | OUTPATIENT
Start: 2023-10-24

## 2023-10-24 NOTE — PLAN OF CARE
Please make pt aware unfortunately of the fact that they have noted Saxenda is still being denied with a comorbidity (hypertension) and her updated BMI. Wegovy was denied, and there are no other options in this class of medicines approved by her insurance that would be workable. As much as I want to help I think we exhausted our options in primary care and so suggest she try to meet with Medical Weight Management team to see if they can offer her other options or have access to certain coupons/cost-savings programs. Referral placed 2 months ago but reactivated. Thanks!   START ON PATHWAY REGIMEN - Breast    [id number]        Palbociclib (Ibrance)       Fulvestrant (Faslodex)       Palbociclib (Ibrance)       Fulvestrant (Faslodex)     **Always confirm dose/schedule in your pharmacy ordering system**    Patient Characteristics:  Distant Metastases or Locoregional Recurrent Disease - Unresected or Locally   Advanced Unresectable Disease Progressing after Neoadjuvant and Local Therapies,   HER2 Low/Negative/Unknown, ER Positive, Endocrine Therapy, Postmenopausal,   Second Line, Candidate for a CDK4/6 Inhibitor  Therapeutic Status: Distant Metastases  HER2 Status: Negative (-)  ER Status: Positive (+)  AR Status: Positive (+)  Therapy Approach Indicated: Standard Chemotherapy/Endocrine Therapy  Menopausal Status: Postmenopausal  Line of Therapy: Second Line  Intent of Therapy:  Non-Curative / Palliative Intent, Discussed with Patient

## 2023-11-07 ENCOUNTER — HOSPITAL ENCOUNTER (OUTPATIENT)
Dept: RADIOLOGY | Facility: HOSPITAL | Age: 58
Discharge: HOME | End: 2023-11-07
Attending: INTERNAL MEDICINE
Payer: MEDICARE

## 2023-11-07 DIAGNOSIS — C50.111 MALIGNANT NEOPLASM OF CENTRAL PORTION OF RIGHT BREAST IN FEMALE, ESTROGEN RECEPTOR POSITIVE: ICD-10-CM

## 2023-11-07 DIAGNOSIS — Z17.0 MALIGNANT NEOPLASM OF CENTRAL PORTION OF RIGHT BREAST IN FEMALE, ESTROGEN RECEPTOR POSITIVE: ICD-10-CM

## 2023-11-07 PROCEDURE — A9552 F18 FDG: HCPCS | Mod: HCNC

## 2023-11-07 PROCEDURE — 78815 PET IMAGE W/CT SKULL-THIGH: CPT | Mod: 26,PS,HCNC, | Performed by: RADIOLOGY

## 2023-11-07 PROCEDURE — 78815 NM PET CT ROUTINE: ICD-10-PCS | Mod: 26,PS,HCNC, | Performed by: RADIOLOGY

## 2023-11-08 NOTE — PROGRESS NOTES
Subjective:       Patient ID: Carole Moore is a 58 y.o. female.    Chief Complaint: breast cancer surveillance      HPI 58-year-old  female presents for breast cancer surveilence.    The patient was previously followed in the outpatient  Hem/Onc clinic by Dr. Alessia Moss and Dr. Huitron.    She is on fulvestrant (faslodex) q 4 weeks + arimidex daily. She is also getting zometa q 3 months, last on 4/10/23. She is also treated with Ibrance days 1-21 of 28 day cycle.   Cancer Hx: ddACT, R mastectomy, adjuvant radiation completed 2017  Arimidex  30Gy/10fx to T7-11 completed 10/31/22  Pmhx: follows with neurosurg, Dr. Pelayo    August 2022 pt c/o left mid to low back pain over the muscle- plain imaging revealed degenerative changes- MRI was ordered- widespread metastatic disease noted in spine.    Pt is on Ibrance, arimidex, Faslodex, zometa  and had palliative radiation to the spine.     Pain is 5-9 now- low back - has appt with neurosurgery for consideration of kyphoplasty 1/2024 for pain relief- pt not riding her bike due to the pain    2/2017-Right-sided breast cancer: Stage IIIA invasive lobular carcinoma, ER/ME positive, [id number] neg. BRCA negative. Given large mass on physical examination, treated with neoadjuvant chemotherapy, using dose-dense AC-T regimen, which patient completed in 2/2017 with decrease in size and firmness of R breast mass. She then underwent R breast mastectomy and SLND 4/2017, with tissue expander placement. Final pathology after neoadjuvant chemotherapy showed tumor 6cm, sentinel LNs positive, and nipple skin positive, we did recommend adjuvant radiation to R chest and axilla. Her case was discussed at tumor board and while axillary LN dissection was discussed, adjuvant radiation to the left chest and axilla were recommended which she completed 7/24/2017- 28 treatments    Anastrazole 1mg PO daily for adjuvant endocrine therapy was initiated end of July 2017-      Bone  density 10/29/2021: normal    4/13/2022- dexa- normal- repeat 4/2024      Has history of heart failure and is followed by cardiology- last visit 3/9/2023      PAST MEDICAL HISTORY:   1.  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  Dyslipidemia  3.  Osteopenia - resolved  4.  Right breast cancer- Stage IIIA invasive lobular carcinoma, ER/HI positive, [id number] neg. BRCA negative. Bone metastatic disease 8/2022  5.  Heart failure     SURGICAL HISTORY:   1.  Tonsillectomy  2.  Hysterectomy  3.  Right breast mastectomy with with right deep axilla sentinel lymph node biopsy followed by breast reconstruction with tissue expander done on April 11, 2017 and reconstruction completed in Dec 2017.  4.  Mediport placement and removal     FAMILY HISTORY: She reports that 2 paternal aunts had breast cancer in their late 50s.  A maternal uncle had lung cancer the age of 54.  He used to smoke cigarettes.  A maternal uncle had melanoma at the age of 49.  Her father had colon cancer at the age of 70.  Her mother had colon cancer in her 70s.  She denies any other immediate family members with cancer or bleeding/clotting disorders.     SOCIAL HISTORY: The reports a 5-pack-year smoking history and quit at the age of 23.  She drinks alcohol socially.  She has never used any recreational drugs.  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  She is  and has 2 children.  She lives in [place], [place].     ALLERGIES: [NKDA]     MEDICATIONS: reviewed and reconciled    Interval History:     Persistent symptoms/side effects of treatment: fatigue - back pain that is persistent from metastatic disease    Functional changes: ROM, neuropathy, weakness or fatigue- not able to exercise daily due to back pain- has right arm ROM issues due to paralysis from previous CVA    Psychosocial challenges- anxiety related to potential further spread of cancer- does not talk with  family about it - states it hits her when she is trying to sleep at night- has not seen any counselors- states coping better now    Lymphedema- stable    Diet/Nutrition- wt stable- not riding bike due to back pain     Sexual Dysfunction or challenges - none    Review of Systems   Constitutional: Negative.  Negative for appetite change, fatigue, fever and unexpected weight change.   HENT: Negative.     Eyes: Negative.    Respiratory: Negative.     Cardiovascular: Negative.    Gastrointestinal: Negative.  Negative for abdominal pain and blood in stool.        No reflux   Endocrine: Negative.    Genitourinary: Negative.  Negative for hematuria.            Musculoskeletal:  Positive for back pain (back pain much improved- mainly with standing and walking long time).   Skin: Negative.    Allergic/Immunologic: Negative.    Neurological: Negative.    Hematological: Negative.  Negative for adenopathy.   Psychiatric/Behavioral: Negative.          Anxiety related to fear of further recurrence and dying- not able to talk with her family- does not want to stress them out.        Breast - no mass, pain or discharge  Objective:      Physical Exam  Constitutional:       Appearance: Normal appearance. She is well-developed.   HENT:      Head: Normocephalic and atraumatic.      Right Ear: Ear canal and external ear normal.      Left Ear: Ear canal and external ear normal.      Mouth/Throat:      Pharynx: No oropharyngeal exudate.   Eyes:      General: No scleral icterus.        Right eye: No discharge.         Left eye: No discharge.      Conjunctiva/sclera: Conjunctivae normal.      Pupils: Pupils are equal, round, and reactive to light.   Neck:      Thyroid: No thyromegaly.   Chest:   Breasts:     Right: No inverted nipple, mass, nipple discharge, skin change or tenderness.      Left: No inverted nipple, mass, nipple discharge, skin change or tenderness.       Musculoskeletal:         General: Normal range of motion.      Cervical  back: Normal range of motion and neck supple.   Lymphadenopathy:      Head:      Right side of head: No submental, submandibular, tonsillar, preauricular, posterior auricular or occipital adenopathy.      Left side of head: No submental, submandibular, tonsillar, preauricular, posterior auricular or occipital adenopathy.      Cervical: No cervical adenopathy.      Right cervical: No superficial or posterior cervical adenopathy.     Left cervical: No superficial or posterior cervical adenopathy.      Upper Body:      Right upper body: No supraclavicular or axillary adenopathy.      Left upper body: No supraclavicular or axillary adenopathy.   Skin:     General: Skin is warm and dry.      Coloration: Skin is not pale.      Findings: No erythema or rash.   Neurological:      General: No focal deficit present.      Mental Status: She is alert and oriented to person, place, and time.      Deep Tendon Reflexes: Reflexes are normal and symmetric.      Comments: Right upper extremity hemiparesis noted   Psychiatric:         Mood and Affect: Mood normal.         Behavior: Behavior normal.         Thought Content: Thought content normal.         Judgment: Judgment normal.         Mammogram - left- today- results pending    10/29/2021 and 4/13/2022- Bone density - normal-   Taking Vit D daily- and taking calcium  Pt reports negative genetic testing in 2017    Exercise-  none now      Assessment:       1. Malignant neoplasm of central portion of right breast in female, estrogen receptor positive    2. Secondary malignant neoplasm of bone    3. Encounter for follow-up surveillance of breast cancer    4. Encounter for screening breast examination    5. Counseling and coordination of care    6. Counseling on health promotion and disease prevention          Plan:       1.        Right-sided breast cancer: Stage IIIA invasive lobular carcinoma, ER/IN positive, Ght2Wbr neg. S/p AC-T chemotherapy and chest wall/axilla radiation. BRCA  negative.   2.        Left sided back pain - MRI revealed metastatic disease- palliative radiation to spine and is on faslodex, arimidex, zometa and Ibrance  2.        Negative clinical exam- Mammo - today- results pending  2.        dexa 4/13/2022 reveals- normal- repeat 2 years- 4/2024     3.        Continue therapy as directed by Oncology. Take calcium and vit D.   4.        Hot flashes- improved   5.        Gyn f/u was in 7/13/2022   7.        Continue f/u with Dr. Khan and Rad Onc- thom  8.        RTC for mammo and exam November 2024 and clinical exam June 2024

## 2023-11-20 ENCOUNTER — OFFICE VISIT (OUTPATIENT)
Dept: HEMATOLOGY/ONCOLOGY | Facility: CLINIC | Age: 58
End: 2023-11-20
Payer: MEDICARE

## 2023-11-20 ENCOUNTER — INFUSION (OUTPATIENT)
Dept: INFUSION THERAPY | Facility: HOSPITAL | Age: 58
End: 2023-11-20
Attending: INTERNAL MEDICINE
Payer: MEDICARE

## 2023-11-20 VITALS
SYSTOLIC BLOOD PRESSURE: 110 MMHG | SYSTOLIC BLOOD PRESSURE: 108 MMHG | BODY MASS INDEX: 28.41 KG/M2 | DIASTOLIC BLOOD PRESSURE: 74 MMHG | RESPIRATION RATE: 20 BRPM | HEIGHT: 67 IN | WEIGHT: 181 LBS | HEIGHT: 67 IN | TEMPERATURE: 98 F | HEART RATE: 95 BPM | OXYGEN SATURATION: 97 % | HEART RATE: 90 BPM | DIASTOLIC BLOOD PRESSURE: 72 MMHG | RESPIRATION RATE: 18 BRPM | BODY MASS INDEX: 28.41 KG/M2 | OXYGEN SATURATION: 98 % | WEIGHT: 181 LBS | TEMPERATURE: 98 F

## 2023-11-20 DIAGNOSIS — C50.111 MALIGNANT NEOPLASM OF CENTRAL PORTION OF RIGHT BREAST IN FEMALE, ESTROGEN RECEPTOR POSITIVE: Primary | ICD-10-CM

## 2023-11-20 DIAGNOSIS — T45.1X5A PANCYTOPENIA DUE TO ANTINEOPLASTIC CHEMOTHERAPY: ICD-10-CM

## 2023-11-20 DIAGNOSIS — Z17.0 MALIGNANT NEOPLASM OF CENTRAL PORTION OF RIGHT BREAST IN FEMALE, ESTROGEN RECEPTOR POSITIVE: Primary | ICD-10-CM

## 2023-11-20 DIAGNOSIS — D61.810 PANCYTOPENIA DUE TO ANTINEOPLASTIC CHEMOTHERAPY: ICD-10-CM

## 2023-11-20 DIAGNOSIS — C79.51 SECONDARY MALIGNANT NEOPLASM OF BONE: ICD-10-CM

## 2023-11-20 DIAGNOSIS — T45.1X5A IMMUNODEFICIENCY DUE TO CHEMOTHERAPY: ICD-10-CM

## 2023-11-20 DIAGNOSIS — Z79.899 IMMUNODEFICIENCY DUE TO CHEMOTHERAPY: ICD-10-CM

## 2023-11-20 DIAGNOSIS — D84.821 IMMUNODEFICIENCY DUE TO CHEMOTHERAPY: ICD-10-CM

## 2023-11-20 PROCEDURE — 99999 PR PBB SHADOW E&M-EST. PATIENT-LVL V: CPT | Mod: PBBFAC,HCNC,, | Performed by: INTERNAL MEDICINE

## 2023-11-20 PROCEDURE — 99215 PR OFFICE/OUTPT VISIT, EST, LEVL V, 40-54 MIN: ICD-10-PCS | Mod: 25,HCNC,S$GLB, | Performed by: INTERNAL MEDICINE

## 2023-11-20 PROCEDURE — 96402 CHEMO HORMON ANTINEOPL SQ/IM: CPT | Mod: HCNC

## 2023-11-20 PROCEDURE — 99999 PR PBB SHADOW E&M-EST. PATIENT-LVL V: ICD-10-PCS | Mod: PBBFAC,HCNC,, | Performed by: INTERNAL MEDICINE

## 2023-11-20 PROCEDURE — 3078F PR MOST RECENT DIASTOLIC BLOOD PRESSURE < 80 MM HG: ICD-10-PCS | Mod: HCNC,CPTII,S$GLB, | Performed by: INTERNAL MEDICINE

## 2023-11-20 PROCEDURE — 63600175 PHARM REV CODE 636 W HCPCS: Mod: JZ,JG,HCNC | Performed by: INTERNAL MEDICINE

## 2023-11-20 PROCEDURE — 3074F PR MOST RECENT SYSTOLIC BLOOD PRESSURE < 130 MM HG: ICD-10-PCS | Mod: HCNC,CPTII,S$GLB, | Performed by: INTERNAL MEDICINE

## 2023-11-20 PROCEDURE — 99215 OFFICE O/P EST HI 40 MIN: CPT | Mod: 25,HCNC,S$GLB, | Performed by: INTERNAL MEDICINE

## 2023-11-20 PROCEDURE — 3008F BODY MASS INDEX DOCD: CPT | Mod: HCNC,CPTII,S$GLB, | Performed by: INTERNAL MEDICINE

## 2023-11-20 PROCEDURE — 3008F PR BODY MASS INDEX (BMI) DOCUMENTED: ICD-10-PCS | Mod: HCNC,CPTII,S$GLB, | Performed by: INTERNAL MEDICINE

## 2023-11-20 PROCEDURE — 3074F SYST BP LT 130 MM HG: CPT | Mod: HCNC,CPTII,S$GLB, | Performed by: INTERNAL MEDICINE

## 2023-11-20 PROCEDURE — 3078F DIAST BP <80 MM HG: CPT | Mod: HCNC,CPTII,S$GLB, | Performed by: INTERNAL MEDICINE

## 2023-11-20 RX ORDER — COVID-19 VACCINE, MRNA 50 UG/.5ML
INJECTION, SUSPENSION INTRAMUSCULAR
COMMUNITY
Start: 2023-09-30 | End: 2024-03-07 | Stop reason: ALTCHOICE

## 2023-11-20 RX ORDER — LAMOTRIGINE 25 MG/1
500 TABLET ORAL
Status: CANCELLED | OUTPATIENT
Start: 2023-11-20

## 2023-11-20 RX ORDER — LAMOTRIGINE 25 MG/1
500 TABLET ORAL
Status: COMPLETED | OUTPATIENT
Start: 2023-11-20 | End: 2023-11-20

## 2023-11-20 RX ORDER — INFLUENZA A VIRUS A/WEST VIRGINIA/30/2022 (H1N1) RECOMBINANT HEMAGGLUTININ ANTIGEN, INFLUENZA A VIRUS A/DARWIN/6/2021 (H3N2) RECOMBINANT HEMAGGLUTININ ANTIGEN, INFLUENZA B VIRUS B/AUSTRIA/1359417/2021 RECOMBINANT HEMAGGLUTININ ANTIGEN, AND INFLUENZA B VIRUS B/PHUKET/3073/2013 RECOMBINANT HEMAGGLUTININ ANTIGEN 45; 45; 45; 45 UG/.5ML; UG/.5ML; UG/.5ML; UG/.5ML
INJECTION INTRAMUSCULAR
COMMUNITY
Start: 2023-08-27 | End: 2024-03-07 | Stop reason: ALTCHOICE

## 2023-11-20 RX ADMIN — FULVESTRANT 500 MG: 50 INJECTION, SOLUTION INTRAMUSCULAR at 09:11

## 2023-11-20 NOTE — PLAN OF CARE
"Pt is stable. Pt administered Faslodex today. Pt voiced she was doing "great," today. Pt will follow up in one month.      Problem: Fall Injury Risk  Goal: Absence of Fall and Fall-Related Injury  Outcome: Ongoing, Progressing     Problem: Anemia (Chemotherapy Effects)  Goal: Anemia Symptom Improvement  Outcome: Ongoing, Progressing     Problem: Urinary Bleeding Risk or Actual (Chemotherapy Effects)  Goal: Absence of Hematuria  Outcome: Ongoing, Progressing     Problem: Nausea and Vomiting (Chemotherapy Effects)  Goal: Fluid and Electrolyte Balance  Outcome: Ongoing, Progressing     Problem: Neurotoxicity (Chemotherapy Effects)  Goal: Neurotoxicity Symptom Control  Outcome: Ongoing, Progressing     Problem: Neutropenia (Chemotherapy Effects)  Goal: Absence of Infection  Outcome: Ongoing, Progressing     Problem: Oral Mucositis (Chemotherapy Effects)  Goal: Improved Oral Mucous Membrane Integrity  Outcome: Ongoing, Progressing     Problem: Thrombocytopenia Bleeding Risk (Chemotherapy Effects)  Goal: Absence of Bleeding  Outcome: Ongoing, Progressing     Problem: Infection  Goal: Absence of Infection Signs and Symptoms  Outcome: Ongoing, Progressing     Problem: Adult Inpatient Plan of Care  Goal: Patient-Specific Goal (Individualized)  Flowsheets (Taken 11/20/2023 6013)  Anxieties, Fears or Concerns: Pt denies any.  Individualized Care Needs: Pt stands for injections.     "

## 2023-11-20 NOTE — PROGRESS NOTES
Subjective:       Patient ID: Carole Moore is a 58 y.o. female.    Chief Complaint: Results, Pain, Breast Cancer, and Chemotherapy    HPI:  58-year-old female history of metastatic breast carcinoma continuing on Ibrance and Faslodex.  Recent PET scan demonstrates stable findings patient has persistent pain back scheduled to be seen by Neurosurgery for possible kyphoplasty.  ECOG status 2    Past Medical History:   Diagnosis Date    Antineoplastic chemotherapy induced anemia     Breast cancer 2016    right breast    Cancer     R Breast cancer    CVA (cerebral infarction)     Diverticulosis     Family history of colon cancer 10/30/2018    Her mother and father both had colon cancer.    Genetic testing of female 12/27/2016    zSoup (28 genes) - NEGATIVE    Hyperlipidemia     Immunodeficiency due to chemotherapy 10/03/2022    Nausea after anesthesia     Paralysis     right side    PONV (postoperative nausea and vomiting)     Stroke 2001    R side weakness    Trouble in sleeping      Family History   Problem Relation Age of Onset    Breast cancer Paternal Aunt     Colon cancer Father     Colon cancer Mother     Melanoma Maternal Uncle     Skin cancer Maternal Uncle      Social History     Socioeconomic History    Marital status:    Tobacco Use    Smoking status: Never    Smokeless tobacco: Never   Substance and Sexual Activity    Alcohol use: Never    Drug use: No    Sexual activity: Not Currently     Birth control/protection: Surgical   Other Topics Concern    Are you pregnant or think you may be? No    Breast-feeding No     Social Determinants of Health     Financial Resource Strain: Low Risk  (11/19/2023)    Overall Financial Resource Strain (CARDIA)     Difficulty of Paying Living Expenses: Not hard at all   Food Insecurity: No Food Insecurity (11/19/2023)    Hunger Vital Sign     Worried About Running Out of Food in the Last Year: Never true     Ran Out of Food in the Last Year: Never true    Transportation Needs: No Transportation Needs (11/19/2023)    PRAPARE - Transportation     Lack of Transportation (Medical): No     Lack of Transportation (Non-Medical): No   Physical Activity: Inactive (11/19/2023)    Exercise Vital Sign     Days of Exercise per Week: 0 days     Minutes of Exercise per Session: 0 min   Stress: No Stress Concern Present (11/19/2023)    [facility] of Occupational Health - Occupational Stress Questionnaire     Feeling of Stress : Only a little   Recent Concern: Stress - Stress Concern Present (8/29/2023)    [facility] of Occupational Health - Occupational Stress Questionnaire     Feeling of Stress : To some extent   Social Connections: Moderately Integrated (11/19/2023)    Social Connection and Isolation Panel [NHANES]     Frequency of Communication with Friends and Family: More than three times a week     Frequency of Social Gatherings with Friends and Family: More than three times a week     Attends Orthodoxy Services: More than 4 times per year     Active Member of Clubs or Organizations: No     Attends Club or Organization Meetings: Never     Marital Status:    Housing Stability: Low Risk  (11/19/2023)    Housing Stability Vital Sign     Unable to Pay for Housing in the Last Year: No     Number of Places Lived in the Last Year: 1     Unstable Housing in the Last Year: No     Past Surgical History:   Procedure Laterality Date    AUGMENTATION OF BREAST Left 2017    BREAST SURGERY      COLONOSCOPY N/A 10/30/2018    Procedure: COLONOSCOPY;  Surgeon: Cosme Monson MD;  Location: [facility];  Service: Endoscopy;  Laterality: N/A;    CYSTOSCOPY WITH BIOPSY OF BLADDER N/A 11/29/2021    Procedure: CYSTOSCOPY, WITH BLADDER BIOPSY, WITH FULGURATION IF INDICATED;  Surgeon: Page Marcelo MD;  Location: [facility] OR;  Service: Urology;  Laterality: N/A;    FIXATION KYPHOPLASTY Bilateral 2/9/2023    Procedure: KYPHOPLASTY;  Surgeon: Josse Pelayo MD;  Location: [facility]  "OR;  Service: Neurosurgery;  Laterality: Bilateral;  Kyphoplasty T10 with ablation    HYSTERECTOMY  2007    maintains both ovaries, menorrhagia    MASTECTOMY Right 2017    MEDIPORT INSERTION, SINGLE      RADIOFREQUENCY ABLATION, BONE, PERCUTANEOUS Bilateral 2/9/2023    Procedure: RADIOFREQUENCY ABLATION,BONE,PERCUTANEOUS;  Surgeon: Josse Pelayo MD;  Location: [facility] OR;  Service: Neurosurgery;  Laterality: Bilateral;    TONSILLECTOMY         Labs:  Lab Results   Component Value Date    WBC 3.40 (L) 11/20/2023    HGB 13.5 11/20/2023    HCT 39.7 11/20/2023    MCV 99 (H) 11/20/2023     11/20/2023     BMP  Lab Results   Component Value Date     10/10/2023    K 4.6 10/10/2023     10/10/2023    CO2 26 10/10/2023    BUN 12 10/10/2023    CREATININE 0.7 10/10/2023    CALCIUM 9.4 10/10/2023    ANIONGAP 11 10/10/2023    ESTGFRAFRICA >60 05/12/2022    EGFRNONAA >60 05/12/2022     Lab Results   Component Value Date    ALT 20 10/10/2023    AST 18 10/10/2023    ALKPHOS 42 (L) 10/10/2023    BILITOT 0.7 10/10/2023       No results found for: "IRON", "TIBC", "FERRITIN", "SATURATEDIRO"  No results found for: "[id number]"  No results found for: "FOLATE"  Lab Results   Component Value Date    TSH 0.793 10/29/2021         Review of Systems   Constitutional:  Negative for activity change, appetite change, chills, diaphoresis, fatigue, fever and unexpected weight change.   HENT:  Negative for congestion, dental problem, drooling, ear discharge, ear pain, facial swelling, hearing loss, mouth sores, nosebleeds, postnasal drip, rhinorrhea, sinus pressure, sneezing, sore throat, tinnitus, trouble swallowing and voice change.    Eyes:  Negative for photophobia, pain, discharge, redness, itching and visual disturbance.   Respiratory:  Negative for cough, choking, chest tightness, shortness of breath, wheezing and stridor.    Cardiovascular:  Negative for chest pain, palpitations and leg swelling.   Gastrointestinal:  " Negative for abdominal distention, abdominal pain, anal bleeding, blood in stool, constipation, diarrhea, nausea, rectal pain and vomiting.   Endocrine: Negative for cold intolerance, heat intolerance, polydipsia, polyphagia and polyuria.   Genitourinary:  Negative for decreased urine volume, difficulty urinating, dyspareunia, dysuria, enuresis, flank pain, frequency, genital sores, hematuria, menstrual problem, pelvic pain, urgency, vaginal bleeding, vaginal discharge and vaginal pain.   Musculoskeletal:  Positive for back pain and gait problem. Negative for arthralgias, joint swelling, myalgias, neck pain and neck stiffness.   Skin:  Negative for color change, pallor and rash.   Allergic/Immunologic: Negative for environmental allergies, food allergies and immunocompromised state.   Neurological:  Negative for dizziness, tremors, seizures, syncope, facial asymmetry, speech difficulty, weakness, light-headedness, numbness and headaches.   Hematological:  Negative for adenopathy. Does not bruise/bleed easily.   Psychiatric/Behavioral:  Negative for agitation, behavioral problems, confusion, decreased concentration, dysphoric mood, hallucinations, self-injury, sleep disturbance and suicidal ideas. The patient is not nervous/anxious and is not hyperactive.        Objective:      Physical Exam  Vitals reviewed.   Constitutional:       General: She is not in acute distress.     Appearance: She is well-developed. She is not diaphoretic.   HENT:      Head: Normocephalic and atraumatic.      Right Ear: External ear normal.      Left Ear: External ear normal.      Nose: Nose normal.      Right Sinus: No maxillary sinus tenderness or frontal sinus tenderness.      Left Sinus: No maxillary sinus tenderness or frontal sinus tenderness.      Mouth/Throat:      Pharynx: No oropharyngeal exudate.   Eyes:      General: Lids are normal. No scleral icterus.        Right eye: No discharge.         Left eye: No discharge.       Conjunctiva/sclera: Conjunctivae normal.      Right eye: Right conjunctiva is not injected. No hemorrhage.     Left eye: Left conjunctiva is not injected. No hemorrhage.     Pupils: Pupils are equal, round, and reactive to light.   Neck:      Thyroid: No thyromegaly.      Vascular: No JVD.      Trachea: No tracheal deviation.   Cardiovascular:      Rate and Rhythm: Normal rate.   Pulmonary:      Effort: Pulmonary effort is normal. No respiratory distress.      Breath sounds: No stridor.   Chest:      Chest wall: No tenderness.   Abdominal:      General: Bowel sounds are normal. There is no distension.      Palpations: Abdomen is soft. There is no hepatomegaly, splenomegaly or mass.      Tenderness: There is no abdominal tenderness. There is no rebound.   Musculoskeletal:         General: No tenderness. Normal range of motion.      Cervical back: Normal range of motion and neck supple.   Lymphadenopathy:      Cervical: No cervical adenopathy.      Upper Body:      Right upper body: No supraclavicular adenopathy.      Left upper body: No supraclavicular adenopathy.   Skin:     General: Skin is dry.      Findings: No erythema or rash.   Neurological:      Mental Status: She is alert and oriented to person, place, and time.      Cranial Nerves: No cranial nerve deficit.      Coordination: Coordination normal.      Gait: Gait abnormal.   Psychiatric:         Behavior: Behavior normal.         Thought Content: Thought content normal.         Judgment: Judgment normal.             Assessment:      1. Malignant neoplasm of central portion of right breast in female, estrogen receptor positive    2. Immunodeficiency due to chemotherapy    3. Pancytopenia due to antineoplastic chemotherapy    4. Secondary malignant neoplasm of bone           Med Onc Chart Routing      Follow up with physician . RTC in 2 months with me CBC CMP   Follow up with [facility] . Can be seen in 1 month with CBC CMP by [facility]   Infusion scheduling note     Injection scheduling note Faslodex monthly   Labs    Imaging    Pharmacy appointment    Other referrals                   Plan:     Continue with current plan Faslodex on a monthly basis as well as Ibrance days 1 through 21 on a 28 day cycle.  Patient is to return in follow-up with me with CBC CMP.  Discussed implications and answered questions.  I have told the patient she should proceed with a colonoscopies since both parents had colon carcinoma.  She is stable from metastatic breast cancer certainly can be seen by Neurosurgery for potential kyphoplasty.  Discussed implications of answered questions with her follow-up in 1 month with APAP and myself in 2 months.  Discussed implications discussed natural history of her stable metastatic breast cancerAdvance Care Planning              Harpal Khan Jr, MD FACP

## 2023-11-20 NOTE — DISCHARGE INSTRUCTIONS
Thank you for allowing me to care for you today,  SOILA YapN, RN    [facility]  [address] [address]  [phone] phone     [phone] fax  Hours of Operation: Monday- Friday 7:00am- 5:30pm  After hours phone  [phone]  Hematology / Oncology Physicians on call      [address] Dr., Dr., GIULIANO Jackson, GIULIANO Vieyra, GIULIANO    Please call with any concerns regarding your appointment today.   FALL PREVENTION   Falls often occur due to slipping, tripping or losing your balance. Here are ways to reduce your risk of falling again.   Was there anything that caused your fall that can be fixed, removed or replaced?   Make your home safe by keeping walkways clear of objects you may trip over.   Use non-slip pads under rugs.   Do not walk in poorly lit areas.   Do not stand on chairs or wobbly ladders.   Use caution when reaching overhead or looking upward. This position can cause a loss of balance.   Be sure your shoes fit properly, have non-slip bottoms and are in good condition.   Be cautious when going up and down stairs, curbs, and when walking on uneven sidewalks.   If your balance is poor, consider using a cane or walker.   If your fall was related to alcohol use, stop or limit alcohol intake.   If your fall was related to use of sleeping medicines, talk to your doctor about this. You may need to reduce your dosage at bedtime if you awaken during the night to go to the bathroom.   To reduce the need for nighttime bathroom trips:   Avoid drinking fluids for several hours before going to bed   Empty your bladder before going to bed   Men can keep a urinal at the bedside   © 3401-1238 [employer], [address] [place] [address], [place] [address]. All rights reserved. This information is not intended as a substitute for professional medical care. Always follow your  healthcare professional's instructions.

## 2023-11-29 ENCOUNTER — HOSPITAL ENCOUNTER (OUTPATIENT)
Dept: RADIOLOGY | Facility: HOSPITAL | Age: 58
Discharge: HOME | End: 2023-11-29
Attending: NURSE PRACTITIONER
Payer: MEDICARE

## 2023-11-29 ENCOUNTER — OFFICE VISIT (OUTPATIENT)
Dept: SURGERY | Facility: CLINIC | Age: 58
End: 2023-11-29
Payer: MEDICARE

## 2023-11-29 VITALS
WEIGHT: 180.75 LBS | RESPIRATION RATE: 14 BRPM | HEIGHT: 67 IN | BODY MASS INDEX: 27.93 KG/M2 | WEIGHT: 177.94 LBS | HEIGHT: 67 IN | BODY MASS INDEX: 28.37 KG/M2

## 2023-11-29 DIAGNOSIS — Z12.31 ENCOUNTER FOR SCREENING MAMMOGRAM FOR MALIGNANT NEOPLASM OF BREAST: ICD-10-CM

## 2023-11-29 DIAGNOSIS — C50.111 MALIGNANT NEOPLASM OF CENTRAL PORTION OF RIGHT BREAST IN FEMALE, ESTROGEN RECEPTOR POSITIVE: ICD-10-CM

## 2023-11-29 DIAGNOSIS — Z08 ENCOUNTER FOR FOLLOW-UP SURVEILLANCE OF BREAST CANCER: ICD-10-CM

## 2023-11-29 DIAGNOSIS — C79.51 SECONDARY MALIGNANT NEOPLASM OF BONE: ICD-10-CM

## 2023-11-29 DIAGNOSIS — Z17.0 MALIGNANT NEOPLASM OF CENTRAL PORTION OF RIGHT BREAST IN FEMALE, ESTROGEN RECEPTOR POSITIVE: Primary | ICD-10-CM

## 2023-11-29 DIAGNOSIS — Z71.89 COUNSELING AND COORDINATION OF CARE: ICD-10-CM

## 2023-11-29 DIAGNOSIS — Z12.39 ENCOUNTER FOR SCREENING BREAST EXAMINATION: ICD-10-CM

## 2023-11-29 DIAGNOSIS — Z17.0 MALIGNANT NEOPLASM OF CENTRAL PORTION OF RIGHT BREAST IN FEMALE, ESTROGEN RECEPTOR POSITIVE: ICD-10-CM

## 2023-11-29 DIAGNOSIS — C50.111 MALIGNANT NEOPLASM OF CENTRAL PORTION OF RIGHT BREAST IN FEMALE, ESTROGEN RECEPTOR POSITIVE: Primary | ICD-10-CM

## 2023-11-29 DIAGNOSIS — Z71.89 COUNSELING ON HEALTH PROMOTION AND DISEASE PREVENTION: ICD-10-CM

## 2023-11-29 DIAGNOSIS — Z85.3 ENCOUNTER FOR FOLLOW-UP SURVEILLANCE OF BREAST CANCER: ICD-10-CM

## 2023-11-29 DIAGNOSIS — Z12.39 ENCOUNTER FOR BREAST CANCER SCREENING USING NON-MAMMOGRAM MODALITY: ICD-10-CM

## 2023-11-29 PROCEDURE — 1159F PR MEDICATION LIST DOCUMENTED IN MEDICAL RECORD: ICD-10-PCS | Mod: HCNC,CPTII,S$GLB, | Performed by: NURSE PRACTITIONER

## 2023-11-29 PROCEDURE — 77067 SCR MAMMO BI INCL CAD: CPT | Mod: 26,52,HCNC, | Performed by: RADIOLOGY

## 2023-11-29 PROCEDURE — 99214 PR OFFICE/OUTPT VISIT, EST, LEVL IV, 30-39 MIN: ICD-10-PCS | Mod: HCNC,S$GLB,, | Performed by: NURSE PRACTITIONER

## 2023-11-29 PROCEDURE — 99999 PR PBB SHADOW E&M-EST. PATIENT-LVL III: CPT | Mod: PBBFAC,HCNC,, | Performed by: NURSE PRACTITIONER

## 2023-11-29 PROCEDURE — 1159F MED LIST DOCD IN RCRD: CPT | Mod: HCNC,CPTII,S$GLB, | Performed by: NURSE PRACTITIONER

## 2023-11-29 PROCEDURE — 77063 MAMMO DIGITAL SCREENING LEFT WITH TOMO: ICD-10-PCS | Mod: 26,52,HCNC, | Performed by: RADIOLOGY

## 2023-11-29 PROCEDURE — 3008F BODY MASS INDEX DOCD: CPT | Mod: HCNC,CPTII,S$GLB, | Performed by: NURSE PRACTITIONER

## 2023-11-29 PROCEDURE — 99999 PR PBB SHADOW E&M-EST. PATIENT-LVL III: ICD-10-PCS | Mod: PBBFAC,HCNC,, | Performed by: NURSE PRACTITIONER

## 2023-11-29 PROCEDURE — 99214 OFFICE O/P EST MOD 30 MIN: CPT | Mod: HCNC,S$GLB,, | Performed by: NURSE PRACTITIONER

## 2023-11-29 PROCEDURE — 77067 SCR MAMMO BI INCL CAD: CPT | Mod: TC,52,HCNC

## 2023-11-29 PROCEDURE — 77063 BREAST TOMOSYNTHESIS BI: CPT | Mod: 26,52,HCNC, | Performed by: RADIOLOGY

## 2023-11-29 PROCEDURE — 3008F PR BODY MASS INDEX (BMI) DOCUMENTED: ICD-10-PCS | Mod: HCNC,CPTII,S$GLB, | Performed by: NURSE PRACTITIONER

## 2023-11-29 PROCEDURE — 77067 MAMMO DIGITAL SCREENING LEFT WITH TOMO: ICD-10-PCS | Mod: 26,52,HCNC, | Performed by: RADIOLOGY

## 2023-12-10 ENCOUNTER — PATIENT MESSAGE (OUTPATIENT)
Dept: ADMINISTRATIVE | Facility: OTHER | Age: 58
End: 2023-12-10
Payer: MEDICARE

## 2023-12-13 NOTE — PROGRESS NOTES
Subjective:       Patient ID: Carole Moore is a 58 y.o. female.    Chief Complaint: Breast Cancer and Chemotherapy    Primary Oncologist/Hematologist: Dr. Khan    HPI: Ms. Moore is a pleasant 58 year old female who is following up for her metastatic breast cancer. She is on fulvestrant (faslodex) q 4 weeks + ibrance on days 1-21 on a 35 day cycle. She is also getting zometa q 3 months, last on 10/10/23. Recent PET scan (11/7/23) demonstrates stable findings.   Cancer Hx: ddACT, R mastectomy, adjuvant radiation completed 2017  Arimidex  30Gy/10fx to T7-11 completed 10/31/22  Pmhx: follows with neurosurg, Dr. Pelayo for persistent back pain, possible kyphoplasty in the future.  Having colonoscopy on 12/27/23.     Today: She continues to take calcium and vitamin D. She states her appetite is good and she has been staying hydrated. She denies any fevers, illnesses, n/v/d/c. She continues to take stool softeners. She is on her 2 off weeks of ibrance.     Social History     Socioeconomic History    Marital status:    Tobacco Use    Smoking status: Never    Smokeless tobacco: Never   Substance and Sexual Activity    Alcohol use: Never    Drug use: No    Sexual activity: Not Currently     Birth control/protection: Surgical   Other Topics Concern    Are you pregnant or think you may be? No    Breast-feeding No     Social Determinants of Health     Financial Resource Strain: Low Risk  (11/19/2023)    Overall Financial Resource Strain (CARDIA)     Difficulty of Paying Living Expenses: Not hard at all   Food Insecurity: No Food Insecurity (11/19/2023)    Hunger Vital Sign     Worried About Running Out of Food in the Last Year: Never true     Ran Out of Food in the Last Year: Never true   Transportation Needs: No Transportation Needs (11/19/2023)    PRAPARE - Transportation     Lack of Transportation (Medical): No     Lack of Transportation (Non-Medical): No   Physical Activity: Inactive (11/19/2023)     Exercise Vital Sign     Days of Exercise per Week: 0 days     Minutes of Exercise per Session: 0 min   Stress: No Stress Concern Present (11/19/2023)    [employer] [place] [employer] - Occupational Stress Questionnaire     Feeling of Stress : Only a little   Recent Concern: Stress - Stress Concern Present (8/29/2023)    [employer] [place] [employer] - Occupational Stress Questionnaire     Feeling of Stress : To some extent   Social Connections: Moderately Integrated (11/19/2023)    Social Connection and Isolation Panel [NHANES]     Frequency of Communication with Friends and Family: More than three times a week     Frequency of Social Gatherings with Friends and Family: More than three times a week     Attends Yarsanism Services: More than 4 times per year     Active Member of Clubs or Organizations: No     Attends Club or Organization Meetings: Never     Marital Status:    Housing Stability: Low Risk  (11/19/2023)    Housing Stability Vital Sign     Unable to Pay for Housing in the Last Year: No     Number of Places Lived in the Last Year: 1     Unstable Housing in the Last Year: No       Past Medical History:   Diagnosis Date    Antineoplastic chemotherapy induced anemia     Breast cancer 2016    right breast    Cancer     R Breast cancer    CVA (cerebral infarction)     Diverticulosis     Family history of colon cancer 10/30/2018    Her mother and father both had colon cancer.    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    Nausea after anesthesia     Paralysis     right side    PONV (postoperative nausea and vomiting)     Stroke 2001    R side weakness    Trouble in sleeping        Family History   Problem Relation Age of Onset    Breast cancer Paternal Aunt     Colon cancer Father     Colon cancer Mother     Melanoma Maternal Uncle     Skin cancer Maternal Uncle        Past  Surgical History:   Procedure Laterality Date    AUGMENTATION OF BREAST Left 2017    BREAST SURGERY      COLONOSCOPY N/A 10/30/2018    Procedure: COLONOSCOPY;  Surgeon: Cosme Monson MD;  Location: [facility];  Service: Endoscopy;  Laterality: N/A;    CYSTOSCOPY WITH BIOPSY OF BLADDER N/A 11/29/2021    Procedure: CYSTOSCOPY, WITH BLADDER BIOPSY, WITH FULGURATION IF INDICATED;  Surgeon: Page Marcelo MD;  Location: [facility] OR;  Service: Urology;  Laterality: N/A;    FIXATION KYPHOPLASTY Bilateral 2/9/2023    Procedure: KYPHOPLASTY;  Surgeon: Josse Pelayo MD;  Location: [facility] OR;  Service: Neurosurgery;  Laterality: Bilateral;  Kyphoplasty T10 with ablation    HYSTERECTOMY  2007    maintains both ovaries, menorrhagia    MASTECTOMY Right 2017    MEDIPORT INSERTION, SINGLE      RADIOFREQUENCY ABLATION, BONE, PERCUTANEOUS Bilateral 2/9/2023    Procedure: RADIOFREQUENCY ABLATION,BONE,PERCUTANEOUS;  Surgeon: Josse Pelayo MD;  Location: [facility] OR;  Service: Neurosurgery;  Laterality: Bilateral;    TONSILLECTOMY         Review of Systems   Constitutional:  Negative for activity change, appetite change, chills, diaphoresis, fatigue, fever and unexpected weight change.   HENT:  Negative for congestion, nosebleeds and rhinorrhea.    Respiratory:  Negative for cough and shortness of breath.    Cardiovascular:  Negative for chest pain and leg swelling.   Gastrointestinal:  Negative for abdominal pain, anal bleeding, blood in stool, constipation, diarrhea, nausea and vomiting.   Genitourinary:  Negative for hematuria.   Musculoskeletal:  Positive for arthralgias and back pain.   Skin:  Negative for color change and pallor.   Allergic/Immunologic: Positive for immunocompromised state.   Neurological:  Negative for dizziness, weakness, light-headedness and headaches.         Medication List with Changes/Refills   Current Medications    ASCORBIC ACID, VITAMIN C, ORAL    Take by mouth once daily.    ASPIRIN (ECOTRIN) 81 MG  EC TABLET    Take 81 mg by mouth once daily.    ATORVASTATIN (LIPITOR) 10 MG TABLET    Take 1 tablet (10 mg total) by mouth once daily.    CALCIUM CARBONATE ORAL    Take 1 tablet by mouth once daily.    CETIRIZINE (ZYRTEC) 10 MG TABLET    Take 10 mg by mouth daily as needed.    CHOLECALCIFEROL, VITAMIN D3, ORAL    Take by mouth once daily.    FLUBLOK QUAD 2977-2193, PF, 180 MCG (45 MCG X 4)/0.5 ML SYRG        FULVESTRANT (FASLODEX) 250 MG/5 ML INJECTION    Inject 250 mg into the muscle every 30 days.    GADAVIST 10 MMOL/10 ML (1 MMOL/ML) SOLN INJECTION    every 6 (six) months. Once yearly    MECLIZINE (ANTIVERT) 25 MG TABLET    Take 25 mg by mouth 3 (three) times daily as needed.    METHEN-M.BLUE-S.PHOS-PHSAL-HYO (URIBEL) 118-10-40.8-36 MG CAP    Take 1 po Q6 hours prn bladder pain    METHOCARBAMOL (ROBAXIN) 750 MG TAB    Take 1 tablet (750 mg total) by mouth 3 (three) times daily.    MULTIVITAMIN CAPSULE    Take 1 capsule by mouth once daily.    NITROFURANTOIN, MACROCRYSTAL-MONOHYDRATE, (MACROBID) 100 MG CAPSULE    Take 1 capsule (100 mg total) by mouth 2 (two) times daily.    ONDANSETRON (ZOFRAN) 4 MG TABLET    Take 1 tablet (4 mg total) by mouth every 8 (eight) hours as needed for Nausea.    OXYCODONE-ACETAMINOPHEN (PERCOCET)  MG PER TABLET    Take 1 tablet by mouth every 6 (six) hours as needed for Pain.    PALBOCICLIB (IBRANCE) 125 MG CAP    Take 1 capsule (125 mg) by mouth once daily on days 1-21 of a 35-day cycle.    POLYETHYLENE GLYCOL (GLYCOLAX) 17 GRAM/DOSE POWDER    Take 17 g by mouth once daily.    PROCHLORPERAZINE (COMPAZINE) 5 MG TABLET    TAKE 1 TABLET BY MOUTH 4 TIMES DAILY AS NEEDED FOR NAUSEA    SPIKEVAX 5069-7148,12Y UP,,PF, 50 MCG/0.5 ML INJECTION        ZOLEDRONIC 4 MG/100 ML PGBK INFUSION         Objective:     Vitals:    12/18/23 0820   BP: 111/74   Pulse: 84   Temp: 97.9 °F (36.6 °C)     Physical Exam  Vitals reviewed.   Constitutional:       General: She is not in acute distress.      Appearance: She is not ill-appearing, toxic-appearing or diaphoretic.   HENT:      Head: Normocephalic and atraumatic.   Cardiovascular:      Rate and Rhythm: Normal rate.   Musculoskeletal:      Right lower leg: No edema.      Left lower leg: No edema.   Skin:     General: Skin is warm.      Coloration: Skin is not jaundiced or pale.      Findings: No bruising, erythema, lesion or rash.   Neurological:      Mental Status: She is alert.      Motor: No weakness.      Gait: Gait normal.   Psychiatric:         Mood and Affect: Mood normal.         Behavior: Behavior normal.         Thought Content: Thought content normal.            Labs/Results:  Lab Results   Component Value Date    WBC 1.94 (LL) 12/18/2023    RBC 3.58 (L) 12/18/2023    HGB 12.5 12/18/2023    HCT 35.9 (L) 12/18/2023     (H) 12/18/2023    MCH 34.9 (H) 12/18/2023    MCHC 34.8 12/18/2023    RDW 14.0 12/18/2023     12/18/2023    MPV 9.1 (L) 12/18/2023    GRAN 1.9 11/20/2023    GRAN 54.6 11/20/2023    LYMPH 0.9 (L) 11/20/2023    LYMPH 25.0 11/20/2023    MONO 0.5 11/20/2023    MONO 15.9 (H) 11/20/2023    EOS 0.1 11/20/2023    BASO 0.06 11/20/2023    EOSINOPHIL 2.4 11/20/2023    BASOPHIL 1.8 11/20/2023     CMP  Sodium   Date Value Ref Range Status   12/18/2023 142 136 - 145 mmol/L Final     Potassium   Date Value Ref Range Status   12/18/2023 4.7 3.5 - 5.1 mmol/L Final     Chloride   Date Value Ref Range Status   12/18/2023 107 95 - 110 mmol/L Final     CO2   Date Value Ref Range Status   12/18/2023 26 23 - 29 mmol/L Final     Glucose   Date Value Ref Range Status   12/18/2023 102 70 - 110 mg/dL Final     BUN   Date Value Ref Range Status   12/18/2023 13 6 - 20 mg/dL Final     Creatinine   Date Value Ref Range Status   12/18/2023 0.7 0.5 - 1.4 mg/dL Final     Calcium   Date Value Ref Range Status   12/18/2023 9.3 8.7 - 10.5 mg/dL Final     Total Protein   Date Value Ref Range Status   12/18/2023 6.8 6.0 - 8.4 g/dL Final     Albumin   Date  Value Ref Range Status   12/18/2023 3.7 3.5 - 5.2 g/dL Final     Total Bilirubin   Date Value Ref Range Status   12/18/2023 0.5 0.1 - 1.0 mg/dL Final     Comment:     For infants and newborns, interpretation of results should be based  on gestational age, weight and in agreement with clinical  observations.    Premature Infant recommended reference ranges:  Up to 24 hours.............<8.0 mg/dL  Up to 48 hours............<12.0 mg/dL  3-5 days..................<15.0 mg/dL  6-29 days.................<15.0 mg/dL       Alkaline Phosphatase   Date Value Ref Range Status   12/18/2023 48 (L) 55 - 135 U/L Final     AST   Date Value Ref Range Status   12/18/2023 14 10 - 40 U/L Final     ALT   Date Value Ref Range Status   12/18/2023 15 10 - 44 U/L Final     Anion Gap   Date Value Ref Range Status   12/18/2023 9 8 - 16 mmol/L Final     eGFR   Date Value Ref Range Status   12/18/2023 >60 >60 mL/min/1.73 m^2 Final     Pet scan 11/7/23  Impression:  1. Stable examination with redemonstration of of multiple known non FDG avid osseous metastasis.  2. No new metastatic lesions visualized    Assessment:     Problem List Items Addressed This Visit          Immunology/Multi System    Immunodeficiency due to chemotherapy - Primary    Immunodeficiency secondary to radiation therapy       Oncology    Malignant neoplasm of central portion of right breast in female, estrogen receptor positive    Use of aromatase inhibitors       Orthopedic    Osteopenia of multiple sites     Plan:     Malignant neoplasm of central portion of right breast in female, estrogen receptor positive, Use of aromatase inhibitors, Secondary malignant neoplasm of bone  --continue with zometa q 3 months. Last given 10/10/23  --continue with fulvestrant (faslodex) q 4 weeks  --continue with ibrance on days 1-21 on a 35 day cycle--> will obtain cbc on the 27th, same day as colonoscopy to check WBC prior to starting ibrance the 28th.   --Recent PET scan (11/7/23)  demonstrates stable findings   --fever precautions given  --continue with calcium and vitamin D supplement  --last dexa scan 4/13/22, normal. Will repeat dexa in 4/2024  --continue to follow with breast surg for imaging and breat exams, seeing them in June 2024 and November 2024  --last mammogram 11/29/23 negative    --scheduled for colonoscopy on 12/27/23  --continue with Dr. Pelayo for back pain    Follow-Up:  obtain CBC on 27th prior to colonoscopy. 4 weeks with cbc cmp prior for next faslodex  with primary oncologist-standing orders in    Vicky Jimenez PA-C  Hematology Oncology    Route Chart for Scheduling    Med Onc Chart Routing      Follow up with physician 4 weeks. with cbc cmp prior   Follow up with [facility] . CBC on the 27th (at the [place]) prior to colonscopy- use Isabella Oliver cbc please   Infusion scheduling note   4 weeks for faslodex and zometa   Injection scheduling note    Labs CBC and CMP   Scheduling:  Preferred lab:  Lab interval:     Imaging    Pharmacy appointment    Other referrals                  Treatment Plan Information   OP PALBOCICLIB FULVESTRANT Q4W   Harpal Khan MD   Upcoming Treatment Dates - OP PALBOCICLIB FULVESTRANT Q4W    1/15/2024       Chemotherapy       fulvestrant (FASLODEX) injection 500 mg  2/12/2024       Chemotherapy       fulvestrant (FASLODEX) injection 500 mg  3/11/2024       Chemotherapy       fulvestrant (FASLODEX) injection 500 mg    Supportive Plan Information  OP ZOLEDRONIC ACID (ZOMETA) Q4W   Harpal Khan MD   Upcoming Treatment Dates - OP ZOLEDRONIC ACID (ZOMETA) Q4W    12/30/2023       Medications       zoledronic acid (ZOMETA) 4 mg in sodium chloride 0.9% 100 mL IVPB  3/29/2024       Medications       zoledronic acid (ZOMETA) 4 mg in sodium chloride 0.9% 100 mL IVPB  6/27/2024       Medications       zoledronic acid (ZOMETA) 4 mg in sodium chloride 0.9% 100 mL IVPB  9/25/2024       Medications       zoledronic acid (ZOMETA) 4 mg in sodium chloride 0.9% 100 mL  IVPB

## 2023-12-18 ENCOUNTER — TELEPHONE (OUTPATIENT)
Dept: HEMATOLOGY/ONCOLOGY | Facility: CLINIC | Age: 58
End: 2023-12-18
Payer: MEDICARE

## 2023-12-18 ENCOUNTER — INFUSION (OUTPATIENT)
Dept: INFUSION THERAPY | Facility: HOSPITAL | Age: 58
End: 2023-12-18
Attending: INTERNAL MEDICINE
Payer: MEDICARE

## 2023-12-18 ENCOUNTER — OFFICE VISIT (OUTPATIENT)
Dept: HEMATOLOGY/ONCOLOGY | Facility: CLINIC | Age: 58
End: 2023-12-18
Payer: MEDICARE

## 2023-12-18 VITALS
BODY MASS INDEX: 28.93 KG/M2 | OXYGEN SATURATION: 99 % | WEIGHT: 184.31 LBS | DIASTOLIC BLOOD PRESSURE: 74 MMHG | TEMPERATURE: 98 F | HEART RATE: 84 BPM | HEIGHT: 67 IN | SYSTOLIC BLOOD PRESSURE: 111 MMHG

## 2023-12-18 VITALS
WEIGHT: 184.31 LBS | DIASTOLIC BLOOD PRESSURE: 72 MMHG | OXYGEN SATURATION: 98 % | BODY MASS INDEX: 28.93 KG/M2 | RESPIRATION RATE: 16 BRPM | HEIGHT: 67 IN | TEMPERATURE: 98 F | HEART RATE: 78 BPM | SYSTOLIC BLOOD PRESSURE: 126 MMHG

## 2023-12-18 DIAGNOSIS — T45.1X5A IMMUNODEFICIENCY DUE TO CHEMOTHERAPY: Primary | ICD-10-CM

## 2023-12-18 DIAGNOSIS — Z17.0 MALIGNANT NEOPLASM OF CENTRAL PORTION OF RIGHT BREAST IN FEMALE, ESTROGEN RECEPTOR POSITIVE: Primary | ICD-10-CM

## 2023-12-18 DIAGNOSIS — C50.111 MALIGNANT NEOPLASM OF CENTRAL PORTION OF RIGHT BREAST IN FEMALE, ESTROGEN RECEPTOR POSITIVE: Primary | ICD-10-CM

## 2023-12-18 DIAGNOSIS — M85.89 OSTEOPENIA OF MULTIPLE SITES: ICD-10-CM

## 2023-12-18 DIAGNOSIS — C50.111 MALIGNANT NEOPLASM OF CENTRAL PORTION OF RIGHT BREAST IN FEMALE, ESTROGEN RECEPTOR POSITIVE: ICD-10-CM

## 2023-12-18 DIAGNOSIS — D84.821 IMMUNODEFICIENCY DUE TO CHEMOTHERAPY: Primary | ICD-10-CM
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
## 2023-12-18 DIAGNOSIS — Z79.811 USE OF AROMATASE INHIBITORS: ICD-10-CM

## 2023-12-18 DIAGNOSIS — Z17.0 MALIGNANT NEOPLASM OF CENTRAL PORTION OF RIGHT BREAST IN FEMALE, ESTROGEN RECEPTOR POSITIVE: ICD-10-CM

## 2023-12-18 DIAGNOSIS — D84.822 IMMUNODEFICIENCY SECONDARY TO RADIATION THERAPY: ICD-10-CM

## 2023-12-18 DIAGNOSIS — Z79.899 IMMUNODEFICIENCY DUE TO CHEMOTHERAPY: Primary | ICD-10-CM

## 2023-12-18 PROCEDURE — 99999 PR PBB SHADOW E&M-EST. PATIENT-LVL IV: CPT | Mod: PBBFAC,HCNC,,

## 2023-12-18 PROCEDURE — 3074F SYST BP LT 130 MM HG: CPT | Mod: HCNC,CPTII,S$GLB,

## 2023-12-18 PROCEDURE — 3078F PR MOST RECENT DIASTOLIC BLOOD PRESSURE < 80 MM HG: ICD-10-PCS | Mod: HCNC,CPTII,S$GLB,

## 2023-12-18 PROCEDURE — 96402 CHEMO HORMON ANTINEOPL SQ/IM: CPT | Mod: HCNC

## 2023-12-18 PROCEDURE — 3078F DIAST BP <80 MM HG: CPT | Mod: HCNC,CPTII,S$GLB,

## 2023-12-18 PROCEDURE — 3008F PR BODY MASS INDEX (BMI) DOCUMENTED: ICD-10-PCS | Mod: HCNC,CPTII,S$GLB,

## 2023-12-18 PROCEDURE — 63600175 PHARM REV CODE 636 W HCPCS: Mod: JZ,JG,HCNC

## 2023-12-18 PROCEDURE — 99999 PR PBB SHADOW E&M-EST. PATIENT-LVL IV: ICD-10-PCS | Mod: PBBFAC,HCNC,,

## 2023-12-18 PROCEDURE — 99215 OFFICE O/P EST HI 40 MIN: CPT | Mod: HCNC,S$GLB,,

## 2023-12-18 PROCEDURE — 99215 PR OFFICE/OUTPT VISIT, EST, LEVL V, 40-54 MIN: ICD-10-PCS | Mod: HCNC,S$GLB,,

## 2023-12-18 PROCEDURE — 3008F BODY MASS INDEX DOCD: CPT | Mod: HCNC,CPTII,S$GLB,

## 2023-12-18 PROCEDURE — 3074F PR MOST RECENT SYSTOLIC BLOOD PRESSURE < 130 MM HG: ICD-10-PCS | Mod: HCNC,CPTII,S$GLB,

## 2023-12-18 RX ORDER — LAMOTRIGINE 25 MG/1
500 TABLET ORAL
Status: COMPLETED | OUTPATIENT
Start: 2023-12-18 | End: 2023-12-18

## 2023-12-18 RX ORDER — LAMOTRIGINE 25 MG/1
500 TABLET ORAL
Status: CANCELLED | OUTPATIENT
Start: 2023-12-18

## 2023-12-18 RX ADMIN — FULVESTRANT 500 MG: 50 INJECTION, SOLUTION INTRAMUSCULAR at 09:12

## 2023-12-18 NOTE — PLAN OF CARE
Patient tolerated faslodex well today; no adverse reaction noted.  POC reviewed with pt.  No questions or concerns voiced.  NAD noted upon discharge.  No significant new complaints voiced.   Has f/u appt(s) scheduled per MD request.      Problem: Adult Inpatient Plan of Care  Goal: Plan of Care Review  Outcome: Ongoing, Progressing  Flowsheets (Taken 12/18/2023 1021)  Plan of Care Reviewed With: patient  Goal: Patient-Specific Goal (Individualized)  Outcome: Ongoing, Progressing  Flowsheets (Taken 12/18/2023 1021)  Anxieties, Fears or Concerns: denies  Individualized Care Needs: stands for injection. family present  Goal: Optimal Comfort and Wellbeing  Outcome: Ongoing, Progressing  Intervention: Provide Person-Centered Care  Flowsheets (Taken 12/18/2023 1021)  Trust Relationship/Rapport:   care explained   questions encouraged   choices provided   reassurance provided   emotional support provided   thoughts/feelings acknowledged   empathic listening provided   questions answered

## 2023-12-18 NOTE — TELEPHONE ENCOUNTER
----- Message from Harpal Khan MD sent at 12/18/2023  8:17 AM CST -----  Regarding: RE: Critical Lab  Please check to see what day of Ibrance the patient is on  ----- Message -----  From: Petty Faith MA  Sent: 12/18/2023   8:13 AM CST  To: Harpal Khan MD  Subject: Critical Lab                                     WBC 1.94

## 2023-12-21 ENCOUNTER — LAB VISIT (OUTPATIENT)
Dept: LAB | Facility: HOSPITAL | Age: 58
End: 2023-12-21
Attending: INTERNAL MEDICINE
Payer: MEDICARE

## 2023-12-21 ENCOUNTER — ANESTHESIA EVENT (OUTPATIENT)
Dept: ENDOSCOPY | Facility: HOSPITAL | Age: 58
End: 2023-12-21
Payer: MEDICARE

## 2023-12-21 DIAGNOSIS — Z17.0 MALIGNANT NEOPLASM OF CENTRAL PORTION OF RIGHT BREAST IN FEMALE, ESTROGEN RECEPTOR POSITIVE: ICD-10-CM

## 2023-12-21 DIAGNOSIS — C50.111 MALIGNANT NEOPLASM OF CENTRAL PORTION OF RIGHT BREAST IN FEMALE, ESTROGEN RECEPTOR POSITIVE: ICD-10-CM

## 2023-12-21 LAB
ALBUMIN SERPL BCP-MCNC: 3.8 G/DL (ref 3.5–5.2)
ALP SERPL-CCNC: 43 U/L (ref 55–135)
ALT SERPL W/O P-5'-P-CCNC: 23 U/L (ref 10–44)
ANION GAP SERPL CALC-SCNC: 11 MMOL/L (ref 8–16)
AST SERPL-CCNC: 21 U/L (ref 10–40)
BASOPHILS # BLD AUTO: 0.05 K/UL (ref 0–0.2)
BASOPHILS NFR BLD: 2.1 % (ref 0–1.9)
BILIRUB SERPL-MCNC: 0.7 MG/DL (ref 0.1–1)
BUN SERPL-MCNC: 12 MG/DL (ref 6–20)
CALCIUM SERPL-MCNC: 9.6 MG/DL (ref 8.7–10.5)
CHLORIDE SERPL-SCNC: 103 MMOL/L (ref 95–110)
CO2 SERPL-SCNC: 26 MMOL/L (ref 23–29)
CREAT SERPL-MCNC: 0.8 MG/DL (ref 0.5–1.4)
DIFFERENTIAL METHOD: ABNORMAL
EOSINOPHIL # BLD AUTO: 0.1 K/UL (ref 0–0.5)
EOSINOPHIL NFR BLD: 2.6 % (ref 0–8)
ERYTHROCYTE [DISTWIDTH] IN BLOOD BY AUTOMATED COUNT: 14.2 % (ref 11.5–14.5)
EST. GFR  (NO RACE VARIABLE): >60 ML/MIN/1.73 M^2
GLUCOSE SERPL-MCNC: 103 MG/DL (ref 70–110)
HCT VFR BLD AUTO: 36.6 % (ref 37–48.5)
HGB BLD-MCNC: 12.6 G/DL (ref 12–16)
IMM GRANULOCYTES # BLD AUTO: 0.01 K/UL (ref 0–0.04)
IMM GRANULOCYTES NFR BLD AUTO: 0.4 % (ref 0–0.5)
LYMPHOCYTES # BLD AUTO: 0.9 K/UL (ref 1–4.8)
LYMPHOCYTES NFR BLD: 36.3 % (ref 18–48)
MCH RBC QN AUTO: 34.6 PG (ref 27–31)
MCHC RBC AUTO-ENTMCNC: 34.4 G/DL (ref 32–36)
MCV RBC AUTO: 101 FL (ref 82–98)
MONOCYTES # BLD AUTO: 0.5 K/UL (ref 0.3–1)
MONOCYTES NFR BLD: 19.7 % (ref 4–15)
NEUTROPHILS # BLD AUTO: 0.9 K/UL (ref 1.8–7.7)
NEUTROPHILS NFR BLD: 38.9 % (ref 38–73)
NRBC BLD-RTO: 0 /100 WBC
PLATELET # BLD AUTO: 208 K/UL (ref 150–450)
PLATELET BLD QL SMEAR: ABNORMAL
PMV BLD AUTO: 8.5 FL (ref 9.2–12.9)
POTASSIUM SERPL-SCNC: 4.3 MMOL/L (ref 3.5–5.1)
PROT SERPL-MCNC: 6.8 G/DL (ref 6–8.4)
RBC # BLD AUTO: 3.64 M/UL (ref 4–5.4)
SODIUM SERPL-SCNC: 140 MMOL/L (ref 136–145)
WBC # BLD AUTO: 2.34 K/UL (ref 3.9–12.7)

## 2023-12-21 PROCEDURE — 85025 COMPLETE CBC W/AUTO DIFF WBC: CPT | Mod: HCNC | Performed by: INTERNAL MEDICINE

## 2023-12-21 PROCEDURE — 80053 COMPREHEN METABOLIC PANEL: CPT | Mod: HCNC | Performed by: INTERNAL MEDICINE

## 2023-12-21 PROCEDURE — 36415 COLL VENOUS BLD VENIPUNCTURE: CPT | Mod: HCNC | Performed by: INTERNAL MEDICINE

## 2023-12-22 NOTE — ANESTHESIA PREPROCEDURE EVALUATION
12/22/2023  Carole Moore is a 58 y.o., female.    Patient Active Problem List   Diagnosis    Hyperlipidemia    History of CVA (cerebrovascular accident)    Malignant neoplasm of central portion of right breast in female, estrogen receptor positive    Pancytopenia due to antineoplastic chemotherapy    Use of aromatase inhibitors    Osteopenia of multiple sites    Hemiparesis affecting right side as late effect of cerebrovascular accident    Right sided weakness    Family history of colon cancer    Microhematuria    Cystitis cystica    Recurrent UTI    Immunodeficiency due to chemotherapy    Immunodeficiency secondary to radiation therapy    Secondary malignant neoplasm of bone    Compression fracture of T10 vertebra     Pre-op Assessment    I have reviewed the Patient Summary Reports.     I have reviewed the Nursing Notes. I have reviewed the NPO Status.   I have reviewed the Medications.     Review of Systems  Anesthesia Hx:  No problems with previous Anesthesia             Denies Family Hx of Anesthesia complications.   Personal Hx of Anesthesia complications, Post-Operative Nausea/Vomiting                    Social:  Former Smoker, No Alcohol Use       Hematology/Oncology:  Hematology Normal                     Current/Recent Cancer.  Breast       chemotherapy, radiation and surgery   Oncology Comments: Stage IV breast cancer post mastectomy now with vertebral mets and pathologic fracture     EENT/Dental:  EENT/Dental Normal           Cardiovascular:  Cardiovascular Normal                  ECG has been reviewed.                          Pulmonary:  Pulmonary Normal                       Renal/:  Renal/ Normal                 Hepatic/GI:  Hepatic/GI Normal                 Musculoskeletal:         Spine Disorders:             Neurological:   CVA, residual symptoms        Right hemiparesis post  CVA 22 years ago felt due to OCA's                            Endocrine:  Endocrine Normal            Dermatological:  Skin Normal    Psych:  Psychiatric Normal                    Physical Exam  General: Alert and Oriented    Airway:  Mallampati: II   Mouth Opening: Normal  TM Distance: Normal  Tongue: Normal  Neck ROM: Normal ROM    Dental:  Intact        Anesthesia Plan  Type of Anesthesia, risks & benefits discussed:    Anesthesia Type: Gen ETT  Intra-op Monitoring Plan: Standard ASA Monitors  Induction:  IV  Informed Consent: Informed consent signed with the Patient and all parties understand the risks and agree with anesthesia plan.  All questions answered.   ASA Score: 3  Day of Surgery Review of History & Physical: H&P Update referred to the surgeon/provider.    Ready For Surgery From Anesthesia Perspective.     .

## 2023-12-27 ENCOUNTER — TELEPHONE (OUTPATIENT)
Dept: HEMATOLOGY/ONCOLOGY | Facility: CLINIC | Age: 58
End: 2023-12-27
Payer: MEDICARE

## 2023-12-27 ENCOUNTER — ANESTHESIA (OUTPATIENT)
Dept: ENDOSCOPY | Facility: HOSPITAL | Age: 58
End: 2023-12-27
Payer: MEDICARE

## 2023-12-27 ENCOUNTER — HOSPITAL ENCOUNTER (OUTPATIENT)
Facility: HOSPITAL | Age: 58
Discharge: HOME | End: 2023-12-27
Attending: INTERNAL MEDICINE | Admitting: INTERNAL MEDICINE
Payer: MEDICARE

## 2023-12-27 VITALS
BODY MASS INDEX: 27.55 KG/M2 | OXYGEN SATURATION: 98 % | HEART RATE: 85 BPM | RESPIRATION RATE: 15 BRPM | WEIGHT: 175.5 LBS | SYSTOLIC BLOOD PRESSURE: 116 MMHG | TEMPERATURE: 98 F | HEIGHT: 67 IN | DIASTOLIC BLOOD PRESSURE: 76 MMHG

## 2023-12-27 DIAGNOSIS — Z86.010 PERSONAL HISTORY OF COLONIC POLYPS: ICD-10-CM

## 2023-12-27 DIAGNOSIS — Z08 ENCOUNTER FOR FOLLOW-UP SURVEILLANCE OF BREAST CANCER: Primary | ICD-10-CM

## 2023-12-27 DIAGNOSIS — Z80.0 FAMILY HISTORY OF COLON CANCER: Primary | ICD-10-CM

## 2023-12-27 DIAGNOSIS — Z85.3 ENCOUNTER FOR FOLLOW-UP SURVEILLANCE OF BREAST CANCER: Primary | ICD-10-CM

## 2023-12-27 PROBLEM — Z86.0100 PERSONAL HISTORY OF COLONIC POLYPS: Status: ACTIVE | Noted: 2023-12-27

## 2023-12-27 PROCEDURE — 88305 TISSUE EXAM BY PATHOLOGIST: CPT | Mod: 26,HCNC,, | Performed by: PATHOLOGY

## 2023-12-27 PROCEDURE — 63600175 PHARM REV CODE 636 W HCPCS: Mod: HCNC | Performed by: NURSE ANESTHETIST, CERTIFIED REGISTERED

## 2023-12-27 PROCEDURE — 37000008 HC ANESTHESIA 1ST 15 MINUTES: Performed by: INTERNAL MEDICINE

## 2023-12-27 PROCEDURE — 37000009 HC ANESTHESIA EA ADD 15 MINS: Performed by: INTERNAL MEDICINE

## 2023-12-27 PROCEDURE — 88305 TISSUE EXAM BY PATHOLOGIST: CPT | Mod: HCNC | Performed by: PATHOLOGY

## 2023-12-27 PROCEDURE — D9220A PRA ANESTHESIA: Mod: PT,,, | Performed by: NURSE ANESTHETIST, CERTIFIED REGISTERED

## 2023-12-27 PROCEDURE — 25000003 PHARM REV CODE 250: Mod: HCNC | Performed by: NURSE ANESTHETIST, CERTIFIED REGISTERED

## 2023-12-27 PROCEDURE — 63600175 PHARM REV CODE 636 W HCPCS: Mod: HCNC | Performed by: INTERNAL MEDICINE

## 2023-12-27 PROCEDURE — 27201012 HC FORCEPS, HOT/COLD, DISP: Performed by: INTERNAL MEDICINE

## 2023-12-27 PROCEDURE — 45380 COLONOSCOPY AND BIOPSY: CPT | Mod: PT,HCNC,, | Performed by: INTERNAL MEDICINE

## 2023-12-27 PROCEDURE — D9220A PRA ANESTHESIA: ICD-10-PCS | Mod: PT,,, | Performed by: NURSE ANESTHETIST, CERTIFIED REGISTERED

## 2023-12-27 PROCEDURE — 45380 COLONOSCOPY AND BIOPSY: CPT | Mod: PT,HCNC | Performed by: INTERNAL MEDICINE

## 2023-12-27 RX ORDER — PROPOFOL 10 MG/ML
VIAL (ML) INTRAVENOUS
Status: DISCONTINUED | OUTPATIENT
Start: 2023-12-27 | End: 2023-12-27

## 2023-12-27 RX ORDER — SODIUM CHLORIDE, SODIUM LACTATE, POTASSIUM CHLORIDE, CALCIUM CHLORIDE 600; 310; 30; 20 MG/100ML; MG/100ML; MG/100ML; MG/100ML
INJECTION, SOLUTION INTRAVENOUS CONTINUOUS
Status: DISCONTINUED | OUTPATIENT
Start: 2023-12-27 | End: 2023-12-27 | Stop reason: HOSPADM

## 2023-12-27 RX ORDER — LIDOCAINE HYDROCHLORIDE 20 MG/ML
INJECTION, SOLUTION EPIDURAL; INFILTRATION; INTRACAUDAL; PERINEURAL
Status: DISCONTINUED | OUTPATIENT
Start: 2023-12-27 | End: 2023-12-27

## 2023-12-27 RX ADMIN — PROPOFOL 20 MG: 10 INJECTION, EMULSION INTRAVENOUS at 07:12

## 2023-12-27 RX ADMIN — LIDOCAINE HYDROCHLORIDE 50 MG: 20 INJECTION, SOLUTION EPIDURAL; INFILTRATION; INTRACAUDAL; PERINEURAL at 06:12

## 2023-12-27 RX ADMIN — SODIUM CHLORIDE, POTASSIUM CHLORIDE, SODIUM LACTATE AND CALCIUM CHLORIDE: 600; 310; 30; 20 INJECTION, SOLUTION INTRAVENOUS at 06:12

## 2023-12-27 RX ADMIN — PROPOFOL 30 MG: 10 INJECTION, EMULSION INTRAVENOUS at 07:12

## 2023-12-27 RX ADMIN — PROPOFOL 50 MG: 10 INJECTION, EMULSION INTRAVENOUS at 07:12

## 2023-12-27 RX ADMIN — PROPOFOL 100 MG: 10 INJECTION, EMULSION INTRAVENOUS at 06:12

## 2023-12-27 NOTE — ANESTHESIA POSTPROCEDURE EVALUATION
Anesthesia Post Evaluation    Patient: Carole Moore    Procedure(s) Performed: Procedure(s) (LRB):  COLONOSCOPY (N/A)    Final Anesthesia Type: general      Patient location during evaluation: PACU  Patient participation: Yes- Able to Participate  Level of consciousness: awake and alert and oriented  Post-procedure vital signs: reviewed and stable  Pain management: adequate  Airway patency: patent    PONV status at discharge: No PONV  Anesthetic complications: no      Cardiovascular status: blood pressure returned to baseline, stable and hemodynamically stable  Respiratory status: unassisted  Hydration status: euvolemic  Follow-up not needed.              Vitals Value Taken Time   /61 12/27/23 0740   Temp 36.6 °C (97.8 °F) 12/27/23 0717   Pulse 74 12/27/23 0745   Resp 37 12/27/23 0742   SpO2 98 % 12/27/23 0745   Vitals shown include unvalidated device data.      Event Time   Out of Recovery 07:47:00         Pain/Dimas Score: Dimas Score: 10 (12/27/2023  7:27 AM)

## 2023-12-27 NOTE — PLAN OF CARE
Patient met criteria for discharge. No complaints of pain at this time. Pt recovered to baseline. Discharge instructions reviewed, and patient verbalizes understanding. Pt dressed and wheeled clinic for labwork.

## 2023-12-27 NOTE — DISCHARGE SUMMARY
[facility] - Endoscopy 1st Fl  Discharge Note  Short Stay    Procedure(s) (LRB):  COLONOSCOPY (N/A)      OUTCOME: Patient tolerated treatment/procedure well without complication and is now ready for discharge.    DISPOSITION: Home or Self Care    FINAL DIAGNOSIS:  Family history of colon cancer    FOLLOWUP: With primary care provider    DISCHARGE INSTRUCTIONS:  No discharge procedures on file.      Clinical Reference Documents Added to Patient Instructions         Document    DIVERTICULOSIS DISCHARGE INSTRUCTIONS (ENGLISH)

## 2023-12-27 NOTE — TELEPHONE ENCOUNTER
----- Message from Nabeel Welch LPN sent at 12/27/2023  9:07 AM CST -----  Patient was unable to have platelets ran at the lab due to blood clumping and will have to have a [place] Platelet re-draw per Lab

## 2023-12-27 NOTE — H&P
Short Stay Endoscopy History and Physical    PCP - Angel Eemry MD    Procedure - Colonoscopy  ASA - 2  Mallampati - per anesthesia  History of Anesthesia problems - no  Family history Anesthesia problems -  no     HPI:  This is a 58 y.o. female here for evaluation of :   Active Hospital Problems    Diagnosis  POA    *Family history of colon cancer [Z80.0]  Not Applicable     Her mother and father both had colon cancer.      Personal history of colonic polyps [Z86.010]  Not Applicable      Resolved Hospital Problems   No resolved problems to display.         Health Maintenance         Date Due Completion Date    Hemoglobin A1c (Diabetic Prevention Screening) Never done ---    Pneumococcal Vaccines (Age 0-64) (2 - PCV) 10/16/2020 10/16/2019    Colorectal Cancer Screening 10/30/2023 10/30/2018    Lipid Panel 07/14/2024 7/14/2023    High Dose Statin 12/27/2024 12/27/2023    TETANUS VACCINE 01/01/2032 1/1/2022              ROS:  CONSTITUTIONAL: Denies weight change,  fatigue, fevers, chills, night sweats.  CARDIOVASCULAR: Denies chest pain, shortness of breath, orthopnea and edema.  RESPIRATORY: Denies cough, hemoptysis, dyspnea, and wheezing.  GI: See HPI.    Medical History:   Past Medical History:   Diagnosis Date    Antineoplastic chemotherapy induced anemia     Breast cancer 2016    right breast    Cancer     R Breast cancer    CVA (cerebral infarction)     Diverticulosis     Family history of colon cancer 10/30/2018    Her mother and father both had colon cancer.    Genetic testing of female 12/27/2016    [employer] (28 genes) - NEGATIVE    Hyperlipidemia     Immunodeficiency due to chemotherapy 10/03/2022    Nausea after anesthesia     Paralysis     right side    PONV (postoperative nausea and vomiting)     Stroke 2001    R side weakness    Trouble in sleeping        Surgical History:   Past Surgical History:   Procedure Laterality Date    AUGMENTATION OF BREAST Left 2017    BREAST SURGERY       COLONOSCOPY N/A 10/30/2018    Procedure: COLONOSCOPY;  Surgeon: Cosme Monson MD;  Location: [facility] ENDO;  Service: Endoscopy;  Laterality: N/A;    CYSTOSCOPY WITH BIOPSY OF BLADDER N/A 11/29/2021    Procedure: CYSTOSCOPY, WITH BLADDER BIOPSY, WITH FULGURATION IF INDICATED;  Surgeon: Page Marcelo MD;  Location: [facility] OR;  Service: Urology;  Laterality: N/A;    FIXATION KYPHOPLASTY Bilateral 2/9/2023    Procedure: KYPHOPLASTY;  Surgeon: Josse Pelayo MD;  Location: [facility] OR;  Service: Neurosurgery;  Laterality: Bilateral;  Kyphoplasty T10 with ablation    HYSTERECTOMY  2007    maintains both ovaries, menorrhagia    MASTECTOMY Right 2017    MEDIPORT INSERTION, SINGLE      RADIOFREQUENCY ABLATION, BONE, PERCUTANEOUS Bilateral 2/9/2023    Procedure: RADIOFREQUENCY ABLATION,BONE,PERCUTANEOUS;  Surgeon: Josse Pelayo MD;  Location: [facility] OR;  Service: Neurosurgery;  Laterality: Bilateral;    TONSILLECTOMY         Family History:   Family History   Problem Relation Age of Onset    Breast cancer Paternal Aunt     Colon cancer Father     Colon cancer Mother     Melanoma Maternal Uncle     Skin cancer Maternal Uncle        Social History:   Social History     Tobacco Use    Smoking status: Never    Smokeless tobacco: Never   Substance Use Topics    Alcohol use: Never    Drug use: No       Allergies:   Review of patient's allergies indicates:  No Known Allergies    Medications:   No current facility-administered medications on file prior to encounter.     Current Outpatient Medications on File Prior to Encounter   Medication Sig Dispense Refill    ASCORBIC ACID, VITAMIN C, ORAL Take by mouth once daily.      aspirin (ECOTRIN) 81 MG EC tablet Take 81 mg by mouth once daily.      atorvastatin (LIPITOR) 10 MG tablet Take 1 tablet (10 mg total) by mouth once daily. 90 tablet 4    CALCIUM CARBONATE ORAL Take 1 tablet by mouth once daily.      cetirizine (ZYRTEC) 10 MG tablet Take 10 mg by mouth daily as needed.       CHOLECALCIFEROL, VITAMIN D3, ORAL Take by mouth once daily.      multivitamin capsule Take 1 capsule by mouth once daily.      ondansetron (ZOFRAN) 4 MG tablet Take 1 tablet (4 mg total) by mouth every 8 (eight) hours as needed for Nausea. 20 tablet 11    oxyCODONE-acetaminophen (PERCOCET)  mg per tablet Take 1 tablet by mouth every 6 (six) hours as needed for Pain. 30 tablet 0    polyethylene glycol (GLYCOLAX) 17 gram/dose powder Take 17 g by mouth once daily. 510 g 11    zoledronic 4 mg/100 mL PgBk infusion       fulvestrant (FASLODEX) 250 mg/5 mL injection Inject 250 mg into the muscle every 30 days.      GADAVIST 10 mmol/10 mL (1 mmol/mL) Soln injection every 6 (six) months. Once yearly      meclizine (ANTIVERT) 25 mg tablet Take 25 mg by mouth 3 (three) times daily as needed.      methocarbamoL (ROBAXIN) 750 MG Tab Take 1 tablet (750 mg total) by mouth 3 (three) times daily. 60 tablet 3       Physical Exam:  Vital Signs:   Vitals:    12/27/23 0614   BP: 112/78   Pulse: 101   Resp: 20   Temp: 96.8 °F (36 °C)     General Appearance: Well appearing in no acute distress  ENT: OP clear  Chest: CTA B  CV: RRR, no m/r/g  Abd: s/nt/nd/nabs  Ext: no edema    Labs:Reviewed    IMP:  Active Hospital Problems    Diagnosis  POA    *Family history of colon cancer [Z80.0]  Not Applicable     Her mother and father both had colon cancer.      Personal history of colonic polyps [Z86.010]  Not Applicable      Resolved Hospital Problems   No resolved problems to display.         Plan:   I have explained the risks and benefits of colonoscopy to the patient including but not limited to bleeding, perforation, infection, and death. The patient wishes to proceed.

## 2023-12-27 NOTE — TRANSFER OF CARE
"Anesthesia Transfer of Care Note    Patient: Carole Moore    Procedure(s) Performed: Procedure(s) (LRB):  COLONOSCOPY (N/A)    Patient location: PACU    Anesthesia Type: general    Transport from OR: Transported from OR on room air with adequate spontaneous ventilation    Post pain: adequate analgesia    Post assessment: no apparent anesthetic complications    Post vital signs: stable    Level of consciousness: sedated    Nausea/Vomiting: no nausea/vomiting    Complications: none    Transfer of care protocol was followed      Last vitals: Visit Vitals  /78 (BP Location: Right arm)   Pulse 101   Temp 36 °C (96.8 °F) (Temporal)   Resp 20   Ht 5' 7" (1.702 m)   Wt 79.6 kg (175 lb 7.8 oz)   LMP 10/11/2009   SpO2 97%   Breastfeeding No   BMI 27.49 kg/m²     "

## 2023-12-27 NOTE — [employer] PATIENT INSTRUCTIONS
Discharge Summary/Instructions after an Endoscopic Procedure  Patient Name: Carole Moore  Patient MRN: [id number]  Patient YOB: 1965 Wednesday, December 27, 2023  Caro Leo MD  Dear patient,  As a result of recent federal legislation (The Federal Cures Act), you may   receive lab or pathology results from your procedure in your [facility]   account before your physician is able to contact you. Your physician or   their representative will relay the results to you with their   recommendations at their soonest availability.  Thank you,  RESTRICTIONS:  During your procedure today, you received medications for sedation.  These   medications may affect your judgment, balance and coordination.  Therefore,   for 24 hours, you have the following restrictions:   - DO NOT drive a car, operate machinery, make legal/financial decisions,   sign important papers or drink alcohol.    ACTIVITY:  Today: no heavy lifting, straining or running due to procedural   sedation/anesthesia.  The following day: return to full activity including work.  DIET:  Eat and drink normally unless instructed otherwise.     TREATMENT FOR COMMON SIDE EFFECTS:  - Mild abdominal pain, nausea, belching, bloating or excessive gas:  rest,   eat lightly and use a heating pad.  - Sore Throat: treat with throat lozenges and/or gargle with warm salt   water.  - Because air was used during the procedure, expelling large amounts of air   from your rectum or belching is normal.  - If a bowel prep was taken, you may not have a bowel movement for 1-3 days.    This is normal.  SYMPTOMS TO WATCH FOR AND REPORT TO YOUR PHYSICIAN:  1. Abdominal pain or bloating, other than gas cramps.  2. Chest pain.  3. Back pain.  4. Signs of infection such as: chills or fever occurring within 24 hours   after the procedure.  5. Rectal bleeding, which would show as bright red, maroon, or black stools.   (A tablespoon of blood from the rectum is not serious,  especially if   hemorrhoids are present.)  6. Vomiting.  7. Weakness or dizziness.  GO DIRECTLY TO THE NEAREST EMERGENCY ROOM IF YOU HAVE ANY OF THE FOLLOWING:      Difficulty breathing              Chills and/or fever over 101 F   Persistent vomiting and/or vomiting blood   Severe abdominal pain   Severe chest pain   Black, tarry stools   Bleeding- more than one tablespoon   Any other symptom or condition that you feel may need urgent attention  Your doctor recommends these additional instructions:  If any biopsies were taken, your doctors clinic will contact you in 1 to 2   weeks with any results.  - Discharge patient to home (via wheelchair).   - Resume previous diet.   - Continue present medications.   - Await pathology results.   - Repeat colonoscopy in 5 years for surveillance.   - Patient has a contact number available for emergencies.  The signs and   symptoms of potential delayed complications were discussed with the   patient.  Return to normal activities tomorrow.  Written discharge   instructions were provided to the patient.  For questions, problems or results please call your physician Caro Leo MD at Work:  [phone]  If you have any questions about the above instructions, call the GI   department at [phone] or call the endoscopy unit at [phone]   from 7am until 3 pm.  [facility] [place], EMERGENCY ROOM PHONE NUMBER:   [phone]  IF A COMPLICATION OR EMERGENCY SITUATION ARISES AND YOU ARE UNABLE TO REACH   YOUR PHYSICIAN - GO DIRECTLY TO THE EMERGENCY ROOM.  I have read or have had read to me these discharge instructions for my   procedure and have received a written copy.  I understand these   instructions and will follow-up with my physician if I have any questions.     __________________________________       _____________________________________  Nurse Signature                                          Patient/Designated   Responsible Party  Signature  MD Caro Sanchez MD  12/27/2023 7:16:09 AM  [employer]

## 2024-01-02 LAB
FINAL PATHOLOGIC DIAGNOSIS: NORMAL
Lab: NORMAL

## 2024-01-10 ENCOUNTER — INFUSION (OUTPATIENT)
Dept: INFUSION THERAPY | Facility: HOSPITAL | Age: 59
End: 2024-01-10
Attending: INTERNAL MEDICINE
Payer: MEDICARE

## 2024-01-10 VITALS
DIASTOLIC BLOOD PRESSURE: 72 MMHG | SYSTOLIC BLOOD PRESSURE: 112 MMHG | HEART RATE: 68 BPM | TEMPERATURE: 97 F | HEIGHT: 67 IN | RESPIRATION RATE: 16 BRPM | BODY MASS INDEX: 28.34 KG/M2 | WEIGHT: 180.56 LBS | OXYGEN SATURATION: 97 %

## 2024-01-10 DIAGNOSIS — C79.51 SECONDARY MALIGNANT NEOPLASM OF BONE: ICD-10-CM

## 2024-01-10 DIAGNOSIS — C50.111 MALIGNANT NEOPLASM OF CENTRAL PORTION OF RIGHT BREAST IN FEMALE, ESTROGEN RECEPTOR POSITIVE: Primary | ICD-10-CM

## 2024-01-10 DIAGNOSIS — Z17.0 MALIGNANT NEOPLASM OF CENTRAL PORTION OF RIGHT BREAST IN FEMALE, ESTROGEN RECEPTOR POSITIVE: Primary | ICD-10-CM

## 2024-01-10 PROCEDURE — 96365 THER/PROPH/DIAG IV INF INIT: CPT | Mod: HCNC

## 2024-01-10 PROCEDURE — 63600175 PHARM REV CODE 636 W HCPCS: Mod: HCNC | Performed by: INTERNAL MEDICINE

## 2024-01-10 RX ORDER — HEPARIN 100 UNIT/ML
500 SYRINGE INTRAVENOUS
Status: CANCELLED | OUTPATIENT
Start: 2024-01-10

## 2024-01-10 RX ORDER — SODIUM CHLORIDE 0.9 % (FLUSH) 0.9 %
10 SYRINGE (ML) INJECTION
Status: CANCELLED | OUTPATIENT
Start: 2024-01-10

## 2024-01-10 RX ORDER — ZOLEDRONIC ACID 0.04 MG/ML
4 INJECTION, SOLUTION INTRAVENOUS
Status: COMPLETED | OUTPATIENT
Start: 2024-01-10 | End: 2024-01-10

## 2024-01-10 RX ORDER — SODIUM CHLORIDE 0.9 % (FLUSH) 0.9 %
10 SYRINGE (ML) INJECTION
Status: DISCONTINUED | OUTPATIENT
Start: 2024-01-10 | End: 2024-01-10 | Stop reason: HOSPADM

## 2024-01-10 RX ADMIN — ZOLEDRONIC ACID 4 MG: 0.04 INJECTION, SOLUTION INTRAVENOUS at 09:01

## 2024-01-10 NOTE — PLAN OF CARE
Problem: Adult Inpatient Plan of Care  Goal: Plan of Care Review  Outcome: Ongoing, Progressing  Flowsheets (Taken 1/10/2024 0905)  Plan of Care Reviewed With: patient  Goal: Patient-Specific Goal (Individualized)  Outcome: Ongoing, Progressing  Flowsheets (Taken 1/10/2024 0905)  Anxieties, Fears or Concerns: denies  Individualized Care Needs: denies  Goal: Optimal Comfort and Wellbeing  Outcome: Ongoing, Progressing  Intervention: Provide Person-Centered Care  Flowsheets (Taken 1/10/2024 0905)  Trust Relationship/Rapport:   care explained   questions encouraged   choices provided   reassurance provided   emotional support provided   thoughts/feelings acknowledged   empathic listening provided   questions answered     Problem: Fall Injury Risk  Goal: Absence of Fall and Fall-Related Injury  Outcome: Ongoing, Progressing  Intervention: Identify and Manage Contributors  Flowsheets (Taken 1/10/2024 0905)  Self-Care Promotion:   independence encouraged   BADL personal objects within reach  Medication Review/Management: medications reviewed  Intervention: Promote Injury-Free Environment  Flowsheets (Taken 1/10/2024 0905)  Safety Promotion/Fall Prevention:   assistive device/personal item within reach   Fall Risk reviewed with patient/family   medications reviewed   in recliner, wheels locked

## 2024-01-11 ENCOUNTER — OFFICE VISIT (OUTPATIENT)
Dept: NEUROSURGERY | Facility: CLINIC | Age: 59
End: 2024-01-11
Payer: MEDICARE

## 2024-01-11 VITALS — HEART RATE: 81 BPM | DIASTOLIC BLOOD PRESSURE: 71 MMHG | SYSTOLIC BLOOD PRESSURE: 111 MMHG

## 2024-01-11 DIAGNOSIS — M51.34 DEGENERATIVE DISC DISEASE, THORACIC: ICD-10-CM

## 2024-01-11 DIAGNOSIS — M47.814 SPONDYLOSIS WITHOUT MYELOPATHY OR RADICULOPATHY, THORACIC REGION: ICD-10-CM

## 2024-01-11 DIAGNOSIS — Z98.890 STATUS POST KYPHOPLASTY: Primary | ICD-10-CM

## 2024-01-11 DIAGNOSIS — C79.51 METASTATIC CANCER TO SPINE: ICD-10-CM

## 2024-01-11 PROCEDURE — 99213 OFFICE O/P EST LOW 20 MIN: CPT | Mod: HCNC,S$GLB,, | Performed by: NEUROLOGICAL SURGERY

## 2024-01-11 PROCEDURE — 3078F DIAST BP <80 MM HG: CPT | Mod: HCNC,CPTII,S$GLB, | Performed by: NEUROLOGICAL SURGERY

## 2024-01-11 PROCEDURE — 99999 PR PBB SHADOW E&M-EST. PATIENT-LVL II: CPT | Mod: PBBFAC,HCNC,, | Performed by: NEUROLOGICAL SURGERY

## 2024-01-11 PROCEDURE — 3074F SYST BP LT 130 MM HG: CPT | Mod: HCNC,CPTII,S$GLB, | Performed by: NEUROLOGICAL SURGERY

## 2024-01-11 NOTE — PROGRESS NOTES
Subjective:      Patient ID: Carole Moore is a 58 y.o. female.    Chief Complaint: No chief complaint on file.    HPI  Patient is here today for follow-up status post kyphoplasty February 20, 2023  States that she has mid axial back pain without radiation  Symptoms are 3 to 4/10 in severity  Here today with a PET scan for review from November 20, 2023        Previous notes  The patient is here today for postop evaluation #2.  She has good days and days where her pain is mild to moderate.   Localizes her pain to lower T- spine, upper L spine.  She was taking Robaxin but is out. Requests a refill.  Takes this as needed but it is helpful.    Does not report or c/o pain that radiates down her legs or to the front of her spine.   Denies chest pain.  The patient is happy to report that the kyphoplasty helped her pain a lot and she is more active now.    Last note: 2/23/23  The patient is here today for postop evaluation #1.  Pain is at 1/10 today.   Feels like the surgery was very successful in providing back pain relief.  No issues with the incision sites.  Discontinued the oxy and robaxin on Friday.   Denies fever, HA, dizziness, B/B changes, CP, new SOB, calf pain or any other symptoms at this time.      Date of Procedure: 2/9/2023    Procedure: Procedure(s) (LRB):  KYPHOPLASTY (Bilateral)  RADIOFREQUENCY ABLATION,BONE,PERCUTANEOUS (Bilateral)    Operative Findings (including complications, if any): compression fracture T10    Description of Technical Procedures:   T10 kyphoplasty   Radiofrequency ablation           Objective:            General    Nursing note and vitals reviewed.  Constitutional: She is oriented to person, place, and time. She appears well-developed and well-nourished. No distress.   Eyes: EOM are normal. Pupils are equal, round, and reactive to light.   Cardiovascular:  Normal rate and regular rhythm.            Pulmonary/Chest: Effort normal.   Abdominal: Soft.   Neurological: She is  alert and oriented to person, place, and time.   Psychiatric: She has a normal mood and affect. Her behavior is normal.             Physical Exam:  Nursing note and vitals reviewed.    Constitutional: She appears well-developed and well-nourished. She is not diaphoretic. No distress.     Eyes: Pupils are equal, round, and reactive to light. EOM are normal.     Cardiovascular: Normal rate and regular rhythm.     Abdominal: Soft.     Psych/Behavior: She is alert. She is oriented to person, place, and time. She has a normal mood and affect.     Musculoskeletal:        Back: Range of motion is limited. There is tenderness. Muscle strength is 5/5.       Right Lower Extremities: Range of motion is full. There is no tenderness. Muscle strength is 5/5. Tone is normal.        Left Lower Extremities: There is no tenderness. Muscle strength is 5/5. Tone is normal.     Neurological:        Sensory: There is no sensory deficit in the trunk. There is no sensory deficit in the extremities.        Cranial nerves: Cranial nerve(s) II, III, IV, V, VI, VII, VIII, IX, X, XI and XII are intact.               X-rays:  FINDINGS:  Compression deformity of T10 noted with evidence of prior vertebroplasty.  No other compression deformity or acute fracture seen. Bones appear somewhat demineralized. The left pedicle of T8 is poorly visualized.     Alignment is within normal limits.   Intervertebral disc heights are relatively well preserved.  Mild multilevel degenerative endplate osteophytosis noted.   Visualized soft tissues are unremarkable.        Impression:   Chronic appearing compression deformity of T10 with evidence of prior vertebroplasty.   Indistinct left pedicle of T8, suspicious for metastatic disease.    Assessment:       Encounter Diagnoses   Name Primary?    Status post kyphoplasty Yes    Metastatic cancer to spine     Degenerative disc disease, thoracic     Spondylosis without myelopathy or radiculopathy, thoracic region            Plan:       Diagnoses and all orders for this visit:    Status post kyphoplasty  -     MRI Thoracic Spine W WO Cont; Future  -     MRI Lumbar Spine W WO Contrast; Future    Metastatic cancer to spine  -     MRI Thoracic Spine W WO Cont; Future  -     MRI Lumbar Spine W WO Contrast; Future    Degenerative disc disease, thoracic    Spondylosis without myelopathy or radiculopathy, thoracic region  -     MRI Thoracic Spine W WO Cont; Future  -     MRI Lumbar Spine W WO Contrast; Future    Patient is status post kyphoplasty for metastatic lesions  Will repeat MRI of the thoracic and lumbar spine for routine maintenance  Pain remained stable without any concerning radicular symptoms-  She will have follow-up after her repeat imaging is done    Thank you for the referral   Please call with any questions    Josse Pelayo MD  Neurosurgery     Disclaimer: This note was prepared using a voice recognition system and is likely to have sound alike errors within the text.

## 2024-01-19 ENCOUNTER — HOSPITAL ENCOUNTER (OUTPATIENT)
Dept: RADIOLOGY | Facility: HOSPITAL | Age: 59
Discharge: HOME | End: 2024-01-19
Attending: NEUROLOGICAL SURGERY
Payer: MEDICARE

## 2024-01-19 DIAGNOSIS — M47.814 SPONDYLOSIS WITHOUT MYELOPATHY OR RADICULOPATHY, THORACIC REGION: ICD-10-CM

## 2024-01-19 DIAGNOSIS — Z98.890 STATUS POST KYPHOPLASTY: ICD-10-CM

## 2024-01-19 DIAGNOSIS — C79.51 METASTATIC CANCER TO SPINE: ICD-10-CM

## 2024-01-19 PROCEDURE — 25500020 PHARM REV CODE 255: Mod: PN | Performed by: NEUROLOGICAL SURGERY

## 2024-01-19 PROCEDURE — 72157 MRI CHEST SPINE W/O & W/DYE: CPT | Mod: TC,PN

## 2024-01-19 PROCEDURE — 72158 MRI LUMBAR SPINE W/O & W/DYE: CPT | Mod: TC,PN

## 2024-01-19 PROCEDURE — A9585 GADOBUTROL INJECTION: HCPCS | Mod: PN | Performed by: NEUROLOGICAL SURGERY

## 2024-01-19 PROCEDURE — 72157 MRI CHEST SPINE W/O & W/DYE: CPT | Mod: 26,,, | Performed by: RADIOLOGY

## 2024-01-19 PROCEDURE — 72158 MRI LUMBAR SPINE W/O & W/DYE: CPT | Mod: 26,,, | Performed by: RADIOLOGY

## 2024-01-19 RX ORDER — GADOBUTROL 604.72 MG/ML
8 INJECTION INTRAVENOUS
Status: COMPLETED | OUTPATIENT
Start: 2024-01-19 | End: 2024-01-19

## 2024-01-19 RX ADMIN — GADOBUTROL 8 ML: 604.72 INJECTION INTRAVENOUS at 10:01

## 2024-01-22 ENCOUNTER — TELEPHONE (OUTPATIENT)
Dept: HEMATOLOGY/ONCOLOGY | Facility: CLINIC | Age: 59
End: 2024-01-22
Payer: MEDICARE

## 2024-01-22 ENCOUNTER — INFUSION (OUTPATIENT)
Dept: INFUSION THERAPY | Facility: HOSPITAL | Age: 59
End: 2024-01-22
Attending: INTERNAL MEDICINE
Payer: MEDICARE

## 2024-01-22 ENCOUNTER — OFFICE VISIT (OUTPATIENT)
Dept: HEMATOLOGY/ONCOLOGY | Facility: CLINIC | Age: 59
End: 2024-01-22
Payer: MEDICARE

## 2024-01-22 VITALS
SYSTOLIC BLOOD PRESSURE: 90 MMHG | HEART RATE: 80 BPM | OXYGEN SATURATION: 97 % | RESPIRATION RATE: 20 BRPM | HEIGHT: 67 IN | DIASTOLIC BLOOD PRESSURE: 57 MMHG | WEIGHT: 177 LBS | BODY MASS INDEX: 27.78 KG/M2 | TEMPERATURE: 97 F

## 2024-01-22 VITALS
RESPIRATION RATE: 18 BRPM | TEMPERATURE: 98 F | HEART RATE: 71 BPM | DIASTOLIC BLOOD PRESSURE: 65 MMHG | OXYGEN SATURATION: 97 % | SYSTOLIC BLOOD PRESSURE: 97 MMHG

## 2024-01-22 DIAGNOSIS — C50.111 MALIGNANT NEOPLASM OF CENTRAL PORTION OF RIGHT BREAST IN FEMALE, ESTROGEN RECEPTOR POSITIVE: Primary | ICD-10-CM

## 2024-01-22 DIAGNOSIS — C79.51 SECONDARY CANCER OF BONE: ICD-10-CM

## 2024-01-22 DIAGNOSIS — Z17.0 MALIGNANT NEOPLASM OF CENTRAL PORTION OF RIGHT BREAST IN FEMALE, ESTROGEN RECEPTOR POSITIVE: Primary | ICD-10-CM

## 2024-01-22 PROCEDURE — 63600175 PHARM REV CODE 636 W HCPCS: Mod: JZ,JG | Performed by: NURSE PRACTITIONER

## 2024-01-22 PROCEDURE — 3008F BODY MASS INDEX DOCD: CPT | Mod: CPTII,S$GLB,, | Performed by: NURSE PRACTITIONER

## 2024-01-22 PROCEDURE — 3078F DIAST BP <80 MM HG: CPT | Mod: CPTII,S$GLB,, | Performed by: NURSE PRACTITIONER

## 2024-01-22 PROCEDURE — 3074F SYST BP LT 130 MM HG: CPT | Mod: CPTII,S$GLB,, | Performed by: NURSE PRACTITIONER

## 2024-01-22 PROCEDURE — 1160F RVW MEDS BY RX/DR IN RCRD: CPT | Mod: CPTII,S$GLB,, | Performed by: NURSE PRACTITIONER

## 2024-01-22 PROCEDURE — 1159F MED LIST DOCD IN RCRD: CPT | Mod: CPTII,S$GLB,, | Performed by: NURSE PRACTITIONER

## 2024-01-22 PROCEDURE — 99999 PR PBB SHADOW E&M-EST. PATIENT-LVL IV: CPT | Mod: PBBFAC,,, | Performed by: NURSE PRACTITIONER

## 2024-01-22 PROCEDURE — 99215 OFFICE O/P EST HI 40 MIN: CPT | Mod: 25,S$GLB,, | Performed by: NURSE PRACTITIONER

## 2024-01-22 PROCEDURE — 96402 CHEMO HORMON ANTINEOPL SQ/IM: CPT

## 2024-01-22 RX ORDER — LAMOTRIGINE 25 MG/1
500 TABLET ORAL
Status: CANCELLED | OUTPATIENT
Start: 2024-01-22

## 2024-01-22 RX ORDER — LAMOTRIGINE 25 MG/1
500 TABLET ORAL
Status: COMPLETED | OUTPATIENT
Start: 2024-01-22 | End: 2024-01-22

## 2024-01-22 RX ADMIN — FULVESTRANT 500 MG: 50 INJECTION, SOLUTION INTRAMUSCULAR at 10:01

## 2024-01-22 NOTE — PROGRESS NOTES
Subjective:       Patient ID: Carole Moore is a 58 y.o. female.    Chief Complaint:   1. Malignant neoplasm of central portion of right breast in female, estrogen receptor positive        2. Secondary cancer of bone          Current Treatment:   OP PALBOCICLIB FULVESTRANT Q4W    OP ZOLEDRONIC ACID (ZOMETA) every 3 months; due 4/2024    Treatment History:    HPI: This is a 58 year old  woman with diverticulosis, CVA with right sided weakness, and hyperlipidemia who is seen in Hem/Onc for metastatic right breast cancer. She was initially diagnosed with Stage IIIA  invasive lobular carcinoma, ER/DE positive, Ivz3Oah negative. Given the large mass on physical exam, she was treated with DDAC/T followed by right mastectomy with SLN biopsy in 4/2017 with tissue expander placement.  Final pathology after neoadjuvant chemotherapy showed tumor 6cm, sentinel LNs positive, and nipple skin positive so she was treated with adjuvant XRT which she completed in 2017. She was then started on Arimidex in 7/2017 which was discontinued in 8/2022 when metastatic disease was noted.     She is currently on Ibrance on days 1-21 every 35 days/Faslodex every 4 weeks.      Her primary Hematologist/Oncologist is Dr. Khan.    Interval History: Patient presents for follow up on Ibrance/Faslodex; She is scheduled to receive C17D1 today. She presents alone and reports back pain 4/10; she states her pain medication is effective. She plans to see her back doctor this Thursday. She also reports feeling fatigued; reviewed labs with her that reveal mild anemia. Also discussed that in addition to chemo causing anemia, it can also cause fatigue separate from anemia. She verbalizes understanding. WBC 1.92; she completed her current cycle of Ibrance last week. CMP stable. Will continue to monitor. Will proceed with Faslodex today.     Reviewed labs with patient:   CBC:   Recent Labs   Lab 01/22/24  0837   WBC 1.92 LL   RBC 3.67 L    Hemoglobin 12.4   Hematocrit 35.9 L   Platelets 165   MCV 98   MCH 33.8 H   MCHC 34.5     CMP:  Recent Labs   Lab 01/22/24  0837   Glucose 87   Calcium 9.6   Albumin 3.8   Total Protein 7.0   Sodium 142   Potassium 4.3   CO2 28   Chloride 106   BUN 12   Creatinine 0.8   Alkaline Phosphatase 49 L   ALT 15   AST 15   Total Bilirubin 0.6       Social History     Socioeconomic History    Marital status:    Tobacco Use    Smoking status: Never    Smokeless tobacco: Never   Substance and Sexual Activity    Alcohol use: Never    Drug use: No    Sexual activity: Not Currently     Birth control/protection: Surgical   Other Topics Concern    Are you pregnant or think you may be? No    Breast-feeding No     Social Determinants of Health     Financial Resource Strain: Low Risk  (11/19/2023)    Overall Financial Resource Strain (CARDIA)     Difficulty of Paying Living Expenses: Not hard at all   Food Insecurity: No Food Insecurity (11/19/2023)    Hunger Vital Sign     Worried About Running Out of Food in the Last Year: Never true     Ran Out of Food in the Last Year: Never true   Transportation Needs: No Transportation Needs (11/19/2023)    PRAPARE - Transportation     Lack of Transportation (Medical): No     Lack of Transportation (Non-Medical): No   Physical Activity: Inactive (11/19/2023)    Exercise Vital Sign     Days of Exercise per Week: 0 days     Minutes of Exercise per Session: 0 min   Stress: No Stress Concern Present (11/19/2023)    [employer] [place] [employer] - Occupational Stress Questionnaire     Feeling of Stress : Only a little   Recent Concern: Stress - Stress Concern Present (8/29/2023)    [employer] [place] [employer] - Occupational Stress Questionnaire     Feeling of Stress : To some extent   Social Connections: Moderately Integrated (11/19/2023)    Social Connection and Isolation Panel [NHANES]     Frequency of Communication with Friends and Family: More than three times a  week     Frequency of Social Gatherings with Friends and Family: More than three times a week     Attends [employer] Services: More than 4 times per year     Active Member of Clubs or Organizations: No     Attends Club or Organization Meetings: Never     Marital Status:    Housing Stability: Low Risk  (11/19/2023)    Housing Stability Vital Sign     Unable to Pay for Housing in the Last Year: No     Number of Places Lived in the Last Year: 1     Unstable Housing in the Last Year: No     Past Medical History:   Diagnosis Date    Antineoplastic chemotherapy induced anemia     Breast cancer 2016    right breast    Cancer     R Breast cancer    CVA (cerebral infarction)     Diverticulosis     Family history of colon cancer 10/30/2018    Her mother and father both had colon cancer.    Genetic testing of female 12/27/2016    Emotient (28 genes) - NEGATIVE    Hyperlipidemia     Immunodeficiency due to chemotherapy 10/03/2022    Nausea after anesthesia     Paralysis     right side    PONV (postoperative nausea and vomiting)     Stroke 2001    R side weakness    Trouble in sleeping      Family History   Problem Relation Age of Onset    Breast cancer Paternal Aunt     Colon cancer Father     Colon cancer Mother     Melanoma Maternal Uncle     Skin cancer Maternal Uncle      Past Surgical History:   Procedure Laterality Date    AUGMENTATION OF BREAST Left 2017    BREAST SURGERY      COLONOSCOPY N/A 10/30/2018    Procedure: COLONOSCOPY;  Surgeon: Cosme Monson MD;  Location: [facility];  Service: Endoscopy;  Laterality: N/A;    CYSTOSCOPY WITH BIOPSY OF BLADDER N/A 11/29/2021    Procedure: CYSTOSCOPY, WITH BLADDER BIOPSY, WITH FULGURATION IF INDICATED;  Surgeon: Page Marcelo MD;  Location: [facility] OR;  Service: Urology;  Laterality: N/A;    FIXATION KYPHOPLASTY Bilateral 2/9/2023    Procedure: KYPHOPLASTY;  Surgeon: Josse Pelayo MD;  Location: [facility] OR;  Service: Neurosurgery;  Laterality: Bilateral;   Kyphoplasty T10 with ablation    HYSTERECTOMY  2007    maintains both ovaries, menorrhagia    MASTECTOMY Right 2017    MEDIPORT INSERTION, SINGLE      RADIOFREQUENCY ABLATION, BONE, PERCUTANEOUS Bilateral 2/9/2023    Procedure: RADIOFREQUENCY ABLATION,BONE,PERCUTANEOUS;  Surgeon: Josse Pelayo MD;  Location[facility];  Service: Neurosurgery;  Laterality: Bilateral;    TONSILLECTOMY       Review of Systems   Constitutional:  Positive for fatigue. Negative for appetite change.   HENT:  Negative for mouth sores, rhinorrhea and sore throat.    Eyes: Negative.    Respiratory: Negative.     Cardiovascular: Negative.    Gastrointestinal:  Negative for constipation, diarrhea, nausea and vomiting.   Endocrine: Negative.    Genitourinary: Negative.    Musculoskeletal:  Positive for back pain (4/10).   Integumentary:  Negative.   Allergic/Immunologic: Negative.    Neurological:  Negative for weakness and numbness.   Hematological: Negative.    Psychiatric/Behavioral: Negative.         Medication List with Changes/Refills   Current Medications    ASCORBIC ACID, VITAMIN C, ORAL    Take by mouth once daily.    ASPIRIN (ECOTRIN) 81 MG EC TABLET    Take 81 mg by mouth once daily.    ATORVASTATIN (LIPITOR) 10 MG TABLET    Take 1 tablet (10 mg total) by mouth once daily.    CALCIUM CARBONATE ORAL    Take 1 tablet by mouth once daily.    CETIRIZINE (ZYRTEC) 10 MG TABLET    Take 10 mg by mouth daily as needed.    CHOLECALCIFEROL, VITAMIN D3, ORAL    Take by mouth once daily.    FLUBLOK QUAD 4862-9642, PF, 180 MCG (45 MCG X 4)/0.5 ML SYRG        FULVESTRANT (FASLODEX) 250 MG/5 ML INJECTION    Inject 250 mg into the muscle every 30 days.    GADAVIST 10 MMOL/10 ML (1 MMOL/ML) SOLN INJECTION    every 6 (six) months. Once yearly    MECLIZINE (ANTIVERT) 25 MG TABLET    Take 25 mg by mouth 3 (three) times daily as needed.    METHEN-M.BLUE-S.PHOS-PHSAL-HYO (URIBEL) 118-10-40.8-36 MG CAP    Take 1 po Q6 hours prn bladder pain     METHOCARBAMOL (ROBAXIN) 750 MG TAB    Take 1 tablet (750 mg total) by mouth 3 (three) times daily.    MULTIVITAMIN CAPSULE    Take 1 capsule by mouth once daily.    NITROFURANTOIN, MACROCRYSTAL-MONOHYDRATE, (MACROBID) 100 MG CAPSULE    Take 1 capsule (100 mg total) by mouth 2 (two) times daily.    ONDANSETRON (ZOFRAN) 4 MG TABLET    Take 1 tablet (4 mg total) by mouth every 8 (eight) hours as needed for Nausea.    OXYCODONE-ACETAMINOPHEN (PERCOCET)  MG PER TABLET    Take 1 tablet by mouth every 6 (six) hours as needed for Pain.    PALBOCICLIB (IBRANCE) 125 MG CAP    Take 1 capsule (125 mg) by mouth once daily on days 1-21 of a 35-day cycle.    POLYETHYLENE GLYCOL (GLYCOLAX) 17 GRAM/DOSE POWDER    Take 17 g by mouth once daily.    PROCHLORPERAZINE (COMPAZINE) 5 MG TABLET    TAKE 1 TABLET BY MOUTH 4 TIMES DAILY AS NEEDED FOR NAUSEA    SPIKEVAX 1732-2380,12Y UP,,PF, 50 MCG/0.5 ML INJECTION        ZOLEDRONIC 4 MG/100 ML PGBK INFUSION         Objective:     Vitals:    01/22/24 0905   BP: (!) 90/57   Pulse: 80   Resp: 20   Temp: 97.4 °F (36.3 °C)     Physical Exam  Vitals reviewed.   Constitutional:       Appearance: Normal appearance.   HENT:      Head: Normocephalic.      Mouth/Throat:      Comments:     Eyes:      Extraocular Movements: Extraocular movements intact.      Pupils: Pupils are equal, round, and reactive to light.   Cardiovascular:      Rate and Rhythm: Normal rate and regular rhythm.      Heart sounds: Normal heart sounds.   Pulmonary:      Effort: Pulmonary effort is normal.      Breath sounds: Normal breath sounds.   Abdominal:      General: Bowel sounds are normal.      Palpations: Abdomen is soft.      Comments: rounded     Genitourinary:     Comments: deferred    Musculoskeletal:      Cervical back: Normal range of motion and neck supple.      Comments: Right sided upper extremity paralysis   Skin:     General: Skin is warm and dry.   Neurological:      Mental Status: She is alert and oriented  to person, place, and time.   Psychiatric:         Behavior: Behavior normal.         Thought Content: Thought content normal.          (2) Ambulatory and capable of self care, unable to carry out work activity, up and about > 50% or waking hours  Assessment:     Problem List Items Addressed This Visit          Oncology    Malignant neoplasm of central portion of right breast in female, estrogen receptor positive - Primary     Diagnosed in 2017 with Stage IIIA right breast invasive lobular carcinoma. Treated with DDACT followed by right mastectomy with SLN biopsy. Final path revealed skin positivity; she was treated with XRT followed by Arimidex which was continued until bone mets was discovered in 8/2022. Currently on Ibrance/Faslodex.          Secondary cancer of bone     Currently on Zometa every 3 months.           Plan:     Malignant neoplasm of central portion of right breast in female, estrogen receptor positive    Secondary cancer of bone    Labs reviewed.   Ok to proceed with C17D1 of Faslodex today.  Follow up in 4 weeks with Dr. Khan with CBC and Comprehensive Metabolic Panel prior to C18D1.    Route Chart for Scheduling    Med Onc Chart Routing      Follow up with physician 4 weeks. Dr. Khan   Follow up with [facility]    Infusion scheduling note    Injection scheduling note in 4 weeks for C18D1 Faslodex   Labs CBC and CMP   Scheduling:  Preferred lab:  Lab interval:  in 4 weeks   Imaging None      Pharmacy appointment No pharmacy appointment needed      Other referrals       No additional referrals needed             I will review assessment/plan with collaborating physician.      MAREK Souza

## 2024-01-22 NOTE — PLAN OF CARE
Discussed plan of care with pt. Addressed any and ongoing concerns. Pt denies    Problem: Adult Inpatient Plan of Care  Goal: Plan of Care Review  Outcome: Ongoing, Progressing  Flowsheets (Taken 1/22/2024 1005)  Plan of Care Reviewed With: patient  Goal: Absence of Hospital-Acquired Illness or Injury  Outcome: Ongoing, Progressing  Intervention: Identify and Manage Fall Risk  Flowsheets (Taken 1/22/2024 1005)  Safety Promotion/Fall Prevention:   in recliner, wheels locked   nonskid shoes/socks when out of bed   room near unit station  Intervention: Prevent Infection  Flowsheets (Taken 1/22/2024 1005)  Infection Prevention:   equipment surfaces disinfected   hand hygiene promoted  Goal: Optimal Comfort and Wellbeing  Outcome: Ongoing, Progressing  Intervention: Monitor Pain and Promote Comfort  Flowsheets (Taken 1/22/2024 1005)  Pain Management Interventions: quiet environment facilitated  Intervention: Provide Person-Centered Care  Flowsheets (Taken 1/22/2024 1005)  Trust Relationship/Rapport:   care explained   questions encouraged   reassurance provided   choices provided   emotional support provided   thoughts/feelings acknowledged   empathic listening provided   questions answered

## 2024-01-22 NOTE — ASSESSMENT & PLAN NOTE
Diagnosed in 2017 with Stage IIIA right breast invasive lobular carcinoma. Treated with DDACT followed by right mastectomy with SLN biopsy. Final path revealed skin positivity; she was treated with XRT followed by Arimidex which was continued until bone mets was discovered in 8/2022. Currently on Ibrance/Faslodex.

## 2024-01-25 ENCOUNTER — OFFICE VISIT (OUTPATIENT)
Dept: NEUROSURGERY | Facility: CLINIC | Age: 59
End: 2024-01-25
Payer: MEDICARE

## 2024-01-25 ENCOUNTER — PATIENT MESSAGE (OUTPATIENT)
Dept: ORTHOPEDICS | Facility: CLINIC | Age: 59
End: 2024-01-25
Payer: MEDICARE

## 2024-01-25 VITALS — SYSTOLIC BLOOD PRESSURE: 104 MMHG | DIASTOLIC BLOOD PRESSURE: 69 MMHG | HEART RATE: 88 BPM

## 2024-01-25 DIAGNOSIS — C79.51 METASTATIC CANCER TO BONE: Primary | ICD-10-CM

## 2024-01-25 DIAGNOSIS — M89.9 LYTIC BONE LESIONS ON XRAY: Primary | ICD-10-CM

## 2024-01-25 PROCEDURE — 99999 PR PBB SHADOW E&M-EST. PATIENT-LVL II: CPT | Mod: PBBFAC,,, | Performed by: PHYSICIAN ASSISTANT

## 2024-01-25 PROCEDURE — 99214 OFFICE O/P EST MOD 30 MIN: CPT | Mod: S$GLB,,, | Performed by: PHYSICIAN ASSISTANT

## 2024-01-25 PROCEDURE — 3074F SYST BP LT 130 MM HG: CPT | Mod: CPTII,S$GLB,, | Performed by: PHYSICIAN ASSISTANT

## 2024-01-25 PROCEDURE — 3078F DIAST BP <80 MM HG: CPT | Mod: CPTII,S$GLB,, | Performed by: PHYSICIAN ASSISTANT

## 2024-01-25 RX ORDER — TIZANIDINE 4 MG/1
4 TABLET ORAL EVERY 6 HOURS PRN
Qty: 30 TABLET | Refills: 0 | Status: SHIPPED | OUTPATIENT
Start: 2024-01-25 | End: 2024-02-04

## 2024-01-25 NOTE — PROGRESS NOTES
Patient is a 57 yo f with previous stroke and right sided paresis who presents s/p T8 kyphoplasty in February of last year. Patient is undergoing chemo and is s/p spinal radiation. She endorses back pain with shooting pain into her hips. Each month she get two shots of chemo into her hip. Patient states she noted improvement of symptoms for about a month and a half after surgery. Her symptoms have now returned and notes severe pain. Patient denies BB dysfunction or focal weakness. 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 She has a foot brace for her right foot drop. She is taking narcotic given by her oncologist.                Pertinent positive and negative ROS documented above in HPI, all other systems reviewed and found to be negative.         Constitutional/ General: Awake, alert, oriented X 3. Sitting upright in No acute distress. Well developed/well nourished    Neck: trachea midline.    Respiratory: No increased work of breathing on room air. Chest expansion equal and symmetric.    Neuro: AAO X3 speech is fluent and appripriate CN II-XII grossly intact throughout. EOMI. Face symmetric tongue midline. Shoulder shrug equal and intact BL. Follows commands and moves all extremities antigravity.    Extremities:No cyanosis clubbing or edema. Ambulating without issues.         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 I discussed this at length with patient and significant other. I called to place referral to orthopedic oncologist Dr. Bola Harrell MD.    I spoke with a  who will place call to patient. I also gave the patient this information so that she could call and make appt.      Patient is neurologically stable     Attestation:  Maame BECKER PA-C have obtained HPI, performed Physical Examination on the above patient, reviewed the pertinent labs, tests, imaging, other relevant data  and recorded my findings in this clinic note.

## 2024-01-29 ENCOUNTER — HOSPITAL ENCOUNTER (OUTPATIENT)
Dept: RADIOLOGY | Facility: HOSPITAL | Age: 59
Discharge: HOME | End: 2024-01-29
Attending: ORTHOPAEDIC SURGERY
Payer: MEDICARE

## 2024-01-29 ENCOUNTER — OFFICE VISIT (OUTPATIENT)
Dept: ORTHOPEDICS | Facility: CLINIC | Age: 59
End: 2024-01-29
Payer: MEDICARE

## 2024-01-29 ENCOUNTER — TELEPHONE (OUTPATIENT)
Dept: ORTHOPEDICS | Facility: CLINIC | Age: 59
End: 2024-01-29
Payer: MEDICARE

## 2024-01-29 DIAGNOSIS — M89.9 LYTIC BONE LESIONS ON XRAY: ICD-10-CM

## 2024-01-29 DIAGNOSIS — C79.51 METASTATIC CANCER TO BONE: Primary | ICD-10-CM

## 2024-01-29 DIAGNOSIS — C79.51 METASTATIC CANCER TO BONE: ICD-10-CM

## 2024-01-29 PROCEDURE — 99999 PR PBB SHADOW E&M-EST. PATIENT-LVL I: CPT | Mod: PBBFAC,,, | Performed by: ORTHOPAEDIC SURGERY

## 2024-01-29 PROCEDURE — 72170 X-RAY EXAM OF PELVIS: CPT | Mod: 26,,, | Performed by: RADIOLOGY

## 2024-01-29 PROCEDURE — 73552 X-RAY EXAM OF FEMUR 2/>: CPT | Mod: TC,50

## 2024-01-29 PROCEDURE — 73552 X-RAY EXAM OF FEMUR 2/>: CPT | Mod: 26,50,, | Performed by: RADIOLOGY

## 2024-01-29 PROCEDURE — 72170 X-RAY EXAM OF PELVIS: CPT | Mod: TC

## 2024-01-29 PROCEDURE — 99203 OFFICE O/P NEW LOW 30 MIN: CPT | Mod: S$GLB,,, | Performed by: ORTHOPAEDIC SURGERY

## 2024-01-29 NOTE — PROGRESS NOTES
Orthopaedic Oncology New Patient Visit:      Dear Self, Aaareferral  No address on file and Angel Emery MD,    We had the pleasure of evaluating Carole Moore in the Orthopaedic Clinic today at the [facility] today.      Chief Complaint: metastatic breast carcinoma, left proximal femur lesion increasing avidity on PET, pelvic bone lesions on PET    As you may recall, Carole Moore is a 58 y.o. female who has known metastatic breast carcinoma to bone presenting for evaluation of her metastatic bone disease. She was initially diagnosed with breast cancer in 2016 in the right breast treated with mastectomy and systemic therapy. Then in 2022/2023 she developed back pain and was found to have lesions in her spine concerning from recurrent disease now metastatic to the spine. She has been followed by spine for her spine involvement. She had a kyphoplasty about a year prior and had radiation post operatively. She denies any bowel or bladder incontinence. She tells me her pain improved significantly since this procedure. She is on infusions now. She has history of right sided stroke and baseline foot drop on the right side for which she wears an AFO back in 2001. She tells me they are unsure of the cause of stroke. She takes a baby aspirin daily for this. She did a significant amount of therapy after this and has limited right upper extremity function and ambulates with an AFO. She uses a cane more for balance for longer distances. She had radiation to the spine and no other areas. She is on chemotherapy and her oncologist is Dr. Khan. She tells me she has bilateral lateral hip/buttock pain that occurs usually around time of her infusions. It is not necessarily activity related. She says is 4/10. Tends to come and go. She denies any groin pain or proximal thigh pain associated with activities. She has not had any radiation to areas other than the spine. No recent physical therapy. She  is able to ambulate at her baseline. Pain has not worsened. No associated trauma. Pretty equal amount between right and left side.     PMH:   Past Medical History:   Diagnosis Date    Antineoplastic chemotherapy induced anemia     Breast cancer 2016    right breast    Cancer     R Breast cancer    CVA (cerebral infarction)     Diverticulosis     Family history of colon cancer 10/30/2018    Her mother and father both had colon cancer.    Genetic testing of female 12/27/2016    Myriad Yanelis (28 genes) - NEGATIVE    Hyperlipidemia     Immunodeficiency due to chemotherapy 10/03/2022    Nausea after anesthesia     Paralysis     right side    PONV (postoperative nausea and vomiting)     Stroke 2001    R side weakness    Trouble in sleeping        PSH:  has a past surgical history that includes Tonsillectomy; Mediport insertion, single; Breast surgery; Colonoscopy (N/A, 10/30/2018); Mastectomy (Right, 2017); Augmentation of breast (Left, 2017); Hysterectomy (2007); Cystoscopy with biopsy of bladder (N/A, 11/29/2021); Fixation Kyphoplasty (Bilateral, 2/9/2023); radiofrequency ablation, bone, percutaneous (Bilateral, 2/9/2023); and Colonoscopy (N/A, 12/27/2023).    Allergies:   Allergies as of 01/29/2024    (No Known Allergies)       Medications:    Current Outpatient Medications:     ASCORBIC ACID, VITAMIN C, ORAL, Take by mouth once daily., Disp: , Rfl:     aspirin (ECOTRIN) 81 MG EC tablet, Take 81 mg by mouth once daily., Disp: , Rfl:     atorvastatin (LIPITOR) 10 MG tablet, Take 1 tablet (10 mg total) by mouth once daily., Disp: 90 tablet, Rfl: 4    CALCIUM CARBONATE ORAL, Take 1 tablet by mouth once daily., Disp: , Rfl:     cetirizine (ZYRTEC) 10 MG tablet, Take 10 mg by mouth daily as needed., Disp: , Rfl:     CHOLECALCIFEROL, VITAMIN D3, ORAL, Take by mouth once daily., Disp: , Rfl:     FLUBLOK QUAD 4984-2133, PF, 180 mcg (45 mcg x 4)/0.5 mL Syrg, , Disp: , Rfl:     fulvestrant (FASLODEX) 250 mg/5 mL injection,  Inject 250 mg into the muscle every 30 days., Disp: , Rfl:     GADAVIST 10 mmol/10 mL (1 mmol/mL) Soln injection, every 6 (six) months. Once yearly, Disp: , Rfl:     meclizine (ANTIVERT) 25 mg tablet, Take 25 mg by mouth 3 (three) times daily as needed., Disp: , Rfl:     methen-m.blue-s.phos-phsal-hyo (URIBEL) 118-10-40.8-36 mg Cap, Take 1 po Q6 hours prn bladder pain, Disp: 30 capsule, Rfl: 1    methocarbamoL (ROBAXIN) 750 MG Tab, Take 1 tablet (750 mg total) by mouth 3 (three) times daily., Disp: 60 tablet, Rfl: 3    multivitamin capsule, Take 1 capsule by mouth once daily., Disp: , Rfl:     nitrofurantoin, macrocrystal-monohydrate, (MACROBID) 100 MG capsule, Take 1 capsule (100 mg total) by mouth 2 (two) times daily., Disp: 14 capsule, Rfl: 0    ondansetron (ZOFRAN) 4 MG tablet, Take 1 tablet (4 mg total) by mouth every 8 (eight) hours as needed for Nausea. (Patient not taking: Reported on 1/25/2024), Disp: 20 tablet, Rfl: 11    oxyCODONE-acetaminophen (PERCOCET)  mg per tablet, Take 1 tablet by mouth every 6 (six) hours as needed for Pain., Disp: 30 tablet, Rfl: 0    palbociclib (IBRANCE) 125 mg Cap, Take 1 capsule (125 mg) by mouth once daily on days 1-21 of a 35-day cycle., Disp: 21 capsule, Rfl: 11    polyethylene glycol (GLYCOLAX) 17 gram/dose powder, Take 17 g by mouth once daily., Disp: 510 g, Rfl: 11    prochlorperazine (COMPAZINE) 5 MG tablet, TAKE 1 TABLET BY MOUTH 4 TIMES DAILY AS NEEDED FOR NAUSEA, Disp: 20 tablet, Rfl: 11    SPIKEVAX 7549-6110,12Y UP,,PF, 50 mcg/0.5 mL injection, , Disp: , Rfl:     tiZANidine (ZANAFLEX) 4 MG tablet, Take 1 tablet (4 mg total) by mouth every 6 (six) hours as needed., Disp: 30 tablet, Rfl: 0    zoledronic 4 mg/100 mL PgBk infusion, , Disp: , Rfl:     Family History: family history includes Breast cancer in her paternal aunt; Colon cancer in her father and mother; Melanoma in her maternal uncle; Skin cancer in her maternal uncle.    Social History:  reports that  she has never smoked. She has never used smokeless tobacco. She reports that she does not drink alcohol and does not use drugs.    ROS:  A Complete review of systems was performed.  Pertinent positives are listed in the history of present illness above.  Remainder of review of systems were negative.      Vital Signs:      Physical exam:  General:  AAOX3, No acute distress, normal affect and disposition  Respiratory: Respirations unlabored    Focused examination of the bilateral lower extremity was performed.   Left side she is also able to stand without pain without assistive device  She has some mild pain to palpation about the trochanteric bursa region and a little more posterior than its typical almost in the gluteal region  No palpable masses  No pain with axial load or log roll  No pain with resisted hip flexion  In left side distal motor function is intact, sensation is intact in foot  Right side baseline weakness secondary to prior stroke, also right upper extremity weakness, AFO present, motor exam limited as noted from prior stroke, she is able to stand without assistive device without significant pain - mildly tender to touch about the right trochanteric bursa region  Feet are perfused    Non antalgic gait but baseline modified gait from right sided stroke    Imaging:    Radiographic Data:  AP pelvis and bilateral femur radiographs were obtained in clinic today - there are osseous lesions in the pelvis iliac wings and left proximal femur with a sclerotic border about the lesser trochanter region, there may be some adjacent intramedullary lucency as well in the same region of the proximal diaphysis although not clearly disease, this correlates to PET scan uptake, no pathologic fractures noted, no cortical involvement noted    MRI of the lumbar spine obtained previously in September reviewed and demonstrates some of the pelvic lesions although mostly limited since only lumbar study, as noted in the report no  acute spinal compression, there is various lesions in thoracic and some lumbar vertebral bodies    PET scan 11/2023 - noted to have increased uptake in the left proximal femur which correlates with radiographs and otherwise stable osseous disease, also intake in the pelvis and spine    Assessment/Plan: Carole Moore is a 58 y.o. old female with metastatic breast carcinoma and bilateral mild lateral hip pain     We discussed the findings to date. Given the location of her pain and description of symptoms and bilaterality I am not convinced that her pain is related to osseous disease currently and therefore do not think that at this moment she needs prophylactic fixation for the left femur. I did explain that this may change in the future if her disease progresses or symptoms change. Also if we did need to do a surgery we would time this with her infusion cycles as well. I think that given the location of her pain, it being relatively mild and her baseline altered gait due to her right side stroke that this may be some tendonitis type picture. I offered physical therapy, but do not feel strongly and they said for now would like to hold off on this referral as her pain is mild and they are pretty well versed in PT given how much PT she had for her stroke. I think this is reasonable. I also explained it is possible that some of this pain may be related to her pelvis lesions, but also this is not clear to me based on her symptoms and exam. If this declared itself more clearly discussed for these we typically start with radiation. They had excellent understanding on any concerning signs or symptoms that would warrant them coming to see me sooner like more groin/proximal thigh pain related to activities or just pain that worsens especially with activities. If this occurs they will see me sooner. Also given her spine disease I did review any concerning symptoms from that perspective although they have excellent  spine follow up already. She will continue to use a cane for balance. I will see her back in 6 weeks or sooner should issues arise with repeat radiographs. All questions answered.     Thank you for the referral and the opportunity to participate in the care of your patient.  If you have any questions regarding our treatment recommendations don't hesitate to call.    Electronically signed by:    Ana Harrell MD   Orthopedic Oncology and Complex Arthroplasty Reconstruction  [facility]

## 2024-02-01 ENCOUNTER — PATIENT MESSAGE (OUTPATIENT)
Dept: HEMATOLOGY/ONCOLOGY | Facility: CLINIC | Age: 59
End: 2024-02-01
Payer: MEDICARE

## 2024-02-05 ENCOUNTER — TELEPHONE (OUTPATIENT)
Dept: INFUSION THERAPY | Facility: HOSPITAL | Age: 59
End: 2024-02-05
Payer: MEDICARE

## 2024-02-05 NOTE — TELEPHONE ENCOUNTER
----- Message from Nabeel Welch LPN sent at 2024  1:19 PM CST -----  Contact: Pt sent message via [facility]    ----- Message -----  From: Lucia Perry FNP  Sent: 2024   1:14 PM CST  To: Nabeel Welch LPN    Good afternoon,     I'm not sure why she doesn't have an appointment for February when my note clearly states she should be seen 4 weeks from 2024 when I saw her last. The schedulers need to fix that error and schedule her for the dates she is requesting.     ----- Message -----  From: Nabeel Welch LPN  Sent: 2024   9:03 AM CST  To: MAREK Souza    Patient doesn't have an appointment/injection scheduled until 3/18. But would like to be seen a month ahead. Is this possible? Please advise   ----- Message -----  From: Prema Connelly  Sent: 2024   6:05 AM CST  To: [facility] Staff    ppointment Request  Carole Moore  Sent:  11:54 PM  To: [facility]       Carole Moore  MRN: [id number] : 1965  Pt Home: [phone]    Entered: [phone]      Message    Appointment Request From: Carole Moore    With Provider: MAREK Souza [[facility]]    Preferred Date Range: 2024 - 2024    Preferred Times: Any Time    Reason for visit: Office Visit and Infusion    Comments:  Office Visit and Infusion on 24 because I will be leaving to go out of state on 24.

## 2024-02-19 ENCOUNTER — OFFICE VISIT (OUTPATIENT)
Dept: HEMATOLOGY/ONCOLOGY | Facility: CLINIC | Age: 59
End: 2024-02-19
Payer: MEDICARE

## 2024-02-19 ENCOUNTER — INFUSION (OUTPATIENT)
Dept: INFUSION THERAPY | Facility: HOSPITAL | Age: 59
End: 2024-02-19
Attending: INTERNAL MEDICINE
Payer: MEDICARE

## 2024-02-19 ENCOUNTER — TELEPHONE (OUTPATIENT)
Dept: HEMATOLOGY/ONCOLOGY | Facility: CLINIC | Age: 59
End: 2024-02-19

## 2024-02-19 VITALS
HEART RATE: 89 BPM | TEMPERATURE: 98 F | RESPIRATION RATE: 16 BRPM | HEIGHT: 67 IN | OXYGEN SATURATION: 98 % | BODY MASS INDEX: 27.48 KG/M2 | SYSTOLIC BLOOD PRESSURE: 111 MMHG | WEIGHT: 175.06 LBS | DIASTOLIC BLOOD PRESSURE: 75 MMHG

## 2024-02-19 DIAGNOSIS — D61.810 PANCYTOPENIA DUE TO ANTINEOPLASTIC CHEMOTHERAPY: ICD-10-CM

## 2024-02-19 DIAGNOSIS — T45.1X5A PANCYTOPENIA DUE TO ANTINEOPLASTIC CHEMOTHERAPY: ICD-10-CM

## 2024-02-19 DIAGNOSIS — C50.111 MALIGNANT NEOPLASM OF CENTRAL PORTION OF RIGHT BREAST IN FEMALE, ESTROGEN RECEPTOR POSITIVE: Primary | ICD-10-CM

## 2024-02-19 DIAGNOSIS — Z17.0 MALIGNANT NEOPLASM OF CENTRAL PORTION OF RIGHT BREAST IN FEMALE, ESTROGEN RECEPTOR POSITIVE: Primary | ICD-10-CM

## 2024-02-19 DIAGNOSIS — C79.51 SECONDARY MALIGNANT NEOPLASM OF BONE: ICD-10-CM

## 2024-02-19 DIAGNOSIS — C79.51 SECONDARY CANCER OF BONE: ICD-10-CM

## 2024-02-19 PROCEDURE — 1159F MED LIST DOCD IN RCRD: CPT | Mod: HCNC,CPTII,95, | Performed by: INTERNAL MEDICINE

## 2024-02-19 PROCEDURE — 63600175 PHARM REV CODE 636 W HCPCS: Mod: JZ,JG,HCNC | Performed by: INTERNAL MEDICINE

## 2024-02-19 PROCEDURE — 99214 OFFICE O/P EST MOD 30 MIN: CPT | Mod: 25,HCNC,95, | Performed by: INTERNAL MEDICINE

## 2024-02-19 PROCEDURE — 96402 CHEMO HORMON ANTINEOPL SQ/IM: CPT | Mod: HCNC

## 2024-02-19 PROCEDURE — 1160F RVW MEDS BY RX/DR IN RCRD: CPT | Mod: HCNC,CPTII,95, | Performed by: INTERNAL MEDICINE

## 2024-02-19 RX ORDER — LAMOTRIGINE 25 MG/1
500 TABLET ORAL
Status: COMPLETED | OUTPATIENT
Start: 2024-02-19 | End: 2024-02-19

## 2024-02-19 RX ORDER — LAMOTRIGINE 25 MG/1
500 TABLET ORAL
Status: CANCELLED | OUTPATIENT
Start: 2024-03-18

## 2024-02-19 RX ORDER — LAMOTRIGINE 25 MG/1
500 TABLET ORAL
Status: CANCELLED | OUTPATIENT
Start: 2024-02-19

## 2024-02-19 RX ADMIN — FULVESTRANT 500 MG: 50 INJECTION, SOLUTION INTRAMUSCULAR at 09:02

## 2024-02-19 NOTE — PROGRESS NOTES
Subjective:       Patient ID: Carole Moore is a 58 y.o. female.    Chief Complaint: Results, Chemotherapy, and Breast Cancer    HPI:  58-year-old female history of metastatic breast carcinoma continues on Ibrance and Faslodex doing well no complaints seen in virtual visit ECOG status 1    Past Medical History:   Diagnosis Date    Antineoplastic chemotherapy induced anemia     Breast cancer 2016    right breast    Cancer     R Breast cancer    CVA (cerebral infarction)     Diverticulosis     Family history of colon cancer 10/30/2018    Her mother and father both had colon cancer.    Genetic testing of female 12/27/2016    Insplorion (28 genes) - NEGATIVE    Hyperlipidemia     Immunodeficiency due to chemotherapy 10/03/2022    Nausea after anesthesia     Paralysis     right side    PONV (postoperative nausea and vomiting)     Stroke 2001    R side weakness    Trouble in sleeping      Family History   Problem Relation Age of Onset    Breast cancer Paternal Aunt     Colon cancer Father     Colon cancer Mother     Melanoma Maternal Uncle     Skin cancer Maternal Uncle      Social History     Socioeconomic History    Marital status:    Tobacco Use    Smoking status: Never    Smokeless tobacco: Never   Substance and Sexual Activity    Alcohol use: Never    Drug use: No    Sexual activity: Not Currently     Birth control/protection: Surgical   Other Topics Concern    Are you pregnant or think you may be? No    Breast-feeding No     Social Determinants of Health     Financial Resource Strain: Low Risk  (11/19/2023)    Overall Financial Resource Strain (CARDIA)     Difficulty of Paying Living Expenses: Not hard at all   Food Insecurity: No Food Insecurity (11/19/2023)    Hunger Vital Sign     Worried About Running Out of Food in the Last Year: Never true     Ran Out of Food in the Last Year: Never true   Transportation Needs: No Transportation Needs (11/19/2023)    PRAPARE - Transportation     Lack of  Transportation (Medical): No     Lack of Transportation (Non-Medical): No   Physical Activity: Inactive (11/19/2023)    Exercise Vital Sign     Days of Exercise per Week: 0 days     Minutes of Exercise per Session: 0 min   Stress: No Stress Concern Present (11/19/2023)    [facility] of Occupational Health - Occupational Stress Questionnaire     Feeling of Stress : Only a little   Recent Concern: Stress - Stress Concern Present (8/29/2023)    [facility] of Occupational Health - Occupational Stress Questionnaire     Feeling of Stress : To some extent   Social Connections: Moderately Integrated (11/19/2023)    Social Connection and Isolation Panel [NHANES]     Frequency of Communication with Friends and Family: More than three times a week     Frequency of Social Gatherings with Friends and Family: More than three times a week     Attends Mandaeism Services: More than 4 times per year     Active Member of Clubs or Organizations: No     Attends Club or Organization Meetings: Never     Marital Status:    Housing Stability: Low Risk  (11/19/2023)    Housing Stability Vital Sign     Unable to Pay for Housing in the Last Year: No     Number of Places Lived in the Last Year: 1     Unstable Housing in the Last Year: No     Past Surgical History:   Procedure Laterality Date    AUGMENTATION OF BREAST Left 2017    BREAST SURGERY      COLONOSCOPY N/A 10/30/2018    Procedure: COLONOSCOPY;  Surgeon: Cosme Monson MD;  Location[facility] ENDO;  Service: Endoscopy;  Laterality: N/A;    COLONOSCOPY N/A 12/27/2023    Procedure: COLONOSCOPY;  Surgeon: Caro Leo MD;  Location: [facility];  Service: Endoscopy;  Laterality: N/A;    CYSTOSCOPY WITH BIOPSY OF BLADDER N/A 11/29/2021    Procedure: CYSTOSCOPY, WITH BLADDER BIOPSY, WITH FULGURATION IF INDICATED;  Surgeon: Page Marcelo MD;  Location: [facility] OR;  Service: Urology;  Laterality: N/A;    FIXATION KYPHOPLASTY Bilateral 2/9/2023    Procedure:  "KYPHOPLASTY;  Surgeon: Josse Pelayo MD;  Location: [facility] OR;  Service: Neurosurgery;  Laterality: Bilateral;  Kyphoplasty T10 with ablation    HYSTERECTOMY  2007    maintains both ovaries, menorrhagia    MASTECTOMY Right 2017    MEDIPORT INSERTION, SINGLE      RADIOFREQUENCY ABLATION, BONE, PERCUTANEOUS Bilateral 2/9/2023    Procedure: RADIOFREQUENCY ABLATION,BONE,PERCUTANEOUS;  Surgeon: Josse Pelayo MD;  Location: [facility] OR;  Service: Neurosurgery;  Laterality: Bilateral;    TONSILLECTOMY         Labs:  Lab Results   Component Value Date    WBC 1.92 (LL) 01/22/2024    HGB 12.4 01/22/2024    HCT 35.9 (L) 01/22/2024    MCV 98 01/22/2024     01/22/2024     BMP  Lab Results   Component Value Date     01/22/2024    K 4.3 01/22/2024     01/22/2024    CO2 28 01/22/2024    BUN 12 01/22/2024    CREATININE 0.8 01/22/2024    CALCIUM 9.6 01/22/2024    ANIONGAP 8 01/22/2024    ESTGFRAFRICA >60 05/12/2022    EGFRNONAA >60 05/12/2022     Lab Results   Component Value Date    ALT 15 01/22/2024    AST 15 01/22/2024    ALKPHOS 49 (L) 01/22/2024    BILITOT 0.6 01/22/2024       No results found for: "IRON", "TIBC", "FERRITIN", "SATURATEDIRO"  No results found for: "BRWOUBWW12"  No results found for: "FOLATE"  Lab Results   Component Value Date    TSH 0.793 10/29/2021         Review of Systems   Constitutional:  Negative for activity change, appetite change, chills, diaphoresis, fatigue, fever and unexpected weight change.   HENT:  Negative for congestion, dental problem, drooling, ear discharge, ear pain, facial swelling, hearing loss, mouth sores, nosebleeds, postnasal drip, rhinorrhea, sinus pressure, sneezing, sore throat, tinnitus, trouble swallowing and voice change.    Eyes:  Negative for photophobia, pain, discharge, redness, itching and visual disturbance.   Respiratory:  Negative for cough, choking, chest tightness, shortness of breath, wheezing and stridor.    Cardiovascular:  Negative for chest " pain, palpitations and leg swelling.   Gastrointestinal:  Negative for abdominal distention, abdominal pain, anal bleeding, blood in stool, constipation, diarrhea, nausea, rectal pain and vomiting.   Endocrine: Negative for cold intolerance, heat intolerance, polydipsia, polyphagia and polyuria.   Genitourinary:  Negative for decreased urine volume, difficulty urinating, dyspareunia, dysuria, enuresis, flank pain, frequency, genital sores, hematuria, menstrual problem, pelvic pain, urgency, vaginal bleeding, vaginal discharge and vaginal pain.   Musculoskeletal:  Positive for gait problem. Negative for arthralgias, back pain, joint swelling, myalgias, neck pain and neck stiffness.   Skin:  Negative for color change, pallor and rash.   Allergic/Immunologic: Negative for environmental allergies, food allergies and immunocompromised state.   Neurological:  Negative for dizziness, tremors, seizures, syncope, facial asymmetry, speech difficulty, weakness, light-headedness, numbness and headaches.   Hematological:  Negative for adenopathy. Does not bruise/bleed easily.   Psychiatric/Behavioral:  Negative for agitation, behavioral problems, confusion, decreased concentration, dysphoric mood, hallucinations, self-injury, sleep disturbance and suicidal ideas. The patient is not nervous/anxious and is not hyperactive.        Objective:      Physical Exam  Constitutional:       Appearance: Normal appearance.             Assessment:      1. Malignant neoplasm of central portion of right breast in female, estrogen receptor positive    2. Secondary cancer of bone    3. Secondary malignant neoplasm of bone    4. Pancytopenia due to antineoplastic chemotherapy           Med Onc Chart Routing      Follow up with physician . Return to clinic in 1 month to see me CBC CMP   Follow up with [facility]    Infusion scheduling note    Injection scheduling note Faslodex monthly   Labs    Imaging PET scan   Last PET scan in November of 2023 probable  rescanned in April.  With next dosing of Zometa   Pharmacy appointment    Other referrals                   Plan:   The patient location is:  [place]  The chief complaint leading to consultation is:  Breast cancer    Visit type: audiovisual    Face to Face time with patient: 25 minutes of total time spent on the encounter, which includes face to face time and non-face to face time preparing to see the patient (eg, review of tests), Obtaining and/or reviewing separately obtained history, Documenting clinical information in the electronic or other health record, Independently interpreting results (not separately reported) and communicating results to the patient/family/caregiver, or Care coordination (not separately reported).         Each patient to whom he or she provides medical services by telemedicine is:  (1) informed of the relationship between the physician and patient and the respective role of any other health care provider with respect to management of the patient; and (2) notified that he or she may decline to receive medical services by telemedicine and may withdraw from such care at any time.    Notes:   Continue with current dosing of Faslodex.  Will see back in 1 month CBC CMP current dosing of Ibrance continue most likely will reimage in April of 2024 patient wishes PET scan done sooner can certainly do so been placed        Harpal Khan Jr, MD FACP

## 2024-02-19 NOTE — PLAN OF CARE
Plan of care reviewed with patient. Discussed if there are any new or ongoing concerns. Denies.   Problem: Adult Inpatient Plan of Care  Goal: Plan of Care Review  2/19/2024 1024 by Kevin Coto RN  Flowsheets (Taken 2/19/2024 1024)  Plan of Care Reviewed With: patient  2/19/2024 1023 by Kevin Coto RN  Outcome: Ongoing, Progressing  Goal: Patient-Specific Goal (Individualized)  Outcome: Ongoing, Progressing  Goal: Optimal Comfort and Wellbeing  Outcome: Ongoing, Progressing  Intervention: Provide Person-Centered Care  Flowsheets (Taken 2/19/2024 1024)  Trust Relationship/Rapport:   care explained   reassurance provided   choices provided   emotional support provided   thoughts/feelings acknowledged   empathic listening provided   questions answered   questions encouraged

## 2024-02-25 ENCOUNTER — PATIENT MESSAGE (OUTPATIENT)
Dept: ADMINISTRATIVE | Facility: OTHER | Age: 59
End: 2024-02-25
Payer: MEDICARE

## 2024-03-06 ENCOUNTER — TELEPHONE (OUTPATIENT)
Dept: CARDIOLOGY | Facility: CLINIC | Age: 59
End: 2024-03-06
Payer: MEDICARE

## 2024-03-06 DIAGNOSIS — E78.5 HYPERLIPIDEMIA, UNSPECIFIED HYPERLIPIDEMIA TYPE: Primary | ICD-10-CM

## 2024-03-07 ENCOUNTER — OFFICE VISIT (OUTPATIENT)
Dept: CARDIOLOGY | Facility: CLINIC | Age: 59
End: 2024-03-07
Payer: MEDICARE

## 2024-03-07 ENCOUNTER — PATIENT MESSAGE (OUTPATIENT)
Dept: ORTHOPEDICS | Facility: CLINIC | Age: 59
End: 2024-03-07
Payer: MEDICARE

## 2024-03-07 ENCOUNTER — HOSPITAL ENCOUNTER (OUTPATIENT)
Dept: CARDIOLOGY | Facility: HOSPITAL | Age: 59
Discharge: HOME | End: 2024-03-07
Attending: STUDENT IN AN ORGANIZED HEALTH CARE EDUCATION/TRAINING PROGRAM
Payer: MEDICARE

## 2024-03-07 VITALS
BODY MASS INDEX: 27.71 KG/M2 | DIASTOLIC BLOOD PRESSURE: 74 MMHG | OXYGEN SATURATION: 97 % | HEART RATE: 88 BPM | SYSTOLIC BLOOD PRESSURE: 112 MMHG | WEIGHT: 176.56 LBS | HEIGHT: 67 IN

## 2024-03-07 DIAGNOSIS — C50.111 MALIGNANT NEOPLASM OF CENTRAL PORTION OF RIGHT BREAST IN FEMALE, ESTROGEN RECEPTOR POSITIVE: ICD-10-CM

## 2024-03-07 DIAGNOSIS — I69.351 HEMIPARESIS AFFECTING RIGHT SIDE AS LATE EFFECT OF CEREBROVASCULAR ACCIDENT: ICD-10-CM

## 2024-03-07 DIAGNOSIS — E78.2 MIXED HYPERLIPIDEMIA: ICD-10-CM

## 2024-03-07 DIAGNOSIS — E78.5 HYPERLIPIDEMIA, UNSPECIFIED HYPERLIPIDEMIA TYPE: ICD-10-CM

## 2024-03-07 DIAGNOSIS — Z86.73 HISTORY OF CVA (CEREBROVASCULAR ACCIDENT): Primary | ICD-10-CM

## 2024-03-07 DIAGNOSIS — Z17.0 MALIGNANT NEOPLASM OF CENTRAL PORTION OF RIGHT BREAST IN FEMALE, ESTROGEN RECEPTOR POSITIVE: ICD-10-CM

## 2024-03-07 DIAGNOSIS — R53.1 RIGHT SIDED WEAKNESS: ICD-10-CM

## 2024-03-07 LAB
OHS QRS DURATION: 108 MS
OHS QTC CALCULATION: 457 MS

## 2024-03-07 PROCEDURE — 3074F SYST BP LT 130 MM HG: CPT | Mod: CPTII,S$GLB,, | Performed by: STUDENT IN AN ORGANIZED HEALTH CARE EDUCATION/TRAINING PROGRAM

## 2024-03-07 PROCEDURE — 3008F BODY MASS INDEX DOCD: CPT | Mod: CPTII,S$GLB,, | Performed by: STUDENT IN AN ORGANIZED HEALTH CARE EDUCATION/TRAINING PROGRAM

## 2024-03-07 PROCEDURE — 3078F DIAST BP <80 MM HG: CPT | Mod: CPTII,S$GLB,, | Performed by: STUDENT IN AN ORGANIZED HEALTH CARE EDUCATION/TRAINING PROGRAM

## 2024-03-07 PROCEDURE — 93005 ELECTROCARDIOGRAM TRACING: CPT

## 2024-03-07 PROCEDURE — 93010 ELECTROCARDIOGRAM REPORT: CPT | Mod: ,,, | Performed by: INTERNAL MEDICINE

## 2024-03-07 PROCEDURE — 99214 OFFICE O/P EST MOD 30 MIN: CPT | Mod: S$GLB,,, | Performed by: STUDENT IN AN ORGANIZED HEALTH CARE EDUCATION/TRAINING PROGRAM

## 2024-03-07 PROCEDURE — 99999 PR PBB SHADOW E&M-EST. PATIENT-LVL III: CPT | Mod: PBBFAC,,, | Performed by: STUDENT IN AN ORGANIZED HEALTH CARE EDUCATION/TRAINING PROGRAM

## 2024-03-07 NOTE — PROGRESS NOTES
Section of Cardiology                  Cardiac Clinic Note    Chief Complaint/Reason for consultation:  Follow-up      HPI:   Carole Moore is a 58 y.o. female with h/o with HLD, CVA 2001, breast cancer s/p radiation, chemotherapy, surgery who comes into Cardiology Clinic for follow-up.    3/31/2022  Patient was seen Dr. Mei in cardiology clinic.  Denies history of needing lasix in the past,no CHF history.  Right leg and arm weakness and speech difficulty since CVA  Smoked for 5 years many years ago. Denies ETOH abuse.   She exercises with bike riding.   Diet: trying to watch her eating habits  Denies chest pain, orthopnea or PND, LE swelling.       3/9/23  Comes in with   Breast cancer with now mets to the spine  Still right sided weakness  Has not active   Difficult to ride the bike  Had kyphoplasty recently   Lost 4 lbs since 1/23    Denies palpitations, syncope, dizziness       3/7/24  Just started ibrance for chemo  Having runny nose, congestion for 1 week   Some chills   Most likely allergies she thinks- on allergy meds   Has not been exercising due to back pain  Weight coming back down to 170s lbs   BP stable     No fevers, SOB, chest pain      EKG 3/7/24 NSR, LAFB, cannot r/o lateral infarct, LVH  EKG 11/2021 normal sinus rhythm, sinus arrhythmia, LAFB, possible lateral infarct, 64 bpm,  ms,  ms    ECHO  2020  CONCLUSIONS     1 - Normal left ventricular systolic function (EF 60-65%).     2 - Normal left ventricular diastolic function.     3 - Normal right ventricular systolic function .     STRESS TEST    [facility]      ROS: All 10 systems reviewed. Please refer to the HPI for pertinent positives. All other systems negative.     Past Medical History  Past Medical History:   Diagnosis Date    Antineoplastic chemotherapy induced anemia     Breast cancer 2016    right breast    Cancer     R Breast cancer    CVA (cerebral infarction)     Diverticulosis     Family  history of colon cancer 10/30/2018    Her mother and father both had colon cancer.    Genetic testing of female 12/27/2016    Myriad Yanelis (28 genes) - NEGATIVE    Hyperlipidemia     Immunodeficiency due to chemotherapy 10/03/2022    Nausea after anesthesia     Paralysis     right side    PONV (postoperative nausea and vomiting)     Stroke 2001    R side weakness    Trouble in sleeping        Surgical History  Past Surgical History:   Procedure Laterality Date    AUGMENTATION OF BREAST Left 2017    BREAST SURGERY      COLONOSCOPY N/A 10/30/2018    Procedure: COLONOSCOPY;  Surgeon: Cosme Monson MD;  Location: [facility] ENDO;  Service: Endoscopy;  Laterality: N/A;    COLONOSCOPY N/A 12/27/2023    Procedure: COLONOSCOPY;  Surgeon: Caro Leo MD;  Location: [facility] ENDO;  Service: Endoscopy;  Laterality: N/A;    CYSTOSCOPY WITH BIOPSY OF BLADDER N/A 11/29/2021    Procedure: CYSTOSCOPY, WITH BLADDER BIOPSY, WITH FULGURATION IF INDICATED;  Surgeon: Page Marcelo MD;  Location: [facility] OR;  Service: Urology;  Laterality: N/A;    FIXATION KYPHOPLASTY Bilateral 2/9/2023    Procedure: KYPHOPLASTY;  Surgeon: Josse Pelayo MD;  Location: [facility] OR;  Service: Neurosurgery;  Laterality: Bilateral;  Kyphoplasty T10 with ablation    HYSTERECTOMY  2007    maintains both ovaries, menorrhagia    MASTECTOMY Right 2017    MEDIPORT INSERTION, SINGLE      RADIOFREQUENCY ABLATION, BONE, PERCUTANEOUS Bilateral 2/9/2023    Procedure: RADIOFREQUENCY ABLATION,BONE,PERCUTANEOUS;  Surgeon: Josse Pelayo MD;  Location: [facility] OR;  Service: Neurosurgery;  Laterality: Bilateral;    TONSILLECTOMY            Allergies:   Review of patient's allergies indicates:  No Known Allergies    Social History:  Social History     Socioeconomic History    Marital status:    Tobacco Use    Smoking status: Never    Smokeless tobacco: Never   Substance and Sexual Activity    Alcohol use: Never    Drug use: No    Sexual activity: Not Currently      Birth control/protection: Surgical   Other Topics Concern    Are you pregnant or think you may be? No    Breast-feeding No     Social Determinants of Health     Financial Resource Strain: Low Risk  (3/6/2024)    Overall Financial Resource Strain (CARDIA)     Difficulty of Paying Living Expenses: Not hard at all   Food Insecurity: No Food Insecurity (3/6/2024)    Hunger Vital Sign     Worried About Running Out of Food in the Last Year: Never true     Ran Out of Food in the Last Year: Never true   Transportation Needs: No Transportation Needs (3/6/2024)    PRAPARE - Transportation     Lack of Transportation (Medical): No     Lack of Transportation (Non-Medical): No   Physical Activity: Inactive (3/6/2024)    Exercise Vital Sign     Days of Exercise per Week: 0 days     Minutes of Exercise per Session: 0 min   Stress: Stress Concern Present (3/6/2024)    [employer] [place] [employer] - Occupational Stress Questionnaire     Feeling of Stress : To some extent   Social Connections: Moderately Integrated (3/6/2024)    Social Connection and Isolation Panel [NHANES]     Frequency of Communication with Friends and Family: More than three times a week     Frequency of Social Gatherings with Friends and Family: Three times a week     Attends Gnosticist Services: More than 4 times per year     Active Member of Clubs or Organizations: No     Attends Club or Organization Meetings: Never     Marital Status:    Housing Stability: Low Risk  (3/6/2024)    Housing Stability Vital Sign     Unable to Pay for Housing in the Last Year: No     Number of Places Lived in the Last Year: 1     Unstable Housing in the Last Year: No       Family History:  family history includes Breast cancer in her paternal aunt; Colon cancer in her father and mother; Melanoma in her maternal uncle; Skin cancer in her maternal uncle.    Home Medications:  Current Outpatient Medications on File Prior to Visit   Medication Sig Dispense Refill     ASCORBIC ACID, VITAMIN C, ORAL Take by mouth once daily.      aspirin (ECOTRIN) 81 MG EC tablet Take 81 mg by mouth once daily.      atorvastatin (LIPITOR) 10 MG tablet Take 1 tablet (10 mg total) by mouth once daily. 90 tablet 4    CALCIUM CARBONATE ORAL Take 1 tablet by mouth once daily.      cetirizine (ZYRTEC) 10 MG tablet Take 10 mg by mouth daily as needed.      CHOLECALCIFEROL, VITAMIN D3, ORAL Take by mouth once daily.      fulvestrant (FASLODEX) 250 mg/5 mL injection Inject 250 mg into the muscle every 30 days.      GADAVIST 10 mmol/10 mL (1 mmol/mL) Soln injection every 6 (six) months. Once yearly      meclizine (ANTIVERT) 25 mg tablet Take 25 mg by mouth 3 (three) times daily as needed.      methocarbamoL (ROBAXIN) 750 MG Tab Take 1 tablet (750 mg total) by mouth 3 (three) times daily. 60 tablet 3    multivitamin capsule Take 1 capsule by mouth once daily.      ondansetron (ZOFRAN) 4 MG tablet Take 1 tablet (4 mg total) by mouth every 8 (eight) hours as needed for Nausea. 20 tablet 11    oxyCODONE-acetaminophen (PERCOCET)  mg per tablet Take 1 tablet by mouth every 6 (six) hours as needed for Pain. 30 tablet 0    palbociclib (IBRANCE) 125 mg Cap Take 1 capsule (125 mg) by mouth once daily on days 1-21 of a 35-day cycle. 21 capsule 11    polyethylene glycol (GLYCOLAX) 17 gram/dose powder Take 17 g by mouth once daily. 510 g 11    prochlorperazine (COMPAZINE) 5 MG tablet TAKE 1 TABLET BY MOUTH 4 TIMES DAILY AS NEEDED FOR NAUSEA 20 tablet 11    zoledronic 4 mg/100 mL PgBk infusion       methen-m.blue-s.phos-phsal-hyo (URIBEL) 118-10-40.8-36 mg Cap Take 1 po Q6 hours prn bladder pain (Patient not taking: Reported on 3/7/2024) 30 capsule 1    nitrofurantoin, macrocrystal-monohydrate, (MACROBID) 100 MG capsule Take 1 capsule (100 mg total) by mouth 2 (two) times daily. (Patient not taking: Reported on 3/7/2024) 14 capsule 0    [DISCONTINUED] FLUBLOK QUAD 7292-3986, PF, 180 mcg (45 mcg x 4)/0.5 mL Syrg     "   [DISCONTINUED] SPIKEVAX 2091-0023,12Y UP,,PF, 50 mcg/0.5 mL injection        No current facility-administered medications on file prior to visit.       Physical exam:  /74 (BP Location: Left arm, Patient Position: Sitting)   Pulse 88   Ht 5' 7" (1.702 m)   Wt 80.1 kg (176 lb 9.4 oz)   LMP 10/11/2009   SpO2 97%   BMI 27.66 kg/m²         General: Pt is a 58 y.o. year old female who is AAOx3, in NAD, is pleasant, well nourished, looks stated age  HEENT: PERRL, EOMI, Oral mucosa pink & moist  CVS  No abnormal cardiac pulsations noted on inspection. JVP not raised. The apical impulse is normal on palpation, and is located in the left 5th intercostal space in the mid - clavicular line. No palpable thrills or abnormal pulsations noted. RR, S1 - S2 heard, no murmurs, rubs or gallops appreciated.   PUL : CTA B/L. No wheezes/crackles heard   ABD : BS +, soft. No tenderness elicited   LE : No C/C/E. Distal Pulses palpable B/L. Right UE with paralysis, weakness in right LE        LABS:    Chemistry:   Lab Results   Component Value Date     02/19/2024    K 4.7 02/19/2024     02/19/2024    CO2 25 02/19/2024    BUN 12 02/19/2024    CREATININE 0.8 02/19/2024    CALCIUM 9.6 02/19/2024     Cardiac Markers:   Lab Results   Component Value Date    TROPONINI 0.023 01/10/2017     Cardiac Markers (Last 3):   Lab Results   Component Value Date    TROPONINI 0.023 01/10/2017    TROPONINI 0.012 12/27/2016    TROPONINI 0.007 12/13/2016     CBC:   Lab Results   Component Value Date    WBC 1.92 (LL) 02/19/2024    HGB 12.5 02/19/2024    HCT 36.8 (L) 02/19/2024     (H) 02/19/2024     (L) 02/19/2024     Lipids:   Lab Results   Component Value Date    CHOL 177 07/14/2023    TRIG 68 07/14/2023    HDL 65 07/14/2023     Coagulation: No results found for: "PT", "INR", "APTT"        Assessment    1. History of CVA (cerebrovascular accident)    2. Mixed hyperlipidemia    3. Malignant neoplasm of central portion of " right breast in female, estrogen receptor positive    4. Hemiparesis affecting right side as late effect of cerebrovascular accident    5. Right sided weakness           Plan:    History of CVA  Residual right-sided weakness  Continue aspirin, statin  Allergy symptoms     HLD  LDL 98 as of 7/23  Continue statin    History of breast cancer  S/p chemotherapy, radiation, surgery  Has mets to spine, receiving ibrance  Takes ibrance   Continue to follow-up with Hem/Onc      This note was prepared using voice recognition system and is likely to have sound alike errors that may have been overlooked even after proofreading.     I have reviewed all pertinent chart information.  Plans and recommendations have been formulated under my direct supervision. All questions answered and patient voiced understanding.   If symptoms persist go to the ED.    RTC in 1 year        Jemma Melendez MD  Cardiology

## 2024-03-08 ENCOUNTER — OFFICE VISIT (OUTPATIENT)
Dept: ORTHOPEDICS | Facility: CLINIC | Age: 59
End: 2024-03-08
Payer: MEDICARE

## 2024-03-08 ENCOUNTER — HOSPITAL ENCOUNTER (OUTPATIENT)
Dept: RADIOLOGY | Facility: HOSPITAL | Age: 59
Discharge: HOME | End: 2024-03-08
Attending: ORTHOPAEDIC SURGERY
Payer: MEDICARE

## 2024-03-08 DIAGNOSIS — C79.51 SECONDARY CANCER OF BONE: Primary | ICD-10-CM

## 2024-03-08 DIAGNOSIS — C79.51 METASTATIC CANCER TO BONE: ICD-10-CM

## 2024-03-08 DIAGNOSIS — C79.51 METASTATIC CANCER TO BONE: Primary | ICD-10-CM

## 2024-03-08 PROCEDURE — 73552 X-RAY EXAM OF FEMUR 2/>: CPT | Mod: 26,50,, | Performed by: RADIOLOGY

## 2024-03-08 PROCEDURE — 73552 X-RAY EXAM OF FEMUR 2/>: CPT | Mod: TC,50

## 2024-03-08 PROCEDURE — 72170 X-RAY EXAM OF PELVIS: CPT | Mod: 26,,, | Performed by: RADIOLOGY

## 2024-03-08 PROCEDURE — 99213 OFFICE O/P EST LOW 20 MIN: CPT | Mod: S$GLB,,, | Performed by: ORTHOPAEDIC SURGERY

## 2024-03-08 PROCEDURE — 72170 X-RAY EXAM OF PELVIS: CPT | Mod: TC

## 2024-03-08 PROCEDURE — 1159F MED LIST DOCD IN RCRD: CPT | Mod: CPTII,S$GLB,, | Performed by: ORTHOPAEDIC SURGERY

## 2024-03-08 PROCEDURE — 99999 PR PBB SHADOW E&M-EST. PATIENT-LVL III: CPT | Mod: PBBFAC,,, | Performed by: ORTHOPAEDIC SURGERY

## 2024-03-08 NOTE — PROGRESS NOTES
Orthopaedic Oncology Follow Up Visit:      Dear No referring provider defined for this encounter. and Angel Emery MD,    We had the pleasure of evaluating Carole Moore in the Orthopaedic Clinic today at the [facility] today.      Chief Complaint: metastatic breast carcinoma, left proximal femur lesion increasing avidity on PET, pelvic bone lesions on PET    As you may recall, Carole Moore is a 58 y.o. female who has known metastatic breast carcinoma to bone presenting for evaluation of her metastatic bone disease. She was initially diagnosed with breast cancer in 2016 in the right breast treated with mastectomy and systemic therapy. Then in 2022/2023 she developed back pain and was found to have lesions in her spine concerning from recurrent disease now metastatic to the spine. She has been followed by spine for her spine involvement. She had a kyphoplasty about a year prior and had radiation post operatively. She denies any bowel or bladder incontinence. She tells me her pain improved significantly since this procedure. She is on infusions now. She has history of right sided stroke and baseline foot drop on the right side for which she wears an AFO back in 2001. She tells me they are unsure of the cause of stroke. She takes a baby aspirin daily for this. She did a significant amount of therapy after this and has limited right upper extremity function and ambulates with an AFO. She uses a cane more for balance for longer distances. She had radiation to the spine and no other areas. She is on chemotherapy and her oncologist is Dr. Khan. She tells me she has bilateral lateral hip/buttock pain that occurs usually around time of her infusions. It is not necessarily activity related. She says is 4/10. Tends to come and go. She denies any groin pain or proximal thigh pain associated with activities. She has not had any radiation to areas other than the spine. No recent physical  therapy. She is able to ambulate at her baseline. Pain has not worsened. No associated trauma. Pretty equal amount between right and left side.     Interval History:  Patient is here for scheduled follow up with her . She has no changes in her symptoms. She only has some mild lateral hip pain at times. No groin pain. No thigh pain. She does have some back pain. No neurologic changes, just pain. She said she was told by her spine surgeon there was no intervention for this. She is continues on systemic therapy. She has no new complaints.     PMH:   Past Medical History:   Diagnosis Date    Antineoplastic chemotherapy induced anemia     Breast cancer 2016    right breast    Cancer     R Breast cancer    CVA (cerebral infarction)     Diverticulosis     Family history of colon cancer 10/30/2018    Her mother and father both had colon cancer.    Genetic testing of female 12/27/2016    CBA PHARMA (28 genes) - NEGATIVE    Hyperlipidemia     Immunodeficiency due to chemotherapy 10/03/2022    Nausea after anesthesia     Paralysis     right side    PONV (postoperative nausea and vomiting)     Stroke 2001    R side weakness    Trouble in sleeping        PSH:  has a past surgical history that includes Tonsillectomy; Mediport insertion, single; Breast surgery; Colonoscopy (N/A, 10/30/2018); Mastectomy (Right, 2017); Augmentation of breast (Left, 2017); Hysterectomy (2007); Cystoscopy with biopsy of bladder (N/A, 11/29/2021); Fixation Kyphoplasty (Bilateral, 2/9/2023); radiofrequency ablation, bone, percutaneous (Bilateral, 2/9/2023); and Colonoscopy (N/A, 12/27/2023).    Allergies:   Allergies as of 03/08/2024    (No Known Allergies)       Medications:    Current Outpatient Medications:     ASCORBIC ACID, VITAMIN C, ORAL, Take by mouth once daily., Disp: , Rfl:     aspirin (ECOTRIN) 81 MG EC tablet, Take 81 mg by mouth once daily., Disp: , Rfl:     atorvastatin (LIPITOR) 10 MG tablet, Take 1 tablet (10 mg total) by  mouth once daily., Disp: 90 tablet, Rfl: 4    CALCIUM CARBONATE ORAL, Take 1 tablet by mouth once daily., Disp: , Rfl:     cetirizine (ZYRTEC) 10 MG tablet, Take 10 mg by mouth daily as needed., Disp: , Rfl:     CHOLECALCIFEROL, VITAMIN D3, ORAL, Take by mouth once daily., Disp: , Rfl:     fulvestrant (FASLODEX) 250 mg/5 mL injection, Inject 250 mg into the muscle every 30 days., Disp: , Rfl:     GADAVIST 10 mmol/10 mL (1 mmol/mL) Soln injection, every 6 (six) months. Once yearly, Disp: , Rfl:     meclizine (ANTIVERT) 25 mg tablet, Take 25 mg by mouth 3 (three) times daily as needed., Disp: , Rfl:     methen-m.blue-s.phos-phsal-hyo (URIBEL) 118-10-40.8-36 mg Cap, Take 1 po Q6 hours prn bladder pain (Patient not taking: Reported on 3/7/2024), Disp: 30 capsule, Rfl: 1    methocarbamoL (ROBAXIN) 750 MG Tab, Take 1 tablet (750 mg total) by mouth 3 (three) times daily., Disp: 60 tablet, Rfl: 3    multivitamin capsule, Take 1 capsule by mouth once daily., Disp: , Rfl:     nitrofurantoin, macrocrystal-monohydrate, (MACROBID) 100 MG capsule, Take 1 capsule (100 mg total) by mouth 2 (two) times daily. (Patient not taking: Reported on 3/7/2024), Disp: 14 capsule, Rfl: 0    ondansetron (ZOFRAN) 4 MG tablet, Take 1 tablet (4 mg total) by mouth every 8 (eight) hours as needed for Nausea., Disp: 20 tablet, Rfl: 11    oxyCODONE-acetaminophen (PERCOCET)  mg per tablet, Take 1 tablet by mouth every 6 (six) hours as needed for Pain., Disp: 30 tablet, Rfl: 0    palbociclib (IBRANCE) 125 mg Cap, Take 1 capsule (125 mg) by mouth once daily on days 1-21 of a 35-day cycle., Disp: 21 capsule, Rfl: 11    polyethylene glycol (GLYCOLAX) 17 gram/dose powder, Take 17 g by mouth once daily., Disp: 510 g, Rfl: 11    prochlorperazine (COMPAZINE) 5 MG tablet, TAKE 1 TABLET BY MOUTH 4 TIMES DAILY AS NEEDED FOR NAUSEA, Disp: 20 tablet, Rfl: 11    zoledronic 4 mg/100 mL PgBk infusion, , Disp: , Rfl:     Family History: family history includes  Breast cancer in her paternal aunt; Colon cancer in her father and mother; Melanoma in her maternal uncle; Skin cancer in her maternal uncle.    Social History:  reports that she has never smoked. She has never used smokeless tobacco. She reports that she does not drink alcohol and does not use drugs.    ROS:  A Complete review of systems was performed.  Pertinent positives are listed in the history of present illness above.  Remainder of review of systems were negative.      Vital Signs:  no vital signs obtained during this visit    Physical exam:  General:  AAOX3, No acute distress, normal affect and disposition  Respiratory: Respirations unlabored    Focused examination of the bilateral lower extremity was performed.   Left side she is also able to stand without pain without assistive device  She has some mild pain to palpation about the trochanteric bursa region and a little more posterior than its typical almost in the gluteal region  No palpable masses  No pain with axial load or log roll  No pain with resisted hip flexion  In left side distal motor function is intact, sensation is intact in foot  Right side baseline weakness secondary to prior stroke, also right upper extremity weakness, AFO present, motor exam limited as noted from prior stroke, she is able to stand without assistive device without significant pain - mildly tender to touch about the right trochanteric bursa region  Feet are perfused    Non antalgic gait but baseline modified gait from right sided stroke    Imaging:    Radiographic Data:  Updated AP pelvis and bilateral femur radiographs were obtained in clinic today without significant changes from prior radiographs to my read - there are osseous lesions in the pelvis iliac wings and left proximal femur with a sclerotic border about the lesser trochanter region, there may be some adjacent intramedullary lucency as well in the same region of the proximal diaphysis although not clearly  disease, this correlates to PET scan uptake, no pathologic fractures noted, no cortical involvement noted    MRI of the lumbar spine obtained previously in September reviewed and demonstrates some of the pelvic lesions although mostly limited since only lumbar study, as noted in the report no acute spinal compression, there is various lesions in thoracic and some lumbar vertebral bodies    PET scan 11/2023 - noted to have increased uptake in the left proximal femur which correlates with radiographs and otherwise stable osseous disease, also intake in the pelvis and spine    Assessment/Plan: Carole Moore is a 58 y.o. old female with metastatic breast carcinoma and bilateral mild lateral hip pain    We discussed the findings to date. She continues to have only mild discomfort without any specific correlation in symptoms or location to any lesions. Her main pain complaint is more back pain. She has not had any significant progression on imaging. We discussed continued observation vs. Radiation vs. Surgery for the left femur and she would like to continue with observation of her osseous involvement and I think this is reasonable. I recommended she mention her back pain to her medical oncologist to see if there is progression on new pet scan in this area and possibly consider non surgical options either more radiation if able or ablative procedures with interventionalists and she agrees with this. She is aware I do not manage the spine, but is appreciative of my input. We reviewed concerning signs and symptoms for progressive osseous disease and she has excellent understanding of this. I will see her back in 3 months with repeat radiographs AP pelvis and bilateral femurs or sooner should issues arise. All questions answered.     Thank you for the referral and the opportunity to participate in the care of your patient.  If you have any questions regarding our treatment recommendations don't hesitate to  call.    Electronically signed by:    Ana Harrell MD   Orthopedic Oncology and Complex Arthroplasty Reconstruction  [facility]

## 2024-03-18 ENCOUNTER — INFUSION (OUTPATIENT)
Dept: INFUSION THERAPY | Facility: HOSPITAL | Age: 59
End: 2024-03-18
Attending: INTERNAL MEDICINE
Payer: MEDICARE

## 2024-03-18 ENCOUNTER — OFFICE VISIT (OUTPATIENT)
Dept: HEMATOLOGY/ONCOLOGY | Facility: CLINIC | Age: 59
End: 2024-03-18
Payer: MEDICARE

## 2024-03-18 ENCOUNTER — LAB VISIT (OUTPATIENT)
Dept: LAB | Facility: HOSPITAL | Age: 59
End: 2024-03-18
Attending: INTERNAL MEDICINE
Payer: MEDICARE

## 2024-03-18 VITALS
TEMPERATURE: 98 F | RESPIRATION RATE: 18 BRPM | WEIGHT: 176.81 LBS | BODY MASS INDEX: 27.75 KG/M2 | OXYGEN SATURATION: 97 % | HEART RATE: 64 BPM | HEIGHT: 67 IN | DIASTOLIC BLOOD PRESSURE: 70 MMHG | SYSTOLIC BLOOD PRESSURE: 108 MMHG

## 2024-03-18 DIAGNOSIS — T45.1X5A IMMUNODEFICIENCY DUE TO CHEMOTHERAPY: ICD-10-CM

## 2024-03-18 DIAGNOSIS — C50.111 MALIGNANT NEOPLASM OF CENTRAL PORTION OF RIGHT BREAST IN FEMALE, ESTROGEN RECEPTOR POSITIVE: Primary | ICD-10-CM

## 2024-03-18 DIAGNOSIS — D84.821 IMMUNODEFICIENCY DUE TO CHEMOTHERAPY: ICD-10-CM

## 2024-03-18 DIAGNOSIS — D61.810 PANCYTOPENIA DUE TO ANTINEOPLASTIC CHEMOTHERAPY: ICD-10-CM

## 2024-03-18 DIAGNOSIS — T45.1X5A PANCYTOPENIA DUE TO ANTINEOPLASTIC CHEMOTHERAPY: ICD-10-CM

## 2024-03-18 DIAGNOSIS — Z17.0 MALIGNANT NEOPLASM OF CENTRAL PORTION OF RIGHT BREAST IN FEMALE, ESTROGEN RECEPTOR POSITIVE: ICD-10-CM

## 2024-03-18 DIAGNOSIS — C79.51 SECONDARY MALIGNANT NEOPLASM OF BONE: ICD-10-CM

## 2024-03-18 DIAGNOSIS — C79.51 SECONDARY CANCER OF BONE: ICD-10-CM

## 2024-03-18 DIAGNOSIS — Z17.0 MALIGNANT NEOPLASM OF CENTRAL PORTION OF RIGHT BREAST IN FEMALE, ESTROGEN RECEPTOR POSITIVE: Primary | ICD-10-CM

## 2024-03-18 DIAGNOSIS — C50.111 MALIGNANT NEOPLASM OF CENTRAL PORTION OF RIGHT BREAST IN FEMALE, ESTROGEN RECEPTOR POSITIVE: ICD-10-CM

## 2024-03-18 DIAGNOSIS — Z79.899 IMMUNODEFICIENCY DUE TO CHEMOTHERAPY: ICD-10-CM

## 2024-03-18 DIAGNOSIS — D84.822 IMMUNODEFICIENCY SECONDARY TO RADIATION THERAPY: ICD-10-CM
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
## 2024-03-18 LAB
ALBUMIN SERPL BCP-MCNC: 3.6 G/DL (ref 3.5–5.2)
ALP SERPL-CCNC: 44 U/L (ref 55–135)
ALT SERPL W/O P-5'-P-CCNC: 18 U/L (ref 10–44)
ANION GAP SERPL CALC-SCNC: 8 MMOL/L (ref 8–16)
AST SERPL-CCNC: 16 U/L (ref 10–40)
BASOPHILS # BLD AUTO: 0.02 K/UL (ref 0–0.2)
BASOPHILS NFR BLD: 0.9 % (ref 0–1.9)
BILIRUB SERPL-MCNC: 0.7 MG/DL (ref 0.1–1)
BUN SERPL-MCNC: 14 MG/DL (ref 6–20)
CALCIUM SERPL-MCNC: 9.2 MG/DL (ref 8.7–10.5)
CHLORIDE SERPL-SCNC: 107 MMOL/L (ref 95–110)
CO2 SERPL-SCNC: 28 MMOL/L (ref 23–29)
CREAT SERPL-MCNC: 0.7 MG/DL (ref 0.5–1.4)
DIFFERENTIAL METHOD BLD: ABNORMAL
EOSINOPHIL # BLD AUTO: 0.1 K/UL (ref 0–0.5)
EOSINOPHIL NFR BLD: 5.2 % (ref 0–8)
ERYTHROCYTE [DISTWIDTH] IN BLOOD BY AUTOMATED COUNT: 13.6 % (ref 11.5–14.5)
EST. GFR  (NO RACE VARIABLE): >60 ML/MIN/1.73 M^2
GLUCOSE SERPL-MCNC: 82 MG/DL (ref 70–110)
HCT VFR BLD AUTO: 37.2 % (ref 37–48.5)
HGB BLD-MCNC: 12.7 G/DL (ref 12–16)
IMM GRANULOCYTES # BLD AUTO: 0 K/UL (ref 0–0.04)
IMM GRANULOCYTES NFR BLD AUTO: 0 % (ref 0–0.5)
LYMPHOCYTES # BLD AUTO: 0.7 K/UL (ref 1–4.8)
LYMPHOCYTES NFR BLD: 31.7 % (ref 18–48)
MCH RBC QN AUTO: 34.1 PG (ref 27–31)
MCHC RBC AUTO-ENTMCNC: 34.1 G/DL (ref 32–36)
MCV RBC AUTO: 100 FL (ref 82–98)
MONOCYTES # BLD AUTO: 0.2 K/UL (ref 0.3–1)
MONOCYTES NFR BLD: 6.5 % (ref 4–15)
NEUTROPHILS # BLD AUTO: 1.3 K/UL (ref 1.8–7.7)
NEUTROPHILS NFR BLD: 55.7 % (ref 38–73)
NRBC BLD-RTO: 0 /100 WBC
PLATELET # BLD AUTO: 269 K/UL (ref 150–450)
PMV BLD AUTO: 9 FL (ref 9.2–12.9)
POTASSIUM SERPL-SCNC: 4.4 MMOL/L (ref 3.5–5.1)
PROT SERPL-MCNC: 6.4 G/DL (ref 6–8.4)
RBC # BLD AUTO: 3.72 M/UL (ref 4–5.4)
SODIUM SERPL-SCNC: 143 MMOL/L (ref 136–145)
WBC # BLD AUTO: 2.3 K/UL (ref 3.9–12.7)

## 2024-03-18 PROCEDURE — 85025 COMPLETE CBC W/AUTO DIFF WBC: CPT | Performed by: INTERNAL MEDICINE

## 2024-03-18 PROCEDURE — 1160F RVW MEDS BY RX/DR IN RCRD: CPT | Mod: CPTII,95,, | Performed by: INTERNAL MEDICINE

## 2024-03-18 PROCEDURE — 96402 CHEMO HORMON ANTINEOPL SQ/IM: CPT

## 2024-03-18 PROCEDURE — 80053 COMPREHEN METABOLIC PANEL: CPT | Performed by: INTERNAL MEDICINE

## 2024-03-18 PROCEDURE — 36415 COLL VENOUS BLD VENIPUNCTURE: CPT | Performed by: INTERNAL MEDICINE

## 2024-03-18 PROCEDURE — 99214 OFFICE O/P EST MOD 30 MIN: CPT | Mod: 25,95,, | Performed by: INTERNAL MEDICINE

## 2024-03-18 PROCEDURE — 63600175 PHARM REV CODE 636 W HCPCS: Mod: JZ,JG | Performed by: INTERNAL MEDICINE

## 2024-03-18 PROCEDURE — 1159F MED LIST DOCD IN RCRD: CPT | Mod: CPTII,95,, | Performed by: INTERNAL MEDICINE

## 2024-03-18 RX ORDER — LAMOTRIGINE 25 MG/1
500 TABLET ORAL
Status: COMPLETED | OUTPATIENT
Start: 2024-03-18 | End: 2024-03-18

## 2024-03-18 RX ADMIN — FULVESTRANT 500 MG: 50 INJECTION, SOLUTION INTRAMUSCULAR at 10:03

## 2024-03-18 NOTE — DISCHARGE INSTRUCTIONS
Thank you for letting me take care of YOU!!    [facility]  [address] [facility]  [id number] [place] [address] Drive  [phone] phone     [phone] fax  Hours of Operation: Monday- Friday 8:00am- 5:00pm  After hours phone  [phone]  Hematology / Oncology Physicians on call:    Dr. Bill Sosa        Nurse Practitioners:    Bianca Mendoza, GIULIANO Miranda, TRI Rojas NP Khelsea Conley, GIULIANO Camilo, NP      Please don't hesitate to call if you have any concerns.

## 2024-03-18 NOTE — PROGRESS NOTES
Subjective:       Patient ID: Carole Moore is a 58 y.o. female.    Chief Complaint: Results, Chemotherapy, and Breast Cancer    HPI:  58-year-old female history of metastatic breast carcinoma continues on Ibrance and Faslodex patient is tolerating therapy well previous episodes of pancytopenia noted seen in virtual visit ECOG status 1    Past Medical History:   Diagnosis Date    Antineoplastic chemotherapy induced anemia     Breast cancer 2016    right breast    Cancer     R Breast cancer    CVA (cerebral infarction)     Diverticulosis     Family history of colon cancer 10/30/2018    Her mother and father both had colon cancer.    Genetic testing of female 12/27/2016    TiVo (28 genes) - NEGATIVE    Hyperlipidemia     Immunodeficiency due to chemotherapy 10/03/2022    Nausea after anesthesia     Paralysis     right side    PONV (postoperative nausea and vomiting)     Stroke 2001    R side weakness    Trouble in sleeping      Family History   Problem Relation Age of Onset    Breast cancer Paternal Aunt     Colon cancer Father     Colon cancer Mother     Melanoma Maternal Uncle     Skin cancer Maternal Uncle      Social History     Socioeconomic History    Marital status:    Tobacco Use    Smoking status: Never    Smokeless tobacco: Never   Substance and Sexual Activity    Alcohol use: Never    Drug use: No    Sexual activity: Not Currently     Birth control/protection: Surgical   Other Topics Concern    Are you pregnant or think you may be? No    Breast-feeding No     Social Determinants of Health     Financial Resource Strain: Low Risk  (3/6/2024)    Overall Financial Resource Strain (CARDIA)     Difficulty of Paying Living Expenses: Not hard at all   Food Insecurity: No Food Insecurity (3/6/2024)    Hunger Vital Sign     Worried About Running Out of Food in the Last Year: Never true     Ran Out of Food in the Last Year: Never true   Transportation Needs: No Transportation Needs (3/6/2024)     PRAPARE - Transportation     Lack of Transportation (Medical): No     Lack of Transportation (Non-Medical): No   Physical Activity: Inactive (3/6/2024)    Exercise Vital Sign     Days of Exercise per Week: 0 days     Minutes of Exercise per Session: 0 min   Stress: Stress Concern Present (3/6/2024)    [employer] [employer] - Occupational Stress Questionnaire     Feeling of Stress : To some extent   Social Connections: Moderately Integrated (3/6/2024)    Social Connection and Isolation Panel [NHANES]     Frequency of Communication with Friends and Family: More than three times a week     Frequency of Social Gatherings with Friends and Family: Three times a week     Attends Tenriism Services: More than 4 times per year     Active Member of Clubs or Organizations: No     Attends Club or Organization Meetings: Never     Marital Status:    Housing Stability: Low Risk  (3/6/2024)    Housing Stability Vital Sign     Unable to Pay for Housing in the Last Year: No     Number of Places Lived in the Last Year: 1     Unstable Housing in the Last Year: No     Past Surgical History:   Procedure Laterality Date    AUGMENTATION OF BREAST Left 2017    BREAST SURGERY      COLONOSCOPY N/A 10/30/2018    Procedure: COLONOSCOPY;  Surgeon: Cosme Monson MD;  Location: [facility];  Service: Endoscopy;  Laterality: N/A;    COLONOSCOPY N/A 12/27/2023    Procedure: COLONOSCOPY;  Surgeon: Caro Leo MD;  Location: [facility];  Service: Endoscopy;  Laterality: N/A;    CYSTOSCOPY WITH BIOPSY OF BLADDER N/A 11/29/2021    Procedure: CYSTOSCOPY, WITH BLADDER BIOPSY, WITH FULGURATION IF INDICATED;  Surgeon: Page Marcelo MD;  Location: [facility] OR;  Service: Urology;  Laterality: N/A;    FIXATION KYPHOPLASTY Bilateral 2/9/2023    Procedure: KYPHOPLASTY;  Surgeon: Josse Pelayo MD;  Location: [facility] OR;  Service: Neurosurgery;  Laterality: Bilateral;  Kyphoplasty T10 with ablation    HYSTERECTOMY  2007     "maintains both ovaries, menorrhagia    MASTECTOMY Right 2017    MEDIPORT INSERTION, SINGLE      RADIOFREQUENCY ABLATION, BONE, PERCUTANEOUS Bilateral 2/9/2023    Procedure: RADIOFREQUENCY ABLATION,BONE,PERCUTANEOUS;  Surgeon: Josse Pelayo MD;  Location: [facility];  Service: Neurosurgery;  Laterality: Bilateral;    TONSILLECTOMY         Labs:  Lab Results   Component Value Date    WBC 2.30 (L) 03/18/2024    HGB 12.7 03/18/2024    HCT 37.2 03/18/2024     (H) 03/18/2024     03/18/2024     BMP  Lab Results   Component Value Date     03/18/2024    K 4.4 03/18/2024     03/18/2024    CO2 28 03/18/2024    BUN 14 03/18/2024    CREATININE 0.7 03/18/2024    CALCIUM 9.2 03/18/2024    ANIONGAP 8 03/18/2024    ESTGFRAFRICA >60 05/12/2022    EGFRNONAA >60 05/12/2022     Lab Results   Component Value Date    ALT 18 03/18/2024    AST 16 03/18/2024    ALKPHOS 44 (L) 03/18/2024    BILITOT 0.7 03/18/2024       No results found for: "IRON", "TIBC", "FERRITIN", "SATURATEDIRO"  No results found for: "OXWMLXGV35"  No results found for: "FOLATE"  Lab Results   Component Value Date    TSH 0.793 10/29/2021         Review of Systems   Constitutional:  Negative for activity change, appetite change, chills, diaphoresis, fatigue, fever and unexpected weight change.   HENT:  Negative for congestion, dental problem, drooling, ear discharge, ear pain, facial swelling, hearing loss, mouth sores, nosebleeds, postnasal drip, rhinorrhea, sinus pressure, sneezing, sore throat, tinnitus, trouble swallowing and voice change.    Eyes:  Negative for photophobia, pain, discharge, redness, itching and visual disturbance.   Respiratory:  Negative for cough, choking, chest tightness, shortness of breath, wheezing and stridor.    Cardiovascular:  Negative for chest pain, palpitations and leg swelling.   Gastrointestinal:  Negative for abdominal distention, abdominal pain, anal bleeding, blood in stool, constipation, diarrhea, nausea, " rectal pain and vomiting.   Endocrine: Negative for cold intolerance, heat intolerance, polydipsia, polyphagia and polyuria.   Genitourinary:  Negative for decreased urine volume, difficulty urinating, dyspareunia, dysuria, enuresis, flank pain, frequency, genital sores, hematuria, menstrual problem, pelvic pain, urgency, vaginal bleeding, vaginal discharge and vaginal pain.   Musculoskeletal:  Negative for arthralgias, back pain, gait problem, joint swelling, myalgias, neck pain and neck stiffness.   Skin:  Negative for color change, pallor and rash.   Allergic/Immunologic: Negative for environmental allergies, food allergies and immunocompromised state.   Neurological:  Negative for dizziness, tremors, seizures, syncope, facial asymmetry, speech difficulty, weakness, light-headedness, numbness and headaches.   Hematological:  Negative for adenopathy. Does not bruise/bleed easily.   Psychiatric/Behavioral:  Negative for agitation, behavioral problems, confusion, decreased concentration, dysphoric mood, hallucinations, self-injury, sleep disturbance and suicidal ideas. The patient is not nervous/anxious and is not hyperactive.        Objective:      Physical Exam  Constitutional:       Appearance: Normal appearance.             Assessment:      1. Malignant neoplasm of central portion of right breast in female, estrogen receptor positive    2. Secondary cancer of bone    3. Secondary malignant neoplasm of bone    4. Pancytopenia due to antineoplastic chemotherapy    5. Immunodeficiency due to chemotherapy    6. Immunodeficiency secondary to radiation therapy           Med Onc Chart Routing      Follow up with physician . Return to clinic to see me in 1 month with CBC CMP LDH C-reactive protein and PET scan prior to next dosing of Faslodex   Follow up with [facility]    Infusion scheduling note    Injection scheduling note Faslodex monthly will need Zometa dosing next month at next dosing of Faslodex   Labs    Imaging     Pharmacy appointment    Other referrals                   Plan:     The patient [facility]  The chief complaint leading to consultation is:  Breast cancer    Visit type: audiovisual    Face to Face time with patient: 25 minutes of total time spent on the encounter, which includes face to face time and non-face to face time preparing to see the patient (eg, review of tests), Obtaining and/or reviewing separately obtained history, Documenting clinical information in the electronic or other health record, Independently interpreting results (not separately reported) and communicating results to the patient/family/caregiver, or Care coordination (not separately reported).         Each patient to whom he or she provides medical services by telemedicine is:  (1) informed of the relationship between the physician and patient and the respective role of any other health care provider with respect to management of the patient; and (2) notified that he or she may decline to receive medical services by telemedicine and may withdraw from such care at any time.    Notes:   Patient is currently day 11/21 of Ibrance.  Told to take total of 14 days and hold last week of Ibrance because of previous pancytopenia Faslodex dosing today would recommend that patient return in 1 month at that time with CBC CMP C-reactive protein LDH Zometa dosing and next dosing of Faslodex with PET scan for response compared to October 2023      Harpal Khan Jr, MD FACP

## 2024-03-18 NOTE — NURSING
Faslodex injections given without difficulty to crista ventrogluteal.   Band-Aids applied to site.  Patient instructed to stay in the clinic for 15 minutes.  Patient verbalized understanding and will notify nurse with any complaints.  NAD noted upon discharge.  Pt (at this time) does not want to put Zometa and Faslodex on the same day.       71 yo F with PMH of ESRD on HD (M, W, F), CAD s/p stents 2007, DM, HTN, MI, biliary stent, Liver abscess who presents to the ED with abdominal pain x2 days, 4 episodes of vomiting, leukocytosis and Klebsiella bacteremia  Tender RUQ  Despite HIDA without cystic duct obstruction suspect GB remains the source  Has lines but Klebsiella less common than other potential causes of catheter-related bacteremia.  Liver abscesses smaller and tend to doubt this as source of Klebsiella.

## 2024-03-20 ENCOUNTER — TELEPHONE (OUTPATIENT)
Dept: HEMATOLOGY/ONCOLOGY | Facility: CLINIC | Age: 59
End: 2024-03-20
Payer: MEDICARE

## 2024-03-20 NOTE — TELEPHONE ENCOUNTER
3/17/2024     8:18 AM 2/19/2024     1:07 AM 1/22/2024    10:04 AM 1/22/2024     9:04 AM 12/18/2023     8:20 AM 11/20/2023     9:00 AM 10/23/2023    11:36 AM   DISTRESS SCREENING   Distress Score 7 6 0 - No Distress 0 - No Distress 0 - No Distress 0 - No Distress 0 - No Distress   Practical Concerns None of these None of these  None of these None of these None of these None of these   Social Concerns None of these None of these  None of these None of these None of these None of these   Emotional Concerns Worry or anxiety Worry or anxiety  None of these None of these None of these None of these   Retire Spiritual or Amish Concerns     No No No   Spiritual or Amish Concerns None of these None of these  None of these      Physical Concerns Pain;Fatigue;Memory or concentration Pain  None of these None of these None of these None of these     UYEN intern reviewed pt's distress score of 7 due to emotional and physical concerns. UYEN intern contacted pt via phone. Pt reported her worry is constant, but normal considering her circumstances. UYEN intern informed pt of support group taking place tomorrow and pt declined due to appt and other scheduled events. UYEN intern encouraged pt to contact SS if she feels worry feels outside of her normal. UYEN intern will remain available.

## 2024-03-21 ENCOUNTER — OFFICE VISIT (OUTPATIENT)
Dept: INTERNAL MEDICINE | Facility: CLINIC | Age: 59
End: 2024-03-21
Payer: MEDICARE

## 2024-03-21 VITALS
DIASTOLIC BLOOD PRESSURE: 72 MMHG | HEART RATE: 99 BPM | WEIGHT: 179.44 LBS | SYSTOLIC BLOOD PRESSURE: 114 MMHG | BODY MASS INDEX: 28.11 KG/M2 | OXYGEN SATURATION: 96 % | TEMPERATURE: 98 F

## 2024-03-21 DIAGNOSIS — C50.111 MALIGNANT NEOPLASM OF CENTRAL PORTION OF RIGHT BREAST IN FEMALE, ESTROGEN RECEPTOR POSITIVE: Primary | ICD-10-CM

## 2024-03-21 DIAGNOSIS — C79.51 SECONDARY MALIGNANT NEOPLASM OF BONE: ICD-10-CM

## 2024-03-21 DIAGNOSIS — I69.351 HEMIPARESIS AFFECTING RIGHT SIDE AS LATE EFFECT OF CEREBROVASCULAR ACCIDENT: ICD-10-CM

## 2024-03-21 DIAGNOSIS — E78.2 MIXED HYPERLIPIDEMIA: ICD-10-CM

## 2024-03-21 DIAGNOSIS — Z17.0 MALIGNANT NEOPLASM OF CENTRAL PORTION OF RIGHT BREAST IN FEMALE, ESTROGEN RECEPTOR POSITIVE: Primary | ICD-10-CM

## 2024-03-21 PROBLEM — N39.0 RECURRENT UTI: Status: RESOLVED | Noted: 2021-12-21 | Resolved: 2024-03-21

## 2024-03-21 PROCEDURE — 90677 PCV20 VACCINE IM: CPT | Mod: HCNC,S$GLB,, | Performed by: INTERNAL MEDICINE

## 2024-03-21 PROCEDURE — 3008F BODY MASS INDEX DOCD: CPT | Mod: HCNC,CPTII,S$GLB, | Performed by: INTERNAL MEDICINE

## 2024-03-21 PROCEDURE — 3074F SYST BP LT 130 MM HG: CPT | Mod: HCNC,CPTII,S$GLB, | Performed by: INTERNAL MEDICINE

## 2024-03-21 PROCEDURE — 99999 PR PBB SHADOW E&M-EST. PATIENT-LVL V: CPT | Mod: PBBFAC,,, | Performed by: INTERNAL MEDICINE

## 2024-03-21 PROCEDURE — 99214 OFFICE O/P EST MOD 30 MIN: CPT | Mod: HCNC,S$GLB,, | Performed by: INTERNAL MEDICINE

## 2024-03-21 PROCEDURE — 3078F DIAST BP <80 MM HG: CPT | Mod: HCNC,CPTII,S$GLB, | Performed by: INTERNAL MEDICINE

## 2024-03-21 PROCEDURE — 1159F MED LIST DOCD IN RCRD: CPT | Mod: HCNC,CPTII,S$GLB, | Performed by: INTERNAL MEDICINE

## 2024-03-21 PROCEDURE — G0009 ADMIN PNEUMOCOCCAL VACCINE: HCPCS | Mod: HCNC,S$GLB,, | Performed by: INTERNAL MEDICINE

## 2024-03-21 RX ORDER — PROPOFOL 10 MG/ML
INJECTION, EMULSION INTRAVENOUS
COMMUNITY
Start: 2023-12-27 | End: 2024-04-16

## 2024-03-21 NOTE — PROGRESS NOTES
Vis given. Pt instructed to wait 15 min before leaving facility. Pt states understanding. Administered in  LEFT deltoid. Pt tolerated well. No complaints or concerns noted at this time

## 2024-03-21 NOTE — PROGRESS NOTES
Subjective:       Patient ID: Carole Moore is a 58 y.o. female.    Chief Complaint: Follow-up      HPI:  Patient is a 58-year-old female presenting today for follow-up on chronic health issues.  Patient remains under treatment by Hematology-Oncology for breast cancer with metastatic disease to the bones.  She is doing well at this point.  She remains on active therapy and states she is feeling pretty good.  They are continuing to monitor very closely she has got an updating up to upcoming scan.      She has had longstanding hemiparesis on the right side as late effect of CVA.  Nothing has changed in regards to this issue at this juncture.  She continues to have stable dense hemiparesis on the right side.      Hyperlipidemia has been stable over time.  She is on statin therapy.  She will be due for some lab work in July.          Review of Systems   Constitutional:  Negative for chills and fever.   HENT:  Negative for hearing loss.    Eyes:  Negative for photophobia and visual disturbance.   Respiratory:  Negative for cough, shortness of breath and wheezing.    Cardiovascular:  Negative for chest pain and palpitations.   Gastrointestinal:  Negative for blood in stool, constipation, nausea and vomiting.   Genitourinary:  Negative for dysuria and hematuria.   Musculoskeletal:  Negative for neck pain and neck stiffness.   Skin:  Negative for rash.   Neurological:  Positive for weakness. Negative for syncope, light-headedness and headaches.   Hematological:  Negative for adenopathy.   Psychiatric/Behavioral:  Negative for dysphoric mood. The patient is not nervous/anxious.        Objective:   /72   Pulse 99   Temp 98 °F (36.7 °C)   Wt 81.4 kg (179 lb 7.3 oz)   LMP 10/11/2009   SpO2 96%   BMI 28.11 kg/m²      Physical Exam  Vitals reviewed.   Constitutional:       Appearance: Normal appearance. She is well-developed. She is not ill-appearing.   HENT:      Head: Normocephalic and atraumatic.       Right Ear: Tympanic membrane, ear canal and external ear normal.      Left Ear: Tympanic membrane, ear canal and external ear normal.   Eyes:      Pupils: Pupils are equal, round, and reactive to light.   Neck:      Thyroid: No thyromegaly.   Cardiovascular:      Rate and Rhythm: Normal rate and regular rhythm.      Heart sounds: No murmur heard.     No friction rub. No gallop.   Pulmonary:      Effort: Pulmonary effort is normal.      Breath sounds: Normal breath sounds. No wheezing or rales.   Chest:      Chest wall: No tenderness.   Abdominal:      General: Abdomen is flat. Bowel sounds are normal. There is no distension.      Palpations: Abdomen is soft. There is no mass.      Tenderness: There is no abdominal tenderness. There is no guarding or rebound.   Musculoskeletal:      Cervical back: Normal range of motion and neck supple.   Lymphadenopathy:      Cervical: No cervical adenopathy.   Skin:     General: Skin is warm and dry.      Findings: No rash.   Neurological:      General: No focal deficit present.      Mental Status: She is alert and oriented to person, place, and time.      Cranial Nerves: No cranial nerve deficit.      Deep Tendon Reflexes: Reflexes abnormal (hyprereflexic on the right).      Comments: Hemiparesis on the right   Psychiatric:         Behavior: Behavior normal.         Judgment: Judgment normal.         Lab Visit on 03/18/2024   Component Date Value    Sodium 03/18/2024 143     Potassium 03/18/2024 4.4     Chloride 03/18/2024 107     CO2 03/18/2024 28     Glucose 03/18/2024 82     BUN 03/18/2024 14     Creatinine 03/18/2024 0.7     Calcium 03/18/2024 9.2     Total Protein 03/18/2024 6.4     Albumin 03/18/2024 3.6     Total Bilirubin 03/18/2024 0.7     Alkaline Phosphatase 03/18/2024 44 (L)     AST 03/18/2024 16     ALT 03/18/2024 18     eGFR 03/18/2024 >60     Anion Gap 03/18/2024 8     WBC 03/18/2024 2.30 (L)     RBC 03/18/2024 3.72 (L)     Hemoglobin 03/18/2024 12.7     Hematocrit  03/18/2024 37.2     MCV 03/18/2024 100 (H)     MCH 03/18/2024 34.1 (H)     MCHC 03/18/2024 34.1     RDW 03/18/2024 13.6     Platelets 03/18/2024 269     MPV 03/18/2024 9.0 (L)     Immature Granulocytes 03/18/2024 0.0     Gran # (ANC) 03/18/2024 1.3 (L)     Immature Grans (Abs) 03/18/2024 0.00     Lymph # 03/18/2024 0.7 (L)     Mono # 03/18/2024 0.2 (L)     Eos # 03/18/2024 0.1     Baso # 03/18/2024 0.02     nRBC 03/18/2024 0     Gran % 03/18/2024 55.7     Lymph % 03/18/2024 31.7     Mono % 03/18/2024 6.5     Eosinophil % 03/18/2024 5.2     Basophil % 03/18/2024 0.9     Differential Method 03/18/2024 Automated        Assessment:       1. Malignant neoplasm of central portion of right breast in female, estrogen receptor positive    2. Hemiparesis affecting right side as late effect of cerebrovascular accident    3. Mixed hyperlipidemia    4. Secondary malignant neoplasm of bone        Plan:   No problem-specific Assessment & Plan notes found for this encounter.    Carole was seen today for follow-up.    Diagnoses and all orders for this visit:    Malignant neoplasm of central portion of right breast in female, estrogen receptor positive  Comments:  continuing ibrance, faslodex, and zometa.  Following with Dr. Khan.    Hemiparesis affecting right side as late effect of cerebrovascular accident  Comments:  stable.  No new issues or changes since last check.  Orders:  -     Lipid Panel; Future    Mixed hyperlipidemia  Comments:  Continue lipitor.  Update labs in July  Orders:  -     Lipid Panel; Future    Secondary malignant neoplasm of bone    Other orders  -     (In Office Administered) Pneumococcal Conjugate Vaccine (20 Valent) (IM) (Preferred)          Follow up in about 1 year (around 3/21/2025).

## 2024-03-22 ENCOUNTER — TELEPHONE (OUTPATIENT)
Dept: INTERNAL MEDICINE | Facility: CLINIC | Age: 59
End: 2024-03-22
Payer: MEDICARE

## 2024-03-22 NOTE — TELEPHONE ENCOUNTER
----- Message from Malaika Bourgeois sent at 3/22/2024  2:43 PM CDT -----  Contact: storm /   The pt  is calling in regards to paperwork that was needing to be filled out. They were supposed to get a call this morning verifying it was received and ready to be sent over to them. Please give a call back at [phone] or [phone]

## 2024-04-05 RX ORDER — SODIUM CHLORIDE 0.9 % (FLUSH) 0.9 %
10 SYRINGE (ML) INJECTION
Status: CANCELLED | OUTPATIENT
Start: 2024-04-09

## 2024-04-05 RX ORDER — HEPARIN 100 UNIT/ML
500 SYRINGE INTRAVENOUS
Status: CANCELLED | OUTPATIENT
Start: 2024-04-09

## 2024-04-08 ENCOUNTER — INFUSION (OUTPATIENT)
Dept: INFUSION THERAPY | Facility: HOSPITAL | Age: 59
End: 2024-04-08
Attending: INTERNAL MEDICINE
Payer: MEDICARE

## 2024-04-08 VITALS
TEMPERATURE: 98 F | BODY MASS INDEX: 28.07 KG/M2 | HEART RATE: 67 BPM | WEIGHT: 179.25 LBS | SYSTOLIC BLOOD PRESSURE: 111 MMHG | OXYGEN SATURATION: 96 % | DIASTOLIC BLOOD PRESSURE: 67 MMHG | RESPIRATION RATE: 18 BRPM

## 2024-04-08 DIAGNOSIS — C50.111 MALIGNANT NEOPLASM OF CENTRAL PORTION OF RIGHT BREAST IN FEMALE, ESTROGEN RECEPTOR POSITIVE: Primary | ICD-10-CM

## 2024-04-08 DIAGNOSIS — C79.51 SECONDARY MALIGNANT NEOPLASM OF BONE: ICD-10-CM

## 2024-04-08 DIAGNOSIS — Z17.0 MALIGNANT NEOPLASM OF CENTRAL PORTION OF RIGHT BREAST IN FEMALE, ESTROGEN RECEPTOR POSITIVE: Primary | ICD-10-CM

## 2024-04-08 PROCEDURE — 63600175 PHARM REV CODE 636 W HCPCS: Mod: HCNC | Performed by: INTERNAL MEDICINE

## 2024-04-08 PROCEDURE — 96365 THER/PROPH/DIAG IV INF INIT: CPT | Mod: HCNC

## 2024-04-08 RX ORDER — METHOCARBAMOL 750 MG/1
750 TABLET, FILM COATED ORAL 3 TIMES DAILY
Qty: 60 TABLET | Refills: 0 | Status: SHIPPED | OUTPATIENT
Start: 2024-04-08 | End: 2024-06-09 | Stop reason: SDUPTHER

## 2024-04-08 RX ORDER — ZOLEDRONIC ACID 0.04 MG/ML
4 INJECTION, SOLUTION INTRAVENOUS
Status: COMPLETED | OUTPATIENT
Start: 2024-04-08 | End: 2024-04-08

## 2024-04-08 RX ADMIN — ZOLEDRONIC ACID 4 MG: 0.04 INJECTION, SOLUTION INTRAVENOUS at 02:04

## 2024-04-08 NOTE — PLAN OF CARE
Discussed plan of care with pt. Addressed any and ongoing concerns. Pt denies    Problem: Adult Inpatient Plan of Care  Goal: Plan of Care Review  Outcome: Ongoing, Progressing  Flowsheets (Taken 4/8/2024 1431)  Plan of Care Reviewed With: patient  Goal: Absence of Hospital-Acquired Illness or Injury  Outcome: Ongoing, Progressing  Intervention: Identify and Manage Fall Risk  Flowsheets (Taken 4/8/2024 1431)  Safety Promotion/Fall Prevention:   in recliner, wheels locked   nonskid shoes/socks when out of bed   room near unit station  Intervention: Prevent Infection  Flowsheets (Taken 4/8/2024 1431)  Infection Prevention:   hand hygiene promoted   equipment surfaces disinfected  Goal: Optimal Comfort and Wellbeing  Outcome: Ongoing, Progressing  Intervention: Monitor Pain and Promote Comfort  Flowsheets (Taken 4/8/2024 1431)  Pain Management Interventions:   quiet environment facilitated   relaxation techniques promoted   warm blanket provided  Intervention: Provide Person-Centered Care  Flowsheets (Taken 4/8/2024 1431)  Trust Relationship/Rapport:   questions encouraged   care explained   choices provided   reassurance provided   thoughts/feelings acknowledged   empathic listening provided   emotional support provided   questions answered

## 2024-04-10 ENCOUNTER — HOSPITAL ENCOUNTER (OUTPATIENT)
Dept: RADIOLOGY | Facility: HOSPITAL | Age: 59
Discharge: HOME | End: 2024-04-10
Attending: INTERNAL MEDICINE
Payer: MEDICARE

## 2024-04-10 DIAGNOSIS — C50.111 MALIGNANT NEOPLASM OF CENTRAL PORTION OF RIGHT BREAST IN FEMALE, ESTROGEN RECEPTOR POSITIVE: Primary | ICD-10-CM

## 2024-04-10 DIAGNOSIS — C79.51 SECONDARY CANCER OF BONE: ICD-10-CM

## 2024-04-10 DIAGNOSIS — C50.111 MALIGNANT NEOPLASM OF CENTRAL PORTION OF RIGHT BREAST IN FEMALE, ESTROGEN RECEPTOR POSITIVE: ICD-10-CM

## 2024-04-10 DIAGNOSIS — Z17.0 MALIGNANT NEOPLASM OF CENTRAL PORTION OF RIGHT BREAST IN FEMALE, ESTROGEN RECEPTOR POSITIVE: ICD-10-CM

## 2024-04-10 DIAGNOSIS — T45.1X5A PANCYTOPENIA DUE TO ANTINEOPLASTIC CHEMOTHERAPY: ICD-10-CM

## 2024-04-10 DIAGNOSIS — C79.51 SECONDARY MALIGNANT NEOPLASM OF BONE: ICD-10-CM

## 2024-04-10 DIAGNOSIS — Z17.0 MALIGNANT NEOPLASM OF CENTRAL PORTION OF RIGHT BREAST IN FEMALE, ESTROGEN RECEPTOR POSITIVE: Primary | ICD-10-CM

## 2024-04-10 DIAGNOSIS — D61.810 PANCYTOPENIA DUE TO ANTINEOPLASTIC CHEMOTHERAPY: ICD-10-CM

## 2024-04-10 PROCEDURE — 78815 PET IMAGE W/CT SKULL-THIGH: CPT | Mod: 26,PS,HCNC, | Performed by: RADIOLOGY

## 2024-04-10 PROCEDURE — 78815 PET IMAGE W/CT SKULL-THIGH: CPT | Mod: TC,HCNC

## 2024-04-10 PROCEDURE — A9552 F18 FDG: HCPCS | Mod: HCNC | Performed by: INTERNAL MEDICINE

## 2024-04-10 RX ORDER — FLUDEOXYGLUCOSE F18 500 MCI/ML
10.69 INJECTION INTRAVENOUS
Status: COMPLETED | OUTPATIENT
Start: 2024-04-10 | End: 2024-04-10

## 2024-04-10 RX ADMIN — FLUDEOXYGLUCOSE F-18 10.69 MILLICURIE: 500 INJECTION INTRAVENOUS at 08:04

## 2024-04-11 ENCOUNTER — OFFICE VISIT (OUTPATIENT)
Dept: INTERNAL MEDICINE | Facility: CLINIC | Age: 59
End: 2024-04-11
Payer: MEDICARE

## 2024-04-11 VITALS
HEART RATE: 90 BPM | DIASTOLIC BLOOD PRESSURE: 66 MMHG | BODY MASS INDEX: 27.27 KG/M2 | RESPIRATION RATE: 16 BRPM | WEIGHT: 173.75 LBS | HEIGHT: 67 IN | SYSTOLIC BLOOD PRESSURE: 110 MMHG | OXYGEN SATURATION: 98 %

## 2024-04-11 DIAGNOSIS — T45.1X5A PANCYTOPENIA DUE TO ANTINEOPLASTIC CHEMOTHERAPY: ICD-10-CM

## 2024-04-11 DIAGNOSIS — T45.1X5A IMMUNODEFICIENCY DUE TO CHEMOTHERAPY: ICD-10-CM

## 2024-04-11 DIAGNOSIS — C79.51 SECONDARY MALIGNANT NEOPLASM OF BONE: ICD-10-CM

## 2024-04-11 DIAGNOSIS — D84.822 IMMUNODEFICIENCY SECONDARY TO RADIATION THERAPY: ICD-10-CM

## 2024-04-11 DIAGNOSIS — Z00.00 ENCOUNTER FOR MEDICARE ANNUAL WELLNESS EXAM: Primary | ICD-10-CM

## 2024-04-11 DIAGNOSIS — S22.070G COMPRESSION FRACTURE OF T10 VERTEBRA WITH DELAYED HEALING, SUBSEQUENT ENCOUNTER: ICD-10-CM

## 2024-04-11 DIAGNOSIS — M85.89 OSTEOPENIA OF MULTIPLE SITES: ICD-10-CM

## 2024-04-11 DIAGNOSIS — C50.111 MALIGNANT NEOPLASM OF CENTRAL PORTION OF RIGHT BREAST IN FEMALE, ESTROGEN RECEPTOR POSITIVE: ICD-10-CM

## 2024-04-11 DIAGNOSIS — Z79.899 IMMUNODEFICIENCY DUE TO CHEMOTHERAPY: ICD-10-CM
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
## 2024-04-11 DIAGNOSIS — Z86.010 PERSONAL HISTORY OF COLONIC POLYPS: ICD-10-CM

## 2024-04-11 DIAGNOSIS — E78.2 MIXED HYPERLIPIDEMIA: ICD-10-CM

## 2024-04-11 DIAGNOSIS — Z17.0 MALIGNANT NEOPLASM OF CENTRAL PORTION OF RIGHT BREAST IN FEMALE, ESTROGEN RECEPTOR POSITIVE: ICD-10-CM

## 2024-04-11 DIAGNOSIS — D84.821 IMMUNODEFICIENCY DUE TO CHEMOTHERAPY: ICD-10-CM

## 2024-04-11 DIAGNOSIS — I69.351 HEMIPARESIS AFFECTING RIGHT SIDE AS LATE EFFECT OF CEREBROVASCULAR ACCIDENT: ICD-10-CM

## 2024-04-11 DIAGNOSIS — D61.810 PANCYTOPENIA DUE TO ANTINEOPLASTIC CHEMOTHERAPY: ICD-10-CM

## 2024-04-11 PROCEDURE — 1160F RVW MEDS BY RX/DR IN RCRD: CPT | Mod: CPTII,S$GLB,, | Performed by: NURSE PRACTITIONER

## 2024-04-11 PROCEDURE — 3074F SYST BP LT 130 MM HG: CPT | Mod: CPTII,S$GLB,, | Performed by: NURSE PRACTITIONER

## 2024-04-11 PROCEDURE — 99999 PR PBB SHADOW E&M-EST. PATIENT-LVL V: CPT | Mod: PBBFAC,,, | Performed by: NURSE PRACTITIONER

## 2024-04-11 PROCEDURE — 3078F DIAST BP <80 MM HG: CPT | Mod: CPTII,S$GLB,, | Performed by: NURSE PRACTITIONER

## 2024-04-11 PROCEDURE — 1159F MED LIST DOCD IN RCRD: CPT | Mod: CPTII,S$GLB,, | Performed by: NURSE PRACTITIONER

## 2024-04-11 PROCEDURE — G0439 PPPS, SUBSEQ VISIT: HCPCS | Mod: S$GLB,,, | Performed by: NURSE PRACTITIONER

## 2024-04-11 NOTE — PATIENT INSTRUCTIONS
Counseling and Referral of Other Preventative  (Italic type indicates deductible and co-insurance are waived)    Patient Name: Carole Moore  Today's Date: 4/11/2024    Health Maintenance       Date Due Completion Date    Hemoglobin A1c (Diabetic Prevention Screening) Never done ---    COVID-19 Vaccine (6 - 2023-24 season) 11/25/2023 9/30/2023    Lipid Panel 07/14/2024 7/14/2023    High Dose Statin 03/21/2025 3/21/2024    Colorectal Cancer Screening 12/27/2028 12/27/2023    TETANUS VACCINE 01/01/2032 1/1/2022        No orders of the defined types were placed in this encounter.    The following information is provided to all patients.  This information is to help you find resources for any of the problems found today that may be affecting your health:                  Living healthy guide: www.[facility].[place].[facility]      Understanding Diabetes: www.diabetes.org      Eating healthy: www.cdc.gov/healthyweight      [facility] home safety checklist: www.cdc.gov/steadi/patient.html      Agency on Aging: www.goea.[place].[facility]      Alcoholics anonymous (AA): www.aa.org      Physical Activity: www.laxmi.nih.gov/mq4tfrq      Tobacco use: www.quitwithusla.org         Counseling and Referral of Other Preventative  (Italic type indicates deductible and co-insurance are waived)    Patient Name: Carole Moore  Today's Date: 4/11/2024    Health Maintenance       Date Due Completion Date    Hemoglobin A1c (Diabetic Prevention Screening) Never done ---    COVID-19 Vaccine (6 - 2023-24 season) 11/25/2023 9/30/2023    Lipid Panel 07/14/2024 7/14/2023    High Dose Statin 03/21/2025 3/21/2024    Colorectal Cancer Screening 12/27/2028 12/27/2023    TETANUS VACCINE 01/01/2032 1/1/2022        No orders of the defined types were placed in this encounter.    The following information is provided to all patients.  This information is to help you find resources for any of the problems found today that may be affecting your health:                  Living  healthy guide: www.[facility].[place].[facility]      Understanding Diabetes: www.diabetes.org      Eating healthy: www.cdc.gov/healthyweight      CDC home safety checklist: www.cdc.gov/steadi/patient.html      Agency on Aging: www.goea.[place].[facility]      Alcoholics anonymous (AA): www.aa.org      Physical Activity: www.laxmi.nih.gov/jp0lzoh      Tobacco use: www.quitwithusla.org          You can access the FollowMyHealth Patient Portal offered by [facility] by registering at the following website: [email or web address]. By joining [employer]’s FollowMyHealth portal, you will also be able to view your health information using other applications (apps) compatible with our system. You can access the FollowMyHealth Patient Portal offered by [facility] by registering at the following website: [email or web address]. By joining [employer]’s FollowMyHealth portal, you will also be able to view your health information using other applications (apps) compatible with our system.

## 2024-04-11 NOTE — PROGRESS NOTES
Carole Moore presented for a Medicare AWV today. The following components were reviewed and updated:    Medical history  Family History  Social history  Allergies and Current Medications  Health Risk Assessment  Health Maintenance  Care Team    **See Completed Assessments for Annual Wellness visit with in the encounter summary    The following assessments were completed:  Depression Screening  Cognitive function Screening  Timed Get Up Test  Whisper Test          Opioid documentation:      Patient does not have a current opioid prescription.      Non-opioid treatment options have been discussed today and added to the patient's after visit summary.          There were no vitals filed for this visit.  There is no height or weight on file to calculate BMI.       Physical Exam  Constitutional:       Appearance: Normal appearance.   HENT:      Head: Normocephalic and atraumatic.   Neck:      Vascular: No carotid bruit.   Cardiovascular:      Rate and Rhythm: Normal rate and regular rhythm.      Pulses: Normal pulses.      Heart sounds: Normal heart sounds.   Pulmonary:      Effort: Pulmonary effort is normal.      Breath sounds: Normal breath sounds.   Abdominal:      General: Bowel sounds are normal.      Palpations: Abdomen is soft.   Musculoskeletal:         General: Deformity (right hemiparesis with right arm contracture) present. Normal range of motion.      Cervical back: Normal range of motion.   Skin:     General: Skin is warm and dry.   Neurological:      General: No focal deficit present.      Mental Status: She is alert and oriented to person, place, and time. Mental status is at baseline.      Motor: Weakness (right hemiparesis) present.      Gait: Gait abnormal.   Psychiatric:         Mood and Affect: Mood normal.         Behavior: Behavior normal.         Thought Content: Thought content normal.         Judgment: Judgment normal.       Diagnoses and health risks identified today and associated  recommendations/orders:  1. Encounter for Medicare annual wellness exam  Reviewed and discussed preventive health screenings and vaccinations with the patient.   A1c: scheduled     2. Malignant neoplasm of central portion of right breast in female, estrogen receptor positive  3. Secondary malignant neoplasm of bone  4. Pancytopenia due to antineoplastic chemotherapy  5. Immunodeficiency secondary to radiation therapy  6. Immunodeficiency due to chemotherapy  Currently on Ibrance / followed closely by HemOnc. Also under observation by Ortho oncology and breast specialist. We discussed vaccine and screening recommendations in setting of immune deficiency. Continue current treatment plan as previously prescribed with your specialists.     7. Hemiparesis affecting right side as late effect of cerebrovascular accident  On ASA and statin with h/o CVA. Stable. Continue current treatment plan as previously prescribed with your PCP     8. Compression fracture of T10 vertebra with delayed healing, subsequent encounter  Stable. Continue current treatment plan as previously prescribed with your HemOnc and Orthopedic oncology specialist.     9. Mixed hyperlipidemia  On statin / Stable. Continue current treatment plan as previously prescribed with your PCP     Lab Results   Component Value Date    CHOL 177 07/14/2023    CHOL 165 09/15/2021    CHOL 166 09/21/2020     Lab Results   Component Value Date    HDL 65 07/14/2023    HDL 59 09/15/2021    HDL 65 09/21/2020     Lab Results   Component Value Date    LDLCALC 98.4 07/14/2023    LDLCALC 91.2 09/15/2021    LDLCALC 81.4 09/21/2020     Lab Results   Component Value Date    TRIG 68 07/14/2023    TRIG 74 09/15/2021    TRIG 98 09/21/2020       Lab Results   Component Value Date    CHOLHDL 36.7 07/14/2023    CHOLHDL 35.8 09/15/2021    CHOLHDL 39.2 09/21/2020       10. Osteopenia of multiple sites  On Reclast, calcium, and vitamin D. Stable. Continue current treatment plan as previously  prescribed with your HemOnc and PCP    11. Personal history of colonic polyps  Colonoscopy 12/2023 with rec repeat in 5 yrs.       Provided Carole with a 5-10 year written screening schedule and personal prevention plan. Recommendations were developed using the USPSTF age appropriate recommendations. Education, counseling, and referrals were provided as needed.  After Visit Summary printed and given to patient which includes a list of additional screenings\tests needed.    No follow-ups on file.      Martha Hutson, GIULIANO    I offered to discuss advanced care planning, including how to pick a person who would make decisions for you if you were unable to make them for yourself, called a health care power of , and what kind of decisions you might make such as use of life sustaining treatments such as ventilators and tube feeding when faced with a life limiting illness recorded on a living will that they will need to know. (How you want to be cared for as you near the end of your natural life)     X Patient is interested in learning more about how to make advanced directives.  I provided them paperwork and offered to discuss this with them.

## 2024-04-12 ENCOUNTER — HOSPITAL ENCOUNTER (OUTPATIENT)
Dept: RADIOLOGY | Facility: HOSPITAL | Age: 59
Discharge: HOME | End: 2024-04-12
Attending: INTERNAL MEDICINE
Payer: MEDICARE

## 2024-04-12 DIAGNOSIS — Z17.0 MALIGNANT NEOPLASM OF CENTRAL PORTION OF RIGHT BREAST IN FEMALE, ESTROGEN RECEPTOR POSITIVE: ICD-10-CM

## 2024-04-12 DIAGNOSIS — C50.111 MALIGNANT NEOPLASM OF CENTRAL PORTION OF RIGHT BREAST IN FEMALE, ESTROGEN RECEPTOR POSITIVE: ICD-10-CM

## 2024-04-12 PROCEDURE — 71250 CT THORAX DX C-: CPT | Mod: 26,,, | Performed by: RADIOLOGY

## 2024-04-12 PROCEDURE — 71250 CT THORAX DX C-: CPT | Mod: TC

## 2024-04-15 ENCOUNTER — INFUSION (OUTPATIENT)
Dept: INFUSION THERAPY | Facility: HOSPITAL | Age: 59
End: 2024-04-15
Attending: INTERNAL MEDICINE
Payer: MEDICARE

## 2024-04-15 ENCOUNTER — OFFICE VISIT (OUTPATIENT)
Dept: HEMATOLOGY/ONCOLOGY | Facility: CLINIC | Age: 59
End: 2024-04-15
Payer: MEDICARE

## 2024-04-15 VITALS
DIASTOLIC BLOOD PRESSURE: 74 MMHG | WEIGHT: 174.38 LBS | OXYGEN SATURATION: 97 % | SYSTOLIC BLOOD PRESSURE: 120 MMHG | HEART RATE: 87 BPM | TEMPERATURE: 99 F | BODY MASS INDEX: 27.31 KG/M2 | RESPIRATION RATE: 18 BRPM

## 2024-04-15 DIAGNOSIS — C50.111 MALIGNANT NEOPLASM OF CENTRAL PORTION OF RIGHT BREAST IN FEMALE, ESTROGEN RECEPTOR POSITIVE: Primary | ICD-10-CM

## 2024-04-15 DIAGNOSIS — Z17.0 MALIGNANT NEOPLASM OF CENTRAL PORTION OF RIGHT BREAST IN FEMALE, ESTROGEN RECEPTOR POSITIVE: Primary | ICD-10-CM

## 2024-04-15 PROCEDURE — 96402 CHEMO HORMON ANTINEOPL SQ/IM: CPT

## 2024-04-15 PROCEDURE — 1159F MED LIST DOCD IN RCRD: CPT | Mod: CPTII,95,, | Performed by: INTERNAL MEDICINE

## 2024-04-15 PROCEDURE — 99214 OFFICE O/P EST MOD 30 MIN: CPT | Mod: 25,95,, | Performed by: INTERNAL MEDICINE

## 2024-04-15 PROCEDURE — 63600175 PHARM REV CODE 636 W HCPCS: Mod: JZ,JG | Performed by: INTERNAL MEDICINE

## 2024-04-15 PROCEDURE — 1160F RVW MEDS BY RX/DR IN RCRD: CPT | Mod: CPTII,95,, | Performed by: INTERNAL MEDICINE

## 2024-04-15 RX ORDER — LAMOTRIGINE 25 MG/1
500 TABLET ORAL
Status: CANCELLED | OUTPATIENT
Start: 2024-05-13

## 2024-04-15 RX ORDER — LAMOTRIGINE 25 MG/1
500 TABLET ORAL
Status: CANCELLED | OUTPATIENT
Start: 2024-04-15

## 2024-04-15 RX ORDER — LAMOTRIGINE 25 MG/1
500 TABLET ORAL
Status: COMPLETED | OUTPATIENT
Start: 2024-04-15 | End: 2024-04-15

## 2024-04-15 RX ADMIN — FULVESTRANT 500 MG: 50 INJECTION, SOLUTION INTRAMUSCULAR at 10:04

## 2024-04-15 NOTE — PLAN OF CARE
Discussed plan of care with pt. Addressed any and ongoing concerns. Pt denies    Problem: Adult Inpatient Plan of Care  Goal: Plan of Care Review  Outcome: Ongoing, Progressing  Flowsheets (Taken 4/15/2024 1020)  Plan of Care Reviewed With: patient  Goal: Absence of Hospital-Acquired Illness or Injury  Outcome: Ongoing, Progressing  Intervention: Identify and Manage Fall Risk  Flowsheets (Taken 4/15/2024 1020)  Safety Promotion/Fall Prevention:   in recliner, wheels locked   nonskid shoes/socks when out of bed   room near unit station  Intervention: Prevent Infection  Flowsheets (Taken 4/15/2024 1020)  Infection Prevention:   equipment surfaces disinfected   hand hygiene promoted  Goal: Optimal Comfort and Wellbeing  Outcome: Ongoing, Progressing  Intervention: Monitor Pain and Promote Comfort  Flowsheets (Taken 4/15/2024 1020)  Pain Management Interventions:   relaxation techniques promoted   quiet environment facilitated  Intervention: Provide Person-Centered Care  Flowsheets (Taken 4/15/2024 1020)  Trust Relationship/Rapport:   questions encouraged   care explained   choices provided   reassurance provided   emotional support provided   empathic listening provided   thoughts/feelings acknowledged   questions answered      Stable Stable

## 2024-04-15 NOTE — PROGRESS NOTES
Subjective:       Patient ID: Carole Moore is a 58 y.o. female.    Chief Complaint: Results, Breast Cancer, and Chemotherapy    HPI:  58-year-old female history of metastatic breast carcinoma continues on Ibrance in Faslodex tolerating therapy well recent PET scan demonstrating stable findings except for 8 mm nodule in lung. .  ECOG status 1 seen in virtual visit    Past Medical History:   Diagnosis Date    Antineoplastic chemotherapy induced anemia     Back pain     Breast cancer 2016    right breast    Cancer     R Breast cancer    CVA (cerebral infarction)     Diverticulosis     Family history of colon cancer 10/30/2018    Her mother and father both had colon cancer.    Genetic testing of female 12/27/2016    Echolocation (28 genes) - NEGATIVE    Hyperlipidemia     Immunodeficiency due to chemotherapy 10/03/2022    Nausea after anesthesia     Paralysis     right side    PONV (postoperative nausea and vomiting)     Stroke 2001    R side weakness    Trouble in sleeping      Family History   Problem Relation Name Age of Onset    Breast cancer Paternal Aunt      Colon cancer Father      Colon cancer Mother Kathy Stein     Cancer Mother Kathy Stein         colon cancer    Melanoma Maternal Uncle      Skin cancer Maternal Uncle       Social History     Socioeconomic History    Marital status:    Tobacco Use    Smoking status: Never    Smokeless tobacco: Never    Tobacco comments:     I was a very light smoker for 5 years   Substance and Sexual Activity    Alcohol use: No    Drug use: No    Sexual activity: Not Currently     Partners: Male     Birth control/protection: Surgical     Comment: hysterectomy   Other Topics Concern    Are you pregnant or think you may be? No    Breast-feeding No     Social Determinants of Health     Financial Resource Strain: Low Risk  (4/11/2024)    Overall Financial Resource Strain (CARDIA)     Difficulty of Paying Living Expenses: Not hard at all   Food Insecurity: No Food  Insecurity (4/11/2024)    Hunger Vital Sign     Worried About Running Out of Food in the Last Year: Never true     Ran Out of Food in the Last Year: Never true   Transportation Needs: No Transportation Needs (4/11/2024)    PRAPARE - Transportation     Lack of Transportation (Medical): No     Lack of Transportation (Non-Medical): No   Physical Activity: Inactive (4/11/2024)    Exercise Vital Sign     Days of Exercise per Week: 0 days     Minutes of Exercise per Session: 0 min   Stress: Stress Concern Present (4/11/2024)    [employer] - Occupational Stress Questionnaire     Feeling of Stress : To some extent   Social Connections: Socially Integrated (4/11/2024)    Social Connection and Isolation Panel [NHANES]     Frequency of Communication with Friends and Family: More than three times a week     Frequency of Social Gatherings with Friends and Family: More than three times a week     Attends [employer] Services: More than 4 times per year     Active Member of Clubs or Organizations: Yes     Attends Club or Organization Meetings: More than 4 times per year     Marital Status:    Housing Stability: Low Risk  (4/11/2024)    Housing Stability Vital Sign     Unable to Pay for Housing in the Last Year: No     Number of Places Lived in the Last Year: 1     Unstable Housing in the Last Year: No     Past Surgical History:   Procedure Laterality Date    AUGMENTATION OF BREAST Left 2017    BREAST SURGERY      COLONOSCOPY N/A 10/30/2018    Procedure: COLONOSCOPY;  Surgeon: Cosme Monson MD;  Location: [facility];  Service: Endoscopy;  Laterality: N/A;    COLONOSCOPY N/A 12/27/2023    Procedure: COLONOSCOPY;  Surgeon: Caro Leo MD;  Location: [facility];  Service: Endoscopy;  Laterality: N/A;    COSMETIC SURGERY  December 2017    Breast Reconstruction after Mastectomy    CYSTOSCOPY WITH BIOPSY OF BLADDER N/A 11/29/2021    Procedure: CYSTOSCOPY, WITH BLADDER BIOPSY, WITH FULGURATION  "IF INDICATED;  Surgeon: Page Marcelo MD;  Location: [facility] OR;  Service: Urology;  Laterality: N/A;    FIXATION KYPHOPLASTY Bilateral 02/09/2023    Procedure: KYPHOPLASTY;  Surgeon: Josse Pelayo MD;  Location: [facility] OR;  Service: Neurosurgery;  Laterality: Bilateral;  Kyphoplasty T10 with ablation    HYSTERECTOMY  2007    maintains both ovaries, menorrhagia    MASTECTOMY Right 2017    MEDIPORT INSERTION, SINGLE      RADIOFREQUENCY ABLATION, BONE, PERCUTANEOUS Bilateral 02/09/2023    Procedure: RADIOFREQUENCY ABLATION,BONE,PERCUTANEOUS;  Surgeon: Josse Pelayo MD;  Location: [facility] OR;  Service: Neurosurgery;  Laterality: Bilateral;    TONSILLECTOMY         Labs:  Lab Results   Component Value Date    WBC 3.84 (L) 04/15/2024    HGB 14.0 04/15/2024    HCT 40.7 04/15/2024     (H) 04/15/2024     04/15/2024     BMP  Lab Results   Component Value Date     04/15/2024    K 4.3 04/15/2024     04/15/2024    CO2 27 04/15/2024    BUN 13 04/15/2024    CREATININE 0.8 04/15/2024    CALCIUM 9.9 04/15/2024    ANIONGAP 9 04/15/2024    ESTGFRAFRICA >60 05/12/2022    EGFRNONAA >60 05/12/2022     Lab Results   Component Value Date    ALT 30 04/15/2024    AST 19 04/15/2024    ALKPHOS 65 04/15/2024    BILITOT 1.0 04/15/2024       No results found for: "IRON", "TIBC", "FERRITIN", "SATURATEDIRO"  No results found for: "[id number]"  No results found for: "FOLATE"  Lab Results   Component Value Date    TSH 0.793 10/29/2021         Review of Systems   Constitutional:  Negative for activity change, appetite change, chills, diaphoresis, fatigue, fever and unexpected weight change.   HENT:  Negative for congestion, dental problem, drooling, ear discharge, ear pain, facial swelling, hearing loss, mouth sores, nosebleeds, postnasal drip, rhinorrhea, sinus pressure, sneezing, sore throat, tinnitus, trouble swallowing and voice change.    Eyes:  Negative for photophobia, pain, discharge, redness, itching and visual " disturbance.   Respiratory:  Negative for cough, choking, chest tightness, shortness of breath, wheezing and stridor.    Cardiovascular:  Negative for chest pain, palpitations and leg swelling.   Gastrointestinal:  Negative for abdominal distention, abdominal pain, anal bleeding, blood in stool, constipation, diarrhea, nausea, rectal pain and vomiting.   Endocrine: Negative for cold intolerance, heat intolerance, polydipsia, polyphagia and polyuria.   Genitourinary:  Negative for decreased urine volume, difficulty urinating, dyspareunia, dysuria, enuresis, flank pain, frequency, genital sores, hematuria, menstrual problem, pelvic pain, urgency, vaginal bleeding, vaginal discharge and vaginal pain.   Musculoskeletal:  Negative for arthralgias, back pain, gait problem, joint swelling, myalgias, neck pain and neck stiffness.   Skin:  Negative for color change, pallor and rash.   Allergic/Immunologic: Negative for environmental allergies, food allergies and immunocompromised state.   Neurological:  Negative for dizziness, tremors, seizures, syncope, facial asymmetry, speech difficulty, weakness, light-headedness, numbness and headaches.   Hematological:  Negative for adenopathy. Does not bruise/bleed easily.   Psychiatric/Behavioral:  Negative for agitation, behavioral problems, confusion, decreased concentration, dysphoric mood, hallucinations, self-injury, sleep disturbance and suicidal ideas. The patient is not nervous/anxious and is not hyperactive.        Objective:      Physical Exam  Constitutional:       Appearance: Normal appearance.             Assessment:      No diagnosis found.       Med Onc Chart Routing      Follow up with physician . Return to clinic to see me in 2 months CBC CMP and CT chest prior   Follow up with [facility]    Infusion scheduling note    Injection scheduling note Faslodex monthly with CBC CMP   Labs    Imaging    Pharmacy appointment    Other referrals                   Plan:   The patient  location is:  Home  The chief complaint leading to consultation is:  Breast cancer    Visit type: audiovisual    Face to Face time with patient: 25 minutes of total time spent on the encounter, which includes face to face time and non-face to face time preparing to see the patient (eg, review of tests), Obtaining and/or reviewing separately obtained history, Documenting clinical information in the electronic or other health record, Independently interpreting results (not separately reported) and communicating results to the patient/family/caregiver, or Care coordination (not separately reported).         Each patient to whom he or she provides medical services by telemedicine is:  (1) informed of the relationship between the physician and patient and the respective role of any other health care provider with respect to management of the patient; and (2) notified that he or she may decline to receive medical services by telemedicine and may withdraw from such care at any time.    Notes:   Reviewed information no evidence of progression except for 8 mm nodule in lung variant will present at multidisciplinary conference to see whether not enough for stereotactic radiation today or wait 2 months repeat CT to see if growth.  Discussed implications with her answered questions        Harpal Khan Jr, MD FACP

## 2024-04-16 ENCOUNTER — OFFICE VISIT (OUTPATIENT)
Dept: UROLOGY | Facility: CLINIC | Age: 59
End: 2024-04-16
Payer: MEDICARE

## 2024-04-16 VITALS
BODY MASS INDEX: 27.27 KG/M2 | DIASTOLIC BLOOD PRESSURE: 76 MMHG | HEART RATE: 108 BPM | HEIGHT: 67 IN | TEMPERATURE: 98 F | WEIGHT: 173.75 LBS | SYSTOLIC BLOOD PRESSURE: 117 MMHG | RESPIRATION RATE: 17 BRPM

## 2024-04-16 DIAGNOSIS — R31.29 MICROHEMATURIA: Primary | ICD-10-CM

## 2024-04-16 DIAGNOSIS — N39.0 RECURRENT UTI: ICD-10-CM

## 2024-04-16 DIAGNOSIS — R39.15 URINARY URGENCY: ICD-10-CM

## 2024-04-16 LAB
BILIRUB UR QL STRIP: NEGATIVE
GLUCOSE UR QL STRIP: NEGATIVE
KETONES UR QL STRIP: NEGATIVE
LEUKOCYTE ESTERASE UR QL STRIP: NEGATIVE
PH, POC UA: 5.5
POC BLOOD, URINE: POSITIVE
POC NITRATES, URINE: NEGATIVE
POC RESIDUAL URINE VOLUME: 20 ML (ref 0–100)
PROT UR QL STRIP: POSITIVE
SP GR UR STRIP: 1.01 (ref 1–1.03)
UROBILINOGEN UR STRIP-ACNC: 0.2 (ref 0.1–1.1)

## 2024-04-16 PROCEDURE — 3074F SYST BP LT 130 MM HG: CPT | Mod: CPTII,S$GLB,, | Performed by: UROLOGY

## 2024-04-16 PROCEDURE — 99999 PR PBB SHADOW E&M-EST. PATIENT-LVL IV: CPT | Mod: PBBFAC,,, | Performed by: UROLOGY

## 2024-04-16 PROCEDURE — 51798 US URINE CAPACITY MEASURE: CPT | Mod: S$GLB,,, | Performed by: UROLOGY

## 2024-04-16 PROCEDURE — 99214 OFFICE O/P EST MOD 30 MIN: CPT | Mod: S$GLB,,, | Performed by: UROLOGY

## 2024-04-16 PROCEDURE — 1159F MED LIST DOCD IN RCRD: CPT | Mod: CPTII,S$GLB,, | Performed by: UROLOGY

## 2024-04-16 PROCEDURE — 81003 URINALYSIS AUTO W/O SCOPE: CPT | Mod: QW,S$GLB,, | Performed by: UROLOGY

## 2024-04-16 PROCEDURE — 3008F BODY MASS INDEX DOCD: CPT | Mod: CPTII,S$GLB,, | Performed by: UROLOGY

## 2024-04-16 PROCEDURE — 3078F DIAST BP <80 MM HG: CPT | Mod: CPTII,S$GLB,, | Performed by: UROLOGY

## 2024-04-16 RX ORDER — NITROFURANTOIN 25; 75 MG/1; MG/1
100 CAPSULE ORAL 2 TIMES DAILY
Qty: 14 CAPSULE | Refills: 0 | Status: SHIPPED | OUTPATIENT
Start: 2024-04-16

## 2024-04-16 NOTE — PROGRESS NOTES
Chief Complaint: f/u recurrent UTI    HPI:    4/16/24-Pt does like to have self-start abx on hand, but UTIs have been much less frequent. 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 No dysuria or gross hematuria. She does have some urgency. No incontinence. No pads. Nocturia x x 0-1.     10/11/23-Pt had a couple of negative cultures last month. She was having frequency and dysuria when she was having symptoms. Took 1 uribel and it helped. Pt reports that sometimes she has diarrhea related to stool softeners that she takes related to anti-cancer medication she is on. Wonders if it is related.   Stress Incontinence is stable when she coughs. 1 pad per day. No gross hematuria.     3/22/23-sometimes she has frequency, but then it resolves. No recent UTIs. Had lumbar surgery since her last visit. She does have vaginal dryness.     9/21/22- Pt reports left flank and abdominal pain. Having urinary frequency. Went to [facility] and had non-contrast CT, showing grossly normal kidneys without stone or hydronephrosis, per report. No fever. She is now off of daily antibiotics. Prescribed estring by gyn, but not using it. She reports that the pain is primarily with movement. Ongoing x 4 weeks, off and on. Currently on baclofen, norco and celebrex. Has a grandbaby that she has been lifting.    3/24/22- Daily macrobid seems to be working. No recent UTIs. Pt has an upcoming trip and would like to continue daily abx through then. No dysuria, abdominal pain or gross hematuria.    12/21/21- bladder neck biopsy with chronic cystitis with cystitis cystica and glandularis. Had some bloating after the procedure for a week. No gross hematuria. No dysuria. Has had recurrent UTIs every month or so lately.   11/5/21-here for cysto. CT urogram with tiny right renal cyst.   10/21/24-[id number] for dipstick positive UA for blood, urine culture negative. She reports that a year ago, she was having recurrent UTIs. She reports that after being  cathed in the  clinic, she didn't have any more UTIs. Currently, she reports that she is now having frequency and dysuria. She was treated with abx (cipro) a few weeks ago and symptoms improved, but she is scared they will recur. No gross hematuria. She was on oxybutynin, but it gave her dry mouth.   1/30/20: 55 yo woman with breast cancer history about a year ago had a few UTI.  Sole symptom was frequency.   Bought some new underwear and after changing back to old type things got better and no problems since, about 5 mo.  No abd/pelvic pain and no exac/rel factors.  No hematuria.  No urolithiasis.  No urinary bother.  No  history.  Normal sexual function.  Normal OB exam 2 mo ago.  Microhematuria on dipstick UA.  Had a stroke 20 yrs ago, on disability.    Allergies:  Patient has no known allergies.    Medications: has a current medication list which includes the following prescription(s): ascorbic acid, aspirin, atorvastatin, calcium carbonate, cetirizine, cholecalciferol (vitamin d3), fulvestrant, gadavist, meclizine, methocarbamol, multivitamin, nitrofurantoin (macrocrystal-monohydrate), ondansetron, oxycodone-acetaminophen, palbociclib, polyethylene glycol, prochlorperazine, and zoledronic.    Review of Systems:  General: No fever, chills, fatigability, or weight loss.  Skin: No rashes, itching, or changes in color or texture of skin.  Chest: Denies LIPSCOMB, cyanosis, wheezing, cough, and sputum production.  Abdomen: Appetite fine. No weight loss. Denies diarrhea, abdominal pain, hematemesis, or blood in stool.  Musculoskeletal: No joint stiffness or swelling. Denies back pain.  : As above.  All other review of systems negative.    PMH:   has a past medical history of Antineoplastic chemotherapy induced anemia, Back pain, Breast cancer (2016), Cancer, CVA (cerebral infarction), Diverticulosis, Family history of colon cancer (10/30/2018), Genetic testing of female (12/27/2016), Hyperlipidemia, Immunodeficiency  due to chemotherapy (10/03/2022), Nausea after anesthesia, Paralysis, PONV (postoperative nausea and vomiting), Stroke (2001), and Trouble in sleeping.    PSH:   has a past surgical history that includes Tonsillectomy; Mediport insertion, single; Breast surgery; Colonoscopy (N/A, 10/30/2018); Mastectomy (Right, 2017); Augmentation of breast (Left, 2017); Hysterectomy (2007); Cystoscopy with biopsy of bladder (N/A, 11/29/2021); Fixation Kyphoplasty (Bilateral, 02/09/2023); radiofrequency ablation, bone, percutaneous (Bilateral, 02/09/2023); Colonoscopy (N/A, 12/27/2023); and Cosmetic surgery (December 2017).    FamHx: family history includes Breast cancer in her paternal aunt; Cancer in her mother; Colon cancer in her father and mother; Melanoma in her maternal uncle; Skin cancer in her maternal uncle.    SocHx:  reports that she has never smoked. She has never used smokeless tobacco. She reports that she does not drink alcohol and does not use drugs.     Physical Exam:  Vitals:   Vitals:    04/16/24 0920   BP: 117/76   Pulse: 108   Resp: 17   Temp: 97.8 °F (36.6 °C)       General: A&Ox3. No apparent distress. No deformities.  Neck: No masses. Normal thyroid.  Lungs: normal inspiration. No use of accessory muscles.  Heart: normal pulse. No arrhythmias.  Abdomen: Soft. NT. ND. No masses. No hernias. No hepatosplenomegaly.  Lymphatic: Neck and groin nodes negative.  Skin: The skin is warm and dry. No jaundice.  Ext: No c/c/e. R hemiparesis  : External genitalia normal.     Labs/Studies:   Trace blood, negative for LE, nit    Impression/Plan:   Microhematuria  -     POCT Urinalysis, Dipstick, Automated, W/O Scope  -     POCT Bladder Scan    Urinary urgency    Recurrent UTI    Other orders  -     nitrofurantoin, macrocrystal-monohydrate, (MACROBID) 100 MG capsule; Take 1 capsule (100 mg total) by mouth 2 (two) times daily.  Dispense: 14 capsule; Refill: 0      Self-start macrobid  Discussed OAB meds and behavioral  modifications. Pt not too bothered currently.     Follow up in about 1 year (around 4/16/2025).

## 2024-04-17 ENCOUNTER — TELEPHONE (OUTPATIENT)
Dept: HEMATOLOGY/ONCOLOGY | Facility: CLINIC | Age: 59
End: 2024-04-17
Payer: MEDICARE

## 2024-04-17 NOTE — TELEPHONE ENCOUNTER
"    4/11/2024     7:04 AM 3/17/2024     8:18 AM 2/19/2024     1:07 AM 1/22/2024    10:04 AM 1/22/2024     9:04 AM 12/18/2023     8:20 AM 11/20/2023     9:00 AM   DISTRESS SCREENING   Distress Score 7 7 6 0 - No Distress 0 - No Distress 0 - No Distress 0 - No Distress   Practical Concerns Treatment decisions None of these None of these  None of these None of these None of these   Social Concerns None of these None of these None of these  None of these None of these None of these   Emotional Concerns Worry or anxiety;Fear Worry or anxiety Worry or anxiety  None of these None of these None of these   Retire Spiritual or Orthodox Concerns      No No   Spiritual or Orthodox Concerns None of these None of these None of these  None of these     Physical Concerns Sleep Pain;Fatigue;Memory or concentration Pain  None of these None of these None of these     SW intern contact pt to discuss distress core of 7 due to practical, emotional, and physical concerns. SW inquired about emotional concerns and pt reported she is generally a "worrier" and "worries all the time". SW intern provided support and informed pt of support group tomorrow and other mental health resources. Pt reported she would be unable to attend support group and was uninterested in mental health resources. Pt reported she works closely with her MD for practical and physical concerns. Pt reported good support from family and no other questions or concerns at this time. SW intern encouraged pt to contact SS as needed. SW intern will remain available.   "

## 2024-04-19 ENCOUNTER — TUMOR BOARD CONFERENCE (OUTPATIENT)
Dept: HEMATOLOGY/ONCOLOGY | Facility: CLINIC | Age: 59
End: 2024-04-19
Payer: MEDICARE

## 2024-04-19 ENCOUNTER — PATIENT MESSAGE (OUTPATIENT)
Dept: HEMATOLOGY/ONCOLOGY | Facility: CLINIC | Age: 59
End: 2024-04-19
Payer: MEDICARE

## 2024-04-19 DIAGNOSIS — C50.111 MALIGNANT NEOPLASM OF CENTRAL PORTION OF RIGHT BREAST IN FEMALE, ESTROGEN RECEPTOR POSITIVE: Primary | ICD-10-CM

## 2024-04-19 DIAGNOSIS — Z17.0 MALIGNANT NEOPLASM OF CENTRAL PORTION OF RIGHT BREAST IN FEMALE, ESTROGEN RECEPTOR POSITIVE: Primary | ICD-10-CM

## 2024-04-19 NOTE — PROGRESS NOTES
Tumor Board Documentation      Carole Moore was presented by Harpal Khan MD at our Tumor Board on 4/19/2024, which included representatives from Medical Oncology, Hematology, Radiation Oncology, Surgical Oncology, Pathology, Navigation, Genetics, Radiology, Gastrointestinal, Urology, Pulmonology.    Carole currently presents as a current patient with Breast cancer, with history of the following treatments: Adjuvant Radiation, Neoadjuvant Chemotherapy.    Additionally, we reviewed previous medical and familial history, history of present illness, and recent lab results along with all available histopathologic and imaging studies. The tumor board considered available treatment options and made the following recommendations:     Additional Observation    Continue with surveillance, repeat CT in 3 months     The following procedures/referrals were also placed: No orders of the defined types were placed in this encounter.      Clinical Trial Status: Not discussed     National site-specific guidelines were discussed with respect to the case.    Tumor board is a meeting of clinicians from various specialty areas who evaluate and discuss patients for whom a multidisciplinary approach is being considered. Final determinations in the plan of care are those of the provider(s). The responsibility for follow up of recommendations given during tumor board is that of the provider.     Harpal Khan MD

## 2024-05-06 ENCOUNTER — TELEPHONE (OUTPATIENT)
Dept: ORTHOPEDICS | Facility: CLINIC | Age: 59
End: 2024-05-06
Payer: MEDICARE

## 2024-05-06 ENCOUNTER — PATIENT MESSAGE (OUTPATIENT)
Dept: ORTHOPEDICS | Facility: CLINIC | Age: 59
End: 2024-05-06
Payer: MEDICARE

## 2024-05-06 NOTE — TELEPHONE ENCOUNTER
Spoke with patient. Rescheduled Xrays and appointment with Dr. Harrell on 6/21. Patient verbalized understanding. Follow up portal message sent with appointment instructions.     Denisse Christie MS, ATC, OTC  Clinical/Surgical Assistant - Dr. Ana Harrell   Orthopedic Oncology   [facility]  Phone: [phone]

## 2024-05-11 ENCOUNTER — PATIENT MESSAGE (OUTPATIENT)
Dept: ADMINISTRATIVE | Facility: OTHER | Age: 59
End: 2024-05-11
Payer: MEDICARE

## 2024-05-13 ENCOUNTER — INFUSION (OUTPATIENT)
Dept: INFUSION THERAPY | Facility: HOSPITAL | Age: 59
End: 2024-05-13
Attending: INTERNAL MEDICINE
Payer: MEDICARE

## 2024-05-13 VITALS
DIASTOLIC BLOOD PRESSURE: 68 MMHG | SYSTOLIC BLOOD PRESSURE: 116 MMHG | TEMPERATURE: 98 F | RESPIRATION RATE: 16 BRPM | HEART RATE: 85 BPM | WEIGHT: 173.94 LBS | OXYGEN SATURATION: 97 % | HEIGHT: 67 IN | BODY MASS INDEX: 27.3 KG/M2

## 2024-05-13 DIAGNOSIS — Z17.0 MALIGNANT NEOPLASM OF CENTRAL PORTION OF RIGHT BREAST IN FEMALE, ESTROGEN RECEPTOR POSITIVE: Primary | ICD-10-CM

## 2024-05-13 DIAGNOSIS — C50.111 MALIGNANT NEOPLASM OF CENTRAL PORTION OF RIGHT BREAST IN FEMALE, ESTROGEN RECEPTOR POSITIVE: Primary | ICD-10-CM

## 2024-05-13 PROCEDURE — 96402 CHEMO HORMON ANTINEOPL SQ/IM: CPT | Mod: HCNC

## 2024-05-13 PROCEDURE — 63600175 PHARM REV CODE 636 W HCPCS: Mod: JZ,JG,HCNC | Performed by: INTERNAL MEDICINE

## 2024-05-13 RX ORDER — LAMOTRIGINE 25 MG/1
500 TABLET ORAL
Status: COMPLETED | OUTPATIENT
Start: 2024-05-13 | End: 2024-05-13

## 2024-05-13 RX ADMIN — FULVESTRANT 500 MG: 50 INJECTION, SOLUTION INTRAMUSCULAR at 08:05

## 2024-06-05 ENCOUNTER — PATIENT MESSAGE (OUTPATIENT)
Dept: ADMINISTRATIVE | Facility: OTHER | Age: 59
End: 2024-06-05
Payer: MEDICARE

## 2024-06-07 RX ORDER — LAMOTRIGINE 25 MG/1
500 TABLET ORAL
Status: CANCELLED | OUTPATIENT
Start: 2024-06-10

## 2024-06-07 RX ORDER — LAMOTRIGINE 25 MG/1
500 TABLET ORAL
OUTPATIENT
Start: 2024-07-08

## 2024-06-10 ENCOUNTER — TELEPHONE (OUTPATIENT)
Dept: INFUSION THERAPY | Facility: HOSPITAL | Age: 59
End: 2024-06-10
Payer: MEDICARE

## 2024-06-10 ENCOUNTER — INFUSION (OUTPATIENT)
Dept: INFUSION THERAPY | Facility: HOSPITAL | Age: 59
End: 2024-06-10
Attending: INTERNAL MEDICINE
Payer: MEDICARE

## 2024-06-10 VITALS
SYSTOLIC BLOOD PRESSURE: 124 MMHG | BODY MASS INDEX: 28.07 KG/M2 | RESPIRATION RATE: 16 BRPM | DIASTOLIC BLOOD PRESSURE: 77 MMHG | TEMPERATURE: 97 F | WEIGHT: 179.25 LBS | HEART RATE: 81 BPM | OXYGEN SATURATION: 95 %

## 2024-06-10 DIAGNOSIS — Z17.0 MALIGNANT NEOPLASM OF CENTRAL PORTION OF RIGHT BREAST IN FEMALE, ESTROGEN RECEPTOR POSITIVE: Primary | ICD-10-CM

## 2024-06-10 DIAGNOSIS — C50.111 MALIGNANT NEOPLASM OF CENTRAL PORTION OF RIGHT BREAST IN FEMALE, ESTROGEN RECEPTOR POSITIVE: Primary | ICD-10-CM

## 2024-06-10 PROCEDURE — 63600175 PHARM REV CODE 636 W HCPCS: Mod: JZ,JG,HCNC | Performed by: INTERNAL MEDICINE

## 2024-06-10 PROCEDURE — 96401 CHEMO ANTI-NEOPL SQ/IM: CPT | Mod: HCNC

## 2024-06-10 PROCEDURE — 96402 CHEMO HORMON ANTINEOPL SQ/IM: CPT | Mod: HCNC

## 2024-06-10 RX ORDER — METHOCARBAMOL 750 MG/1
750 TABLET, FILM COATED ORAL 3 TIMES DAILY
Qty: 60 TABLET | Refills: 0 | Status: SHIPPED | OUTPATIENT
Start: 2024-06-10

## 2024-06-10 RX ORDER — LAMOTRIGINE 25 MG/1
500 TABLET ORAL
Status: COMPLETED | OUTPATIENT
Start: 2024-06-10 | End: 2024-06-10

## 2024-06-10 RX ADMIN — FULVESTRANT 500 MG: 50 INJECTION, SOLUTION INTRAMUSCULAR at 08:06

## 2024-06-10 NOTE — TELEPHONE ENCOUNTER
Spoke with patient to clarify upcoming lab and office visit. All vists confirmed. Call ended well.

## 2024-06-10 NOTE — NURSING
Critical lab value called in from lab on pt's wbc count. Dr. Khan notified- states okay to treat with faslodex today but would like her to hold Ibrance and recheck labs in two weeks with provider visit. Pt informed and verbalized understanding.    Faslodex 500 mg administered in two injections IM as ordered. Pt tolerated well.

## 2024-06-10 NOTE — DISCHARGE INSTRUCTIONS
WAYS TO HELP PREVENT INFECTION        WASH YOUR HANDS OFTEN DURING THE DAY, ESPECIALLY BEFORE YOU EAT, AFTER USING THE BATHROOM, AND AFTER TOUCHING ANIMALS    STAY AWAY FROM PEOPLE WHO HAVE ILLNESSES YOU CAN CATCH; SUCH AS COLDS, FLU, CHICKEN POX    TRY TO AVOID CROWDS    STAY AWAY FROM CHILDREN WHO RECENTLY HAVE RECEIVED LIVE VIRUS VACCINES    MAINTAIN GOOD MOUTH CARE    DO NOT SQUEEZE OR SCRATCH PIMPLES    CLEAN CUTS & SCRAPES RIGHT AWAY AND DAILY UNTIL HEALED WITH WARM WATER, SOAP & AN ANTISEPTIC    AVOID CONTACT WITH LITTER BOXES, BIRD CAGES, & FISH TANKS    AVOID STANDING WATER, IE., BIRD BATHS, FLOWER POTS/VASES, OR HUMIDIFIERS    WEAR GLOVES WHEN GARDENING OR CLEANING UP AFTER OTHERS, ESPECIALLY BABIES & SMALL CHILDREN    DO NOT EAT RAW FISH, SEAFOOD, MEAT, OR EGGSHOME CARE AFTER CHEMOTHERAPY   Meals   Many patients feel sick and lose their appetites during treatment. Eat small meals several times a day. Choose bland foods with little taste or smell if you have problems with nausea. Be sure to cook all food thoroughly. This kills bacteria and helps you avoid intestinal infection. Soft foods are easier to swallow and digest.   Activity   Exercise keeps you strong and keeps your heart and lungs active. Talk to your doctor about an appropriate exercise program for you.   Skin Care   To prevent a skin infection, bathe or shower once a day. Use a moisturizing soap and wash with warm water. Avoid very hot or cold water. Chemotherapy can make your skin dry . Apply moisturizing lotion to help relieve dry skin. Some drugs used in high doses can cause slight burns to appear (like sunburn). Ask for a special cream to help relieve the burn and protect your skin.   Prevent Mouth Sores   During chemotherapy, many people get mouth sores. Do the following to help prevent mouth sores or to ease discomfort.   Brush your teeth with a soft-bristle toothbrush after every meal.  Don't use dental floss if your platelet count is  below 50,000. Your doctor or nurse will tell you if this is the case.  Use an oral swab or special soft toothbrush if your gums bleed during regular brushing.  Use mouthwash as directed. If you can't tolerate commercial mouthwash, use salt and baking soda to clean your mouth. Mix 1 teaspoon of salt and 1 teaspoon of baking soda into a glass of water. Swish and spit.  Call your doctor or return to this facility if you develop any of the following:   Sore throat   White patches in the mouth or throat   Fever of 100.4ºF (38ºC) or higher, or as directed by your healthcare provider  © 4712-0496 [facility], [address] [place] [address], [place] [address]. All rights reserved. This information is not intended as a substitute for professional medical care. Always follow your healthcare professional's

## 2024-06-19 NOTE — PROGRESS NOTES
Subjective:       Patient ID: Carole Moore is a 58 y.o. female.    Chief Complaint: breast cancer surveillance      HPI 58-year-old  female presents for breast cancer surveilence.    The patient was previously followed in the outpatient  Hem/Onc clinic by Dr. Alessia Moss and Dr. Huitron. Now Dr. AREN Khan    She is on fulvestrant (faslodex) q 4 weeks + arimidex daily. She is also getting zometa q 3 months, last on 4/10/23. She is also treated with Ibrance days 1-21 of 28 day cycle.   Cancer Hx: ddACT, R mastectomy, adjuvant radiation completed 2017  Arimidex  30Gy/10fx to T7-11 completed 10/31/22  Pmhx: follows with neurosurg, Dr. Pelayo    August 2022 pt c/o left mid to low back pain over the muscle- plain imaging revealed degenerative changes- MRI was ordered- widespread metastatic disease noted in spine.    Pt is on Ibrance, arimidex, Faslodex, zometa  and had palliative radiation to the spine.     Pain is 5-9 now- low back - has appt with neurosurgery for consideration of kyphoplasty 1/2024 for pain relief- pt not riding her bike due to the pain    2/2017-Right-sided breast cancer: Stage IIIA invasive lobular carcinoma, ER/IL positive, [id number] neg. BRCA negative. Given large mass on physical examination, treated with neoadjuvant chemotherapy, using dose-dense AC-T regimen, which patient completed in 2/2017 with decrease in size and firmness of R breast mass. She then underwent R breast mastectomy and SLND 4/2017, with tissue expander placement. Final pathology after neoadjuvant chemotherapy showed tumor 6cm, sentinel LNs positive, and nipple skin positive, we did recommend adjuvant radiation to R chest and axilla. Her case was discussed at tumor board and while axillary LN dissection was discussed, adjuvant radiation to the left chest and axilla were recommended which she completed 7/24/2017- 28 treatments    Anastrazole 1mg PO daily for adjuvant endocrine therapy was initiated end of  July 2017-      4/13/2022- dexa- normal- next to be ordered by Oncology- pt on zometa      Has history of heart failure and is followed by cardiology- last visit 3/9/2023      PAST MEDICAL HISTORY:   1.  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  Dyslipidemia  3.  Osteopenia - resolved  4.  Right breast cancer- Stage IIIA invasive lobular carcinoma, ER/MI positive, [id number] neg. BRCA negative. Bone metastatic disease 8/2022  5.  Heart failure     SURGICAL HISTORY:   1.  Tonsillectomy  2.  Hysterectomy  3.  Right breast mastectomy with with right deep axilla sentinel lymph node biopsy followed by breast reconstruction with tissue expander done on April 11, 2017 and reconstruction completed in Dec 2017.  4.  Mediport placement and removal     FAMILY HISTORY: She reports that 2 paternal aunts had breast cancer in their late 50s.  A maternal uncle had lung cancer the age of 54.  He used to smoke cigarettes.  A maternal uncle had melanoma at the age of 49.  Her father had colon cancer at the age of 70.  Her mother had colon cancer in her 70s.  She denies any other immediate family members with cancer or bleeding/clotting disorders.     SOCIAL HISTORY: The reports a 5-pack-year smoking history and quit at the age of 23.  She drinks alcohol socially.  She has never used any recreational drugs.  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  She is  and has 2 children.  She lives in [place], [place].     ALLERGIES: [NKDA]     MEDICATIONS: reviewed and reconciled    Interval History:     Persistent symptoms/side effects of treatment: fatigue - back pain that is persistent from metastatic disease    Functional changes: ROM, neuropathy, weakness or fatigue- not able to exercise daily due to back pain- has right arm ROM issues due to paralysis from previous CVA    Psychosocial challenges- anxiety related to potential further spread of cancer-  states it is improved- staying busy- does not talk with family about it - states it hits her when she is trying to sleep at night- has not seen any counselors- states coping better now    Lymphedema- stable    Diet/Nutrition- wt stable- not riding bike due to back pain - relates back pain is improving    Sexual Dysfunction or challenges - none    Review of Systems   Constitutional: Negative.  Negative for appetite change, fatigue, fever and unexpected weight change.   HENT: Negative.     Eyes: Negative.    Respiratory: Negative.     Cardiovascular: Negative.    Gastrointestinal: Negative.  Negative for abdominal pain and blood in stool.   Endocrine: Negative.    Genitourinary: Negative.  Negative for hematuria.            Musculoskeletal:  Positive for back pain (back pain much improved- mainly with standing and walking long time).   Skin: Negative.    Allergic/Immunologic: Negative.    Neurological: Negative.    Hematological: Negative.  Negative for adenopathy.   Psychiatric/Behavioral: Negative.          Anxiety related to fear of further recurrence and dying- not able to talk with her family- does not want to stress them out.        Breast - no mass, pain or discharge  Objective:      Physical Exam  Constitutional:       Appearance: Normal appearance. She is well-developed.   HENT:      Head: Normocephalic and atraumatic.      Right Ear: Ear canal and external ear normal.      Left Ear: Ear canal and external ear normal.      Mouth/Throat:      Pharynx: No oropharyngeal exudate.   Eyes:      General: No scleral icterus.        Right eye: No discharge.         Left eye: No discharge.      Conjunctiva/sclera: Conjunctivae normal.      Pupils: Pupils are equal, round, and reactive to light.   Neck:      Thyroid: No thyromegaly.   Chest:   Breasts:     Right: No inverted nipple, mass, nipple discharge, skin change or tenderness.      Left: No inverted nipple, mass, nipple discharge, skin change or tenderness.        Musculoskeletal:         General: Normal range of motion.      Cervical back: Normal range of motion and neck supple.   Lymphadenopathy:      Head:      Right side of head: No submental, submandibular, tonsillar, preauricular, posterior auricular or occipital adenopathy.      Left side of head: No submental, submandibular, tonsillar, preauricular, posterior auricular or occipital adenopathy.      Cervical: No cervical adenopathy.      Right cervical: No superficial or posterior cervical adenopathy.     Left cervical: No superficial or posterior cervical adenopathy.      Upper Body:      Right upper body: No supraclavicular or axillary adenopathy.      Left upper body: No supraclavicular or axillary adenopathy.   Skin:     General: Skin is warm and dry.      Coloration: Skin is not pale.      Findings: No erythema or rash.   Neurological:      General: No focal deficit present.      Mental Status: She is alert and oriented to person, place, and time.      Comments: Right upper extremity hemiparesis noted   Psychiatric:         Mood and Affect: Mood normal.         Behavior: Behavior normal.         Thought Content: Thought content normal.         Judgment: Judgment normal.         11/29/2024- Mammogram - left- wnl    10/29/2021 and 4/13/2022- Bone density - normal-   Taking Vit D daily- and taking calcium  Pt reports negative genetic testing in 2017    Exercise-  none now      Assessment:       1. Secondary malignant neoplasm of bone    2. Malignant neoplasm of central portion of right breast in female, estrogen receptor positive    3. Encounter for follow-up surveillance of breast cancer    4. Counseling and coordination of care    5. Counseling on health promotion and disease prevention    6. Encounter for screening breast examination              Plan:       1.        Right-sided breast cancer: Stage IIIA invasive lobular carcinoma, ER/AZ positive, [id number] neg. S/p AC-T chemotherapy and chest wall/axilla  radiation. BRCA negative.   2.        Left sided back pain - MRI revealed metastatic disease- palliative radiation to spine and is on faslodex, arimidex, zometa and Ibrance  3.        Negative clinical exam- Mammo - 11/29/2023- wnl  4.        dexa 4/13/2022 reveals- normal- repeat 2 years- oncology to order     3.        Continue therapy as directed by Oncology. Take calcium and vit D.   4.        Hot flashes- improved   5.        Gyn f/u was in 7/13/2022   7.        Continue f/u with Dr. Khan and Rad Onc-   8.        RTC for mammo and exam Dec 2024

## 2024-06-20 NOTE — PROGRESS NOTES
Subjective:       Patient ID: Carole Moore is a 58 y.o. female.    Chief Complaint: Breast Cancer and Chemotherapy    Primary Oncologist/Hematologist: Dr. Khan    HPI: Ms. Moore is a pleasant 58 year old female who is following up for her metastatic breast cancer. She is on fulvestrant (faslodex) q 4 weeks, last one 6/10/24 + ibrance on days 1-21 on a 35 day cycle. She is also getting zometa q 3 months, last on 4/8/24. Recent PET scan showing New 8 mm pulmonary nodule present in the posterior left lower lobe with associated low level tracer uptake.  Findings are concerning for possible metastatic disease.  Ct chest 4/12/24 showing consistent findings.  Cancer Hx: ddACT, R mastectomy, adjuvant radiation completed 2017  Arimidex  30Gy/10fx to T7-11 completed 10/31/22  Pmhx: follows with neurosurg, Dr. Pelayo for persistent back pain, possible kyphoplasty in the future. colonoscopy on 12/27/23, recommended repeat in 5 years.     Today: She continues to take calcium and vitamin D. She states her appetite is good and she has been staying hydrated. She was on ibrance until 6/10/24. Asked to hold it due to neutropenia. She has also been dealing with a lot as her daughter had a baby and then suffered from blood clot in her leg, needing surgery. She has been at the hospital with her often. She stopped the ibrance on day 6 due to neutropenia on 6/10/24. She denies any fevers, illnesses, n/v/dc. She states she is feeling quite well. She has prescription for 21 days.      Social History     Socioeconomic History    Marital status:    Tobacco Use    Smoking status: Never    Smokeless tobacco: Never    Tobacco comments:     I was a very light smoker for 5 years   Substance and Sexual Activity    Alcohol use: No    Drug use: No    Sexual activity: Not Currently     Partners: Male     Birth control/protection: Surgical     Comment: hysterectomy   Other Topics Concern    Are you pregnant or think you may be? No     Breast-feeding No     Social Determinants of Health     Financial Resource Strain: Low Risk  (4/11/2024)    Overall Financial Resource Strain (CARDIA)     Difficulty of Paying Living Expenses: Not hard at all   Food Insecurity: No Food Insecurity (4/11/2024)    Hunger Vital Sign     Worried About Running Out of Food in the Last Year: Never true     Ran Out of Food in the Last Year: Never true   Transportation Needs: No Transportation Needs (4/11/2024)    PRAPARE - Transportation     Lack of Transportation (Medical): No     Lack of Transportation (Non-Medical): No   Physical Activity: Inactive (4/11/2024)    Exercise Vital Sign     Days of Exercise per Week: 0 days     Minutes of Exercise per Session: 0 min   Stress: Stress Concern Present (4/11/2024)    [employer] [place] [employer] - Occupational Stress Questionnaire     Feeling of Stress : To some extent   Housing Stability: Low Risk  (4/11/2024)    Housing Stability Vital Sign     Unable to Pay for Housing in the Last Year: No     Number of Places Lived in the Last Year: 1     Unstable Housing in the Last Year: No       Past Medical History:   Diagnosis Date    Antineoplastic chemotherapy induced anemia     Back pain     Breast cancer 2016    right breast    Cancer     R Breast cancer    CVA (cerebral infarction)     Diverticulosis     Family history of colon cancer 10/30/2018    Her mother and father both had colon cancer.    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    Nausea after anesthesia     Paralysis     right side    PONV (postoperative nausea and vomiting)     Stroke 2001    R side weakness    Trouble in sleeping        Family History   Problem Relation Name Age of Onset    Breast cancer Paternal Aunt      Colon cancer Father      Colon cancer Mother Kathy Emil     Cancer Mother Kathy Emil         colon cancer    Melanoma Maternal Uncle       Skin cancer Maternal Uncle         Past Surgical History:   Procedure Laterality Date    AUGMENTATION OF BREAST Left 2017    BREAST SURGERY      COLONOSCOPY N/A 10/30/2018    Procedure: COLONOSCOPY;  Surgeon: Cosme Monson MD;  Location: [facility] ENDO;  Service: Endoscopy;  Laterality: N/A;    COLONOSCOPY N/A 12/27/2023    Procedure: COLONOSCOPY;  Surgeon: Caro Leo MD;  Location: [facility] ENDO;  Service: Endoscopy;  Laterality: N/A;    COSMETIC SURGERY  December 2017    Breast Reconstruction after Mastectomy    CYSTOSCOPY WITH BIOPSY OF BLADDER N/A 11/29/2021    Procedure: CYSTOSCOPY, WITH BLADDER BIOPSY, WITH FULGURATION IF INDICATED;  Surgeon: Page Marcelo MD;  Location: [facility] OR;  Service: Urology;  Laterality: N/A;    FIXATION KYPHOPLASTY Bilateral 02/09/2023    Procedure: KYPHOPLASTY;  Surgeon: Josse Pelayo MD;  Location: [facility] OR;  Service: Neurosurgery;  Laterality: Bilateral;  Kyphoplasty T10 with ablation    HYSTERECTOMY  2007    maintains both ovaries, menorrhagia    MASTECTOMY Right 2017    MEDIPORT INSERTION, SINGLE      RADIOFREQUENCY ABLATION, BONE, PERCUTANEOUS Bilateral 02/09/2023    Procedure: RADIOFREQUENCY ABLATION,BONE,PERCUTANEOUS;  Surgeon: Josse Pelayo MD;  Location: [facility] OR;  Service: Neurosurgery;  Laterality: Bilateral;    TONSILLECTOMY         Review of Systems   Constitutional:  Negative for activity change, appetite change, chills, diaphoresis, fatigue, fever and unexpected weight change.   HENT:  Negative for congestion, nosebleeds and rhinorrhea.    Respiratory:  Negative for cough and shortness of breath.    Cardiovascular:  Negative for chest pain and leg swelling.   Gastrointestinal:  Negative for abdominal pain, anal bleeding, blood in stool, constipation, diarrhea, nausea and vomiting.   Genitourinary:  Negative for hematuria.   Musculoskeletal:  Positive for arthralgias and back pain.   Skin:  Negative for color change and pallor.   Allergic/Immunologic:  Positive for immunocompromised state.   Neurological:  Negative for dizziness, weakness, light-headedness and headaches.         Medication List with Changes/Refills   Current Medications    ASCORBIC ACID, VITAMIN C, ORAL    Take by mouth once daily.    ASPIRIN (ECOTRIN) 81 MG EC TABLET    Take 81 mg by mouth once daily.    ATORVASTATIN (LIPITOR) 10 MG TABLET    Take 1 tablet (10 mg total) by mouth once daily.    CALCIUM CARBONATE ORAL    Take 1 tablet by mouth once daily.    CETIRIZINE (ZYRTEC) 10 MG TABLET    Take 10 mg by mouth daily as needed.    CHOLECALCIFEROL, VITAMIN D3, ORAL    Take by mouth once daily.    FULVESTRANT (FASLODEX) 250 MG/5 ML INJECTION    Inject 250 mg into the muscle every 30 days.    GADAVIST 10 MMOL/10 ML (1 MMOL/ML) SOLN INJECTION    every 6 (six) months. Once yearly    MECLIZINE (ANTIVERT) 25 MG TABLET    Take 25 mg by mouth 3 (three) times daily as needed.    METHOCARBAMOL (ROBAXIN) 750 MG TAB    Take 1 tablet (750 mg total) by mouth 3 (three) times daily.    MULTIVITAMIN CAPSULE    Take 1 capsule by mouth once daily.    NITROFURANTOIN, MACROCRYSTAL-MONOHYDRATE, (MACROBID) 100 MG CAPSULE    Take 1 capsule (100 mg total) by mouth 2 (two) times daily.    ONDANSETRON (ZOFRAN) 4 MG TABLET    Take 1 tablet (4 mg total) by mouth every 8 (eight) hours as needed for Nausea.    OXYCODONE-ACETAMINOPHEN (PERCOCET)  MG PER TABLET    Take 1 tablet by mouth every 6 (six) hours as needed for Pain.    PALBOCICLIB (IBRANCE) 125 MG CAP    Take 1 capsule (125 mg) by mouth once daily on days 1-21 of a 35-day cycle.    POLYETHYLENE GLYCOL (GLYCOLAX) 17 GRAM/DOSE POWDER    Take 17 g by mouth once daily.    PREVNAR 20, PF, 0.5 ML SYRG INJECTION        PROCHLORPERAZINE (COMPAZINE) 5 MG TABLET    TAKE 1 TABLET BY MOUTH 4 TIMES DAILY AS NEEDED FOR NAUSEA    ZOLEDRONIC 4 MG/100 ML PGBK INFUSION         Objective:     Vitals:    06/24/24 0819   BP: 114/71   Pulse: 84   Resp: 20   Temp: 97.8 °F (36.6 °C)        Physical Exam  Vitals reviewed.   Constitutional:       General: She is not in acute distress.     Appearance: She is not ill-appearing, toxic-appearing or diaphoretic.   HENT:      Head: Normocephalic and atraumatic.   Cardiovascular:      Rate and Rhythm: Normal rate.   Musculoskeletal:      Right lower leg: No edema.      Left lower leg: No edema.   Skin:     General: Skin is warm.      Coloration: Skin is not jaundiced or pale.      Findings: No bruising, erythema, lesion or rash.   Neurological:      Mental Status: She is alert.      Motor: No weakness.      Gait: Gait normal.   Psychiatric:         Mood and Affect: Mood normal.         Behavior: Behavior normal.         Thought Content: Thought content normal.          Labs/Results:  Lab Results   Component Value Date    WBC 2.93 (L) 06/24/2024    RBC 3.85 (L) 06/24/2024    HGB 13.1 06/24/2024    HCT 39.2 06/24/2024     (H) 06/24/2024    MCH 34.0 (H) 06/24/2024    MCHC 33.4 06/24/2024    RDW 14.0 06/24/2024     06/24/2024    MPV 8.9 (L) 06/24/2024    GRAN 1.3 (L) 06/24/2024    GRAN 45.7 06/24/2024    LYMPH 0.9 (L) 06/24/2024    LYMPH 31.1 06/24/2024    MONO 0.5 06/24/2024    MONO 17.1 (H) 06/24/2024    EOS 0.1 06/24/2024    BASO 0.06 06/24/2024    EOSINOPHIL 4.1 06/24/2024    BASOPHIL 2.0 (H) 06/24/2024   CMP  Sodium   Date Value Ref Range Status   06/24/2024 142 136 - 145 mmol/L Final     Potassium   Date Value Ref Range Status   06/24/2024 5.0 3.5 - 5.1 mmol/L Final     Chloride   Date Value Ref Range Status   06/24/2024 106 95 - 110 mmol/L Final     CO2   Date Value Ref Range Status   06/24/2024 26 23 - 29 mmol/L Final     Glucose   Date Value Ref Range Status   06/24/2024 100 70 - 110 mg/dL Final     BUN   Date Value Ref Range Status   06/24/2024 14 6 - 20 mg/dL Final     Creatinine   Date Value Ref Range Status   06/24/2024 0.8 0.5 - 1.4 mg/dL Final     Calcium   Date Value Ref Range Status   06/24/2024 9.6 8.7 - 10.5 mg/dL Final      Total Protein   Date Value Ref Range Status   06/24/2024 7.0 6.0 - 8.4 g/dL Final     Albumin   Date Value Ref Range Status   06/24/2024 3.8 3.5 - 5.2 g/dL Final     Total Bilirubin   Date Value Ref Range Status   06/24/2024 1.0 0.1 - 1.0 mg/dL Final     Comment:     For infants and newborns, interpretation of results should be based  on gestational age, weight and in agreement with clinical  observations.    Premature Infant recommended reference ranges:  Up to 24 hours.............<8.0 mg/dL  Up to 48 hours............<12.0 mg/dL  3-5 days..................<15.0 mg/dL  6-29 days.................<15.0 mg/dL       Alkaline Phosphatase   Date Value Ref Range Status   06/24/2024 46 (L) 55 - 135 U/L Final     AST   Date Value Ref Range Status   06/24/2024 26 10 - 40 U/L Final     ALT   Date Value Ref Range Status   06/24/2024 30 10 - 44 U/L Final     Anion Gap   Date Value Ref Range Status   06/24/2024 10 8 - 16 mmol/L Final     eGFR   Date Value Ref Range Status   06/24/2024 >60 >60 mL/min/1.73 m^2 Final       Ct chest 4/12/24  Impression:  Stable CT chest.  -Small subcentimeter left lower lobe pulmonary nodules are stable including the 8 mm nodule described on the recent PET scan.  -Stable osseous metastatic disease    Pet scan 4/10/24  Impression:  1. New 8 mm pulmonary nodule present in the posterior left lower lobe with associated low level tracer uptake.  Findings are concerning for possible metastatic disease.  Consider correlation with dedicated chest CT.  2. Redemonstration of known multifocal osseous metastatic disease without appreciable change from prior    Assessment:     Problem List Items Addressed This Visit          Neuro    History of CVA (cerebrovascular accident) - Primary       Immunology/Multi System    Immunodeficiency due to chemotherapy    Immunodeficiency secondary to radiation therapy       Oncology    Malignant neoplasm of central portion of right breast in female, estrogen receptor  positive    Use of aromatase inhibitors       Orthopedic    Osteopenia of multiple sites     Plan:     Malignant neoplasm of central portion of right breast in female, estrogen receptor positive, Use of aromatase inhibitors, Secondary malignant neoplasm of bone  --continue with zometa q 3 months. Last given 4/8/24  --continue with fulvestrant (faslodex) q 4 weeks, last one 6/10/24  --continue with ibrance on days 1-21 on a 35 day cycle-->held since 6/10/24 due to neutropenia. Can START back tomorrow on day 1 of cycle. Will complete 21 days (with labs in 2 weeks and visit with Dr. Khan) for review.   --Recent PET scan (4/10/24) demonstrates stable findings with Small subcentimeter left lower lobe pulmonary nodules are stable including the 8 mm nodule described on the recent PET scan. CT chest 4/12/24 with same.  --fever precautions given  --continue with calcium and vitamin D supplement  --last dexa scan 4/13/22, normal. Will repeat dexa in 4/2024  --continue to follow with breast surg for imaging and breat exams, seeing them November 2024  --last mammogram 11/29/23 negative  --tumor board recommendations were to continue with current regimen and surveillance again 3 months from 4/2024.    -- colonoscopy on 12/27/23: diverticulosis, polyp, recommended to repeat in 5 years  --continue with Dr. Pelayo for back pain    Follow-Up:  as scheduled    Vicky Jimenez PA-C  Hematology Oncology    Route Chart for Scheduling  Med Onc Route Chart for Scheduling    Treatment Plan Information   OP PALBOCICLIB FULVESTRANT Q4W   Harpal Khan MD   Upcoming Treatment Dates - OP PALBOCICLIB FULVESTRANT Q4W    7/8/2024       Chemotherapy       fulvestrant (FASLODEX) injection 500 mg  8/5/2024       Chemotherapy       fulvestrant (FASLODEX) injection 500 mg  9/2/2024       Chemotherapy       fulvestrant (FASLODEX) injection 500 mg  9/30/2024       Chemotherapy       fulvestrant (FASLODEX) injection 500 mg    Supportive Plan  Information  OP ZOLEDRONIC ACID (ZOMETA) Q4W   Harpal Khan MD   Upcoming Treatment Dates - OP ZOLEDRONIC ACID (ZOMETA) Q4W    7/8/2024       Medications       zoledronic acid (ZOMETA) 4 mg in sodium chloride 0.9% 100 mL IVPB  10/6/2024       Medications       zoledronic acid (ZOMETA) 4 mg in sodium chloride 0.9% 100 mL IVPB  11/3/2024       Medications       zoledronic acid (ZOMETA) 4 mg in sodium chloride 0.9% 100 mL IVPB  12/1/2024       Medications       zoledronic acid (ZOMETA) 4 mg in sodium chloride 0.9% 100 mL IVPB

## 2024-06-21 ENCOUNTER — OFFICE VISIT (OUTPATIENT)
Dept: ORTHOPEDICS | Facility: CLINIC | Age: 59
End: 2024-06-21
Payer: MEDICARE

## 2024-06-21 ENCOUNTER — HOSPITAL ENCOUNTER (OUTPATIENT)
Dept: RADIOLOGY | Facility: HOSPITAL | Age: 59
Discharge: HOME | End: 2024-06-21
Attending: ORTHOPAEDIC SURGERY
Payer: MEDICARE

## 2024-06-21 DIAGNOSIS — C79.51 METASTATIC CANCER TO BONE: Primary | ICD-10-CM

## 2024-06-21 DIAGNOSIS — C79.51 METASTATIC CANCER TO BONE: ICD-10-CM

## 2024-06-21 PROCEDURE — 72170 X-RAY EXAM OF PELVIS: CPT | Mod: TC,HCNC

## 2024-06-21 PROCEDURE — 73552 X-RAY EXAM OF FEMUR 2/>: CPT | Mod: TC,50,HCNC

## 2024-06-21 PROCEDURE — 72170 X-RAY EXAM OF PELVIS: CPT | Mod: 26,HCNC,, | Performed by: RADIOLOGY

## 2024-06-21 PROCEDURE — 99999 PR PBB SHADOW E&M-EST. PATIENT-LVL III: CPT | Mod: PBBFAC,HCNC,, | Performed by: ORTHOPAEDIC SURGERY

## 2024-06-21 PROCEDURE — 73552 X-RAY EXAM OF FEMUR 2/>: CPT | Mod: 26,50,HCNC, | Performed by: RADIOLOGY

## 2024-06-21 NOTE — PROGRESS NOTES
Orthopaedic Oncology Follow Up Visit:      Dear No referring provider defined for this encounter. and Angel Emery MD,    We had the pleasure of evaluating Carole Moore in the Orthopaedic Clinic today at the [facility] today.      Chief Complaint: metastatic breast carcinoma osseous involvement     As you may recall, Carole Moore is a 58 y.o. female who has known metastatic breast carcinoma to bone presenting for evaluation of her metastatic bone disease. She was initially diagnosed with breast cancer in 2016 in the right breast treated with mastectomy and systemic therapy. Then in 2022/2023 she developed back pain and was found to have lesions in her spine concerning from recurrent disease now metastatic to the spine. She has been followed by spine for her spine involvement. She had a kyphoplasty about a year prior and had radiation post operatively. She denies any bowel or bladder incontinence. She tells me her pain improved significantly since this procedure. She is on infusions now. She has history of right sided stroke and baseline foot drop on the right side for which she wears an AFO back in 2001. She tells me they are unsure of the cause of stroke. She takes a baby aspirin daily for this. She did a significant amount of therapy after this and has limited right upper extremity function and ambulates with an AFO. She uses a cane more for balance for longer distances. She had radiation to the spine and no other areas. She is on chemotherapy and her oncologist is Dr. Khan. She tells me she has bilateral lateral hip/buttock pain that occurs usually around time of her infusions. It is not necessarily activity related. She says is 4/10. Tends to come and go. She denies any groin pain or proximal thigh pain associated with activities. She has not had any radiation to areas other than the spine. No recent physical therapy. She is able to ambulate at her baseline. Pain has not  worsened. No associated trauma. Pretty equal amount between right and left side.     Interval History:  Patient is here for scheduled follow up with her . She is doing well. Recent PET scan was reassuring. Her back pain has resolved. No new bony complaints. She continues on treatment. Stable neutropenia she tells me occurs with her treatments regularly. No systemic symptoms. Driving to [place] today to see family. Have trip planned to [place] in a few months as well. Overall doing well. Continues to follow with Dr. Khan.     PMH:   Past Medical History:   Diagnosis Date    Antineoplastic chemotherapy induced anemia     Back pain     Breast cancer 2016    right breast    Cancer     R Breast cancer    CVA (cerebral infarction)     Diverticulosis     Family history of colon cancer 10/30/2018    Her mother and father both had colon cancer.    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    Nausea after anesthesia     Paralysis     right side    PONV (postoperative nausea and vomiting)     Stroke 2001    R side weakness    Trouble in sleeping        PSH:  has a past surgical history that includes Tonsillectomy; Mediport insertion, single; Breast surgery; Colonoscopy (N/A, 10/30/2018); Mastectomy (Right, 2017); Augmentation of breast (Left, 2017); Hysterectomy (2007); Cystoscopy with biopsy of bladder (N/A, 11/29/2021); Fixation Kyphoplasty (Bilateral, 02/09/2023); radiofrequency ablation, bone, percutaneous (Bilateral, 02/09/2023); Colonoscopy (N/A, 12/27/2023); and Cosmetic surgery (December 2017).    Allergies:   Allergies as of 06/21/2024    (No Known Allergies)       Medications:    Current Outpatient Medications:     ASCORBIC ACID, VITAMIN C, ORAL, Take by mouth once daily., Disp: , Rfl:     aspirin (ECOTRIN) 81 MG EC tablet, Take 81 mg by mouth once daily., Disp: , Rfl:     atorvastatin (LIPITOR) 10 MG  tablet, Take 1 tablet (10 mg total) by mouth once daily., Disp: 90 tablet, Rfl: 4    CALCIUM CARBONATE ORAL, Take 1 tablet by mouth once daily., Disp: , Rfl:     cetirizine (ZYRTEC) 10 MG tablet, Take 10 mg by mouth daily as needed., Disp: , Rfl:     CHOLECALCIFEROL, VITAMIN D3, ORAL, Take by mouth once daily., Disp: , Rfl:     fulvestrant (FASLODEX) 250 mg/5 mL injection, Inject 250 mg into the muscle every 30 days., Disp: , Rfl:     GADAVIST 10 mmol/10 mL (1 mmol/mL) Soln injection, every 6 (six) months. Once yearly, Disp: , Rfl:     meclizine (ANTIVERT) 25 mg tablet, Take 25 mg by mouth 3 (three) times daily as needed., Disp: , Rfl:     methocarbamoL (ROBAXIN) 750 MG Tab, Take 1 tablet (750 mg total) by mouth 3 (three) times daily., Disp: 60 tablet, Rfl: 0    multivitamin capsule, Take 1 capsule by mouth once daily., Disp: , Rfl:     nitrofurantoin, macrocrystal-monohydrate, (MACROBID) 100 MG capsule, Take 1 capsule (100 mg total) by mouth 2 (two) times daily., Disp: 14 capsule, Rfl: 0    ondansetron (ZOFRAN) 4 MG tablet, Take 1 tablet (4 mg total) by mouth every 8 (eight) hours as needed for Nausea., Disp: 20 tablet, Rfl: 11    oxyCODONE-acetaminophen (PERCOCET)  mg per tablet, Take 1 tablet by mouth every 6 (six) hours as needed for Pain., Disp: 30 tablet, Rfl: 0    palbociclib (IBRANCE) 125 mg Cap, Take 1 capsule (125 mg) by mouth once daily on days 1-21 of a 35-day cycle., Disp: 21 capsule, Rfl: 11    polyethylene glycol (GLYCOLAX) 17 gram/dose powder, Take 17 g by mouth once daily., Disp: 510 g, Rfl: 11    prochlorperazine (COMPAZINE) 5 MG tablet, TAKE 1 TABLET BY MOUTH 4 TIMES DAILY AS NEEDED FOR NAUSEA, Disp: 20 tablet, Rfl: 11    zoledronic 4 mg/100 mL PgBk infusion, , Disp: , Rfl:     Family History: family history includes Breast cancer in her paternal aunt; Cancer in her mother; Colon cancer in her father and mother; Melanoma in her maternal uncle; Skin cancer in her maternal uncle.    Social  History:  reports that she has never smoked. She has never used smokeless tobacco. She reports that she does not drink alcohol and does not use drugs.    ROS:  A Complete review of systems was performed.  Pertinent positives are listed in the history of present illness above.  Remainder of review of systems were negative.      Vital Signs:  no vital signs obtained during this visit    Physical exam:  General:  AAOX3, No acute distress, normal affect and disposition  Respiratory: Respirations unlabored    Focused examination of the bilateral lower extremity was performed.   Left side she is also able to stand without pain without assistive device  She has no pain to palpation about the trochanteric bursa region today  No palpable masses  No pain with axial load or log roll  No pain with resisted hip flexion  In left side distal motor function is intact, sensation is intact in foot  Right side baseline weakness secondary to prior stroke, also right upper extremity weakness, AFO present, motor exam limited as noted from prior stroke, she is able to stand without assistive device without significant pain - mildly tender to touch about the right trochanteric bursa region  Feet are perfused    Non antalgic gait but baseline modified gait from right sided stroke    Imaging:    Radiographic Data:  Once again updated AP pelvis and bilateral femur radiographs were obtained in clinic today without significant changes from prior radiographs to my read - there are osseous lesions in the pelvis iliac wings and left proximal femur with a sclerotic border about the lesser trochanter region, there may be some adjacent intramedullary lucency as well in the same region of no cortical involvement noted, no fractures noted    MRI of the lumbar spine obtained previously in September reviewed and demonstrates some of the pelvic lesions although mostly limited since only lumbar study, as noted in the report no acute spinal compression,  there is various lesions in thoracic and some lumbar vertebral bodies    PET scan 4/2024 - stable appearance of disease, no progression of osseous disease, ct scan without any areas of cortical breakthrough in femurs    Assessment/Plan: Carole Moore is a 58 y.o. old female with metastatic breast carcinoma and no symptoms and stable imaging    We discussed the findings to date. Imaging is stable and she has no significant MSK complaints. She is very happy to be doing well. Reviewed concerning signs and symptoms for progression and she is very aware of these. Since her imaging has been stable and she is asymptomatic without any major structural lesions we will push follow up to 6 months with radiographs. She understands to contact me sooner should she develop worsening symptoms. She is in agreement with the plan. Ok for continued low impact activity and exercise for overall health.  All questions answered.     Thank you for the referral and the opportunity to participate in the care of your patient.  If you have any questions regarding our treatment recommendations don't hesitate to call.    Electronically signed by:    Ana Harrell MD   Orthopedic Oncology and Complex Arthroplasty Reconstruction  [facility]

## 2024-06-24 ENCOUNTER — OFFICE VISIT (OUTPATIENT)
Dept: HEMATOLOGY/ONCOLOGY | Facility: CLINIC | Age: 59
End: 2024-06-24
Payer: MEDICARE

## 2024-06-24 ENCOUNTER — LAB VISIT (OUTPATIENT)
Dept: LAB | Facility: HOSPITAL | Age: 59
End: 2024-06-24
Attending: INTERNAL MEDICINE
Payer: MEDICARE

## 2024-06-24 VITALS
RESPIRATION RATE: 20 BRPM | OXYGEN SATURATION: 97 % | DIASTOLIC BLOOD PRESSURE: 71 MMHG | WEIGHT: 178.81 LBS | BODY MASS INDEX: 28.07 KG/M2 | HEART RATE: 84 BPM | HEIGHT: 67 IN | SYSTOLIC BLOOD PRESSURE: 114 MMHG | TEMPERATURE: 98 F

## 2024-06-24 DIAGNOSIS — D61.810 PANCYTOPENIA DUE TO ANTINEOPLASTIC CHEMOTHERAPY: ICD-10-CM

## 2024-06-24 DIAGNOSIS — T45.1X5A PANCYTOPENIA DUE TO ANTINEOPLASTIC CHEMOTHERAPY: ICD-10-CM

## 2024-06-24 DIAGNOSIS — M85.89 OSTEOPENIA OF MULTIPLE SITES: ICD-10-CM

## 2024-06-24 DIAGNOSIS — Z79.811 USE OF AROMATASE INHIBITORS: ICD-10-CM

## 2024-06-24 DIAGNOSIS — C79.51 SECONDARY CANCER OF BONE: ICD-10-CM

## 2024-06-24 DIAGNOSIS — Z17.0 MALIGNANT NEOPLASM OF CENTRAL PORTION OF RIGHT BREAST IN FEMALE, ESTROGEN RECEPTOR POSITIVE: ICD-10-CM

## 2024-06-24 DIAGNOSIS — D84.822 IMMUNODEFICIENCY SECONDARY TO RADIATION THERAPY: ICD-10-CM

## 2024-06-24 DIAGNOSIS — C50.111 MALIGNANT NEOPLASM OF CENTRAL PORTION OF RIGHT BREAST IN FEMALE, ESTROGEN RECEPTOR POSITIVE: ICD-10-CM

## 2024-06-24 DIAGNOSIS — T45.1X5A IMMUNODEFICIENCY DUE TO CHEMOTHERAPY: ICD-10-CM

## 2024-06-24 DIAGNOSIS — Z86.73 HISTORY OF CVA (CEREBROVASCULAR ACCIDENT): Primary | ICD-10-CM

## 2024-06-24 DIAGNOSIS — C79.51 SECONDARY MALIGNANT NEOPLASM OF BONE: ICD-10-CM

## 2024-06-24 DIAGNOSIS — D84.821 IMMUNODEFICIENCY DUE TO CHEMOTHERAPY: ICD-10-CM
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
## 2024-06-24 DIAGNOSIS — Z79.899 IMMUNODEFICIENCY DUE TO CHEMOTHERAPY: ICD-10-CM

## 2024-06-24 LAB
ALBUMIN SERPL BCP-MCNC: 3.8 G/DL (ref 3.5–5.2)
ALP SERPL-CCNC: 46 U/L (ref 55–135)
ALT SERPL W/O P-5'-P-CCNC: 30 U/L (ref 10–44)
ANION GAP SERPL CALC-SCNC: 10 MMOL/L (ref 8–16)
AST SERPL-CCNC: 26 U/L (ref 10–40)
BASOPHILS # BLD AUTO: 0.06 K/UL (ref 0–0.2)
BASOPHILS NFR BLD: 2 % (ref 0–1.9)
BILIRUB SERPL-MCNC: 1 MG/DL (ref 0.1–1)
BUN SERPL-MCNC: 14 MG/DL (ref 6–20)
CALCIUM SERPL-MCNC: 9.6 MG/DL (ref 8.7–10.5)
CHLORIDE SERPL-SCNC: 106 MMOL/L (ref 95–110)
CO2 SERPL-SCNC: 26 MMOL/L (ref 23–29)
CREAT SERPL-MCNC: 0.8 MG/DL (ref 0.5–1.4)
DIFFERENTIAL METHOD BLD: ABNORMAL
EOSINOPHIL # BLD AUTO: 0.1 K/UL (ref 0–0.5)
EOSINOPHIL NFR BLD: 4.1 % (ref 0–8)
ERYTHROCYTE [DISTWIDTH] IN BLOOD BY AUTOMATED COUNT: 14 % (ref 11.5–14.5)
EST. GFR  (NO RACE VARIABLE): >60 ML/MIN/1.73 M^2
GLUCOSE SERPL-MCNC: 100 MG/DL (ref 70–110)
HCT VFR BLD AUTO: 39.2 % (ref 37–48.5)
HGB BLD-MCNC: 13.1 G/DL (ref 12–16)
IMM GRANULOCYTES # BLD AUTO: 0 K/UL (ref 0–0.04)
IMM GRANULOCYTES NFR BLD AUTO: 0 % (ref 0–0.5)
LYMPHOCYTES # BLD AUTO: 0.9 K/UL (ref 1–4.8)
LYMPHOCYTES NFR BLD: 31.1 % (ref 18–48)
MCH RBC QN AUTO: 34 PG (ref 27–31)
MCHC RBC AUTO-ENTMCNC: 33.4 G/DL (ref 32–36)
MCV RBC AUTO: 102 FL (ref 82–98)
MONOCYTES # BLD AUTO: 0.5 K/UL (ref 0.3–1)
MONOCYTES NFR BLD: 17.1 % (ref 4–15)
NEUTROPHILS # BLD AUTO: 1.3 K/UL (ref 1.8–7.7)
NEUTROPHILS NFR BLD: 45.7 % (ref 38–73)
NRBC BLD-RTO: 0 /100 WBC
PLATELET # BLD AUTO: 263 K/UL (ref 150–450)
PMV BLD AUTO: 8.9 FL (ref 9.2–12.9)
POTASSIUM SERPL-SCNC: 5 MMOL/L (ref 3.5–5.1)
PROT SERPL-MCNC: 7 G/DL (ref 6–8.4)
RBC # BLD AUTO: 3.85 M/UL (ref 4–5.4)
SODIUM SERPL-SCNC: 142 MMOL/L (ref 136–145)
WBC # BLD AUTO: 2.93 K/UL (ref 3.9–12.7)

## 2024-06-24 PROCEDURE — 99215 OFFICE O/P EST HI 40 MIN: CPT | Mod: HCNC,S$GLB,,

## 2024-06-24 PROCEDURE — 85025 COMPLETE CBC W/AUTO DIFF WBC: CPT | Mod: HCNC | Performed by: INTERNAL MEDICINE

## 2024-06-24 PROCEDURE — 3008F BODY MASS INDEX DOCD: CPT | Mod: HCNC,CPTII,S$GLB,

## 2024-06-24 PROCEDURE — 3074F SYST BP LT 130 MM HG: CPT | Mod: HCNC,CPTII,S$GLB,

## 2024-06-24 PROCEDURE — 99999 PR PBB SHADOW E&M-EST. PATIENT-LVL IV: CPT | Mod: PBBFAC,HCNC,,

## 2024-06-24 PROCEDURE — 1159F MED LIST DOCD IN RCRD: CPT | Mod: HCNC,CPTII,S$GLB,

## 2024-06-24 PROCEDURE — 80053 COMPREHEN METABOLIC PANEL: CPT | Mod: HCNC | Performed by: INTERNAL MEDICINE

## 2024-06-24 PROCEDURE — 3078F DIAST BP <80 MM HG: CPT | Mod: HCNC,CPTII,S$GLB,

## 2024-06-24 PROCEDURE — 36415 COLL VENOUS BLD VENIPUNCTURE: CPT | Mod: HCNC | Performed by: INTERNAL MEDICINE

## 2024-06-24 RX ORDER — PNEUMOCOCCAL 20-VALENT CONJUGATE VACCINE 2.2; 2.2; 2.2; 2.2; 2.2; 2.2; 2.2; 2.2; 2.2; 2.2; 2.2; 2.2; 2.2; 2.2; 2.2; 2.2; 4.4; 2.2; 2.2; 2.2 UG/.5ML; UG/.5ML; UG/.5ML; UG/.5ML; UG/.5ML; UG/.5ML; UG/.5ML; UG/.5ML; UG/.5ML; UG/.5ML; UG/.5ML; UG/.5ML; UG/.5ML; UG/.5ML; UG/.5ML; UG/.5ML; UG/.5ML; UG/.5ML; UG/.5ML; UG/.5ML
INJECTION, SUSPENSION INTRAMUSCULAR
COMMUNITY
Start: 2024-03-21

## 2024-07-03 ENCOUNTER — OFFICE VISIT (OUTPATIENT)
Dept: SURGERY | Facility: CLINIC | Age: 59
End: 2024-07-03
Payer: MEDICARE

## 2024-07-03 VITALS — WEIGHT: 178.81 LBS | BODY MASS INDEX: 28.07 KG/M2 | HEIGHT: 67 IN | RESPIRATION RATE: 16 BRPM

## 2024-07-03 DIAGNOSIS — C79.51 SECONDARY MALIGNANT NEOPLASM OF BONE: Primary | ICD-10-CM

## 2024-07-03 DIAGNOSIS — Z71.89 COUNSELING ON HEALTH PROMOTION AND DISEASE PREVENTION: ICD-10-CM

## 2024-07-03 DIAGNOSIS — Z17.0 MALIGNANT NEOPLASM OF CENTRAL PORTION OF RIGHT BREAST IN FEMALE, ESTROGEN RECEPTOR POSITIVE: ICD-10-CM

## 2024-07-03 DIAGNOSIS — Z85.3 ENCOUNTER FOR FOLLOW-UP SURVEILLANCE OF BREAST CANCER: ICD-10-CM

## 2024-07-03 DIAGNOSIS — Z71.89 COUNSELING AND COORDINATION OF CARE: ICD-10-CM

## 2024-07-03 DIAGNOSIS — Z12.39 ENCOUNTER FOR SCREENING BREAST EXAMINATION: ICD-10-CM

## 2024-07-03 DIAGNOSIS — Z08 ENCOUNTER FOR FOLLOW-UP SURVEILLANCE OF BREAST CANCER: ICD-10-CM

## 2024-07-03 DIAGNOSIS — C50.111 MALIGNANT NEOPLASM OF CENTRAL PORTION OF RIGHT BREAST IN FEMALE, ESTROGEN RECEPTOR POSITIVE: ICD-10-CM

## 2024-07-03 PROCEDURE — 99999 PR PBB SHADOW E&M-EST. PATIENT-LVL III: CPT | Mod: PBBFAC,HCNC,, | Performed by: NURSE PRACTITIONER

## 2024-07-03 RX ORDER — METHOCARBAMOL 750 MG/1
750 TABLET, FILM COATED ORAL 3 TIMES DAILY
Qty: 60 TABLET | Refills: 0 | Status: SHIPPED | OUTPATIENT
Start: 2024-07-03

## 2024-07-05 ENCOUNTER — HOSPITAL ENCOUNTER (OUTPATIENT)
Dept: RADIOLOGY | Facility: HOSPITAL | Age: 59
Discharge: HOME | End: 2024-07-05
Attending: INTERNAL MEDICINE
Payer: MEDICARE

## 2024-07-05 DIAGNOSIS — C50.111 MALIGNANT NEOPLASM OF CENTRAL PORTION OF RIGHT BREAST IN FEMALE, ESTROGEN RECEPTOR POSITIVE: ICD-10-CM

## 2024-07-05 DIAGNOSIS — Z17.0 MALIGNANT NEOPLASM OF CENTRAL PORTION OF RIGHT BREAST IN FEMALE, ESTROGEN RECEPTOR POSITIVE: ICD-10-CM

## 2024-07-05 PROCEDURE — 71250 CT THORAX DX C-: CPT | Mod: TC,HCNC

## 2024-07-05 PROCEDURE — 71250 CT THORAX DX C-: CPT | Mod: 26,HCNC,, | Performed by: RADIOLOGY

## 2024-07-08 ENCOUNTER — OFFICE VISIT (OUTPATIENT)
Dept: HEMATOLOGY/ONCOLOGY | Facility: CLINIC | Age: 59
End: 2024-07-08
Payer: MEDICARE

## 2024-07-08 ENCOUNTER — INFUSION (OUTPATIENT)
Dept: INFUSION THERAPY | Facility: HOSPITAL | Age: 59
End: 2024-07-08
Attending: INTERNAL MEDICINE
Payer: MEDICARE

## 2024-07-08 VITALS
OXYGEN SATURATION: 96 % | BODY MASS INDEX: 28.16 KG/M2 | TEMPERATURE: 97 F | HEART RATE: 72 BPM | SYSTOLIC BLOOD PRESSURE: 117 MMHG | HEIGHT: 67 IN | RESPIRATION RATE: 18 BRPM | WEIGHT: 179.44 LBS | DIASTOLIC BLOOD PRESSURE: 59 MMHG

## 2024-07-08 DIAGNOSIS — D84.821 IMMUNODEFICIENCY DUE TO CHEMOTHERAPY: ICD-10-CM

## 2024-07-08 DIAGNOSIS — Z17.0 MALIGNANT NEOPLASM OF CENTRAL PORTION OF RIGHT BREAST IN FEMALE, ESTROGEN RECEPTOR POSITIVE: Primary | ICD-10-CM

## 2024-07-08 DIAGNOSIS — C50.111 MALIGNANT NEOPLASM OF CENTRAL PORTION OF RIGHT BREAST IN FEMALE, ESTROGEN RECEPTOR POSITIVE: Primary | ICD-10-CM

## 2024-07-08 DIAGNOSIS — T45.1X5A IMMUNODEFICIENCY DUE TO CHEMOTHERAPY: ICD-10-CM

## 2024-07-08 DIAGNOSIS — T45.1X5A PANCYTOPENIA DUE TO ANTINEOPLASTIC CHEMOTHERAPY: ICD-10-CM

## 2024-07-08 DIAGNOSIS — C79.51 SECONDARY MALIGNANT NEOPLASM OF BONE: ICD-10-CM

## 2024-07-08 DIAGNOSIS — Z79.899 IMMUNODEFICIENCY DUE TO CHEMOTHERAPY: ICD-10-CM
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
## 2024-07-08 DIAGNOSIS — D84.822 IMMUNODEFICIENCY SECONDARY TO RADIATION THERAPY: ICD-10-CM

## 2024-07-08 DIAGNOSIS — Z79.811 USE OF AROMATASE INHIBITORS: ICD-10-CM

## 2024-07-08 DIAGNOSIS — D61.810 PANCYTOPENIA DUE TO ANTINEOPLASTIC CHEMOTHERAPY: ICD-10-CM

## 2024-07-08 PROCEDURE — 1159F MED LIST DOCD IN RCRD: CPT | Mod: HCNC,CPTII,95, | Performed by: INTERNAL MEDICINE

## 2024-07-08 PROCEDURE — 96402 CHEMO HORMON ANTINEOPL SQ/IM: CPT | Mod: HCNC

## 2024-07-08 PROCEDURE — 96401 CHEMO ANTI-NEOPL SQ/IM: CPT | Mod: HCNC

## 2024-07-08 PROCEDURE — 99214 OFFICE O/P EST MOD 30 MIN: CPT | Mod: 25,HCNC,95, | Performed by: INTERNAL MEDICINE

## 2024-07-08 PROCEDURE — 96365 THER/PROPH/DIAG IV INF INIT: CPT | Mod: HCNC

## 2024-07-08 PROCEDURE — 1160F RVW MEDS BY RX/DR IN RCRD: CPT | Mod: HCNC,CPTII,95, | Performed by: INTERNAL MEDICINE

## 2024-07-08 PROCEDURE — 63600175 PHARM REV CODE 636 W HCPCS: Mod: HCNC | Performed by: INTERNAL MEDICINE

## 2024-07-08 RX ORDER — SODIUM CHLORIDE 0.9 % (FLUSH) 0.9 %
10 SYRINGE (ML) INJECTION
Status: DISCONTINUED | OUTPATIENT
Start: 2024-07-08 | End: 2024-07-08 | Stop reason: HOSPADM

## 2024-07-08 RX ORDER — LAMOTRIGINE 25 MG/1
500 TABLET ORAL
OUTPATIENT
Start: 2024-08-05

## 2024-07-08 RX ORDER — HEPARIN 100 UNIT/ML
500 SYRINGE INTRAVENOUS
Status: CANCELLED | OUTPATIENT
Start: 2024-07-08

## 2024-07-08 RX ORDER — LAMOTRIGINE 25 MG/1
500 TABLET ORAL
OUTPATIENT
Start: 2024-09-30

## 2024-07-08 RX ORDER — ZOLEDRONIC ACID 0.04 MG/ML
4 INJECTION, SOLUTION INTRAVENOUS
Status: COMPLETED | OUTPATIENT
Start: 2024-07-08 | End: 2024-07-08

## 2024-07-08 RX ORDER — LAMOTRIGINE 25 MG/1
500 TABLET ORAL
OUTPATIENT
Start: 2024-09-02

## 2024-07-08 RX ORDER — SODIUM CHLORIDE 0.9 % (FLUSH) 0.9 %
10 SYRINGE (ML) INJECTION
Status: CANCELLED | OUTPATIENT
Start: 2024-07-08

## 2024-07-08 RX ORDER — LAMOTRIGINE 25 MG/1
500 TABLET ORAL
Status: COMPLETED | OUTPATIENT
Start: 2024-07-08 | End: 2024-07-08

## 2024-07-08 RX ADMIN — ZOLEDRONIC ACID 4 MG: 0.04 INJECTION, SOLUTION INTRAVENOUS at 11:07

## 2024-07-08 RX ADMIN — FULVESTRANT 500 MG: 50 INJECTION, SOLUTION INTRAMUSCULAR at 12:07

## 2024-07-08 NOTE — NURSING
Zometa administered IV and Fasolodex given in 2 equal IM injections via left and right gluteals.  Future appointments made per order.   Patient discharged in NAD.

## 2024-07-08 NOTE — PROGRESS NOTES
Subjective:       Patient ID: Carole Moore is a 58 y.o. female.    Chief Complaint: Results, Breast Cancer, and Chemotherapy    HPI:  58-year-old female history of metastatic breast cancer patient continues on Ibrance 125 mg days 1 through 21 on a 35 day cycle.  Patient is tolerating therapy well patient had CT chest for possible pulmonary nodule progression demonstrated no change in actually smaller.  Continues with current Faslodex dosing and Ibrance ECOG status 1 seen in virtual visit    Past Medical History:   Diagnosis Date    Antineoplastic chemotherapy induced anemia     Back pain     Breast cancer 2016    right breast    Cancer     R Breast cancer    CVA (cerebral infarction)     Diverticulosis     Family history of colon cancer 10/30/2018    Her mother and father both had colon cancer.    Genetic testing of female 12/27/2016    [employer] (28 genes) - NEGATIVE    Hyperlipidemia     Immunodeficiency due to chemotherapy 10/03/2022    Nausea after anesthesia     Paralysis     right side    PONV (postoperative nausea and vomiting)     Stroke 2001    R side weakness    Trouble in sleeping      Family History   Problem Relation Name Age of Onset    Breast cancer Paternal Aunt      Colon cancer Father      Colon cancer Mother Kathy Stein     Cancer Mother Kathy Stein         colon cancer    Melanoma Maternal Uncle      Skin cancer Maternal Uncle       Social History     Socioeconomic History    Marital status:    Tobacco Use    Smoking status: Never    Smokeless tobacco: Never    Tobacco comments:     I was a very light smoker for 5 years   Substance and Sexual Activity    Alcohol use: No    Drug use: No    Sexual activity: Not Currently     Partners: Male     Birth control/protection: Surgical     Comment: hysterectomy   Other Topics Concern    Are you pregnant or think you may be? No    Breast-feeding No     Social Determinants of Health     Financial Resource Strain: Low Risk  (4/11/2024)     Overall Financial Resource Strain (CARDIA)     Difficulty of Paying Living Expenses: Not hard at all   Food Insecurity: No Food Insecurity (4/11/2024)    Hunger Vital Sign     Worried About Running Out of Food in the Last Year: Never true     Ran Out of Food in the Last Year: Never true   Transportation Needs: No Transportation Needs (4/11/2024)    PRAPARE - Transportation     Lack of Transportation (Medical): No     Lack of Transportation (Non-Medical): No   Physical Activity: Inactive (4/11/2024)    Exercise Vital Sign     Days of Exercise per Week: 0 days     Minutes of Exercise per Session: 0 min   Stress: Stress Concern Present (4/11/2024)    [employer] [place] [employer] - Occupational Stress Questionnaire     Feeling of Stress : To some extent   Housing Stability: Low Risk  (4/11/2024)    Housing Stability Vital Sign     Unable to Pay for Housing in the Last Year: No     Number of Places Lived in the Last Year: 1     Unstable Housing in the Last Year: No     Past Surgical History:   Procedure Laterality Date    AUGMENTATION OF BREAST Left 2017    BREAST SURGERY      COLONOSCOPY N/A 10/30/2018    Procedure: COLONOSCOPY;  Surgeon: Cosme Monson MD;  Location: [facility];  Service: Endoscopy;  Laterality: N/A;    COLONOSCOPY N/A 12/27/2023    Procedure: COLONOSCOPY;  Surgeon: Caro Leo MD;  Location: [facility];  Service: Endoscopy;  Laterality: N/A;    COSMETIC SURGERY  December 2017    Breast Reconstruction after Mastectomy    CYSTOSCOPY WITH BIOPSY OF BLADDER N/A 11/29/2021    Procedure: CYSTOSCOPY, WITH BLADDER BIOPSY, WITH FULGURATION IF INDICATED;  Surgeon: Page Marcelo MD;  Location: [facility];  Service: Urology;  Laterality: N/A;    FIXATION KYPHOPLASTY Bilateral 02/09/2023    Procedure: KYPHOPLASTY;  Surgeon: Josse Pelayo MD;  Location: [facility] OR;  Service: Neurosurgery;  Laterality: Bilateral;  Kyphoplasty T10 with ablation    HYSTERECTOMY  2007    maintains both ovaries,  "menorrhagia    MASTECTOMY Right 2017    MEDIPORT INSERTION, SINGLE      RADIOFREQUENCY ABLATION, BONE, PERCUTANEOUS Bilateral 02/09/2023    Procedure: RADIOFREQUENCY ABLATION,BONE,PERCUTANEOUS;  Surgeon: Josse Pelayo MD;  Location: [facility];  Service: Neurosurgery;  Laterality: Bilateral;    TONSILLECTOMY         Labs:  Lab Results   Component Value Date    WBC 2.07 (L) 07/05/2024    HGB 12.6 07/05/2024    HCT 37.7 07/05/2024     (H) 07/05/2024     07/05/2024     BMP  Lab Results   Component Value Date     07/05/2024    K 4.3 07/05/2024     07/05/2024    CO2 26 07/05/2024    BUN 16 07/05/2024    CREATININE 0.8 07/05/2024    CALCIUM 10.0 07/05/2024    ANIONGAP 8 07/05/2024    ESTGFRAFRICA >60 05/12/2022    EGFRNONAA >60 05/12/2022     Lab Results   Component Value Date    ALT 17 07/05/2024    AST 17 07/05/2024    ALKPHOS 46 (L) 07/05/2024    BILITOT 0.8 07/05/2024       No results found for: "IRON", "TIBC", "FERRITIN", "SATURATEDIRO"  No results found for: "CIZEMPDY68"  No results found for: "FOLATE"  Lab Results   Component Value Date    TSH 0.793 10/29/2021         Review of Systems   Constitutional:  Negative for activity change, appetite change, chills, diaphoresis, fatigue, fever and unexpected weight change.   HENT:  Negative for congestion, dental problem, drooling, ear discharge, ear pain, facial swelling, hearing loss, mouth sores, nosebleeds, postnasal drip, rhinorrhea, sinus pressure, sneezing, sore throat, tinnitus, trouble swallowing and voice change.    Eyes:  Negative for photophobia, pain, discharge, redness, itching and visual disturbance.   Respiratory:  Negative for cough, choking, chest tightness, shortness of breath, wheezing and stridor.    Cardiovascular:  Negative for chest pain, palpitations and leg swelling.   Gastrointestinal:  Negative for abdominal distention, abdominal pain, anal bleeding, blood in stool, constipation, diarrhea, nausea, rectal pain and " vomiting.   Endocrine: Negative for cold intolerance, heat intolerance, polydipsia, polyphagia and polyuria.   Genitourinary:  Negative for decreased urine volume, difficulty urinating, dyspareunia, dysuria, enuresis, flank pain, frequency, genital sores, hematuria, menstrual problem, pelvic pain, urgency, vaginal bleeding, vaginal discharge and vaginal pain.   Musculoskeletal:  Negative for arthralgias, back pain, gait problem, joint swelling, myalgias, neck pain and neck stiffness.   Skin:  Negative for color change, pallor and rash.   Allergic/Immunologic: Negative for environmental allergies, food allergies and immunocompromised state.   Neurological:  Negative for dizziness, tremors, seizures, syncope, facial asymmetry, speech difficulty, weakness, light-headedness, numbness and headaches.   Hematological:  Negative for adenopathy. Does not bruise/bleed easily.   Psychiatric/Behavioral:  Negative for agitation, behavioral problems, confusion, decreased concentration, dysphoric mood, hallucinations, self-injury, sleep disturbance and suicidal ideas. The patient is not nervous/anxious and is not hyperactive.        Objective:      Physical Exam  Constitutional:       Appearance: Normal appearance.   Neurological:      Mental Status: She is alert.             Assessment:      1. Malignant neoplasm of central portion of right breast in female, estrogen receptor positive    2. Immunodeficiency due to chemotherapy    3. Immunodeficiency secondary to radiation therapy    4. Secondary malignant neoplasm of bone    5. Use of aromatase inhibitors    6. Pancytopenia due to antineoplastic chemotherapy           Med Onc Chart Routing      Follow up with physician . Return to clinic to see me in October 20, 2024 for next dosing of Zometa along with a PET scan prior CBC CMP   Follow up with [facility]    Infusion scheduling note    Injection scheduling note Faslodex Q 28 days with CBC CMP x3; Zometa Q 90 days   Labs    Imaging     Pharmacy appointment    Other referrals                   Plan:   The patient location is:  Home  The chief complaint leading to consultation is:  Breast cancer    Visit type: audiovisual    Face to Face time with patient: 25 minutes of total time spent on the encounter, which includes face to face time and non-face to face time preparing to see the patient (eg, review of tests), Obtaining and/or reviewing separately obtained history, Documenting clinical information in the electronic or other health record, Independently interpreting results (not separately reported) and communicating results to the patient/family/caregiver, or Care coordination (not separately reported).         Each patient to whom he or she provides medical services by telemedicine is:  (1) informed of the relationship between the physician and patient and the respective role of any other health care provider with respect to management of the patient; and (2) notified that he or she may decline to receive medical services by telemedicine and may withdraw from such care at any time.    Notes:   History of metastatic breast cancer tolerating therapy well continue with current dosing of Faslodex and Ibrance on 30/5 day cycle.  Patient's most recent CT no evidence of progression would recommend repeat PET scan in October 20, 2024.  Laboratory studies orders written reviewed Zometa dosing today along with Faslodex orders have been signed        Harpal Khan Jr, MD FACP

## 2024-07-15 ENCOUNTER — LAB VISIT (OUTPATIENT)
Dept: LAB | Facility: HOSPITAL | Age: 59
End: 2024-07-15
Attending: INTERNAL MEDICINE
Payer: MEDICARE

## 2024-07-15 DIAGNOSIS — I69.351 HEMIPARESIS AFFECTING RIGHT SIDE AS LATE EFFECT OF CEREBROVASCULAR ACCIDENT: ICD-10-CM

## 2024-07-15 DIAGNOSIS — E78.2 MIXED HYPERLIPIDEMIA: ICD-10-CM

## 2024-07-15 DIAGNOSIS — Z13.1 DIABETES MELLITUS SCREENING: ICD-10-CM

## 2024-07-15 DIAGNOSIS — R79.9 ABNORMAL FINDING OF BLOOD CHEMISTRY, UNSPECIFIED: ICD-10-CM

## 2024-07-15 LAB
CHOLEST SERPL-MCNC: 155 MG/DL (ref 120–199)
CHOLEST/HDLC SERPL: 2.3 {RATIO} (ref 2–5)
ESTIMATED AVG GLUCOSE: 100 MG/DL (ref 68–131)
HBA1C MFR BLD: 5.1 % (ref 4–5.6)
HDLC SERPL-MCNC: 67 MG/DL (ref 40–75)
HDLC SERPL: 43.2 % (ref 20–50)
LDLC SERPL CALC-MCNC: 75.8 MG/DL (ref 63–159)
NONHDLC SERPL-MCNC: 88 MG/DL
TRIGL SERPL-MCNC: 61 MG/DL (ref 30–150)

## 2024-07-15 PROCEDURE — 80061 LIPID PANEL: CPT | Mod: HCNC | Performed by: INTERNAL MEDICINE

## 2024-07-15 PROCEDURE — 83036 HEMOGLOBIN GLYCOSYLATED A1C: CPT | Mod: HCNC | Performed by: NURSE PRACTITIONER

## 2024-07-15 PROCEDURE — 36415 COLL VENOUS BLD VENIPUNCTURE: CPT | Mod: HCNC,PO | Performed by: INTERNAL MEDICINE

## 2024-07-17 ENCOUNTER — TELEPHONE (OUTPATIENT)
Dept: ORTHOPEDICS | Facility: CLINIC | Age: 59
End: 2024-07-17
Payer: MEDICARE

## 2024-07-17 ENCOUNTER — PATIENT MESSAGE (OUTPATIENT)
Dept: ADMINISTRATIVE | Facility: OTHER | Age: 59
End: 2024-07-17
Payer: MEDICARE

## 2024-07-17 NOTE — TELEPHONE ENCOUNTER
Called patient to let them know that I will be leaving [facility] at the end of September. Patient appreciative of call. Explained happy to see her before I leave should issues arise and will provide list of other providers in the area for her to continue to follow up with. All questions answered. She will reach out to office with any questions in the interim.

## 2024-07-31 ENCOUNTER — PATIENT MESSAGE (OUTPATIENT)
Dept: RESEARCH | Facility: HOSPITAL | Age: 59
End: 2024-07-31
Payer: MEDICARE

## 2024-08-02 ENCOUNTER — LAB VISIT (OUTPATIENT)
Dept: LAB | Facility: HOSPITAL | Age: 59
End: 2024-08-02
Attending: INTERNAL MEDICINE
Payer: MEDICARE

## 2024-08-02 ENCOUNTER — RESEARCH ENCOUNTER (OUTPATIENT)
Dept: RESEARCH | Facility: HOSPITAL | Age: 59
End: 2024-08-02
Payer: MEDICARE

## 2024-08-02 ENCOUNTER — PATIENT MESSAGE (OUTPATIENT)
Dept: RESEARCH | Facility: HOSPITAL | Age: 59
End: 2024-08-02
Payer: MEDICARE

## 2024-08-02 DIAGNOSIS — C50.111 MALIGNANT NEOPLASM OF CENTRAL PORTION OF RIGHT BREAST IN FEMALE, ESTROGEN RECEPTOR POSITIVE: ICD-10-CM

## 2024-08-02 DIAGNOSIS — Z17.0 MALIGNANT NEOPLASM OF CENTRAL PORTION OF RIGHT BREAST IN FEMALE, ESTROGEN RECEPTOR POSITIVE: ICD-10-CM

## 2024-08-02 LAB
ALBUMIN SERPL BCP-MCNC: 3.6 G/DL (ref 3.5–5.2)
ALP SERPL-CCNC: 51 U/L (ref 55–135)
ALT SERPL W/O P-5'-P-CCNC: 21 U/L (ref 10–44)
ANION GAP SERPL CALC-SCNC: 6 MMOL/L (ref 8–16)
AST SERPL-CCNC: 21 U/L (ref 10–40)
BASOPHILS # BLD AUTO: 0.06 K/UL (ref 0–0.2)
BASOPHILS NFR BLD: 1.7 % (ref 0–1.9)
BILIRUB SERPL-MCNC: 0.9 MG/DL (ref 0.1–1)
BUN SERPL-MCNC: 11 MG/DL (ref 6–20)
CALCIUM SERPL-MCNC: 9.5 MG/DL (ref 8.7–10.5)
CHLORIDE SERPL-SCNC: 107 MMOL/L (ref 95–110)
CO2 SERPL-SCNC: 27 MMOL/L (ref 23–29)
CREAT SERPL-MCNC: 0.7 MG/DL (ref 0.5–1.4)
DIFFERENTIAL METHOD BLD: ABNORMAL
EOSINOPHIL # BLD AUTO: 0.2 K/UL (ref 0–0.5)
EOSINOPHIL NFR BLD: 5.3 % (ref 0–8)
ERYTHROCYTE [DISTWIDTH] IN BLOOD BY AUTOMATED COUNT: 13.7 % (ref 11.5–14.5)
EST. GFR  (NO RACE VARIABLE): >60 ML/MIN/1.73 M^2
GLUCOSE SERPL-MCNC: 96 MG/DL (ref 70–110)
HCT VFR BLD AUTO: 38.4 % (ref 37–48.5)
HGB BLD-MCNC: 13 G/DL (ref 12–16)
IMM GRANULOCYTES # BLD AUTO: 0.01 K/UL (ref 0–0.04)
IMM GRANULOCYTES NFR BLD AUTO: 0.3 % (ref 0–0.5)
LYMPHOCYTES # BLD AUTO: 1 K/UL (ref 1–4.8)
LYMPHOCYTES NFR BLD: 28.4 % (ref 18–48)
MCH RBC QN AUTO: 34.2 PG (ref 27–31)
MCHC RBC AUTO-ENTMCNC: 33.9 G/DL (ref 32–36)
MCV RBC AUTO: 101 FL (ref 82–98)
MONOCYTES # BLD AUTO: 0.6 K/UL (ref 0.3–1)
MONOCYTES NFR BLD: 16.7 % (ref 4–15)
NEUTROPHILS # BLD AUTO: 1.7 K/UL (ref 1.8–7.7)
NEUTROPHILS NFR BLD: 47.6 % (ref 38–73)
NRBC BLD-RTO: 0 /100 WBC
PLATELET # BLD AUTO: 286 K/UL (ref 150–450)
PMV BLD AUTO: 9.4 FL (ref 9.2–12.9)
POTASSIUM SERPL-SCNC: 4.9 MMOL/L (ref 3.5–5.1)
PROT SERPL-MCNC: 6.8 G/DL (ref 6–8.4)
RBC # BLD AUTO: 3.8 M/UL (ref 4–5.4)
SODIUM SERPL-SCNC: 140 MMOL/L (ref 136–145)
WBC # BLD AUTO: 3.59 K/UL (ref 3.9–12.7)

## 2024-08-02 PROCEDURE — 36415 COLL VENOUS BLD VENIPUNCTURE: CPT | Mod: HCNC,PO | Performed by: INTERNAL MEDICINE

## 2024-08-02 PROCEDURE — 85025 COMPLETE CBC W/AUTO DIFF WBC: CPT | Mod: HCNC | Performed by: INTERNAL MEDICINE

## 2024-08-02 PROCEDURE — 80053 COMPREHEN METABOLIC PANEL: CPT | Mod: HCNC | Performed by: INTERNAL MEDICINE

## 2024-08-02 NOTE — PROGRESS NOTES
The Patient  was called today regarding the patient's participation in (IRB #[id number] PI: Miguel Angel).   The Verbal Informed Consent was read and discussed by the consenter. The following was discussed:  Types of specimens to be collected  All medical information released to researchers will be stripped of identifiers and no patient information will be given to anyone outside of this research project.   Participating in a research study is not the same as getting regular medical care and will not improve the patient's health. The purpose of a research study is to gather information.  Being in this study does not interfere with your regular medical care.  The patient/LAR does not have to participate in this study. If they do not join, their care at [facility] will not be affected.  The person granting permission was provided adequate time to ask questions regarding the scope and purpose of the study.  Permission was obtained by telephone.   The above statements were read by the person obtaining permission to the person granting permission and witnessed by Kristi Carrera, who also confirmed the patient's consent to the study. The witness information was documented on the verbal consent form as well.  This Verbal Informed Consent process was conducted prior to initiation of any study procedures.

## 2024-08-05 ENCOUNTER — RESEARCH ENCOUNTER (OUTPATIENT)
Dept: RESEARCH | Facility: HOSPITAL | Age: 59
End: 2024-08-05
Payer: MEDICARE

## 2024-08-05 ENCOUNTER — INFUSION (OUTPATIENT)
Dept: INFUSION THERAPY | Facility: HOSPITAL | Age: 59
End: 2024-08-05
Attending: INTERNAL MEDICINE
Payer: MEDICARE

## 2024-08-05 VITALS
DIASTOLIC BLOOD PRESSURE: 67 MMHG | RESPIRATION RATE: 18 BRPM | HEART RATE: 72 BPM | SYSTOLIC BLOOD PRESSURE: 96 MMHG | OXYGEN SATURATION: 98 % | TEMPERATURE: 98 F

## 2024-08-05 DIAGNOSIS — C50.111 MALIGNANT NEOPLASM OF CENTRAL PORTION OF RIGHT BREAST IN FEMALE, ESTROGEN RECEPTOR POSITIVE: Primary | ICD-10-CM

## 2024-08-05 DIAGNOSIS — Z17.0 MALIGNANT NEOPLASM OF CENTRAL PORTION OF RIGHT BREAST IN FEMALE, ESTROGEN RECEPTOR POSITIVE: Primary | ICD-10-CM

## 2024-08-05 DIAGNOSIS — Z00.6 RESEARCH SUBJECT: Primary | ICD-10-CM

## 2024-08-05 DIAGNOSIS — Z00.6 RESEARCH SUBJECT: ICD-10-CM

## 2024-08-05 PROCEDURE — 96402 CHEMO HORMON ANTINEOPL SQ/IM: CPT | Mod: HCNC

## 2024-08-05 PROCEDURE — 96401 CHEMO ANTI-NEOPL SQ/IM: CPT | Mod: HCNC

## 2024-08-05 PROCEDURE — 36415 COLL VENOUS BLD VENIPUNCTURE: CPT | Mod: HCNC

## 2024-08-05 PROCEDURE — 63600175 PHARM REV CODE 636 W HCPCS: Mod: JZ,JG,HCNC | Performed by: INTERNAL MEDICINE

## 2024-08-05 RX ORDER — LAMOTRIGINE 25 MG/1
500 TABLET ORAL
Status: COMPLETED | OUTPATIENT
Start: 2024-08-05 | End: 2024-08-05

## 2024-08-05 RX ADMIN — FULVESTRANT 500 MG: 50 INJECTION, SOLUTION INTRAMUSCULAR at 11:08

## 2024-08-08 RX ORDER — METHOCARBAMOL 750 MG/1
750 TABLET, FILM COATED ORAL 3 TIMES DAILY
Qty: 60 TABLET | Refills: 0 | Status: SHIPPED | OUTPATIENT
Start: 2024-08-08

## 2024-08-09 DIAGNOSIS — E78.2 MIXED HYPERLIPIDEMIA: ICD-10-CM

## 2024-08-09 RX ORDER — ATORVASTATIN CALCIUM 10 MG/1
10 TABLET, FILM COATED ORAL DAILY
Qty: 90 TABLET | Refills: 2 | Status: SHIPPED | OUTPATIENT
Start: 2024-08-09

## 2024-08-21 NOTE — PLAN OF CARE
Problem: Adult Inpatient Plan of Care  Goal: Plan of Care Review  Outcome: Ongoing, Progressing  Flowsheets (Taken 4/10/2023 1538)  Plan of Care Reviewed With: patient  Goal: Patient-Specific Goal (Individualized)  Outcome: Ongoing, Progressing  Flowsheets (Taken 4/10/2023 1538)  Anxieties, Fears or Concerns: none  Individualized Care Needs: Give both injections at the same time  Goal: Absence of Hospital-Acquired Illness or Injury  Outcome: Ongoing, Progressing  Intervention: Identify and Manage Fall Risk  Flowsheets (Taken 4/10/2023 1538)  Safety Promotion/Fall Prevention:   in recliner, wheels locked   nonskid shoes/socks when out of bed   medications reviewed  Goal: Optimal Comfort and Wellbeing  Outcome: Ongoing, Progressing  Intervention: Monitor Pain and Promote Comfort  Flowsheets (Taken 4/10/2023 1538)  Pain Management Interventions:   quiet environment facilitated   relaxation techniques promoted   warm blanket provided   pillow support provided  Intervention: Provide Person-Centered Care  Flowsheets (Taken 4/10/2023 1538)  Trust Relationship/Rapport:   care explained   questions encouraged   choices provided   reassurance provided   emotional support provided   thoughts/feelings acknowledged   empathic listening provided   questions answered      oriented to person, place and time ,  , cranial nerves 2-12 grossly intact Lab: 540 Electrodesiccation Text: The wound bed was treated with electrodesiccation after the biopsy was performed. Destruction After The Procedure: No Size Of Lesion In Cm: 1 Electrodesiccation And Curettage Text: The wound bed was treated with electrodesiccation and curettage after the biopsy was performed. Wound Care: Bacitracin Biopsy Method: Double edge Personna blades Curettage Text: The wound bed was treated with curettage after the biopsy was performed. Notification Instructions: Patient will be notified of biopsy results. However, patient instructed to call the office if not contacted within 2 weeks. Silver Nitrate Text: The wound bed was treated with silver nitrate after the biopsy was performed. Dressing: bandage X Size Of Lesion In Cm: 0.8 Detail Level: Detailed Lab Facility: [id number] Type Of Destruction Used: Curettage Consent: Verbal consent was obtained and risks were reviewed including but not limited to scarring, infection, bleeding, scabbing, incomplete removal, nerve damage and allergy to anesthesia. Billing Type: Third-Party Bill Cryotherapy Text: The wound bed was treated with cryotherapy after the biopsy was performed. Anesthesia Volume In Cc (Will Not Render If 0): 0.5 Post-Care Instructions: I reviewed with the patient in detail post-care instructions. Patient is to keep the biopsy site covered and then apply Vaseline twice daily until healed. Patient may wash gently with soap and water to remove crusting. Hemostasis: Drysol Render Post-Care Instructions In Note?: yes Anesthesia Type: 2% lidocaine with epinephrine Additional Anesthesia Volume In Cc (Will Not Render If 0): 0 Biopsy Type: H and E

## 2024-08-23 ENCOUNTER — PATIENT MESSAGE (OUTPATIENT)
Dept: ADMINISTRATIVE | Facility: OTHER | Age: 59
End: 2024-08-23
Payer: MEDICARE

## 2024-09-03 ENCOUNTER — OFFICE VISIT (OUTPATIENT)
Dept: HEMATOLOGY/ONCOLOGY | Facility: CLINIC | Age: 59
End: 2024-09-03
Payer: MEDICARE

## 2024-09-03 ENCOUNTER — LAB VISIT (OUTPATIENT)
Dept: LAB | Facility: HOSPITAL | Age: 59
End: 2024-09-03
Attending: INTERNAL MEDICINE
Payer: MEDICARE

## 2024-09-03 ENCOUNTER — INFUSION (OUTPATIENT)
Dept: INFUSION THERAPY | Facility: HOSPITAL | Age: 59
End: 2024-09-03
Attending: INTERNAL MEDICINE
Payer: MEDICARE

## 2024-09-03 VITALS
OXYGEN SATURATION: 99 % | DIASTOLIC BLOOD PRESSURE: 72 MMHG | TEMPERATURE: 97 F | HEIGHT: 67 IN | HEART RATE: 79 BPM | RESPIRATION RATE: 18 BRPM | SYSTOLIC BLOOD PRESSURE: 112 MMHG | BODY MASS INDEX: 28.86 KG/M2 | WEIGHT: 183.88 LBS

## 2024-09-03 DIAGNOSIS — T45.1X5A PANCYTOPENIA DUE TO ANTINEOPLASTIC CHEMOTHERAPY: ICD-10-CM

## 2024-09-03 DIAGNOSIS — C50.111 MALIGNANT NEOPLASM OF CENTRAL PORTION OF RIGHT BREAST IN FEMALE, ESTROGEN RECEPTOR POSITIVE: Primary | ICD-10-CM

## 2024-09-03 DIAGNOSIS — D61.810 PANCYTOPENIA DUE TO ANTINEOPLASTIC CHEMOTHERAPY: ICD-10-CM

## 2024-09-03 DIAGNOSIS — T45.1X5A IMMUNODEFICIENCY DUE TO CHEMOTHERAPY: ICD-10-CM

## 2024-09-03 DIAGNOSIS — C50.111 MALIGNANT NEOPLASM OF CENTRAL PORTION OF RIGHT BREAST IN FEMALE, ESTROGEN RECEPTOR POSITIVE: ICD-10-CM

## 2024-09-03 DIAGNOSIS — R94.6 ABNORMAL RESULTS OF THYROID FUNCTION STUDIES: ICD-10-CM
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
## 2024-09-03 DIAGNOSIS — Z79.899 IMMUNODEFICIENCY DUE TO CHEMOTHERAPY: ICD-10-CM

## 2024-09-03 DIAGNOSIS — C79.51 SECONDARY MALIGNANT NEOPLASM OF BONE: ICD-10-CM

## 2024-09-03 DIAGNOSIS — Z17.0 MALIGNANT NEOPLASM OF CENTRAL PORTION OF RIGHT BREAST IN FEMALE, ESTROGEN RECEPTOR POSITIVE: Primary | ICD-10-CM

## 2024-09-03 DIAGNOSIS — D84.822 IMMUNODEFICIENCY SECONDARY TO RADIATION THERAPY: ICD-10-CM

## 2024-09-03 DIAGNOSIS — Z17.0 MALIGNANT NEOPLASM OF CENTRAL PORTION OF RIGHT BREAST IN FEMALE, ESTROGEN RECEPTOR POSITIVE: ICD-10-CM

## 2024-09-03 DIAGNOSIS — D84.821 IMMUNODEFICIENCY DUE TO CHEMOTHERAPY: ICD-10-CM

## 2024-09-03 LAB
ALBUMIN SERPL BCP-MCNC: 3.7 G/DL (ref 3.5–5.2)
ALP SERPL-CCNC: 46 U/L (ref 55–135)
ALT SERPL W/O P-5'-P-CCNC: 22 U/L (ref 10–44)
ANION GAP SERPL CALC-SCNC: 8 MMOL/L (ref 8–16)
AST SERPL-CCNC: 17 U/L (ref 10–40)
BASOPHILS # BLD AUTO: 0.05 K/UL (ref 0–0.2)
BASOPHILS NFR BLD: 2 % (ref 0–1.9)
BILIRUB SERPL-MCNC: 0.7 MG/DL (ref 0.1–1)
BUN SERPL-MCNC: 16 MG/DL (ref 6–20)
CALCIUM SERPL-MCNC: 9.7 MG/DL (ref 8.7–10.5)
CHLORIDE SERPL-SCNC: 105 MMOL/L (ref 95–110)
CO2 SERPL-SCNC: 28 MMOL/L (ref 23–29)
CREAT SERPL-MCNC: 0.7 MG/DL (ref 0.5–1.4)
DIFFERENTIAL METHOD BLD: ABNORMAL
EOSINOPHIL # BLD AUTO: 0.1 K/UL (ref 0–0.5)
EOSINOPHIL NFR BLD: 3.2 % (ref 0–8)
ERYTHROCYTE [DISTWIDTH] IN BLOOD BY AUTOMATED COUNT: 14.5 % (ref 11.5–14.5)
EST. GFR  (NO RACE VARIABLE): >60 ML/MIN/1.73 M^2
GLUCOSE SERPL-MCNC: 95 MG/DL (ref 70–110)
HCT VFR BLD AUTO: 38.1 % (ref 37–48.5)
HGB BLD-MCNC: 12.9 G/DL (ref 12–16)
IMM GRANULOCYTES # BLD AUTO: 0 K/UL (ref 0–0.04)
IMM GRANULOCYTES NFR BLD AUTO: 0 % (ref 0–0.5)
LYMPHOCYTES # BLD AUTO: 0.9 K/UL (ref 1–4.8)
LYMPHOCYTES NFR BLD: 34.4 % (ref 18–48)
MCH RBC QN AUTO: 34.5 PG (ref 27–31)
MCHC RBC AUTO-ENTMCNC: 33.9 G/DL (ref 32–36)
MCV RBC AUTO: 102 FL (ref 82–98)
MONOCYTES # BLD AUTO: 0.6 K/UL (ref 0.3–1)
MONOCYTES NFR BLD: 21.7 % (ref 4–15)
NEUTROPHILS # BLD AUTO: 1 K/UL (ref 1.8–7.7)
NEUTROPHILS NFR BLD: 38.7 % (ref 38–73)
NRBC BLD-RTO: 0 /100 WBC
PLATELET # BLD AUTO: 216 K/UL (ref 150–450)
PMV BLD AUTO: 8.6 FL (ref 9.2–12.9)
POTASSIUM SERPL-SCNC: 5 MMOL/L (ref 3.5–5.1)
PROT SERPL-MCNC: 7 G/DL (ref 6–8.4)
RBC # BLD AUTO: 3.74 M/UL (ref 4–5.4)
SODIUM SERPL-SCNC: 141 MMOL/L (ref 136–145)
TSH SERPL DL<=0.005 MIU/L-ACNC: 1.02 UIU/ML (ref 0.4–4)
WBC # BLD AUTO: 2.53 K/UL (ref 3.9–12.7)

## 2024-09-03 PROCEDURE — 84443 ASSAY THYROID STIM HORMONE: CPT | Mod: HCNC | Performed by: INTERNAL MEDICINE

## 2024-09-03 PROCEDURE — 99214 OFFICE O/P EST MOD 30 MIN: CPT | Mod: 25,HCNC,95, | Performed by: INTERNAL MEDICINE

## 2024-09-03 PROCEDURE — 85025 COMPLETE CBC W/AUTO DIFF WBC: CPT | Mod: HCNC | Performed by: INTERNAL MEDICINE

## 2024-09-03 PROCEDURE — 80053 COMPREHEN METABOLIC PANEL: CPT | Mod: HCNC | Performed by: INTERNAL MEDICINE

## 2024-09-03 PROCEDURE — 96401 CHEMO ANTI-NEOPL SQ/IM: CPT | Mod: HCNC

## 2024-09-03 PROCEDURE — 1159F MED LIST DOCD IN RCRD: CPT | Mod: HCNC,CPTII,95, | Performed by: INTERNAL MEDICINE

## 2024-09-03 PROCEDURE — 36415 COLL VENOUS BLD VENIPUNCTURE: CPT | Mod: HCNC | Performed by: INTERNAL MEDICINE

## 2024-09-03 PROCEDURE — 3044F HG A1C LEVEL LT 7.0%: CPT | Mod: HCNC,CPTII,95, | Performed by: INTERNAL MEDICINE

## 2024-09-03 PROCEDURE — 1160F RVW MEDS BY RX/DR IN RCRD: CPT | Mod: HCNC,CPTII,95, | Performed by: INTERNAL MEDICINE

## 2024-09-03 PROCEDURE — 63600175 PHARM REV CODE 636 W HCPCS: Mod: JZ,JG,HCNC | Performed by: INTERNAL MEDICINE

## 2024-09-03 PROCEDURE — 96402 CHEMO HORMON ANTINEOPL SQ/IM: CPT | Mod: HCNC

## 2024-09-03 RX ORDER — LAMOTRIGINE 25 MG/1
500 TABLET ORAL
Status: COMPLETED | OUTPATIENT
Start: 2024-09-03 | End: 2024-09-03

## 2024-09-03 RX ADMIN — FULVESTRANT 500 MG: 50 INJECTION, SOLUTION INTRAMUSCULAR at 09:09

## 2024-09-03 NOTE — NURSING
0925: Faslodex Injection given without difficulties.Bandaid applied to bilateral glutes. Patient instructed to stay in the clinic for 15 minutes. Patient verbalized understanding and will notify nurse with any complaints.

## 2024-09-03 NOTE — PROGRESS NOTES
Subjective:       Patient ID: Carole Moore is a 59 y.o. female.    Chief Complaint: Results, Chemotherapy, and Breast Cancer    HPI:  59-year-old female history of metastatic breast cancer continues on Ibrance and Faslodex patient reports increased fatigue over the last several months patient had CT in 07/05/2024 demonstrating stable findings assurance refused for PET scan ECOG status 1    Past Medical History:   Diagnosis Date    Antineoplastic chemotherapy induced anemia     Back pain     Breast cancer 2016    right breast    Cancer     R Breast cancer    CVA (cerebral infarction)     Diverticulosis     Family history of colon cancer 10/30/2018    Her mother and father both had colon cancer.    Genetic testing of female 12/27/2016    Recorded Future (28 genes) - NEGATIVE    Hyperlipidemia     Immunodeficiency due to chemotherapy 10/03/2022    Nausea after anesthesia     Paralysis     right side    PONV (postoperative nausea and vomiting)     Stroke 2001    R side weakness    Trouble in sleeping      Family History   Problem Relation Name Age of Onset    Breast cancer Paternal Aunt      Colon cancer Father      Colon cancer Mother Kathy Stein     Cancer Mother Kathy Stein         colon cancer    Melanoma Maternal Uncle      Skin cancer Maternal Uncle       Social History     Socioeconomic History    Marital status:    Tobacco Use    Smoking status: Never    Smokeless tobacco: Never    Tobacco comments:     I was a very light smoker for 5 years   Substance and Sexual Activity    Alcohol use: No    Drug use: No    Sexual activity: Not Currently     Partners: Male     Birth control/protection: Surgical     Comment: hysterectomy   Other Topics Concern    Are you pregnant or think you may be? No    Breast-feeding No     Social Determinants of Health     Financial Resource Strain: Low Risk  (4/11/2024)    Overall Financial Resource Strain (CARDIA)     Difficulty of Paying  Living Expenses: Not hard at all   Food Insecurity: No Food Insecurity (4/11/2024)    Hunger Vital Sign     Worried About Running Out of Food in the Last Year: Never true     Ran Out of Food in the Last Year: Never true   Transportation Needs: No Transportation Needs (4/11/2024)    PRAPARE - Transportation     Lack of Transportation (Medical): No     Lack of Transportation (Non-Medical): No   Physical Activity: Inactive (4/11/2024)    Exercise Vital Sign     Days of Exercise per Week: 0 days     Minutes of Exercise per Session: 0 min   Stress: Stress Concern Present (4/11/2024)    [employer] [place] [employer] - Occupational Stress Questionnaire     Feeling of Stress : To some extent   Housing Stability: Low Risk  (4/11/2024)    Housing Stability Vital Sign     Unable to Pay for Housing in the Last Year: No     Number of Places Lived in the Last Year: 1     Unstable Housing in the Last Year: No     Past Surgical History:   Procedure Laterality Date    AUGMENTATION OF BREAST Left 2017    BREAST SURGERY      COLONOSCOPY N/A 10/30/2018    Procedure: COLONOSCOPY;  Surgeon: Cosme Monson MD;  Location: [facility] END[facility];  Service: Endoscopy;  Laterality: N/A;    COLONOSCOPY N/A 12/27/2023    Procedure: COLONOSCOPY;  Surgeon: Caro Leo MD;  Location: [facility] ENDO;  Service: Endoscopy;  Laterality: N/A;    COSMETIC SURGERY  December 2017    Breast Reconstruction after Mastectomy    CYSTOSCOPY WITH BIOPSY OF BLADDER N/A 11/29/2021    Procedure: CYSTOSCOPY, WITH BLADDER BIOPSY, WITH FULGURATION IF INDICATED;  Surgeon: Page Marcelo MD;  Location: [facility] OR;  Service: Urology;  Laterality: N/A;    FIXATION KYPHOPLASTY Bilateral 02/09/2023    Procedure: KYPHOPLASTY;  Surgeon: Josse Pelayo MD;  Location: [facility] OR;  Service: Neurosurgery;  Laterality: Bilateral;  Kyphoplasty T10 with ablation    HYSTERECTOMY  2007    maintains both ovaries, menorrhagia    MASTECTOMY Right 2017    [facility]  "INSERTION, SINGLE      RADIOFREQUENCY ABLATION, BONE, PERCUTANEOUS Bilateral 02/09/2023    Procedure: RADIOFREQUENCY ABLATION,BONE,PERCUTANEOUS;  Surgeon: Josse Pelayo MD;  Location: [facility];  Service: Neurosurgery;  Laterality: Bilateral;    TONSILLECTOMY         Labs:  Lab Results   Component Value Date    WBC 2.53 (L) 09/03/2024    HGB 12.9 09/03/2024    HCT 38.1 09/03/2024     (H) 09/03/2024     09/03/2024     BMP  Lab Results   Component Value Date     09/03/2024    K 5.0 09/03/2024     09/03/2024    CO2 28 09/03/2024    BUN 16 09/03/2024    CREATININE 0.7 09/03/2024    CALCIUM 9.7 09/03/2024    ANIONGAP 8 09/03/2024    ESTGFRAFRICA >60 05/12/2022    EGFRNONAA >60 05/12/2022     Lab Results   Component Value Date    ALT 22 09/03/2024    AST 17 09/03/2024    ALKPHOS 46 (L) 09/03/2024    BILITOT 0.7 09/03/2024       No results found for: "IRON", "TIBC", "FERRITIN", "SATURATEDIRO"  No results found for: "XQIORRYR35"  No results found for: "FOLATE"  Lab Results   Component Value Date    TSH 0.793 10/29/2021         Review of Systems   Constitutional:  Positive for activity change, appetite change and fatigue. Negative for chills, diaphoresis, fever and unexpected weight change.   HENT:  Negative for congestion, dental problem, drooling, ear discharge, ear pain, facial swelling, hearing loss, mouth sores, nosebleeds, postnasal drip, rhinorrhea, sinus pressure, sneezing, sore throat, tinnitus, trouble swallowing and voice change.    Eyes:  Negative for photophobia, pain, discharge, redness, itching and visual disturbance.   Respiratory:  Negative for cough, choking, chest tightness, shortness of breath, wheezing and stridor.    Cardiovascular:  Negative for chest pain, palpitations and leg swelling.   Gastrointestinal:  Negative for abdominal distention, abdominal pain, anal bleeding, blood in stool, constipation, diarrhea, nausea, rectal pain and vomiting.   Endocrine: Negative for " cold intolerance, heat intolerance, polydipsia, polyphagia and polyuria.   Genitourinary:  Negative for decreased urine volume, difficulty urinating, dyspareunia, dysuria, enuresis, flank pain, frequency, genital sores, hematuria, menstrual problem, pelvic pain, urgency, vaginal bleeding, vaginal discharge and vaginal pain.   Musculoskeletal:  Positive for gait problem. Negative for arthralgias, back pain, joint swelling, myalgias, neck pain and neck stiffness.   Skin:  Negative for color change, pallor and rash.   Allergic/Immunologic: Negative for environmental allergies, food allergies and immunocompromised state.   Neurological:  Positive for weakness. Negative for dizziness, tremors, seizures, syncope, facial asymmetry, speech difficulty, light-headedness, numbness and headaches.   Hematological:  Negative for adenopathy. Does not bruise/bleed easily.   Psychiatric/Behavioral:  Positive for dysphoric mood. Negative for agitation, behavioral problems, confusion, decreased concentration, hallucinations, self-injury, sleep disturbance and suicidal ideas. The patient is nervous/anxious. The patient is not hyperactive.        Objective:      Physical Exam  Constitutional:       Appearance: Normal appearance.   Neurological:      Mental Status: She is alert.      Motor: Weakness present.      Coordination: Coordination abnormal.      Gait: Gait abnormal.           Assessment:      1. Malignant neoplasm of central portion of right breast in female, estrogen receptor positive    2. Abnormal results of thyroid function studies    3. Immunodeficiency secondary to radiation therapy    4. Immunodeficiency due to chemotherapy    5. Pancytopenia due to antineoplastic chemotherapy    6. Secondary malignant neoplasm of bone           Med Onc Chart Routing      Follow up with physician . Return in 1 month CBC CMP LDH C-reactive protein   Follow up with [facility]    Infusion scheduling note    Injection scheduling note Faslodex on a  monthly basis; Zometa dose in October   Labs   Scheduling:  Preferred lab:  Lab interval:  Draw TSH level today   Imaging    Pharmacy appointment    Other referrals               Plan:     The patient location is:  Home  The chief complaint leading to consultation is:  Breast cancer    Visit type: audiovisual    Face to Face time with patient: 25 minutes of total time spent on the encounter, which includes face to face time and non-face to face time preparing to see the patient (eg, review of tests), Obtaining and/or reviewing separately obtained history, Documenting clinical information in the electronic or other health record, Independently interpreting results (not separately reported) and communicating results to the patient/family/caregiver, or Care coordination (not separately reported).         Each patient to whom he or she provides medical services by telemedicine is:  (1) informed of the relationship between the physician and patient and the respective role of any other health care provider with respect to management of the patient; and (2) notified that he or she may decline to receive medical services by telemedicine and may withdraw from such care at any time.    Notes:   Patient reports increasing fatigue will check TSH level today.  Return in 1 month CBC LDH C-reactive protein CMP.  Continue with current dosing of Ibrance Faslodex ANC a 1000 will start her next dose Ibrance in proximally one-week.  Discussed implications of answered questions with her last imaging 07/05/2024 would like PET CT done will need to follow-up Zometa to be given at next dose in follow-up in October      Harpal Khan Jr, MD FACP

## 2024-09-03 NOTE — PLAN OF CARE
"Pt is stable, pt administered Faslodex today. Pt voiced she was doing "well." Pt will follow up in one month. Plan of care reviewed with patient. Discussed if there are any new or ongoing concerns.        Problem: Adult Inpatient Plan of Care  Goal: Plan of Care Review  Outcome: Progressing  Goal: Patient-Specific Goal (Individualized)  Outcome: Progressing  Flowsheets (Taken 9/3/2024 0941)  Individualized Care Needs: Pt offered refreshments.  Anxieties, Fears or Concerns: Pt denies.  Patient/Family-Specific Goals (Include Timeframe): Pt will tolerated treatment today without adverse reactions.  Goal: Optimal Comfort and Wellbeing  Outcome: Progressing     Problem: Fall Injury Risk  Goal: Absence of Fall and Fall-Related Injury  Outcome: Progressing     Problem: Chemotherapy Effects  Goal: Anemia Symptom Improvement  Outcome: Progressing  Goal: Safety Maintained  Outcome: Progressing  Goal: Absence of Hematuria  Outcome: Progressing  Goal: Nausea and Vomiting Relief  Outcome: Progressing  Goal: Neurotoxicity Symptom Control  Outcome: Progressing  Goal: Absence of Infection  Outcome: Progressing  Goal: Absence of Bleeding  Outcome: Progressing     "

## 2024-09-03 NOTE — DISCHARGE INSTRUCTIONS
Thank you for allowing me to care for you today,  SOILA YapN, RN    [facility]  [address] [address]  [phone] phone     [phone] fax  Hours of Operation: Monday- Friday 7:00am- 5:30pm  After hours phone  [phone]  Hematology / Oncology Physicians on call      TRI Torrez Dr., Dr., Dr., Dr., Dr., Dr., Dr., Dr., Dr., Dr., Dr., Dr., Dr., Dr., Dr., GIULIANO Valdivia, GIULIANO Vieyra, GIULIANO Camilo, NP  Please call with any concerns regarding your appointment today.   FALL PREVENTION   Falls often occur due to slipping, tripping or losing your balance. Here are ways to reduce your risk of falling again.   Was there anything that caused your fall that can be fixed, removed or replaced?   Make your home safe by keeping walkways clear of objects you may trip over.   Use non-slip pads under rugs.   Do not walk in poorly lit areas.   Do not stand on chairs or wobbly ladders.   Use caution when reaching overhead or looking upward. This position can cause a loss of balance.   Be sure your shoes fit properly, have non-slip bottoms and are in good condition.   Be cautious when going up and down stairs, curbs, and when walking on uneven sidewalks.   If your balance is poor, consider using a cane or walker.   If your fall was related to alcohol use, stop or limit alcohol intake.   If your fall was related to use of sleeping medicines, talk to your doctor about this. You may need to reduce your dosage at bedtime if you awaken during the night to go to the bathroom.   To reduce the need for nighttime bathroom trips:   Avoid drinking fluids for several hours before going to bed   Empty your bladder before going to bed   Men can keep a urinal at the bedside   © 8059-6057 [employer]en[employer], [address] [place] [address], [place] [address]. All rights reserved. This information is not intended as a  substitute for professional medical care. Always follow your healthcare professional's instructions.

## 2024-10-01 ENCOUNTER — OFFICE VISIT (OUTPATIENT)
Dept: HEMATOLOGY/ONCOLOGY | Facility: CLINIC | Age: 59
End: 2024-10-01
Payer: MEDICARE

## 2024-10-01 ENCOUNTER — HOSPITAL ENCOUNTER (OUTPATIENT)
Dept: RADIOLOGY | Facility: HOSPITAL | Age: 59
Discharge: HOME | End: 2024-10-01
Attending: INTERNAL MEDICINE
Payer: MEDICARE

## 2024-10-01 ENCOUNTER — INFUSION (OUTPATIENT)
Dept: INFUSION THERAPY | Facility: HOSPITAL | Age: 59
End: 2024-10-01
Attending: INTERNAL MEDICINE
Payer: MEDICARE

## 2024-10-01 ENCOUNTER — TELEPHONE (OUTPATIENT)
Dept: HEMATOLOGY/ONCOLOGY | Facility: CLINIC | Age: 59
End: 2024-10-01
Payer: MEDICARE

## 2024-10-01 VITALS
SYSTOLIC BLOOD PRESSURE: 122 MMHG | HEART RATE: 120 BPM | WEIGHT: 182.31 LBS | DIASTOLIC BLOOD PRESSURE: 80 MMHG | OXYGEN SATURATION: 98 % | RESPIRATION RATE: 18 BRPM | TEMPERATURE: 97 F | BODY MASS INDEX: 28.56 KG/M2

## 2024-10-01 DIAGNOSIS — C50.111 MALIGNANT NEOPLASM OF CENTRAL PORTION OF RIGHT BREAST IN FEMALE, ESTROGEN RECEPTOR POSITIVE: Primary | ICD-10-CM

## 2024-10-01 DIAGNOSIS — R05.1 ACUTE COUGH: Primary | ICD-10-CM

## 2024-10-01 DIAGNOSIS — Z79.899 IMMUNODEFICIENCY DUE TO CHEMOTHERAPY: ICD-10-CM

## 2024-10-01 DIAGNOSIS — C50.111 MALIGNANT NEOPLASM OF CENTRAL PORTION OF RIGHT BREAST IN FEMALE, ESTROGEN RECEPTOR POSITIVE: ICD-10-CM

## 2024-10-01 DIAGNOSIS — Z17.0 MALIGNANT NEOPLASM OF CENTRAL PORTION OF RIGHT BREAST IN FEMALE, ESTROGEN RECEPTOR POSITIVE: Primary | ICD-10-CM

## 2024-10-01 DIAGNOSIS — U07.1 COVID: ICD-10-CM

## 2024-10-01 DIAGNOSIS — C79.51 SECONDARY MALIGNANT NEOPLASM OF BONE: ICD-10-CM

## 2024-10-01 DIAGNOSIS — D84.821 IMMUNODEFICIENCY DUE TO CHEMOTHERAPY: ICD-10-CM

## 2024-10-01 DIAGNOSIS — T45.1X5A IMMUNODEFICIENCY DUE TO CHEMOTHERAPY: ICD-10-CM

## 2024-10-01 DIAGNOSIS — Z17.0 MALIGNANT NEOPLASM OF CENTRAL PORTION OF RIGHT BREAST IN FEMALE, ESTROGEN RECEPTOR POSITIVE: ICD-10-CM

## 2024-10-01 DIAGNOSIS — R05.1 ACUTE COUGH: ICD-10-CM

## 2024-10-01 DIAGNOSIS — T45.1X5A PANCYTOPENIA DUE TO ANTINEOPLASTIC CHEMOTHERAPY: ICD-10-CM

## 2024-10-01 DIAGNOSIS — D61.810 PANCYTOPENIA DUE TO ANTINEOPLASTIC CHEMOTHERAPY: ICD-10-CM

## 2024-10-01 LAB
INFLUENZA A, MOLECULAR: NEGATIVE
INFLUENZA B, MOLECULAR: NEGATIVE
SARS-COV-2 RDRP RESP QL NAA+PROBE: POSITIVE
SPECIMEN SOURCE: NORMAL

## 2024-10-01 PROCEDURE — 71046 X-RAY EXAM CHEST 2 VIEWS: CPT | Mod: 26,HCNC,, | Performed by: STUDENT IN AN ORGANIZED HEALTH CARE EDUCATION/TRAINING PROGRAM

## 2024-10-01 PROCEDURE — 63600175 PHARM REV CODE 636 W HCPCS: Mod: JZ,JG,HCNC | Performed by: INTERNAL MEDICINE

## 2024-10-01 PROCEDURE — 87502 INFLUENZA DNA AMP PROBE: CPT | Mod: HCNC | Performed by: INTERNAL MEDICINE

## 2024-10-01 PROCEDURE — 71046 X-RAY EXAM CHEST 2 VIEWS: CPT | Mod: TC,HCNC

## 2024-10-01 PROCEDURE — 96402 CHEMO HORMON ANTINEOPL SQ/IM: CPT | Mod: HCNC

## 2024-10-01 PROCEDURE — U0002 COVID-19 LAB TEST NON-CDC: HCPCS | Mod: HCNC | Performed by: INTERNAL MEDICINE

## 2024-10-01 RX ORDER — LAMOTRIGINE 25 MG/1
500 TABLET ORAL
Status: COMPLETED | OUTPATIENT
Start: 2024-10-01 | End: 2024-10-01

## 2024-10-01 RX ADMIN — FULVESTRANT 500 MG: 50 INJECTION, SOLUTION INTRAMUSCULAR at 10:10

## 2024-10-01 NOTE — PROGRESS NOTES
Subjective:       Patient ID: Carole Moore is a 59 y.o. female.    Chief Complaint: Results, Breast Cancer, and Chemotherapy    HPI:  59-year-old female history of metastatic breast cancer continues on Faslodex and Ibrance.  Patient's ANC today is 1000 she is scheduled next week for a cracked tooth prior to Zometa dosing.  Patient also reports upper respiratory tract symptoms of cough in URI symptomatology ECOG status 1 seen in virtual visit    Past Medical History:   Diagnosis Date    Antineoplastic chemotherapy induced anemia     Back pain     Breast cancer 2016    right breast    Cancer     R Breast cancer    CVA (cerebral infarction)     Diverticulosis     Family history of colon cancer 10/30/2018    Her mother and father both had colon cancer.    Genetic testing of female 12/27/2016    [employer] (28 genes) - NEGATIVE    Hyperlipidemia     Immunodeficiency due to chemotherapy 10/03/2022    Nausea after anesthesia     Paralysis     right side    PONV (postoperative nausea and vomiting)     Stroke 2001    R side weakness    Trouble in sleeping      Family History   Problem Relation Name Age of Onset    Breast cancer Paternal Aunt      Colon cancer Father      Colon cancer Mother Kathy Stein     Cancer Mother Kathy Stein         colon cancer    Melanoma Maternal Uncle      Skin cancer Maternal Uncle       Social History     Socioeconomic History    Marital status:    Tobacco Use    Smoking status: Never    Smokeless tobacco: Never    Tobacco comments:     I was a very light smoker for 5 years   Substance and Sexual Activity    Alcohol use: No    Drug use: No    Sexual activity: Not Currently     Partners: Male     Birth control/protection: Surgical     Comment: hysterectomy   Other Topics Concern    Are you pregnant or think you may be? No    Breast-feeding No     Social Drivers of Health     Financial Resource Strain: Low Risk  (4/11/2024)    Overall Financial Resource Strain (CARDIA)      Difficulty of Paying Living Expenses: Not hard at all   Food Insecurity: No Food Insecurity (4/11/2024)    Hunger Vital Sign     Worried About Running Out of Food in the Last Year: Never true     Ran Out of Food in the Last Year: Never true   Transportation Needs: No Transportation Needs (4/11/2024)    PRAPARE - Transportation     Lack of Transportation (Medical): No     Lack of Transportation (Non-Medical): No   Physical Activity: Inactive (4/11/2024)    Exercise Vital Sign     Days of Exercise per Week: 0 days     Minutes of Exercise per Session: 0 min   Stress: Stress Concern Present (4/11/2024)    [employer] - Occupational Stress Questionnaire     Feeling of Stress : To some extent   Housing Stability: Low Risk  (4/11/2024)    Housing Stability Vital Sign     Unable to Pay for Housing in the Last Year: No     Number of Places Lived in the Last Year: 1     Unstable Housing in the Last Year: No     Past Surgical History:   Procedure Laterality Date    AUGMENTATION OF BREAST Left 2017    BREAST SURGERY      COLONOSCOPY N/A 10/30/2018    Procedure: COLONOSCOPY;  Surgeon: Cosme Monson MD;  Location: [facility] ENDO;  Service: Endoscopy;  Laterality: N/A;    COLONOSCOPY N/A 12/27/2023    Procedure: COLONOSCOPY;  Surgeon: Caro Leo MD;  Location: [facility];  Service: Endoscopy;  Laterality: N/A;    COSMETIC SURGERY  December 2017    Breast Reconstruction after Mastectomy    CYSTOSCOPY WITH BIOPSY OF BLADDER N/A 11/29/2021    Procedure: CYSTOSCOPY, WITH BLADDER BIOPSY, WITH FULGURATION IF INDICATED;  Surgeon: Page Marcelo MD;  Location: [facility] OR;  Service: Urology;  Laterality: N/A;    FIXATION KYPHOPLASTY Bilateral 02/09/2023    Procedure: KYPHOPLASTY;  Surgeon: Josse Pelayo MD;  Location: [facility] OR;  Service: Neurosurgery;  Laterality: Bilateral;  Kyphoplasty T10 with ablation    HYSTERECTOMY  2007    maintains both ovaries, menorrhagia    MASTECTOMY Right 2017     "MEDIPORT INSERTION, SINGLE      RADIOFREQUENCY ABLATION, BONE, PERCUTANEOUS Bilateral 02/09/2023    Procedure: RADIOFREQUENCY ABLATION,BONE,PERCUTANEOUS;  Surgeon: Josse Pelayo MD;  Location: [facility];  Service: Neurosurgery;  Laterality: Bilateral;    TONSILLECTOMY         Labs:  Lab Results   Component Value Date    WBC 1.96 (LL) 10/01/2024    HGB 13.2 10/01/2024    HCT 38.9 10/01/2024     (H) 10/01/2024     (L) 10/01/2024     BMP  Lab Results   Component Value Date     10/01/2024    K 4.8 10/01/2024     10/01/2024    CO2 24 10/01/2024    BUN 16 10/01/2024    CREATININE 0.9 10/01/2024    CALCIUM 9.7 10/01/2024    ANIONGAP 12 10/01/2024    ESTGFRAFRICA >60 05/12/2022    EGFRNONAA >60 05/12/2022     Lab Results   Component Value Date    ALT 20 10/01/2024    AST 21 10/01/2024    ALKPHOS 56 10/01/2024    BILITOT 0.9 10/01/2024       No results found for: "IRON", "TIBC", "FERRITIN", "SATURATEDIRO"  No results found for: "UXYBZHQA42"  No results found for: "FOLATE"  Lab Results   Component Value Date    TSH 1.020 09/03/2024         Review of Systems   Constitutional:  Negative for activity change, appetite change, chills, diaphoresis, fatigue, fever and unexpected weight change.   HENT:  Positive for postnasal drip and rhinorrhea. Negative for congestion, dental problem, drooling, ear discharge, ear pain, facial swelling, hearing loss, mouth sores, nosebleeds, sinus pressure, sneezing, sore throat, tinnitus, trouble swallowing and voice change.    Eyes:  Negative for photophobia, pain, discharge, redness, itching and visual disturbance.   Respiratory:  Positive for cough. Negative for choking, chest tightness, shortness of breath, wheezing and stridor.    Cardiovascular:  Negative for chest pain, palpitations and leg swelling.   Gastrointestinal:  Negative for abdominal distention, abdominal pain, anal bleeding, blood in stool, constipation, diarrhea, nausea, rectal pain and vomiting. "   Endocrine: Negative for cold intolerance, heat intolerance, polydipsia, polyphagia and polyuria.   Genitourinary:  Negative for decreased urine volume, difficulty urinating, dyspareunia, dysuria, enuresis, flank pain, frequency, genital sores, hematuria, menstrual problem, pelvic pain, urgency, vaginal bleeding, vaginal discharge and vaginal pain.   Musculoskeletal:  Negative for arthralgias, back pain, gait problem, joint swelling, myalgias, neck pain and neck stiffness.   Skin:  Negative for color change, pallor and rash.   Allergic/Immunologic: Negative for environmental allergies, food allergies and immunocompromised state.   Neurological:  Negative for dizziness, tremors, seizures, syncope, facial asymmetry, speech difficulty, weakness, light-headedness, numbness and headaches.   Hematological:  Negative for adenopathy. Does not bruise/bleed easily.   Psychiatric/Behavioral:  Negative for agitation, behavioral problems, confusion, decreased concentration, dysphoric mood, hallucinations, self-injury, sleep disturbance and suicidal ideas. The patient is not nervous/anxious and is not hyperactive.        Objective:      Physical Exam  Vitals reviewed.   Constitutional:       General: She is not in acute distress.     Appearance: She is well-developed. She is ill-appearing. She is not diaphoretic.   HENT:      Head: Normocephalic and atraumatic.      Right Ear: External ear normal.      Left Ear: External ear normal.      Nose: Nose normal.      Right Sinus: No maxillary sinus tenderness or frontal sinus tenderness.      Left Sinus: No maxillary sinus tenderness or frontal sinus tenderness.      Mouth/Throat:      Pharynx: No oropharyngeal exudate.   Eyes:      General: Lids are normal. No scleral icterus.        Right eye: No discharge.         Left eye: No discharge.      Conjunctiva/sclera: Conjunctivae normal.      Right eye: Right conjunctiva is not injected. No hemorrhage.     Left eye: Left conjunctiva is  not injected. No hemorrhage.     Pupils: Pupils are equal, round, and reactive to light.   Neck:      Thyroid: No thyromegaly.      Vascular: No JVD.      Trachea: No tracheal deviation.   Cardiovascular:      Rate and Rhythm: Normal rate.   Pulmonary:      Effort: Pulmonary effort is normal. No respiratory distress.      Breath sounds: No stridor.   Chest:      Chest wall: No tenderness.   Abdominal:      General: Bowel sounds are normal. There is no distension.      Palpations: Abdomen is soft. There is no hepatomegaly, splenomegaly or mass.      Tenderness: There is no abdominal tenderness. There is no rebound.   Musculoskeletal:         General: No tenderness. Normal range of motion.      Cervical back: Normal range of motion and neck supple.   Lymphadenopathy:      Cervical: No cervical adenopathy.      Upper Body:      Right upper body: No supraclavicular adenopathy.      Left upper body: No supraclavicular adenopathy.   Skin:     General: Skin is dry.      Findings: No erythema or rash.   Neurological:      Mental Status: She is alert and oriented to person, place, and time.      Cranial Nerves: No cranial nerve deficit.      Motor: Weakness present.      Coordination: Coordination normal.   Psychiatric:         Behavior: Behavior normal.         Thought Content: Thought content normal.         Judgment: Judgment normal.             Assessment:      1. Acute cough    2. Malignant neoplasm of central portion of right breast in female, estrogen receptor positive    3. Immunodeficiency due to chemotherapy    4. Pancytopenia due to antineoplastic chemotherapy    5. Secondary malignant neoplasm of bone           Med Onc Chart Routing      Follow up with physician . Return to clinic in 1 month CBC CMP   Follow up with [facility]    Infusion scheduling note    Injection scheduling note Faslodex dosing today   Labs   Scheduling:  Preferred lab:  Lab interval:  Needs COVID and flu swab   Imaging   Chest x-ray today    Pharmacy appointment    Other referrals                   Plan:     The patient location is:  [place]  The chief complaint leading to consultation is:  Breast cancer    Visit type: audiovisual    Face to Face time with patient: 25 minutes of total time spent on the encounter, which includes face to face time and non-face to face time preparing to see the patient (eg, review of tests), Obtaining and/or reviewing separately obtained history, Documenting clinical information in the electronic or other health record, Independently interpreting results (not separately reported) and communicating results to the patient/family/caregiver, or Care coordination (not separately reported).         Each patient to whom he or she provides medical services by telemedicine is:  (1) informed of the relationship between the physician and patient and the respective role of any other health care provider with respect to management of the patient; and (2) notified that he or she may decline to receive medical services by telemedicine and may withdraw from such care at any time.    Notes:  Reviewed information with her she states she has upper respiratory tract infections will check for flu and COVID.  In addition chest x-ray to be ordered patient should hold Ibrance until after tooth fixed next week ANC is a 1000 at present point she sounds like she has a cracked tooth Zometa dosing only after no evidence of need for extraction.  Return in 1 month with repeat CBC.  CMP orders written reviewed        Harpal Khan Jr, MD FACP

## 2024-10-08 ENCOUNTER — INFUSION (OUTPATIENT)
Dept: INFUSION THERAPY | Facility: HOSPITAL | Age: 59
End: 2024-10-08
Attending: INTERNAL MEDICINE
Payer: MEDICARE

## 2024-10-08 VITALS
HEIGHT: 67 IN | HEART RATE: 77 BPM | TEMPERATURE: 98 F | SYSTOLIC BLOOD PRESSURE: 98 MMHG | RESPIRATION RATE: 18 BRPM | DIASTOLIC BLOOD PRESSURE: 66 MMHG | BODY MASS INDEX: 28.52 KG/M2 | WEIGHT: 181.69 LBS | OXYGEN SATURATION: 98 %

## 2024-10-08 DIAGNOSIS — C79.51 SECONDARY MALIGNANT NEOPLASM OF BONE: ICD-10-CM

## 2024-10-08 DIAGNOSIS — Z17.0 MALIGNANT NEOPLASM OF CENTRAL PORTION OF RIGHT BREAST IN FEMALE, ESTROGEN RECEPTOR POSITIVE: Primary | ICD-10-CM

## 2024-10-08 DIAGNOSIS — C50.111 MALIGNANT NEOPLASM OF CENTRAL PORTION OF RIGHT BREAST IN FEMALE, ESTROGEN RECEPTOR POSITIVE: Primary | ICD-10-CM

## 2024-10-08 PROCEDURE — 96365 THER/PROPH/DIAG IV INF INIT: CPT | Mod: HCNC

## 2024-10-08 PROCEDURE — 63600175 PHARM REV CODE 636 W HCPCS: Mod: HCNC | Performed by: INTERNAL MEDICINE

## 2024-10-08 RX ORDER — HEPARIN 100 UNIT/ML
500 SYRINGE INTRAVENOUS
Status: DISCONTINUED | OUTPATIENT
Start: 2024-10-08 | End: 2024-10-08 | Stop reason: HOSPADM

## 2024-10-08 RX ORDER — SODIUM CHLORIDE 0.9 % (FLUSH) 0.9 %
10 SYRINGE (ML) INJECTION
Status: CANCELLED | OUTPATIENT
Start: 2024-10-08

## 2024-10-08 RX ORDER — ZOLEDRONIC ACID 0.04 MG/ML
4 INJECTION, SOLUTION INTRAVENOUS
Status: COMPLETED | OUTPATIENT
Start: 2024-10-08 | End: 2024-10-08

## 2024-10-08 RX ORDER — SODIUM CHLORIDE 0.9 % (FLUSH) 0.9 %
10 SYRINGE (ML) INJECTION
Status: DISCONTINUED | OUTPATIENT
Start: 2024-10-08 | End: 2024-10-08 | Stop reason: HOSPADM

## 2024-10-08 RX ORDER — HEPARIN 100 UNIT/ML
500 SYRINGE INTRAVENOUS
Status: CANCELLED | OUTPATIENT
Start: 2024-10-08

## 2024-10-08 RX ADMIN — ZOLEDRONIC ACID 4 MG: 0.04 INJECTION, SOLUTION INTRAVENOUS at 10:10

## 2024-10-12 ENCOUNTER — PATIENT MESSAGE (OUTPATIENT)
Dept: ADMINISTRATIVE | Facility: OTHER | Age: 59
End: 2024-10-12
Payer: MEDICARE

## 2024-10-14 RX ORDER — METHOCARBAMOL 750 MG/1
750 TABLET, FILM COATED ORAL 3 TIMES DAILY
Qty: 60 TABLET | Refills: 0 | Status: SHIPPED | OUTPATIENT
Start: 2024-10-14

## 2024-10-14 NOTE — TELEPHONE ENCOUNTER
Refill Routing Note   Medication(s) are not appropriate for processing by [facility] for the following reason(s):        Outside of protocol    [facility] action(s):  Route               Appointments  past 12m or future 3m with PCP    Date Provider   Last Visit   3/21/2024 Angel Emery MD   Next Visit   1/9/2025 Angel Emery MD   ED visits in past 90 days: 0        Note composed:10:38 AM 10/14/2024

## 2024-10-29 ENCOUNTER — TELEPHONE (OUTPATIENT)
Dept: HEMATOLOGY/ONCOLOGY | Facility: CLINIC | Age: 59
End: 2024-10-29

## 2024-10-29 ENCOUNTER — OFFICE VISIT (OUTPATIENT)
Dept: HEMATOLOGY/ONCOLOGY | Facility: CLINIC | Age: 59
End: 2024-10-29
Payer: MEDICARE

## 2024-10-29 ENCOUNTER — INFUSION (OUTPATIENT)
Dept: INFUSION THERAPY | Facility: HOSPITAL | Age: 59
End: 2024-10-29
Attending: INTERNAL MEDICINE
Payer: MEDICARE

## 2024-10-29 ENCOUNTER — LAB VISIT (OUTPATIENT)
Dept: LAB | Facility: HOSPITAL | Age: 59
End: 2024-10-29
Attending: INTERNAL MEDICINE
Payer: MEDICARE

## 2024-10-29 VITALS
HEART RATE: 76 BPM | RESPIRATION RATE: 17 BRPM | TEMPERATURE: 98 F | SYSTOLIC BLOOD PRESSURE: 124 MMHG | WEIGHT: 183.63 LBS | BODY MASS INDEX: 28.76 KG/M2 | OXYGEN SATURATION: 98 % | DIASTOLIC BLOOD PRESSURE: 69 MMHG

## 2024-10-29 DIAGNOSIS — C50.111 MALIGNANT NEOPLASM OF CENTRAL PORTION OF RIGHT BREAST IN FEMALE, ESTROGEN RECEPTOR POSITIVE: ICD-10-CM

## 2024-10-29 DIAGNOSIS — T45.1X5A PANCYTOPENIA DUE TO ANTINEOPLASTIC CHEMOTHERAPY: ICD-10-CM
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
## 2024-10-29 DIAGNOSIS — Z17.0 MALIGNANT NEOPLASM OF CENTRAL PORTION OF RIGHT BREAST IN FEMALE, ESTROGEN RECEPTOR POSITIVE: ICD-10-CM

## 2024-10-29 DIAGNOSIS — C79.51 SECONDARY MALIGNANT NEOPLASM OF BONE: ICD-10-CM

## 2024-10-29 DIAGNOSIS — T45.1X5A IMMUNODEFICIENCY DUE TO CHEMOTHERAPY: ICD-10-CM

## 2024-10-29 DIAGNOSIS — D84.821 IMMUNODEFICIENCY DUE TO CHEMOTHERAPY: ICD-10-CM

## 2024-10-29 DIAGNOSIS — D84.822 IMMUNODEFICIENCY SECONDARY TO RADIATION THERAPY: ICD-10-CM

## 2024-10-29 DIAGNOSIS — C50.111 MALIGNANT NEOPLASM OF CENTRAL PORTION OF RIGHT BREAST IN FEMALE, ESTROGEN RECEPTOR POSITIVE: Primary | ICD-10-CM

## 2024-10-29 DIAGNOSIS — D61.810 PANCYTOPENIA DUE TO ANTINEOPLASTIC CHEMOTHERAPY: ICD-10-CM

## 2024-10-29 DIAGNOSIS — Z17.0 MALIGNANT NEOPLASM OF CENTRAL PORTION OF RIGHT BREAST IN FEMALE, ESTROGEN RECEPTOR POSITIVE: Primary | ICD-10-CM

## 2024-10-29 DIAGNOSIS — Z79.899 IMMUNODEFICIENCY DUE TO CHEMOTHERAPY: ICD-10-CM

## 2024-10-29 LAB
ALBUMIN SERPL BCP-MCNC: 3.6 G/DL (ref 3.5–5.2)
ALP SERPL-CCNC: 56 U/L (ref 40–150)
ALT SERPL W/O P-5'-P-CCNC: 20 U/L (ref 10–44)
ANION GAP SERPL CALC-SCNC: 8 MMOL/L (ref 8–16)
AST SERPL-CCNC: 17 U/L (ref 10–40)
BASOPHILS NFR BLD: 0 % (ref 0–1.9)
BILIRUB SERPL-MCNC: 0.8 MG/DL (ref 0.1–1)
BUN SERPL-MCNC: 12 MG/DL (ref 6–20)
CALCIUM SERPL-MCNC: 9.3 MG/DL (ref 8.7–10.5)
CHLORIDE SERPL-SCNC: 107 MMOL/L (ref 95–110)
CO2 SERPL-SCNC: 26 MMOL/L (ref 23–29)
CREAT SERPL-MCNC: 0.8 MG/DL (ref 0.5–1.4)
DIFFERENTIAL METHOD BLD: ABNORMAL
EOSINOPHIL NFR BLD: 1 % (ref 0–8)
ERYTHROCYTE [DISTWIDTH] IN BLOOD BY AUTOMATED COUNT: 13.6 % (ref 11.5–14.5)
EST. GFR  (NO RACE VARIABLE): >60 ML/MIN/1.73 M^2
GLUCOSE SERPL-MCNC: 106 MG/DL (ref 70–110)
HCT VFR BLD AUTO: 37 % (ref 37–48.5)
HGB BLD-MCNC: 12.4 G/DL (ref 12–16)
IMM GRANULOCYTES # BLD AUTO: ABNORMAL K/UL (ref 0–0.04)
IMM GRANULOCYTES NFR BLD AUTO: ABNORMAL % (ref 0–0.5)
LYMPHOCYTES NFR BLD: 36 % (ref 18–48)
MCH RBC QN AUTO: 34.3 PG (ref 27–31)
MCHC RBC AUTO-ENTMCNC: 33.5 G/DL (ref 32–36)
MCV RBC AUTO: 103 FL (ref 82–98)
MONOCYTES NFR BLD: 11 % (ref 4–15)
NEUTROPHILS NFR BLD: 52 % (ref 38–73)
NRBC BLD-RTO: 0 /100 WBC
PLATELET # BLD AUTO: 202 K/UL (ref 150–450)
PMV BLD AUTO: 8.6 FL (ref 9.2–12.9)
POTASSIUM SERPL-SCNC: 4.6 MMOL/L (ref 3.5–5.1)
PROT SERPL-MCNC: 7 G/DL (ref 6–8.4)
RBC # BLD AUTO: 3.61 M/UL (ref 4–5.4)
SODIUM SERPL-SCNC: 141 MMOL/L (ref 136–145)
WBC # BLD AUTO: 1.97 K/UL (ref 3.9–12.7)

## 2024-10-29 PROCEDURE — 63600175 PHARM REV CODE 636 W HCPCS: Mod: JZ,JG,HCNC | Performed by: INTERNAL MEDICINE

## 2024-10-29 PROCEDURE — 1160F RVW MEDS BY RX/DR IN RCRD: CPT | Mod: HCNC,CPTII,95, | Performed by: INTERNAL MEDICINE

## 2024-10-29 PROCEDURE — 99214 OFFICE O/P EST MOD 30 MIN: CPT | Mod: 25,HCNC,95, | Performed by: INTERNAL MEDICINE

## 2024-10-29 PROCEDURE — 85007 BL SMEAR W/DIFF WBC COUNT: CPT | Mod: HCNC | Performed by: INTERNAL MEDICINE

## 2024-10-29 PROCEDURE — 1159F MED LIST DOCD IN RCRD: CPT | Mod: HCNC,CPTII,95, | Performed by: INTERNAL MEDICINE

## 2024-10-29 PROCEDURE — 96402 CHEMO HORMON ANTINEOPL SQ/IM: CPT | Mod: HCNC

## 2024-10-29 PROCEDURE — 96401 CHEMO ANTI-NEOPL SQ/IM: CPT | Mod: HCNC

## 2024-10-29 PROCEDURE — 36415 COLL VENOUS BLD VENIPUNCTURE: CPT | Mod: HCNC | Performed by: INTERNAL MEDICINE

## 2024-10-29 PROCEDURE — 85027 COMPLETE CBC AUTOMATED: CPT | Mod: HCNC | Performed by: INTERNAL MEDICINE

## 2024-10-29 PROCEDURE — 80053 COMPREHEN METABOLIC PANEL: CPT | Mod: HCNC | Performed by: INTERNAL MEDICINE

## 2024-10-29 PROCEDURE — 3044F HG A1C LEVEL LT 7.0%: CPT | Mod: HCNC,CPTII,95, | Performed by: INTERNAL MEDICINE

## 2024-10-29 RX ORDER — LAMOTRIGINE 25 MG/1
500 TABLET ORAL
Status: COMPLETED | OUTPATIENT
Start: 2024-10-29 | End: 2024-10-29

## 2024-10-29 RX ORDER — LAMOTRIGINE 25 MG/1
500 TABLET ORAL
Status: CANCELLED | OUTPATIENT
Start: 2024-10-29

## 2024-10-29 RX ADMIN — FULVESTRANT 500 MG: 50 INJECTION, SOLUTION INTRAMUSCULAR at 09:10

## 2024-11-04 ENCOUNTER — HOSPITAL ENCOUNTER (OUTPATIENT)
Dept: RADIOLOGY | Facility: HOSPITAL | Age: 59
Discharge: HOME | End: 2024-11-04
Attending: INTERNAL MEDICINE
Payer: MEDICARE

## 2024-11-04 DIAGNOSIS — C50.111 MALIGNANT NEOPLASM OF CENTRAL PORTION OF RIGHT BREAST IN FEMALE, ESTROGEN RECEPTOR POSITIVE: ICD-10-CM

## 2024-11-04 DIAGNOSIS — Z17.0 MALIGNANT NEOPLASM OF CENTRAL PORTION OF RIGHT BREAST IN FEMALE, ESTROGEN RECEPTOR POSITIVE: ICD-10-CM

## 2024-11-04 PROCEDURE — 78815 PET IMAGE W/CT SKULL-THIGH: CPT | Mod: TC,HCNC,PS

## 2024-11-04 PROCEDURE — 78815 PET IMAGE W/CT SKULL-THIGH: CPT | Mod: 26,HCNC,PS, | Performed by: STUDENT IN AN ORGANIZED HEALTH CARE EDUCATION/TRAINING PROGRAM

## 2024-11-04 PROCEDURE — A9552 F18 FDG: HCPCS | Mod: HCNC | Performed by: INTERNAL MEDICINE

## 2024-11-04 RX ORDER — FLUDEOXYGLUCOSE F18 500 MCI/ML
11.96 INJECTION INTRAVENOUS
Status: COMPLETED | OUTPATIENT
Start: 2024-11-04 | End: 2024-11-04

## 2024-11-04 RX ADMIN — FLUDEOXYGLUCOSE F-18 11.96 MILLICURIE: 500 INJECTION INTRAVENOUS at 10:11

## 2024-11-18 ENCOUNTER — OFFICE VISIT (OUTPATIENT)
Dept: HEMATOLOGY/ONCOLOGY | Facility: CLINIC | Age: 59
End: 2024-11-18
Payer: MEDICARE

## 2024-11-18 ENCOUNTER — LAB VISIT (OUTPATIENT)
Dept: LAB | Facility: HOSPITAL | Age: 59
End: 2024-11-18
Attending: INTERNAL MEDICINE
Payer: MEDICARE

## 2024-11-18 DIAGNOSIS — Z17.0 MALIGNANT NEOPLASM OF CENTRAL PORTION OF RIGHT BREAST IN FEMALE, ESTROGEN RECEPTOR POSITIVE: Primary | ICD-10-CM

## 2024-11-18 DIAGNOSIS — D61.810 PANCYTOPENIA DUE TO ANTINEOPLASTIC CHEMOTHERAPY: ICD-10-CM

## 2024-11-18 DIAGNOSIS — Z79.899 IMMUNODEFICIENCY DUE TO CHEMOTHERAPY: ICD-10-CM

## 2024-11-18 DIAGNOSIS — C50.111 MALIGNANT NEOPLASM OF CENTRAL PORTION OF RIGHT BREAST IN FEMALE, ESTROGEN RECEPTOR POSITIVE: Primary | ICD-10-CM

## 2024-11-18 DIAGNOSIS — T45.1X5A IMMUNODEFICIENCY DUE TO CHEMOTHERAPY: ICD-10-CM

## 2024-11-18 DIAGNOSIS — D84.822 IMMUNODEFICIENCY SECONDARY TO RADIATION THERAPY: ICD-10-CM
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
## 2024-11-18 DIAGNOSIS — C79.51 SECONDARY MALIGNANT NEOPLASM OF BONE: ICD-10-CM

## 2024-11-18 DIAGNOSIS — C50.111 MALIGNANT NEOPLASM OF CENTRAL PORTION OF RIGHT BREAST IN FEMALE, ESTROGEN RECEPTOR POSITIVE: ICD-10-CM

## 2024-11-18 DIAGNOSIS — T45.1X5A PANCYTOPENIA DUE TO ANTINEOPLASTIC CHEMOTHERAPY: ICD-10-CM

## 2024-11-18 DIAGNOSIS — Z17.0 MALIGNANT NEOPLASM OF CENTRAL PORTION OF RIGHT BREAST IN FEMALE, ESTROGEN RECEPTOR POSITIVE: ICD-10-CM

## 2024-11-18 DIAGNOSIS — D84.821 IMMUNODEFICIENCY DUE TO CHEMOTHERAPY: ICD-10-CM

## 2024-11-18 LAB
ALBUMIN SERPL BCP-MCNC: 3.7 G/DL (ref 3.5–5.2)
ALP SERPL-CCNC: 54 U/L (ref 40–150)
ALT SERPL W/O P-5'-P-CCNC: 31 U/L (ref 10–44)
ANION GAP SERPL CALC-SCNC: 13 MMOL/L (ref 8–16)
AST SERPL-CCNC: 23 U/L (ref 10–40)
BASOPHILS # BLD AUTO: 0.06 K/UL (ref 0–0.2)
BASOPHILS NFR BLD: 1.6 % (ref 0–1.9)
BILIRUB SERPL-MCNC: 0.7 MG/DL (ref 0.1–1)
BUN SERPL-MCNC: 14 MG/DL (ref 6–20)
CALCIUM SERPL-MCNC: 9.8 MG/DL (ref 8.7–10.5)
CHLORIDE SERPL-SCNC: 107 MMOL/L (ref 95–110)
CO2 SERPL-SCNC: 23 MMOL/L (ref 23–29)
CREAT SERPL-MCNC: 0.7 MG/DL (ref 0.5–1.4)
DIFFERENTIAL METHOD BLD: ABNORMAL
EOSINOPHIL # BLD AUTO: 0.2 K/UL (ref 0–0.5)
EOSINOPHIL NFR BLD: 4.3 % (ref 0–8)
ERYTHROCYTE [DISTWIDTH] IN BLOOD BY AUTOMATED COUNT: 13.3 % (ref 11.5–14.5)
EST. GFR  (NO RACE VARIABLE): >60 ML/MIN/1.73 M^2
GLUCOSE SERPL-MCNC: 97 MG/DL (ref 70–110)
HCT VFR BLD AUTO: 40.5 % (ref 37–48.5)
HGB BLD-MCNC: 13.4 G/DL (ref 12–16)
IMM GRANULOCYTES # BLD AUTO: 0.01 K/UL (ref 0–0.04)
IMM GRANULOCYTES NFR BLD AUTO: 0.3 % (ref 0–0.5)
LYMPHOCYTES # BLD AUTO: 1.1 K/UL (ref 1–4.8)
LYMPHOCYTES NFR BLD: 28.5 % (ref 18–48)
MCH RBC QN AUTO: 33.8 PG (ref 27–31)
MCHC RBC AUTO-ENTMCNC: 33.1 G/DL (ref 32–36)
MCV RBC AUTO: 102 FL (ref 82–98)
MONOCYTES # BLD AUTO: 0.3 K/UL (ref 0.3–1)
MONOCYTES NFR BLD: 9.2 % (ref 4–15)
NEUTROPHILS # BLD AUTO: 2.1 K/UL (ref 1.8–7.7)
NEUTROPHILS NFR BLD: 56.1 % (ref 38–73)
NRBC BLD-RTO: 0 /100 WBC
PLATELET # BLD AUTO: 334 K/UL (ref 150–450)
PMV BLD AUTO: 9.5 FL (ref 9.2–12.9)
POTASSIUM SERPL-SCNC: 4.2 MMOL/L (ref 3.5–5.1)
PROT SERPL-MCNC: 7.1 G/DL (ref 6–8.4)
RBC # BLD AUTO: 3.96 M/UL (ref 4–5.4)
SODIUM SERPL-SCNC: 143 MMOL/L (ref 136–145)
WBC # BLD AUTO: 3.68 K/UL (ref 3.9–12.7)

## 2024-11-18 PROCEDURE — 3044F HG A1C LEVEL LT 7.0%: CPT | Mod: HCNC,CPTII,95, | Performed by: INTERNAL MEDICINE

## 2024-11-18 PROCEDURE — 85025 COMPLETE CBC W/AUTO DIFF WBC: CPT | Mod: HCNC | Performed by: INTERNAL MEDICINE

## 2024-11-18 PROCEDURE — 80053 COMPREHEN METABOLIC PANEL: CPT | Mod: HCNC | Performed by: INTERNAL MEDICINE

## 2024-11-18 PROCEDURE — 1160F RVW MEDS BY RX/DR IN RCRD: CPT | Mod: HCNC,CPTII,95, | Performed by: INTERNAL MEDICINE

## 2024-11-18 PROCEDURE — 1159F MED LIST DOCD IN RCRD: CPT | Mod: HCNC,CPTII,95, | Performed by: INTERNAL MEDICINE

## 2024-11-18 PROCEDURE — 36415 COLL VENOUS BLD VENIPUNCTURE: CPT | Mod: HCNC,PO | Performed by: INTERNAL MEDICINE

## 2024-11-18 PROCEDURE — 99214 OFFICE O/P EST MOD 30 MIN: CPT | Mod: HCNC,95,, | Performed by: INTERNAL MEDICINE

## 2024-11-18 RX ORDER — LAMOTRIGINE 25 MG/1
500 TABLET ORAL
OUTPATIENT
Start: 2024-11-26

## 2024-11-18 NOTE — PROGRESS NOTES
Subjective:       Patient ID: Carole Moore is a 59 y.o. female.    Chief Complaint: Results, Chemotherapy, and Breast Cancer    HPI:  59-year-old female continuing with Faslodex and Ibrance.  Patient had pancytopenia with week delay of restarting Ibrance recent PET scan demonstrates resolution of pulmonary abnormality in stable findings continue with Faslodex on a monthly basis next dosing next month along with Zometa in January of 2025 ECOG status 1    Past Medical History:   Diagnosis Date    Antineoplastic chemotherapy induced anemia     Back pain     Breast cancer 2016    right breast    Cancer     R Breast cancer    CVA (cerebral infarction)     Diverticulosis     Family history of colon cancer 10/30/2018    Her mother and father both had colon cancer.    Genetic testing of female 12/27/2016    Funsherpa (28 genes) - NEGATIVE    Hyperlipidemia     Immunodeficiency due to chemotherapy 10/03/2022    Nausea after anesthesia     Paralysis     right side    PONV (postoperative nausea and vomiting)     Stroke 2001    R side weakness    Trouble in sleeping      Family History   Problem Relation Name Age of Onset    Breast cancer Paternal Aunt      Colon cancer Father      Colon cancer Mother Kathy Stein     Cancer Mother Kathy Stein         colon cancer    Melanoma Maternal Uncle      Skin cancer Maternal Uncle       Social History     Socioeconomic History    Marital status:    Tobacco Use    Smoking status: Never    Smokeless tobacco: Never    Tobacco comments:     I was a very light smoker for 5 years   Substance and Sexual Activity    Alcohol use: No    Drug use: No    Sexual activity: Not Currently     Partners: Male     Birth control/protection: Surgical     Comment: hysterectomy   Other Topics Concern    Are you pregnant or think you may be? No    Breast-feeding No     Social Drivers of Health     Financial Resource Strain: Low Risk  (4/11/2024)    Overall Financial Resource Strain (CARDIA)      Difficulty of Paying Living Expenses: Not hard at all   Food Insecurity: No Food Insecurity (4/11/2024)    Hunger Vital Sign     Worried About Running Out of Food in the Last Year: Never true     Ran Out of Food in the Last Year: Never true   Transportation Needs: No Transportation Needs (4/11/2024)    PRAPARE - Transportation     Lack of Transportation (Medical): No     Lack of Transportation (Non-Medical): No   Physical Activity: Inactive (4/11/2024)    Exercise Vital Sign     Days of Exercise per Week: 0 days     Minutes of Exercise per Session: 0 min   Stress: Stress Concern Present (4/11/2024)    [employer] - Occupational Stress Questionnaire     Feeling of Stress : To some extent   Housing Stability: Low Risk  (4/11/2024)    Housing Stability Vital Sign     Unable to Pay for Housing in the Last Year: No     Number of Places Lived in the Last Year: 1     Unstable Housing in the Last Year: No     Past Surgical History:   Procedure Laterality Date    AUGMENTATION OF BREAST Left 2017    BREAST SURGERY      COLONOSCOPY N/A 10/30/2018    Procedure: COLONOSCOPY;  Surgeon: Cosme Monson MD;  Location: [facility];  Service: Endoscopy;  Laterality: N/A;    COLONOSCOPY N/A 12/27/2023    Procedure: COLONOSCOPY;  Surgeon: Caro Leo MD;  Location: [facility];  Service: Endoscopy;  Laterality: N/A;    COSMETIC SURGERY  December 2017    Breast Reconstruction after Mastectomy    CYSTOSCOPY WITH BIOPSY OF BLADDER N/A 11/29/2021    Procedure: CYSTOSCOPY, WITH BLADDER BIOPSY, WITH FULGURATION IF INDICATED;  Surgeon: Page Marcelo MD;  Location: [facility] OR;  Service: Urology;  Laterality: N/A;    FIXATION KYPHOPLASTY Bilateral 02/09/2023    Procedure: KYPHOPLASTY;  Surgeon: Josse Pelayo MD;  Location: [facility] OR;  Service: Neurosurgery;  Laterality: Bilateral;  Kyphoplasty T10 with ablation    HYSTERECTOMY  2007    maintains both ovaries, menorrhagia    MASTECTOMY Right 2017     "MEDIPORT INSERTION, SINGLE      RADIOFREQUENCY ABLATION, BONE, PERCUTANEOUS Bilateral 02/09/2023    Procedure: RADIOFREQUENCY ABLATION,BONE,PERCUTANEOUS;  Surgeon: Josse Pelayo MD;  Location: [facility];  Service: Neurosurgery;  Laterality: Bilateral;    TONSILLECTOMY         Labs:  Lab Results   Component Value Date    WBC 1.97 (LL) 10/29/2024    HGB 12.4 10/29/2024    HCT 37.0 10/29/2024     (H) 10/29/2024     10/29/2024     BMP  Lab Results   Component Value Date     10/29/2024    K 4.6 10/29/2024     10/29/2024    CO2 26 10/29/2024    BUN 12 10/29/2024    CREATININE 0.8 10/29/2024    CALCIUM 9.3 10/29/2024    ANIONGAP 8 10/29/2024    ESTGFRAFRICA >60 05/12/2022    EGFRNONAA >60 05/12/2022     Lab Results   Component Value Date    ALT 20 10/29/2024    AST 17 10/29/2024    ALKPHOS 56 10/29/2024    BILITOT 0.8 10/29/2024       No results found for: "IRON", "TIBC", "FERRITIN", "SATURATEDIRO"  No results found for: "HTQFBYIP70"  No results found for: "FOLATE"  Lab Results   Component Value Date    TSH 0.921 10/01/2024         Review of Systems   Constitutional:  Negative for activity change, appetite change, chills, diaphoresis, fatigue, fever and unexpected weight change.   HENT:  Negative for congestion, dental problem, drooling, ear discharge, ear pain, facial swelling, hearing loss, mouth sores, nosebleeds, postnasal drip, rhinorrhea, sinus pressure, sneezing, sore throat, tinnitus, trouble swallowing and voice change.    Eyes:  Negative for photophobia, pain, discharge, redness, itching and visual disturbance.   Respiratory:  Negative for cough, choking, chest tightness, shortness of breath, wheezing and stridor.    Cardiovascular:  Negative for chest pain, palpitations and leg swelling.   Gastrointestinal:  Negative for abdominal distention, abdominal pain, anal bleeding, blood in stool, constipation, diarrhea, nausea, rectal pain and vomiting.   Endocrine: Negative for cold " intolerance, heat intolerance, polydipsia, polyphagia and polyuria.   Genitourinary:  Negative for decreased urine volume, difficulty urinating, dyspareunia, dysuria, enuresis, flank pain, frequency, genital sores, hematuria, menstrual problem, pelvic pain, urgency, vaginal bleeding, vaginal discharge and vaginal pain.   Musculoskeletal:  Negative for arthralgias, back pain, gait problem, joint swelling, myalgias, neck pain and neck stiffness.   Skin:  Negative for color change, pallor and rash.   Allergic/Immunologic: Negative for environmental allergies, food allergies and immunocompromised state.   Neurological:  Negative for dizziness, tremors, seizures, syncope, facial asymmetry, speech difficulty, weakness, light-headedness, numbness and headaches.   Hematological:  Negative for adenopathy. Does not bruise/bleed easily.   Psychiatric/Behavioral:  Negative for agitation, behavioral problems, confusion, decreased concentration, dysphoric mood, hallucinations, self-injury, sleep disturbance and suicidal ideas. The patient is not nervous/anxious and is not hyperactive.        Objective:      Physical Exam  Constitutional:       Appearance: Normal appearance.   Neurological:      Mental Status: She is alert.             Assessment:      1. Malignant neoplasm of central portion of right breast in female, estrogen receptor positive    2. Immunodeficiency due to chemotherapy    3. Immunodeficiency secondary to radiation therapy    4. Pancytopenia due to antineoplastic chemotherapy    5. Secondary malignant neoplasm of bone           Med Onc Chart Routing      Follow up with physician . Return in January 20, 2025 with CBC CMP for next dosing of Zometa   Follow up with [facility]    Infusion scheduling note    Injection scheduling note Orders signed for 1126 Faslodex RTC in 1 month with Faslodex in December 2024   Labs    Imaging    Pharmacy appointment    Other referrals                   Plan:     The patient location is:   Home  The chief complaint leading to consultation is:  Breast cancer    Visit type: audiovisual    Face to Face time with patient: 25 minutes of total time spent on the encounter, which includes face to face time and non-face to face time preparing to see the patient (eg, review of tests), Obtaining and/or reviewing separately obtained history, Documenting clinical information in the electronic or other health record, Independently interpreting results (not separately reported) and communicating results to the patient/family/caregiver, or Care coordination (not separately reported).         Each patient to whom he or she provides medical services by telemedicine is:  (1) informed of the relationship between the physician and patient and the respective role of any other health care provider with respect to management of the patient; and (2) notified that he or she may decline to receive medical services by telemedicine and may withdraw from such care at any time.    Notes:   Reviewed PET scan stable findings no evidence of progression continue with current plan course of treatment return to clinic in [address] dosing of Faslodex ask that we start Ibrance same time.  Return in December of 2020 24 with CBC CMP prior to Faslodex dosing.  And again in January with Zometa dosing      Harpal Khan Jr, MD FACP

## 2024-11-19 NOTE — PROGRESS NOTES
Subjective:       Patient ID: Carole Moore is a 59 y.o. female.    Chief Complaint: Breast Cancer and Chemotherapy    Primary Oncologist/Hematologist: Dr. Khan    HPI: Ms. Moore is a pleasant 59 year old female who is following up for her metastatic breast cancer. She is on fulvestrant (faslodex) q 4 weeks, last one 10/29/24 + ibrance on days 1-21 on a 35 day cycle. She is also getting zometa q 3 months, last on 10/8/24. Recent PET 11/4/24 showing Interval resolution of the previously seen nodule in the left lung base.   Cancer Hx: ddACT, R mastectomy, adjuvant radiation completed 2017  Arimidex  30Gy/10fx to T7-11 completed 10/31/22  Pmhx: follows with neurosurg, Dr. Pelayo for persistent back pain, possible kyphoplasty in the future. colonoscopy on 12/27/23, recommended repeat in 5 years.     Today: She continues to take calcium and vitamin D. She states her appetite is good and she has been staying hydrated.  Ibrance has been held at times due to neutropenia. She denies any fevers, illnesses. She is on her off week, will start today.      Social History     Socioeconomic History    Marital status:    Tobacco Use    Smoking status: Never    Smokeless tobacco: Never    Tobacco comments:     I was a very light smoker for 5 years   Substance and Sexual Activity    Alcohol use: No    Drug use: No    Sexual activity: Not Currently     Partners: Male     Birth control/protection: Surgical     Comment: hysterectomy   Other Topics Concern    Are you pregnant or think you may be? No    Breast-feeding No     Social Drivers of Health     Financial Resource Strain: Low Risk  (4/11/2024)    Overall Financial Resource Strain (CARDIA)     Difficulty of Paying Living Expenses: Not hard at all   Food Insecurity: No Food Insecurity (4/11/2024)    Hunger Vital Sign     Worried About Running Out of Food in the Last Year: Never true     Ran Out of Food in the Last Year: Never true   Transportation Needs: No  Transportation Needs (4/11/2024)    PRAPARE - Transportation     Lack of Transportation (Medical): No     Lack of Transportation (Non-Medical): No   Physical Activity: Inactive (4/11/2024)    Exercise Vital Sign     Days of Exercise per Week: 0 days     Minutes of Exercise per Session: 0 min   Stress: Stress Concern Present (4/11/2024)    [employer] - Occupational Stress Questionnaire     Feeling of Stress : To some extent   Housing Stability: Low Risk  (4/11/2024)    Housing Stability Vital Sign     Unable to Pay for Housing in the Last Year: No     Number of Places Lived in the Last Year: 1     Unstable Housing in the Last Year: No       Past Medical History:   Diagnosis Date    Antineoplastic chemotherapy induced anemia     Back pain     Breast cancer 2016    right breast    Cancer     R Breast cancer    CVA (cerebral infarction)     Diverticulosis     Family history of colon cancer 10/30/2018    Her mother and father both had colon cancer.    Genetic testing of female 12/27/2016    MetaCure (28 genes) - NEGATIVE    Hyperlipidemia     Immunodeficiency due to chemotherapy 10/03/2022    Nausea after anesthesia     Paralysis     right side    PONV (postoperative nausea and vomiting)     Stroke 2001    R side weakness    Trouble in sleeping        Family History   Problem Relation Name Age of Onset    Breast cancer Paternal Aunt      Colon cancer Father      Colon cancer Mother Kathy Emil     Cancer Mother Kathy Emil         colon cancer    Melanoma Maternal Uncle      Skin cancer Maternal Uncle         Past Surgical History:   Procedure Laterality Date    AUGMENTATION OF BREAST Left 2017    BREAST SURGERY      COLONOSCOPY N/A 10/30/2018    Procedure: COLONOSCOPY;  Surgeon: Cosme Monson MD;  Location: [facility];  Service: Endoscopy;  Laterality: N/A;    COLONOSCOPY N/A 12/27/2023    Procedure: COLONOSCOPY;  Surgeon: Caro Leo MD;  Location: [facility] ENDO;  Service:  Endoscopy;  Laterality: N/A;    COSMETIC SURGERY  December 2017    Breast Reconstruction after Mastectomy    CYSTOSCOPY WITH BIOPSY OF BLADDER N/A 11/29/2021    Procedure: CYSTOSCOPY, WITH BLADDER BIOPSY, WITH FULGURATION IF INDICATED;  Surgeon: Page Marcelo MD;  Location: [facility] OR;  Service: Urology;  Laterality: N/A;    FIXATION KYPHOPLASTY Bilateral 02/09/2023    Procedure: KYPHOPLASTY;  Surgeon: Josse Pelayo MD;  Location: [facility] OR;  Service: Neurosurgery;  Laterality: Bilateral;  Kyphoplasty T10 with ablation    HYSTERECTOMY  2007    maintains both ovaries, menorrhagia    MASTECTOMY Right 2017    MEDIPORT INSERTION, SINGLE      RADIOFREQUENCY ABLATION, BONE, PERCUTANEOUS Bilateral 02/09/2023    Procedure: RADIOFREQUENCY ABLATION,BONE,PERCUTANEOUS;  Surgeon: Josse Pelayo MD;  Location: [facility] OR;  Service: Neurosurgery;  Laterality: Bilateral;    TONSILLECTOMY         Review of Systems   Constitutional:  Negative for activity change, appetite change, chills, diaphoresis, fatigue, fever and unexpected weight change.   HENT:  Negative for congestion, nosebleeds and rhinorrhea.    Respiratory:  Negative for cough and shortness of breath.    Cardiovascular:  Negative for chest pain and leg swelling.   Gastrointestinal:  Negative for abdominal pain, anal bleeding, blood in stool, constipation, diarrhea, nausea and vomiting.   Genitourinary:  Negative for hematuria.   Musculoskeletal:  Positive for arthralgias and back pain.   Skin:  Negative for color change and pallor.   Allergic/Immunologic: Positive for immunocompromised state.   Neurological:  Negative for dizziness, weakness, light-headedness and headaches.         Medication List with Changes/Refills   Current Medications    ASCORBIC ACID, VITAMIN C, ORAL    Take by mouth once daily.    ASPIRIN (ECOTRIN) 81 MG EC TABLET    Take 81 mg by mouth once daily.    ATORVASTATIN (LIPITOR) 10 MG TABLET    Take 1 tablet (10 mg total) by mouth once daily.     CALCIUM CARBONATE ORAL    Take 1 tablet by mouth once daily.    CETIRIZINE (ZYRTEC) 10 MG TABLET    Take 10 mg by mouth daily as needed.    CHOLECALCIFEROL, VITAMIN D3, ORAL    Take by mouth once daily.    FULVESTRANT (FASLODEX) 250 MG/5 ML INJECTION    Inject 250 mg into the muscle every 30 days.    GADAVIST 10 MMOL/10 ML (1 MMOL/ML) SOLN INJECTION    every 6 (six) months. Once yearly    MECLIZINE (ANTIVERT) 25 MG TABLET    Take 25 mg by mouth 3 (three) times daily as needed.    METHOCARBAMOL (ROBAXIN) 750 MG TAB    TAKE 1 TABLET BY MOUTH THREE TIMES DAILY    MULTIVITAMIN CAPSULE    Take 1 capsule by mouth once daily.    ONDANSETRON (ZOFRAN) 4 MG TABLET    Take 1 tablet (4 mg total) by mouth every 8 (eight) hours as needed for Nausea.    OXYCODONE-ACETAMINOPHEN (PERCOCET)  MG PER TABLET    Take 1 tablet by mouth every 6 (six) hours as needed for Pain.    PALBOCICLIB (IBRANCE) 125 MG CAP    Take 1 capsule (125 mg) by mouth once daily on days 1-21 of a 35-day cycle.    POLYETHYLENE GLYCOL (GLYCOLAX) 17 GRAM/DOSE POWDER    Take 17 g by mouth once daily.    PREVNAR 20, PF, 0.5 ML SYRG INJECTION        PROCHLORPERAZINE (COMPAZINE) 5 MG TABLET    TAKE 1 TABLET BY MOUTH 4 TIMES DAILY AS NEEDED FOR NAUSEA    ZOLEDRONIC 4 MG/100 ML PGBK INFUSION         Objective:     Vitals:    11/26/24 0900   BP: 105/72   Pulse: 84   Temp: 97.7 °F (36.5 °C)     Physical Exam  Vitals reviewed.   Constitutional:       General: She is not in acute distress.     Appearance: She is not ill-appearing, toxic-appearing or diaphoretic.   HENT:      Head: Normocephalic and atraumatic.   Cardiovascular:      Rate and Rhythm: Normal rate.   Musculoskeletal:      Right lower leg: No edema.      Left lower leg: No edema.   Skin:     General: Skin is warm.      Coloration: Skin is not jaundiced or pale.      Findings: No bruising, erythema, lesion or rash.   Neurological:      Mental Status: She is alert.      Motor: No weakness.      Gait: Gait  normal.   Psychiatric:         Mood and Affect: Mood normal.         Behavior: Behavior normal.         Thought Content: Thought content normal.          Labs/Results:  Lab Results   Component Value Date    WBC 4.95 11/26/2024    RBC 4.07 11/26/2024    HGB 13.9 11/26/2024    HCT 41.5 11/26/2024     (H) 11/26/2024    MCH 34.2 (H) 11/26/2024    MCHC 33.5 11/26/2024    RDW 12.9 11/26/2024     11/26/2024    MPV 9.5 11/26/2024    GRAN 2.9 11/26/2024    GRAN 59.2 11/26/2024    LYMPH 1.1 11/26/2024    LYMPH 22.4 11/26/2024    MONO 0.6 11/26/2024    MONO 12.1 11/26/2024    EOS 0.3 11/26/2024    BASO 0.04 11/26/2024    EOSINOPHIL 5.3 11/26/2024    BASOPHIL 0.8 11/26/2024   CMP  Sodium   Date Value Ref Range Status   11/26/2024 141 136 - 145 mmol/L Final     Potassium   Date Value Ref Range Status   11/26/2024 4.4 3.5 - 5.1 mmol/L Final     Chloride   Date Value Ref Range Status   11/26/2024 106 95 - 110 mmol/L Final     CO2   Date Value Ref Range Status   11/26/2024 25 23 - 29 mmol/L Final     Glucose   Date Value Ref Range Status   11/26/2024 91 70 - 110 mg/dL Final     BUN   Date Value Ref Range Status   11/26/2024 16 6 - 20 mg/dL Final     Creatinine   Date Value Ref Range Status   11/26/2024 0.8 0.5 - 1.4 mg/dL Final     Calcium   Date Value Ref Range Status   11/26/2024 9.7 8.7 - 10.5 mg/dL Final     Total Protein   Date Value Ref Range Status   11/26/2024 7.1 6.0 - 8.4 g/dL Final     Albumin   Date Value Ref Range Status   11/26/2024 3.7 3.5 - 5.2 g/dL Final     Total Bilirubin   Date Value Ref Range Status   11/26/2024 0.9 0.1 - 1.0 mg/dL Final     Comment:     For infants and newborns, interpretation of results should be based  on gestational age, weight and in agreement with clinical  observations.    Premature Infant recommended reference ranges:  Up to 24 hours.............<8.0 mg/dL  Up to 48 hours............<12.0 mg/dL  3-5 days..................<15.0 mg/dL  6-29 days.................<15.0 mg/dL        Alkaline Phosphatase   Date Value Ref Range Status   11/26/2024 52 40 - 150 U/L Final     AST   Date Value Ref Range Status   11/26/2024 22 10 - 40 U/L Final     ALT   Date Value Ref Range Status   11/26/2024 23 10 - 44 U/L Final     Anion Gap   Date Value Ref Range Status   11/26/2024 10 8 - 16 mmol/L Final     eGFR   Date Value Ref Range Status   11/26/2024 >60 >60 mL/min/1.73 m^2 Final       Pet scan 11/4/24  Impression:  1. Interval resolution of the previously seen nodule in the left lung base.  2. Otherwise grossly unchanged evaluation with redemonstration of multiple known and non avid FDG osseous metastasis    Assessment:     Problem List Items Addressed This Visit       Malignant neoplasm of central portion of right breast in female, estrogen receptor positive - Primary    Osteopenia of multiple sites     Plan:     Malignant neoplasm of central portion of right breast in female, estrogen receptor positive, Use of aromatase inhibitors, Secondary malignant neoplasm of bone  --continue with zometa q 3 months. Last given 10/8/24  --continue with fulvestrant (faslodex) q 4 weeks, last one 10/29/24  --continue with ibrance on days 1-21 on a 35 day cycle  --Recent PET scan 11/4/24: Interval resolution of the previously seen nodule in the left lung base.Otherwise grossly unchanged evaluation with redemonstration of multiple known and non avid FDG osseous metastasis.  --fever precautions given  --continue with calcium and vitamin D supplement  --last dexa scan 4/13/22, normal. Will repeat dexa in 4/2024  --continue to follow with breast surg for imaging and breat exams  --last mammogram 11/29/23 negative    -- colonoscopy on 12/27/23: diverticulosis, polyp, recommended to repeat in 5 years  --continue with Dr. Pelayo for back pain    Follow-Up:  faslodex monthly. 1/20/24 with cbc cmp prior for faslodex and zometa with primary oncologist-standing orders in    Vicky Jimenez PA-C  Hematology Oncology    Route  Chart for Scheduling    Med Onc Chart Routing      Follow up with physician . 1/20/24 with cbc cmp prior   Follow up with [facility]    Infusion scheduling note   faslodex monthly. 1/20/24 for faslodex and zometa   Injection scheduling note    Labs CBC and CMP   Scheduling:  Preferred lab:  Lab interval:  standing orders in   Imaging    Pharmacy appointment    Other referrals                  Treatment Plan Information   OP PALBOCICLIB FULVESTRANT Q4W Harpal Khan MD   Associated diagnosis: Malignant neoplasm of central portion of right breast in female, estrogen receptor positive Stage IV T3, N0, M1 noted on 11/7/2016   Line of treatment: First Line  Treatment Goal: Control     Upcoming Treatment Dates - OP PALBOCICLIB FULVESTRANT Q4W    12/24/2024       Chemotherapy       fulvestrant (FASLODEX) injection 500 mg  1/21/2025       Chemotherapy       fulvestrant (FASLODEX) injection 500 mg  2/18/2025       Chemotherapy       fulvestrant (FASLODEX) injection 500 mg  3/18/2025       Chemotherapy       fulvestrant (FASLODEX) injection 500 mg    Supportive Plan Information  OP ZOLEDRONIC ACID (ZOMETA) Q4W Harpal Khan MD   Associated Diagnosis: Malignant neoplasm of central portion of right breast in female, estrogen receptor positive Stage IV T3, N0, M1 noted on 11/7/2016  Associated Diagnosis: Secondary malignant neoplasm of bone   noted on 1/12/2023   Line of treatment: Supportive Care   Treatment goal: Supportive     Upcoming Treatment Dates - OP ZOLEDRONIC ACID (ZOMETA) Q4W    1/4/2025       Medications       zoledronic acid (ZOMETA) 4 mg in 0.9% NaCl 100 mL IVPB  4/4/2025       Medications       zoledronic acid (ZOMETA) 4 mg in 0.9% NaCl 100 mL IVPB  7/3/2025       Medications       zoledronic acid (ZOMETA) 4 mg in 0.9% NaCl 100 mL IVPB  7/31/2025       Medications       zoledronic acid (ZOMETA) 4 mg in 0.9% NaCl 100 mL IVPB

## 2024-11-26 ENCOUNTER — INFUSION (OUTPATIENT)
Dept: INFUSION THERAPY | Facility: HOSPITAL | Age: 59
End: 2024-11-26
Attending: INTERNAL MEDICINE
Payer: MEDICARE

## 2024-11-26 ENCOUNTER — OFFICE VISIT (OUTPATIENT)
Dept: HEMATOLOGY/ONCOLOGY | Facility: CLINIC | Age: 59
End: 2024-11-26
Payer: MEDICARE

## 2024-11-26 ENCOUNTER — LAB VISIT (OUTPATIENT)
Dept: LAB | Facility: HOSPITAL | Age: 59
End: 2024-11-26
Attending: INTERNAL MEDICINE
Payer: MEDICARE

## 2024-11-26 VITALS
TEMPERATURE: 98 F | HEART RATE: 81 BPM | SYSTOLIC BLOOD PRESSURE: 105 MMHG | WEIGHT: 182.13 LBS | BODY MASS INDEX: 28.58 KG/M2 | HEIGHT: 67 IN | DIASTOLIC BLOOD PRESSURE: 71 MMHG | HEIGHT: 67 IN | BODY MASS INDEX: 28.58 KG/M2 | OXYGEN SATURATION: 97 % | SYSTOLIC BLOOD PRESSURE: 111 MMHG | HEART RATE: 84 BPM | TEMPERATURE: 98 F | RESPIRATION RATE: 16 BRPM | OXYGEN SATURATION: 97 % | WEIGHT: 182.13 LBS | DIASTOLIC BLOOD PRESSURE: 72 MMHG

## 2024-11-26 DIAGNOSIS — C50.111 MALIGNANT NEOPLASM OF CENTRAL PORTION OF RIGHT BREAST IN FEMALE, ESTROGEN RECEPTOR POSITIVE: Primary | ICD-10-CM

## 2024-11-26 DIAGNOSIS — M85.89 OSTEOPENIA OF MULTIPLE SITES: ICD-10-CM

## 2024-11-26 DIAGNOSIS — Z17.0 MALIGNANT NEOPLASM OF CENTRAL PORTION OF RIGHT BREAST IN FEMALE, ESTROGEN RECEPTOR POSITIVE: ICD-10-CM

## 2024-11-26 DIAGNOSIS — Z17.0 MALIGNANT NEOPLASM OF CENTRAL PORTION OF RIGHT BREAST IN FEMALE, ESTROGEN RECEPTOR POSITIVE: Primary | ICD-10-CM

## 2024-11-26 DIAGNOSIS — C50.111 MALIGNANT NEOPLASM OF CENTRAL PORTION OF RIGHT BREAST IN FEMALE, ESTROGEN RECEPTOR POSITIVE: ICD-10-CM

## 2024-11-26 LAB
ALBUMIN SERPL BCP-MCNC: 3.7 G/DL (ref 3.5–5.2)
ALP SERPL-CCNC: 52 U/L (ref 40–150)
ALT SERPL W/O P-5'-P-CCNC: 23 U/L (ref 10–44)
ANION GAP SERPL CALC-SCNC: 10 MMOL/L (ref 8–16)
AST SERPL-CCNC: 22 U/L (ref 10–40)
BASOPHILS # BLD AUTO: 0.04 K/UL (ref 0–0.2)
BASOPHILS NFR BLD: 0.8 % (ref 0–1.9)
BILIRUB SERPL-MCNC: 0.9 MG/DL (ref 0.1–1)
BUN SERPL-MCNC: 16 MG/DL (ref 6–20)
CALCIUM SERPL-MCNC: 9.7 MG/DL (ref 8.7–10.5)
CHLORIDE SERPL-SCNC: 106 MMOL/L (ref 95–110)
CO2 SERPL-SCNC: 25 MMOL/L (ref 23–29)
CREAT SERPL-MCNC: 0.8 MG/DL (ref 0.5–1.4)
DIFFERENTIAL METHOD BLD: ABNORMAL
EOSINOPHIL # BLD AUTO: 0.3 K/UL (ref 0–0.5)
EOSINOPHIL NFR BLD: 5.3 % (ref 0–8)
ERYTHROCYTE [DISTWIDTH] IN BLOOD BY AUTOMATED COUNT: 12.9 % (ref 11.5–14.5)
EST. GFR  (NO RACE VARIABLE): >60 ML/MIN/1.73 M^2
GLUCOSE SERPL-MCNC: 91 MG/DL (ref 70–110)
HCT VFR BLD AUTO: 41.5 % (ref 37–48.5)
HGB BLD-MCNC: 13.9 G/DL (ref 12–16)
IMM GRANULOCYTES # BLD AUTO: 0.01 K/UL (ref 0–0.04)
IMM GRANULOCYTES NFR BLD AUTO: 0.2 % (ref 0–0.5)
LYMPHOCYTES # BLD AUTO: 1.1 K/UL (ref 1–4.8)
LYMPHOCYTES NFR BLD: 22.4 % (ref 18–48)
MCH RBC QN AUTO: 34.2 PG (ref 27–31)
MCHC RBC AUTO-ENTMCNC: 33.5 G/DL (ref 32–36)
MCV RBC AUTO: 102 FL (ref 82–98)
MONOCYTES # BLD AUTO: 0.6 K/UL (ref 0.3–1)
MONOCYTES NFR BLD: 12.1 % (ref 4–15)
NEUTROPHILS # BLD AUTO: 2.9 K/UL (ref 1.8–7.7)
NEUTROPHILS NFR BLD: 59.2 % (ref 38–73)
NRBC BLD-RTO: 0 /100 WBC
PLATELET # BLD AUTO: 268 K/UL (ref 150–450)
PMV BLD AUTO: 9.5 FL (ref 9.2–12.9)
POTASSIUM SERPL-SCNC: 4.4 MMOL/L (ref 3.5–5.1)
PROT SERPL-MCNC: 7.1 G/DL (ref 6–8.4)
RBC # BLD AUTO: 4.07 M/UL (ref 4–5.4)
SODIUM SERPL-SCNC: 141 MMOL/L (ref 136–145)
WBC # BLD AUTO: 4.95 K/UL (ref 3.9–12.7)

## 2024-11-26 PROCEDURE — 63600175 PHARM REV CODE 636 W HCPCS: Mod: JZ,JG,HCNC | Performed by: INTERNAL MEDICINE

## 2024-11-26 PROCEDURE — 36415 COLL VENOUS BLD VENIPUNCTURE: CPT | Mod: HCNC | Performed by: INTERNAL MEDICINE

## 2024-11-26 PROCEDURE — 96402 CHEMO HORMON ANTINEOPL SQ/IM: CPT | Mod: HCNC

## 2024-11-26 PROCEDURE — 99999 PR PBB SHADOW E&M-EST. PATIENT-LVL IV: CPT | Mod: PBBFAC,HCNC,,

## 2024-11-26 PROCEDURE — 85025 COMPLETE CBC W/AUTO DIFF WBC: CPT | Mod: HCNC | Performed by: INTERNAL MEDICINE

## 2024-11-26 PROCEDURE — 80053 COMPREHEN METABOLIC PANEL: CPT | Mod: HCNC | Performed by: INTERNAL MEDICINE

## 2024-11-26 RX ORDER — LAMOTRIGINE 25 MG/1
500 TABLET ORAL
Status: COMPLETED | OUTPATIENT
Start: 2024-11-26 | End: 2024-11-26

## 2024-11-26 RX ORDER — LAMOTRIGINE 25 MG/1
500 TABLET ORAL
OUTPATIENT
Start: 2024-12-24

## 2024-11-26 RX ADMIN — FULVESTRANT 500 MG: 50 INJECTION, SOLUTION INTRAMUSCULAR at 09:11

## 2024-11-26 NOTE — NURSING
Injection given without difficulties to bilateral buttocks.Bandaid applied. Patient instructed to stay in the clinic for 15 minutes. Patient verbalized understanding and will notify nurse with any complaints.     Okay per Vicky Jimenez NP for pt to not have labs and provider visit with next injection.

## 2024-11-26 NOTE — PLAN OF CARE
Problem: Adult Inpatient Plan of Care  Goal: Plan of Care Review  Description: Pt here for zometa.  No recent/upcoming invasive dental work.  Taking ca.vit d.  Pt wearing mas (+ covid 7days ago). Cleared by Dr. Khan.  Outcome: Progressing  Goal: Patient-Specific Goal (Individualized)  Description: Pt will tolerate well.   Outcome: Progressing  Goal: Optimal Comfort and Wellbeing  Outcome: Progressing     Problem: Fall Injury Risk  Goal: Absence of Fall and Fall-Related Injury  Outcome: Progressing     Problem: Chemotherapy Effects  Goal: Anemia Symptom Improvement  Outcome: Progressing  Goal: Safety Maintained  Outcome: Progressing  Goal: Absence of Hematuria  Outcome: Progressing  Goal: Nausea and Vomiting Relief  Outcome: Progressing  Goal: Neurotoxicity Symptom Control  Outcome: Progressing  Goal: Absence of Infection  Outcome: Progressing  Goal: Absence of Bleeding  Outcome: Progressing

## 2024-11-29 DIAGNOSIS — Z17.0 MALIGNANT NEOPLASM OF CENTRAL PORTION OF RIGHT BREAST IN FEMALE, ESTROGEN RECEPTOR POSITIVE: ICD-10-CM

## 2024-11-29 DIAGNOSIS — C50.111 MALIGNANT NEOPLASM OF CENTRAL PORTION OF RIGHT BREAST IN FEMALE, ESTROGEN RECEPTOR POSITIVE: ICD-10-CM

## 2024-11-29 RX ORDER — PROCHLORPERAZINE MALEATE 5 MG
TABLET ORAL
Qty: 20 TABLET | Refills: 0 | Status: SHIPPED | OUTPATIENT
Start: 2024-11-29

## 2024-12-06 ENCOUNTER — HOSPITAL ENCOUNTER (OUTPATIENT)
Dept: RADIOLOGY | Facility: HOSPITAL | Age: 59
Discharge: HOME | End: 2024-12-06
Attending: NURSE PRACTITIONER
Payer: MEDICARE

## 2024-12-06 DIAGNOSIS — Z85.3 ENCOUNTER FOR FOLLOW-UP SURVEILLANCE OF BREAST CANCER: ICD-10-CM

## 2024-12-06 DIAGNOSIS — Z08 ENCOUNTER FOR FOLLOW-UP SURVEILLANCE OF BREAST CANCER: ICD-10-CM

## 2024-12-06 DIAGNOSIS — Z17.0 MALIGNANT NEOPLASM OF CENTRAL PORTION OF RIGHT BREAST IN FEMALE, ESTROGEN RECEPTOR POSITIVE: ICD-10-CM

## 2024-12-06 DIAGNOSIS — C50.111 MALIGNANT NEOPLASM OF CENTRAL PORTION OF RIGHT BREAST IN FEMALE, ESTROGEN RECEPTOR POSITIVE: ICD-10-CM

## 2024-12-06 DIAGNOSIS — Z12.31 ENCOUNTER FOR SCREENING MAMMOGRAM FOR MALIGNANT NEOPLASM OF BREAST: ICD-10-CM

## 2024-12-06 PROCEDURE — 77067 SCR MAMMO BI INCL CAD: CPT | Mod: TC,52,HCNC,PO

## 2024-12-06 PROCEDURE — 77063 BREAST TOMOSYNTHESIS BI: CPT | Mod: 26,52,HCNC, | Performed by: RADIOLOGY

## 2024-12-06 PROCEDURE — 77067 SCR MAMMO BI INCL CAD: CPT | Mod: 26,52,HCNC, | Performed by: RADIOLOGY

## 2024-12-24 ENCOUNTER — INFUSION (OUTPATIENT)
Dept: INFUSION THERAPY | Facility: HOSPITAL | Age: 59
End: 2024-12-24
Attending: INTERNAL MEDICINE
Payer: MEDICARE

## 2024-12-24 VITALS
SYSTOLIC BLOOD PRESSURE: 128 MMHG | HEART RATE: 89 BPM | DIASTOLIC BLOOD PRESSURE: 78 MMHG | TEMPERATURE: 97 F | OXYGEN SATURATION: 97 % | RESPIRATION RATE: 18 BRPM | HEIGHT: 67 IN | BODY MASS INDEX: 28.68 KG/M2 | WEIGHT: 182.75 LBS

## 2024-12-24 DIAGNOSIS — C50.111 MALIGNANT NEOPLASM OF CENTRAL PORTION OF RIGHT BREAST IN FEMALE, ESTROGEN RECEPTOR POSITIVE: Primary | ICD-10-CM

## 2024-12-24 DIAGNOSIS — Z17.0 MALIGNANT NEOPLASM OF CENTRAL PORTION OF RIGHT BREAST IN FEMALE, ESTROGEN RECEPTOR POSITIVE: Primary | ICD-10-CM

## 2024-12-24 PROCEDURE — 96402 CHEMO HORMON ANTINEOPL SQ/IM: CPT | Mod: HCNC

## 2024-12-24 PROCEDURE — 63600175 PHARM REV CODE 636 W HCPCS: Mod: JZ,JG,HCNC

## 2024-12-24 RX ORDER — LAMOTRIGINE 25 MG/1
500 TABLET ORAL
Status: COMPLETED | OUTPATIENT
Start: 2024-12-24 | End: 2024-12-24

## 2024-12-24 RX ADMIN — FULVESTRANT 500 MG: 50 INJECTION, SOLUTION INTRAMUSCULAR at 09:12

## 2024-12-24 NOTE — NURSING
Administered Faslodex 500  mg IM in divided doses of 250 mg each via biateral gluteals per MD order.  Patient tolerated medication without complication.  Discharged with future appointments.

## 2024-12-27 ENCOUNTER — OFFICE VISIT (OUTPATIENT)
Dept: URGENT CARE | Facility: CLINIC | Age: 59
End: 2024-12-27
Payer: MEDICARE

## 2024-12-27 VITALS
HEIGHT: 67 IN | DIASTOLIC BLOOD PRESSURE: 66 MMHG | TEMPERATURE: 98 F | OXYGEN SATURATION: 96 % | WEIGHT: 181.75 LBS | SYSTOLIC BLOOD PRESSURE: 105 MMHG | HEART RATE: 93 BPM | BODY MASS INDEX: 28.53 KG/M2 | RESPIRATION RATE: 16 BRPM

## 2024-12-27 DIAGNOSIS — R05.8 COUGH PRODUCTIVE OF YELLOW SPUTUM: ICD-10-CM

## 2024-12-27 DIAGNOSIS — R51.9 SINUS HEADACHE: ICD-10-CM

## 2024-12-27 DIAGNOSIS — R09.81 SINUS CONGESTION: Primary | ICD-10-CM

## 2024-12-27 DIAGNOSIS — J34.89 SINUS PRESSURE: ICD-10-CM

## 2024-12-27 LAB
CTP QC/QA: YES
SARS-COV-2 AG RESP QL IA.RAPID: NEGATIVE

## 2024-12-27 NOTE — PATIENT INSTRUCTIONS
VIRAL URI: OVER THE COUNTER RECOMMENDATIONS/SUGGESTIONS--if needed        COUGH:      Make sure you are getting rest and drinking lots of fluids.    You can use cough drops (recommend ricola lemon mint honey) or Cepacol to soothe your sore throat.     You can also take Elderberry and/or Emergen-C (vitamin C) to help boost your immune system.    RX POLYTUSSIN AS DIRECTED To help with runny nose/sneezing/sore throat/cough.       Honey is a natural cough suppressant that can be used.    If your symptoms do not improve, you should return to this clinic. If your symptoms worsen, go to the emergency room.         CONGESTION:  Make sure to stay well hydrated.    Use Nasal Saline to mechanically move any post nasal drip from your eustachian tube or from the back of your throat.        PAIN/DISCOMFORT:  Tylenol up to 4,000 mg a day is safe for short periods and can be used for headache, body aches, pain, and fever. However in high doses and prolonged use it can cause liver irritation.    Ibuprofen is a non-steroidal anti-inflammatory that can be used for headache, body aches, pain, and fever. However it can also cause stomach irritation if over used.    If you have been discharged from the clinic prior to your point of care test results being completed, please make sure to check your [employer] account.  If there is a change in treatment, we will communicate with you through here.  If your test is positive, and medications are ordered, these will be sent to your preferred pharmacy.   If your test is negative, no further steps needed. If you do not hear from us or have questions, please call the clinic.      - You must understand that you have received an Urgent Care treatment only and that you may be released before all of your medical problems are known or treated.   - You, the patient, will arrange for follow up care as instructed with your primary care provider or recommended specialist.   - If your condition worsens or  fails to improve we recommend that you receive another evaluation at the ER immediately or contact your PCP to discuss your concerns, or return here.   - Please do not drive or make any important decisions for 24 hours if you have received any pain medications, sedatives or mood altering drugs during your visit.    Disclaimer: This document was drafted with the use of a voice recognition device and is likely to have sound alike errors.

## 2024-12-27 NOTE — PROGRESS NOTES
"Subjective:      Patient ID: Carole Moore is a 59 y.o. female.    Vitals:  height is 5' 7" (1.702 m) and weight is 82.5 kg (181 lb 12.3 oz). Her tympanic temperature is 98.2 °F (36.8 °C). Her blood pressure is 105/66 and her pulse is 93. Her respiration is 16 and oxygen saturation is 96%.     Chief Complaint: Sinus Problem    Patient presents with congestion, headache, and left ear popping that began 2 weeks ago. She has been taking Ibrance which lowers her immune system. She has also been taking OTC cold medicine    Sinus Problem  This is a new problem. The current episode started 1 to 4 weeks ago. The problem is unchanged. There has been no fever. Her pain is at a severity of 7/10. Associated symptoms include congestion, ear pain, headaches and sinus pressure. Pertinent negatives include no chills, coughing, diaphoresis, hoarse voice, neck pain, shortness of breath, sneezing, sore throat or swollen glands. Past treatments include oral decongestants.       Constitution: Negative for chills and sweating.   HENT:  Positive for ear pain, congestion and sinus pressure. Negative for sore throat.    Neck: Negative for neck pain.   Cardiovascular: Negative.    Eyes: Negative.    Respiratory:  Negative for cough and shortness of breath.    Gastrointestinal: Negative.    Allergic/Immunologic: Negative for sneezing.   Neurological:  Positive for headaches.      Objective:     Vitals:    12/27/24 1004   BP: 105/66   BP Location: Left arm   Patient Position: Sitting   Pulse: 93   Resp: 16   Temp: 98.2 °F (36.8 °C)   TempSrc: Tympanic   SpO2: 96%   Weight: 82.5 kg (181 lb 12.3 oz)   Height: 5' 7" (1.702 m)       Physical Exam   Constitutional: She appears well-developed. She is cooperative. She does not appear ill.   HENT:   Head: Normocephalic and atraumatic.   Ears:   Right Ear: Hearing, tympanic membrane, external ear and ear canal normal.   Left Ear: Hearing, tympanic membrane, external ear and ear canal normal. "   Nose: Congestion present. No mucosal edema or nasal deformity. No epistaxis. Right sinus exhibits no maxillary sinus tenderness and no frontal sinus tenderness. Left sinus exhibits no maxillary sinus tenderness and no frontal sinus tenderness.   Mouth/Throat: Uvula is midline, oropharynx is clear and moist and mucous membranes are normal. Mucous membranes are moist. No trismus in the jaw. Normal dentition. No uvula swelling. No posterior oropharyngeal erythema. Oropharynx is clear.   Eyes: Conjunctivae and lids are normal.   Neck: Trachea normal and phonation normal. Neck supple.   Cardiovascular: Normal rate, regular rhythm, normal heart sounds and normal pulses.   Pulmonary/Chest: Effort normal and breath sounds normal. No stridor. No respiratory distress. She has no wheezes. She has no rhonchi. She has no rales. She exhibits no tenderness.   Abdominal: Normal appearance and bowel sounds are normal. Soft.   Neurological: She is alert.   Skin: Skin is warm, dry and intact.   Psychiatric: Her speech is normal and behavior is normal. Mood, judgment and thought content normal.   Nursing note and vitals reviewed.      Assessment:     1. Sinus congestion    2. Cough productive of yellow sputum    3. Sinus pressure    4. Sinus headache          Plan:       Sinus congestion  -     SARS Coronavirus 2 Antigen, POCT Manual Read    Cough productive of yellow sputum  -     pyrilamine-phenylephrine-DM 12.5-5-7.5 mg/5 mL Liqd; Take 10 mLs by mouth 2 (two) times a day. for 10 days  Dispense: 473 mL; Refill: 0    Sinus pressure    Sinus headache          Medical Decision Making:   Initial Assessment:   VITAL SIGNS STABLE  Clinical Tests:   Lab Tests: Ordered and Reviewed       <> Summary of Lab: COVID NEGATIVE  Urgent Care Management:  See entire note for further details    Discussed with pt all pertinent  information and results. Discussed pt dx and plan of tx.   Gave pt all f/u and return to the  instructions. All  questions and concerns were addressed at this time.   Pt expresses understanding of information and instructions, and is comfortable with plan to discharge.   Pt is stable for discharge.     I discussed with patient and/or family/caretaker that evaluation in the UC does not suggest any emergent or life threatening medical conditions requiring immediate intervention beyond what was provided in the UC, and I believe patient is safe for discharge.  Regardless, an unremarkable evaluation in the UC does not preclude the development or presence of a serious or life threatening condition. As such, patient was instructed to GO to ER immediately for any worsening or change in current symptoms.               Patient Instructions   VIRAL URI: OVER THE COUNTER RECOMMENDATIONS/SUGGESTIONS--if needed        COUGH:      Make sure you are getting rest and drinking lots of fluids.    You can use cough drops (recommend ricola lemon mint honey) or Cepacol to soothe your sore throat.     You can also take Elderberry and/or Emergen-C (vitamin C) to help boost your immune system.    RX POLYTUSSIN AS DIRECTED To help with runny nose/sneezing/sore throat/cough.       Honey is a natural cough suppressant that can be used.    If your symptoms do not improve, you should return to this clinic. If your symptoms worsen, go to the emergency room.         CONGESTION:  Make sure to stay well hydrated.    Use Nasal Saline to mechanically move any post nasal drip from your eustachian tube or from the back of your throat.        PAIN/DISCOMFORT:  Tylenol up to 4,000 mg a day is safe for short periods and can be used for headache, body aches, pain, and fever. However in high doses and prolonged use it can cause liver irritation.    Ibuprofen is a non-steroidal anti-inflammatory that can be used for headache, body aches, pain, and fever. However it can also cause stomach irritation if over used.    If you have been discharged from the clinic prior to  your point of care test results being completed, please make sure to check your [employer] account.  If there is a change in treatment, we will communicate with you through here.  If your test is positive, and medications are ordered, these will be sent to your preferred pharmacy.   If your test is negative, no further steps needed. If you do not hear from us or have questions, please call the clinic.      - You must understand that you have received an Urgent Care treatment only and that you may be released before all of your medical problems are known or treated.   - You, the patient, will arrange for follow up care as instructed with your primary care provider or recommended specialist.   - If your condition worsens or fails to improve we recommend that you receive another evaluation at the ER immediately or contact your PCP to discuss your concerns, or return here.   - Please do not drive or make any important decisions for 24 hours if you have received any pain medications, sedatives or mood altering drugs during your visit.    Disclaimer: This document was drafted with the use of a voice recognition device and is likely to have sound alike errors.

## 2024-12-31 DIAGNOSIS — Z17.0 MALIGNANT NEOPLASM OF CENTRAL PORTION OF RIGHT BREAST IN FEMALE, ESTROGEN RECEPTOR POSITIVE: ICD-10-CM

## 2024-12-31 DIAGNOSIS — C50.111 MALIGNANT NEOPLASM OF CENTRAL PORTION OF RIGHT BREAST IN FEMALE, ESTROGEN RECEPTOR POSITIVE: ICD-10-CM

## 2024-12-31 RX ORDER — PROCHLORPERAZINE MALEATE 5 MG
TABLET ORAL
Qty: 20 TABLET | Refills: 0 | Status: SHIPPED | OUTPATIENT
Start: 2024-12-31

## 2025-01-06 ENCOUNTER — OFFICE VISIT (OUTPATIENT)
Dept: DERMATOLOGY | Facility: CLINIC | Age: 60
End: 2025-01-06
Payer: MEDICARE

## 2025-01-06 ENCOUNTER — PATIENT MESSAGE (OUTPATIENT)
Dept: DERMATOLOGY | Facility: CLINIC | Age: 60
End: 2025-01-06

## 2025-01-06 DIAGNOSIS — L82.1 SEBORRHEIC KERATOSIS: ICD-10-CM

## 2025-01-06 DIAGNOSIS — Z12.83 SCREENING, MALIGNANT NEOPLASM, SKIN: ICD-10-CM

## 2025-01-06 DIAGNOSIS — L81.4 LENTIGINES: Primary | ICD-10-CM

## 2025-01-06 DIAGNOSIS — L91.8 ACROCHORDON: ICD-10-CM

## 2025-01-06 PROCEDURE — 99999 PR PBB SHADOW E&M-EST. PATIENT-LVL III: CPT | Mod: PBBFAC,,, | Performed by: DERMATOLOGY

## 2025-01-06 PROCEDURE — G2211 COMPLEX E/M VISIT ADD ON: HCPCS | Mod: S$PBB,,, | Performed by: DERMATOLOGY

## 2025-01-06 PROCEDURE — 99203 OFFICE O/P NEW LOW 30 MIN: CPT | Mod: S$PBB,,, | Performed by: DERMATOLOGY

## 2025-01-06 RX ORDER — MUPIROCIN 20 MG/G
OINTMENT TOPICAL
Qty: 30 G | Refills: 1 | Status: SHIPPED | OUTPATIENT
Start: 2025-01-06

## 2025-01-06 NOTE — PROGRESS NOTES
Subjective:       Patient ID:  Carole Moore is a 59 y.o. female who presents for   No chief complaint on file.    Hx of multiple nevi, fibrous papule of the left nasal sidewall, right breast CA (c/ metastatic breast CA, on ibrance, followed by Dr. Khan and GIULIANO Mendoza), SK's and angioma, last seen on 12/8/20.  She c/o lesion of the posterior neck and right cheek. Asymptomatic, + growing.    The patient denies personal or family history of skin cancer.          Review of Systems   Constitutional:  Negative for fever and chills.   Gastrointestinal:  Negative for nausea and vomiting.   Skin:  Negative for daily sunscreen use, activity-related sunscreen use and recent sunburn.   Hematologic/Lymphatic: Does not bruise/bleed easily.        Objective:    Physical Exam   Constitutional: She appears well-developed and well-nourished. No distress.   Neurological: She is alert and oriented to person, place, and time. She is not disoriented.   Psychiatric: She has a normal mood and affect.   Skin:   Areas Examined (abnormalities noted in diagram):   Scalp / Hair Palpated and Inspected  Head / Face Inspection Performed  Neck Inspection Performed  Chest / Axilla Inspection Performed  Abdomen Inspection Performed  Genitals / Buttocks / Groin Inspection Performed  Back Inspection Performed  RUE Inspected  LUE Inspection Performed  RLE Inspected  LLE Inspection Performed  Nails and Digits Inspection Performed                   Diagram Legend     Erythematous scaling macule/papule c/w actinic keratosis       Vascular papule c/w angioma      Pigmented verrucoid papule/plaque c/w seborrheic keratosis      Yellow umbilicated papule c/w sebaceous hyperplasia      Irregularly shaped tan macule c/w lentigo     1-2 mm smooth white papules consistent with Milia      Movable subcutaneous cyst with punctum c/w epidermal inclusion cyst      Subcutaneous movable cyst c/w pilar cyst      Firm pink to brown papule c/w dermatofibroma       Pedunculated fleshy papule(s) c/w skin tag(s)      Evenly pigmented macule c/w junctional nevus     Mildly variegated pigmented, slightly irregular-bordered macule c/w mildly atypical nevus      Flesh colored to evenly pigmented papule c/w intradermal nevus       Pink pearly papule/plaque c/w basal cell carcinoma      Erythematous hyperkeratotic cursted plaque c/w SCC      Surgical scar with no sign of skin cancer recurrence      Open and closed comedones      Inflammatory papules and pustules      Verrucoid papule consistent consistent with wart     Erythematous eczematous patches and plaques     Dystrophic onycholytic nail with subungual debris c/w onychomycosis     Umbilicated papule    Erythematous-base heme-crusted tan verrucoid plaque consistent with inflamed seborrheic keratosis     Erythematous Silvery Scaling Plaque c/w Psoriasis     See annotation      Assessment / Plan:        Lentigines  Screening, malignant neoplasm, skin  Reassurance given.  Discussed ABCDEF of melanoma and changes for patient to look for.  AAD Handout given. Discussed importance of daily use of sunscreen which is broad-spectrum and has a minimum SPF of 30.    Upper body skin examination performed today including at least 6 points as noted in physical examination. No lesions suspicious for malignancy noted.    Seborrheic keratosis  Reassurance given. Discussed diagnosis and that lesions are benign.      Acrochordon  Reassurance given.  Lesions are benign.             Follow up in about 1 year (around 1/6/2026).

## 2025-01-09 ENCOUNTER — OFFICE VISIT (OUTPATIENT)
Dept: INTERNAL MEDICINE | Facility: CLINIC | Age: 60
End: 2025-01-09
Payer: MEDICARE

## 2025-01-09 VITALS
BODY MASS INDEX: 28.42 KG/M2 | OXYGEN SATURATION: 99 % | TEMPERATURE: 99 F | SYSTOLIC BLOOD PRESSURE: 110 MMHG | DIASTOLIC BLOOD PRESSURE: 78 MMHG | HEART RATE: 99 BPM | WEIGHT: 181.44 LBS

## 2025-01-09 DIAGNOSIS — E78.2 MIXED HYPERLIPIDEMIA: ICD-10-CM

## 2025-01-09 DIAGNOSIS — I69.351 HEMIPARESIS AFFECTING RIGHT SIDE AS LATE EFFECT OF CEREBROVASCULAR ACCIDENT: ICD-10-CM

## 2025-01-09 DIAGNOSIS — C79.51 SECONDARY MALIGNANT NEOPLASM OF BONE: ICD-10-CM

## 2025-01-09 DIAGNOSIS — Z17.0 MALIGNANT NEOPLASM OF CENTRAL PORTION OF RIGHT BREAST IN FEMALE, ESTROGEN RECEPTOR POSITIVE: Primary | ICD-10-CM

## 2025-01-09 DIAGNOSIS — C50.111 MALIGNANT NEOPLASM OF CENTRAL PORTION OF RIGHT BREAST IN FEMALE, ESTROGEN RECEPTOR POSITIVE: Primary | ICD-10-CM

## 2025-01-09 PROCEDURE — 99999 PR PBB SHADOW E&M-EST. PATIENT-LVL IV: CPT | Mod: PBBFAC,,, | Performed by: INTERNAL MEDICINE

## 2025-01-09 RX ORDER — METHOCARBAMOL 750 MG/1
750 TABLET, FILM COATED ORAL 3 TIMES DAILY
Qty: 60 TABLET | Refills: 0 | Status: SHIPPED | OUTPATIENT
Start: 2025-01-09

## 2025-01-09 NOTE — PROGRESS NOTES
Subjective:       Patient ID: Carole Moore is a 59 y.o. female.    Chief Complaint: Annual Exam      HPI:  History of Present Illness    Patient presents today for follow-up regarding cancer treatment.    She is currently on Ibrance and Faslodex for cancer treatment. Her white blood cell counts have been periodically low, requiring temporary discontinuation of chemotherapy. She follows up with oncology monthly for ongoing management.    She takes methocarbamol once or twice daily, usually in the morning, for muscle relaxation due to back pain.    She received COVID-19 vaccine in September.    Mother lived to 91, great-grandfather to 96, and great-grandmother to 92.    Cholesterol was normal in July.         Review of Systems   Constitutional:  Negative for activity change and unexpected weight change.   HENT:  Negative for hearing loss, rhinorrhea and trouble swallowing.    Eyes:  Negative for discharge and visual disturbance.   Respiratory:  Negative for chest tightness and wheezing.    Cardiovascular:  Negative for chest pain and palpitations.   Gastrointestinal:  Negative for blood in stool, constipation, diarrhea and vomiting.   Endocrine: Negative for polydipsia and polyuria.   Genitourinary:  Negative for difficulty urinating, dysuria, hematuria and menstrual problem.   Musculoskeletal:  Negative for arthralgias, joint swelling and neck pain.   Neurological:  Negative for weakness and headaches.   Psychiatric/Behavioral:  Negative for confusion and dysphoric mood.        Objective:   /78   Pulse 99   Temp 98.5 °F (36.9 °C) (Tympanic)   Wt 82.3 kg (181 lb 7 oz)   LMP 10/11/2009   SpO2 99%   BMI 28.42 kg/m²      Physical Exam  Vitals reviewed.   Constitutional:       General: She is not in acute distress.     Appearance: Normal appearance. She is well-developed. She is not ill-appearing.   HENT:      Head: Normocephalic and atraumatic.      Right Ear: Tympanic membrane, ear canal and  external ear normal.      Left Ear: Tympanic membrane, ear canal and external ear normal.   Cardiovascular:      Rate and Rhythm: Normal rate and regular rhythm.      Heart sounds: No murmur heard.     No friction rub. No gallop.   Pulmonary:      Effort: Pulmonary effort is normal.      Breath sounds: Normal breath sounds. No wheezing or rales.   Chest:      Chest wall: No tenderness.   Abdominal:      General: Bowel sounds are normal. There is no distension.      Palpations: Abdomen is soft.      Tenderness: There is no abdominal tenderness.   Lymphadenopathy:      Cervical: No cervical adenopathy.   Skin:     Findings: No rash.   Neurological:      General: No focal deficit present.      Mental Status: She is alert and oriented to person, place, and time.      Cranial Nerves: No cranial nerve deficit.         Office Visit on 12/27/2024   Component Date Value    SARS Coronavirus 2 Antig* 12/27/2024 Negative      Acceptab* 12/27/2024 Yes        Assessment:       1. Malignant neoplasm of central portion of right breast in female, estrogen receptor positive    2. Secondary malignant neoplasm of bone    3. Hemiparesis affecting right side as late effect of cerebrovascular accident    4. Mixed hyperlipidemia        Plan:   No problem-specific Assessment & Plan notes found for this encounter.    Carole was seen today for annual exam.    Diagnoses and all orders for this visit:    Malignant neoplasm of central portion of right breast in female, estrogen receptor positive  Comments:  continue with meds and Oncology    Secondary malignant neoplasm of bone    Hemiparesis affecting right side as late effect of cerebrovascular accident    Mixed hyperlipidemia    Other orders  -     methocarbamoL (ROBAXIN) 750 MG Tab; Take 1 tablet (750 mg total) by mouth 3 (three) times daily.      Assessment & Plan    CANCER:  - Reviewed ongoing cancer treatment.  - Patient reports doing 'pretty good' with cancer treatment.  -  Continued Ibrance and Faslodex for cancer treatment (managed by oncologist).  - Noted the patient's history of low white blood cell counts due to chemotherapy.  - Instructed the patient to continue monthly follow-ups with oncologist Dr. Khan.  - Reviewed chemotherapy regimen, including Ibrance and Faslodex.  - Patient reports doing 'pretty good' with chemotherapy treatment.  - Instructed the patient to continue monthly follow-ups with oncologist Dr. Khan for monitoring.    BACK PAIN:  - Patient reports experiencing back pain, particularly upon waking.  - Patient states their back is 'doing okay' but requires medication refill.  - Refilled methocarbamol for back pain management.    HYPERLIPIDEMIA:  - Assessed cholesterol levels from July, which were reported as 'really good'.  - Evaluated need for continued statin therapy.  - Continued statin medication for cholesterol management.  - Scheduled to recheck cholesterol levels next summer.    HYPERTENSION:  - Evaluated blood pressure control as a key aspect of the patient's care.    COVID-19 VACCINATION:  - Noted the patient received a COVID vaccine in September.            Follow up in about 9 months (around 10/9/2025).    This note was generated with the assistance of ambient listening technology. Verbal consent was obtained by the patient and accompanying visitor(s) for the recording of patient appointment to facilitate this note

## 2025-01-23 ENCOUNTER — PATIENT MESSAGE (OUTPATIENT)
Dept: ADMINISTRATIVE | Facility: OTHER | Age: 60
End: 2025-01-23
Payer: MEDICARE

## 2025-01-24 ENCOUNTER — TELEPHONE (OUTPATIENT)
Dept: HEMATOLOGY/ONCOLOGY | Facility: CLINIC | Age: 60
End: 2025-01-24
Payer: MEDICARE

## 2025-01-24 ENCOUNTER — LAB VISIT (OUTPATIENT)
Dept: LAB | Facility: HOSPITAL | Age: 60
End: 2025-01-24
Attending: INTERNAL MEDICINE
Payer: MEDICARE

## 2025-01-24 DIAGNOSIS — Z17.0 MALIGNANT NEOPLASM OF CENTRAL PORTION OF RIGHT BREAST IN FEMALE, ESTROGEN RECEPTOR POSITIVE: ICD-10-CM

## 2025-01-24 DIAGNOSIS — C50.111 MALIGNANT NEOPLASM OF CENTRAL PORTION OF RIGHT BREAST IN FEMALE, ESTROGEN RECEPTOR POSITIVE: ICD-10-CM

## 2025-01-24 LAB
ALBUMIN SERPL BCP-MCNC: 3.8 G/DL (ref 3.5–5.2)
ALBUMIN SERPL BCP-MCNC: 3.8 G/DL (ref 3.5–5.2)
ALP SERPL-CCNC: 55 U/L (ref 40–150)
ALP SERPL-CCNC: 55 U/L (ref 40–150)
ALT SERPL W/O P-5'-P-CCNC: 25 U/L (ref 10–44)
ALT SERPL W/O P-5'-P-CCNC: 25 U/L (ref 10–44)
ANION GAP SERPL CALC-SCNC: 8 MMOL/L (ref 8–16)
ANION GAP SERPL CALC-SCNC: 8 MMOL/L (ref 8–16)
ANISOCYTOSIS BLD QL SMEAR: SLIGHT
ANISOCYTOSIS BLD QL SMEAR: SLIGHT
AST SERPL-CCNC: 21 U/L (ref 10–40)
AST SERPL-CCNC: 21 U/L (ref 10–40)
BASOPHILS NFR BLD: 6 % (ref 0–1.9)
BASOPHILS NFR BLD: 6 % (ref 0–1.9)
BILIRUB SERPL-MCNC: 0.6 MG/DL (ref 0.1–1)
BILIRUB SERPL-MCNC: 0.6 MG/DL (ref 0.1–1)
BUN SERPL-MCNC: 15 MG/DL (ref 6–20)
BUN SERPL-MCNC: 15 MG/DL (ref 6–20)
CALCIUM SERPL-MCNC: 9.1 MG/DL (ref 8.7–10.5)
CALCIUM SERPL-MCNC: 9.1 MG/DL (ref 8.7–10.5)
CHLORIDE SERPL-SCNC: 108 MMOL/L (ref 95–110)
CHLORIDE SERPL-SCNC: 108 MMOL/L (ref 95–110)
CO2 SERPL-SCNC: 25 MMOL/L (ref 23–29)
CO2 SERPL-SCNC: 25 MMOL/L (ref 23–29)
CREAT SERPL-MCNC: 0.7 MG/DL (ref 0.5–1.4)
CREAT SERPL-MCNC: 0.7 MG/DL (ref 0.5–1.4)
DIFFERENTIAL METHOD BLD: ABNORMAL
DIFFERENTIAL METHOD BLD: ABNORMAL
EOSINOPHIL NFR BLD: 2 % (ref 0–8)
EOSINOPHIL NFR BLD: 2 % (ref 0–8)
ERYTHROCYTE [DISTWIDTH] IN BLOOD BY AUTOMATED COUNT: 14.2 % (ref 11.5–14.5)
ERYTHROCYTE [DISTWIDTH] IN BLOOD BY AUTOMATED COUNT: 14.2 % (ref 11.5–14.5)
EST. GFR  (NO RACE VARIABLE): >60 ML/MIN/1.73 M^2
EST. GFR  (NO RACE VARIABLE): >60 ML/MIN/1.73 M^2
GLUCOSE SERPL-MCNC: 91 MG/DL (ref 70–110)
GLUCOSE SERPL-MCNC: 91 MG/DL (ref 70–110)
HCT VFR BLD AUTO: 37.6 % (ref 37–48.5)
HCT VFR BLD AUTO: 37.6 % (ref 37–48.5)
HGB BLD-MCNC: 12.6 G/DL (ref 12–16)
HGB BLD-MCNC: 12.6 G/DL (ref 12–16)
IMM GRANULOCYTES # BLD AUTO: ABNORMAL K/UL (ref 0–0.04)
IMM GRANULOCYTES # BLD AUTO: ABNORMAL K/UL (ref 0–0.04)
IMM GRANULOCYTES NFR BLD AUTO: ABNORMAL % (ref 0–0.5)
IMM GRANULOCYTES NFR BLD AUTO: ABNORMAL % (ref 0–0.5)
LDH SERPL L TO P-CCNC: 179 U/L (ref 110–260)
LYMPHOCYTES NFR BLD: 38 % (ref 18–48)
LYMPHOCYTES NFR BLD: 38 % (ref 18–48)
MCH RBC QN AUTO: 33.9 PG (ref 27–31)
MCH RBC QN AUTO: 33.9 PG (ref 27–31)
MCHC RBC AUTO-ENTMCNC: 33.5 G/DL (ref 32–36)
MCHC RBC AUTO-ENTMCNC: 33.5 G/DL (ref 32–36)
MCV RBC AUTO: 101 FL (ref 82–98)
MCV RBC AUTO: 101 FL (ref 82–98)
MONOCYTES NFR BLD: 6 % (ref 4–15)
MONOCYTES NFR BLD: 6 % (ref 4–15)
NEUTROPHILS NFR BLD: 48 % (ref 38–73)
NEUTROPHILS NFR BLD: 48 % (ref 38–73)
NRBC BLD-RTO: 0 /100 WBC
NRBC BLD-RTO: 0 /100 WBC
PLATELET # BLD AUTO: 146 K/UL (ref 150–450)
PLATELET # BLD AUTO: 146 K/UL (ref 150–450)
PLATELET BLD QL SMEAR: ABNORMAL
PLATELET BLD QL SMEAR: ABNORMAL
PMV BLD AUTO: 9.2 FL (ref 9.2–12.9)
PMV BLD AUTO: 9.2 FL (ref 9.2–12.9)
POTASSIUM SERPL-SCNC: 4.3 MMOL/L (ref 3.5–5.1)
POTASSIUM SERPL-SCNC: 4.3 MMOL/L (ref 3.5–5.1)
PROT SERPL-MCNC: 7 G/DL (ref 6–8.4)
PROT SERPL-MCNC: 7 G/DL (ref 6–8.4)
RBC # BLD AUTO: 3.72 M/UL (ref 4–5.4)
RBC # BLD AUTO: 3.72 M/UL (ref 4–5.4)
SODIUM SERPL-SCNC: 141 MMOL/L (ref 136–145)
SODIUM SERPL-SCNC: 141 MMOL/L (ref 136–145)
WBC # BLD AUTO: 1.84 K/UL (ref 3.9–12.7)
WBC # BLD AUTO: 1.84 K/UL (ref 3.9–12.7)

## 2025-01-24 PROCEDURE — 80053 COMPREHEN METABOLIC PANEL: CPT | Mod: HCNC | Performed by: INTERNAL MEDICINE

## 2025-01-24 PROCEDURE — 83615 LACTATE (LD) (LDH) ENZYME: CPT | Mod: HCNC | Performed by: INTERNAL MEDICINE

## 2025-01-24 PROCEDURE — 85027 COMPLETE CBC AUTOMATED: CPT | Mod: HCNC | Performed by: INTERNAL MEDICINE

## 2025-01-24 PROCEDURE — 85007 BL SMEAR W/DIFF WBC COUNT: CPT | Mod: HCNC | Performed by: INTERNAL MEDICINE

## 2025-01-24 NOTE — TELEPHONE ENCOUNTER
Dr. Khan notified  Notified patient: As per Dr. Khan, hold Ibrance until the ANC is higher. We will repeat the labs in 10-14 days from today and set a follow up appointment with Dr. Khan

## 2025-01-27 ENCOUNTER — OFFICE VISIT (OUTPATIENT)
Dept: HEMATOLOGY/ONCOLOGY | Facility: CLINIC | Age: 60
End: 2025-01-27
Payer: MEDICARE

## 2025-01-27 ENCOUNTER — INFUSION (OUTPATIENT)
Dept: INFUSION THERAPY | Facility: HOSPITAL | Age: 60
End: 2025-01-27
Attending: INTERNAL MEDICINE
Payer: MEDICARE

## 2025-01-27 VITALS
OXYGEN SATURATION: 98 % | DIASTOLIC BLOOD PRESSURE: 72 MMHG | RESPIRATION RATE: 16 BRPM | BODY MASS INDEX: 28.37 KG/M2 | HEART RATE: 82 BPM | TEMPERATURE: 98 F | HEIGHT: 67 IN | SYSTOLIC BLOOD PRESSURE: 121 MMHG | WEIGHT: 180.75 LBS

## 2025-01-27 VITALS
HEIGHT: 67 IN | WEIGHT: 180.75 LBS | SYSTOLIC BLOOD PRESSURE: 107 MMHG | DIASTOLIC BLOOD PRESSURE: 72 MMHG | TEMPERATURE: 97 F | HEART RATE: 89 BPM | BODY MASS INDEX: 28.37 KG/M2 | OXYGEN SATURATION: 97 %

## 2025-01-27 DIAGNOSIS — Z79.899 IMMUNODEFICIENCY DUE TO CHEMOTHERAPY: ICD-10-CM

## 2025-01-27 DIAGNOSIS — Z17.0 MALIGNANT NEOPLASM OF CENTRAL PORTION OF RIGHT BREAST IN FEMALE, ESTROGEN RECEPTOR POSITIVE: Primary | ICD-10-CM

## 2025-01-27 DIAGNOSIS — T45.1X5A PANCYTOPENIA DUE TO ANTINEOPLASTIC CHEMOTHERAPY: ICD-10-CM

## 2025-01-27 DIAGNOSIS — D84.821 IMMUNODEFICIENCY DUE TO CHEMOTHERAPY: ICD-10-CM

## 2025-01-27 DIAGNOSIS — D61.810 PANCYTOPENIA DUE TO ANTINEOPLASTIC CHEMOTHERAPY: ICD-10-CM

## 2025-01-27 DIAGNOSIS — C79.51 SECONDARY MALIGNANT NEOPLASM OF BONE: ICD-10-CM

## 2025-01-27 DIAGNOSIS — T45.1X5A IMMUNODEFICIENCY DUE TO CHEMOTHERAPY: ICD-10-CM

## 2025-01-27 DIAGNOSIS — C50.111 MALIGNANT NEOPLASM OF CENTRAL PORTION OF RIGHT BREAST IN FEMALE, ESTROGEN RECEPTOR POSITIVE: Primary | ICD-10-CM

## 2025-01-27 PROCEDURE — 3078F DIAST BP <80 MM HG: CPT | Mod: CPTII,S$GLB,, | Performed by: INTERNAL MEDICINE

## 2025-01-27 PROCEDURE — 99215 OFFICE O/P EST HI 40 MIN: CPT | Mod: 25,S$GLB,, | Performed by: INTERNAL MEDICINE

## 2025-01-27 PROCEDURE — 3008F BODY MASS INDEX DOCD: CPT | Mod: CPTII,S$GLB,, | Performed by: INTERNAL MEDICINE

## 2025-01-27 PROCEDURE — 1159F MED LIST DOCD IN RCRD: CPT | Mod: CPTII,S$GLB,, | Performed by: INTERNAL MEDICINE

## 2025-01-27 PROCEDURE — 96402 CHEMO HORMON ANTINEOPL SQ/IM: CPT | Mod: HCNC

## 2025-01-27 PROCEDURE — 96365 THER/PROPH/DIAG IV INF INIT: CPT | Mod: HCNC

## 2025-01-27 PROCEDURE — 99999 PR PBB SHADOW E&M-EST. PATIENT-LVL V: CPT | Mod: PBBFAC,HCNC,, | Performed by: INTERNAL MEDICINE

## 2025-01-27 PROCEDURE — 63600175 PHARM REV CODE 636 W HCPCS: Mod: JZ,TB,HCNC | Performed by: INTERNAL MEDICINE

## 2025-01-27 PROCEDURE — 3074F SYST BP LT 130 MM HG: CPT | Mod: CPTII,S$GLB,, | Performed by: INTERNAL MEDICINE

## 2025-01-27 PROCEDURE — 1160F RVW MEDS BY RX/DR IN RCRD: CPT | Mod: CPTII,S$GLB,, | Performed by: INTERNAL MEDICINE

## 2025-01-27 RX ORDER — LAMOTRIGINE 25 MG/1
500 TABLET ORAL
Status: CANCELLED | OUTPATIENT
Start: 2025-01-27

## 2025-01-27 RX ORDER — HEPARIN 100 UNIT/ML
500 SYRINGE INTRAVENOUS
Status: CANCELLED | OUTPATIENT
Start: 2025-01-27

## 2025-01-27 RX ORDER — LAMOTRIGINE 25 MG/1
500 TABLET ORAL
Status: COMPLETED | OUTPATIENT
Start: 2025-01-27 | End: 2025-01-27

## 2025-01-27 RX ORDER — SODIUM CHLORIDE 0.9 % (FLUSH) 0.9 %
10 SYRINGE (ML) INJECTION
Status: CANCELLED | OUTPATIENT
Start: 2025-01-27

## 2025-01-27 RX ORDER — ZOLEDRONIC ACID 0.04 MG/ML
4 INJECTION, SOLUTION INTRAVENOUS
Status: COMPLETED | OUTPATIENT
Start: 2025-01-27 | End: 2025-01-27

## 2025-01-27 RX ADMIN — ZOLEDRONIC ACID 4 MG: 0.04 INJECTION, SOLUTION INTRAVENOUS at 08:01

## 2025-01-27 RX ADMIN — FULVESTRANT 500 MG: 50 INJECTION, SOLUTION INTRAMUSCULAR at 08:01

## 2025-01-27 NOTE — PROGRESS NOTES
Subjective:       Patient ID: Carole Moore is a 59 y.o. female.    Chief Complaint: Results, Chemotherapy, and Breast Cancer    HPI:  59-year-old female history of metastatic breast cancer continues on Faslodex and Ibrance.  Patient denies nausea vomiting fevers chills night sweats ECOG status 1 patient complains of pain involving left knee    Past Medical History:   Diagnosis Date    Antineoplastic chemotherapy induced anemia     Back pain     Breast cancer 2016    right breast    Cancer     R Breast cancer    CVA (cerebral infarction)     Diverticulosis     Family history of colon cancer 10/30/2018    Her mother and father both had colon cancer.    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    Nausea after anesthesia     Paralysis     right side    PONV (postoperative nausea and vomiting)     Stroke 2001    R side weakness    Trouble in sleeping      Family History   Problem Relation Name Age of Onset    Breast cancer Paternal Aunt      Colon cancer Father      Colon cancer Mother Kathy Stein     Cancer Mother Kathy Stein         colon cancer    Melanoma Maternal Uncle      Skin cancer Maternal Uncle       Social History     Socioeconomic History    Marital status:    Tobacco Use    Smoking status: Never     Passive exposure: Never    Smokeless tobacco: Never    Tobacco comments:     I was a very light smoker for 5 years   Substance and Sexual Activity    Alcohol use: No    Drug use: No    Sexual activity: Not Currently     Partners: Male     Birth control/protection: Surgical     Comment: hysterectomy   Other Topics Concern    Are you pregnant or think you may be? No    Breast-feeding No     Social Drivers of Health     Financial Resource Strain: Low Risk  (4/11/2024)    Overall Financial Resource Strain (CARDIA)     Difficulty of Paying Living Expenses: Not hard at all   Food Insecurity: No Food  Insecurity (4/11/2024)    Hunger Vital Sign     Worried About Running Out of Food in the Last Year: Never true     Ran Out of Food in the Last Year: Never true   Transportation Needs: No Transportation Needs (4/11/2024)    PRAPARE - Transportation     Lack of Transportation (Medical): No     Lack of Transportation (Non-Medical): No   Physical Activity: Inactive (4/11/2024)    Exercise Vital Sign     Days of Exercise per Week: 0 days     Minutes of Exercise per Session: 0 min   Stress: Stress Concern Present (4/11/2024)    [employer] [place] [employer] - Occupational Stress Questionnaire     Feeling of Stress : To some extent   Housing Stability: Low Risk  (4/11/2024)    Housing Stability Vital Sign     Unable to Pay for Housing in the Last Year: No     Number of Places Lived in the Last Year: 1     Unstable Housing in the Last Year: No     Past Surgical History:   Procedure Laterality Date    AUGMENTATION OF BREAST Left 2017    BREAST SURGERY      COLONOSCOPY N/A 10/30/2018    Procedure: COLONOSCOPY;  Surgeon: Cosme Monson MD;  Location: [facility];  Service: Endoscopy;  Laterality: N/A;    COLONOSCOPY N/A 12/27/2023    Procedure: COLONOSCOPY;  Surgeon: Caro Leo MD;  Location: [facility];  Service: Endoscopy;  Laterality: N/A;    COSMETIC SURGERY  December 2017    Breast Reconstruction after Mastectomy    CYSTOSCOPY WITH BIOPSY OF BLADDER N/A 11/29/2021    Procedure: CYSTOSCOPY, WITH BLADDER BIOPSY, WITH FULGURATION IF INDICATED;  Surgeon: Page Marcelo MD;  Location: [facility] OR;  Service: Urology;  Laterality: N/A;    FIXATION KYPHOPLASTY Bilateral 02/09/2023    Procedure: KYPHOPLASTY;  Surgeon: Josse Pelayo MD;  Location: [facility];  Service: Neurosurgery;  Laterality: Bilateral;  Kyphoplasty T10 with ablation    HYSTERECTOMY  2007    maintains both ovaries, menorrhagia    MASTECTOMY Right 2017    MEDIPORT INSERTION, SINGLE      RADIOFREQUENCY ABLATION, BONE, PERCUTANEOUS Bilateral  "02/09/2023    Procedure: RADIOFREQUENCY ABLATION,BONE,PERCUTANEOUS;  Surgeon: Josse Pelayo MD;  Location: [facility];  Service: Neurosurgery;  Laterality: Bilateral;    TONSILLECTOMY         Labs:  Lab Results   Component Value Date    WBC 1.84 (LL) 01/24/2025    WBC 1.84 (LL) 01/24/2025    HGB 12.6 01/24/2025    HGB 12.6 01/24/2025    HCT 37.6 01/24/2025    HCT 37.6 01/24/2025     (H) 01/24/2025     (H) 01/24/2025     (L) 01/24/2025     (L) 01/24/2025     BMP  Lab Results   Component Value Date     01/24/2025     01/24/2025    K 4.3 01/24/2025    K 4.3 01/24/2025     01/24/2025     01/24/2025    CO2 25 01/24/2025    CO2 25 01/24/2025    BUN 15 01/24/2025    BUN 15 01/24/2025    CREATININE 0.7 01/24/2025    CREATININE 0.7 01/24/2025    CALCIUM 9.1 01/24/2025    CALCIUM 9.1 01/24/2025    ANIONGAP 8 01/24/2025    ANIONGAP 8 01/24/2025    ESTGFRAFRICA >60 05/12/2022    EGFRNONAA >60 05/12/2022     Lab Results   Component Value Date    ALT 25 01/24/2025    ALT 25 01/24/2025    AST 21 01/24/2025    AST 21 01/24/2025    ALKPHOS 55 01/24/2025    ALKPHOS 55 01/24/2025    BILITOT 0.6 01/24/2025    BILITOT 0.6 01/24/2025       No results found for: "IRON", "TIBC", "FERRITIN", "SATURATEDIRO"  No results found for: "ONDKIHEH97"  No results found for: "FOLATE"  Lab Results   Component Value Date    TSH 0.921 10/01/2024         Review of Systems   Constitutional:  Negative for activity change, appetite change, chills, diaphoresis, fatigue, fever and unexpected weight change.   HENT:  Negative for congestion, dental problem, drooling, ear discharge, ear pain, facial swelling, hearing loss, mouth sores, nosebleeds, postnasal drip, rhinorrhea, sinus pressure, sneezing, sore throat, tinnitus, trouble swallowing and voice change.    Eyes:  Negative for photophobia, pain, discharge, redness, itching and visual disturbance.   Respiratory:  Negative for cough, choking, chest " tightness, shortness of breath, wheezing and stridor.    Cardiovascular:  Negative for chest pain, palpitations and leg swelling.   Gastrointestinal:  Negative for abdominal distention, abdominal pain, anal bleeding, blood in stool, constipation, diarrhea, nausea, rectal pain and vomiting.   Endocrine: Negative for cold intolerance, heat intolerance, polydipsia, polyphagia and polyuria.   Genitourinary:  Negative for decreased urine volume, difficulty urinating, dyspareunia, dysuria, enuresis, flank pain, frequency, genital sores, hematuria, menstrual problem, pelvic pain, urgency, vaginal bleeding, vaginal discharge and vaginal pain.   Musculoskeletal:  Positive for arthralgias, gait problem and myalgias. Negative for back pain, joint swelling, neck pain and neck stiffness.   Skin:  Negative for color change, pallor and rash.   Allergic/Immunologic: Negative for environmental allergies, food allergies and immunocompromised state.   Neurological:  Negative for dizziness, tremors, seizures, syncope, facial asymmetry, speech difficulty, weakness, light-headedness, numbness and headaches.   Hematological:  Negative for adenopathy. Does not bruise/bleed easily.   Psychiatric/Behavioral:  Negative for agitation, behavioral problems, confusion, decreased concentration, dysphoric mood, hallucinations, self-injury, sleep disturbance and suicidal ideas. The patient is not nervous/anxious and is not hyperactive.        Objective:      Physical Exam  Vitals reviewed.   Constitutional:       General: She is not in acute distress.     Appearance: Normal appearance. She is well-developed. She is not diaphoretic.   HENT:      Head: Normocephalic and atraumatic.      Right Ear: External ear normal.      Left Ear: External ear normal.      Nose: Nose normal.      Right Sinus: No maxillary sinus tenderness or frontal sinus tenderness.      Left Sinus: No maxillary sinus tenderness or frontal sinus tenderness.      Mouth/Throat:       Pharynx: No oropharyngeal exudate.   Eyes:      General: Lids are normal. No scleral icterus.        Right eye: No discharge.         Left eye: No discharge.      Conjunctiva/sclera: Conjunctivae normal.      Right eye: Right conjunctiva is not injected. No hemorrhage.     Left eye: Left conjunctiva is not injected. No hemorrhage.     Pupils: Pupils are equal, round, and reactive to light.   Neck:      Thyroid: No thyromegaly.      Vascular: No JVD.      Trachea: No tracheal deviation.   Cardiovascular:      Rate and Rhythm: Normal rate.   Pulmonary:      Effort: Pulmonary effort is normal. No respiratory distress.      Breath sounds: No stridor.   Chest:      Chest wall: No tenderness.   Abdominal:      General: Bowel sounds are normal. There is no distension.      Palpations: Abdomen is soft. There is no hepatomegaly, splenomegaly or mass.      Tenderness: There is no abdominal tenderness. There is no rebound.   Musculoskeletal:         General: No tenderness. Normal range of motion.      Cervical back: Normal range of motion and neck supple.   Lymphadenopathy:      Cervical: No cervical adenopathy.      Upper Body:      Right upper body: No supraclavicular adenopathy.      Left upper body: No supraclavicular adenopathy.   Skin:     General: Skin is dry.      Findings: No erythema or rash.   Neurological:      Mental Status: She is alert and oriented to person, place, and time.      Cranial Nerves: No cranial nerve deficit.      Coordination: Coordination abnormal.      Gait: Gait abnormal.   Psychiatric:         Behavior: Behavior normal.         Thought Content: Thought content normal.         Judgment: Judgment normal.             Assessment:      1. Malignant neoplasm of central portion of right breast in female, estrogen receptor positive    2. Secondary malignant neoplasm of bone    3. Pancytopenia due to antineoplastic chemotherapy    4. Immunodeficiency due to chemotherapy           Med Onc Chart  Routing      Follow up with physician . Return to clinic in 1 month for Faslodex injection with CBC CMP prior   Follow up with [facility]    Infusion scheduling note    Injection scheduling note Faslodex today in 1 month Zometa today in in 3 months   Labs   Scheduling:  Preferred lab:  Lab interval:  Patient needs CBC CMP in one-week   Imaging   X-rays of need femur in hips in one-week   Pharmacy appointment    Other referrals                   Plan:     Continue with Faslodex injection today.  Zometa injection today patient is return in 1 week with repeat CBC to determine whether not to restart Ibrance.  May need to reduced dose to 100 mg variant or shortened duration to 14 days discussed strategies with her x-rays of hip knees to be ordered.  If persistent pain in knee may need MRI low risk of metastatic disease although PET scan in November does not reach down as 4 to go to the knee.  Discussed implications and answered questions with her        Harpal Khan Jr, MD FACP

## 2025-01-27 NOTE — NURSING
Faslodex injections given without difficulty to crista dorsogluteal.   Band-Aids applied to site.  Patient instructed to stay in the clinic for 15 minutes.  Patient verbalized understanding and will notify nurse with any complaints.  NAD noted upon discharge.

## 2025-01-27 NOTE — DISCHARGE INSTRUCTIONS
Thank you for letting me take care of YOU!!    [facility]  [address] [facility]  [id number] [place] [address] Drive  [phone] phone     [phone] fax  Hours of Operation: Monday- Friday 8:00am- 5:00pm  After hours phone  [phone]  Hematology / Oncology Physicians on call:    Dr. Bill Sosa        Nurse Practitioners:    Bianca Mendoza, GIULIANO Miranda, TRI Rojas, GIULIANO Perry, GIULIANO Camilo, GIULIANO      Please don't hesitate to call if you have any concerns.

## 2025-02-03 ENCOUNTER — HOSPITAL ENCOUNTER (OUTPATIENT)
Dept: RADIOLOGY | Facility: HOSPITAL | Age: 60
Discharge: HOME | End: 2025-02-03
Attending: INTERNAL MEDICINE
Payer: MEDICARE

## 2025-02-03 DIAGNOSIS — C50.111 MALIGNANT NEOPLASM OF CENTRAL PORTION OF RIGHT BREAST IN FEMALE, ESTROGEN RECEPTOR POSITIVE: ICD-10-CM

## 2025-02-03 DIAGNOSIS — Z17.0 MALIGNANT NEOPLASM OF CENTRAL PORTION OF RIGHT BREAST IN FEMALE, ESTROGEN RECEPTOR POSITIVE: ICD-10-CM

## 2025-02-03 PROCEDURE — 73565 X-RAY EXAM OF KNEES: CPT | Mod: 26,HCNC,, | Performed by: RADIOLOGY

## 2025-02-03 PROCEDURE — 73552 X-RAY EXAM OF FEMUR 2/>: CPT | Mod: TC,50,HCNC,FY,PO

## 2025-02-03 PROCEDURE — 73565 X-RAY EXAM OF KNEES: CPT | Mod: TC,HCNC,FY,PO

## 2025-02-03 PROCEDURE — 72170 X-RAY EXAM OF PELVIS: CPT | Mod: TC,HCNC,FY,PO

## 2025-02-03 PROCEDURE — 73552 X-RAY EXAM OF FEMUR 2/>: CPT | Mod: 26,50,HCNC, | Performed by: RADIOLOGY

## 2025-02-03 PROCEDURE — 72170 X-RAY EXAM OF PELVIS: CPT | Mod: 26,HCNC,, | Performed by: RADIOLOGY

## 2025-02-11 ENCOUNTER — PATIENT MESSAGE (OUTPATIENT)
Dept: INTERNAL MEDICINE | Facility: CLINIC | Age: 60
End: 2025-02-11
Payer: MEDICARE

## 2025-02-20 ENCOUNTER — TELEPHONE (OUTPATIENT)
Dept: INTERNAL MEDICINE | Facility: CLINIC | Age: 60
End: 2025-02-20
Payer: MEDICARE

## 2025-02-27 ENCOUNTER — RESULTS FOLLOW-UP (OUTPATIENT)
Dept: HEMATOLOGY/ONCOLOGY | Facility: CLINIC | Age: 60
End: 2025-02-27

## 2025-02-27 ENCOUNTER — OFFICE VISIT (OUTPATIENT)
Dept: HEMATOLOGY/ONCOLOGY | Facility: CLINIC | Age: 60
End: 2025-02-27
Payer: MEDICARE

## 2025-02-27 ENCOUNTER — LAB VISIT (OUTPATIENT)
Dept: LAB | Facility: HOSPITAL | Age: 60
End: 2025-02-27
Attending: INTERNAL MEDICINE
Payer: MEDICARE

## 2025-02-27 ENCOUNTER — INFUSION (OUTPATIENT)
Dept: INFUSION THERAPY | Facility: HOSPITAL | Age: 60
End: 2025-02-27
Attending: INTERNAL MEDICINE
Payer: MEDICARE

## 2025-02-27 VITALS
TEMPERATURE: 98 F | BODY MASS INDEX: 28.72 KG/M2 | RESPIRATION RATE: 20 BRPM | RESPIRATION RATE: 20 BRPM | TEMPERATURE: 97 F | OXYGEN SATURATION: 98 % | HEART RATE: 79 BPM | HEIGHT: 67 IN | WEIGHT: 183 LBS | SYSTOLIC BLOOD PRESSURE: 133 MMHG | SYSTOLIC BLOOD PRESSURE: 112 MMHG | BODY MASS INDEX: 28.72 KG/M2 | OXYGEN SATURATION: 98 % | HEIGHT: 67 IN | DIASTOLIC BLOOD PRESSURE: 60 MMHG | WEIGHT: 183 LBS | DIASTOLIC BLOOD PRESSURE: 76 MMHG | HEART RATE: 87 BPM

## 2025-02-27 DIAGNOSIS — C50.111 MALIGNANT NEOPLASM OF CENTRAL PORTION OF RIGHT BREAST IN FEMALE, ESTROGEN RECEPTOR POSITIVE: Primary | ICD-10-CM

## 2025-02-27 DIAGNOSIS — C79.51 SECONDARY MALIGNANT NEOPLASM OF BONE: ICD-10-CM

## 2025-02-27 DIAGNOSIS — D84.821 IMMUNODEFICIENCY DUE TO CHEMOTHERAPY: ICD-10-CM

## 2025-02-27 DIAGNOSIS — Z17.0 MALIGNANT NEOPLASM OF CENTRAL PORTION OF RIGHT BREAST IN FEMALE, ESTROGEN RECEPTOR POSITIVE: Primary | ICD-10-CM

## 2025-02-27 DIAGNOSIS — C50.111 MALIGNANT NEOPLASM OF CENTRAL PORTION OF RIGHT BREAST IN FEMALE, ESTROGEN RECEPTOR POSITIVE: ICD-10-CM

## 2025-02-27 DIAGNOSIS — Z17.0 MALIGNANT NEOPLASM OF CENTRAL PORTION OF RIGHT BREAST IN FEMALE, ESTROGEN RECEPTOR POSITIVE: ICD-10-CM

## 2025-02-27 DIAGNOSIS — Z79.899 IMMUNODEFICIENCY DUE TO CHEMOTHERAPY: ICD-10-CM

## 2025-02-27 DIAGNOSIS — T45.1X5A IMMUNODEFICIENCY DUE TO CHEMOTHERAPY: ICD-10-CM

## 2025-02-27 DIAGNOSIS — D61.810 PANCYTOPENIA DUE TO ANTINEOPLASTIC CHEMOTHERAPY: ICD-10-CM

## 2025-02-27 DIAGNOSIS — T45.1X5A PANCYTOPENIA DUE TO ANTINEOPLASTIC CHEMOTHERAPY: ICD-10-CM

## 2025-02-27 LAB
ALBUMIN SERPL BCP-MCNC: 3.6 G/DL (ref 3.5–5.2)
ALP SERPL-CCNC: 53 U/L (ref 40–150)
ALT SERPL W/O P-5'-P-CCNC: 17 U/L (ref 10–44)
ANION GAP SERPL CALC-SCNC: 8 MMOL/L (ref 8–16)
AST SERPL-CCNC: 15 U/L (ref 10–40)
BASOPHILS # BLD AUTO: 0.05 K/UL (ref 0–0.2)
BASOPHILS NFR BLD: 1.8 % (ref 0–1.9)
BILIRUB SERPL-MCNC: 0.6 MG/DL (ref 0.1–1)
BUN SERPL-MCNC: 21 MG/DL (ref 6–20)
CALCIUM SERPL-MCNC: 9.5 MG/DL (ref 8.7–10.5)
CHLORIDE SERPL-SCNC: 109 MMOL/L (ref 95–110)
CO2 SERPL-SCNC: 26 MMOL/L (ref 23–29)
CREAT SERPL-MCNC: 0.7 MG/DL (ref 0.5–1.4)
DIFFERENTIAL METHOD BLD: ABNORMAL
EOSINOPHIL # BLD AUTO: 0.1 K/UL (ref 0–0.5)
EOSINOPHIL NFR BLD: 3.3 % (ref 0–8)
ERYTHROCYTE [DISTWIDTH] IN BLOOD BY AUTOMATED COUNT: 14.6 % (ref 11.5–14.5)
EST. GFR  (NO RACE VARIABLE): >60 ML/MIN/1.73 M^2
GLUCOSE SERPL-MCNC: 72 MG/DL (ref 70–110)
HCT VFR BLD AUTO: 37.5 % (ref 37–48.5)
HGB BLD-MCNC: 12.4 G/DL (ref 12–16)
IMM GRANULOCYTES # BLD AUTO: 0 K/UL (ref 0–0.04)
IMM GRANULOCYTES NFR BLD AUTO: 0 % (ref 0–0.5)
LYMPHOCYTES # BLD AUTO: 1 K/UL (ref 1–4.8)
LYMPHOCYTES NFR BLD: 37.7 % (ref 18–48)
MCH RBC QN AUTO: 33.9 PG (ref 27–31)
MCHC RBC AUTO-ENTMCNC: 33.1 G/DL (ref 32–36)
MCV RBC AUTO: 103 FL (ref 82–98)
MONOCYTES # BLD AUTO: 0.5 K/UL (ref 0.3–1)
MONOCYTES NFR BLD: 18.8 % (ref 4–15)
NEUTROPHILS # BLD AUTO: 1.1 K/UL (ref 1.8–7.7)
NEUTROPHILS NFR BLD: 38.4 % (ref 38–73)
NRBC BLD-RTO: 0 /100 WBC
PLATELET # BLD AUTO: 180 K/UL (ref 150–450)
PMV BLD AUTO: 8.8 FL (ref 9.2–12.9)
POTASSIUM SERPL-SCNC: 4.7 MMOL/L (ref 3.5–5.1)
PROT SERPL-MCNC: 6.9 G/DL (ref 6–8.4)
RBC # BLD AUTO: 3.66 M/UL (ref 4–5.4)
SODIUM SERPL-SCNC: 143 MMOL/L (ref 136–145)
WBC # BLD AUTO: 2.76 K/UL (ref 3.9–12.7)

## 2025-02-27 PROCEDURE — 63600175 PHARM REV CODE 636 W HCPCS: Mod: JZ,TB,HCNC | Performed by: INTERNAL MEDICINE

## 2025-02-27 PROCEDURE — 36415 COLL VENOUS BLD VENIPUNCTURE: CPT | Mod: HCNC | Performed by: INTERNAL MEDICINE

## 2025-02-27 PROCEDURE — 80053 COMPREHEN METABOLIC PANEL: CPT | Mod: HCNC | Performed by: INTERNAL MEDICINE

## 2025-02-27 PROCEDURE — 96402 CHEMO HORMON ANTINEOPL SQ/IM: CPT | Mod: HCNC

## 2025-02-27 PROCEDURE — 99999 PR PBB SHADOW E&M-EST. PATIENT-LVL IV: CPT | Mod: PBBFAC,HCNC,, | Performed by: INTERNAL MEDICINE

## 2025-02-27 PROCEDURE — 85025 COMPLETE CBC W/AUTO DIFF WBC: CPT | Mod: HCNC | Performed by: INTERNAL MEDICINE

## 2025-02-27 RX ORDER — LAMOTRIGINE 25 MG/1
500 TABLET ORAL
Status: COMPLETED | OUTPATIENT
Start: 2025-02-27 | End: 2025-02-27

## 2025-02-27 RX ORDER — VALACYCLOVIR HYDROCHLORIDE 500 MG/1
500 TABLET, FILM COATED ORAL DAILY
Qty: 30 TABLET | Refills: 11 | Status: SHIPPED | OUTPATIENT
Start: 2025-02-27 | End: 2025-03-29

## 2025-02-27 RX ORDER — LAMOTRIGINE 25 MG/1
500 TABLET ORAL
Status: CANCELLED | OUTPATIENT
Start: 2025-02-27

## 2025-02-27 RX ADMIN — FULVESTRANT 500 MG: 50 INJECTION, SOLUTION INTRAMUSCULAR at 08:02

## 2025-02-27 NOTE — DISCHARGE INSTRUCTIONS
Thank you for letting me take care of YOU!!    [facility]  [address] [facility]  [address] [place] [address] Drive  [phone] phone     [phone] fax  Hours of Operation: Monday- Friday 8:00am- 5:00pm  After hours phone  [phone]  Hematology / Oncology Physicians on call:    Dr. Bill Sosa        Nurse Practitioners:    Bianca Mendoza, GIULIANO Miranda, TRI Rojas, GIULIANO Perry, GIULIANO Camilo, GIULIANO      Please don't hesitate to call if you have any concerns.

## 2025-02-27 NOTE — PLAN OF CARE
Faslodex injections given without difficulty to crista dorsogluteal.   Band-Aids applied to sites.  Patient instructed to stay in the clinic for 15 minutes.  Patient verbalized understanding and will notify nurse with any complaints.  NAD noted upon discharge.

## 2025-02-27 NOTE — PROGRESS NOTES
Subjective:       Patient ID: Carole Moore is a 59 y.o. female.    Chief Complaint: Results, Chemotherapy, and Breast Cancer    HPI:  59-year-old female history of metastatic breast cancer patient is had modifications of her Ibrance because of pancytopenia.  Returns today for next dosing of Faslodex in readjustment in calculation of new dosing.  ECOG status 1    Past Medical History:   Diagnosis Date    Antineoplastic chemotherapy induced anemia     Back pain     Breast cancer 2016    right breast    Cancer     R Breast cancer    CVA (cerebral infarction)     Diverticulosis     Family history of colon cancer 10/30/2018    Her mother and father both had colon cancer.    Genetic testing of female 12/27/2016    [employer] Links (28 genes) - NEGATIVE    Hyperlipidemia     Immunodeficiency due to chemotherapy 10/03/2022    Nausea after anesthesia     Paralysis     right side    PONV (postoperative nausea and vomiting)     Stroke 2001    R side weakness    Trouble in sleeping      Family History   Problem Relation Name Age of Onset    Breast cancer Paternal Aunt      Colon cancer Father      Colon cancer Mother Kathy Stein     Cancer Mother Kathy Stein         colon cancer    Melanoma Maternal Uncle      Skin cancer Maternal Uncle       Social History[1]  Past Surgical History:   Procedure Laterality Date    AUGMENTATION OF BREAST Left 2017    BREAST SURGERY      COLONOSCOPY N/A 10/30/2018    Procedure: COLONOSCOPY;  Surgeon: Cosme Monson MD;  Location: [facility];  Service: Endoscopy;  Laterality: N/A;    COLONOSCOPY N/A 12/27/2023    Procedure: COLONOSCOPY;  Surgeon: Caro Leo MD;  Location: [facility];  Service: Endoscopy;  Laterality: N/A;    COSMETIC SURGERY  December 2017    Breast Reconstruction after Mastectomy    CYSTOSCOPY WITH BIOPSY OF BLADDER N/A 11/29/2021    Procedure: CYSTOSCOPY, WITH BLADDER BIOPSY, WITH FULGURATION IF INDICATED;  Surgeon: Page Marcelo MD;  Location: [facility] OR;   "Service: Urology;  Laterality: N/A;    FIXATION KYPHOPLASTY Bilateral 02/09/2023    Procedure: KYPHOPLASTY;  Surgeon: Josse Pelayo MD;  Location: [facility];  Service: Neurosurgery;  Laterality: Bilateral;  Kyphoplasty T10 with ablation    HYSTERECTOMY  2007    maintains both ovaries, menorrhagia    MASTECTOMY Right 2017    MEDIPORT INSERTION, SINGLE      RADIOFREQUENCY ABLATION, BONE, PERCUTANEOUS Bilateral 02/09/2023    Procedure: RADIOFREQUENCY ABLATION,BONE,PERCUTANEOUS;  Surgeon: Josse Pelayo MD;  Location: [facility];  Service: Neurosurgery;  Laterality: Bilateral;    TONSILLECTOMY         Labs:  Lab Results   Component Value Date    WBC 2.76 (L) 02/27/2025    HGB 12.4 02/27/2025    HCT 37.5 02/27/2025     (H) 02/27/2025     02/27/2025     BMP  Lab Results   Component Value Date     02/27/2025    K 4.7 02/27/2025     02/27/2025    CO2 26 02/27/2025    BUN 21 (H) 02/27/2025    CREATININE 0.7 02/27/2025    CALCIUM 9.5 02/27/2025    ANIONGAP 8 02/27/2025    ESTGFRAFRICA >60 05/12/2022    EGFRNONAA >60 05/12/2022     Lab Results   Component Value Date    ALT 17 02/27/2025    AST 15 02/27/2025    ALKPHOS 53 02/27/2025    BILITOT 0.6 02/27/2025       No results found for: "IRON", "TIBC", "FERRITIN", "SATURATEDIRO"  No results found for: "KFXUVKFP05"  No results found for: "FOLATE"  Lab Results   Component Value Date    TSH 0.921 10/01/2024         Review of Systems   Constitutional:  Positive for fatigue. Negative for activity change, appetite change, chills, diaphoresis, fever and unexpected weight change.   HENT:  Negative for congestion, dental problem, drooling, ear discharge, ear pain, facial swelling, hearing loss, mouth sores, nosebleeds, postnasal drip, rhinorrhea, sinus pressure, sneezing, sore throat, tinnitus, trouble swallowing and voice change.    Eyes:  Negative for photophobia, pain, discharge, redness, itching and visual disturbance.   Respiratory:  Negative for cough, " choking, chest tightness, shortness of breath, wheezing and stridor.    Cardiovascular:  Negative for chest pain, palpitations and leg swelling.   Gastrointestinal:  Negative for abdominal distention, abdominal pain, anal bleeding, blood in stool, constipation, diarrhea, nausea, rectal pain and vomiting.   Endocrine: Negative for cold intolerance, heat intolerance, polydipsia, polyphagia and polyuria.   Genitourinary:  Negative for decreased urine volume, difficulty urinating, dyspareunia, dysuria, enuresis, flank pain, frequency, genital sores, hematuria, menstrual problem, pelvic pain, urgency, vaginal bleeding, vaginal discharge and vaginal pain.   Musculoskeletal:  Positive for arthralgias, gait problem and myalgias. Negative for back pain, joint swelling, neck pain and neck stiffness.   Skin:  Negative for color change, pallor and rash.   Allergic/Immunologic: Negative for environmental allergies, food allergies and immunocompromised state.   Neurological:  Positive for weakness. Negative for dizziness, tremors, seizures, syncope, facial asymmetry, speech difficulty, light-headedness, numbness and headaches.   Hematological:  Negative for adenopathy. Does not bruise/bleed easily.   Psychiatric/Behavioral:  Negative for agitation, behavioral problems, confusion, decreased concentration, dysphoric mood, hallucinations, self-injury, sleep disturbance and suicidal ideas. The patient is not nervous/anxious and is not hyperactive.        Objective:      Physical Exam  Vitals reviewed.   Constitutional:       General: She is not in acute distress.     Appearance: She is well-developed. She is not diaphoretic.   HENT:      Head: Normocephalic and atraumatic.      Right Ear: External ear normal.      Left Ear: External ear normal.      Nose: Nose normal.      Right Sinus: No maxillary sinus tenderness or frontal sinus tenderness.      Left Sinus: No maxillary sinus tenderness or frontal sinus tenderness.       Mouth/Throat:      Pharynx: No oropharyngeal exudate.   Eyes:      General: Lids are normal. No scleral icterus.        Right eye: No discharge.         Left eye: No discharge.      Conjunctiva/sclera: Conjunctivae normal.      Right eye: Right conjunctiva is not injected. No hemorrhage.     Left eye: Left conjunctiva is not injected. No hemorrhage.     Pupils: Pupils are equal, round, and reactive to light.   Neck:      Thyroid: No thyromegaly.      Vascular: No JVD.      Trachea: No tracheal deviation.   Cardiovascular:      Rate and Rhythm: Normal rate.   Pulmonary:      Effort: Pulmonary effort is normal. No respiratory distress.      Breath sounds: No stridor.   Chest:      Chest wall: No tenderness.   Abdominal:      General: Bowel sounds are normal. There is no distension.      Palpations: Abdomen is soft. There is no hepatomegaly, splenomegaly or mass.      Tenderness: There is no abdominal tenderness. There is no rebound.   Musculoskeletal:         General: Deformity present. No tenderness. Normal range of motion.      Cervical back: Normal range of motion and neck supple.   Lymphadenopathy:      Cervical: No cervical adenopathy.      Upper Body:      Right upper body: No supraclavicular adenopathy.      Left upper body: No supraclavicular adenopathy.   Skin:     General: Skin is dry.      Findings: No erythema or rash.   Neurological:      Mental Status: She is alert and oriented to person, place, and time.      Cranial Nerves: No cranial nerve deficit.      Motor: Weakness present.      Coordination: Coordination abnormal.      Gait: Gait abnormal.   Psychiatric:         Behavior: Behavior normal.         Thought Content: Thought content normal.         Judgment: Judgment normal.             Assessment:      1. Malignant neoplasm of central portion of right breast in female, estrogen receptor positive    2. Pancytopenia due to antineoplastic chemotherapy    3. Secondary malignant neoplasm of bone    4.  Immunodeficiency due to chemotherapy           Med Onc Chart Routing      Follow up with physician . Return to clinic in 1 month CBC CMP LDH C-reactive protein   Follow up with [facility]    Infusion scheduling note    Injection scheduling note Faslodex today and in 1 month   Labs    Imaging    Pharmacy appointment    Other referrals                   Plan:     Re calculation of Ibrance dosing 200 mg days 1 through 21 on a 28 day cycle.  Continue with Faslodex on a monthly basis.  Pancytopenia appears to resolve with ANC greater than a 1000 new prescription sent to [facility] prophylactic Valtrex for prevention of recurrent fever blisters        Harpal Khan Jr, MD FACP         [1]   Social History  Socioeconomic History    Marital status:    Tobacco Use    Smoking status: Never     Passive exposure: Never    Smokeless tobacco: Never    Tobacco comments:     I was a very light smoker for 5 years   Substance and Sexual Activity    Alcohol use: No    Drug use: No    Sexual activity: Not Currently     Partners: Male     Birth control/protection: Surgical     Comment: hysterectomy   Other Topics Concern    Are you pregnant or think you may be? No    Breast-feeding No     Social Drivers of Health     Financial Resource Strain: Low Risk  (4/11/2024)    Overall Financial Resource Strain (CARDIA)     Difficulty of Paying Living Expenses: Not hard at all   Food Insecurity: No Food Insecurity (4/11/2024)    Hunger Vital Sign     Worried About Running Out of Food in the Last Year: Never true     Ran Out of Food in the Last Year: Never true   Transportation Needs: No Transportation Needs (4/11/2024)    PRAPARE - Transportation     Lack of Transportation (Medical): No     Lack of Transportation (Non-Medical): No   Physical Activity: Inactive (4/11/2024)    Exercise Vital Sign     Days of Exercise per Week: 0 days     Minutes of Exercise per Session: 0 min   Stress: Stress Concern Present (4/11/2024)    [place]  [place] [employer] - Occupational Stress Questionnaire     Feeling of Stress : To some extent   Housing Stability: Low Risk  (4/11/2024)    Housing Stability Vital Sign     Unable to Pay for Housing in the Last Year: No     Number of Places Lived in the Last Year: 1     Unstable Housing in the Last Year: No

## 2025-03-10 RX ORDER — METHOCARBAMOL 750 MG/1
750 TABLET, FILM COATED ORAL 3 TIMES DAILY
Qty: 60 TABLET | Refills: 0 | Status: SHIPPED | OUTPATIENT
Start: 2025-03-10

## 2025-03-10 NOTE — TELEPHONE ENCOUNTER
No care due was identified.  [facility] Embedded Care Due Messages. Reference number: [id number].   3/10/2025 7:36:54 AM CDT

## 2025-03-11 DIAGNOSIS — Z00.00 ROUTINE ADULT HEALTH MAINTENANCE: Primary | ICD-10-CM

## 2025-03-11 DIAGNOSIS — E78.5 HYPERLIPIDEMIA, UNSPECIFIED HYPERLIPIDEMIA TYPE: ICD-10-CM

## 2025-03-13 ENCOUNTER — PATIENT MESSAGE (OUTPATIENT)
Dept: CARDIOLOGY | Facility: HOSPITAL | Age: 60
End: 2025-03-13
Payer: MEDICARE

## 2025-03-13 ENCOUNTER — OFFICE VISIT (OUTPATIENT)
Dept: CARDIOLOGY | Facility: CLINIC | Age: 60
End: 2025-03-13
Payer: MEDICARE

## 2025-03-13 ENCOUNTER — HOSPITAL ENCOUNTER (OUTPATIENT)
Dept: CARDIOLOGY | Facility: HOSPITAL | Age: 60
Discharge: HOME | End: 2025-03-13
Payer: MEDICARE

## 2025-03-13 VITALS
HEIGHT: 67 IN | DIASTOLIC BLOOD PRESSURE: 74 MMHG | HEART RATE: 77 BPM | SYSTOLIC BLOOD PRESSURE: 106 MMHG | BODY MASS INDEX: 28.58 KG/M2 | WEIGHT: 182.13 LBS

## 2025-03-13 DIAGNOSIS — E78.2 MIXED HYPERLIPIDEMIA: ICD-10-CM

## 2025-03-13 DIAGNOSIS — E78.5 HYPERLIPIDEMIA, UNSPECIFIED HYPERLIPIDEMIA TYPE: ICD-10-CM

## 2025-03-13 DIAGNOSIS — I45.10 INCOMPLETE RIGHT BUNDLE BRANCH BLOCK: Primary | ICD-10-CM

## 2025-03-13 DIAGNOSIS — Z00.00 ROUTINE ADULT HEALTH MAINTENANCE: ICD-10-CM

## 2025-03-13 LAB
OHS QRS DURATION: 110 MS
OHS QTC CALCULATION: 452 MS

## 2025-03-13 PROCEDURE — 1160F RVW MEDS BY RX/DR IN RCRD: CPT | Mod: HCNC,CPTII,S$GLB,

## 2025-03-13 PROCEDURE — 93005 ELECTROCARDIOGRAM TRACING: CPT | Mod: HCNC,PO

## 2025-03-13 PROCEDURE — 3008F BODY MASS INDEX DOCD: CPT | Mod: HCNC,CPTII,S$GLB,

## 2025-03-13 PROCEDURE — 3074F SYST BP LT 130 MM HG: CPT | Mod: HCNC,CPTII,S$GLB,

## 2025-03-13 PROCEDURE — 3078F DIAST BP <80 MM HG: CPT | Mod: HCNC,CPTII,S$GLB,

## 2025-03-13 PROCEDURE — 99214 OFFICE O/P EST MOD 30 MIN: CPT | Mod: HCNC,S$GLB,,

## 2025-03-13 PROCEDURE — 93010 ELECTROCARDIOGRAM REPORT: CPT | Mod: HCNC,,, | Performed by: INTERNAL MEDICINE

## 2025-03-13 PROCEDURE — 99999 PR PBB SHADOW E&M-EST. PATIENT-LVL IV: CPT | Mod: PBBFAC,HCNC,,

## 2025-03-13 PROCEDURE — 1159F MED LIST DOCD IN RCRD: CPT | Mod: HCNC,CPTII,S$GLB,

## 2025-03-13 NOTE — PROGRESS NOTES
Subjective:   Patient ID:  Carole Moore is a 59 y.o. female who presents for evaluation of No chief complaint on file.      HPI 59-year-old female whose current medical conditions include HLD, CVA 2001, breast cancer s/p radiation, chemotherapy, surgery previously followed in cardiology clinic by Dr. Melendez here today for CV follow-up, denies any chest pain, angina or anginal equivalent.  Blood pressure stable in clinic today reports compliance with her medications tries to watch her salt intake. EKG today NSR incomp RBBB, LAFB    LDL 7/24' 75  HGA1C 7/24 5.1  Echo 2/20 with normal EF  Past Medical History:   Diagnosis Date    Antineoplastic chemotherapy induced anemia     Back pain     Breast cancer 2016    right breast    Cancer     R Breast cancer    CVA (cerebral infarction)     Diverticulosis     Family history of colon cancer 10/30/2018    Her mother and father both had colon cancer.    Genetic testing of female 12/27/2016    [employer] (28 genes) - NEGATIVE    Hyperlipidemia     Immunodeficiency due to chemotherapy 10/03/2022    Nausea after anesthesia     Paralysis     right side    PONV (postoperative nausea and vomiting)     Stroke 2001    R side weakness    Trouble in sleeping        Past Surgical History:   Procedure Laterality Date    AUGMENTATION OF BREAST Left 2017    BREAST SURGERY      COLONOSCOPY N/A 10/30/2018    Procedure: COLONOSCOPY;  Surgeon: Cosme Monson MD;  Location: [facility];  Service: Endoscopy;  Laterality: N/A;    COLONOSCOPY N/A 12/27/2023    Procedure: COLONOSCOPY;  Surgeon: Caro Leo MD;  Location: [facility];  Service: Endoscopy;  Laterality: N/A;    COSMETIC SURGERY  December 2017    Breast Reconstruction after Mastectomy    CYSTOSCOPY WITH BIOPSY OF BLADDER N/A 11/29/2021    Procedure: CYSTOSCOPY, WITH BLADDER BIOPSY, WITH FULGURATION IF INDICATED;  Surgeon: Page Marcelo MD;  Location: [facility] OR;  Service: Urology;  Laterality: N/A;    FIXATION  KYPHOPLASTY Bilateral 02/09/2023    Procedure: KYPHOPLASTY;  Surgeon: Josse Pelayo MD;  Location: [facility] OR;  Service: Neurosurgery;  Laterality: Bilateral;  Kyphoplasty T10 with ablation    HYSTERECTOMY  2007    maintains both ovaries, menorrhagia    MASTECTOMY Right 2017    MEDIPORT INSERTION, SINGLE      RADIOFREQUENCY ABLATION, BONE, PERCUTANEOUS Bilateral 02/09/2023    Procedure: RADIOFREQUENCY ABLATION,BONE,PERCUTANEOUS;  Surgeon: Josse Pelayo MD;  Location: [facility] OR;  Service: Neurosurgery;  Laterality: Bilateral;    TONSILLECTOMY         Social History[1]    Family History   Problem Relation Name Age of Onset    Breast cancer Paternal Aunt      Colon cancer Father      Colon cancer Mother Kathy Emil     Cancer Mother Kathy Emil         colon cancer    Melanoma Maternal Uncle      Skin cancer Maternal Uncle         Medications Ordered Prior to Encounter[2]   Wt Readings from Last 3 Encounters:   03/13/25 82.6 kg (182 lb 1.6 oz)   02/27/25 83 kg (182 lb 15.7 oz)   02/27/25 83 kg (182 lb 15.7 oz)     Temp Readings from Last 3 Encounters:   02/27/25 97.9 °F (36.6 °C)   02/27/25 97.2 °F (36.2 °C) (Temporal)   01/27/25 97.9 °F (36.6 °C)     BP Readings from Last 3 Encounters:   03/13/25 106/74   02/27/25 112/76   02/27/25 133/60     Pulse Readings from Last 3 Encounters:   03/13/25 77   02/27/25 87   02/27/25 79        Review of Systems   Constitutional: Negative.   HENT: Negative.     Eyes: Negative.    Cardiovascular: Negative.    Respiratory: Negative.     Skin: Negative.    Musculoskeletal: Negative.    Gastrointestinal: Negative.    Genitourinary: Negative.    Neurological: Negative.    Psychiatric/Behavioral: Negative.         Objective:   Physical Exam  Vitals and nursing note reviewed.   Constitutional:       Appearance: Normal appearance.   HENT:      Head: Normocephalic.   Eyes:      Pupils: Pupils are equal, round, and reactive to light.   Cardiovascular:      Rate and Rhythm: Normal rate and  "regular rhythm.      Heart sounds: Normal heart sounds, S1 normal and S2 normal. No murmur heard.     No S3 or S4 sounds.   Pulmonary:      Effort: Pulmonary effort is normal.      Breath sounds: Normal breath sounds.   Abdominal:      General: Bowel sounds are normal.      Palpations: Abdomen is soft.   Musculoskeletal:         General: Normal range of motion.      Cervical back: Normal range of motion.   Skin:     Capillary Refill: Capillary refill takes less than 2 seconds.   Neurological:      General: No focal deficit present.      Mental Status: She is alert and oriented to person, place, and time.   Psychiatric:         Mood and Affect: Mood normal.         Behavior: Behavior normal.         Thought Content: Thought content normal.         Lab Results   Component Value Date    CHOL 155 07/15/2024    CHOL 177 07/14/2023    CHOL 165 09/15/2021     Lab Results   Component Value Date    HDL 67 07/15/2024    HDL 65 07/14/2023    HDL 59 09/15/2021     Lab Results   Component Value Date    LDLCALC 75.8 07/15/2024    LDLCALC 98.4 07/14/2023    LDLCALC 91.2 09/15/2021     Lab Results   Component Value Date    TRIG 61 07/15/2024    TRIG 68 07/14/2023    TRIG 74 09/15/2021     Lab Results   Component Value Date    CHOLHDL 43.2 07/15/2024    CHOLHDL 36.7 07/14/2023    CHOLHDL 35.8 09/15/2021       Chemistry        Component Value Date/Time     02/27/2025 0747    K 4.7 02/27/2025 0747     02/27/2025 0747    CO2 26 02/27/2025 0747    BUN 21 (H) 02/27/2025 0747    CREATININE 0.7 02/27/2025 0747    GLU 72 02/27/2025 0747        Component Value Date/Time    CALCIUM 9.5 02/27/2025 0747    ALKPHOS 53 02/27/2025 0747    AST 15 02/27/2025 0747    ALT 17 02/27/2025 0747    BILITOT 0.6 02/27/2025 0747    ESTGFRAFRICA >60 05/12/2022 1313    EGFRNONAA >60 05/12/2022 1313          Lab Results   Component Value Date    TSH 0.921 10/01/2024     No results found for: "INR", " ""PROTIME"  @RESUFAST(WBC,HGB,HCT,MCV,PLT)  @LABRCNTIP(BNP,BNPTRIAGEBLO)@  CrCl cannot be calculated (Patient's most recent lab result is older than the maximum 7 days allowed.).     No results found for this or any previous visit.     No results found for this or any previous visit.     Assessment:      1. Mixed hyperlipidemia        Plan:   Mixed hyperlipidemia      Statin, low-fat diet-HLP   Risk factor modifications   Daily exercise as tolerated    RTC 1year  Echo    Courtney Guillot, FNP-C [facility], Cardiology         [1]   Social History  Tobacco Use    Smoking status: Never     Passive exposure: Never    Smokeless tobacco: Never    Tobacco comments:     I was a very light smoker for 5 years   Substance Use Topics    Alcohol use: No    Drug use: No   [2]   Current Outpatient Medications on File Prior to Visit   Medication Sig Dispense Refill    ASCORBIC ACID, VITAMIN C, ORAL Take by mouth once daily.      aspirin (ECOTRIN) 81 MG EC tablet Take 81 mg by mouth once daily.      atorvastatin (LIPITOR) 10 MG tablet Take 1 tablet (10 mg total) by mouth once daily. 90 tablet 2    CALCIUM CARBONATE ORAL Take 1 tablet by mouth once daily.      cetirizine (ZYRTEC) 10 MG tablet Take 10 mg by mouth daily as needed.      CHOLECALCIFEROL, VITAMIN D3, ORAL Take by mouth once daily.      fulvestrant (FASLODEX) 250 mg/5 mL injection Inject 250 mg into the muscle every 30 days.      GADAVIST 10 mmol/10 mL (1 mmol/mL) Soln injection every 6 (six) months. Once yearly      methocarbamoL (ROBAXIN) 750 MG Tab TAKE 1 TABLET BY MOUTH THREE TIMES DAILY 60 tablet 0    multivitamin capsule Take 1 capsule by mouth once daily.      mupirocin (BACTROBAN) 2 % ointment AAA bid prn sores from cryotherapy treatment. Antibiotic ointment. 30 g 1    palbociclib (IBRANCE) 100 mg Cap Take 100 mg by mouth Daily On days 1-21 of a 28 day cycle.. 21 capsule 11    polyethylene glycol (GLYCOLAX) 17 gram/dose powder Take 17 g by mouth once daily. 510 g 11 "    prochlorperazine (COMPAZINE) 5 MG tablet TAKE 1 TABLET BY MOUTH 4 TIMES DAILY AS NEEDED FOR NAUSEA 20 tablet 0    valACYclovir (VALTREX) 500 MG tablet Take 1 tablet (500 mg total) by mouth once daily. 30 tablet 11    zoledronic 4 mg/100 mL PgBk infusion       meclizine (ANTIVERT) 25 mg tablet Take 25 mg by mouth 3 (three) times daily as needed. (Patient not taking: Reported on 3/13/2025)      ondansetron (ZOFRAN) 4 MG tablet Take 1 tablet (4 mg total) by mouth every 8 (eight) hours as needed for Nausea. (Patient not taking: Reported on 3/13/2025) 20 tablet 11     No current facility-administered medications on file prior to visit.

## 2025-03-17 ENCOUNTER — TELEPHONE (OUTPATIENT)
Dept: SURGERY | Facility: CLINIC | Age: 60
End: 2025-03-17
Payer: MEDICARE

## 2025-03-27 ENCOUNTER — TELEPHONE (OUTPATIENT)
Dept: HEMATOLOGY/ONCOLOGY | Facility: CLINIC | Age: 60
End: 2025-03-27
Payer: MEDICARE

## 2025-03-27 ENCOUNTER — LAB VISIT (OUTPATIENT)
Dept: LAB | Facility: HOSPITAL | Age: 60
End: 2025-03-27
Attending: INTERNAL MEDICINE
Payer: MEDICARE

## 2025-03-27 ENCOUNTER — OFFICE VISIT (OUTPATIENT)
Dept: HEMATOLOGY/ONCOLOGY | Facility: CLINIC | Age: 60
End: 2025-03-27
Payer: MEDICARE

## 2025-03-27 ENCOUNTER — INFUSION (OUTPATIENT)
Dept: INFUSION THERAPY | Facility: HOSPITAL | Age: 60
End: 2025-03-27
Attending: INTERNAL MEDICINE
Payer: MEDICARE

## 2025-03-27 VITALS
BODY MASS INDEX: 28.48 KG/M2 | DIASTOLIC BLOOD PRESSURE: 70 MMHG | SYSTOLIC BLOOD PRESSURE: 105 MMHG | HEIGHT: 67 IN | OXYGEN SATURATION: 97 % | WEIGHT: 181.44 LBS | RESPIRATION RATE: 18 BRPM | TEMPERATURE: 97 F | HEART RATE: 76 BPM

## 2025-03-27 VITALS
OXYGEN SATURATION: 96 % | DIASTOLIC BLOOD PRESSURE: 72 MMHG | HEIGHT: 67 IN | SYSTOLIC BLOOD PRESSURE: 110 MMHG | WEIGHT: 181.44 LBS | HEART RATE: 74 BPM | BODY MASS INDEX: 28.48 KG/M2 | TEMPERATURE: 98 F

## 2025-03-27 DIAGNOSIS — C50.111 MALIGNANT NEOPLASM OF CENTRAL PORTION OF RIGHT BREAST IN FEMALE, ESTROGEN RECEPTOR POSITIVE: ICD-10-CM

## 2025-03-27 DIAGNOSIS — C50.111 MALIGNANT NEOPLASM OF CENTRAL PORTION OF RIGHT BREAST IN FEMALE, ESTROGEN RECEPTOR POSITIVE: Primary | ICD-10-CM

## 2025-03-27 DIAGNOSIS — D61.810 PANCYTOPENIA DUE TO ANTINEOPLASTIC CHEMOTHERAPY: ICD-10-CM

## 2025-03-27 DIAGNOSIS — T45.1X5A IMMUNODEFICIENCY DUE TO CHEMOTHERAPY: ICD-10-CM

## 2025-03-27 DIAGNOSIS — D84.821 IMMUNODEFICIENCY DUE TO CHEMOTHERAPY: ICD-10-CM

## 2025-03-27 DIAGNOSIS — T45.1X5A PANCYTOPENIA DUE TO ANTINEOPLASTIC CHEMOTHERAPY: ICD-10-CM

## 2025-03-27 DIAGNOSIS — C79.51 SECONDARY MALIGNANT NEOPLASM OF BONE: ICD-10-CM

## 2025-03-27 DIAGNOSIS — Z17.0 MALIGNANT NEOPLASM OF CENTRAL PORTION OF RIGHT BREAST IN FEMALE, ESTROGEN RECEPTOR POSITIVE: Primary | ICD-10-CM

## 2025-03-27 DIAGNOSIS — Z79.899 IMMUNODEFICIENCY DUE TO CHEMOTHERAPY: ICD-10-CM

## 2025-03-27 DIAGNOSIS — Z17.0 MALIGNANT NEOPLASM OF CENTRAL PORTION OF RIGHT BREAST IN FEMALE, ESTROGEN RECEPTOR POSITIVE: ICD-10-CM

## 2025-03-27 LAB
ABSOLUTE NEUTROPHIL MANUAL (OHS): 0.6 K/UL
ALBUMIN SERPL BCP-MCNC: 3.8 G/DL (ref 3.5–5.2)
ALP SERPL-CCNC: 46 UNIT/L (ref 40–150)
ALT SERPL W/O P-5'-P-CCNC: 19 UNIT/L (ref 10–44)
ANION GAP (OHS): 13 MMOL/L (ref 8–16)
AST SERPL-CCNC: 17 UNIT/L (ref 11–45)
BILIRUB SERPL-MCNC: 1.1 MG/DL (ref 0.1–1)
BUN SERPL-MCNC: 17 MG/DL (ref 6–20)
CALCIUM SERPL-MCNC: 9.7 MG/DL (ref 8.7–10.5)
CHLORIDE SERPL-SCNC: 105 MMOL/L (ref 95–110)
CO2 SERPL-SCNC: 23 MMOL/L (ref 23–29)
CREAT SERPL-MCNC: 0.8 MG/DL (ref 0.5–1.4)
CRP SERPL-MCNC: 0.9 MG/L
ERYTHROCYTE [DISTWIDTH] IN BLOOD BY AUTOMATED COUNT: 13.5 % (ref 11.5–14.5)
GFR SERPLBLD CREATININE-BSD FMLA CKD-EPI: >60 ML/MIN/1.73/M2
GLUCOSE SERPL-MCNC: 96 MG/DL (ref 70–110)
HCT VFR BLD AUTO: 37.1 % (ref 37–48.5)
HGB BLD-MCNC: 12.7 GM/DL (ref 12–16)
LDH SERPL-CCNC: 193 U/L (ref 110–260)
LYMPHOCYTES NFR BLD MANUAL: 55.4 % (ref 18–48)
MCH RBC QN AUTO: 35 PG (ref 27–50)
MCHC RBC AUTO-ENTMCNC: 34.2 G/DL (ref 32–36)
MCV RBC AUTO: 102 FL (ref 82–98)
MONOCYTES NFR BLD MANUAL: 8.9 % (ref 4–15)
NEUTROPHILS NFR BLD MANUAL: 35.6 % (ref 38–73)
NUCLEATED RBC (/100WBC) (OHS): 0 /100 WBC
PLATELET # BLD AUTO: 160 K/UL (ref 150–450)
PMV BLD AUTO: 8.8 FL (ref 9.2–12.9)
POTASSIUM SERPL-SCNC: 4.4 MMOL/L (ref 3.5–5.1)
PROT SERPL-MCNC: 7.1 GM/DL (ref 6–8.4)
RBC # BLD AUTO: 3.63 M/UL (ref 4–5.4)
SODIUM SERPL-SCNC: 141 MMOL/L (ref 136–145)
WBC # BLD AUTO: 1.79 K/UL (ref 3.9–12.7)

## 2025-03-27 PROCEDURE — 84075 ASSAY ALKALINE PHOSPHATASE: CPT | Mod: HCNC

## 2025-03-27 PROCEDURE — 63600175 PHARM REV CODE 636 W HCPCS: Mod: JZ,TB,HCNC | Performed by: INTERNAL MEDICINE

## 2025-03-27 PROCEDURE — 99999 PR PBB SHADOW E&M-EST. PATIENT-LVL IV: CPT | Mod: PBBFAC,HCNC,, | Performed by: INTERNAL MEDICINE

## 2025-03-27 PROCEDURE — 86140 C-REACTIVE PROTEIN: CPT | Mod: HCNC

## 2025-03-27 PROCEDURE — 96402 CHEMO HORMON ANTINEOPL SQ/IM: CPT | Mod: HCNC

## 2025-03-27 PROCEDURE — 83615 LACTATE (LD) (LDH) ENZYME: CPT | Mod: HCNC

## 2025-03-27 PROCEDURE — 36415 COLL VENOUS BLD VENIPUNCTURE: CPT | Mod: HCNC

## 2025-03-27 PROCEDURE — 85007 BL SMEAR W/DIFF WBC COUNT: CPT | Mod: HCNC

## 2025-03-27 RX ORDER — LAMOTRIGINE 25 MG/1
500 TABLET ORAL
Status: CANCELLED | OUTPATIENT
Start: 2025-03-27

## 2025-03-27 RX ORDER — LAMOTRIGINE 25 MG/1
500 TABLET ORAL
Status: COMPLETED | OUTPATIENT
Start: 2025-03-27 | End: 2025-03-27

## 2025-03-27 RX ADMIN — FULVESTRANT 500 MG: 50 INJECTION, SOLUTION INTRAMUSCULAR at 09:03

## 2025-03-27 NOTE — PLAN OF CARE
Plan of care reviewed with patient. Discussed if there are any new or ongoing concerns. Denies.  Problem: Adult Inpatient Plan of Care  Goal: Plan of Care Review  Description: Patient here today for her Faslodex injections.  Flowsheets (Taken 3/27/2025 1409)  Plan of Care Reviewed With: patient  Goal: Absence of Hospital-Acquired Illness or Injury  Intervention: Identify and Manage Fall Risk  Flowsheets (Taken 3/27/2025 1409)  Safety Promotion/Fall Prevention: in recliner, wheels locked  Goal: Optimal Comfort and Wellbeing  Description: Patient likes injections dorsogluteal and to stand during administration.  Intervention: Provide Person-Centered Care  Flowsheets (Taken 3/27/2025 1400)  Trust Relationship/Rapport:   care explained   reassurance provided   thoughts/feelings acknowledged   choices provided   emotional support provided   empathic listening provided   questions answered   questions encouraged

## 2025-03-27 NOTE — TELEPHONE ENCOUNTER
----- Message from MARGAUX Garcia sent at 3/27/2025  9:17 AM CDT -----  Regarding: FW: Critical Lab    ----- Message -----  From: Harpal Khan MD  Sent: 3/27/2025   9:00 AM CDT  To: [facility] Staff  Subject: RE: Critical Lab                                 Please tell her not to start her Ibrance repeat CBC in 7-10 days  ----- Message -----  From: Nabeel Welch LPN  Sent: 3/27/2025   8:31 AM CDT  To: Harpal Khan MD  Subject: Critical Lab                                     Critical Lab of WBC 1.79

## 2025-03-27 NOTE — PROGRESS NOTES
Subjective:       Patient ID: Carole Moore is a 59 y.o. female.    Chief Complaint: Results, Chemotherapy, and Breast Cancer    HPI:  59-year-old female history of metastatic breast cancer.  Patient continues on Faslodex and Ibrance reduced dose of Ibrance 100 mg patient is currently day 23 of last cycle.  Patient denies nausea vomiting fevers chills night sweats    Past Medical History:   Diagnosis Date    Antineoplastic chemotherapy induced anemia     Back pain     Breast cancer 2016    right breast    Cancer     R Breast cancer    CVA (cerebral infarction)     Diverticulosis     Family history of colon cancer 10/30/2018    Her mother and father both had colon cancer.    Genetic testing of female 12/27/2016    [employer] (28 genes) - NEGATIVE    Hyperlipidemia     Immunodeficiency due to chemotherapy 10/03/2022    Nausea after anesthesia     Paralysis     right side    PONV (postoperative nausea and vomiting)     Stroke 2001    R side weakness    Trouble in sleeping      Family History   Problem Relation Name Age of Onset    Breast cancer Paternal Aunt      Colon cancer Father      Colon cancer Mother Kathy Stein     Cancer Mother Kathy Stein         colon cancer    Melanoma Maternal Uncle      Skin cancer Maternal Uncle       Social History[1]  Past Surgical History:   Procedure Laterality Date    AUGMENTATION OF BREAST Left 2017    BREAST SURGERY      COLONOSCOPY N/A 10/30/2018    Procedure: COLONOSCOPY;  Surgeon: Cosme Monson MD;  Location: [facility];  Service: Endoscopy;  Laterality: N/A;    COLONOSCOPY N/A 12/27/2023    Procedure: COLONOSCOPY;  Surgeon: Caro Leo MD;  [facility]: [facility];  Service: Endoscopy;  Laterality: N/A;    COSMETIC SURGERY  December 2017    Breast Reconstruction after Mastectomy    CYSTOSCOPY WITH BIOPSY OF BLADDER N/A 11/29/2021    Procedure: CYSTOSCOPY, WITH BLADDER BIOPSY, WITH FULGURATION IF INDICATED;  Surgeon: Page Marcelo MD;  Location: [facility] OR;   "Service: Urology;  Laterality: N/A;    FIXATION KYPHOPLASTY Bilateral 02/09/2023    Procedure: KYPHOPLASTY;  Surgeon: Josse Pelayo MD;  Location: [facility] OR;  Service: Neurosurgery;  Laterality: Bilateral;  Kyphoplasty T10 with ablation    HYSTERECTOMY  2007    maintains both ovaries, menorrhagia    MASTECTOMY Right 2017    MEDIPORT INSERTION, SINGLE      RADIOFREQUENCY ABLATION, BONE, PERCUTANEOUS Bilateral 02/09/2023    Procedure: RADIOFREQUENCY ABLATION,BONE,PERCUTANEOUS;  Surgeon: Josse Pelayo MD;  Location: [facility] OR;  Service: Neurosurgery;  Laterality: Bilateral;    TONSILLECTOMY         Labs:  Lab Results   Component Value Date    WBC 2.76 (L) 02/27/2025    HGB 12.4 02/27/2025    HCT 37.5 02/27/2025     (H) 02/27/2025     02/27/2025     BMP  Lab Results   Component Value Date     02/27/2025    K 4.7 02/27/2025     02/27/2025    CO2 26 02/27/2025    BUN 21 (H) 02/27/2025    CREATININE 0.7 02/27/2025    CALCIUM 9.5 02/27/2025    ANIONGAP 8 02/27/2025    ESTGFRAFRICA >60 05/12/2022    EGFRNONAA >60 05/12/2022     Lab Results   Component Value Date    ALT 17 02/27/2025    AST 15 02/27/2025    ALKPHOS 53 02/27/2025    BILITOT 0.6 02/27/2025       No results found for: "IRON", "TIBC", "FERRITIN", "SATURATEDIRO"  No results found for: "KNNIBCBC30"  No results found for: "FOLATE"  Lab Results   Component Value Date    TSH 0.921 10/01/2024         Review of Systems   Constitutional:  Negative for activity change, appetite change, chills, diaphoresis, fatigue, fever and unexpected weight change.   HENT:  Negative for congestion, dental problem, drooling, ear discharge, ear pain, facial swelling, hearing loss, mouth sores, nosebleeds, postnasal drip, rhinorrhea, sinus pressure, sneezing, sore throat, tinnitus, trouble swallowing and voice change.    Eyes:  Negative for photophobia, pain, discharge, redness, itching and visual disturbance.   Respiratory:  Negative for cough, choking, chest " tightness, shortness of breath, wheezing and stridor.    Cardiovascular:  Negative for chest pain, palpitations and leg swelling.   Gastrointestinal:  Negative for abdominal distention, abdominal pain, anal bleeding, blood in stool, constipation, diarrhea, nausea, rectal pain and vomiting.   Endocrine: Negative for cold intolerance, heat intolerance, polydipsia, polyphagia and polyuria.   Genitourinary:  Negative for decreased urine volume, difficulty urinating, dyspareunia, dysuria, enuresis, flank pain, frequency, genital sores, hematuria, menstrual problem, pelvic pain, urgency, vaginal bleeding, vaginal discharge and vaginal pain.   Musculoskeletal:  Positive for gait problem. Negative for arthralgias, back pain, joint swelling, myalgias, neck pain and neck stiffness.   Skin:  Negative for color change, pallor and rash.   Allergic/Immunologic: Negative for environmental allergies, food allergies and immunocompromised state.   Neurological:  Negative for dizziness, tremors, seizures, syncope, facial asymmetry, speech difficulty, weakness, light-headedness, numbness and headaches.   Hematological:  Negative for adenopathy. Does not bruise/bleed easily.   Psychiatric/Behavioral:  Negative for agitation, behavioral problems, confusion, decreased concentration, dysphoric mood, hallucinations, self-injury, sleep disturbance and suicidal ideas. The patient is not nervous/anxious and is not hyperactive.        Objective:      Physical Exam  Vitals reviewed.   Constitutional:       General: She is not in acute distress.     Appearance: She is well-developed. She is not diaphoretic.   HENT:      Head: Normocephalic and atraumatic.      Right Ear: External ear normal.      Left Ear: External ear normal.      Nose: Nose normal.      Right Sinus: No maxillary sinus tenderness or frontal sinus tenderness.      Left Sinus: No maxillary sinus tenderness or frontal sinus tenderness.      Mouth/Throat:      Pharynx: No  oropharyngeal exudate.   Eyes:      General: Lids are normal. No scleral icterus.        Right eye: No discharge.         Left eye: No discharge.      Conjunctiva/sclera: Conjunctivae normal.      Right eye: Right conjunctiva is not injected. No hemorrhage.     Left eye: Left conjunctiva is not injected. No hemorrhage.     Pupils: Pupils are equal, round, and reactive to light.   Neck:      Thyroid: No thyromegaly.      Vascular: No JVD.      Trachea: No tracheal deviation.   Cardiovascular:      Rate and Rhythm: Normal rate.   Pulmonary:      Effort: Pulmonary effort is normal. No respiratory distress.      Breath sounds: No stridor.   Chest:      Chest wall: No tenderness.   Abdominal:      General: Bowel sounds are normal. There is no distension.      Palpations: Abdomen is soft. There is no hepatomegaly, splenomegaly or mass.      Tenderness: There is no abdominal tenderness. There is no rebound.   Musculoskeletal:         General: No tenderness. Normal range of motion.      Cervical back: Normal range of motion and neck supple.   Lymphadenopathy:      Cervical: No cervical adenopathy.      Upper Body:      Right upper body: No supraclavicular adenopathy.      Left upper body: No supraclavicular adenopathy.   Skin:     General: Skin is dry.      Findings: No erythema or rash.   Neurological:      Mental Status: She is alert and oriented to person, place, and time.      Cranial Nerves: No cranial nerve deficit.      Coordination: Coordination abnormal.      Gait: Gait abnormal.   Psychiatric:         Behavior: Behavior normal.         Thought Content: Thought content normal.         Judgment: Judgment normal.             Assessment:      1. Malignant neoplasm of central portion of right breast in female, estrogen receptor positive    2. Immunodeficiency due to chemotherapy    3. Secondary malignant neoplasm of bone    4. Pancytopenia due to antineoplastic chemotherapy           Med Onc Chart Routing      Follow  up with physician . Return in 1 month with CBC CMP and PET scan prior to next dosing of Faslodex   Follow up with [facility]    Infusion scheduling note    Injection scheduling note Faslodex Q 28 days   Labs    Imaging    Pharmacy appointment    Other referrals                   Plan:     Awaiting results of CBC to see whether not to resume Ibrance at day 28.  In that reduced dosing variant patient will have PET scan done in 1 month last 1 in November of 2024.  Orders written reviewed        Harpal Khan Jr, MD FACP         [1]   Social History  Socioeconomic History    Marital status:    Tobacco Use    Smoking status: Never     Passive exposure: Never    Smokeless tobacco: Never    Tobacco comments:     I was a very light smoker for 5 years   Substance and Sexual Activity    Alcohol use: No    Drug use: No    Sexual activity: Not Currently     Partners: Male     Birth control/protection: Surgical     Comment: hysterectomy   Other Topics Concern    Are you pregnant or think you may be? No    Breast-feeding No     Social Drivers of Health     Financial Resource Strain: Low Risk  (4/11/2024)    Overall Financial Resource Strain (CARDIA)     Difficulty of Paying Living Expenses: Not hard at all   Food Insecurity: No Food Insecurity (4/11/2024)    Hunger Vital Sign     Worried About Running Out of Food in the Last Year: Never true     Ran Out of Food in the Last Year: Never true   Transportation Needs: No Transportation Needs (4/11/2024)    PRAPARE - Transportation     Lack of Transportation (Medical): No     Lack of Transportation (Non-Medical): No   Physical Activity: Inactive (4/11/2024)    Exercise Vital Sign     Days of Exercise per Week: 0 days     Minutes of Exercise per Session: 0 min   Stress: Stress Concern Present (4/11/2024)    [employer] [place] [employer] - Occupational Stress Questionnaire     Feeling of Stress : To some extent   Housing Stability: Low Risk  (4/11/2024)    Housing Stability  Vital Sign     Unable to Pay for Housing in the Last Year: No     Number of Places Lived in the Last Year: 1     Unstable Housing in the Last Year: No

## 2025-04-02 ENCOUNTER — OFFICE VISIT (OUTPATIENT)
Dept: SURGERY | Facility: CLINIC | Age: 60
End: 2025-04-02
Payer: MEDICARE

## 2025-04-02 VITALS — BODY MASS INDEX: 28.18 KG/M2 | WEIGHT: 179.88 LBS

## 2025-04-02 DIAGNOSIS — C50.111 MALIGNANT NEOPLASM OF CENTRAL PORTION OF RIGHT BREAST IN FEMALE, ESTROGEN RECEPTOR POSITIVE: Primary | ICD-10-CM

## 2025-04-02 DIAGNOSIS — Z17.0 MALIGNANT NEOPLASM OF CENTRAL PORTION OF RIGHT BREAST IN FEMALE, ESTROGEN RECEPTOR POSITIVE: Primary | ICD-10-CM

## 2025-04-02 DIAGNOSIS — C79.51 SECONDARY MALIGNANT NEOPLASM OF BONE: ICD-10-CM

## 2025-04-02 PROCEDURE — 99999 PR PBB SHADOW E&M-EST. PATIENT-LVL III: CPT | Mod: PBBFAC,HCNC,,

## 2025-04-02 NOTE — PROGRESS NOTES
Subjective:       Patient ID: Carole Moore is a 59 y.o. female.    Chief Complaint: breast cancer surveillance      HPI 59-year-old  female presents for breast cancer surveilence. She is doing well today and denies any breast complaints. Denies any mass to the right chest wall,  left breast pain, masses, skin changes, nipple dimpling, nipple discharge, or axillary masses. The patient is currently followed by Dr. Daniel Khan and is currently on Faslodex and Ibrance. She has a PET upcoming. She decided not to proceed with any neurosurgical intervention for her spine.       Her cancer history is as follows:  2/2017-Right-sided breast cancer: Stage IIIA invasive lobular carcinoma, ER/MS positive, [id number] neg. BRCA negative. Given large mass on physical examination, treated with neoadjuvant chemotherapy, using dose-dense AC-T regimen, which patient completed in 2/2017 with decrease in size and firmness of R breast mass. She then underwent R breast mastectomy and SLND 4/2017, with tissue expander placement. Final pathology after neoadjuvant chemotherapy showed tumor 6cm, sentinel LNs positive, and nipple skin positive, we did recommend adjuvant radiation to R chest and axilla. Her case was discussed at tumor board and while axillary LN dissection was discussed, adjuvant radiation to the left chest and axilla were recommended which she completed 7/24/2017- 28 treatments.  She had new onset back pain in August 2022 and MRI revealed metastatic disease in the spine.          PAST MEDICAL HISTORY:   1.  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  Dyslipidemia  3.  Osteopenia - resolved  4.  Right breast cancer- Stage IIIA invasive lobular carcinoma, ER/MS positive, Qab2Pmx neg. BRCA negative. Bone metastatic disease 8/2022  5.  Heart failure     SURGICAL HISTORY:   1.  Tonsillectomy  2.  Hysterectomy  3.  Right breast mastectomy with with right deep axilla  sentinel lymph node biopsy followed by breast reconstruction with tissue expander done on April 11, 2017 and reconstruction completed in Dec 2017.  4.  Mediport placement and removal     FAMILY HISTORY: She reports that 2 paternal aunts had breast cancer in their late 50s.  A maternal uncle had lung cancer the age of 54.  He used to smoke cigarettes.  A maternal uncle had melanoma at the age of 49.  Her father had colon cancer at the age of 70.  Her mother had colon cancer in her 70s.  She denies any other immediate family members with cancer or bleeding/clotting disorders.     SOCIAL HISTORY: The reports a 5-pack-year smoking history and quit at the age of 23.  She drinks alcohol socially.  She has never used any recreational drugs.  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  She is  and has 2 children.  She lives in [place], [place].     ALLERGIES: [NKDA]     MEDICATIONS: reviewed and reconciled    Interval History:     Persistent symptoms/side effects of treatment: fatigue - back pain that is persistent from metastatic disease    Functional changes: ROM, neuropathy, weakness or fatigue- not able to exercise daily due to back pain- has right arm ROM issues due to paralysis from previous CVA    Psychosocial challenges- anxiety related to potential further spread of cancer- states it is improved- staying busy- does not talk with family about it - states it hits her when she is trying to sleep at night- has not seen any counselors- states coping better now    Lymphedema- stable    Diet/Nutrition- wt stable-   Sexual Dysfunction or challenges - none    Review of Systems   Constitutional:  Negative for chills, fatigue, fever and unexpected weight change.   Respiratory:  Negative for cough, shortness of breath, wheezing and stridor.    Cardiovascular:  Negative for chest pain, palpitations and leg swelling.   Gastrointestinal:  Negative for abdominal distention, abdominal pain,  constipation, diarrhea, nausea and vomiting.   Genitourinary:  Negative for difficulty urinating, dysuria, frequency, hematuria and urgency.   Musculoskeletal:  Positive for back pain (Stable/improving).   Skin:  Negative for color change, pallor, rash and wound.   Hematological:  Does not bruise/bleed easily.       Breast - no mass, pain or discharge  Objective:        Physical Exam  Constitutional:       General: She is not in acute distress.     Appearance: Normal appearance. She is well-developed. She is not toxic-appearing.   HENT:      Head: Normocephalic and atraumatic.      Right Ear: External ear normal.      Left Ear: External ear normal.      Mouth/Throat:      Pharynx: No oropharyngeal exudate.   Eyes:      General: No scleral icterus.        Right eye: No discharge.         Left eye: No discharge.      Extraocular Movements: Extraocular movements intact.      Conjunctiva/sclera: Conjunctivae normal.   Neck:      Thyroid: No thyromegaly.   Pulmonary:      Effort: Pulmonary effort is normal. No respiratory distress.   Chest:   Breasts:     Right: No mass, skin change or tenderness.      Left: No inverted nipple, mass, nipple discharge, skin change or tenderness.          Comments: Well healed scar with underlying implant to right chest wall. No palpable masses overlying implant. Left breast and axillary exam WNL.   Musculoskeletal:         General: Normal range of motion.      Cervical back: Normal range of motion and neck supple.   Lymphadenopathy:      Head:      Right side of head: No submental, submandibular, tonsillar, preauricular, posterior auricular or occipital adenopathy.      Left side of head: No submental, submandibular, tonsillar, preauricular, posterior auricular or occipital adenopathy.      Cervical: No cervical adenopathy.      Right cervical: No superficial or posterior cervical adenopathy.     Left cervical: No superficial or posterior cervical adenopathy.      Upper Body:      Right  upper body: No supraclavicular or axillary adenopathy.      Left upper body: No supraclavicular or axillary adenopathy.   Skin:     General: Skin is warm and dry.      Coloration: Skin is not pale.      Findings: No erythema or rash.   Neurological:      General: No focal deficit present.      Mental Status: She is alert and oriented to person, place, and time.      Comments: Right upper extremity hemiparesis noted   Psychiatric:         Mood and Affect: Mood normal.         Behavior: Behavior normal.         Thought Content: Thought content normal.         Judgment: Judgment normal.         Mammo Left  12/2024:  Findings:  This procedure was performed using tomosynthesis.   Computer-aided detection was utilized in the interpretation of this examination.     The breasts are heterogeneously dense, which may obscure small masses. There is no evidence of suspicious masses, microcalcifications or architectural distortion.     Impression:   No mammographic evidence of malignancy.     BI-RADS Category 1: Negative     Recommendation:  Routine screening mammogram in 1 year is recommended.     Assessment:       1. Malignant neoplasm of central portion of right breast in female, estrogen receptor positive    2. Secondary malignant neoplasm of bone              Plan:     Carole Moore has a normal breast exam today. We discussed the possible signs and symptoms of breast cancer as lump, masses, new asymmetries, skin changes and nipple changes. She is encouraged to contact me if any new breast concerns arise.        Encouraged healthy diet and exercise to maintain healthy weight to reduce breast cancer risk. Discussed that the underlying cause of increased breast cancer risk in patients with excess body weight is excess estrogen produced and stored in excess adipose tissue.    The patient is due for her next breast imaging in December which will consist of left breast diagnostic mammogram.    She will continue to follow-up with  oncology for ongoing treatment.     The patient will return to clinic in 6 months for CBE.    All questions answered. The patient will call with any questions or concerns.         Pat Paredes PA-C  [facility]        I spent a total of 30 minutes of the day on this visit. This includes face to face time and non-face to face time preparing to see the patient (eg, review of tests), obtaining and/or reviewing separately obtained history, documenting clinical information in the electronic or other health record, independently interpreting results and communicating results to the patient/family/caregiver, or care coordinator.

## 2025-04-03 ENCOUNTER — LAB VISIT (OUTPATIENT)
Dept: LAB | Facility: HOSPITAL | Age: 60
End: 2025-04-03
Attending: INTERNAL MEDICINE
Payer: MEDICARE

## 2025-04-03 DIAGNOSIS — Z17.0 MALIGNANT NEOPLASM OF CENTRAL PORTION OF RIGHT BREAST IN FEMALE, ESTROGEN RECEPTOR POSITIVE: ICD-10-CM

## 2025-04-03 DIAGNOSIS — C50.111 MALIGNANT NEOPLASM OF CENTRAL PORTION OF RIGHT BREAST IN FEMALE, ESTROGEN RECEPTOR POSITIVE: ICD-10-CM

## 2025-04-03 LAB
ABSOLUTE EOSINOPHIL (OHS): 0.09 K/UL
ABSOLUTE MONOCYTE (OHS): 0.5 K/UL (ref 0.3–1)
ABSOLUTE NEUTROPHIL COUNT (OHS): 0.93 K/UL (ref 1.8–7.7)
BASOPHILS # BLD AUTO: 0.06 K/UL
BASOPHILS NFR BLD AUTO: 2.3 %
ERYTHROCYTE [DISTWIDTH] IN BLOOD BY AUTOMATED COUNT: 13.8 % (ref 11.5–14.5)
HCT VFR BLD AUTO: 39.3 % (ref 37–48.5)
HGB BLD-MCNC: 13 GM/DL (ref 12–16)
IMM GRANULOCYTES # BLD AUTO: 0 K/UL (ref 0–0.04)
IMM GRANULOCYTES NFR BLD AUTO: 0 % (ref 0–0.5)
LYMPHOCYTES # BLD AUTO: 1.08 K/UL (ref 1–4.8)
MCH RBC QN AUTO: 34.3 PG (ref 27–31)
MCHC RBC AUTO-ENTMCNC: 33.1 G/DL (ref 32–36)
MCV RBC AUTO: 104 FL (ref 82–98)
NUCLEATED RBC (/100WBC) (OHS): 0 /100 WBC
PLATELET # BLD AUTO: 239 K/UL (ref 150–450)
PMV BLD AUTO: 9.3 FL (ref 9.2–12.9)
RBC # BLD AUTO: 3.79 M/UL (ref 4–5.4)
RELATIVE EOSINOPHIL (OHS): 3.4 %
RELATIVE LYMPHOCYTE (OHS): 40.6 % (ref 18–48)
RELATIVE MONOCYTE (OHS): 18.8 % (ref 4–15)
RELATIVE NEUTROPHIL (OHS): 34.9 % (ref 38–73)
WBC # BLD AUTO: 2.66 K/UL (ref 3.9–12.7)

## 2025-04-03 PROCEDURE — 85025 COMPLETE CBC W/AUTO DIFF WBC: CPT | Mod: HCNC

## 2025-04-03 PROCEDURE — 36415 COLL VENOUS BLD VENIPUNCTURE: CPT | Mod: HCNC,PO

## 2025-04-04 ENCOUNTER — TELEPHONE (OUTPATIENT)
Dept: ADMINISTRATIVE | Facility: CLINIC | Age: 60
End: 2025-04-04
Payer: MEDICARE

## 2025-04-04 ENCOUNTER — RESULTS FOLLOW-UP (OUTPATIENT)
Dept: HEMATOLOGY/ONCOLOGY | Facility: CLINIC | Age: 60
End: 2025-04-04

## 2025-04-04 NOTE — TELEPHONE ENCOUNTER
Called pt; informed pt I was calling to confirm her virtual EAWV on 4/7/25 at 7:45am and to see if she needed any help; pt stated she did not need any help and would complete e-pre check later today; pt informed to login 10 minutes prior to appt time

## 2025-04-07 ENCOUNTER — OFFICE VISIT (OUTPATIENT)
Dept: FAMILY MEDICINE | Facility: CLINIC | Age: 60
End: 2025-04-07
Payer: MEDICARE

## 2025-04-07 DIAGNOSIS — D84.821 IMMUNODEFICIENCY DUE TO CHEMOTHERAPY: ICD-10-CM
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
## 2025-04-07 DIAGNOSIS — N30.80 CYSTITIS CYSTICA: ICD-10-CM

## 2025-04-07 DIAGNOSIS — Z17.0 MALIGNANT NEOPLASM OF CENTRAL PORTION OF RIGHT BREAST IN FEMALE, ESTROGEN RECEPTOR POSITIVE: ICD-10-CM

## 2025-04-07 DIAGNOSIS — Z00.00 ENCOUNTER FOR MEDICARE ANNUAL WELLNESS EXAM: Primary | ICD-10-CM

## 2025-04-07 DIAGNOSIS — Z86.73 HISTORY OF CVA (CEREBROVASCULAR ACCIDENT): ICD-10-CM

## 2025-04-07 DIAGNOSIS — T45.1X5A IMMUNODEFICIENCY DUE TO CHEMOTHERAPY: ICD-10-CM

## 2025-04-07 DIAGNOSIS — C79.51 SECONDARY MALIGNANT NEOPLASM OF BONE: ICD-10-CM

## 2025-04-07 DIAGNOSIS — R31.29 MICROHEMATURIA: ICD-10-CM

## 2025-04-07 DIAGNOSIS — D61.810 PANCYTOPENIA DUE TO ANTINEOPLASTIC CHEMOTHERAPY: ICD-10-CM

## 2025-04-07 DIAGNOSIS — Z86.0100 HISTORY OF COLONIC POLYPS: ICD-10-CM

## 2025-04-07 DIAGNOSIS — S22.070S COMPRESSION FRACTURE OF T10 VERTEBRA, SEQUELA: ICD-10-CM

## 2025-04-07 DIAGNOSIS — Z79.69 IMMUNODEFICIENCY DUE TO CHEMOTHERAPY: ICD-10-CM

## 2025-04-07 DIAGNOSIS — R53.1 RIGHT SIDED WEAKNESS: ICD-10-CM

## 2025-04-07 DIAGNOSIS — C50.111 MALIGNANT NEOPLASM OF CENTRAL PORTION OF RIGHT BREAST IN FEMALE, ESTROGEN RECEPTOR POSITIVE: ICD-10-CM

## 2025-04-07 DIAGNOSIS — E78.5 HYPERLIPIDEMIA, UNSPECIFIED HYPERLIPIDEMIA TYPE: ICD-10-CM

## 2025-04-07 DIAGNOSIS — T45.1X5A PANCYTOPENIA DUE TO ANTINEOPLASTIC CHEMOTHERAPY: ICD-10-CM

## 2025-04-07 DIAGNOSIS — I69.351 HEMIPARESIS AFFECTING RIGHT SIDE AS LATE EFFECT OF CEREBROVASCULAR ACCIDENT: ICD-10-CM

## 2025-04-07 DIAGNOSIS — M85.89 OSTEOPENIA OF MULTIPLE SITES: ICD-10-CM

## 2025-04-07 DIAGNOSIS — D84.822 IMMUNODEFICIENCY SECONDARY TO RADIATION THERAPY: ICD-10-CM

## 2025-04-07 NOTE — PROGRESS NOTES
The patient location is: [place]  The chief complaint leading to consultation is: AWV    Visit type: audiovisual    Face to Face time with patient: 31 minutes  45 minutes of total time spent on the encounter, which includes face to face time and non-face to face time preparing to see the patient (eg, review of tests), Obtaining and/or reviewing separately obtained history, Documenting clinical information in the electronic or other health record, Independently interpreting results (not separately reported) and communicating results to the patient/family/caregiver, or Care coordination (not separately reported).         Each patient to whom he or she provides medical services by telemedicine is:  (1) informed of the relationship between the physician and patient and the respective role of any other health care provider with respect to management of the patient; and (2) notified that he or she may decline to receive medical services by telemedicine and may withdraw from such care at any time.    Notes:                         Carole Moore presented for a  Medicare AWV and comprehensive Health Risk Assessment today. The following components were reviewed and updated:    Medical history  Family History  Social history  Allergies and Current Medications  Health Risk Assessment  Health Maintenance  Care Team         ** See Completed Assessments for Annual Wellness Visit within the encounter summary.**         The following assessments were completed:  Living Situation  CAGE  Depression Screening  Fall Risk Assessment (MACH 10)  Hearing Assessment(HHI)  Cognitive Function Screening  Nutrition Screening  ADL Screening  PAQ Screening    Opioid documentation:      Patient does not have a current opioid prescription.        There were no vitals filed for this visit.  There is no height or weight on file to calculate BMI.  Physical Exam  Constitutional:       General: She is not in acute distress.     Appearance: She is  not ill-appearing or diaphoretic.   Pulmonary:      Effort: Pulmonary effort is normal. No respiratory distress.   Neurological:      Mental Status: She is alert and oriented to person, place, and time. Mental status is at baseline.   Psychiatric:         Mood and Affect: Mood normal.         Behavior: Behavior normal.         Thought Content: Thought content normal.         Judgment: Judgment normal.           Diagnoses and health risks identified today and associated recommendations/orders:    1. Encounter for Medicare annual wellness exam    Patient states she does not drink alcohol    I offered to discuss advanced care planning, including how to pick a person who would make decisions for you if you were unable to make them for yourself, called a health care power of , and what kind of decisions you might make such as use of life sustaining treatments such as ventilators and tube feeding when faced with a life limiting illness recorded on a living will that they will need to know. (How you want to be cared for as you near the end of your natural life)     X Patient is interested in learning more about how to make advanced directives.  I provided them paperwork and offered to discuss this with them.    2. Secondary malignant neoplasm of bone, Pancytopenia due to antineoplastic chemotherapy, Malignant neoplasm of central portion of right breast in female, estrogen receptor positive, Immunodeficiency due to chemotherapy, Immunodeficiency secondary to radiation therapy  Monitor  Follow up with Oncology as directed  Patient currently receiving chemotherapy treatments as per Oncology    3. Microhematuria, Cystitis cystica    Monitor  Follow up as needed, directed     4. History of CVA (cerebrovascular accident), Hemiparesis affecting right side as late effect of cerebrovascular accident, Right sided weakness   Monitor  Follow up as needed, directed  Fall precautions  Chair yoga encouraged  Patient to check with  Oncology to see if ok to ride 3 wheel bike 3 x week     5. Compression fracture of T10 vertebra, sequela   Monitor  Follow up as needed, directed     6. Hyperlipidemia, unspecified hyperlipidemia type  Monitor  Follow up with PCP  Continue home asa, lipitor    7. History of colonic polyps  Monitor  Follow up with GI as directed     8. Osteopenia of multiple sites  Monitor  Follow up with PCP  Continue home calcium, vitamin D3, reclast, MVI                                             Provided Carole with a 5-10 year written screening schedule and personal prevention plan. Recommendations were developed using the USPSTF age appropriate recommendations. Education, counseling, and referrals were provided as needed. After Visit Summary printed and given to patient which includes a list of additional screenings\tests needed.    Follow up in about 1 year (around 4/7/2026) for Annual wellness visit.    MAREK Florentino

## 2025-04-07 NOTE — PATIENT INSTRUCTIONS
Counseling and Referral of Other Preventative  (Italic type indicates deductible and co-insurance are waived)    Patient Name: Carole Moore  Today's Date: 4/7/2025    Health Maintenance       Date Due Completion Date    COVID-19 Vaccine (6 - 2024-25 season) 09/01/2024 9/30/2023    Lipid Panel 07/15/2025 7/15/2024    Mammogram 12/06/2025 12/6/2024    High Dose Statin 04/07/2026 4/7/2025    Hemoglobin A1c (Diabetic Prevention Screening) 07/15/2027 7/15/2024    Colorectal Cancer Screening 12/27/2028 12/27/2023    TETANUS VACCINE 01/01/2032 1/1/2022    RSV Vaccine (Age 60+ and Pregnant patients) (1 - 1-dose 75+ series) 07/21/2040 ---        No orders of the defined types were placed in this encounter.    The following information is provided to all patients.  This information is to help you find resources for any of the problems found today that may be affecting your health:                  Living healthy guide: www.[facility].[place].[facility]      Understanding Diabetes: www.diabetes.org      Eating healthy: www.cdc.gov/healthyweight      CDC home safety checklist: www.cdc.gov/steadi/patient.html      Agency on Aging: www.goea.[place].gov      Alcoholics anonymous (AA): www.aa.org      Physical Activity: www.laxmi.nih.gov/ku8hqpn      Tobacco use: www.quitwithusla.org         Counseling and Referral of Other Preventative  (Italic type indicates deductible and co-insurance are waived)    Patient Name: Carole Moore  Today's Date: 4/7/2025    Health Maintenance       Date Due Completion Date    COVID-19 Vaccine (6 - 2024-25 season) 09/01/2024 9/30/2023    Lipid Panel 07/15/2025 7/15/2024    Mammogram 12/06/2025 12/6/2024    High Dose Statin 04/07/2026 4/7/2025    Hemoglobin A1c (Diabetic Prevention Screening) 07/15/2027 7/15/2024    Colorectal Cancer Screening 12/27/2028 12/27/2023    TETANUS VACCINE 01/01/2032 1/1/2022    RSV Vaccine (Age 60+ and Pregnant patients) (1 - 1-dose 75+ series) 07/21/2040 ---        No orders of the  defined types were placed in this encounter.    The following information is provided to all patients.  This information is to help you find resources for any of the problems found today that may be affecting your health:                  Living healthy guide: www.[facility].[place].[facility]      Understanding Diabetes: www.diabetes.org      Eating healthy: www.cdc.gov/healthyweight      CDC home safety checklist: www.cdc.gov/steadi/patient.html      Agency on Aging: www.goea.[place].[facility]      Alcoholics anonymous (AA): www.aa.org      Physical Activity: www.laxmi.nih.gov/ag6qrbe      Tobacco use: www.quitwithusla.org

## 2025-04-10 ENCOUNTER — HOSPITAL ENCOUNTER (OUTPATIENT)
Dept: CARDIOLOGY | Facility: HOSPITAL | Age: 60
Discharge: HOME | End: 2025-04-10
Payer: MEDICARE

## 2025-04-10 ENCOUNTER — RESULTS FOLLOW-UP (OUTPATIENT)
Dept: CARDIOLOGY | Facility: CLINIC | Age: 60
End: 2025-04-10

## 2025-04-10 ENCOUNTER — TELEPHONE (OUTPATIENT)
Dept: CARDIOLOGY | Facility: CLINIC | Age: 60
End: 2025-04-10
Payer: MEDICARE

## 2025-04-10 VITALS
WEIGHT: 179 LBS | SYSTOLIC BLOOD PRESSURE: 110 MMHG | HEART RATE: 74 BPM | BODY MASS INDEX: 28.09 KG/M2 | HEIGHT: 67 IN | DIASTOLIC BLOOD PRESSURE: 72 MMHG

## 2025-04-10 DIAGNOSIS — I45.10 INCOMPLETE RIGHT BUNDLE BRANCH BLOCK: ICD-10-CM

## 2025-04-10 LAB
AORTIC ROOT ANNULUS: 2.92 CM
ASCENDING AORTA: 2.83 CM
AV INDEX (PROSTH): 0.85
AV MEAN GRADIENT: 4 MMHG
AV PEAK GRADIENT: 6 MMHG
AV VALVE AREA BY VELOCITY RATIO: 2.4 CM²
AV VALVE AREA: 2.7 CM²
AV VELOCITY RATIO: 0.75
BSA FOR ECHO PROCEDURE: 1.96 M2
CV ECHO LV RWT: 0.45 CM
DOP CALC AO PEAK VEL: 1.2 M/S
DOP CALC AO VTI: 22.3 CM
DOP CALC LVOT AREA: 3.1 CM2
DOP CALC LVOT DIAMETER: 2 CM
DOP CALC LVOT PEAK VEL: 0.9 M/S
DOP CALC LVOT STROKE VOLUME: 59.3 CM3
DOP CALC RVOT PEAK VEL: 0.62 M/S
DOP CALC RVOT VTI: 12.4 CM
DOP CALCLVOT PEAK VEL VTI: 18.9 CM
E WAVE DECELERATION TIME: 192 MSEC
E/A RATIO: 0.65
E/E' RATIO: 7 M/S
ECHO LV POSTERIOR WALL: 1 CM (ref 0.6–1.1)
FRACTIONAL SHORTENING: 34.1 % (ref 28–44)
INTERVENTRICULAR SEPTUM: 1.1 CM (ref 0.6–1.1)
IVC DIAMETER: 1.4 CM
IVRT: 66 MSEC
LA MAJOR: 4.5 CM
LA MINOR: 4.5 CM
LA WIDTH: 3.1 CM
LEFT ATRIUM SIZE: 3.5 CM
LEFT ATRIUM VOLUME INDEX: 22 ML/M2
LEFT ATRIUM VOLUME: 42 CM3
LEFT INTERNAL DIMENSION IN SYSTOLE: 2.9 CM (ref 2.1–4)
LEFT VENTRICLE DIASTOLIC VOLUME INDEX: 46.11 ML/M2
LEFT VENTRICLE DIASTOLIC VOLUME: 89 ML
LEFT VENTRICLE MASS INDEX: 82 G/M2
LEFT VENTRICLE SYSTOLIC VOLUME INDEX: 17.1 ML/M2
LEFT VENTRICLE SYSTOLIC VOLUME: 33 ML
LEFT VENTRICULAR INTERNAL DIMENSION IN DIASTOLE: 4.4 CM (ref 3.5–6)
LEFT VENTRICULAR MASS: 158.2 G
LV LATERAL E/E' RATIO: 6.3 M/S
LV SEPTAL E/E' RATIO: 7.1 M/S
LVED V (TEICH): 89.2 ML
LVES V (TEICH): 32.69 ML
LVOT MG: 1.99 MMHG
LVOT MV: 0.69 CM/S
MV PEAK A VEL: 0.77 M/S
MV PEAK E VEL: 0.5 M/S
MV STENOSIS PRESSURE HALF TIME: 55.78 MS
MV VALVE AREA P 1/2 METHOD: 3.94 CM2
PISA MRMAX VEL: 3.42 M/S
PISA TR MAX VEL: 2.1 M/S
PV MEAN GRADIENT: 1 MMHG
RA MAJOR: 3.14 CM
RA PRESSURE ESTIMATED: 3 MMHG
RA WIDTH: 2.32 CM
RV TB RVSP: 5 MMHG
SINUS: 2.94 CM
STJ: 2.85 CM
TDI LATERAL: 0.08 M/S
TDI SEPTAL: 0.07 M/S
TDI: 0.08 M/S
TR MAX PG: 18 MMHG
TR MEAN GRADIENT: 16 MMHG
TRICUSPID ANNULAR PLANE SYSTOLIC EXCURSION: 1.96 CM
TV REST PULMONARY ARTERY PRESSURE: 21 MMHG
Z-SCORE OF LEFT VENTRICULAR DIMENSION IN END DIASTOLE: -2.17
Z-SCORE OF LEFT VENTRICULAR DIMENSION IN END SYSTOLE: -1.17

## 2025-04-10 PROCEDURE — 93306 TTE W/DOPPLER COMPLETE: CPT | Mod: 26,HCNC,, | Performed by: INTERNAL MEDICINE

## 2025-04-10 PROCEDURE — 93306 TTE W/DOPPLER COMPLETE: CPT | Mod: HCNC,PO

## 2025-04-10 NOTE — TELEPHONE ENCOUNTER
----- Message from Vanessa Wadsworth NP sent at 4/10/2025  3:38 PM CDT -----  Heart ultrasound stable, okay to keep current follow-up next year  ----- Message -----  From: Shay Samayoa MD  Sent: 4/10/2025   2:30 PM CDT  To: Vanessa Wadsworth NP

## 2025-04-14 RX ORDER — METHOCARBAMOL 750 MG/1
750 TABLET, FILM COATED ORAL 3 TIMES DAILY
Qty: 60 TABLET | Refills: 0 | Status: SHIPPED | OUTPATIENT
Start: 2025-04-14

## 2025-04-14 NOTE — TELEPHONE ENCOUNTER
Care Due:                  Date            Visit Type   Department     Provider  --------------------------------------------------------------------------------                                MYCHART                              ANNUAL                              CHECKUP/PHY  [facility] INTERNAL  Last Visit: 01-      S            MEDICINE       Angel Emery                              EP -                              PRIMARY      [facility] INTERNAL  Next Visit: 10-      CARE ([facility])   MEDICINE       Angel Emery                                                            Last  Test          Frequency    Reason                     Performed    Due Date  --------------------------------------------------------------------------------    Lipid Panel.  12 months..  atorvastatin.............  07-   07-    [facility] Embedded Care Due Messages. Reference number: [id number].   4/14/2025 7:30:37 AM CDT

## 2025-04-14 NOTE — TELEPHONE ENCOUNTER
Refill Routing Note   Medication(s) are not appropriate for processing by [facility] for the following reason(s):        Outside of protocol    [facility] action(s):  Route     Requires labs : Yes             Appointments  past 12m or future 3m with PCP    Date Provider   Last Visit   1/9/2025 Angel Emery MD   Next Visit   10/1/2025 Angel Emery MD   ED visits in past 90 days: 0        Note composed:9:08 AM 04/14/2025

## 2025-04-15 ENCOUNTER — OFFICE VISIT (OUTPATIENT)
Dept: UROLOGY | Facility: CLINIC | Age: 60
End: 2025-04-15
Payer: MEDICARE

## 2025-04-15 VITALS
RESPIRATION RATE: 17 BRPM | TEMPERATURE: 98 F | DIASTOLIC BLOOD PRESSURE: 67 MMHG | WEIGHT: 181.69 LBS | BODY MASS INDEX: 28.52 KG/M2 | HEIGHT: 67 IN | HEART RATE: 86 BPM | SYSTOLIC BLOOD PRESSURE: 100 MMHG

## 2025-04-15 DIAGNOSIS — N39.3 SUI (STRESS URINARY INCONTINENCE, FEMALE): ICD-10-CM

## 2025-04-15 DIAGNOSIS — N39.0 RECURRENT UTI: Primary | ICD-10-CM

## 2025-04-15 LAB
BILIRUBIN, UA POC OHS: NEGATIVE
BLOOD, UA POC OHS: ABNORMAL
CLARITY, UA POC OHS: CLEAR
COLOR, UA POC OHS: YELLOW
GLUCOSE, UA POC OHS: NEGATIVE
KETONES, UA POC OHS: NEGATIVE
LEUKOCYTES, UA POC OHS: NEGATIVE
NITRITE, UA POC OHS: NEGATIVE
PH, UA POC OHS: 5.5
POC RESIDUAL URINE VOLUME: 33 ML (ref 0–100)
PROTEIN, UA POC OHS: NEGATIVE
SPECIFIC GRAVITY, UA POC OHS: 1.02
UROBILINOGEN, UA POC OHS: 0.2

## 2025-04-15 PROCEDURE — 99999 PR PBB SHADOW E&M-EST. PATIENT-LVL IV: CPT | Mod: PBBFAC,HCNC,, | Performed by: UROLOGY

## 2025-04-15 RX ORDER — NITROFURANTOIN 25; 75 MG/1; MG/1
100 CAPSULE ORAL 2 TIMES DAILY
Qty: 14 CAPSULE | Refills: 2 | Status: SHIPPED | OUTPATIENT
Start: 2025-04-15

## 2025-04-15 NOTE — PROGRESS NOTES
Chief Complaint: f/u recurrent UTI    HPI:    4/15/25- pt has some JANEL. No recent UTIs. No dysuria or gross hematuria. She drinks a lot so has some frequency. Will wear a pad for JANEL if she is going out.     4/16/24-Pt does like to have self-start abx on hand, but UTIs have been much less frequent. 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 No dysuria or gross hematuria. She does have some urgency. No incontinence. No pads. Nocturia x x 0-1.     10/11/23-Pt had a couple of negative cultures last month. She was having frequency and dysuria when she was having symptoms. Took 1 uribel and it helped. Pt reports that sometimes she has diarrhea related to stool softeners that she takes related to anti-cancer medication she is on. Wonders if it is related.   Stress Incontinence is stable when she coughs. 1 pad per day. No gross hematuria.     3/22/23-sometimes she has frequency, but then it resolves. No recent UTIs. Had lumbar surgery since her last visit. She does have vaginal dryness.     9/21/22- Pt reports left flank and abdominal pain. Having urinary frequency. Went to [facility] and had non-contrast CT, showing grossly normal kidneys without stone or hydronephrosis, per report. No fever. She is now off of daily antibiotics. Prescribed estring by gyn, but not using it. She reports that the pain is primarily with movement. Ongoing x 4 weeks, off and on. Currently on baclofen, norco and celebrex. Has a grandbaby that she has been lifting.    3/24/22- Daily macrobid seems to be working. No recent UTIs. Pt has an upcoming trip and would like to continue daily abx through then. No dysuria, abdominal pain or gross hematuria.    12/21/21- bladder neck biopsy with chronic cystitis with cystitis cystica and glandularis. Had some bloating after the procedure for a week. No gross hematuria. No dysuria. Has had recurrent UTIs every month or so lately.   11/5/21-here for cysto. CT urogram with tiny right renal cyst.    10/21/64-Jq-[id number] for dipstick positive UA for blood, urine culture negative. She reports that a year ago, she was having recurrent UTIs. She reports that after being cathed in the  clinic, she didn't have any more UTIs. Currently, she reports that she is now having frequency and dysuria. She was treated with abx (cipro) a few weeks ago and symptoms improved, but she is scared they will recur. No gross hematuria. She was on oxybutynin, but it gave her dry mouth.   1/30/20: 55 yo woman with breast cancer history about a year ago had a few UTI.  Sole symptom was frequency.   Bought some new underwear and after changing back to old type things got better and no problems since, about 5 mo.  No abd/pelvic pain and no exac/rel factors.  No hematuria.  No urolithiasis.  No urinary bother.  No  history.  Normal sexual function.  Normal OB exam 2 mo ago.  Microhematuria on dipstick UA.  Had a stroke 20 yrs ago, on disability.    Allergies:  Patient has no known allergies.    Medications: has a current medication list which includes the following prescription(s): ascorbic acid, aspirin, atorvastatin, calcium carbonate, cetirizine, cholecalciferol (vitamin d3), fulvestrant, gadavist, meclizine, methocarbamol, multivitamin, mupirocin, ondansetron, palbociclib, polyethylene glycol, prochlorperazine, valacyclovir, zoledronic, and nitrofurantoin (macrocrystal-monohydrate).    Review of Systems:  General: No fever, chills, fatigability, or weight loss.  Skin: No rashes, itching, or changes in color or texture of skin.  Chest: Denies LIPSCOMB, cyanosis, wheezing, cough, and sputum production.  Abdomen: Appetite fine. No weight loss. Denies diarrhea, abdominal pain, hematemesis, or blood in stool.  Musculoskeletal: No joint stiffness or swelling. Denies back pain.  : As above.  All other review of systems negative.    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    PSH:   has a past surgical history that includes Tonsillectomy; Mediport insertion, single; Breast surgery; Colonoscopy (N/A, 10/30/2018); Mastectomy (Right, 2017); Augmentation of breast (Left, 2017); Hysterectomy (2007); Cystoscopy with biopsy of bladder (N/A, 11/29/2021); Fixation Kyphoplasty (Bilateral, 02/09/2023); radiofrequency ablation, bone, percutaneous (Bilateral, 02/09/2023); Colonoscopy (N/A, 12/27/2023); and Cosmetic surgery (December 2017).    FamHx: family history includes Breast cancer in her paternal aunt; Cancer in her mother; Colon cancer in her father and mother; Melanoma in her maternal uncle; Skin cancer in her maternal uncle.    SocHx:  reports that she has never smoked. She has never been exposed to tobacco smoke. She has never used smokeless tobacco. She reports that she does not drink alcohol and does not use drugs.     Physical Exam:  Vitals:   Vitals:    04/15/25 0858   BP: 100/67   Pulse: 86   Resp: 17   Temp: 97.7 °F (36.5 °C)       General: A&Ox3. No apparent distress. No deformities.  Neck: No masses. Normal thyroid.  Lungs: normal inspiration. No use of accessory muscles.  Heart: normal pulse. No arrhythmias.  Abdomen: Soft. NT. ND. No masses. No hernias. No hepatosplenomegaly.  Lymphatic: Neck and groin nodes negative.  Skin: The skin is warm and dry. No jaundice.  Ext: No c/c/e. R hemiparesis  : External genitalia normal.     Labs/Studies:   Trace blood, negative for LE, nit    PVR: 33mL    Impression/Plan:   Recurrent UTI  -     POCT Urinalysis(Instrument)  -     POCT Bladder Scan    JANEL (stress urinary incontinence, female)    Other orders  -      nitrofurantoin, macrocrystal-monohydrate, (MACROBID) 100 MG capsule; Take 1 capsule (100 mg total) by mouth 2 (two) times daily.  Dispense: 14 capsule; Refill: 2      Continue with Self-start macrobid  Discussed bulkamid. Educational material provided  Follow up in about 1 year (around 4/15/2026).

## 2025-04-28 ENCOUNTER — LAB VISIT (OUTPATIENT)
Dept: LAB | Facility: HOSPITAL | Age: 60
End: 2025-04-28
Attending: INTERNAL MEDICINE
Payer: MEDICARE

## 2025-04-28 ENCOUNTER — OFFICE VISIT (OUTPATIENT)
Dept: HEMATOLOGY/ONCOLOGY | Facility: CLINIC | Age: 60
End: 2025-04-28
Payer: MEDICARE

## 2025-04-28 ENCOUNTER — INFUSION (OUTPATIENT)
Dept: INFUSION THERAPY | Facility: HOSPITAL | Age: 60
End: 2025-04-28
Attending: INTERNAL MEDICINE
Payer: MEDICARE

## 2025-04-28 ENCOUNTER — RESULTS FOLLOW-UP (OUTPATIENT)
Dept: HEMATOLOGY/ONCOLOGY | Facility: CLINIC | Age: 60
End: 2025-04-28

## 2025-04-28 VITALS
RESPIRATION RATE: 16 BRPM | BODY MASS INDEX: 28.58 KG/M2 | SYSTOLIC BLOOD PRESSURE: 106 MMHG | HEIGHT: 67 IN | HEART RATE: 75 BPM | TEMPERATURE: 98 F | DIASTOLIC BLOOD PRESSURE: 62 MMHG | WEIGHT: 182.13 LBS | OXYGEN SATURATION: 96 %

## 2025-04-28 DIAGNOSIS — C79.51 SECONDARY MALIGNANT NEOPLASM OF BONE: ICD-10-CM

## 2025-04-28 DIAGNOSIS — C50.111 MALIGNANT NEOPLASM OF CENTRAL PORTION OF RIGHT BREAST IN FEMALE, ESTROGEN RECEPTOR POSITIVE: Primary | ICD-10-CM

## 2025-04-28 DIAGNOSIS — C50.111 MALIGNANT NEOPLASM OF CENTRAL PORTION OF RIGHT BREAST IN FEMALE, ESTROGEN RECEPTOR POSITIVE: ICD-10-CM

## 2025-04-28 DIAGNOSIS — T45.1X5A PANCYTOPENIA DUE TO ANTINEOPLASTIC CHEMOTHERAPY: ICD-10-CM

## 2025-04-28 DIAGNOSIS — Z17.0 MALIGNANT NEOPLASM OF CENTRAL PORTION OF RIGHT BREAST IN FEMALE, ESTROGEN RECEPTOR POSITIVE: Primary | ICD-10-CM

## 2025-04-28 DIAGNOSIS — T45.1X5A IMMUNODEFICIENCY DUE TO CHEMOTHERAPY: ICD-10-CM

## 2025-04-28 DIAGNOSIS — D84.821 IMMUNODEFICIENCY DUE TO CHEMOTHERAPY: ICD-10-CM

## 2025-04-28 DIAGNOSIS — D61.810 PANCYTOPENIA DUE TO ANTINEOPLASTIC CHEMOTHERAPY: ICD-10-CM

## 2025-04-28 DIAGNOSIS — Z79.69 IMMUNODEFICIENCY DUE TO CHEMOTHERAPY: ICD-10-CM

## 2025-04-28 DIAGNOSIS — Z17.0 MALIGNANT NEOPLASM OF CENTRAL PORTION OF RIGHT BREAST IN FEMALE, ESTROGEN RECEPTOR POSITIVE: ICD-10-CM

## 2025-04-28 LAB
ABSOLUTE EOSINOPHIL (OHS): 0.34 K/UL
ABSOLUTE MONOCYTE (OHS): 0.62 K/UL (ref 0.3–1)
ABSOLUTE NEUTROPHIL COUNT (OHS): 3.22 K/UL (ref 1.8–7.7)
ALBUMIN SERPL BCP-MCNC: 3.9 G/DL (ref 3.5–5.2)
ALP SERPL-CCNC: 55 UNIT/L (ref 40–150)
ALT SERPL W/O P-5'-P-CCNC: 28 UNIT/L (ref 10–44)
ANION GAP (OHS): 9 MMOL/L (ref 8–16)
AST SERPL-CCNC: 22 UNIT/L (ref 11–45)
BASOPHILS # BLD AUTO: 0.04 K/UL
BASOPHILS NFR BLD AUTO: 0.7 %
BILIRUB SERPL-MCNC: 0.8 MG/DL (ref 0.1–1)
BUN SERPL-MCNC: 14 MG/DL (ref 6–20)
CALCIUM SERPL-MCNC: 9.8 MG/DL (ref 8.7–10.5)
CHLORIDE SERPL-SCNC: 106 MMOL/L (ref 95–110)
CO2 SERPL-SCNC: 25 MMOL/L (ref 23–29)
CREAT SERPL-MCNC: 0.7 MG/DL (ref 0.5–1.4)
ERYTHROCYTE [DISTWIDTH] IN BLOOD BY AUTOMATED COUNT: 12.5 % (ref 11.5–14.5)
GFR SERPLBLD CREATININE-BSD FMLA CKD-EPI: >60 ML/MIN/1.73/M2
GLUCOSE SERPL-MCNC: 102 MG/DL (ref 70–110)
HCT VFR BLD AUTO: 42.3 % (ref 37–48.5)
HGB BLD-MCNC: 14.3 GM/DL (ref 12–16)
IMM GRANULOCYTES # BLD AUTO: 0 K/UL (ref 0–0.04)
IMM GRANULOCYTES NFR BLD AUTO: 0 % (ref 0–0.5)
LYMPHOCYTES # BLD AUTO: 1.26 K/UL (ref 1–4.8)
MCH RBC QN AUTO: 33.9 PG (ref 27–31)
MCHC RBC AUTO-ENTMCNC: 33.8 G/DL (ref 32–36)
MCV RBC AUTO: 100 FL (ref 82–98)
NUCLEATED RBC (/100WBC) (OHS): 0 /100 WBC
PLATELET # BLD AUTO: 276 K/UL (ref 150–450)
PMV BLD AUTO: 9.3 FL (ref 9.2–12.9)
POTASSIUM SERPL-SCNC: 4.6 MMOL/L (ref 3.5–5.1)
PROT SERPL-MCNC: 7.7 GM/DL (ref 6–8.4)
RBC # BLD AUTO: 4.22 M/UL (ref 4–5.4)
RELATIVE EOSINOPHIL (OHS): 6.2 %
RELATIVE LYMPHOCYTE (OHS): 23 % (ref 18–48)
RELATIVE MONOCYTE (OHS): 11.3 % (ref 4–15)
RELATIVE NEUTROPHIL (OHS): 58.8 % (ref 38–73)
SODIUM SERPL-SCNC: 140 MMOL/L (ref 136–145)
WBC # BLD AUTO: 5.48 K/UL (ref 3.9–12.7)

## 2025-04-28 PROCEDURE — 96374 THER/PROPH/DIAG INJ IV PUSH: CPT | Mod: HCNC

## 2025-04-28 PROCEDURE — 63600175 PHARM REV CODE 636 W HCPCS: Mod: JZ,TB,HCNC | Performed by: INTERNAL MEDICINE

## 2025-04-28 PROCEDURE — 84075 ASSAY ALKALINE PHOSPHATASE: CPT | Mod: HCNC

## 2025-04-28 PROCEDURE — 96401 CHEMO ANTI-NEOPL SQ/IM: CPT | Mod: HCNC

## 2025-04-28 PROCEDURE — 96402 CHEMO HORMON ANTINEOPL SQ/IM: CPT | Mod: HCNC

## 2025-04-28 PROCEDURE — 36415 COLL VENOUS BLD VENIPUNCTURE: CPT | Mod: HCNC

## 2025-04-28 PROCEDURE — 85025 COMPLETE CBC W/AUTO DIFF WBC: CPT | Mod: HCNC

## 2025-04-28 RX ORDER — ZOLEDRONIC ACID 0.04 MG/ML
4 INJECTION, SOLUTION INTRAVENOUS
Status: COMPLETED | OUTPATIENT
Start: 2025-04-28 | End: 2025-04-28

## 2025-04-28 RX ORDER — SODIUM CHLORIDE 0.9 % (FLUSH) 0.9 %
10 SYRINGE (ML) INJECTION
Status: CANCELLED | OUTPATIENT
Start: 2025-04-28

## 2025-04-28 RX ORDER — LAMOTRIGINE 25 MG/1
500 TABLET ORAL
Status: COMPLETED | OUTPATIENT
Start: 2025-04-28 | End: 2025-04-28

## 2025-04-28 RX ORDER — HEPARIN 100 UNIT/ML
500 SYRINGE INTRAVENOUS
Status: CANCELLED | OUTPATIENT
Start: 2025-04-28

## 2025-04-28 RX ORDER — LAMOTRIGINE 25 MG/1
500 TABLET ORAL
Status: CANCELLED | OUTPATIENT
Start: 2025-04-28

## 2025-04-28 RX ORDER — SODIUM CHLORIDE 0.9 % (FLUSH) 0.9 %
10 SYRINGE (ML) INJECTION
Status: DISCONTINUED | OUTPATIENT
Start: 2025-04-28 | End: 2025-04-28 | Stop reason: HOSPADM

## 2025-04-28 RX ADMIN — FULVESTRANT 500 MG: 50 INJECTION, SOLUTION INTRAMUSCULAR at 09:04

## 2025-04-28 RX ADMIN — ZOLEDRONIC ACID 4 MG: 0.04 INJECTION, SOLUTION INTRAVENOUS at 09:04

## 2025-04-28 NOTE — PLAN OF CARE
Problem: Adult Inpatient Plan of Care  Goal: Plan of Care Review  Description: Patient here today for her Faslodex injections.  Outcome: Progressing  Flowsheets (Taken 4/28/2025 0915)  Plan of Care Reviewed With: patient  Goal: Patient-Specific Goal (Individualized)  Description: Patient to tolerate injections well today.  Outcome: Progressing  Flowsheets (Taken 4/28/2025 0915)  Individualized Care Needs: feet elevated, pillow and warm blanket provided  Anxieties, Fears or Concerns: denies  Goal: Absence of Hospital-Acquired Illness or Injury  Outcome: Progressing  Intervention: Identify and Manage Fall Risk  Flowsheets (Taken 4/28/2025 0915)  Safety Promotion/Fall Prevention:   assistive device/personal item within reach   Fall Risk reviewed with patient/family   in recliner, wheels locked   medications reviewed   instructed to call staff for mobility   patient expresses understanding of fall risk and prevention  Intervention: Prevent Infection  Flowsheets (Taken 4/28/2025 0915)  Infection Prevention:   environmental surveillance performed   equipment surfaces disinfected   hand hygiene promoted   personal protective equipment utilized  Goal: Optimal Comfort and Wellbeing  Description: Patient likes injections dorsogluteal and to stand during administration.  Outcome: Progressing  Intervention: Monitor Pain and Promote Comfort  Flowsheets (Taken 4/28/2025 0915)  Pain Management Interventions:   position adjusted   quiet environment facilitated   relaxation techniques promoted   warm blanket provided  Intervention: Provide Person-Centered Care  Flowsheets (Taken 4/28/2025 0915)  Trust Relationship/Rapport:   care explained   questions encouraged   choices provided   reassurance provided   emotional support provided   thoughts/feelings acknowledged   empathic listening provided   questions answered     Problem: Fall Injury Risk  Goal: Absence of Fall and Fall-Related Injury  Outcome: Progressing  Intervention:  Identify and Manage Contributors  Flowsheets (Taken 4/28/2025 0915)  Self-Care Promotion:   independence encouraged   BADL personal objects within reach  Medication Review/Management: medications reviewed  Intervention: Promote Injury-Free Environment  Flowsheets (Taken 4/28/2025 0915)  Safety Promotion/Fall Prevention:   assistive device/personal item within reach   Fall Risk reviewed with patient/family   in recliner, wheels locked   medications reviewed   instructed to call staff for mobility   patient expresses understanding of fall risk and prevention

## 2025-04-28 NOTE — PROGRESS NOTES
Subjective:       Patient ID: Carole Moore is a 59 y.o. female.    Chief Complaint: Results    HPI:  59-year-old female history of metastatic breast cancer continuing on Faslodex Ibrance 100 mg days 1 through 21 on a 20 day cycle.  Patient is tolerating therapy well denies nausea vomiting fevers chills night sweats seen in virtual visit ECOG status 1    Past Medical History:   Diagnosis Date    Antineoplastic chemotherapy induced anemia     Back pain     Breast cancer 2016    right breast    Cancer     R Breast cancer    CVA (cerebral infarction)     Diverticulosis     Family history of colon cancer 10/30/2018    Her mother and father both had colon cancer.    Genetic testing of female 12/27/2016    [facility]28 genes[facility] - NEGATIVE    Hyperlipidemia     Immunodeficiency due to chemotherapy 10/03/2022    Nausea after anesthesia     Paralysis     right side    PONV (postoperative nausea and vomiting)     Stroke 2001    R side weakness    Trouble in sleeping      Family History   Problem Relation Name Age of Onset    Breast cancer Paternal Aunt      Colon cancer Father      Colon cancer Mother Kathy Emil     Cancer Mother Kathy Stein         colon cancer    Melanoma Maternal Uncle      Skin cancer Maternal Uncle       Social History[1]  Past Surgical History:   Procedure Laterality Date    AUGMENTATION OF BREAST Left 2017    BREAST SURGERY      COLONOSCOPY N/A 10/30/2018    Procedure: COLONOSCOPY;  Surgeon: Cosme Monson MD;  Location: [facility];  Service: Endoscopy;  Laterality: N/A;    COLONOSCOPY N/A 12/27/2023    Procedure: COLONOSCOPY;  Surgeon: Caro Leo MD;  Location: [facility];  Service: Endoscopy;  Laterality: N/A;    COSMETIC SURGERY  December 2017    Breast Reconstruction after Mastectomy    CYSTOSCOPY WITH BIOPSY OF BLADDER N/A 11/29/2021    Procedure: CYSTOSCOPY, WITH BLADDER BIOPSY, WITH FULGURATION IF INDICATED;  Surgeon: Page Marcelo MD;  Location: [facility];  Service:  "Urology;  Laterality: N/A;    FIXATION KYPHOPLASTY Bilateral 02/09/2023    Procedure: KYPHOPLASTY;  Surgeon: Josse Pelayo MD;  Location: [facility] OR;  Service: Neurosurgery;  Laterality: Bilateral;  Kyphoplasty T10 with ablation    HYSTERECTOMY  2007    maintains both ovaries, menorrhagia    MASTECTOMY Right 2017    MEDIPORT INSERTION, SINGLE      RADIOFREQUENCY ABLATION, BONE, PERCUTANEOUS Bilateral 02/09/2023    Procedure: RADIOFREQUENCY ABLATION,BONE,PERCUTANEOUS;  Surgeon: Josse Pelayo MD;  Location: [facility] OR;  Service: Neurosurgery;  Laterality: Bilateral;    TONSILLECTOMY         Labs:  Lab Results   Component Value Date    WBC 5.48 04/28/2025    HGB 14.3 04/28/2025    HCT 42.3 04/28/2025     (H) 04/28/2025     04/28/2025     BMP  Lab Results   Component Value Date     03/27/2025    K 4.4 03/27/2025     03/27/2025    CO2 23 03/27/2025    BUN 17 03/27/2025    CREATININE 0.8 03/27/2025    CALCIUM 9.7 03/27/2025    ANIONGAP 13 03/27/2025    ESTGFRAFRICA >60 05/12/2022    EGFRNONAA >60 05/12/2022     Lab Results   Component Value Date    ALT 19 03/27/2025    AST 17 03/27/2025    ALKPHOS 46 03/27/2025    BILITOT 1.1 (H) 03/27/2025       No results found for: "IRON", "TIBC", "FERRITIN", "SATURATEDIRO"  No results found for: "LOSEVEPG78"  No results found for: "FOLATE"  Lab Results   Component Value Date    TSH 0.921 10/01/2024         Review of Systems   Constitutional:  Negative for activity change, appetite change, chills, diaphoresis, fatigue, fever and unexpected weight change.   HENT:  Negative for congestion, dental problem, drooling, ear discharge, ear pain, facial swelling, hearing loss, mouth sores, nosebleeds, postnasal drip, rhinorrhea, sinus pressure, sneezing, sore throat, tinnitus, trouble swallowing and voice change.    Eyes:  Negative for photophobia, pain, discharge, redness, itching and visual disturbance.   Respiratory:  Negative for cough, choking, chest tightness, " shortness of breath, wheezing and stridor.    Cardiovascular:  Negative for chest pain, palpitations and leg swelling.   Gastrointestinal:  Negative for abdominal distention, abdominal pain, anal bleeding, blood in stool, constipation, diarrhea, nausea, rectal pain and vomiting.   Endocrine: Negative for cold intolerance, heat intolerance, polydipsia, polyphagia and polyuria.   Genitourinary:  Negative for decreased urine volume, difficulty urinating, dyspareunia, dysuria, enuresis, flank pain, frequency, genital sores, hematuria, menstrual problem, pelvic pain, urgency, vaginal bleeding, vaginal discharge and vaginal pain.   Musculoskeletal:  Negative for arthralgias, back pain, gait problem, joint swelling, myalgias, neck pain and neck stiffness.   Skin:  Negative for color change, pallor and rash.   Allergic/Immunologic: Negative for environmental allergies, food allergies and immunocompromised state.   Neurological:  Negative for dizziness, tremors, seizures, syncope, facial asymmetry, speech difficulty, weakness, light-headedness, numbness and headaches.   Hematological:  Negative for adenopathy. Does not bruise/bleed easily.   Psychiatric/Behavioral:  Negative for agitation, behavioral problems, confusion, decreased concentration, dysphoric mood, hallucinations, self-injury, sleep disturbance and suicidal ideas. The patient is not nervous/anxious and is not hyperactive.        Objective:      Physical Exam  Constitutional:       Appearance: Normal appearance.   Neurological:      Mental Status: She is alert.             Assessment:      1. Malignant neoplasm of central portion of right breast in female, estrogen receptor positive    2. Secondary malignant neoplasm of bone    3. Pancytopenia due to antineoplastic chemotherapy    4. Immunodeficiency due to chemotherapy           Med Onc Chart Routing      Follow up with physician . Return to clinic in 1 month with PET scan prior CBC CMP next dosing of Faslodex    Follow up with [facility]    Infusion scheduling note    Injection scheduling note Zometa dosing today along with Faslodex   Labs    Imaging    Pharmacy appointment    Other referrals                   Plan:     The patient location is:  Home  The chief complaint leading to consultation is:  Breast cancer    Visit type: audiovisual    Face to Face time with patient: 30 minutes of total time spent on the encounter, which includes face to face time and non-face to face time preparing to see the patient (eg, review of tests), Obtaining and/or reviewing separately obtained history, Documenting clinical information in the electronic or other health record, Independently interpreting results (not separately reported) and communicating results to the patient/family/caregiver, or Care coordination (not separately reported).         Each patient to whom he or she provides medical services by telemedicine is:  (1) informed of the relationship between the physician and patient and the respective role of any other health care provider with respect to management of the patient; and (2) notified that he or she may decline to receive medical services by telemedicine and may withdraw from such care at any time.    Notes:  Reviewed information with patient.  Continue with starting Ibrance 100 mg days 1 through 21 on a 28 day cycle Faslodex and Zometa today (node dental work in dental issues) dosing today return to clinic in 1 month with PET scan CBC CMP prior.  Six months since last for response to therapy        Harpal Khan Jr, MD FACP         [1]   Social History  Socioeconomic History    Marital status:    Tobacco Use    Smoking status: Never     Passive exposure: Never    Smokeless tobacco: Never    Tobacco comments:     I was a very light smoker for 5 years   Substance and Sexual Activity    Alcohol use: No    Drug use: No    Sexual activity: Not Currently     Partners: Male     Birth control/protection: Surgical      Comment: hysterectomy   Other Topics Concern    Are you pregnant or think you may be? No    Breast-feeding No     Social Drivers of Health     Financial Resource Strain: Low Risk  (4/6/2025)    Overall Financial Resource Strain (CARDIA)     Difficulty of Paying Living Expenses: Not hard at all   Food Insecurity: No Food Insecurity (4/6/2025)    Hunger Vital Sign     Worried About Running Out of Food in the Last Year: Never true     Ran Out of Food in the Last Year: Never true   Transportation Needs: No Transportation Needs (4/6/2025)    PRAPARE - Transportation     Lack of Transportation (Medical): No     Lack of Transportation (Non-Medical): No   Physical Activity: Inactive (4/6/2025)    Exercise Vital Sign     Days of Exercise per Week: 0 days     Minutes of Exercise per Session: 0 min   Stress: No Stress Concern Present (4/6/2025)    [employer] - Occupational Stress Questionnaire     Feeling of Stress : Only a little   Housing Stability: Low Risk  (4/6/2025)    Housing Stability Vital Sign     Unable to Pay for Housing in the Last Year: No     Number of Times Moved in the Last Year: 0     Homeless in the Last Year: No

## 2025-05-03 DIAGNOSIS — E78.2 MIXED HYPERLIPIDEMIA: ICD-10-CM

## 2025-05-03 RX ORDER — ATORVASTATIN CALCIUM 10 MG/1
10 TABLET, FILM COATED ORAL
Qty: 90 TABLET | Refills: 0 | Status: SHIPPED | OUTPATIENT
Start: 2025-05-03

## 2025-05-03 NOTE — TELEPHONE ENCOUNTER
No care due was identified.  [facility] Embedded Care Due Messages. Reference number: [id number].   5/03/2025 7:01:03 AM CDT

## 2025-05-03 NOTE — TELEPHONE ENCOUNTER
Refill Decision Note   Carole Moore  is requesting a refill authorization.  Brief Assessment and Rationale for Refill:  Approve     Medication Therapy Plan:         Comments:     Note composed:1:08 PM 05/03/2025

## 2025-05-22 ENCOUNTER — HOSPITAL ENCOUNTER (OUTPATIENT)
Dept: RADIOLOGY | Facility: HOSPITAL | Age: 60
Discharge: HOME | End: 2025-05-22
Attending: INTERNAL MEDICINE
Payer: MEDICARE

## 2025-05-22 ENCOUNTER — RESULTS FOLLOW-UP (OUTPATIENT)
Dept: HEMATOLOGY/ONCOLOGY | Facility: CLINIC | Age: 60
End: 2025-05-22

## 2025-05-22 DIAGNOSIS — Z17.0 MALIGNANT NEOPLASM OF CENTRAL PORTION OF RIGHT BREAST IN FEMALE, ESTROGEN RECEPTOR POSITIVE: ICD-10-CM

## 2025-05-22 DIAGNOSIS — C50.111 MALIGNANT NEOPLASM OF CENTRAL PORTION OF RIGHT BREAST IN FEMALE, ESTROGEN RECEPTOR POSITIVE: ICD-10-CM

## 2025-05-22 PROCEDURE — 78815 PET IMAGE W/CT SKULL-THIGH: CPT | Mod: TC,PS

## 2025-05-22 PROCEDURE — A9552 F18 FDG: HCPCS | Performed by: INTERNAL MEDICINE

## 2025-05-22 PROCEDURE — 78815 PET IMAGE W/CT SKULL-THIGH: CPT | Mod: 26,PS,, | Performed by: STUDENT IN AN ORGANIZED HEALTH CARE EDUCATION/TRAINING PROGRAM

## 2025-05-22 RX ORDER — FLUDEOXYGLUCOSE F18 500 MCI/ML
11.79 INJECTION INTRAVENOUS
Status: COMPLETED | OUTPATIENT
Start: 2025-05-22 | End: 2025-05-22

## 2025-05-22 RX ADMIN — FLUDEOXYGLUCOSE F-18 11.79 MILLICURIE: 500 INJECTION INTRAVENOUS at 08:05

## 2025-05-27 ENCOUNTER — LAB VISIT (OUTPATIENT)
Dept: LAB | Facility: HOSPITAL | Age: 60
End: 2025-05-27
Attending: INTERNAL MEDICINE
Payer: MEDICARE

## 2025-05-27 ENCOUNTER — OFFICE VISIT (OUTPATIENT)
Dept: HEMATOLOGY/ONCOLOGY | Facility: CLINIC | Age: 60
End: 2025-05-27
Payer: MEDICARE

## 2025-05-27 ENCOUNTER — OFFICE VISIT (OUTPATIENT)
Dept: OPHTHALMOLOGY | Facility: CLINIC | Age: 60
End: 2025-05-27
Payer: MEDICARE

## 2025-05-27 ENCOUNTER — RESULTS FOLLOW-UP (OUTPATIENT)
Dept: HEMATOLOGY/ONCOLOGY | Facility: CLINIC | Age: 60
End: 2025-05-27

## 2025-05-27 DIAGNOSIS — T45.1X5A IMMUNODEFICIENCY DUE TO CHEMOTHERAPY: ICD-10-CM

## 2025-05-27 DIAGNOSIS — H52.4 MYOPIA WITH ASTIGMATISM AND PRESBYOPIA, BILATERAL: ICD-10-CM

## 2025-05-27 DIAGNOSIS — C79.51 SECONDARY MALIGNANT NEOPLASM OF BONE: ICD-10-CM

## 2025-05-27 DIAGNOSIS — Z17.0 MALIGNANT NEOPLASM OF CENTRAL PORTION OF RIGHT BREAST IN FEMALE, ESTROGEN RECEPTOR POSITIVE: Primary | ICD-10-CM

## 2025-05-27 DIAGNOSIS — T45.1X5A PANCYTOPENIA DUE TO ANTINEOPLASTIC CHEMOTHERAPY: ICD-10-CM

## 2025-05-27 DIAGNOSIS — C50.111 MALIGNANT NEOPLASM OF CENTRAL PORTION OF RIGHT BREAST IN FEMALE, ESTROGEN RECEPTOR POSITIVE: Primary | ICD-10-CM

## 2025-05-27 DIAGNOSIS — C50.111 MALIGNANT NEOPLASM OF CENTRAL PORTION OF RIGHT BREAST IN FEMALE, ESTROGEN RECEPTOR POSITIVE: ICD-10-CM

## 2025-05-27 DIAGNOSIS — D84.821 IMMUNODEFICIENCY DUE TO CHEMOTHERAPY: ICD-10-CM

## 2025-05-27 DIAGNOSIS — Z17.0 MALIGNANT NEOPLASM OF CENTRAL PORTION OF RIGHT BREAST IN FEMALE, ESTROGEN RECEPTOR POSITIVE: ICD-10-CM

## 2025-05-27 DIAGNOSIS — R94.6 ABNORMAL RESULTS OF THYROID FUNCTION STUDIES: ICD-10-CM

## 2025-05-27 DIAGNOSIS — H52.203 MYOPIA WITH ASTIGMATISM AND PRESBYOPIA, BILATERAL: ICD-10-CM

## 2025-05-27 DIAGNOSIS — Z79.69 IMMUNODEFICIENCY DUE TO CHEMOTHERAPY: ICD-10-CM

## 2025-05-27 DIAGNOSIS — D61.810 PANCYTOPENIA DUE TO ANTINEOPLASTIC CHEMOTHERAPY: ICD-10-CM

## 2025-05-27 DIAGNOSIS — H25.813 COMBINED FORM OF AGE-RELATED CATARACT, BOTH EYES: ICD-10-CM

## 2025-05-27 DIAGNOSIS — Z86.73 HISTORY OF CVA (CEREBROVASCULAR ACCIDENT): Primary | ICD-10-CM

## 2025-05-27 DIAGNOSIS — H52.13 MYOPIA WITH ASTIGMATISM AND PRESBYOPIA, BILATERAL: ICD-10-CM

## 2025-05-27 LAB
ABSOLUTE EOSINOPHIL (OHS): 0.06 K/UL
ABSOLUTE MONOCYTE (OHS): 0.53 K/UL (ref 0.3–1)
ABSOLUTE NEUTROPHIL COUNT (OHS): 1.11 K/UL (ref 1.8–7.7)
ALBUMIN SERPL BCP-MCNC: 4 G/DL (ref 3.5–5.2)
ALP SERPL-CCNC: 51 UNIT/L (ref 40–150)
ALT SERPL W/O P-5'-P-CCNC: 20 UNIT/L (ref 10–44)
ANION GAP (OHS): 8 MMOL/L (ref 8–16)
AST SERPL-CCNC: 19 UNIT/L (ref 11–45)
BASOPHILS # BLD AUTO: 0.05 K/UL
BASOPHILS NFR BLD AUTO: 1.6 %
BILIRUB SERPL-MCNC: 0.6 MG/DL (ref 0.1–1)
BUN SERPL-MCNC: 18 MG/DL (ref 6–20)
CALCIUM SERPL-MCNC: 9.6 MG/DL (ref 8.7–10.5)
CHLORIDE SERPL-SCNC: 105 MMOL/L (ref 95–110)
CO2 SERPL-SCNC: 26 MMOL/L (ref 23–29)
CREAT SERPL-MCNC: 0.7 MG/DL (ref 0.5–1.4)
CRP SERPL-MCNC: 0.6 MG/L
ERYTHROCYTE [DISTWIDTH] IN BLOOD BY AUTOMATED COUNT: 14.1 % (ref 11.5–14.5)
GFR SERPLBLD CREATININE-BSD FMLA CKD-EPI: >60 ML/MIN/1.73/M2
GLUCOSE SERPL-MCNC: 105 MG/DL (ref 70–110)
HCT VFR BLD AUTO: 38.8 % (ref 37–48.5)
HGB BLD-MCNC: 13.1 GM/DL (ref 12–16)
IMM GRANULOCYTES # BLD AUTO: 0 K/UL (ref 0–0.04)
IMM GRANULOCYTES NFR BLD AUTO: 0 % (ref 0–0.5)
LDH SERPL-CCNC: 189 U/L (ref 110–260)
LYMPHOCYTES # BLD AUTO: 1.4 K/UL (ref 1–4.8)
MCH RBC QN AUTO: 34.1 PG (ref 27–31)
MCHC RBC AUTO-ENTMCNC: 33.8 G/DL (ref 32–36)
MCV RBC AUTO: 101 FL (ref 82–98)
NUCLEATED RBC (/100WBC) (OHS): 0 /100 WBC
PLATELET # BLD AUTO: 226 K/UL (ref 150–450)
PMV BLD AUTO: 8.6 FL (ref 9.2–12.9)
POTASSIUM SERPL-SCNC: 4 MMOL/L (ref 3.5–5.1)
PROT SERPL-MCNC: 7.7 GM/DL (ref 6–8.4)
RBC # BLD AUTO: 3.84 M/UL (ref 4–5.4)
RELATIVE EOSINOPHIL (OHS): 1.9 %
RELATIVE LYMPHOCYTE (OHS): 44.4 % (ref 18–48)
RELATIVE MONOCYTE (OHS): 16.8 % (ref 4–15)
RELATIVE NEUTROPHIL (OHS): 35.3 % (ref 38–73)
SODIUM SERPL-SCNC: 139 MMOL/L (ref 136–145)
TSH SERPL-ACNC: 0.71 UIU/ML (ref 0.4–4)
WBC # BLD AUTO: 3.15 K/UL (ref 3.9–12.7)

## 2025-05-27 PROCEDURE — 82947 ASSAY GLUCOSE BLOOD QUANT: CPT

## 2025-05-27 PROCEDURE — 83615 LACTATE (LD) (LDH) ENZYME: CPT

## 2025-05-27 PROCEDURE — 1160F RVW MEDS BY RX/DR IN RCRD: CPT | Mod: CPTII,S$GLB,, | Performed by: OPTOMETRIST

## 2025-05-27 PROCEDURE — 92015 DETERMINE REFRACTIVE STATE: CPT | Mod: S$GLB,,, | Performed by: OPTOMETRIST

## 2025-05-27 PROCEDURE — 85025 COMPLETE CBC W/AUTO DIFF WBC: CPT

## 2025-05-27 PROCEDURE — 1159F MED LIST DOCD IN RCRD: CPT | Mod: CPTII,S$GLB,, | Performed by: OPTOMETRIST

## 2025-05-27 PROCEDURE — 36415 COLL VENOUS BLD VENIPUNCTURE: CPT

## 2025-05-27 PROCEDURE — 92014 COMPRE OPH EXAM EST PT 1/>: CPT | Mod: S$GLB,,, | Performed by: OPTOMETRIST

## 2025-05-27 PROCEDURE — 84443 ASSAY THYROID STIM HORMONE: CPT

## 2025-05-27 PROCEDURE — 86140 C-REACTIVE PROTEIN: CPT

## 2025-05-27 PROCEDURE — 99999 PR PBB SHADOW E&M-EST. PATIENT-LVL III: CPT | Mod: PBBFAC,,, | Performed by: OPTOMETRIST

## 2025-05-27 RX ORDER — LAMOTRIGINE 25 MG/1
500 TABLET ORAL
Status: CANCELLED | OUTPATIENT
Start: 2025-05-27

## 2025-05-27 RX ORDER — LAMOTRIGINE 25 MG/1
500 TABLET ORAL
OUTPATIENT
Start: 2025-06-23

## 2025-05-27 NOTE — PROGRESS NOTES
HPI     Annual Exam            Comments: Pt states no Fam Hx of ocular disease   Pt states overall possible change OU va   No pain   No gtts          Last edited by Jasper Bush on 5/27/2025  9:44 AM.            Assessment /Plan     For exam results, see Encounter Report.    1. History of CVA (cerebrovascular accident)  Longstanding in 2000 per patient report. Observe at this time.     2. Combined form of age-related cataract, both eyes  Cataracts are not visually significant and not affecting activities of daily living. Annual observation is recommended at this time. Patient to call or return to clinic with any significant change in vision prior to next visit.     3. Myopia with astigmatism and presbyopia, bilateral  Eyeglass Final Rx       Eyeglass Final Rx         Sphere Cylinder Axis Add    Right -0.75 +0.75 150 +2.50    Left -0.25 +2.50 020 +2.50      Type: PAL    Expiration Date: 5/27/2026                      RTC 1 yr for dilated eye exam or sooner if any changes to vision.   Discussed above and answered questions.

## 2025-05-27 NOTE — PROGRESS NOTES
Subjective:       Patient ID: Carole Moore is a 59 y.o. female.    Chief Complaint: Breast Cancer and Chemotherapy    HPI:  59-year-old female continues on Faslodex monthly along with Ibrance 100 mg days 1 through 21 on a 28 day cycle reason PET scan demonstrates stable findings seen in virtual visit ECOG status 1    Past Medical History:   Diagnosis Date    Antineoplastic chemotherapy induced anemia     Back pain     Breast cancer 2016    right breast    Cancer     R Breast cancer    CVA (cerebral infarction)     Diverticulosis     Family history of colon cancer 10/30/2018    Her mother and father both had colon cancer.    Genetic testing of female 12/27/2016    ReferralCandy (28 genes) - NEGATIVE    Hyperlipidemia     Immunodeficiency due to chemotherapy 10/03/2022    Nausea after anesthesia     Paralysis     right side    PONV (postoperative nausea and vomiting)     Stroke 2001    R side weakness    Trouble in sleeping      Family History   Problem Relation Name Age of Onset    Breast cancer Paternal Aunt      Colon cancer Father      Colon cancer Mother aKthy Stein     Cancer Mother Kathy Stein         colon cancer    Melanoma Maternal Uncle      Skin cancer Maternal Uncle       Social History[1]  Past Surgical History:   Procedure Laterality Date    AUGMENTATION OF BREAST Left 2017    BREAST SURGERY      COLONOSCOPY N/A 10/30/2018    Procedure: COLONOSCOPY;  Surgeon: Cosme Monson MD;  Location: [facility] ENDO;  Service: Endoscopy;  Laterality: N/A;    COLONOSCOPY N/A 12/27/2023    Procedure: COLONOSCOPY;  Surgeon: Caro Leo MD;  Location: [facility] ENDO;  Service: Endoscopy;  Laterality: N/A;    COSMETIC SURGERY  December 2017    Breast Reconstruction after Mastectomy    CYSTOSCOPY WITH BIOPSY OF BLADDER N/A 11/29/2021    Procedure: CYSTOSCOPY, WITH BLADDER BIOPSY, WITH FULGURATION IF INDICATED;  Surgeon: Page Marcelo MD;  Location: [facility] OR;  Service: Urology;  Laterality: N/A;    FIXATION  "KYPHOPLASTY Bilateral 02/09/2023    Procedure: KYPHOPLASTY;  Surgeon: Josse Pelayo MD;  Location: [facility] OR;  Service: Neurosurgery;  Laterality: Bilateral;  Kyphoplasty T10 with ablation    HYSTERECTOMY  2007    maintains both ovaries, menorrhagia    MASTECTOMY Right 2017    MEDIPORT INSERTION, SINGLE      RADIOFREQUENCY ABLATION, BONE, PERCUTANEOUS Bilateral 02/09/2023    Procedure: RADIOFREQUENCY ABLATION,BONE,PERCUTANEOUS;  Surgeon: Josse Pelayo MD;  Location: [facility] OR;  Service: Neurosurgery;  Laterality: Bilateral;    TONSILLECTOMY         Labs:  Lab Results   Component Value Date    WBC 3.15 (L) 05/27/2025    HGB 13.1 05/27/2025    HCT 38.8 05/27/2025     (H) 05/27/2025     05/27/2025     BMP  Lab Results   Component Value Date     05/27/2025    K 4.0 05/27/2025     05/27/2025    CO2 26 05/27/2025    BUN 18 05/27/2025    CREATININE 0.7 05/27/2025    CALCIUM 9.6 05/27/2025    ANIONGAP 8 05/27/2025    ESTGFRAFRICA >60 05/12/2022    EGFRNONAA >60 05/12/2022     Lab Results   Component Value Date    ALT 20 05/27/2025    AST 19 05/27/2025    ALKPHOS 51 05/27/2025    BILITOT 0.6 05/27/2025       No results found for: "IRON", "TIBC", "FERRITIN", "SATURATEDIRO"  No results found for: "[id number]"  No results found for: "FOLATE"  Lab Results   Component Value Date    TSH 0.921 10/01/2024         Review of Systems   Constitutional:  Negative for activity change, appetite change, chills, diaphoresis, fatigue, fever and unexpected weight change.   HENT:  Negative for congestion, dental problem, drooling, ear discharge, ear pain, facial swelling, hearing loss, mouth sores, nosebleeds, postnasal drip, rhinorrhea, sinus pressure, sneezing, sore throat, tinnitus, trouble swallowing and voice change.    Eyes:  Negative for photophobia, pain, discharge, redness, itching and visual disturbance.   Respiratory:  Negative for cough, choking, chest tightness, shortness of breath, wheezing and " stridor.    Cardiovascular:  Negative for chest pain, palpitations and leg swelling.   Gastrointestinal:  Negative for abdominal distention, abdominal pain, anal bleeding, blood in stool, constipation, diarrhea, nausea, rectal pain and vomiting.   Endocrine: Negative for cold intolerance, heat intolerance, polydipsia, polyphagia and polyuria.   Genitourinary:  Negative for decreased urine volume, difficulty urinating, dyspareunia, dysuria, enuresis, flank pain, frequency, genital sores, hematuria, menstrual problem, pelvic pain, urgency, vaginal bleeding, vaginal discharge and vaginal pain.   Musculoskeletal:  Positive for back pain. Negative for arthralgias, gait problem, joint swelling, myalgias, neck pain and neck stiffness.   Skin:  Negative for color change, pallor and rash.   Allergic/Immunologic: Negative for environmental allergies, food allergies and immunocompromised state.   Neurological:  Negative for dizziness, tremors, seizures, syncope, facial asymmetry, speech difficulty, weakness, light-headedness, numbness and headaches.   Hematological:  Negative for adenopathy. Does not bruise/bleed easily.   Psychiatric/Behavioral:  Negative for agitation, behavioral problems, confusion, decreased concentration, dysphoric mood, hallucinations, self-injury, sleep disturbance and suicidal ideas. The patient is not nervous/anxious and is not hyperactive.      Objective:      Physical Exam  Neurological:      Mental Status: She is alert.           Assessment:      1. Malignant neoplasm of central portion of right breast in female, estrogen receptor positive    2. Immunodeficiency due to chemotherapy    3. Pancytopenia due to antineoplastic chemotherapy    4. Secondary malignant neoplasm of bone           Med Onc Chart Routing      Follow up with physician . Return in 1 month CBC CMP can alternate MD APAP   Follow up with [facility]    Infusion scheduling note    Injection scheduling note Faslodex monthly   Labs    Imaging     Pharmacy appointment    Other referrals               Plan:     The patient location is:  Home  The chief complaint leading to consultation is:  Breast cancer    Visit type: audiovisual    Face to Face time with patient: 30 minutes of total time spent on the encounter, which includes face to face time and non-face to face time preparing to see the patient (eg, review of tests), Obtaining and/or reviewing separately obtained history, Documenting clinical information in the electronic or other health record, Independently interpreting results (not separately reported) and communicating results to the patient/family/caregiver, or Care coordination (not separately reported).         Each patient to whom he or she provides medical services by telemedicine is:  (1) informed of the relationship between the physician and patient and the respective role of any other health care provider with respect to management of the patient; and (2) notified that he or she may decline to receive medical services by telemedicine and may withdraw from such care at any time.    Notes:    Reviewed information patient is here for follow-up continue with Faslodex and Ibrance turn dosing PET scan demonstrates stable findings no evidence of progression variant josephine TAYLOR APAP variant      Harpal Khan Jr, MD FACP         [1]   Social History  Socioeconomic History    Marital status:    Tobacco Use    Smoking status: Never     Passive exposure: Never    Smokeless tobacco: Never    Tobacco comments:     I was a very light smoker for 5 years   Substance and Sexual Activity    Alcohol use: No    Drug use: No    Sexual activity: Not Currently     Partners: Male     Birth control/protection: Surgical     Comment: hysterectomy   Other Topics Concern    Are you pregnant or think you may be? No    Breast-feeding No     Social Drivers of Health     Financial Resource Strain: Low Risk  (4/6/2025)    Overall Financial Resource Strain (CARDIA)      Difficulty of Paying Living Expenses: Not hard at all   Food Insecurity: No Food Insecurity (4/6/2025)    Hunger Vital Sign     Worried About Running Out of Food in the Last Year: Never true     Ran Out of Food in the Last Year: Never true   Transportation Needs: No Transportation Needs (4/6/2025)    PRAPARE - Transportation     Lack of Transportation (Medical): No     Lack of Transportation (Non-Medical): No   Physical Activity: Inactive (4/6/2025)    Exercise Vital Sign     Days of Exercise per Week: 0 days     Minutes of Exercise per Session: 0 min   Stress: No Stress Concern Present (4/6/2025)    [employer] - Occupational Stress Questionnaire     Feeling of Stress : Only a little   Housing Stability: Low Risk  (4/6/2025)    Housing Stability Vital Sign     Unable to Pay for Housing in the Last Year: No     Number of Times Moved in the Last Year: 0     Homeless in the Last Year: No

## 2025-05-28 ENCOUNTER — INFUSION (OUTPATIENT)
Dept: INFUSION THERAPY | Facility: HOSPITAL | Age: 60
End: 2025-05-28
Attending: INTERNAL MEDICINE
Payer: MEDICARE

## 2025-05-28 VITALS
BODY MASS INDEX: 28.75 KG/M2 | WEIGHT: 183.19 LBS | OXYGEN SATURATION: 97 % | DIASTOLIC BLOOD PRESSURE: 66 MMHG | HEIGHT: 67 IN | RESPIRATION RATE: 16 BRPM | SYSTOLIC BLOOD PRESSURE: 113 MMHG | HEART RATE: 77 BPM | TEMPERATURE: 97 F

## 2025-05-28 DIAGNOSIS — Z17.0 MALIGNANT NEOPLASM OF CENTRAL PORTION OF RIGHT BREAST IN FEMALE, ESTROGEN RECEPTOR POSITIVE: Primary | ICD-10-CM

## 2025-05-28 DIAGNOSIS — C50.111 MALIGNANT NEOPLASM OF CENTRAL PORTION OF RIGHT BREAST IN FEMALE, ESTROGEN RECEPTOR POSITIVE: Primary | ICD-10-CM

## 2025-05-28 PROCEDURE — 96402 CHEMO HORMON ANTINEOPL SQ/IM: CPT

## 2025-05-28 PROCEDURE — 96401 CHEMO ANTI-NEOPL SQ/IM: CPT

## 2025-05-28 PROCEDURE — 63600175 PHARM REV CODE 636 W HCPCS: Mod: JZ,TB | Performed by: INTERNAL MEDICINE

## 2025-05-28 RX ORDER — LAMOTRIGINE 25 MG/1
500 TABLET ORAL
Status: COMPLETED | OUTPATIENT
Start: 2025-05-28 | End: 2025-05-28

## 2025-05-28 RX ADMIN — FULVESTRANT 500 MG: 50 INJECTION, SOLUTION INTRAMUSCULAR at 09:05

## 2025-05-28 NOTE — PLAN OF CARE
Problem: Adult Inpatient Plan of Care  Goal: Plan of Care Review  Description: Patient here today for her Faslodex injections.  Outcome: Progressing  Goal: Patient-Specific Goal (Individualized)  Description: Patient to tolerate injections well today.  Outcome: Progressing  Goal: Absence of Hospital-Acquired Illness or Injury  Outcome: Progressing  Goal: Optimal Comfort and Wellbeing  Description: Patient likes injections dorsogluteal and to stand during administration.  Outcome: Progressing     Problem: Fall Injury Risk  Goal: Absence of Fall and Fall-Related Injury  Outcome: Progressing

## 2025-05-28 NOTE — NURSING
InjectionS given without difficulties to bilateral buttocks. Bandaids applied. Patient instructed to stay in the clinic for 15 minutes. Patient verbalized understanding and will notify nurse with any complaints.

## 2025-06-03 RX ORDER — METHOCARBAMOL 750 MG/1
750 TABLET, FILM COATED ORAL 3 TIMES DAILY
Qty: 60 TABLET | Refills: 0 | Status: SHIPPED | OUTPATIENT
Start: 2025-06-03

## 2025-06-15 ENCOUNTER — PATIENT MESSAGE (OUTPATIENT)
Dept: ADMINISTRATIVE | Facility: OTHER | Age: 60
End: 2025-06-15
Payer: MEDICARE

## 2025-06-24 ENCOUNTER — RESULTS FOLLOW-UP (OUTPATIENT)
Dept: HEMATOLOGY/ONCOLOGY | Facility: CLINIC | Age: 60
End: 2025-06-24

## 2025-06-24 ENCOUNTER — LAB VISIT (OUTPATIENT)
Dept: LAB | Facility: HOSPITAL | Age: 60
End: 2025-06-24
Attending: INTERNAL MEDICINE
Payer: MEDICARE

## 2025-06-24 DIAGNOSIS — C50.111 MALIGNANT NEOPLASM OF CENTRAL PORTION OF RIGHT BREAST IN FEMALE, ESTROGEN RECEPTOR POSITIVE: ICD-10-CM

## 2025-06-24 DIAGNOSIS — Z17.0 MALIGNANT NEOPLASM OF CENTRAL PORTION OF RIGHT BREAST IN FEMALE, ESTROGEN RECEPTOR POSITIVE: ICD-10-CM

## 2025-06-24 LAB
ABSOLUTE EOSINOPHIL (OHS): 0.08 K/UL
ABSOLUTE MONOCYTE (OHS): 0.32 K/UL (ref 0.3–1)
ABSOLUTE NEUTROPHIL COUNT (OHS): 1 K/UL (ref 1.8–7.7)
ALBUMIN SERPL BCP-MCNC: 3.7 G/DL (ref 3.5–5.2)
ALP SERPL-CCNC: 46 UNIT/L (ref 40–150)
ALT SERPL W/O P-5'-P-CCNC: 19 UNIT/L (ref 10–44)
ANION GAP (OHS): 10 MMOL/L (ref 8–16)
AST SERPL-CCNC: 20 UNIT/L (ref 11–45)
BASOPHILS # BLD AUTO: 0.04 K/UL
BASOPHILS NFR BLD AUTO: 1.6 %
BILIRUB SERPL-MCNC: 0.6 MG/DL (ref 0.1–1)
BUN SERPL-MCNC: 15 MG/DL (ref 6–20)
CALCIUM SERPL-MCNC: 9.3 MG/DL (ref 8.7–10.5)
CHLORIDE SERPL-SCNC: 105 MMOL/L (ref 95–110)
CO2 SERPL-SCNC: 25 MMOL/L (ref 23–29)
CREAT SERPL-MCNC: 0.7 MG/DL (ref 0.5–1.4)
ERYTHROCYTE [DISTWIDTH] IN BLOOD BY AUTOMATED COUNT: 14.2 % (ref 11.5–14.5)
GFR SERPLBLD CREATININE-BSD FMLA CKD-EPI: >60 ML/MIN/1.73/M2
GLUCOSE SERPL-MCNC: 97 MG/DL (ref 70–110)
HCT VFR BLD AUTO: 37.4 % (ref 37–48.5)
HGB BLD-MCNC: 12.8 GM/DL (ref 12–16)
IMM GRANULOCYTES # BLD AUTO: 0 K/UL (ref 0–0.04)
IMM GRANULOCYTES NFR BLD AUTO: 0 % (ref 0–0.5)
LYMPHOCYTES # BLD AUTO: 1.02 K/UL (ref 1–4.8)
MCH RBC QN AUTO: 34 PG (ref 27–31)
MCHC RBC AUTO-ENTMCNC: 34.2 G/DL (ref 32–36)
MCV RBC AUTO: 100 FL (ref 82–98)
NUCLEATED RBC (/100WBC) (OHS): 0 /100 WBC
PLATELET # BLD AUTO: 197 K/UL (ref 150–450)
PMV BLD AUTO: 9 FL (ref 9.2–12.9)
POTASSIUM SERPL-SCNC: 4.2 MMOL/L (ref 3.5–5.1)
PROT SERPL-MCNC: 7.2 GM/DL (ref 6–8.4)
RBC # BLD AUTO: 3.76 M/UL (ref 4–5.4)
RELATIVE EOSINOPHIL (OHS): 3.3 %
RELATIVE LYMPHOCYTE (OHS): 41.5 % (ref 18–48)
RELATIVE MONOCYTE (OHS): 13 % (ref 4–15)
RELATIVE NEUTROPHIL (OHS): 40.6 % (ref 38–73)
SODIUM SERPL-SCNC: 140 MMOL/L (ref 136–145)
WBC # BLD AUTO: 2.46 K/UL (ref 3.9–12.7)

## 2025-06-24 PROCEDURE — 85025 COMPLETE CBC W/AUTO DIFF WBC: CPT | Mod: HCNC

## 2025-06-24 PROCEDURE — 80053 COMPREHEN METABOLIC PANEL: CPT | Mod: HCNC

## 2025-06-24 PROCEDURE — 36415 COLL VENOUS BLD VENIPUNCTURE: CPT | Mod: HCNC,PO

## 2025-06-24 NOTE — PROGRESS NOTES
Subjective:       Patient ID: Carole Moore is a 59 y.o. female.    Chief Complaint: Breast Cancer and Chemotherapy    Primary Oncologist/Hematologist: Dr. Khan    HPI: Ms. Moore is a pleasant 59 year old female who is following up for her metastatic breast cancer. She is on fulvestrant (faslodex) q 4 weeks, last one 5/28/25 + ibrance (decreased to 100mg) on days 1-21 on a 28 day cycle. She is also getting zometa q 3 months, last on 4/28/25. Recent PET 5/22/25 showing stable findings.   Cancer Hx: ddACT, R mastectomy, adjuvant radiation completed 2017  Arimidex  30Gy/10fx to T7-11 completed 10/31/22  Pmhx: follows with neurosurg, Dr. Pelayo for persistent back pain, possible kyphoplasty in the future. colonoscopy on 12/27/23, recommended repeat in 5 years.     Today: She continues to take calcium and vitamin D. She will start ibrance days 1-21 on 28 day cycle today. She states she feels good. She denies any fevers, illnesses, n/v/d/c. She states her appetite is good and she has been staying hydrated.     Social History     Socioeconomic History    Marital status:    Tobacco Use    Smoking status: Never     Passive exposure: Never    Smokeless tobacco: Never    Tobacco comments:     I was a very light smoker for 5 years   Substance and Sexual Activity    Alcohol use: No    Drug use: No    Sexual activity: Not Currently     Partners: Male     Birth control/protection: Surgical     Comment: hysterectomy   Other Topics Concern    Are you pregnant or think you may be? No    Breast-feeding No     Social Drivers of Health     Financial Resource Strain: Low Risk  (4/6/2025)    Overall Financial Resource Strain (CARDIA)     Difficulty of Paying Living Expenses: Not hard at all   Food Insecurity: No Food Insecurity (4/6/2025)    Hunger Vital Sign     Worried About Running Out of Food in the Last Year: Never true     Ran Out of Food in the Last Year: Never true   Transportation Needs: No Transportation  Needs (4/6/2025)    PRAPARE - Transportation     Lack of Transportation (Medical): No     Lack of Transportation (Non-Medical): No   Physical Activity: Inactive (4/6/2025)    Exercise Vital Sign     Days of Exercise per Week: 0 days     Minutes of Exercise per Session: 0 min   Stress: No Stress Concern Present (4/6/2025)    [employer] [place] [employer] - Occupational Stress Questionnaire     Feeling of Stress : Only a little   Housing Stability: Low Risk  (4/6/2025)    Housing Stability Vital Sign     Unable to Pay for Housing in the Last Year: No     Number of Times Moved in the Last Year: 0     Homeless in the Last Year: No       Past Medical History:   Diagnosis Date    Antineoplastic chemotherapy induced anemia     Back pain     Breast cancer 2016    right breast    Cancer     R Breast cancer    CVA (cerebral infarction)     Diverticulosis     Family history of colon cancer 10/30/2018    Her mother and father both had colon cancer.    Genetic testing of female 12/27/2016    [place] (28 genes) - NEGATIVE    Hyperlipidemia     Immunodeficiency due to chemotherapy 10/03/2022    Nausea after anesthesia     Paralysis     right side    PONV (postoperative nausea and vomiting)     Stroke 2001    R side weakness    Trouble in sleeping        Family History   Problem Relation Name Age of Onset    Breast cancer Paternal Aunt      Colon cancer Father      Colon cancer Mother Kathy Emil     Cancer Mother Kathy Emil         colon cancer    Melanoma Maternal Uncle      Skin cancer Maternal Uncle         Past Surgical History:   Procedure Laterality Date    AUGMENTATION OF BREAST Left 2017    BREAST SURGERY      COLONOSCOPY N/A 10/30/2018    Procedure: COLONOSCOPY;  Surgeon: Cosme Monson MD;  Location: [facility];  Service: Endoscopy;  Laterality: N/A;    COLONOSCOPY N/A 12/27/2023    Procedure: COLONOSCOPY;  Surgeon: Caro Leo MD;  Location: [facility] ENDO;  Service: Endoscopy;  Laterality: N/A;     COSMETIC SURGERY  December 2017    Breast Reconstruction after Mastectomy    CYSTOSCOPY WITH BIOPSY OF BLADDER N/A 11/29/2021    Procedure: CYSTOSCOPY, WITH BLADDER BIOPSY, WITH FULGURATION IF INDICATED;  Surgeon: Page Marcelo MD;  Location: [facility] OR;  Service: Urology;  Laterality: N/A;    FIXATION KYPHOPLASTY Bilateral 02/09/2023    Procedure: KYPHOPLASTY;  Surgeon: Josse Pelayo MD;  Location: [facility] OR;  Service: Neurosurgery;  Laterality: Bilateral;  Kyphoplasty T10 with ablation    HYSTERECTOMY  2007    maintains both ovaries, menorrhagia    MASTECTOMY Right 2017    MEDIPORT INSERTION, SINGLE      RADIOFREQUENCY ABLATION, BONE, PERCUTANEOUS Bilateral 02/09/2023    Procedure: RADIOFREQUENCY ABLATION,BONE,PERCUTANEOUS;  Surgeon: Josse Pelayo MD;  Location: [facility] OR;  Service: Neurosurgery;  Laterality: Bilateral;    TONSILLECTOMY         Review of Systems   Constitutional:  Negative for activity change, appetite change, chills, diaphoresis, fatigue, fever and unexpected weight change.   HENT:  Negative for congestion, nosebleeds and rhinorrhea.    Respiratory:  Negative for cough and shortness of breath.    Cardiovascular:  Negative for chest pain and leg swelling.   Gastrointestinal:  Negative for abdominal pain, anal bleeding, blood in stool, constipation, diarrhea, nausea and vomiting.   Genitourinary:  Negative for hematuria.   Musculoskeletal:  Positive for arthralgias and back pain.   Skin:  Negative for color change and pallor.   Allergic/Immunologic: Positive for immunocompromised state.   Neurological:  Negative for dizziness, light-headedness and headaches.         Medication List with Changes/Refills   Current Medications    ASCORBIC ACID, VITAMIN C, ORAL    Take by mouth once daily.    ASPIRIN (ECOTRIN) 81 MG EC TABLET    Take 81 mg by mouth once daily.    ATORVASTATIN (LIPITOR) 10 MG TABLET    Take 1 tablet by mouth once daily    CALCIUM CARBONATE ORAL    Take 1 tablet by mouth once  daily.    CETIRIZINE (ZYRTEC) 10 MG TABLET    Take 10 mg by mouth daily as needed.    CHOLECALCIFEROL, VITAMIN D3, ORAL    Take by mouth once daily.    FULVESTRANT (FASLODEX) 250 MG/5 ML INJECTION    Inject 250 mg into the muscle every 30 days.    GADAVIST 10 MMOL/10 ML (1 MMOL/ML) SOLN INJECTION    every 6 (six) months. Once yearly    MECLIZINE (ANTIVERT) 25 MG TABLET    Take 25 mg by mouth 3 (three) times daily as needed.    METHOCARBAMOL (ROBAXIN) 750 MG TAB    TAKE 1 TABLET BY MOUTH THREE TIMES DAILY    MULTIVITAMIN CAPSULE    Take 1 capsule by mouth once daily.    MUPIROCIN (BACTROBAN) 2 % OINTMENT    AAA bid prn sores from cryotherapy treatment. Antibiotic ointment.    NITROFURANTOIN, MACROCRYSTAL-MONOHYDRATE, (MACROBID) 100 MG CAPSULE    Take 1 capsule (100 mg total) by mouth 2 (two) times daily.    ONDANSETRON (ZOFRAN) 4 MG TABLET    Take 1 tablet (4 mg total) by mouth every 8 (eight) hours as needed for Nausea.    PALBOCICLIB (IBRANCE) 100 MG CAP    Take 100 mg by mouth Daily On days 1-21 of a 28 day cycle..    POLYETHYLENE GLYCOL (GLYCOLAX) 17 GRAM/DOSE POWDER    Take 17 g by mouth once daily.    PROCHLORPERAZINE (COMPAZINE) 5 MG TABLET    TAKE 1 TABLET BY MOUTH 4 TIMES DAILY AS NEEDED FOR NAUSEA    VALACYCLOVIR (VALTREX) 500 MG TABLET    Take 1 tablet (500 mg total) by mouth once daily.    ZOLEDRONIC 4 MG/100 ML PGBK INFUSION         Objective:     Vitals:    06/25/25 0922   BP: 115/75   Pulse: 80   Temp: 97.6 °F (36.4 °C)       Physical Exam  Vitals reviewed.   Constitutional:       General: She is not in acute distress.     Appearance: She is not ill-appearing, toxic-appearing or diaphoretic.   HENT:      Head: Normocephalic and atraumatic.   Cardiovascular:      Rate and Rhythm: Normal rate.   Musculoskeletal:      Right lower leg: No edema.      Left lower leg: No edema.   Skin:     General: Skin is warm.      Coloration: Skin is not jaundiced or pale.      Findings: No bruising, erythema, lesion or  rash.   Neurological:      Mental Status: She is alert.      Motor: No weakness.      Gait: Gait normal.   Psychiatric:         Mood and Affect: Mood normal.         Behavior: Behavior normal.         Thought Content: Thought content normal.          Labs/Results:  Lab Results   Component Value Date    WBC 2.46 (L) 06/24/2025    RBC 3.76 (L) 06/24/2025    HGB 12.8 06/24/2025    HCT 37.4 06/24/2025     (H) 06/24/2025    MCH 34.0 (H) 06/24/2025    MCHC 34.2 06/24/2025    RDW 14.2 06/24/2025     06/24/2025    MPV 9.0 (L) 06/24/2025    GRAN 0.6 03/27/2025    LYMPH 41.5 06/24/2025    LYMPH 1.02 06/24/2025    MONO 13.0 06/24/2025    MONO 0.32 06/24/2025    EOS 3.3 06/24/2025    EOS 0.08 06/24/2025    BASO 0.05 02/27/2025    EOSINOPHIL 3.3 02/27/2025    BASOPHIL 1.6 06/24/2025    BASOPHIL 0.04 06/24/2025     CMP  Sodium   Date Value Ref Range Status   06/24/2025 140 136 - 145 mmol/L Final   02/27/2025 143 136 - 145 mmol/L Final     Potassium   Date Value Ref Range Status   06/24/2025 4.2 3.5 - 5.1 mmol/L Final   02/27/2025 4.7 3.5 - 5.1 mmol/L Final     Chloride   Date Value Ref Range Status   06/24/2025 105 95 - 110 mmol/L Final   02/27/2025 109 95 - 110 mmol/L Final     CO2   Date Value Ref Range Status   06/24/2025 25 23 - 29 mmol/L Final   02/27/2025 26 23 - 29 mmol/L Final     Glucose   Date Value Ref Range Status   06/24/2025 97 70 - 110 mg/dL Final   02/27/2025 72 70 - 110 mg/dL Final     BUN   Date Value Ref Range Status   06/24/2025 15 6 - 20 mg/dL Final     Creatinine   Date Value Ref Range Status   06/24/2025 0.7 0.5 - 1.4 mg/dL Final     Calcium   Date Value Ref Range Status   06/24/2025 9.3 8.7 - 10.5 mg/dL Final   02/27/2025 9.5 8.7 - 10.5 mg/dL Final     Protein Total   Date Value Ref Range Status   06/24/2025 7.2 6.0 - 8.4 gm/dL Final     Total Protein   Date Value Ref Range Status   02/27/2025 6.9 6.0 - 8.4 g/dL Final     Albumin   Date Value Ref Range Status   06/24/2025 3.7 3.5 - 5.2 g/dL  Final   02/27/2025 3.6 3.5 - 5.2 g/dL Final     Total Bilirubin   Date Value Ref Range Status   02/27/2025 0.6 0.1 - 1.0 mg/dL Final     Comment:     For infants and newborns, interpretation of results should be based  on gestational age, weight and in agreement with clinical  observations.    Premature Infant recommended reference ranges:  Up to 24 hours.............<8.0 mg/dL  Up to 48 hours............<12.0 mg/dL  3-5 days..................<15.0 mg/dL  6-29 days.................<15.0 mg/dL       Bilirubin Total   Date Value Ref Range Status   06/24/2025 0.6 0.1 - 1.0 mg/dL Final     Comment:     For infants and newborns, interpretation of results should be based   on gestational age, weight and in agreement with clinical   observations.    Premature Infant recommended reference ranges:   0-24 hours:  <8.0 mg/dL   24-48 hours: <12.0 mg/dL   3-5 days:    <15.0 mg/dL   6-29 days:   <15.0 mg/dL     Alkaline Phosphatase   Date Value Ref Range Status   02/27/2025 53 40 - 150 U/L Final     ALP   Date Value Ref Range Status   06/24/2025 46 40 - 150 unit/L Final     AST   Date Value Ref Range Status   06/24/2025 20 11 - 45 unit/L Final   02/27/2025 15 10 - 40 U/L Final     ALT   Date Value Ref Range Status   06/24/2025 19 10 - 44 unit/L Final   02/27/2025 17 10 - 44 U/L Final     Anion Gap   Date Value Ref Range Status   06/24/2025 10 8 - 16 mmol/L Final     eGFR   Date Value Ref Range Status   06/24/2025 >60 >60 mL/min/1.73/m2 Final     Comment:     Estimated GFR calculated using the CKD-EPI creatinine (2021) equation.   02/27/2025 >60 >60 mL/min/1.73 m^2 Final     Pet scan 5/22/25  Impression:  Redemonstration of the findings compatible with previous osseous metastatic disease without interval detrimental change or increasing uptake compared to the prior study.    Assessment:     Problem List Items Addressed This Visit       Malignant neoplasm of central portion of right breast in female, estrogen receptor positive -  Primary    Osteopenia of multiple sites    Immunodeficiency due to chemotherapy     Plan:     Malignant neoplasm of central portion of right breast in female, estrogen receptor positive, Use of aromatase inhibitors, Secondary malignant neoplasm of bone  --continue with zometa q 3 months. Last given 4/28/25  --continue with fulvestrant (faslodex) q 4 weeks, last one 5/28/25  --continue with ibrance on days 1-21 on a 28 day cycle  --Recent PET scan 5/22/25. Redemonstration of the findings compatible with previous osseous metastatic disease without interval detrimental change or increasing uptake compared to the prior study..  --fever precautions given  --continue with calcium and vitamin D supplement  --continue to follow with breast surg for imaging and breast exams  --last mammogram 11/29/23 negative--> scheduled for 12/8/25.    -- colonoscopy on 12/27/23: diverticulosis, polyp, recommended to repeat in 5 years  --continue with Dr. Pelayo for back pain    Follow-Up:  faslodex monthly. 1 month with cbc cmp prior with primary oncologist for next cycle + zometa. Standing orders in    Vicky Jimenez PA-C  Hematology Oncology    Route Chart for Scheduling    Med Onc Chart Routing      Follow up with physician . 1 month with cbc cmp prior for next cycle and zometa   Follow up with [facility]    Infusion scheduling note   1 months for faslodex and zometa   Injection scheduling note    Labs CBC and CMP   Scheduling:  Preferred lab:  Lab interval:  standing orders in   Imaging    Pharmacy appointment    Other referrals                Treatment Plan Information   OP PALBOCICLIB FULVESTRANT Q4W Harpal Khan MD   Associated diagnosis: Malignant neoplasm of central portion of right breast in female, estrogen receptor positive Stage IV T3, N0, M1 noted on 11/7/2016   Line of treatment: First Line  Treatment Goal: Control     Upcoming Treatment Dates - OP PALBOCICLIB FULVESTRANT Q4W    6/23/2025       Chemotherapy       fulvestrant  (FASLODEX) injection 500 mg  7/21/2025       Chemotherapy       fulvestrant (FASLODEX) injection 500 mg  8/18/2025       Chemotherapy       fulvestrant (FASLODEX) injection 500 mg  9/15/2025       Chemotherapy       fulvestrant (FASLODEX) injection 500 mg    Supportive Plan Information  OP ZOLEDRONIC ACID (ZOMETA) Q4W Harpal Khan MD   Associated Diagnosis: Malignant neoplasm of central portion of right breast in female, estrogen receptor positive Stage IV T3, N0, M1 noted on 11/7/2016  Associated Diagnosis: Secondary malignant neoplasm of bone   noted on 1/12/2023   Line of treatment: Supportive Care   Treatment goal: Supportive     Upcoming Treatment Dates - OP ZOLEDRONIC ACID (ZOMETA) Q4W    7/27/2025       Medications       zoledronic acid (ZOMETA) 4 mg in 0.9% NaCl 100 mL IVPB  10/25/2025       Medications       zoledronic acid (ZOMETA) 4 mg in 0.9% NaCl 100 mL IVPB  1/23/2026       Medications       zoledronic acid (ZOMETA) 4 mg in 0.9% NaCl 100 mL IVPB  4/23/2026       Medications       zoledronic acid (ZOMETA) 4 mg in 0.9% NaCl 100 mL IVPB

## 2025-06-25 ENCOUNTER — OFFICE VISIT (OUTPATIENT)
Dept: HEMATOLOGY/ONCOLOGY | Facility: CLINIC | Age: 60
End: 2025-06-25
Payer: MEDICARE

## 2025-06-25 ENCOUNTER — INFUSION (OUTPATIENT)
Dept: INFUSION THERAPY | Facility: HOSPITAL | Age: 60
End: 2025-06-25
Attending: INTERNAL MEDICINE
Payer: MEDICARE

## 2025-06-25 VITALS
HEIGHT: 67 IN | OXYGEN SATURATION: 99 % | BODY MASS INDEX: 28.2 KG/M2 | WEIGHT: 179.69 LBS | DIASTOLIC BLOOD PRESSURE: 75 MMHG | HEIGHT: 67 IN | TEMPERATURE: 97 F | RESPIRATION RATE: 16 BRPM | SYSTOLIC BLOOD PRESSURE: 104 MMHG | HEART RATE: 80 BPM | DIASTOLIC BLOOD PRESSURE: 69 MMHG | OXYGEN SATURATION: 99 % | BODY MASS INDEX: 28.2 KG/M2 | WEIGHT: 179.69 LBS | SYSTOLIC BLOOD PRESSURE: 115 MMHG | HEART RATE: 77 BPM | TEMPERATURE: 98 F

## 2025-06-25 DIAGNOSIS — T45.1X5A IMMUNODEFICIENCY DUE TO CHEMOTHERAPY: ICD-10-CM

## 2025-06-25 DIAGNOSIS — Z79.69 IMMUNODEFICIENCY DUE TO CHEMOTHERAPY: ICD-10-CM

## 2025-06-25 DIAGNOSIS — C50.111 MALIGNANT NEOPLASM OF CENTRAL PORTION OF RIGHT BREAST IN FEMALE, ESTROGEN RECEPTOR POSITIVE: Primary | ICD-10-CM

## 2025-06-25 DIAGNOSIS — Z17.0 MALIGNANT NEOPLASM OF CENTRAL PORTION OF RIGHT BREAST IN FEMALE, ESTROGEN RECEPTOR POSITIVE: Primary | ICD-10-CM

## 2025-06-25 DIAGNOSIS — M85.89 OSTEOPENIA OF MULTIPLE SITES: ICD-10-CM

## 2025-06-25 DIAGNOSIS — D84.821 IMMUNODEFICIENCY DUE TO CHEMOTHERAPY: ICD-10-CM

## 2025-06-25 PROCEDURE — 63600175 PHARM REV CODE 636 W HCPCS: Mod: JZ,TB,HCNC | Performed by: INTERNAL MEDICINE

## 2025-06-25 PROCEDURE — 99999 PR PBB SHADOW E&M-EST. PATIENT-LVL IV: CPT | Mod: PBBFAC,HCNC,,

## 2025-06-25 PROCEDURE — 96402 CHEMO HORMON ANTINEOPL SQ/IM: CPT | Mod: HCNC

## 2025-06-25 RX ORDER — LAMOTRIGINE 25 MG/1
500 TABLET ORAL
Status: COMPLETED | OUTPATIENT
Start: 2025-06-25 | End: 2025-06-25

## 2025-06-25 RX ADMIN — FULVESTRANT 500 MG: 50 INJECTION, SOLUTION INTRAMUSCULAR at 10:06

## 2025-06-25 NOTE — DISCHARGE INSTRUCTIONS
Thank you for letting me take care of YOU!!    [facility]  [address] [facility]  [id number] [place] [address] Drive  [phone] phone     [phone] fax  Hours of Operation: Monday- Friday 8:00am- 5:00pm  After hours phone  [phone]  Hematology / Oncology Physicians on call:    Dr. Bill Cash      Nurse Practitioners:    Bianca Mendoza, GIULIANO Miranda, TRI Rojas, GIULIANO Perry, GIULIANO aCmilo, GIULIANO      Please don't hesitate to call if you have any concerns.

## 2025-06-25 NOTE — PLAN OF CARE
Injection given without difficulties. Bandaid x 2 applied. Patient instructed to stay in the clinic for 15 minutes. Patient verbalized understanding and will notify nurse with any complaints. Pt refused wait time.         Problem: Adult Inpatient Plan of Care  Goal: Plan of Care Review  Outcome: Progressing  Flowsheets (Taken 6/25/2025 1044)  Plan of Care Reviewed With: patient  Goal: Patient-Specific Goal (Individualized)  Outcome: Progressing  Flowsheets (Taken 6/25/2025 1044)  Individualized Care Needs: pt denies  Anxieties, Fears or Concerns: pt denies  Patient/Family-Specific Goals (Include Timeframe): pt to tolerate injections well today with no adverse reactions.  Goal: Absence of Hospital-Acquired Illness or Injury  Outcome: Progressing  Intervention: Identify and Manage Fall Risk  Flowsheets (Taken 6/25/2025 1044)  Safety Promotion/Fall Prevention:   assistive device/personal item within reach   in recliner, wheels locked   nonskid shoes/socks when out of bed   instructed to call staff for mobility   lighting adjusted   medications reviewed  Intervention: Prevent Infection  Flowsheets (Taken 6/25/2025 1044)  Infection Prevention:   environmental surveillance performed   equipment surfaces disinfected   hand hygiene promoted   personal protective equipment utilized  Goal: Optimal Comfort and Wellbeing  Outcome: Progressing  Intervention: Monitor Pain and Promote Comfort  Flowsheets (Taken 6/25/2025 1044)  Pain Management Interventions:   position adjusted   relaxation techniques promoted   quiet environment facilitated  Intervention: Provide Person-Centered Care  Flowsheets (Taken 6/25/2025 1044)  Trust Relationship/Rapport:   care explained   choices provided   emotional support provided   empathic listening provided   questions answered   questions encouraged   reassurance provided   thoughts/feelings acknowledged

## 2025-07-11 RX ORDER — METHOCARBAMOL 750 MG/1
750 TABLET, FILM COATED ORAL 3 TIMES DAILY
Qty: 60 TABLET | Refills: 0 | Status: SHIPPED | OUTPATIENT
Start: 2025-07-11

## 2025-07-11 NOTE — TELEPHONE ENCOUNTER
Care Due:                  Date            Visit Type   Department     Provider  --------------------------------------------------------------------------------                                MYCHART                              ANNUAL                              CHECKUP/PHY  [facility] INTERNAL  Last Visit: 01-      S            MEDICINE       Angel Emery                              EP -                              PRIMARY      [facility] INTERNAL  Next Visit: 10-      CARE ([facility])   MEDICINE       Angel Emery                                                            Last  Test          Frequency    Reason                     Performed    Due Date  --------------------------------------------------------------------------------    Lipid Panel.  12 months..  atorvastatin.............  07-   07-    [facility] Embedded Care Due Messages. Reference number: [id number].   7/11/2025 9:38:05 AM CDT

## 2025-07-14 ENCOUNTER — PATIENT MESSAGE (OUTPATIENT)
Dept: ADMINISTRATIVE | Facility: OTHER | Age: 60
End: 2025-07-14
Payer: MEDICARE

## 2025-07-22 ENCOUNTER — LAB VISIT (OUTPATIENT)
Dept: LAB | Facility: HOSPITAL | Age: 60
End: 2025-07-22
Attending: INTERNAL MEDICINE
Payer: MEDICARE

## 2025-07-22 DIAGNOSIS — Z17.0 MALIGNANT NEOPLASM OF CENTRAL PORTION OF RIGHT BREAST IN FEMALE, ESTROGEN RECEPTOR POSITIVE: ICD-10-CM

## 2025-07-22 DIAGNOSIS — C50.111 MALIGNANT NEOPLASM OF CENTRAL PORTION OF RIGHT BREAST IN FEMALE, ESTROGEN RECEPTOR POSITIVE: ICD-10-CM

## 2025-07-22 LAB
ABSOLUTE EOSINOPHIL (OHS): 0.06 K/UL
ABSOLUTE MONOCYTE (OHS): 0.37 K/UL (ref 0.3–1)
ABSOLUTE NEUTROPHIL COUNT (OHS): 0.87 K/UL (ref 1.8–7.7)
ALBUMIN SERPL BCP-MCNC: 3.7 G/DL (ref 3.5–5.2)
ALP SERPL-CCNC: 44 UNIT/L (ref 40–150)
ALT SERPL W/O P-5'-P-CCNC: 24 UNIT/L (ref 10–44)
ANION GAP (OHS): 10 MMOL/L (ref 8–16)
AST SERPL-CCNC: 23 UNIT/L (ref 11–45)
BASOPHILS # BLD AUTO: 0.04 K/UL
BASOPHILS NFR BLD AUTO: 1.7 %
BILIRUB SERPL-MCNC: 0.6 MG/DL (ref 0.1–1)
BUN SERPL-MCNC: 12 MG/DL (ref 6–20)
CALCIUM SERPL-MCNC: 9.5 MG/DL (ref 8.7–10.5)
CHLORIDE SERPL-SCNC: 106 MMOL/L (ref 95–110)
CO2 SERPL-SCNC: 24 MMOL/L (ref 23–29)
CREAT SERPL-MCNC: 0.7 MG/DL (ref 0.5–1.4)
ERYTHROCYTE [DISTWIDTH] IN BLOOD BY AUTOMATED COUNT: 14.6 % (ref 11.5–14.5)
GFR SERPLBLD CREATININE-BSD FMLA CKD-EPI: >60 ML/MIN/1.73/M2
GLUCOSE SERPL-MCNC: 110 MG/DL (ref 70–110)
HCT VFR BLD AUTO: 37.5 % (ref 37–48.5)
HGB BLD-MCNC: 12.5 GM/DL (ref 12–16)
IMM GRANULOCYTES # BLD AUTO: 0 K/UL (ref 0–0.04)
IMM GRANULOCYTES NFR BLD AUTO: 0 % (ref 0–0.5)
LYMPHOCYTES # BLD AUTO: 1.04 K/UL (ref 1–4.8)
MCH RBC QN AUTO: 34.2 PG (ref 27–31)
MCHC RBC AUTO-ENTMCNC: 33.3 G/DL (ref 32–36)
MCV RBC AUTO: 103 FL (ref 82–98)
NUCLEATED RBC (/100WBC) (OHS): 0 /100 WBC
PLATELET # BLD AUTO: 209 K/UL (ref 150–450)
PMV BLD AUTO: 9.1 FL (ref 9.2–12.9)
POTASSIUM SERPL-SCNC: 4.3 MMOL/L (ref 3.5–5.1)
PROT SERPL-MCNC: 7.1 GM/DL (ref 6–8.4)
RBC # BLD AUTO: 3.65 M/UL (ref 4–5.4)
RELATIVE EOSINOPHIL (OHS): 2.5 %
RELATIVE LYMPHOCYTE (OHS): 43.7 % (ref 18–48)
RELATIVE MONOCYTE (OHS): 15.5 % (ref 4–15)
RELATIVE NEUTROPHIL (OHS): 36.6 % (ref 38–73)
SODIUM SERPL-SCNC: 140 MMOL/L (ref 136–145)
WBC # BLD AUTO: 2.38 K/UL (ref 3.9–12.7)

## 2025-07-22 PROCEDURE — 36415 COLL VENOUS BLD VENIPUNCTURE: CPT | Mod: HCNC,PO

## 2025-07-22 PROCEDURE — 80053 COMPREHEN METABOLIC PANEL: CPT | Mod: HCNC

## 2025-07-22 PROCEDURE — 85025 COMPLETE CBC W/AUTO DIFF WBC: CPT | Mod: HCNC

## 2025-07-23 ENCOUNTER — INFUSION (OUTPATIENT)
Dept: INFUSION THERAPY | Facility: HOSPITAL | Age: 60
End: 2025-07-23
Attending: INTERNAL MEDICINE
Payer: MEDICARE

## 2025-07-23 ENCOUNTER — OFFICE VISIT (OUTPATIENT)
Dept: HEMATOLOGY/ONCOLOGY | Facility: CLINIC | Age: 60
End: 2025-07-23
Payer: MEDICARE

## 2025-07-23 VITALS
RESPIRATION RATE: 18 BRPM | OXYGEN SATURATION: 99 % | DIASTOLIC BLOOD PRESSURE: 66 MMHG | WEIGHT: 181.19 LBS | HEIGHT: 67 IN | BODY MASS INDEX: 28.44 KG/M2 | SYSTOLIC BLOOD PRESSURE: 95 MMHG | TEMPERATURE: 98 F | HEART RATE: 70 BPM

## 2025-07-23 DIAGNOSIS — C50.111 MALIGNANT NEOPLASM OF CENTRAL PORTION OF RIGHT BREAST IN FEMALE, ESTROGEN RECEPTOR POSITIVE: Primary | ICD-10-CM

## 2025-07-23 DIAGNOSIS — T45.1X5A IMMUNODEFICIENCY DUE TO CHEMOTHERAPY: ICD-10-CM

## 2025-07-23 DIAGNOSIS — Z17.0 MALIGNANT NEOPLASM OF CENTRAL PORTION OF RIGHT BREAST IN FEMALE, ESTROGEN RECEPTOR POSITIVE: Primary | ICD-10-CM

## 2025-07-23 DIAGNOSIS — D84.821 IMMUNODEFICIENCY DUE TO CHEMOTHERAPY: ICD-10-CM

## 2025-07-23 DIAGNOSIS — C79.51 SECONDARY MALIGNANT NEOPLASM OF BONE: ICD-10-CM

## 2025-07-23 DIAGNOSIS — Z79.69 IMMUNODEFICIENCY DUE TO CHEMOTHERAPY: ICD-10-CM

## 2025-07-23 DIAGNOSIS — C79.51 SECONDARY CANCER OF BONE: ICD-10-CM

## 2025-07-23 PROCEDURE — 63600175 PHARM REV CODE 636 W HCPCS: Mod: HCNC | Performed by: INTERNAL MEDICINE

## 2025-07-23 PROCEDURE — 96402 CHEMO HORMON ANTINEOPL SQ/IM: CPT | Mod: HCNC

## 2025-07-23 PROCEDURE — 96374 THER/PROPH/DIAG INJ IV PUSH: CPT | Mod: HCNC

## 2025-07-23 RX ORDER — SODIUM CHLORIDE 0.9 % (FLUSH) 0.9 %
10 SYRINGE (ML) INJECTION
OUTPATIENT
Start: 2025-07-27

## 2025-07-23 RX ORDER — LAMOTRIGINE 25 MG/1
500 TABLET ORAL
Status: COMPLETED | OUTPATIENT
Start: 2025-07-23 | End: 2025-07-23

## 2025-07-23 RX ORDER — LAMOTRIGINE 25 MG/1
500 TABLET ORAL
Status: CANCELLED | OUTPATIENT
Start: 2025-07-23

## 2025-07-23 RX ORDER — ZOLEDRONIC ACID 0.04 MG/ML
4 INJECTION, SOLUTION INTRAVENOUS
Status: COMPLETED | OUTPATIENT
Start: 2025-07-23 | End: 2025-07-23

## 2025-07-23 RX ORDER — HEPARIN 100 UNIT/ML
500 SYRINGE INTRAVENOUS
OUTPATIENT
Start: 2025-07-27

## 2025-07-23 RX ADMIN — ZOLEDRONIC ACID 4 MG: 0.04 INJECTION, SOLUTION INTRAVENOUS at 09:07

## 2025-07-23 RX ADMIN — FULVESTRANT 500 MG: 50 INJECTION, SOLUTION INTRAMUSCULAR at 09:07

## 2025-07-23 NOTE — DISCHARGE INSTRUCTIONS
Thank you for allowing me to care for you today,  SOILA YapN, RN    [facility]  [address] [address]  [phone] phone     [phone] fax  Hours of Operation: Monday- Friday 7:00am- 5:30pm  After hours phone  [phone]  Hematology / Oncology Physicians on call      TRI Torrez Dr., Dr., Dr., Dr., Dr., Dr., Dr., Dr., Dr., Dr., Dr., Dr., Dr., Dr., Dr., GIULIANO Valdivia, GIULIANO Vieyra, GIULIANO Camilo, NP  Please call with any concerns regarding your appointment today.   FALL PREVENTION   Falls often occur due to slipping, tripping or losing your balance. Here are ways to reduce your risk of falling again.   Was there anything that caused your fall that can be fixed, removed or replaced?   Make your home safe by keeping walkways clear of objects you may trip over.   Use non-slip pads under rugs.   Do not walk in poorly lit areas.   Do not stand on chairs or wobbly ladders.   Use caution when reaching overhead or looking upward. This position can cause a loss of balance.   Be sure your shoes fit properly, have non-slip bottoms and are in good condition.   Be cautious when going up and down stairs, curbs, and when walking on uneven sidewalks.   If your balance is poor, consider using a cane or walker.   If your fall was related to alcohol use, stop or limit alcohol intake.   If your fall was related to use of sleeping medicines, talk to your doctor about this. You may need to reduce your dosage at bedtime if you awaken during the night to go to the bathroom.   To reduce the need for nighttime bathroom trips:   Avoid drinking fluids for several hours before going to bed   Empty your bladder before going to bed   Men can keep a urinal at the bedside   © 7725-3157 [employer], [address] [place] [address], [place] [address]. All rights reserved. This information is not intended as a  substitute for professional medical care. Always follow your healthcare professional's instructions.

## 2025-07-23 NOTE — NURSING
0928: Faslodex Injection given without difficulties.Bandaid applied to bilateral buttocks. Patient instructed to stay in the clinic for 15 minutes. Patient verbalized understanding and will notify nurse with any complaints.

## 2025-07-23 NOTE — PROGRESS NOTES
Subjective:       Patient ID: Carole Moore is a 60 y.o. female.    Chief Complaint: Results, Chemotherapy, and Breast Cancer    HPI:  60-year-old female history of metastatic HER2 positive breast cancer continues with Faslodex on a monthly basis Ibrance 100 mg days 1 through 21 on a 28 day cycle seen in virtual visit ECOG status 1    Past Medical History:   Diagnosis Date    Antineoplastic chemotherapy induced anemia     Back pain     Breast cancer 2016    right breast    Cancer     R Breast cancer    CVA (cerebral infarction)     Diverticulosis     Family history of colon cancer 10/30/2018    Her mother and father both had colon cancer.    Genetic testing of female 12/27/2016    [place] (28 genes) - NEGATIVE    Hyperlipidemia     Immunodeficiency due to chemotherapy 10/03/2022    Nausea after anesthesia     Paralysis     right side    PONV (postoperative nausea and vomiting)     Stroke 2001    R side weakness    Trouble in sleeping      Family History   Problem Relation Name Age of Onset    Breast cancer Paternal Aunt      Colon cancer Father      Colon cancer Mother Kathy Stein     Cancer Mother Kathy Stein         colon cancer    Melanoma Maternal Uncle      Skin cancer Maternal Uncle       Social History[1]  Past Surgical History:   Procedure Laterality Date    AUGMENTATION OF BREAST Left 2017    BREAST SURGERY      COLONOSCOPY N/A 10/30/2018    Procedure: COLONOSCOPY;  Surgeon: Cosme Monson MD;  Location: [facility];  Service: Endoscopy;  Laterality: N/A;    COLONOSCOPY N/A 12/27/2023    Procedure: COLONOSCOPY;  Surgeon: Caro Leo MD;  Location: [facility] ENDO;  Service: Endoscopy;  Laterality: N/A;    COSMETIC SURGERY  December 2017    Breast Reconstruction after Mastectomy    CYSTOSCOPY WITH BIOPSY OF BLADDER N/A 11/29/2021    Procedure: CYSTOSCOPY, WITH BLADDER BIOPSY, WITH FULGURATION IF INDICATED;  Surgeon: Page Marcelo MD;  Location: [facility] OR;  Service: Urology;  Laterality:  "N/A;    FIXATION KYPHOPLASTY Bilateral 02/09/2023    Procedure: KYPHOPLASTY;  Surgeon: Josse Pelayo MD;  Location: [facility] OR;  Service: Neurosurgery;  Laterality: Bilateral;  Kyphoplasty T10 with ablation    HYSTERECTOMY  2007    maintains both ovaries, menorrhagia    MASTECTOMY Right 2017    MEDIPORT INSERTION, SINGLE      RADIOFREQUENCY ABLATION, BONE, PERCUTANEOUS Bilateral 02/09/2023    Procedure: RADIOFREQUENCY ABLATION,BONE,PERCUTANEOUS;  Surgeon: Josse Pelayo MD;  Location: [facility] OR;  Service: Neurosurgery;  Laterality: Bilateral;    TONSILLECTOMY         Labs:  Lab Results   Component Value Date    WBC 2.38 (L) 07/22/2025    HGB 12.5 07/22/2025    HCT 37.5 07/22/2025     (H) 07/22/2025     07/22/2025     BMP  Lab Results   Component Value Date     07/22/2025    K 4.3 07/22/2025     07/22/2025    CO2 24 07/22/2025    BUN 12 07/22/2025    CREATININE 0.7 07/22/2025    CALCIUM 9.5 07/22/2025    ANIONGAP 10 07/22/2025    ESTGFRAFRICA >60 05/12/2022    EGFRNONAA >60 05/12/2022     Lab Results   Component Value Date    ALT 24 07/22/2025    AST 23 07/22/2025    ALKPHOS 44 07/22/2025    BILITOT 0.6 07/22/2025       No results found for: "IRON", "TIBC", "FERRITIN", "SATURATEDIRO"  No results found for: "SJVNHQTU27"  No results found for: "FOLATE"  Lab Results   Component Value Date    TSH 0.707 05/27/2025         Review of Systems   Constitutional:  Negative for activity change, appetite change, chills, diaphoresis, fatigue, fever and unexpected weight change.   HENT:  Negative for congestion, dental problem, drooling, ear discharge, ear pain, facial swelling, hearing loss, mouth sores, nosebleeds, postnasal drip, rhinorrhea, sinus pressure, sneezing, sore throat, tinnitus, trouble swallowing and voice change.    Eyes:  Negative for photophobia, pain, discharge, redness, itching and visual disturbance.   Respiratory:  Negative for cough, choking, chest tightness, shortness of breath, " wheezing and stridor.    Cardiovascular:  Negative for chest pain, palpitations and leg swelling.   Gastrointestinal:  Negative for abdominal distention, abdominal pain, anal bleeding, blood in stool, constipation, diarrhea, nausea, rectal pain and vomiting.   Endocrine: Negative for cold intolerance, heat intolerance, polydipsia, polyphagia and polyuria.   Genitourinary:  Negative for decreased urine volume, difficulty urinating, dyspareunia, dysuria, enuresis, flank pain, frequency, genital sores, hematuria, menstrual problem, pelvic pain, urgency, vaginal bleeding, vaginal discharge and vaginal pain.   Musculoskeletal:  Negative for arthralgias, back pain, gait problem, joint swelling, myalgias, neck pain and neck stiffness.   Skin:  Negative for color change, pallor and rash.   Allergic/Immunologic: Negative for environmental allergies, food allergies and immunocompromised state.   Neurological:  Negative for dizziness, tremors, seizures, syncope, facial asymmetry, speech difficulty, weakness, light-headedness, numbness and headaches.   Hematological:  Negative for adenopathy. Does not bruise/bleed easily.   Psychiatric/Behavioral:  Negative for agitation, behavioral problems, confusion, decreased concentration, dysphoric mood, hallucinations, self-injury, sleep disturbance and suicidal ideas. The patient is not nervous/anxious and is not hyperactive.        Objective:      Physical Exam  Constitutional:       Appearance: Normal appearance.   Neurological:      Mental Status: She is alert.             Assessment:      1. Malignant neoplasm of central portion of right breast in female, estrogen receptor positive    2. Secondary cancer of bone    3. Secondary malignant neoplasm of bone    4. Immunodeficiency due to chemotherapy           Med Onc Chart Routing      Follow up with physician . Return to clinic in 1 month CBC CMP   Follow up with [facility]    Infusion scheduling note    Injection scheduling note Faslodex  monthly   Labs    Imaging    Pharmacy appointment    Other referrals                 Plan:     The patient location is:  [place]  The chief complaint leading to consultation is:  Breast cancer    Visit type: audiovisual    Face to Face time with patient: 30 minutes of total time spent on the encounter, which includes face to face time and non-face to face time preparing to see the patient (eg, review of tests), Obtaining and/or reviewing separately obtained history, Documenting clinical information in the electronic or other health record, Independently interpreting results (not separately reported) and communicating results to the patient/family/caregiver, or Care coordination (not separately reported).         Each patient to whom he or she provides medical services by telemedicine is:  (1) informed of the relationship between the physician and patient and the respective role of any other health care provider with respect to management of the patient; and (2) notified that he or she may decline to receive medical services by telemedicine and may withdraw from such care at any time.    Notes:  Extensive conversation with her basically her ANC is less than a 1000 I have told him the hold her Ibrance for one-week in then take Ibrance 100 mg days 1 through 14 on a 28 day cycle will recheck in a month to see whether not this will be a new dosing for her or we need to reduced dose of Ibrance to 75 mcg discussed implications Faslodex orders signed recent PET scan stable findings        Harpal Khan Jr, MD FACP         [1]   Social History  Socioeconomic History    Marital status:    Tobacco Use    Smoking status: Never     Passive exposure: Never    Smokeless tobacco: Never    Tobacco comments:     I was a very light smoker for 5 years   Substance and Sexual Activity    Alcohol use: No    Drug use: No    Sexual activity: Not Currently     Partners: Male     Birth control/protection: Surgical     Comment:  hysterectomy   Other Topics Concern    Are you pregnant or think you may be? No    Breast-feeding No     Social Drivers of Health     Financial Resource Strain: Low Risk  (4/6/2025)    Overall Financial Resource Strain (CARDIA)     Difficulty of Paying Living Expenses: Not hard at all   Food Insecurity: No Food Insecurity (4/6/2025)    Hunger Vital Sign     Worried About Running Out of Food in the Last Year: Never true     Ran Out of Food in the Last Year: Never true   Transportation Needs: No Transportation Needs (4/6/2025)    PRAPARE - Transportation     Lack of Transportation (Medical): No     Lack of Transportation (Non-Medical): No   Physical Activity: Inactive (4/6/2025)    Exercise Vital Sign     Days of Exercise per Week: 0 days     Minutes of Exercise per Session: 0 min   Stress: No Stress Concern Present (4/6/2025)    [employer] [place] [employer] - Occupational Stress Questionnaire     Feeling of Stress : Only a little   Housing Stability: Low Risk  (4/6/2025)    Housing Stability Vital Sign     Unable to Pay for Housing in the Last Year: No     Number of Times Moved in the Last Year: 0     Homeless in the Last Year: No

## 2025-07-23 NOTE — PLAN OF CARE
"Pt is stable, pt administered Faslodex/Zometa. Pt voiced she was doing "great." Pt will follow up in four weeks. Plan of care reviewed with patient. Discussed if there are any new or ongoing concerns.        Problem: Adult Inpatient Plan of Care  Goal: Plan of Care Review  Outcome: Progressing  Goal: Patient-Specific Goal (Individualized)  Outcome: Progressing  Flowsheets (Taken 7/23/2025 1035)  Individualized Care Needs: Pt provided pillow, warm blanket, legs elevated in reclined position. Ice water.  Anxieties, Fears or Concerns: Pt denies.  Patient/Family-Specific Goals (Include Timeframe): Pt will tolerate Faslodex/Zometa without adverse reactions.  Goal: Optimal Comfort and Wellbeing  Outcome: Progressing     Problem: Fall Injury Risk  Goal: Absence of Fall and Fall-Related Injury  Outcome: Progressing     Problem: Infection  Goal: Absence of Infection Signs and Symptoms  Outcome: Progressing     "

## 2025-07-30 ENCOUNTER — TELEPHONE (OUTPATIENT)
Dept: HEMATOLOGY/ONCOLOGY | Facility: CLINIC | Age: 60
End: 2025-07-30
Payer: MEDICARE

## 2025-07-30 NOTE — TELEPHONE ENCOUNTER
Call to patient to address recent distress screening.    Pt says she worries a lot. Sometimes she will go to bed and have trouble going to sleep because her mind is racing. This does not happen every night and she does get some good nights' sleep but some times she tosses and turns. She worries about her health and upcoming appointments. Normalized health anxiety; biggest concern is when it begins to impact QOL/daily life (ex. chronic insomnia). Will send her a message on the patient portal with SW contact info and will remain available for ongoing support as needed.        7/19/2025     9:08 AM 6/25/2025     9:55 AM 6/25/2025     9:23 AM 5/27/2025     2:31 PM 5/24/2025    11:47 AM 4/25/2025     7:22 PM 3/27/2025     8:10 AM   DISTRESS SCREENING   Distress Score 7 0 - No Distress 0 - No Distress 2 2 2 0 - No Distress   Practical Concerns None of these  None of these None of these None of these None of these None of these   Social Concerns None of these  None of these None of these None of these None of these None of these   Emotional Concerns Worry or anxiety  None of these Worry or anxiety Worry or anxiety Worry or anxiety None of these   Spiritual or Protestant Concerns None of these  None of these None of these None of these None of these None of these   Physical Concerns None of these  None of these None of these None of these Pain None of these

## 2025-07-31 DIAGNOSIS — E78.2 MIXED HYPERLIPIDEMIA: ICD-10-CM

## 2025-07-31 RX ORDER — ATORVASTATIN CALCIUM 10 MG/1
10 TABLET, FILM COATED ORAL
Qty: 90 TABLET | Refills: 0 | Status: SHIPPED | OUTPATIENT
Start: 2025-07-31

## 2025-07-31 NOTE — TELEPHONE ENCOUNTER
Refill Routing Note   Medication(s) are not appropriate for processing by [facility] for the following reason(s):        Required labs outdated    [facility] action(s):  Defer               Appointments  past 12m or future 3m with PCP    Date Provider   Last Visit   1/9/2025 Angel Emery MD   Next Visit   10/2/2025 Angel Emery MD   ED visits in past 90 days: 0        Note composed:10:15 AM 07/31/2025

## 2025-07-31 NOTE — TELEPHONE ENCOUNTER
No care due was identified.  [facility] Embedded Care Due Messages. Reference number: [id number].   7/31/2025 7:01:04 AM CDT

## 2025-08-12 ENCOUNTER — PATIENT MESSAGE (OUTPATIENT)
Dept: ADMINISTRATIVE | Facility: OTHER | Age: 60
End: 2025-08-12
Payer: MEDICARE

## 2025-08-19 RX ORDER — METHOCARBAMOL 750 MG/1
750 TABLET, FILM COATED ORAL 3 TIMES DAILY
Qty: 60 TABLET | Refills: 0 | Status: SHIPPED | OUTPATIENT
Start: 2025-08-19

## 2025-08-20 ENCOUNTER — OFFICE VISIT (OUTPATIENT)
Dept: HEMATOLOGY/ONCOLOGY | Facility: CLINIC | Age: 60
End: 2025-08-20
Payer: MEDICARE

## 2025-08-20 ENCOUNTER — INFUSION (OUTPATIENT)
Dept: INFUSION THERAPY | Facility: HOSPITAL | Age: 60
End: 2025-08-20
Attending: INTERNAL MEDICINE
Payer: MEDICARE

## 2025-08-20 VITALS
OXYGEN SATURATION: 97 % | DIASTOLIC BLOOD PRESSURE: 66 MMHG | TEMPERATURE: 98 F | WEIGHT: 182.13 LBS | HEIGHT: 67 IN | SYSTOLIC BLOOD PRESSURE: 97 MMHG | HEART RATE: 77 BPM | BODY MASS INDEX: 28.58 KG/M2

## 2025-08-20 VITALS
DIASTOLIC BLOOD PRESSURE: 72 MMHG | WEIGHT: 182.13 LBS | HEART RATE: 83 BPM | HEIGHT: 67 IN | SYSTOLIC BLOOD PRESSURE: 109 MMHG | RESPIRATION RATE: 18 BRPM | TEMPERATURE: 98 F | OXYGEN SATURATION: 97 % | BODY MASS INDEX: 28.58 KG/M2

## 2025-08-20 DIAGNOSIS — Z17.0 MALIGNANT NEOPLASM OF CENTRAL PORTION OF RIGHT BREAST IN FEMALE, ESTROGEN RECEPTOR POSITIVE: ICD-10-CM

## 2025-08-20 DIAGNOSIS — C50.111 MALIGNANT NEOPLASM OF CENTRAL PORTION OF RIGHT BREAST IN FEMALE, ESTROGEN RECEPTOR POSITIVE: Primary | ICD-10-CM

## 2025-08-20 DIAGNOSIS — Z17.0 MALIGNANT NEOPLASM OF CENTRAL PORTION OF RIGHT BREAST IN FEMALE, ESTROGEN RECEPTOR POSITIVE: Primary | ICD-10-CM

## 2025-08-20 DIAGNOSIS — Z79.69 IMMUNODEFICIENCY DUE TO CHEMOTHERAPY: Primary | ICD-10-CM

## 2025-08-20 DIAGNOSIS — C50.111 MALIGNANT NEOPLASM OF CENTRAL PORTION OF RIGHT BREAST IN FEMALE, ESTROGEN RECEPTOR POSITIVE: ICD-10-CM

## 2025-08-20 DIAGNOSIS — T45.1X5A IMMUNODEFICIENCY DUE TO CHEMOTHERAPY: Primary | ICD-10-CM

## 2025-08-20 DIAGNOSIS — D84.821 IMMUNODEFICIENCY DUE TO CHEMOTHERAPY: Primary | ICD-10-CM

## 2025-08-20 PROCEDURE — 99999 PR PBB SHADOW E&M-EST. PATIENT-LVL IV: CPT | Mod: PBBFAC,HCNC,,

## 2025-08-20 PROCEDURE — 99215 OFFICE O/P EST HI 40 MIN: CPT | Mod: HCNC,S$GLB,,

## 2025-08-20 PROCEDURE — 3078F DIAST BP <80 MM HG: CPT | Mod: CPTII,HCNC,S$GLB,

## 2025-08-20 PROCEDURE — 1159F MED LIST DOCD IN RCRD: CPT | Mod: CPTII,HCNC,S$GLB,

## 2025-08-20 PROCEDURE — 63600175 PHARM REV CODE 636 W HCPCS: Mod: JZ,TB,HCNC

## 2025-08-20 PROCEDURE — 3008F BODY MASS INDEX DOCD: CPT | Mod: CPTII,HCNC,S$GLB,

## 2025-08-20 PROCEDURE — 3074F SYST BP LT 130 MM HG: CPT | Mod: CPTII,HCNC,S$GLB,

## 2025-08-20 PROCEDURE — 96402 CHEMO HORMON ANTINEOPL SQ/IM: CPT | Mod: HCNC

## 2025-08-20 RX ORDER — LAMOTRIGINE 25 MG/1
500 TABLET ORAL
Status: CANCELLED | OUTPATIENT
Start: 2025-08-20 | End: 2025-08-20

## 2025-08-20 RX ORDER — LAMOTRIGINE 25 MG/1
500 TABLET ORAL
Status: COMPLETED | OUTPATIENT
Start: 2025-08-20 | End: 2025-08-20

## 2025-08-20 RX ADMIN — FULVESTRANT 500 MG: 50 INJECTION, SOLUTION INTRAMUSCULAR at 09:08

## (undated) DEVICE — Device

## (undated) DEVICE — SKIN MARKER DEVON 160

## (undated) DEVICE — NDL SAFETY 25G X 1.5 ECLIPSE

## (undated) DEVICE — GOWN POLY REINF BRTH SLV XL

## (undated) DEVICE — MANIFOLD 4 PORT

## (undated) DEVICE — GOWN SMARTGOWN LVL4 X-LONG XL

## (undated) DEVICE — ALCOHOL 70% ISOP RUBBING 4OZ

## (undated) DEVICE — SEE MEDLINE ITEM 152487

## (undated) DEVICE — KIT KYPHOPAK 1ST FX CDS 15/2

## (undated) DEVICE — GAUZE SPONGE PEANUT STRL

## (undated) DEVICE — SUT MONOCRYL 4.0 PS2 CP496G

## (undated) DEVICE — SPONGE DERMACEA 4X4IN 12PLY

## (undated) DEVICE — SUT VICRYL 2-0 SH VCP317H

## (undated) DEVICE — SYR 50CC LL

## (undated) DEVICE — SOL IRR GLY D RX 1.5%

## (undated) DEVICE — SUTURE PDS II 3-0 PS-2 CLR

## (undated) DEVICE — SET IRR URLGY 2LINE UNIV SPIKE

## (undated) DEVICE — ADHESIVE MASTISOL VIAL 48/BX

## (undated) DEVICE — SEE MEDLINE ITEM 157137

## (undated) DEVICE — DURAPREP SURG SCRUB 26ML

## (undated) DEVICE — SUT VICRYL PLUS 3-0 SH 18IN

## (undated) DEVICE — SPONGE LAP 18X18 PREWASHED

## (undated) DEVICE — BRA MAMMARY SUPPORT MED

## (undated) DEVICE — APPLICATOR CHLORAPREP ORN 26ML

## (undated) DEVICE — SEE MEDLINE ITEM 154981

## (undated) DEVICE — DRAPE MOBILE C-ARM

## (undated) DEVICE — COVER OVERHEAD SURG LT BLUE

## (undated) DEVICE — SEE MEDLINE ITEM 157117

## (undated) DEVICE — GLOVE BIOGEL ECLIPSE SZ 8

## (undated) DEVICE — SEE MEDLINE ITEM 157027

## (undated) DEVICE — STAPLER SKIN PROXIMATE WIDE

## (undated) DEVICE — GLOVE ATTENATON RADIATION 8 1

## (undated) DEVICE — SYR 10CC LUER LOCK

## (undated) DEVICE — GAUZE SPONGE 4X4 12PLY

## (undated) DEVICE — SUT CTD VICRYL 3-0 UND SUTU

## (undated) DEVICE — GLOVE SURG BIOGEL LATEX SZ 7.5

## (undated) DEVICE — ADHESIVE DERMABOND ADVANCED

## (undated) DEVICE — COVER LIGHT HANDLE 80/CA

## (undated) DEVICE — PROBE OSTEOCOOL 2 RFA 17G 10MM

## (undated) DEVICE — SUT 2/0 30IN SILK BLK BRAI

## (undated) DEVICE — SET SECONDARY UNIVERSAL

## (undated) DEVICE — SOL WATER STRL IRR 1000ML

## (undated) DEVICE — CONTAINER SPECIMEN STRL 4OZ

## (undated) DEVICE — TOWEL OR DISP STRL BLUE 4/PK

## (undated) DEVICE — NDL SPINAL SPINOCAN 22GX3.5

## (undated) DEVICE — EVACUATOR WOUND BULB 100CC

## (undated) DEVICE — UROVIEW 2600/2800

## (undated) DEVICE — SYR ONLY LUER LOCK 20CC

## (undated) DEVICE — SOL NS 1000CC

## (undated) DEVICE — UNDERGLOVES BIOGEL PI SIZE 8

## (undated) DEVICE — BUFFER FORMALIN 10% 30ML W/CAP

## (undated) DEVICE — KIT BONE ACCESS 10G 090

## (undated) DEVICE — DRAPE LAP T SHT W/ INSTR PAD

## (undated) DEVICE — REMOVER LOTION

## (undated) DEVICE — STOCKINETTE IMPERVIOUS MEDIUM

## (undated) DEVICE — SEE MEDLINE ITEM 157185

## (undated) DEVICE — ELECTRODE REM PLYHSV RETURN 9

## (undated) DEVICE — UNDERGLOVE BIOGEL PI SZ 6.5 LF

## (undated) DEVICE — BOWL STERILE LARGE 32OZ

## (undated) DEVICE — STOPCOCK DISCOFIX 3 WAY

## (undated) DEVICE — CLOSURE SKIN STERI STRIP 1/2X4

## (undated) DEVICE — PAD ABD 8X10 STERILE

## (undated) DEVICE — PENCIL ELECTROCAUTERY W/ HLSTR

## (undated) DEVICE — TAPE CLOTH SOFT MEDIPORE 4IN

## (undated) DEVICE — DRAPE SURG W/TWL 17 5/8X23

## (undated) DEVICE — DRAPE INCISE IOBAN 2 23X17IN

## (undated) DEVICE — KYPHON CEMENT DELIVERY SYSTEM

## (undated) DEVICE — SUT CTD VICRYL 2-0 UND SUTU

## (undated) DEVICE — SOL IRR NACL .9% 3000ML

## (undated) DEVICE — PACK BASIC SETUP SC BR

## (undated) DEVICE — SET IRRIGATION WARMING NORMAL

## (undated) DEVICE — CLIPPER BLADE MOD 4406 (CAREF)

## (undated) DEVICE — SEE MEDLINE ITEM 146345

## (undated) DEVICE — APPLIER CLIP LIAGCLIP 9.375IN

## (undated) DEVICE — GLOVE SURGICAL LATEX SZ 7

## (undated) DEVICE — CARTRIDGE KYPHON CEMENT DEL

## (undated) DEVICE — SUT VICRYL 2-0 27 CT-1

## (undated) DEVICE — SEE MEDLINE ITEM 152622